# Patient Record
Sex: FEMALE | Race: WHITE | Employment: OTHER | URBAN - METROPOLITAN AREA
[De-identification: names, ages, dates, MRNs, and addresses within clinical notes are randomized per-mention and may not be internally consistent; named-entity substitution may affect disease eponyms.]

---

## 2017-01-06 ENCOUNTER — HOSPITAL ENCOUNTER (OUTPATIENT)
Dept: LAB | Age: 62
Discharge: HOME | End: 2017-01-06

## 2017-01-06 LAB
INR PPP: 1.3 (ref 0.9–1.2)
PROTHROMBIN TIME: 13.8 SEC (ref 9.6–12)

## 2017-01-06 PROCEDURE — 85610 PROTHROMBIN TIME: CPT | Performed by: GENERAL PRACTICE

## 2017-01-17 ENCOUNTER — HOSPITAL ENCOUNTER (OUTPATIENT)
Dept: LAB | Age: 62
Discharge: HOME | End: 2017-01-17

## 2017-01-17 LAB
INR PPP: 2 (ref 0.9–1.2)
PROTHROMBIN TIME: 21.9 SEC (ref 9.6–12)

## 2017-01-17 PROCEDURE — 85610 PROTHROMBIN TIME: CPT

## 2017-02-17 ENCOUNTER — HOSPITAL ENCOUNTER (OUTPATIENT)
Dept: LAB | Age: 62
Discharge: HOME | End: 2017-02-17

## 2017-02-17 LAB
INR PPP: 8.1 (ref 0.9–1.2)
PROTHROMBIN TIME: >82 SEC (ref 9.6–12)

## 2017-02-17 PROCEDURE — 85610 PROTHROMBIN TIME: CPT

## 2017-04-11 ENCOUNTER — HOSPITAL ENCOUNTER (OUTPATIENT)
Dept: LAB | Age: 62
Discharge: HOME | End: 2017-04-11

## 2017-04-11 LAB
ERYTHROCYTE [DISTWIDTH] IN BLOOD BY AUTOMATED COUNT: 18.8 % (ref 11.9–14.6)
HCT VFR BLD AUTO: 41.4 % (ref 35.8–46.3)
HGB BLD-MCNC: 13.1 G/DL (ref 11.7–15.4)
MCH RBC QN AUTO: 28 PG (ref 26.1–32.9)
MCHC RBC AUTO-ENTMCNC: 31.6 G/DL (ref 31.4–35)
MCV RBC AUTO: 88.5 FL (ref 79.6–97.8)
PLATELET # BLD AUTO: 429 K/UL (ref 150–450)
PMV BLD AUTO: 11 FL (ref 10.8–14.1)
RBC # BLD AUTO: 4.68 M/UL (ref 4.05–5.25)
WBC # BLD AUTO: 11.8 K/UL (ref 4.3–11.1)

## 2017-04-11 PROCEDURE — 85027 COMPLETE CBC AUTOMATED: CPT | Performed by: GENERAL PRACTICE

## 2017-05-23 ENCOUNTER — HOSPITAL ENCOUNTER (OUTPATIENT)
Dept: LAB | Age: 62
Discharge: HOME | End: 2017-05-23

## 2017-09-30 ENCOUNTER — HOSPITAL ENCOUNTER (OUTPATIENT)
Dept: LAB | Age: 62
Discharge: HOME | End: 2017-09-30

## 2017-09-30 LAB
ALBUMIN SERPL-MCNC: 3.3 G/DL (ref 3.2–4.6)
ALBUMIN/GLOB SERPL: 0.8 {RATIO} (ref 1.2–3.5)
ALP SERPL-CCNC: 162 U/L (ref 50–136)
ALT SERPL-CCNC: 31 U/L (ref 12–65)
ANION GAP SERPL CALC-SCNC: 10 MMOL/L (ref 7–16)
AST SERPL-CCNC: 37 U/L (ref 15–37)
BILIRUB SERPL-MCNC: 0.4 MG/DL (ref 0.2–1.1)
BUN SERPL-MCNC: 8 MG/DL (ref 8–23)
CALCIUM SERPL-MCNC: 8.9 MG/DL (ref 8.3–10.4)
CHLORIDE SERPL-SCNC: 93 MMOL/L (ref 98–107)
CO2 SERPL-SCNC: 32 MMOL/L (ref 21–32)
CREAT SERPL-MCNC: 0.6 MG/DL (ref 0.6–1)
ERYTHROCYTE [DISTWIDTH] IN BLOOD BY AUTOMATED COUNT: 14.8 % (ref 11.9–14.6)
GLOBULIN SER CALC-MCNC: 4.4 G/DL (ref 2.3–3.5)
GLUCOSE SERPL-MCNC: 269 MG/DL (ref 65–100)
HCT VFR BLD AUTO: 48.5 % (ref 35.8–46.3)
HGB BLD-MCNC: 15.4 G/DL (ref 11.7–15.4)
MCH RBC QN AUTO: 29.8 PG (ref 26.1–32.9)
MCHC RBC AUTO-ENTMCNC: 31.8 G/DL (ref 31.4–35)
MCV RBC AUTO: 93.8 FL (ref 79.6–97.8)
PLATELET # BLD AUTO: 369 K/UL (ref 150–450)
PMV BLD AUTO: 11.2 FL (ref 10.8–14.1)
POTASSIUM SERPL-SCNC: 3.6 MMOL/L (ref 3.5–5.1)
PROT SERPL-MCNC: 7.7 G/DL (ref 6.3–8.2)
RBC # BLD AUTO: 5.17 M/UL (ref 4.05–5.25)
SODIUM SERPL-SCNC: 135 MMOL/L (ref 136–145)
WBC # BLD AUTO: 10.9 K/UL (ref 4.3–11.1)

## 2017-09-30 PROCEDURE — 80053 COMPREHEN METABOLIC PANEL: CPT | Performed by: GENERAL PRACTICE

## 2017-09-30 PROCEDURE — 85027 COMPLETE CBC AUTOMATED: CPT | Performed by: GENERAL PRACTICE

## 2018-04-30 ENCOUNTER — HOSPITAL ENCOUNTER (OUTPATIENT)
Dept: LAB | Age: 63
Discharge: HOME | End: 2018-04-30

## 2018-04-30 LAB
AMORPH CRY URNS QL MICRO: ABNORMAL
APPEARANCE UR: ABNORMAL
BACTERIA URNS QL MICRO: ABNORMAL /HPF
BILIRUB UR QL: NEGATIVE
CASTS URNS QL MICRO: ABNORMAL /LPF
COLOR UR: YELLOW
EPI CELLS #/AREA URNS HPF: ABNORMAL /HPF
GLUCOSE UR STRIP.AUTO-MCNC: 250 MG/DL
HGB UR QL STRIP: ABNORMAL
KETONES UR QL STRIP.AUTO: NEGATIVE MG/DL
LEUKOCYTE ESTERASE UR QL STRIP.AUTO: ABNORMAL
NITRITE UR QL STRIP.AUTO: POSITIVE
PH UR STRIP: 8 [PH] (ref 5–9)
PROT UR STRIP-MCNC: NEGATIVE MG/DL
RBC #/AREA URNS HPF: ABNORMAL /HPF
SP GR UR REFRACTOMETRY: 1.01 (ref 1–1.02)
UROBILINOGEN UR QL STRIP.AUTO: 1 EU/DL (ref 0.2–1)
WBC URNS QL MICRO: ABNORMAL /HPF

## 2018-04-30 PROCEDURE — 81001 URINALYSIS AUTO W/SCOPE: CPT | Performed by: GENERAL PRACTICE

## 2018-06-27 ENCOUNTER — HOSPITAL ENCOUNTER (OUTPATIENT)
Dept: LAB | Age: 63
Discharge: HOME | End: 2018-06-27

## 2018-06-27 LAB
AMORPH CRY URNS QL MICRO: ABNORMAL
ANION GAP SERPL CALC-SCNC: 13 MMOL/L (ref 7–16)
APPEARANCE UR: ABNORMAL
BACTERIA URNS QL MICRO: ABNORMAL /HPF
BASOPHILS # BLD: 0.1 K/UL (ref 0–0.2)
BASOPHILS NFR BLD: 0 % (ref 0–2)
BILIRUB UR QL: NEGATIVE
BUN SERPL-MCNC: 16 MG/DL (ref 8–23)
CALCIUM SERPL-MCNC: 8.9 MG/DL (ref 8.3–10.4)
CHLORIDE SERPL-SCNC: 104 MMOL/L (ref 98–107)
CO2 SERPL-SCNC: 26 MMOL/L (ref 21–32)
COLOR UR: YELLOW
CREAT SERPL-MCNC: 0.57 MG/DL (ref 0.6–1)
CRYSTALS URNS QL MICRO: ABNORMAL /LPF
DIFFERENTIAL METHOD BLD: ABNORMAL
EOSINOPHIL # BLD: 0.4 K/UL (ref 0–0.8)
EOSINOPHIL NFR BLD: 3 % (ref 0.5–7.8)
EPI CELLS #/AREA URNS HPF: ABNORMAL /HPF
ERYTHROCYTE [DISTWIDTH] IN BLOOD BY AUTOMATED COUNT: 16.3 % (ref 11.9–14.6)
GLUCOSE SERPL-MCNC: 151 MG/DL (ref 65–100)
GLUCOSE UR STRIP.AUTO-MCNC: NEGATIVE MG/DL
HCT VFR BLD AUTO: 45.7 % (ref 35.8–46.3)
HGB BLD-MCNC: 14.4 G/DL (ref 11.7–15.4)
HGB UR QL STRIP: NEGATIVE
IMM GRANULOCYTES # BLD: 0.1 K/UL (ref 0–0.5)
IMM GRANULOCYTES NFR BLD AUTO: 1 % (ref 0–5)
KETONES UR QL STRIP.AUTO: NEGATIVE MG/DL
LEUKOCYTE ESTERASE UR QL STRIP.AUTO: ABNORMAL
LYMPHOCYTES # BLD: 3.1 K/UL (ref 0.5–4.6)
LYMPHOCYTES NFR BLD: 20 % (ref 13–44)
MCH RBC QN AUTO: 29.4 PG (ref 26.1–32.9)
MCHC RBC AUTO-ENTMCNC: 31.5 G/DL (ref 31.4–35)
MCV RBC AUTO: 93.5 FL (ref 79.6–97.8)
MONOCYTES # BLD: 0.7 K/UL (ref 0.1–1.3)
MONOCYTES NFR BLD: 5 % (ref 4–12)
NEUTS SEG # BLD: 10.9 K/UL (ref 1.7–8.2)
NEUTS SEG NFR BLD: 71 % (ref 43–78)
NITRITE UR QL STRIP.AUTO: POSITIVE
PH UR STRIP: 8.5 [PH] (ref 5–9)
PLATELET # BLD AUTO: 416 K/UL (ref 150–450)
PMV BLD AUTO: 11.6 FL (ref 10.8–14.1)
POTASSIUM SERPL-SCNC: 5.1 MMOL/L (ref 3.5–5.1)
PROT UR STRIP-MCNC: NEGATIVE MG/DL
RBC # BLD AUTO: 4.89 M/UL (ref 4.05–5.25)
RBC #/AREA URNS HPF: ABNORMAL /HPF
SODIUM SERPL-SCNC: 143 MMOL/L (ref 136–145)
SP GR UR REFRACTOMETRY: 1.01 (ref 1–1.02)
UROBILINOGEN UR QL STRIP.AUTO: 1 EU/DL (ref 0.2–1)
WBC # BLD AUTO: 15.2 K/UL (ref 4.3–11.1)
WBC URNS QL MICRO: ABNORMAL /HPF

## 2018-06-27 PROCEDURE — 85025 COMPLETE CBC W/AUTO DIFF WBC: CPT | Performed by: GENERAL PRACTICE

## 2018-06-27 PROCEDURE — 80048 BASIC METABOLIC PNL TOTAL CA: CPT | Performed by: GENERAL PRACTICE

## 2018-06-27 PROCEDURE — 81001 URINALYSIS AUTO W/SCOPE: CPT | Performed by: GENERAL PRACTICE

## 2018-07-02 ENCOUNTER — HOSPITAL ENCOUNTER (OUTPATIENT)
Dept: LAB | Age: 63
Discharge: HOME | End: 2018-07-02

## 2018-07-02 LAB
ALBUMIN SERPL-MCNC: 3.2 G/DL (ref 3.2–4.6)
ALBUMIN/GLOB SERPL: 0.8 {RATIO} (ref 1.2–3.5)
ALP SERPL-CCNC: 117 U/L (ref 50–136)
ALT SERPL-CCNC: 48 U/L (ref 12–65)
ANION GAP SERPL CALC-SCNC: 11 MMOL/L (ref 7–16)
APPEARANCE UR: CLEAR
AST SERPL-CCNC: 41 U/L (ref 15–37)
BILIRUB SERPL-MCNC: 0.3 MG/DL (ref 0.2–1.1)
BILIRUB UR QL: NEGATIVE
BUN SERPL-MCNC: 12 MG/DL (ref 8–23)
CALCIUM SERPL-MCNC: 8.6 MG/DL (ref 8.3–10.4)
CHLORIDE SERPL-SCNC: 102 MMOL/L (ref 98–107)
CO2 SERPL-SCNC: 27 MMOL/L (ref 21–32)
COLOR UR: YELLOW
CREAT SERPL-MCNC: 0.67 MG/DL (ref 0.6–1)
ERYTHROCYTE [DISTWIDTH] IN BLOOD BY AUTOMATED COUNT: 15.5 % (ref 11.9–14.6)
GLOBULIN SER CALC-MCNC: 4 G/DL (ref 2.3–3.5)
GLUCOSE SERPL-MCNC: 236 MG/DL (ref 65–100)
GLUCOSE UR STRIP.AUTO-MCNC: >1000 MG/DL
HCT VFR BLD AUTO: 43.8 % (ref 35.8–46.3)
HGB BLD-MCNC: 13.9 G/DL (ref 11.7–15.4)
HGB UR QL STRIP: NEGATIVE
KETONES UR QL STRIP.AUTO: NEGATIVE MG/DL
LEUKOCYTE ESTERASE UR QL STRIP.AUTO: NEGATIVE
MCH RBC QN AUTO: 29.5 PG (ref 26.1–32.9)
MCHC RBC AUTO-ENTMCNC: 31.7 G/DL (ref 31.4–35)
MCV RBC AUTO: 93 FL (ref 79.6–97.8)
NITRITE UR QL STRIP.AUTO: NEGATIVE
PH UR STRIP: 6.5 [PH] (ref 5–9)
PLATELET # BLD AUTO: 433 K/UL (ref 150–450)
PMV BLD AUTO: 11.4 FL (ref 10.8–14.1)
POTASSIUM SERPL-SCNC: 4.2 MMOL/L (ref 3.5–5.1)
PROT SERPL-MCNC: 7.2 G/DL (ref 6.3–8.2)
PROT UR STRIP-MCNC: NEGATIVE MG/DL
RBC # BLD AUTO: 4.71 M/UL (ref 4.05–5.25)
SODIUM SERPL-SCNC: 140 MMOL/L (ref 136–145)
SP GR UR REFRACTOMETRY: 1.02 (ref 1–1.02)
UROBILINOGEN UR QL STRIP.AUTO: 1 EU/DL (ref 0.2–1)
WBC # BLD AUTO: 11.6 K/UL (ref 4.3–11.1)

## 2018-07-02 PROCEDURE — 85027 COMPLETE CBC AUTOMATED: CPT | Performed by: GENERAL PRACTICE

## 2018-07-02 PROCEDURE — 80053 COMPREHEN METABOLIC PANEL: CPT | Performed by: GENERAL PRACTICE

## 2018-07-02 PROCEDURE — 81003 URINALYSIS AUTO W/O SCOPE: CPT | Performed by: GENERAL PRACTICE

## 2018-07-30 ENCOUNTER — HOSPITAL ENCOUNTER (OUTPATIENT)
Dept: LAB | Age: 63
Discharge: HOME | End: 2018-07-30

## 2018-07-30 LAB
APPEARANCE UR: ABNORMAL
BACTERIA URNS QL MICRO: ABNORMAL /HPF
BILIRUB UR QL: NEGATIVE
CASTS URNS QL MICRO: ABNORMAL /LPF
COLOR UR: YELLOW
EPI CELLS #/AREA URNS HPF: ABNORMAL /HPF
GLUCOSE UR STRIP.AUTO-MCNC: 250 MG/DL
HGB UR QL STRIP: NEGATIVE
KETONES UR QL STRIP.AUTO: NEGATIVE MG/DL
LEUKOCYTE ESTERASE UR QL STRIP.AUTO: ABNORMAL
NITRITE UR QL STRIP.AUTO: NEGATIVE
PH UR STRIP: 5.5 [PH] (ref 5–9)
PROT UR STRIP-MCNC: ABNORMAL MG/DL
RBC #/AREA URNS HPF: ABNORMAL /HPF
SP GR UR REFRACTOMETRY: 1.01 (ref 1–1.02)
UROBILINOGEN UR QL STRIP.AUTO: 0.2 EU/DL (ref 0.2–1)
WBC URNS QL MICRO: ABNORMAL /HPF

## 2018-07-30 PROCEDURE — 81001 URINALYSIS AUTO W/SCOPE: CPT | Performed by: GENERAL PRACTICE

## 2019-01-31 ENCOUNTER — APPOINTMENT (OUTPATIENT)
Dept: GENERAL RADIOLOGY | Age: 64
DRG: 466 | End: 2019-01-31
Attending: EMERGENCY MEDICINE
Payer: MEDICAID

## 2019-01-31 ENCOUNTER — HOSPITAL ENCOUNTER (INPATIENT)
Age: 64
LOS: 4 days | Discharge: HOME | DRG: 466 | End: 2019-02-04
Attending: EMERGENCY MEDICINE | Admitting: INTERNAL MEDICINE
Payer: MEDICAID

## 2019-01-31 ENCOUNTER — APPOINTMENT (OUTPATIENT)
Dept: CT IMAGING | Age: 64
DRG: 466 | End: 2019-01-31
Attending: EMERGENCY MEDICINE
Payer: MEDICAID

## 2019-01-31 DIAGNOSIS — A41.9 SEPSIS, DUE TO UNSPECIFIED ORGANISM: Primary | ICD-10-CM

## 2019-01-31 DIAGNOSIS — G93.40 ENCEPHALOPATHY ACUTE: ICD-10-CM

## 2019-01-31 PROBLEM — N39.0 UTI (URINARY TRACT INFECTION): Status: ACTIVE | Noted: 2019-01-31

## 2019-01-31 PROBLEM — R41.82 ALTERED MENTAL STATUS: Status: ACTIVE | Noted: 2019-01-31

## 2019-01-31 PROBLEM — R79.89 ELEVATED LACTIC ACID LEVEL: Status: ACTIVE | Noted: 2019-01-31

## 2019-01-31 PROBLEM — I48.91 ATRIAL FIBRILLATION WITH RVR (HCC): Status: ACTIVE | Noted: 2019-01-31

## 2019-01-31 LAB
ALBUMIN SERPL-MCNC: 3.5 G/DL (ref 3.2–4.6)
ALBUMIN/GLOB SERPL: 0.6 {RATIO} (ref 1.2–3.5)
ALP SERPL-CCNC: 122 U/L (ref 50–136)
ALT SERPL-CCNC: 39 U/L (ref 12–65)
AMMONIA PLAS-SCNC: 16 UMOL/L (ref 11–32)
AMPHET UR QL SCN: NEGATIVE
ANION GAP SERPL CALC-SCNC: 7 MMOL/L (ref 7–16)
ANION GAP SERPL CALC-SCNC: 9 MMOL/L (ref 7–16)
APAP SERPL-MCNC: <2 UG/ML (ref 10–30)
ARTERIAL PATENCY WRIST A: YES
AST SERPL-CCNC: 98 U/L (ref 15–37)
ATRIAL RATE: 312 BPM
ATRIAL RATE: 326 BPM
BARBITURATES UR QL SCN: NEGATIVE
BASE EXCESS BLD CALC-SCNC: 0 MMOL/L
BASOPHILS # BLD: 0.1 K/UL (ref 0–0.2)
BASOPHILS NFR BLD: 1 % (ref 0–2)
BDY SITE: ABNORMAL
BENZODIAZ UR QL: POSITIVE
BILIRUB SERPL-MCNC: 0.7 MG/DL (ref 0.2–1.1)
BODY TEMPERATURE: 98.6
BUN SERPL-MCNC: 11 MG/DL (ref 8–23)
BUN SERPL-MCNC: 11 MG/DL (ref 8–23)
CALCIUM SERPL-MCNC: 9.5 MG/DL (ref 8.3–10.4)
CALCIUM SERPL-MCNC: 9.6 MG/DL (ref 8.3–10.4)
CALCULATED P AXIS, ECG09: 101 DEGREES
CALCULATED R AXIS, ECG10: 35 DEGREES
CALCULATED R AXIS, ECG10: 45 DEGREES
CALCULATED T AXIS, ECG11: 163 DEGREES
CALCULATED T AXIS, ECG11: 52 DEGREES
CANNABINOIDS UR QL SCN: NEGATIVE
CHLORIDE SERPL-SCNC: 101 MMOL/L (ref 98–107)
CHLORIDE SERPL-SCNC: 103 MMOL/L (ref 98–107)
CO2 BLD-SCNC: 27 MMOL/L
CO2 SERPL-SCNC: 27 MMOL/L (ref 21–32)
CO2 SERPL-SCNC: 29 MMOL/L (ref 21–32)
COCAINE UR QL SCN: NEGATIVE
COLLECT TIME,HTIME: 1430
CREAT SERPL-MCNC: 0.7 MG/DL (ref 0.6–1)
CREAT SERPL-MCNC: 0.78 MG/DL (ref 0.6–1)
DIAGNOSIS, 93000: NORMAL
DIAGNOSIS, 93000: NORMAL
DIFFERENTIAL METHOD BLD: ABNORMAL
EOSINOPHIL # BLD: 0.1 K/UL (ref 0–0.8)
EOSINOPHIL NFR BLD: 0 % (ref 0.5–7.8)
ERYTHROCYTE [DISTWIDTH] IN BLOOD BY AUTOMATED COUNT: 15.9 % (ref 11.9–14.6)
ETHANOL SERPL-MCNC: <3 MG/DL
FLOW RATE ISTAT,IFRATE: 3 L/MIN
GAS FLOW.O2 O2 DELIVERY SYS: ABNORMAL L/MIN
GLOBULIN SER CALC-MCNC: 6.2 G/DL (ref 2.3–3.5)
GLUCOSE BLD STRIP.AUTO-MCNC: 260 MG/DL (ref 65–100)
GLUCOSE BLD STRIP.AUTO-MCNC: 266 MG/DL (ref 65–100)
GLUCOSE SERPL-MCNC: 254 MG/DL (ref 65–100)
GLUCOSE SERPL-MCNC: 266 MG/DL (ref 65–100)
HCO3 BLD-SCNC: 26 MMOL/L (ref 22–26)
HCT VFR BLD AUTO: 50.7 % (ref 35.8–46.3)
HGB BLD-MCNC: 16.4 G/DL (ref 11.7–15.4)
IMM GRANULOCYTES # BLD AUTO: 0.2 K/UL (ref 0–0.5)
IMM GRANULOCYTES NFR BLD AUTO: 1 % (ref 0–5)
INR PPP: 1
LACTATE BLD-SCNC: 2.16 MMOL/L (ref 0.5–1.9)
LACTATE BLD-SCNC: 2.82 MMOL/L (ref 0.5–1.9)
LACTATE SERPL-SCNC: 1.6 MMOL/L (ref 0.4–2)
LYMPHOCYTES # BLD: 2 K/UL (ref 0.5–4.6)
LYMPHOCYTES NFR BLD: 12 % (ref 13–44)
MAGNESIUM SERPL-MCNC: 2.3 MG/DL (ref 1.8–2.4)
MCH RBC QN AUTO: 29.4 PG (ref 26.1–32.9)
MCHC RBC AUTO-ENTMCNC: 32.3 G/DL (ref 31.4–35)
MCV RBC AUTO: 90.9 FL (ref 79.6–97.8)
METHADONE UR QL: NEGATIVE
MONOCYTES # BLD: 0.5 K/UL (ref 0.1–1.3)
MONOCYTES NFR BLD: 3 % (ref 4–12)
NEUTS SEG # BLD: 13.2 K/UL (ref 1.7–8.2)
NEUTS SEG NFR BLD: 82 % (ref 43–78)
NRBC # BLD: 0 K/UL (ref 0–0.2)
OPIATES UR QL: POSITIVE
PCO2 BLD: 44.8 MMHG (ref 35–45)
PCP UR QL: NEGATIVE
PH BLD: 7.37 [PH] (ref 7.35–7.45)
PLATELET # BLD AUTO: 460 K/UL (ref 150–450)
PMV BLD AUTO: 11.4 FL (ref 9.4–12.3)
PO2 BLD: 73 MMHG (ref 75–100)
POTASSIUM SERPL-SCNC: 4.5 MMOL/L (ref 3.5–5.1)
POTASSIUM SERPL-SCNC: 6.5 MMOL/L (ref 3.5–5.1)
PROT SERPL-MCNC: 9.7 G/DL (ref 6.3–8.2)
PROTHROMBIN TIME: 13.1 SEC (ref 11.7–14.5)
Q-T INTERVAL, ECG07: 252 MS
Q-T INTERVAL, ECG07: 436 MS
QRS DURATION, ECG06: 72 MS
QRS DURATION, ECG06: 72 MS
QTC CALCULATION (BEZET), ECG08: 421 MS
QTC CALCULATION (BEZET), ECG08: 497 MS
RBC # BLD AUTO: 5.58 M/UL (ref 4.05–5.2)
SAO2 % BLD: 94 % (ref 95–98)
SERVICE CMNT-IMP: ABNORMAL
SODIUM SERPL-SCNC: 135 MMOL/L (ref 136–145)
SODIUM SERPL-SCNC: 141 MMOL/L (ref 136–145)
SPECIMEN TYPE: ABNORMAL
TROPONIN I BLD-MCNC: 0.01 NG/ML (ref 0.02–0.05)
TSH SERPL DL<=0.005 MIU/L-ACNC: 2.11 UIU/ML (ref 0.36–3.74)
VENTRICULAR RATE, ECG03: 168 BPM
VENTRICULAR RATE, ECG03: 78 BPM
WBC # BLD AUTO: 16.1 K/UL (ref 4.3–11.1)

## 2019-01-31 PROCEDURE — 65660000000 HC RM CCU STEPDOWN

## 2019-01-31 PROCEDURE — 82803 BLOOD GASES ANY COMBINATION: CPT

## 2019-01-31 PROCEDURE — 36600 WITHDRAWAL OF ARTERIAL BLOOD: CPT

## 2019-01-31 PROCEDURE — 74011000258 HC RX REV CODE- 258: Performed by: INTERNAL MEDICINE

## 2019-01-31 PROCEDURE — 83605 ASSAY OF LACTIC ACID: CPT

## 2019-01-31 PROCEDURE — 74011636637 HC RX REV CODE- 636/637: Performed by: INTERNAL MEDICINE

## 2019-01-31 PROCEDURE — 70450 CT HEAD/BRAIN W/O DYE: CPT

## 2019-01-31 PROCEDURE — 36415 COLL VENOUS BLD VENIPUNCTURE: CPT

## 2019-01-31 PROCEDURE — 87088 URINE BACTERIA CULTURE: CPT

## 2019-01-31 PROCEDURE — 71045 X-RAY EXAM CHEST 1 VIEW: CPT

## 2019-01-31 PROCEDURE — 96374 THER/PROPH/DIAG INJ IV PUSH: CPT | Performed by: EMERGENCY MEDICINE

## 2019-01-31 PROCEDURE — 74011250636 HC RX REV CODE- 250/636: Performed by: EMERGENCY MEDICINE

## 2019-01-31 PROCEDURE — 74011000258 HC RX REV CODE- 258: Performed by: EMERGENCY MEDICINE

## 2019-01-31 PROCEDURE — 93005 ELECTROCARDIOGRAM TRACING: CPT | Performed by: EMERGENCY MEDICINE

## 2019-01-31 PROCEDURE — 74011250637 HC RX REV CODE- 250/637: Performed by: INTERNAL MEDICINE

## 2019-01-31 PROCEDURE — 80307 DRUG TEST PRSMV CHEM ANLYZR: CPT

## 2019-01-31 PROCEDURE — 96365 THER/PROPH/DIAG IV INF INIT: CPT | Performed by: EMERGENCY MEDICINE

## 2019-01-31 PROCEDURE — 96375 TX/PRO/DX INJ NEW DRUG ADDON: CPT | Performed by: EMERGENCY MEDICINE

## 2019-01-31 PROCEDURE — 74011250636 HC RX REV CODE- 250/636: Performed by: INTERNAL MEDICINE

## 2019-01-31 PROCEDURE — 85610 PROTHROMBIN TIME: CPT

## 2019-01-31 PROCEDURE — 74011000250 HC RX REV CODE- 250: Performed by: EMERGENCY MEDICINE

## 2019-01-31 PROCEDURE — 83735 ASSAY OF MAGNESIUM: CPT

## 2019-01-31 PROCEDURE — 84484 ASSAY OF TROPONIN QUANT: CPT

## 2019-01-31 PROCEDURE — 85025 COMPLETE CBC W/AUTO DIFF WBC: CPT

## 2019-01-31 PROCEDURE — 96367 TX/PROPH/DG ADDL SEQ IV INF: CPT | Performed by: EMERGENCY MEDICINE

## 2019-01-31 PROCEDURE — 80053 COMPREHEN METABOLIC PANEL: CPT

## 2019-01-31 PROCEDURE — 87186 SC STD MICRODIL/AGAR DIL: CPT

## 2019-01-31 PROCEDURE — 87086 URINE CULTURE/COLONY COUNT: CPT

## 2019-01-31 PROCEDURE — 87040 BLOOD CULTURE FOR BACTERIA: CPT

## 2019-01-31 PROCEDURE — 82962 GLUCOSE BLOOD TEST: CPT

## 2019-01-31 PROCEDURE — 99285 EMERGENCY DEPT VISIT HI MDM: CPT | Performed by: EMERGENCY MEDICINE

## 2019-01-31 PROCEDURE — 84443 ASSAY THYROID STIM HORMONE: CPT

## 2019-01-31 PROCEDURE — 82140 ASSAY OF AMMONIA: CPT

## 2019-01-31 RX ORDER — OXYCODONE HYDROCHLORIDE 5 MG/1
10 TABLET ORAL
Status: DISCONTINUED | OUTPATIENT
Start: 2019-01-31 | End: 2019-02-04 | Stop reason: HOSPADM

## 2019-01-31 RX ORDER — VANCOMYCIN/0.9 % SOD CHLORIDE 1.5G/250ML
1500 PLASTIC BAG, INJECTION (ML) INTRAVENOUS EVERY 12 HOURS
Status: DISCONTINUED | OUTPATIENT
Start: 2019-01-31 | End: 2019-02-02

## 2019-01-31 RX ORDER — IPRATROPIUM BROMIDE AND ALBUTEROL SULFATE 2.5; .5 MG/3ML; MG/3ML
3 SOLUTION RESPIRATORY (INHALATION)
Status: DISCONTINUED | OUTPATIENT
Start: 2019-01-31 | End: 2019-02-04 | Stop reason: HOSPADM

## 2019-01-31 RX ORDER — ONDANSETRON 2 MG/ML
4 INJECTION INTRAMUSCULAR; INTRAVENOUS
Status: DISCONTINUED | OUTPATIENT
Start: 2019-01-31 | End: 2019-02-04 | Stop reason: HOSPADM

## 2019-01-31 RX ORDER — IPRATROPIUM BROMIDE AND ALBUTEROL SULFATE 2.5; .5 MG/3ML; MG/3ML
3 SOLUTION RESPIRATORY (INHALATION)
Status: ACTIVE | OUTPATIENT
Start: 2019-01-31 | End: 2019-02-01

## 2019-01-31 RX ORDER — INSULIN GLARGINE 100 [IU]/ML
30 INJECTION, SOLUTION SUBCUTANEOUS
Status: DISCONTINUED | OUTPATIENT
Start: 2019-01-31 | End: 2019-02-02

## 2019-01-31 RX ORDER — ALPRAZOLAM 0.5 MG/1
0.5 TABLET ORAL
Status: DISCONTINUED | OUTPATIENT
Start: 2019-01-31 | End: 2019-02-04 | Stop reason: HOSPADM

## 2019-01-31 RX ORDER — QUETIAPINE FUMARATE 100 MG/1
300 TABLET, FILM COATED ORAL
Status: DISCONTINUED | OUTPATIENT
Start: 2019-01-31 | End: 2019-02-04 | Stop reason: HOSPADM

## 2019-01-31 RX ORDER — BUDESONIDE 0.5 MG/2ML
500 INHALANT ORAL 2 TIMES DAILY
Status: DISCONTINUED | OUTPATIENT
Start: 2019-01-31 | End: 2019-02-04 | Stop reason: HOSPADM

## 2019-01-31 RX ORDER — DILTIAZEM HYDROCHLORIDE 5 MG/ML
0.25 INJECTION INTRAVENOUS ONCE
Status: COMPLETED | OUTPATIENT
Start: 2019-01-31 | End: 2019-01-31

## 2019-01-31 RX ORDER — DILTIAZEM HYDROCHLORIDE 5 MG/ML
20 INJECTION INTRAVENOUS ONCE
Status: DISCONTINUED | OUTPATIENT
Start: 2019-01-31 | End: 2019-01-31

## 2019-01-31 RX ORDER — HEPARIN SODIUM 5000 [USP'U]/ML
5000 INJECTION, SOLUTION INTRAVENOUS; SUBCUTANEOUS ONCE
Status: COMPLETED | OUTPATIENT
Start: 2019-01-31 | End: 2019-01-31

## 2019-01-31 RX ORDER — BISACODYL 5 MG
5 TABLET, DELAYED RELEASE (ENTERIC COATED) ORAL
Status: DISCONTINUED | OUTPATIENT
Start: 2019-01-31 | End: 2019-02-04 | Stop reason: HOSPADM

## 2019-01-31 RX ORDER — SODIUM CHLORIDE 9 MG/ML
100 INJECTION, SOLUTION INTRAVENOUS CONTINUOUS
Status: DISCONTINUED | OUTPATIENT
Start: 2019-01-31 | End: 2019-01-31

## 2019-01-31 RX ORDER — ONDANSETRON 2 MG/ML
4 INJECTION INTRAMUSCULAR; INTRAVENOUS
Status: COMPLETED | OUTPATIENT
Start: 2019-01-31 | End: 2019-01-31

## 2019-01-31 RX ORDER — CITALOPRAM 10 MG/1
20 TABLET ORAL DAILY
Status: DISCONTINUED | OUTPATIENT
Start: 2019-02-01 | End: 2019-02-04 | Stop reason: HOSPADM

## 2019-01-31 RX ORDER — HEPARIN SODIUM 5000 [USP'U]/100ML
12-25 INJECTION, SOLUTION INTRAVENOUS
Status: DISCONTINUED | OUTPATIENT
Start: 2019-01-31 | End: 2019-02-01

## 2019-01-31 RX ORDER — SODIUM CHLORIDE 9 MG/ML
100 INJECTION, SOLUTION INTRAVENOUS CONTINUOUS
Status: DISCONTINUED | OUTPATIENT
Start: 2019-01-31 | End: 2019-02-01

## 2019-01-31 RX ORDER — HALOPERIDOL 5 MG/ML
2.5 INJECTION INTRAMUSCULAR
Status: COMPLETED | OUTPATIENT
Start: 2019-01-31 | End: 2019-01-31

## 2019-01-31 RX ORDER — INSULIN LISPRO 100 [IU]/ML
INJECTION, SOLUTION INTRAVENOUS; SUBCUTANEOUS
Status: DISCONTINUED | OUTPATIENT
Start: 2019-01-31 | End: 2019-02-04 | Stop reason: HOSPADM

## 2019-01-31 RX ORDER — GUAIFENESIN 100 MG/5ML
81 LIQUID (ML) ORAL DAILY
Status: DISCONTINUED | OUTPATIENT
Start: 2019-02-01 | End: 2019-02-01

## 2019-01-31 RX ORDER — ACETAMINOPHEN 325 MG/1
650 TABLET ORAL
Status: DISCONTINUED | OUTPATIENT
Start: 2019-01-31 | End: 2019-02-04 | Stop reason: HOSPADM

## 2019-01-31 RX ORDER — HALOPERIDOL 5 MG/ML
2 INJECTION INTRAMUSCULAR
Status: DISCONTINUED | OUTPATIENT
Start: 2019-01-31 | End: 2019-02-04 | Stop reason: HOSPADM

## 2019-01-31 RX ADMIN — SODIUM CHLORIDE 100 ML/HR: 900 INJECTION, SOLUTION INTRAVENOUS at 18:54

## 2019-01-31 RX ADMIN — HEPARIN SODIUM AND DEXTROSE 12 UNITS/KG/HR: 5000; 5 INJECTION INTRAVENOUS at 18:12

## 2019-01-31 RX ADMIN — SODIUM CHLORIDE 100 ML/HR: 900 INJECTION, SOLUTION INTRAVENOUS at 18:14

## 2019-01-31 RX ADMIN — INSULIN GLARGINE 30 UNITS: 100 INJECTION, SOLUTION SUBCUTANEOUS at 22:06

## 2019-01-31 RX ADMIN — PIPERACILLIN SODIUM,TAZOBACTAM SODIUM 4.5 G: 4; .5 INJECTION, POWDER, FOR SOLUTION INTRAVENOUS at 13:01

## 2019-01-31 RX ADMIN — SODIUM CHLORIDE 1000 ML: 900 INJECTION, SOLUTION INTRAVENOUS at 11:05

## 2019-01-31 RX ADMIN — DILTIAZEM HYDROCHLORIDE 25 MG: 5 INJECTION INTRAVENOUS at 10:54

## 2019-01-31 RX ADMIN — HEPARIN SODIUM 5000 UNITS: 5000 INJECTION INTRAVENOUS; SUBCUTANEOUS at 18:14

## 2019-01-31 RX ADMIN — QUETIAPINE FUMARATE 300 MG: 100 TABLET ORAL at 20:53

## 2019-01-31 RX ADMIN — SODIUM CHLORIDE 1000 ML: 900 INJECTION, SOLUTION INTRAVENOUS at 13:47

## 2019-01-31 RX ADMIN — VANCOMYCIN HYDROCHLORIDE 2500 MG: 10 INJECTION, POWDER, LYOPHILIZED, FOR SOLUTION INTRAVENOUS at 11:57

## 2019-01-31 RX ADMIN — HALOPERIDOL LACTATE 2.5 MG: 5 INJECTION INTRAMUSCULAR at 13:52

## 2019-01-31 RX ADMIN — BISACODYL 5 MG: 5 TABLET, COATED ORAL at 20:54

## 2019-01-31 RX ADMIN — PIPERACILLIN SODIUM,TAZOBACTAM SODIUM 3.38 G: 3; .375 INJECTION, POWDER, FOR SOLUTION INTRAVENOUS at 20:54

## 2019-01-31 RX ADMIN — INSULIN LISPRO 6 UNITS: 100 INJECTION, SOLUTION INTRAVENOUS; SUBCUTANEOUS at 22:07

## 2019-01-31 RX ADMIN — ALPRAZOLAM 0.5 MG: 0.5 TABLET ORAL at 20:54

## 2019-01-31 RX ADMIN — DILTIAZEM HYDROCHLORIDE 5 MG/HR: 5 INJECTION INTRAVENOUS at 14:30

## 2019-01-31 RX ADMIN — OXYCODONE HYDROCHLORIDE 10 MG: 5 TABLET ORAL at 20:54

## 2019-01-31 RX ADMIN — ONDANSETRON 4 MG: 2 INJECTION INTRAMUSCULAR; INTRAVENOUS at 13:52

## 2019-01-31 NOTE — H&P
History and Physical 
 
Patient: Blanca Crawford MRN: [id number]  SSN: [id number] YOB: 1955  Age: 59 y.o. Sex: female Subjective:\" I was confused\" Blanca Crawford is a 59 y.o. female who has a PMH of type 2 DM, p A fib not on meds, prior DVT on coumadin, HTN, paraplegia on chronic SP cath due to neurogenic bladder, bipolar disorder, anxiety, chronic pain on opiate and xanax, chronic hepatitis C who was brought in by EMS after she was found with altered mental status for the past 2 days. She was recently released from [email or web address] days ago. The patient stated not overusing her oxycodone nor xanax, which are given to her by pain clinic. She complains of fever, chills, cough, malaise. Denies falls, chest pain, dizziness, diarrhea. She does have recurrent UTIs. During EMS on route, patient was found in A fib RVR and also upon ER arrival (  on EKG ). She was started on cardizem bolus and gtt. She was found alert, conversant, oriented in person and place. Labs: lactic acid 2.8, normal troponin; wbc 16k, chemistry was hemolyzed and repeated. Positive urine dipstick. Brain ct normal. THS normal. Ammonia normal. Drug screen positive for benzo and opiates. Abg no hypoxia nor hypercapnia. CXR no infiltrates. She received iv vanc and zosyn empirically and hospitalist was contacted for admission in view of AMS, A fib and sepsis. ROS: as above Social hx: chronic daily smoker 4 cig day. No alcohol use Family hx: parents are healthy. Reviewed with patient Past Medical History:  
Diagnosis Date  Diabetes ([facility] [address]  Gastrointestinal disorder GERD  Hypertension  Ill-defined condition   
 neurogenic bladder  Ill-defined condition   
 transverse myelitis  Ill-defined condition   
 paraplegia  Liver disease Hepatitis C  
 Psychiatric disorder   
 anxiety  Psychiatric disorder   
 bipolar  Thromboembolus ([facility] [address] No past surgical history on file. No family history on file. Social History Tobacco Use  Smoking status: Current Every Day Smoker Packs/day: 0.50 Substance Use Topics  Alcohol use: No  
  
Prior to Admission medications Medication Sig Start Date End Date Taking? Authorizing Provider Blood-Glucose Meter monitoring kit Check glucose at home daily 8/14/18   Junior Marmolejo MD  
insulin glargine (LANTUS SOLOSTAR) 100 unit/mL (3 mL) pen 30 Units by SubCUTAneous route nightly. 12/21/16   Annita Golden MD  
oxyCODONE IR (ROXICODONE) 20 mg immediate release tablet Take 1 Tab by mouth every eight (8) hours as needed. Max Daily Amount: 60 mg. 12/21/16   Annita Golden MD  
ALPRAZolam Martvirgien Alba) 0.5 mg tablet Take 1 Tab by mouth two (2) times daily as needed for Anxiety. Max Daily Amount: 1 mg. 12/21/16   Annita Golden MD  
bisacodyl (DULCOLAX) 5 mg EC tablet Take 1 Tab by mouth daily as needed for Constipation. 12/21/16   Annita Golden MD  
budesonide (PULMICORT) 0.5 mg/2 mL nbsp 2 mL by Nebulization route two (2) times a day. 12/21/16   Annita Golden MD  
fentaNYL (DURAGESIC) 25 mcg/hr PATCH 1 Patch by TransDERmal route every seventy-two (72) hours. Max Daily Amount: 1 Patch. 12/21/16   Annita Golden MD  
nystatin (MYCOSTATIN) powder Apply  to affected area two (2) times a day. 12/21/16   Annita Golden MD  
zinc oxide-cod liver oil (DESITIN) 40 % paste Apply  to affected area two (2) times a day. 12/21/16   Annita Golden MD  
warfarin (COUMADIN) 2.5 mg tablet Take 0.5 Tabs by mouth every evening. Indications: DEEP VENOUS THROMBOSIS 12/21/16   Annita Golden MD  
pregabalin (LYRICA) 100 mg capsule Take 1 Cap by mouth three (3) times daily. Max Daily Amount: 300 mg. 12/21/16   Annita Golden MD  
furosemide (LASIX) 40 mg tablet Take  by mouth two (2) times a day. Provider, Historical  
citalopram (CELEXA) 20 mg tablet Take 20 mg by mouth daily.     Other, MD Dominique  
 aspirin 81 mg chewable tablet Take 81 mg by mouth daily. Dominique Sinha MD  
omeprazole (PRILOSEC) 40 mg capsule Take 40 mg by mouth daily. Dominique Sinha MD  
promethazine (PHENERGAN) 25 mg tablet Take 25 mg by mouth every six (6) hours as needed for Nausea. Dominique Sinha MD  
QUEtiapine (SEROQUEL) 100 mg tablet Take 300 mg by mouth nightly. Dominique Sinha MD  
  
 
No Known Allergies Review of Systems: A comprehensive review of systems was negative except for that written in the History of Present Illness. Objective:  
 
Vitals:  
 01/31/19 1016 01/31/19 1229 01/31/19 1243 BP: 180/80 138/73 139/72 Pulse: (!) 165 (!) 143 76 Resp: 18  22 Temp: 98.1 °F (36.7 °C) SpO2: 95% 91% 90% Weight: 99.8 kg (220 lb) Height: 5' 5\" (1.651 m) Physical Exam: 
GENERAL: alert, cooperative, no distress, appears stated age EYE: negative LYMPHATIC: Cervical, supraclavicular, and axillary nodes normal.  
THROAT & NECK: normal and no erythema or exudates noted. LUNG: clear to auscultation bilaterally HEART: irregular rate and rhythm, no murmur, click, rub or gallop ABDOMEN: soft, non-tender. Bowel sounds normal. No masses,  no organomegaly EXTREMITIES:  extremities normal, atraumatic, no cyanosis or edema - paraplegia SP cath in place SKIN: Normal. 
NEUROLOGIC: oriented in person, place, still drowsy though. Paraplegia. PSYCHIATRIC: non focal 
 
Assessment:  
 
Hospital Problems  Date Reviewed: 1/31/2019 Codes Class Noted POA  
 UTI (urinary tract infection) ICD-10-CM: N39.0 ICD-9-CM: 599.0  1/31/2019 Unknown Altered mental status ICD-10-CM: R41.82 
ICD-9-CM: 780.97  1/31/2019 Unknown Sepsis ([facility] ICD-10-CM: A41.9 ICD-9-CM: 038.9, 995.91  1/31/2019 Unknown * (Principal) Atrial fibrillation with RVR ([facility] ICD-10-CM: I48.91 
ICD-9-CM: 427.31  1/31/2019 Unknown Elevated lactic acid level ICD-10-CM: R79.89 ICD-9-CM: 276.2  1/31/2019 Unknown Type 2 diabetes mellitus with hyperglycemia (HCC) (Chronic) ICD-10-CM: E11.65 ICD-9-CM: 250.00  12/10/2016 Yes Paraplegia (HCC) (Chronic) ICD-10-CM: G57.17 ICD-9-CM: 344.1  12/10/2016 Yes Bipolar disorder without psychotic features (HCC) (Chronic) ICD-10-CM: F31.9 ICD-9-CM: 296.80  12/10/2016 Yes Chronic hepatitis C virus infection (HCC) (Chronic) ICD-10-CM: B18.2 ICD-9-CM: 070.54  12/10/2016 Yes Narcotic addiction ([facility] [address] ICD-10-CM: H09.80 ICD-9-CM: 304.90  12/10/2016 Yes Ms Solange Davis is a 58 yo female with history of paraplegia on chronic CP cath. Came with A fib RVR, sepsis possible related to CAUTI and acute metabolic encephalopathy, due to problem 2. She would benefit from cardizem gtt, heparin gtt and cardiology evaluation. Antibiotics already started. Plan:  
 
-Paroxysmal A fib with RVR: [id number] 2 score: 2 Continue cardizem gtt in view of preserved hemodynamics Heparin gtt, with serial ptt checks Cardiology consult ECHO 
 
-Sepsis: possible due to CAUTI - patient has chronic suprapubic cath in view of neurogenic bladder - complained of cough like symptoms at home with malaise Continue vanc and zosyn awaiting cultures Check influenza Monitor lactic acid q 4 hr until normalization Normal saline ivf hydration Monitor CBC 
 
-Acute metabolic encephalopathy, secondary to problem 2 probable, also related to opiate/benzo overuse Patient still confused. Brain ct is normal, she had no focal deficits Monitor neurological status Resume xanax since discontinuation may cause withdrawal 
Reduce oxycodone dose to 10 mg q 8hr instead of 20 mg  
Haldol if agitation 
 
-History of DVT on coumadin: 
INR level upon admission was 1. Questionable compliance Started on heparin gtt  
 
-Paraplegia, chronic suprapubic cath: monitor / PTOT 
 
-Type 2 DM: SSI 
 
-Chronic hepatitis C: stable 
 
-Chronic opiate dependence, chronic pain: on oxycodone -Anxiety/bipolar: on xanax, seroquel DVT ppx: on heparin gtt Code: full Estimated LOS > 2MN RISK: high Goals of care discussed with patient Disposition: home once stable Signed By: Amarilis Cedeno MD   
 January 31, 2019

## 2019-01-31 NOTE — PROGRESS NOTES
Admission skin assessment completed with second RN and reveals the following:scattered ecchymosis and scabs right knee, left FA, right hand. Sacrum intact- no breakdown noted, heels intact- rooke boots in place. Flaky BLE. Trace edema ble. Right Ac IV is half dollar size pink and firm. pt endorses pain at site. IV discontinued at this time #22 gauge R shoulder #20 gauge R AC removed

## 2019-01-31 NOTE — ED NOTES
Zosyn has been pulled from [place] and placed at bedside for administration after blood cultures are obtained.

## 2019-01-31 NOTE — PROGRESS NOTES
TRANSFER - IN REPORT: 
 
Verbal report received from NALDO Tejada on Blanca Crawford being received from ED for routine progression of care Report consisted of patients Situation, Background, Assessment and Recommendations(SBAR). Information from the following report(s) ED Summary was reviewed with the receiving nurse. Opportunity for questions and clarification was provided. Assessment completed upon patients arrival to unit and care assumed.

## 2019-01-31 NOTE — ED NOTES
Patient has had formed bowel movement into brief at this time. Patient has been cleaned, placed in new brief, linens changed. Patient tolerated procedure well. Dr. Krunal Hewitt aware of patient having suprapubic catheter. Straight cath order has been canceled at this time.

## 2019-01-31 NOTE — ROUTINE PROCESS
TRANSFER - OUT REPORT: 
 
Verbal report given to NLADO Aguero on Kenyatta Tovar  being transferred to 3rd floor for routine progression of care Report consisted of patients Situation, Background, Assessment and  
Recommendations(SBAR). Information from the following report(s) ED Summary was reviewed with the receiving nurse. Lines:    
 
Opportunity for questions and clarification was provided. Patient transported with: 
 Registered Nurse

## 2019-01-31 NOTE — ED PROVIDER NOTES
72-year-old female presents with altered mental status and tachycardia EMS reports that the family member stated that she was more confused than her baseline and found to have a heart rate in the 160s to 180s I also reported the patient has been noncompliant with her routine medications Patient is a very poor historian The history is provided by the patient and the EMS personnel. Irregular Heart Beat This is a recurrent problem. The problem has not changed since onset. The problem occurs constantly. The problem is associated with an unknown factor. Associated symptoms include malaise/fatigue, chest pain and irregular heartbeat. Pertinent negatives include no fever. Risk factors include a sendentary lifestyle. She has tried nothing for the symptoms. Her past medical history is significant for DM and atrial fibrillation. Past Medical History:  
Diagnosis Date  Diabetes ([facility] [address]  Gastrointestinal disorder GERD  Hypertension  Ill-defined condition   
 neurogenic bladder  Ill-defined condition   
 transverse myelitis  Ill-defined condition   
 paraplegia  Liver disease Hepatitis C  
 Psychiatric disorder   
 anxiety  Psychiatric disorder   
 bipolar  Thromboembolus ([facility] [address] No past surgical history on file. No family history on file. Social History Socioeconomic History  Marital status:  Spouse name: Not on file  Number of children: Not on file  Years of education: Not on file  Highest education level: Not on file Social Needs  Financial resource strain: Not on file  Food insecurity - worry: Not on file  Food insecurity - inability: Not on file  Transportation needs - medical: Not on file  Transportation needs - non-medical: Not on file Occupational History  Not on file Tobacco Use  Smoking status: Current Every Day Smoker Packs/day: 0.50 Substance and Sexual Activity  Alcohol use:  No  
  Drug use: No  
 Sexual activity: Not on file Other Topics Concern  Not on file Social History Narrative  Not on file ALLERGIES: Patient has no known allergies. Review of Systems Unable to perform ROS: Mental status change Constitutional: Positive for malaise/fatigue. Negative for fever. Cardiovascular: Positive for chest pain and palpitations. Vitals:  
 01/31/19 1016 BP: 180/80 Pulse: (!) 165 Resp: 18 Temp: 98.1 °F (36.7 °C) SpO2: 95% Weight: 99.8 kg (220 lb) Height: 5' 5\" (1.651 m) Physical Exam  
Constitutional: She is oriented to person, place, and time. She appears well-developed and well-nourished. She appears listless. She appears distressed. HENT:  
Head: Normocephalic and atraumatic. Mouth/Throat: Oropharynx is clear and moist. No oropharyngeal exudate. Eyes: Conjunctivae and EOM are normal. Pupils are equal, round, and reactive to light. Neck: Normal range of motion. Neck supple. No JVD present. Cardiovascular: Normal heart sounds and intact distal pulses. An irregularly irregular rhythm present. Tachycardia present. Exam reveals no gallop and no friction rub. No murmur heard. Pulmonary/Chest: Effort normal and breath sounds normal.  
Abdominal: Soft. Normal appearance and bowel sounds are normal. She exhibits no distension and no mass. There is no hepatosplenomegaly. There is no tenderness. Musculoskeletal: Normal range of motion. She exhibits no edema or deformity. Lower extremity paralysis, chronic. Trace pedal edema Both ankles are in protective boots Neurological: She is oriented to person, place, and time. She has normal strength. She appears listless. She is not disoriented. No cranial nerve deficit or sensory deficit. She displays a negative Romberg sign. She displays no seizure activity. Gait normal. GCS eye subscore is 4. GCS verbal subscore is 4. GCS motor subscore is 6. Bilateral lower extremity paralysis which is chronic. Skin: Skin is warm and dry. Capillary refill takes less than 2 seconds. No rash noted. Psychiatric: Her affect is blunt. Her speech is delayed. She is slowed. Cognition and memory are impaired. She is inattentive. Nursing note and vitals reviewed. MDM Number of Diagnoses or Management Options Encephalopathy acute: new and requires workup Sepsis, due to unspecified organism [facility]): new and requires workup Diagnosis management comments: EKG reviewed A. Fib with RVR Normal axis No acute ischemic changes EKG #2 Sinus rhythm Normal rate. No ectopy No acute ischemic changes 2:07 PM 
Discussed with hospitalist service They will admit Clif Dyer Amount and/or Complexity of Data Reviewed Clinical lab tests: ordered and reviewed Tests in the radiology section of CPT®: ordered and reviewed Tests in the medicine section of CPT®: ordered and reviewed Decide to obtain previous medical records or to obtain history from someone other than the patient: yes Review and summarize past medical records: yes Discuss the patient with other providers: yes Independent visualization of images, tracings, or specimens: yes Risk of Complications, Morbidity, and/or Mortality Presenting problems: high Diagnostic procedures: high Management options: high General comments: Elements of this note have been dictated via voice recognition software. Text and phrases may be limited by the accuracy of the software. The chart has been reviewed, but errors may still be present. Critical Care Total time providing critical care: 30-74 minutes (70 minutes of direct care of patient, reviewing  Laboratory/radiology results, reviewing past history and discussing patient with admitting team) Patient Progress Patient progress: stable Procedures

## 2019-01-31 NOTE — PROGRESS NOTES
Pharmacokinetic Consult to Pharmacist 
 
Irma Frederick is a 59 y.o. female being treated for sepsis. Height: 5' 5\" (165.1 cm)  Weight: 99.8 kg (220 lb) Lab Results Component Value Date/Time BUN 11 01/31/2019 01:00 PM  
 Creatinine 0.70 01/31/2019 01:00 PM  
 WBC 16.1 (H) 01/31/2019 10:56 AM  
 Lactic Acid (POC) 2.82 (H) 01/31/2019 01:14 PM  
  
Estimated Creatinine Clearance: 97.5 mL/min (based on SCr of 0.7 mg/dL). CULTURES: 
All Micro Results Procedure Component Value Units Date/Time INFLUENZA A & B AG (RAPID TEST) [[id number]] Order Status:  Sent Specimen:  Nasopharyngeal   
 CULTURE, URINE [[id number]] Collected:  01/31/19 1314 Order Status:  Completed Specimen:  Urine from Clean catch Updated:  01/31/19 1421 CULTURE, BLOOD [[id number]] Collected:  01/31/19 1307 Order Status:  Completed Specimen:  Whole Blood Updated:  01/31/19 1406 CULTURE, BLOOD [[id number]] Collected:  01/31/19 1307 Order Status:  Completed Specimen:  Whole Blood Updated:  01/31/19 1406  
  
' Day 1 of vancomycin. Goal trough is 15-20. I will order 1500mg q12h to begin tonight. Loading dose was given this morning. Pharmacist will continue to follow patient. Please call with any questions. Thank you, Breonna Ruiz, PharmD, BCCCP Clinical Pharmacist 
[phone]

## 2019-01-31 NOTE — ED TRIAGE NOTES
"Chief Complaint   Patient presents with     Conjunctivitis     Left       Initial /82 (BP Location: Left arm)  Pulse 68  Temp 98.5  F (36.9  C) (Oral) Estimated body mass index is 31.07 kg/(m^2) as calculated from the following:    Height as of 4/6/17: 6' 2\" (1.88 m).    Weight as of 4/6/17: 242 lb (109.8 kg).  Medication Reconciliation: complete  " Patient arrives via EMS for intermittent AMS for the last two days. Patient is able to answer all questions during triage. Recently discharged from rehab facility. A-fib RVR with EMS and on arrival. Patient does have history of this rhythm

## 2019-01-31 NOTE — ED NOTES
Difficulty obtaining blood work for blood cultures. IVF infusing. Charge nurse to attempt to obtain blood cultures prior to abx being administered. Dr. Usha Rao aware.

## 2019-01-31 NOTE — CONSULTS
[facility] Cardiology Consult                Date of  Admission: 1/31/2019 10:10 AM     Primary Care Physician: Dr Tani Zuniga  Primary Cardiologist: Unknown  Referring Physician: Dr Daryle Hudson Physician: Dr Cuca Montenegro    CC/Reason for consult: a fib w RVR      Merlinda Jeans is a 59 y.o. female admitted for Altered mental status  UTI (urinary tract infection)  Elevated lactic acid level  Sepsis ([address]  Atrial fibrillation with RVR ([address]  Atrial fibrillation with RVR ([address] She has a h/o type 2 DM, p A fib, prior DVT on coumadin, HTN, paraplegia on chronic SP cath due to neurogenic bladder, bipolar disorder, anxiety, chronic pain on opiate and xanax, chronic hepatitis C who was brought in by EMS after she was found with altered mental status for the past 2 days. She was recently released from [facility] 2 days ago. The patient stated not overusing her oxycodone nor xanax, which are given to her by pain clinic. She has no family at the bedside, cannot tell me why she is here, what has happened, or who her cardiologist is or what cardiac history she has. History obtained from the chart. She arrived in the ER in a fib w rate 168 w NSST/T wave changes, /80. WBC 16, hgb 16, K 4.5, cr .70, trop . 01. Head CT no acute abnormality. She has been admitted by the hospitalist for possible sepsis and metabolic encephalopathy started on zosyn and vanc. INR 1, unclear whether taking coumadin. She has been started on heparin and cardizem drip at 5 mg/hr and HR remains around 160. She states she currently has diffuse severe chest pain, dyspnea at rest, and notes palpitations but no dizziness or syncope. It appears at some point she converted to NSR but she is now again in a fib w rate 160. Pt not oriented to situation, unable to provide any further history.          Patient Active Problem List   Diagnosis Code    Closed displaced comminuted fracture of shaft of left femur ([facility] S72.352A    Type 2 diabetes mellitus with hyperglycemia ([facility] E11.65    Hypokalemia E87.6    Acute cystitis without hematuria N30.00    Paraplegia (HCC) G82.20    Bipolar disorder without psychotic features ([facility] F31.9    Chronic hepatitis C virus infection ([facility] B18.2    Narcotic addiction ([facility] F11.20    Drug-induced encephalopathy G92    UTI (urinary tract infection) N39.0    Altered mental status R41.82    Sepsis ([facility] A41.9    Atrial fibrillation with RVR (HCC) I48.91    Elevated lactic acid level R79.89       Past Medical History:   Diagnosis Date    Diabetes ([facility]     Gastrointestinal disorder     GERD    Hypertension     Ill-defined condition     neurogenic bladder    Ill-defined condition     transverse myelitis    Ill-defined condition     paraplegia    Liver disease     Hepatitis C    Psychiatric disorder     anxiety    Psychiatric disorder     bipolar    Thromboembolus ([facility]       No past surgical history on file. No Known Allergies   No family history on file. Social History     Tobacco Use    Smoking status: Current Every Day Smoker     Packs/day: 0.50   Substance Use Topics    Alcohol use: No        Current Facility-Administered Medications   Medication Dose Route Frequency    sodium chloride 0.9 % bolus infusion 1,000 mL  1,000 mL IntraVENous ONCE    dilTIAZem (CARDIZEM) 125 mg in dextrose 5% 125 mL infusion  5 mg/hr IntraVENous TITRATE    albuterol-ipratropium (DUO-NEB) 2.5 MG-0.5 MG/3 ML  3 mL Nebulization NOW    heparin 25,000 units in dextrose 500 mL infusion  12-25 Units/kg/hr IntraVENous TITRATE    heparin (porcine) injection 5,000 Units  5,000 Units IntraVENous ONCE     Current Outpatient Medications   Medication Sig    Blood-Glucose Meter monitoring kit Check glucose at home daily    insulin glargine (LANTUS SOLOSTAR) 100 unit/mL (3 mL) pen 30 Units by SubCUTAneous route nightly.  oxyCODONE IR (ROXICODONE) 20 mg immediate release tablet Take 1 Tab by mouth every eight (8) hours as needed.  Max Daily Amount: 60 mg.    ALPRAZolam (XANAX) 0.5 mg tablet Take 1 Tab by mouth two (2) times daily as needed for Anxiety. Max Daily Amount: 1 mg.  bisacodyl (DULCOLAX) 5 mg EC tablet Take 1 Tab by mouth daily as needed for Constipation.  budesonide (PULMICORT) 0.5 mg/2 mL nbsp 2 mL by Nebulization route two (2) times a day.  fentaNYL (DURAGESIC) 25 mcg/hr PATCH 1 Patch by TransDERmal route every seventy-two (72) hours. Max Daily Amount: 1 Patch.  nystatin (MYCOSTATIN) powder Apply  to affected area two (2) times a day.  zinc oxide-cod liver oil (DESITIN) 40 % paste Apply  to affected area two (2) times a day.  warfarin (COUMADIN) 2.5 mg tablet Take 0.5 Tabs by mouth every evening. Indications: DEEP VENOUS THROMBOSIS    pregabalin (LYRICA) 100 mg capsule Take 1 Cap by mouth three (3) times daily. Max Daily Amount: 300 mg.  furosemide (LASIX) 40 mg tablet Take  by mouth two (2) times a day.  citalopram (CELEXA) 20 mg tablet Take 20 mg by mouth daily.  aspirin 81 mg chewable tablet Take 81 mg by mouth daily.  omeprazole (PRILOSEC) 40 mg capsule Take 40 mg by mouth daily.  promethazine (PHENERGAN) 25 mg tablet Take 25 mg by mouth every six (6) hours as needed for Nausea.  QUEtiapine (SEROQUEL) 100 mg tablet Take 300 mg by mouth nightly.        Review of Symptoms:  Unable to obtain due to AMS       Physical Exam  Vitals:    01/31/19 1016 01/31/19 1229 01/31/19 1243 01/31/19 1431   BP: 180/80 138/73 139/72 161/83   Pulse: (!) 165 (!) 143 76 (!) 143   Resp: 18  22 23   Temp: 98.1 °F (36.7 °C)      SpO2: 95% 91% 90% 93%   Weight: 99.8 kg (220 lb)      Height: 5' 5\" (1.651 m)          Physical Exam:  General: Uncomfortable, confused pt, no acute distress, appears stated age  HEENT: pupils equal and round, no abnormalities noted  Neck: supple, no JVD, no carotid bruits  Heart: S1S2 irregularly irregular   Lungs: Clear throughout auscultation bilaterally without adventitious sounds  Abd: soft, nontender, nondistended, with good bowel sounds  Ext: warm, 1+ B edema, venous stasis changes in legs   Skin: warm and dry  Psychiatric: Not oriented to year or place, unable to answer questions appropriately, cannot given history   Neurologic: LE paraplegia with unna boots in place       Cardiographics    Telemetry: a fib w rate around 160     Labs:   Recent Labs     01/31/19  1300 01/31/19  1056    135*   K 4.5 6.5*   MG  --  2.3   BUN 11 11   CREA 0.70 0.78   * 266*   WBC  --  16.1*   HGB  --  16.4*   HCT  --  50.7*   PLT  --  460*   INR  --  1.0        Assessment/Plan:     Assessment:   Atrial fibrillation with RVR ([facility] (1/31/2019)- Pt with AMS, unable to provide any history, now in a fib w RVR supposedly on coumadin with INR 1. She has been admitted and started on antibiotics for sepsis. Cont rate control w cardizem and titrate drip as needed, heparin, check echo     Type 2 diabetes mellitus with hyperglycemia ([address] (12/10/2016)- Cont current meds    Paraplegia ([facility] (12/10/2016)- Per primary     Bipolar disorder without psychotic features ([address] (12/10/2016)- s/p haldol     Chronic hepatitis C virus infection ([facility] [facility] (12/10/2016)- Per primary     Narcotic addiction ([facility] [address] [facility] (12/10/2016)- per primary     UTI (urinary tract infection) (1/31/2019)- zosyn, vanc    Altered mental status (1/31/2019)- head CT without acute process    Sepsis ([address] (1/31/2019)- zosyn, vanc    Elevated lactic acid level (1/31/2019)- s/p 2 L NS fluid bolus    Thank you very much for this referral. We appreciate the opportunity to participate in this patient's care. We will follow along with above stated plan.     Joseph Reed PA-C  Consulting MD: Bri Edwards

## 2019-01-31 NOTE — PROGRESS NOTES
cardizem gtt infusing at 10mg/hr Titrate cardizem per Lucia Hubbard, NP- order modified Heparin gtt initiated- see MAR

## 2019-02-01 LAB
ANION GAP SERPL CALC-SCNC: 8 MMOL/L (ref 7–16)
APPEARANCE UR: ABNORMAL
APTT PPP: 40.4 SEC (ref 24.7–39.8)
APTT PPP: 45.8 SEC (ref 24.7–39.8)
BACTERIA URNS QL MICRO: ABNORMAL /HPF
BASOPHILS # BLD: 0.1 K/UL (ref 0–0.2)
BASOPHILS NFR BLD: 1 % (ref 0–2)
BILIRUB UR QL: NEGATIVE
BUN SERPL-MCNC: 9 MG/DL (ref 8–23)
CALCIUM SERPL-MCNC: 8.4 MG/DL (ref 8.3–10.4)
CASTS URNS QL MICRO: ABNORMAL /LPF
CHLORIDE SERPL-SCNC: 106 MMOL/L (ref 98–107)
CO2 SERPL-SCNC: 27 MMOL/L (ref 21–32)
COLOR UR: YELLOW
CREAT SERPL-MCNC: 0.67 MG/DL (ref 0.6–1)
DIFFERENTIAL METHOD BLD: ABNORMAL
EOSINOPHIL # BLD: 0.3 K/UL (ref 0–0.8)
EOSINOPHIL NFR BLD: 2 % (ref 0.5–7.8)
EPI CELLS #/AREA URNS HPF: ABNORMAL /HPF
ERYTHROCYTE [DISTWIDTH] IN BLOOD BY AUTOMATED COUNT: 15.7 % (ref 11.9–14.6)
FLUAV AG NPH QL IA: NEGATIVE
FLUBV AG NPH QL IA: NEGATIVE
GLUCOSE BLD STRIP.AUTO-MCNC: 215 MG/DL (ref 65–100)
GLUCOSE BLD STRIP.AUTO-MCNC: 231 MG/DL (ref 65–100)
GLUCOSE BLD STRIP.AUTO-MCNC: 249 MG/DL (ref 65–100)
GLUCOSE BLD STRIP.AUTO-MCNC: 267 MG/DL (ref 65–100)
GLUCOSE SERPL-MCNC: 222 MG/DL (ref 65–100)
GLUCOSE UR STRIP.AUTO-MCNC: 500 MG/DL
HCT VFR BLD AUTO: 40.7 % (ref 35.8–46.3)
HGB BLD-MCNC: 12.7 G/DL (ref 11.7–15.4)
HGB UR QL STRIP: ABNORMAL
IMM GRANULOCYTES # BLD AUTO: 0.2 K/UL (ref 0–0.5)
IMM GRANULOCYTES NFR BLD AUTO: 1 % (ref 0–5)
KETONES UR QL STRIP.AUTO: 15 MG/DL
LEUKOCYTE ESTERASE UR QL STRIP.AUTO: ABNORMAL
LYMPHOCYTES # BLD: 2.7 K/UL (ref 0.5–4.6)
LYMPHOCYTES NFR BLD: 20 % (ref 13–44)
MCH RBC QN AUTO: 29.2 PG (ref 26.1–32.9)
MCHC RBC AUTO-ENTMCNC: 31.2 G/DL (ref 31.4–35)
MCV RBC AUTO: 93.6 FL (ref 79.6–97.8)
MONOCYTES # BLD: 0.9 K/UL (ref 0.1–1.3)
MONOCYTES NFR BLD: 7 % (ref 4–12)
NEUTS SEG # BLD: 9.8 K/UL (ref 1.7–8.2)
NEUTS SEG NFR BLD: 70 % (ref 43–78)
NITRITE UR QL STRIP.AUTO: POSITIVE
NRBC # BLD: 0 K/UL (ref 0–0.2)
PH UR STRIP: 7.5 [PH] (ref 5–9)
PLATELET # BLD AUTO: 318 K/UL (ref 150–450)
PMV BLD AUTO: 11.1 FL (ref 9.4–12.3)
POTASSIUM SERPL-SCNC: 3.5 MMOL/L (ref 3.5–5.1)
PROT UR STRIP-MCNC: NEGATIVE MG/DL
RBC # BLD AUTO: 4.35 M/UL (ref 4.05–5.2)
RBC #/AREA URNS HPF: ABNORMAL /HPF
SODIUM SERPL-SCNC: 141 MMOL/L (ref 136–145)
SP GR UR REFRACTOMETRY: 1.01 (ref 1–1.02)
SPECIMEN SOURCE: NORMAL
UROBILINOGEN UR QL STRIP.AUTO: 1 EU/DL (ref 0.2–1)
VANCOMYCIN TROUGH SERPL-MCNC: 34.6 UG/ML (ref 5–20)
WBC # BLD AUTO: 14 K/UL (ref 4.3–11.1)
WBC URNS QL MICRO: ABNORMAL /HPF

## 2019-02-01 PROCEDURE — 74011000258 HC RX REV CODE- 258: Performed by: INTERNAL MEDICINE

## 2019-02-01 PROCEDURE — C8929 TTE W OR WO FOL WCON,DOPPLER: HCPCS

## 2019-02-01 PROCEDURE — 94640 AIRWAY INHALATION TREATMENT: CPT

## 2019-02-01 PROCEDURE — 74011250637 HC RX REV CODE- 250/637: Performed by: INTERNAL MEDICINE

## 2019-02-01 PROCEDURE — 74011250636 HC RX REV CODE- 250/636: Performed by: INTERNAL MEDICINE

## 2019-02-01 PROCEDURE — 94760 N-INVAS EAR/PLS OXIMETRY 1: CPT

## 2019-02-01 PROCEDURE — 74011000250 HC RX REV CODE- 250: Performed by: INTERNAL MEDICINE

## 2019-02-01 PROCEDURE — 85730 THROMBOPLASTIN TIME PARTIAL: CPT

## 2019-02-01 PROCEDURE — 65660000000 HC RM CCU STEPDOWN

## 2019-02-01 PROCEDURE — 85025 COMPLETE CBC W/AUTO DIFF WBC: CPT

## 2019-02-01 PROCEDURE — 81001 URINALYSIS AUTO W/SCOPE: CPT

## 2019-02-01 PROCEDURE — C1751 CATH, INF, PER/CENT/MIDLINE: HCPCS

## 2019-02-01 PROCEDURE — 02HV33Z INSERTION OF INFUSION DEVICE INTO SUPERIOR VENA CAVA, PERCUTANEOUS APPROACH: ICD-10-PCS | Performed by: INTERNAL MEDICINE

## 2019-02-01 PROCEDURE — 36569 INSJ PICC 5 YR+ W/O IMAGING: CPT | Performed by: INTERNAL MEDICINE

## 2019-02-01 PROCEDURE — 36415 COLL VENOUS BLD VENIPUNCTURE: CPT

## 2019-02-01 PROCEDURE — 76937 US GUIDE VASCULAR ACCESS: CPT

## 2019-02-01 PROCEDURE — 80202 ASSAY OF VANCOMYCIN: CPT

## 2019-02-01 PROCEDURE — 77030020263 HC SOL INJ SOD CL0.9% LFCR 1000ML

## 2019-02-01 PROCEDURE — 77010033678 HC OXYGEN DAILY

## 2019-02-01 PROCEDURE — 82962 GLUCOSE BLOOD TEST: CPT

## 2019-02-01 PROCEDURE — 74011250636 HC RX REV CODE- 250/636: Performed by: NURSE PRACTITIONER

## 2019-02-01 PROCEDURE — 87804 INFLUENZA ASSAY W/OPTIC: CPT

## 2019-02-01 PROCEDURE — 74011636637 HC RX REV CODE- 636/637: Performed by: INTERNAL MEDICINE

## 2019-02-01 PROCEDURE — 80048 BASIC METABOLIC PNL TOTAL CA: CPT

## 2019-02-01 RX ORDER — HEPARIN 100 UNIT/ML
900 SYRINGE INTRAVENOUS AS NEEDED
Status: DISCONTINUED | OUTPATIENT
Start: 2019-02-01 | End: 2019-02-04 | Stop reason: HOSPADM

## 2019-02-01 RX ORDER — METOPROLOL TARTRATE 50 MG/1
50 TABLET ORAL EVERY 6 HOURS
Status: DISCONTINUED | OUTPATIENT
Start: 2019-02-01 | End: 2019-02-03

## 2019-02-01 RX ORDER — HEPARIN 100 UNIT/ML
900 SYRINGE INTRAVENOUS EVERY 8 HOURS
Status: DISCONTINUED | OUTPATIENT
Start: 2019-02-01 | End: 2019-02-04 | Stop reason: HOSPADM

## 2019-02-01 RX ORDER — HEPARIN SODIUM 5000 [USP'U]/ML
35 INJECTION, SOLUTION INTRAVENOUS; SUBCUTANEOUS ONCE
Status: COMPLETED | OUTPATIENT
Start: 2019-02-01 | End: 2019-02-01

## 2019-02-01 RX ORDER — SODIUM CHLORIDE 0.9 % (FLUSH) 0.9 %
30 SYRINGE (ML) INJECTION AS NEEDED
Status: DISCONTINUED | OUTPATIENT
Start: 2019-02-01 | End: 2019-02-04 | Stop reason: HOSPADM

## 2019-02-01 RX ORDER — SODIUM CHLORIDE 0.9 % (FLUSH) 0.9 %
30 SYRINGE (ML) INJECTION EVERY 8 HOURS
Status: DISCONTINUED | OUTPATIENT
Start: 2019-02-01 | End: 2019-02-04 | Stop reason: HOSPADM

## 2019-02-01 RX ADMIN — INSULIN LISPRO 4 UNITS: 100 INJECTION, SOLUTION INTRAVENOUS; SUBCUTANEOUS at 07:37

## 2019-02-01 RX ADMIN — OXYCODONE HYDROCHLORIDE 10 MG: 5 TABLET ORAL at 13:07

## 2019-02-01 RX ADMIN — METOPROLOL TARTRATE 50 MG: 50 TABLET ORAL at 15:40

## 2019-02-01 RX ADMIN — VANCOMYCIN HYDROCHLORIDE 1500 MG: 10 INJECTION, POWDER, LYOPHILIZED, FOR SOLUTION INTRAVENOUS at 12:24

## 2019-02-01 RX ADMIN — ACETAMINOPHEN 650 MG: 325 TABLET, FILM COATED ORAL at 17:20

## 2019-02-01 RX ADMIN — Medication 30 ML: at 21:52

## 2019-02-01 RX ADMIN — PIPERACILLIN SODIUM,TAZOBACTAM SODIUM 3.38 G: 3; .375 INJECTION, POWDER, FOR SOLUTION INTRAVENOUS at 21:50

## 2019-02-01 RX ADMIN — HEPARIN SODIUM 3250 UNITS: 5000 INJECTION INTRAVENOUS; SUBCUTANEOUS at 01:40

## 2019-02-01 RX ADMIN — PIPERACILLIN SODIUM,TAZOBACTAM SODIUM 3.38 G: 3; .375 INJECTION, POWDER, FOR SOLUTION INTRAVENOUS at 13:09

## 2019-02-01 RX ADMIN — INSULIN LISPRO 4 UNITS: 100 INJECTION, SOLUTION INTRAVENOUS; SUBCUTANEOUS at 21:53

## 2019-02-01 RX ADMIN — SODIUM CHLORIDE, PRESERVATIVE FREE 900 UNITS: 5 INJECTION INTRAVENOUS at 17:14

## 2019-02-01 RX ADMIN — HALOPERIDOL LACTATE 2 MG: 5 INJECTION, SOLUTION INTRAMUSCULAR at 15:40

## 2019-02-01 RX ADMIN — ALPRAZOLAM 0.5 MG: 0.5 TABLET ORAL at 17:20

## 2019-02-01 RX ADMIN — IPRATROPIUM BROMIDE AND ALBUTEROL SULFATE 3 ML: .5; 3 SOLUTION RESPIRATORY (INHALATION) at 17:47

## 2019-02-01 RX ADMIN — INSULIN LISPRO 6 UNITS: 100 INJECTION, SOLUTION INTRAVENOUS; SUBCUTANEOUS at 17:14

## 2019-02-01 RX ADMIN — BUDESONIDE 500 MCG: 0.5 INHALANT RESPIRATORY (INHALATION) at 09:33

## 2019-02-01 RX ADMIN — DILTIAZEM HYDROCHLORIDE 15 MG/HR: 5 INJECTION, SOLUTION INTRAVENOUS at 13:28

## 2019-02-01 RX ADMIN — DILTIAZEM HYDROCHLORIDE 15 MG/HR: 5 INJECTION, SOLUTION INTRAVENOUS at 01:05

## 2019-02-01 RX ADMIN — CITALOPRAM HYDROBROMIDE 20 MG: 10 TABLET ORAL at 08:55

## 2019-02-01 RX ADMIN — DILTIAZEM HYDROCHLORIDE 15 MG/HR: 5 INJECTION, SOLUTION INTRAVENOUS at 20:21

## 2019-02-01 RX ADMIN — DILTIAZEM HYDROCHLORIDE 10 MG/HR: 5 INJECTION, SOLUTION INTRAVENOUS at 09:30

## 2019-02-01 RX ADMIN — Medication 30 ML: at 17:15

## 2019-02-01 RX ADMIN — OXYCODONE HYDROCHLORIDE 10 MG: 5 TABLET ORAL at 21:51

## 2019-02-01 RX ADMIN — ALPRAZOLAM 0.5 MG: 0.5 TABLET ORAL at 08:55

## 2019-02-01 RX ADMIN — APIXABAN 5 MG: 5 TABLET, FILM COATED ORAL at 15:41

## 2019-02-01 RX ADMIN — SODIUM CHLORIDE, PRESERVATIVE FREE 900 UNITS: 5 INJECTION INTRAVENOUS at 21:52

## 2019-02-01 RX ADMIN — BUDESONIDE 500 MCG: 0.5 INHALANT RESPIRATORY (INHALATION) at 17:47

## 2019-02-01 RX ADMIN — VANCOMYCIN HYDROCHLORIDE 1500 MG: 10 INJECTION, POWDER, LYOPHILIZED, FOR SOLUTION INTRAVENOUS at 00:13

## 2019-02-01 RX ADMIN — QUETIAPINE FUMARATE 300 MG: 100 TABLET ORAL at 21:51

## 2019-02-01 RX ADMIN — INSULIN LISPRO 4 UNITS: 100 INJECTION, SOLUTION INTRAVENOUS; SUBCUTANEOUS at 12:24

## 2019-02-01 RX ADMIN — INSULIN GLARGINE 30 UNITS: 100 INJECTION, SOLUTION SUBCUTANEOUS at 21:51

## 2019-02-01 RX ADMIN — PERFLUTREN 1 ML: 6.52 INJECTION, SUSPENSION INTRAVENOUS at 09:00

## 2019-02-01 RX ADMIN — HALOPERIDOL LACTATE 2 MG: 5 INJECTION, SOLUTION INTRAMUSCULAR at 23:41

## 2019-02-01 RX ADMIN — PIPERACILLIN SODIUM,TAZOBACTAM SODIUM 3.38 G: 3; .375 INJECTION, POWDER, FOR SOLUTION INTRAVENOUS at 05:10

## 2019-02-01 RX ADMIN — HEPARIN SODIUM 3250 UNITS: 5000 INJECTION INTRAVENOUS; SUBCUTANEOUS at 09:30

## 2019-02-01 RX ADMIN — ASPIRIN 81 MG 81 MG: 81 TABLET ORAL at 08:51

## 2019-02-01 RX ADMIN — OXYCODONE HYDROCHLORIDE 10 MG: 5 TABLET ORAL at 05:15

## 2019-02-01 NOTE — PROGRESS NOTES
Problem: Pressure Injury - Risk of 
Goal: *Prevention of pressure injury Document Ras Scale and appropriate interventions in the flowsheet. Outcome: Progressing Towards Goal 
Pressure Injury Interventions: 
Sensory Interventions: Assess changes in LOC Moisture Interventions: Absorbent underpads Activity Interventions: Pressure redistribution bed/mattress(bed type) Mobility Interventions: Pressure redistribution bed/mattress (bed type) Nutrition Interventions: Document food/fluid/supplement intake

## 2019-02-01 NOTE — PROGRESS NOTES
Report given to Daya Cook Heparin gtt verified 
cardizem gtt verified NS infusing Suprapubic cath assessed together- draining to bag Nasal cannula 2L Bed alarm on 
rooke boots on

## 2019-02-01 NOTE — PROGRESS NOTES
TRANSFER - IN REPORT: 
 
Verbal report received from NALDO Holcomb on Golden Nolan being received from GI lab for routine progression of care Report consisted of patients Situation, Background, Assessment and Recommendations(SBAR). Information from the following report(s) SBAR was reviewed with the receiving nurse. Opportunity for questions and clarification was provided. Assessment completed upon patients arrival to unit and care assumed.

## 2019-02-01 NOTE — PROGRESS NOTES
Plan of care reviewed with pt and Dr Abhay Walter. Pt has AMS and not answering or following conversation appropriately. Pt resting in bed, no distress apparent. Heparin and Cardizem infusing, NS stopped. vanc and zosyn per MAR. Pt reports pain, unable to verbalize, rate or describe- says \"please tell me why belly\" RN asks, \"does your belly hurt? \" Pt says \"yes\" abd is soft and tender with palpation, active bowel sounds, formed brown bm this morning in brief. Suprapubic cath is draining, site is red at insertion of cath-unchanged from yesterday.   
 
Prn med per [facility]

## 2019-02-01 NOTE — PROGRESS NOTES
Verbal bedside report given to oncoming RN, Valeriy Crowley. Patient's situation, background, assessment and recommendations provided. Opportunity for questions provided. Oncoming RN assumed care of patient.

## 2019-02-01 NOTE — PROGRESS NOTES
02/01/19 1747 Oxygen Therapy O2 Sat (%) 95 % Pulse via Oximetry 112 beats per minute O2 Device Nasal cannula O2 Flow Rate (L/min) 2 l/min Pre-Treatment Breathing Pattern Dyspnea at rest  
Cough Non-productive Upper Airway Sounds (Audible wheezing) Breath Sounds Bilateral Wheezing Pulse 112 SpO2 95 % Respirations 22 Post-Treatment Breathing Pattern Dyspnea at rest  
Cough Non-productive Breath Sounds Bilateral Expiratory wheezing Pulse 89 SpO2 96 % Respirations 22 Treatment Tolerance Poor 
(breath actuated to blow-by , uncooperative) Procedures  
$$ Subsequent Procedure Aerosol Delivery Source Breath Actuated Nebulizer Aerosolized Medications Pulmicort; Albuterol

## 2019-02-01 NOTE — PROGRESS NOTES
Mews score reviewed with Armida Kang, charge RN and attempted to call ICU outreach RN- was told they do not have one today. RT paged for treatment as pt is wheezing louder than before RR 24, 2L nasal cannula- pt encouraged to leave oxygen in place, but is altered and removes it at times.   
 
Tylenol given and xanax given- see MAR

## 2019-02-01 NOTE — PROGRESS NOTES
[facility] CARDIOLOGY PROGRESS NOTE 
      
 
2/1/2019 2:27 PM 
 
Admit Date: 1/31/2019 Subjective:  
C/o pain in the abdominal area. Hard to discern due to her AMS. Objective:  
  
Vitals:  
 02/01/19 0908 02/01/19 0933 02/01/19 1222 02/01/19 1332 BP: 160/81   145/90 Pulse: 90  (!) 130 (!) 135 Resp: 22   24 Temp: 98.4 °F (36.9 °C)   98.7 °F (37.1 °C) SpO2: 93% 92%  91% Weight:      
Height:      
 
Heart rhythm was NSR but now a fib x lats 2 hours Physical Exam: 
General-No Acute Distress, Neck- supple, no JVD 
CV- regular rate and rhythm no MRG Lung- clear bilaterally Abd- soft, nontender, nondistended Ext- no edema bilaterally. Skin- warm and dry Data Review:  
Recent Labs 02/01/19 
0404 01/31/19 
1300 01/31/19 
1056  141 135* K 3.5 4.5 6.5* MG  --   --  2.3 BUN 9 11 11 CREA 0.67 0.70 0.78 * 254* 266* WBC 14.0*  --  16.1* HGB 12.7  --  16.4* HCT 40.7  --  50.7*   --  460* INR  --   --  1.0 Assessment/Plan:  
 
Principal Problem: 
  Atrial fibrillation with RVR ([facility] (1/31/2019) Active Problems: 
  Type 2 diabetes mellitus with hyperglycemia ([facility] [address] [facility] (12/10/2016) Paraplegia ([facility] [address] [facility] (12/10/2016) Bipolar disorder without psychotic features ([facility] [address] (12/10/2016) Chronic hepatitis C virus infection ([facility] [address] [facility] (12/10/2016) Narcotic addiction ([facility] [facility] (12/10/2016) UTI (urinary tract infection) (1/31/2019) Altered mental status (1/31/2019) Sepsis ([address] (1/31/2019) Elevated lactic acid level (1/31/2019) 
   
///// Po metoprolol added for recurrent a fib Stop heparin, stop aspirin, add 4 [facility]. Jacki Dalton MD 
2/1/2019 2:27 PM

## 2019-02-01 NOTE — PROGRESS NOTES
Bedside and Verbal shift change report given to self(oncoming nurse) by Britton Patient (offgoing nurse). Report included the following information SBAR and Kardex.

## 2019-02-01 NOTE — PROGRESS NOTES
Care Management Interventions PCP Verified by CM: (does not have PCP ) Palliative Care Criteria Met (RRAT>21 & CHF Dx)?: No(RRAT 13 Dx Sepsis ) Transition of Care Consult (CM Consult): Discharge Planning Discharge Durable Medical Equipment: No 
Physical Therapy Consult: No 
Occupational Therapy Consult: No 
Speech Therapy Consult: No 
Current Support Network: Other(apartment with sitter) Discharge Location Discharge Placement: Unable to determine at this time Met with patient for d/c planning. Patient does not want to speak with CM. Noted patient was at [facility] for long term care and was recently d/c 2 days ago. Per Sonal Whitehead for [facility] patient d/c after she was helped with finding an apartment, a family member is being paid to sit with patient ? If paid by [facility] as patient has Medicaid and Brian set up the DME equipment for patient to go home. Patient is paraplegic and has supra pubic catheter for neurogenic bladder. No family available to discuss discharge plans. CM following.

## 2019-02-01 NOTE — PROGRESS NOTES
Progress Note Patient: Lucy Cristobal MRN: [id number]  SSN: [id number] YOB: 1955  Age: 59 y.o. Sex: female Admit Date: 1/31/2019 LOS: 1 day Subjective:  
Patient examined at bedside. She was noted in mild abdominal pain. Denied sob. Objective:  
 
Vitals:  
 01/31/19 2038 02/01/19 0032 02/01/19 9774 02/01/19 0423 BP: (!) 167/96 92/49 127/65 104/62 Pulse: (!) 128 80 92 84 Resp: 19 18 18 Temp: 99.2 °F (37.3 °C) 99 °F (37.2 °C) 98.3 °F (36.8 °C) SpO2: 96% 96% 91% Weight:  92.3 kg (203 lb 6.4 oz) Height:      
  
 
Intake and Output: 
Current Shift: No intake/output data recorded. Last three shifts: 01/30 1901 - 02/01 0700 In: -  
Out: 4023 [[facility]:[id number]] Physical Exam:  
GENERAL: alert, cooperative, no distress, appears stated age EYE: negative LYMPHATIC: Cervical, supraclavicular, and axillary nodes normal.  
THROAT & NECK: normal and no erythema or exudates noted. LUNG: clear to auscultation bilaterally HEART: irregular rate and rhythm, no murmur, click, rub or gallop ABDOMEN: soft, non-tender. Bowel sounds normal. No masses,  no organomegaly EXTREMITIES:  extremities normal, atraumatic, no cyanosis or edema - paraplegia SP cath in place SKIN: Normal. 
NEUROLOGIC: oriented in person, place, still drowsy though. Paraplegia. PSYCHIATRIC: non focal 
 
Lab/Data Review: All lab results for the last 24 hours reviewed. Assessment:  
 
Principal Problem: 
  Atrial fibrillation with RVR ([facility] [facility] (1/31/2019) Active Problems: 
  Type 2 diabetes mellitus with hyperglycemia ([facility] [address] (12/10/2016) Paraplegia ([facility] [facility] (12/10/2016) Bipolar disorder without psychotic features ([facility] [facility] (12/10/2016) Chronic hepatitis C virus infection ([facility] [facility] (12/10/2016) Narcotic addiction ([facility] (12/10/2016) UTI (urinary tract infection) (1/31/2019) Altered mental status (1/31/2019) Sepsis ([facility] (1/31/2019) Elevated lactic acid level (1/31/2019) Plan:  
Recurrent A fib with RVR: [id number] 2 score: 2 Continue cardizem gtt in view of preserved hemodynamics, cardiology added toprol and [address] 
 
-Sepsis: possible due to CAUTI - patient has chronic suprapubic cath in view of neurogenic bladder Continue vanc and zosyn awaiting cultures Discontinue saline ivf hydration Monitor CBC and urine culture, so far GNRs 
  
-Acute metabolic encephalopathy, secondary to problem 2 probable, also related to opiate/benzo overuse: still mild confused Monitor neurological status Haldol if agitation 
  
-History of DVT on coumadin: 
INR level upon admission was 1. Questionable compliance On eliquis  
  
-Paraplegia, chronic suprapubic cath: monitor / PTOT 
  
-Type 2 DM: SSI 
  
-Chronic hepatitis C: stable 
  
-Chronic opiate dependence, chronic pain: on oxycodone 
  
-Anxiety/bipolar: on xanax, seroquel 
  
DVT ppx: on heparin gtt 
  
Code: full Disposition: home once stable Signed By: Bg Esparza MD   
 February 1, 2019

## 2019-02-01 NOTE — PROGRESS NOTES
Problem: Falls - Risk of 
Goal: *Absence of Falls Document Bebeto Christina Fall Risk and appropriate interventions in the flowsheet. Outcome: Progressing Towards Goal 
Fall Risk Interventions: 
  
 
Mentation Interventions: Bed/chair exit alarm Medication Interventions: Bed/chair exit alarm, Patient to call before getting OOB Elimination Interventions: Bed/chair exit alarm, Call light in reach

## 2019-02-01 NOTE — PROGRESS NOTES
Pt restless, fidgeting, calling out Xanax and tylenol given- see MAR Pt points to suprapubic area when asked, Yanely Petty you hurting anywhere? \"

## 2019-02-01 NOTE — PROGRESS NOTES
PICC Placement Note PRE-PROCEDURE VERIFICATION Correct Procedure: yes. Time out completed with assistant Tejinder Bustillos RN VAT and all persons present in agreement with time out. Correct Site:  yes Temperature: Temp: 98.4 °F (36.9 °C), Temperature Source: Temp Source: Axillary Recent Labs 02/01/19 
0404  01/31/19 
1056 BUN 9   < > 11  
CREA 0.67   < > 0.78   --  460* INR  --   --  1.0 WBC 14.0*  --  16.1*  
 < > = values in this interval not displayed. Allergies: Patient has no known allergies. Education materials for [facility] given to patient or family. PROCEDURE DETAIL A triple lumen PICC line was started for vascular access. The following documentation is in addition to the PICC properties in the lines/airways flowsheet : 
Lot #: [id number] 
xylocaine used: yes Mid-Arm Circumference: 36 (cm) Internal Catheter Length: 38 (cm) Internal Catheter Total Length: 38 (cm) Vein Selection for PICC:right basilic Central Line Bundle followed yes Complication Related to Insertion: none Both the insertion guidewire and ECG guidewire were removed intact all ports have positive blood return and were flush well with normal saline. The location of the tip of the PICC is verified using ECG technology. The tip is in the SVC per ECG reading. See image below. Line is okay to use: yes

## 2019-02-01 NOTE — PROGRESS NOTES
Pt calling out \"mama\" \"help me! \" \"hold me please\" Restless and discussed with Dr Reesa Fabry Haldol given- see MAR

## 2019-02-02 LAB
ANION GAP SERPL CALC-SCNC: 8 MMOL/L (ref 7–16)
BASOPHILS # BLD: 0.1 K/UL (ref 0–0.2)
BASOPHILS NFR BLD: 1 % (ref 0–2)
BUN SERPL-MCNC: 6 MG/DL (ref 8–23)
CALCIUM SERPL-MCNC: 8.7 MG/DL (ref 8.3–10.4)
CHLORIDE SERPL-SCNC: 106 MMOL/L (ref 98–107)
CO2 SERPL-SCNC: 27 MMOL/L (ref 21–32)
CREAT SERPL-MCNC: 1.28 MG/DL (ref 0.6–1)
DIFFERENTIAL METHOD BLD: ABNORMAL
EOSINOPHIL # BLD: 0.3 K/UL (ref 0–0.8)
EOSINOPHIL NFR BLD: 3 % (ref 0.5–7.8)
ERYTHROCYTE [DISTWIDTH] IN BLOOD BY AUTOMATED COUNT: 15.7 % (ref 11.9–14.6)
GLUCOSE BLD STRIP.AUTO-MCNC: 142 MG/DL (ref 65–100)
GLUCOSE BLD STRIP.AUTO-MCNC: 163 MG/DL (ref 65–100)
GLUCOSE BLD STRIP.AUTO-MCNC: 182 MG/DL (ref 65–100)
GLUCOSE BLD STRIP.AUTO-MCNC: 210 MG/DL (ref 65–100)
GLUCOSE SERPL-MCNC: 203 MG/DL (ref 65–100)
HCT VFR BLD AUTO: 39.7 % (ref 35.8–46.3)
HGB BLD-MCNC: 12.6 G/DL (ref 11.7–15.4)
IMM GRANULOCYTES # BLD AUTO: 0.2 K/UL (ref 0–0.5)
IMM GRANULOCYTES NFR BLD AUTO: 2 % (ref 0–5)
LYMPHOCYTES # BLD: 1.9 K/UL (ref 0.5–4.6)
LYMPHOCYTES NFR BLD: 18 % (ref 13–44)
MCH RBC QN AUTO: 29 PG (ref 26.1–32.9)
MCHC RBC AUTO-ENTMCNC: 31.7 G/DL (ref 31.4–35)
MCV RBC AUTO: 91.5 FL (ref 79.6–97.8)
MONOCYTES # BLD: 1.1 K/UL (ref 0.1–1.3)
MONOCYTES NFR BLD: 11 % (ref 4–12)
NEUTS SEG # BLD: 6.8 K/UL (ref 1.7–8.2)
NEUTS SEG NFR BLD: 66 % (ref 43–78)
NRBC # BLD: 0 K/UL (ref 0–0.2)
PLATELET # BLD AUTO: 315 K/UL (ref 150–450)
PMV BLD AUTO: 10.9 FL (ref 9.4–12.3)
POTASSIUM SERPL-SCNC: 3.6 MMOL/L (ref 3.5–5.1)
RBC # BLD AUTO: 4.34 M/UL (ref 4.05–5.2)
SODIUM SERPL-SCNC: 141 MMOL/L (ref 136–145)
VANCOMYCIN TROUGH SERPL-MCNC: 33.7 UG/ML (ref 5–20)
WBC # BLD AUTO: 10.3 K/UL (ref 4.3–11.1)

## 2019-02-02 PROCEDURE — 82962 GLUCOSE BLOOD TEST: CPT

## 2019-02-02 PROCEDURE — 65660000000 HC RM CCU STEPDOWN

## 2019-02-02 PROCEDURE — 74011000250 HC RX REV CODE- 250: Performed by: INTERNAL MEDICINE

## 2019-02-02 PROCEDURE — 94640 AIRWAY INHALATION TREATMENT: CPT

## 2019-02-02 PROCEDURE — 74011000258 HC RX REV CODE- 258: Performed by: INTERNAL MEDICINE

## 2019-02-02 PROCEDURE — 74011636637 HC RX REV CODE- 636/637: Performed by: INTERNAL MEDICINE

## 2019-02-02 PROCEDURE — 36592 COLLECT BLOOD FROM PICC: CPT

## 2019-02-02 PROCEDURE — 74011250636 HC RX REV CODE- 250/636: Performed by: INTERNAL MEDICINE

## 2019-02-02 PROCEDURE — 80048 BASIC METABOLIC PNL TOTAL CA: CPT

## 2019-02-02 PROCEDURE — 74011250637 HC RX REV CODE- 250/637: Performed by: INTERNAL MEDICINE

## 2019-02-02 PROCEDURE — 85025 COMPLETE CBC W/AUTO DIFF WBC: CPT

## 2019-02-02 PROCEDURE — 80202 ASSAY OF VANCOMYCIN: CPT

## 2019-02-02 RX ORDER — INSULIN LISPRO 100 [IU]/ML
5 INJECTION, SOLUTION INTRAVENOUS; SUBCUTANEOUS
Status: DISCONTINUED | OUTPATIENT
Start: 2019-02-02 | End: 2019-02-04 | Stop reason: HOSPADM

## 2019-02-02 RX ORDER — INSULIN GLARGINE 100 [IU]/ML
40 INJECTION, SOLUTION SUBCUTANEOUS
Status: DISCONTINUED | OUTPATIENT
Start: 2019-02-02 | End: 2019-02-04 | Stop reason: HOSPADM

## 2019-02-02 RX ORDER — VANCOMYCIN/0.9 % SOD CHLORIDE 1.5G/250ML
1500 PLASTIC BAG, INJECTION (ML) INTRAVENOUS SEE ADMIN INSTRUCTIONS
Status: DISCONTINUED | OUTPATIENT
Start: 2019-02-02 | End: 2019-02-02

## 2019-02-02 RX ADMIN — METOPROLOL TARTRATE 50 MG: 50 TABLET ORAL at 00:12

## 2019-02-02 RX ADMIN — DILTIAZEM HYDROCHLORIDE 15 MG/HR: 5 INJECTION, SOLUTION INTRAVENOUS at 06:50

## 2019-02-02 RX ADMIN — ALPRAZOLAM 0.5 MG: 0.5 TABLET ORAL at 05:56

## 2019-02-02 RX ADMIN — INSULIN LISPRO 5 UNITS: 100 INJECTION, SOLUTION INTRAVENOUS; SUBCUTANEOUS at 17:25

## 2019-02-02 RX ADMIN — INSULIN GLARGINE 40 UNITS: 100 INJECTION, SOLUTION SUBCUTANEOUS at 20:43

## 2019-02-02 RX ADMIN — PIPERACILLIN SODIUM,TAZOBACTAM SODIUM 3.38 G: 3; .375 INJECTION, POWDER, FOR SOLUTION INTRAVENOUS at 20:26

## 2019-02-02 RX ADMIN — INSULIN LISPRO 2 UNITS: 100 INJECTION, SOLUTION INTRAVENOUS; SUBCUTANEOUS at 20:44

## 2019-02-02 RX ADMIN — INSULIN LISPRO 5 UNITS: 100 INJECTION, SOLUTION INTRAVENOUS; SUBCUTANEOUS at 12:03

## 2019-02-02 RX ADMIN — METOPROLOL TARTRATE 50 MG: 50 TABLET ORAL at 05:52

## 2019-02-02 RX ADMIN — APIXABAN 5 MG: 5 TABLET, FILM COATED ORAL at 08:50

## 2019-02-02 RX ADMIN — SODIUM CHLORIDE, PRESERVATIVE FREE 900 UNITS: 5 INJECTION INTRAVENOUS at 20:27

## 2019-02-02 RX ADMIN — OXYCODONE HYDROCHLORIDE 10 MG: 5 TABLET ORAL at 20:26

## 2019-02-02 RX ADMIN — CITALOPRAM HYDROBROMIDE 20 MG: 10 TABLET ORAL at 08:50

## 2019-02-02 RX ADMIN — ALPRAZOLAM 0.5 MG: 0.5 TABLET ORAL at 20:26

## 2019-02-02 RX ADMIN — IPRATROPIUM BROMIDE AND ALBUTEROL SULFATE 3 ML: .5; 3 SOLUTION RESPIRATORY (INHALATION) at 19:47

## 2019-02-02 RX ADMIN — INSULIN LISPRO 5 UNITS: 100 INJECTION, SOLUTION INTRAVENOUS; SUBCUTANEOUS at 08:51

## 2019-02-02 RX ADMIN — IPRATROPIUM BROMIDE AND ALBUTEROL SULFATE 3 ML: .5; 3 SOLUTION RESPIRATORY (INHALATION) at 08:21

## 2019-02-02 RX ADMIN — OXYCODONE HYDROCHLORIDE 10 MG: 5 TABLET ORAL at 05:51

## 2019-02-02 RX ADMIN — Medication 30 ML: at 14:16

## 2019-02-02 RX ADMIN — Medication 30 ML: at 12:07

## 2019-02-02 RX ADMIN — Medication 30 ML: at 20:27

## 2019-02-02 RX ADMIN — ACETAMINOPHEN 650 MG: 325 TABLET, FILM COATED ORAL at 10:14

## 2019-02-02 RX ADMIN — BUDESONIDE 500 MCG: 0.5 INHALANT RESPIRATORY (INHALATION) at 19:47

## 2019-02-02 RX ADMIN — DILTIAZEM HYDROCHLORIDE 10 MG/HR: 5 INJECTION, SOLUTION INTRAVENOUS at 16:18

## 2019-02-02 RX ADMIN — INSULIN LISPRO 4 UNITS: 100 INJECTION, SOLUTION INTRAVENOUS; SUBCUTANEOUS at 08:50

## 2019-02-02 RX ADMIN — INSULIN LISPRO 2 UNITS: 100 INJECTION, SOLUTION INTRAVENOUS; SUBCUTANEOUS at 12:03

## 2019-02-02 RX ADMIN — SODIUM CHLORIDE, PRESERVATIVE FREE 900 UNITS: 5 INJECTION INTRAVENOUS at 05:52

## 2019-02-02 RX ADMIN — METOPROLOL TARTRATE 50 MG: 50 TABLET ORAL at 17:25

## 2019-02-02 RX ADMIN — PIPERACILLIN SODIUM,TAZOBACTAM SODIUM 3.38 G: 3; .375 INJECTION, POWDER, FOR SOLUTION INTRAVENOUS at 12:06

## 2019-02-02 RX ADMIN — BUDESONIDE 500 MCG: 0.5 INHALANT RESPIRATORY (INHALATION) at 08:22

## 2019-02-02 RX ADMIN — SODIUM CHLORIDE, PRESERVATIVE FREE 900 UNITS: 5 INJECTION INTRAVENOUS at 14:16

## 2019-02-02 RX ADMIN — METOPROLOL TARTRATE 50 MG: 50 TABLET ORAL at 12:04

## 2019-02-02 RX ADMIN — PIPERACILLIN SODIUM,TAZOBACTAM SODIUM 3.38 G: 3; .375 INJECTION, POWDER, FOR SOLUTION INTRAVENOUS at 05:52

## 2019-02-02 RX ADMIN — QUETIAPINE FUMARATE 300 MG: 100 TABLET ORAL at 20:26

## 2019-02-02 RX ADMIN — APIXABAN 5 MG: 5 TABLET, FILM COATED ORAL at 17:26

## 2019-02-02 NOTE — PROGRESS NOTES
Patients son Myla Amador called to check on patient, however did not have correct security number. This nurse tried to return call to number on emergency contact list to speak with son, however The call went to voicemail. Will try again at later time

## 2019-02-02 NOTE — PROGRESS NOTES
Speech therapy note Attempted to see pt this pm, however, pt refused PO.   
 
 Ayse Garcia MA/TERESA/SLP

## 2019-02-02 NOTE — PROGRESS NOTES
Verbal bedside report received from [place] [id number] [facility]. Assumed care of patient. Cardizem gtt verified at bedside.

## 2019-02-02 NOTE — PROGRESS NOTES
Date of Outreach Update: 
Jacklyn Stephens was seen and assessed. MEWS Score: 1 (02/02/19 1243) Vitals:  
 02/02/19 0822 02/02/19 0830 02/02/19 1204 02/02/19 1243 BP:  103/68 129/86 129/87 Pulse:  85 88 62 Resp:  22  20 Temp:  97.4 °F (36.3 °C)  97.8 °F (36.6 °C) SpO2: 91% 94%  92% Weight:      
Height:      
  
 
 Pain Assessment Pain Intensity 1: 0 (02/02/19 1140) Pain Location 1: Abdomen(pt pointed) Pain Intervention(s) 1: Medication (see MAR)(Other pain meds are not due at this time, tylenol given) Patient Stated Pain Goal: 0 Previous Outreach assessment has been reviewed. There have been no significant clinical changes since the completion of the last dated Outreach assessment. Will continue to follow up per outreach protocol. Signed By:   Erin Garcia RN   February 2, 2019 3:23 PM

## 2019-02-02 NOTE — PROGRESS NOTES
Verbal bedside report received from [facility]. Assumed care of patient. Cardizem IV drip verified at bedside with outgoing RN.

## 2019-02-02 NOTE — PROGRESS NOTES
[facility] OUTREACH NURSE PROGRESS REPORT SUBJECTIVE: Called to assess patient secondary to outreach protocol. MEWS Score: 1 (02/01/19 2057) Vitals:  
 02/01/19 1715 02/01/19 1722 02/01/19 1747 02/01/19 2057 BP: (!) 167/109 (!) 150/96  150/88 Pulse:  (!) 116  65 Resp: 24 24  20 Temp:  98.1 °F (36.7 °C)  97.9 °F (36.6 °C) SpO2:   95% 94% Weight:      
Height:      
  
 
LAB DATA: 
 
Recent Labs 02/01/19 
0404 01/31/19 
1300 01/31/19 
1056  141 135* K 3.5 4.5 6.5*  
 103 101 CO2 27 29 27 AGAP 8 9 7 * 254* 266* BUN 9 11 11 CREA 0.67 0.70 0.78 GFRAA >60 >60 >60 GFRNA >60 >60 >60  
CA 8.4 9.5 9.6 MG  --   --  2.3 ALB  --   --  3.5 TP  --   --  9.7*  
GLOB  --   --  6.2* AGRAT  --   --  0.6* ALT  --   --  39 Recent Labs 02/01/19 
0404 01/31/19 
1056 WBC 14.0* 16.1* HGB 12.7 16.4* HCT 40.7 50.7*  460* OBJECTIVE: On arrival to room, I found patient to be resting in bed with primary RN at bedside. Pain Assessment Pain Intensity 1: 0 (02/01/19 2030) Pain Location 1: Other (comment) Patient Stated Pain Goal: 0 
 
  
  
  
  
 
  
  
  
   
 
ASSESSMENT:  Pt is alert and confused. Pt on 15mg/hr of Cardizem. Pt now in sinus rhythm/sinus shahriar (low 60's). Primary RN to titrate cardizem gtt down. Pt appears to be in no distress. PLAN:  Will continue to monitor.

## 2019-02-02 NOTE — PROGRESS NOTES
Patient continues to flip between afib and accelerated junctional. Patient has converted rhythms twice this shift. Cardizem at 15 currently. Will continue to monitor patient closely

## 2019-02-02 NOTE — PROGRESS NOTES
Verbal bedside report given to nga Denise RN. Patient's situation, background, assessment and recommendations provided. Opportunity for questions provided. Oncoming RN assumed care of patient.

## 2019-02-02 NOTE — PROGRESS NOTES
Pharmacokinetic Consult to Pharmacist 
 
Jacklyn Stephens is a 59 y.o. female being treated for sepsis with vancomycin and zosyn. Height: 5' 5\" (165.1 cm)  Weight: 97.5 kg (215 lb) Lab Results Component Value Date/Time BUN 6 (L) 02/02/2019 06:15 AM  
 Creatinine 1.28 (H) 02/02/2019 06:15 AM  
 WBC 10.3 02/02/2019 06:15 AM  
 Lactic acid 1.6 01/31/2019 06:14 PM  
 Lactic Acid (POC) 2.82 (H) 01/31/2019 01:14 PM  
  
Estimated Creatinine Clearance: 52.7 mL/min (A) (based on SCr of 1.28 mg/dL (H)). Lab Results Component Value Date/Time Vancomycin,trough 34.6 (HH) 02/01/2019 11:00 PM  
 
 
Day 3 of vancomycin. Goal trough is 15-20. Level elevated to 34.6 and dose held this morning. Change to intermittent dosing. Check and random level with am labs. Will continue to follow patient. Thank you, Charito Larios, Pharm. D. Clinical Pharmacist 
[phone]

## 2019-02-02 NOTE — PROGRESS NOTES
[facility] OUTREACH NURSE PROGRESS REPORT SUBJECTIVE: Assessed patient secondary to outreach protocol. MEWS Score: 2 (02/02/19 0048) Vitals:  
 02/02/19 3991 02/02/19 2296 02/02/19 0448 02/02/19 2822 BP: 119/61 121/61 Pulse: (!) 107 71 Resp: 20 20 Temp: 98 °F (36.7 °C) 98.4 °F (36.9 °C) SpO2: 93% 90%  91% Weight:   97.5 kg (215 lb) Height:      
  
 
 
LAB DATA: 
 
Recent Labs 02/02/19 
0615 02/01/19 
0404 01/31/19 
1300 01/31/19 
1056  141 141 135* K 3.6 3.5 4.5 6.5*  
 106 103 101 CO2 27 27 29 27 AGAP 8 8 9 7 * 222* 254* 266* BUN 6* 9 11 11 CREA 1.28* 0.67 0.70 0.78 GFRAA 54* >60 >60 >60 GFRNA 45* >60 >60 >60  
CA 8.7 8.4 9.5 9.6 MG  --   --   --  2.3 ALB  --   --   --  3.5 TP  --   --   --  9.7*  
GLOB  --   --   --  6.2* AGRAT  --   --   --  0.6* ALT  --   --   --  39 Recent Labs 02/02/19 
0615 02/01/19 
0404 01/31/19 
1056 WBC 10.3 14.0* 16.1* HGB 12.6 12.7 16.4* HCT 39.7 40.7 50.7*  318 460* OBJECTIVE: On arrival to room, I found patient to be resting in bed. Pain Assessment Pain Intensity 1: 0 (02/02/19 0740) Pain Location 1: Other (comment) Patient Stated Pain Goal: Unable to verbalize/indicate pain ASSESSMENT:  Pt in bed, confused at baseline. 2L NC, current sat 94%, HR 80's, on cardizem @ 15. No family at bedside. PLAN:   
 
 
Will continue to follow up per outreach protocol. Signed By:   Elden Cheadle, RN   February 2, 2019 8:56 AM

## 2019-02-02 NOTE — PROGRESS NOTES
Date of Outreach Update: 
Joslyn Walton was seen and assessed. Previous Outreach assessment has been reviewed. There have been no significant clinical changes since the completion of the last dated Outreach assessment. O2= 97% on 2L NC. HR= 58. Pt remains on 15mg/hr of Cardizem. Pt in sinus shahriar. Primary RN aware. Will continue to follow up per outreach protocol. Signed By:   Hattie Mei   February 2, 2019 3:21 AM

## 2019-02-02 NOTE — PROGRESS NOTES
Bedside and Verbal shift change report given to NALDO Mccormick (oncoming nurse) by self (offgoing nurse). Report included the following information SBAR, Kardex, Intake/Output, MAR, Recent Results and Med Rec Status. IV Cardizem gtt verified at bedside.

## 2019-02-02 NOTE — PROGRESS NOTES
Progress Note Patient: Fred Arciniega MRN: [id number]  SSN: [id number] YOB: 1955  Age: 59 y.o. Sex: female Admit Date: 1/31/2019 LOS: 2 days Subjective: \" my sob has improved\" Patient examined at bedside. She was found in no distress. The patient had mild dysphagia. Speech consulted. She denied chest pain, palpitations. Objective:  
 
Vitals:  
 02/02/19 0012 02/02/19 0656 02/02/19 1852 02/02/19 0448 BP: 114/62 119/61 121/61 Pulse: (!) 114 (!) 107 71 Resp:  20 20 Temp:  98 °F (36.7 °C) 98.4 °F (36.9 °C) SpO2:  93% 90% Weight:    97.5 kg (215 lb) Height:      
  
 
Intake and Output: 
Current Shift: No intake/output data recorded. Last three shifts: 01/31 1901 - 02/02 0700 In: -  
[facility] [id number] [facility] Physical Exam:  
GENERAL: alert, cooperative, no distress, appears stated age EYE: negative LYMPHATIC: Cervical, supraclavicular, and axillary nodes normal.  
THROAT & NECK: normal and no erythema or exudates noted. LUNG: clear to auscultation bilaterally HEART: irregular rate and rhythm, no murmur, click, rub or gallop ABDOMEN: soft, non-tender. Bowel sounds normal. No masses,  no organomegaly EXTREMITIES:  extremities normal, atraumatic, no cyanosis or edema - paraplegia SP cath in place SKIN: Normal. 
NEUROLOGIC: oriented in person, place, still drowsy. Paraplegia. PSYCHIATRIC: non focal 
 
Lab/Data Review: All lab results for the last 24 hours reviewed. Assessment:  
 
Principal Problem: 
  Atrial fibrillation with RVR ([facility] [address] (1/31/2019) Active Problems: 
  Type 2 diabetes mellitus with hyperglycemia ([facility] [address] (12/10/2016) Paraplegia ([facility] [address] (12/10/2016) Bipolar disorder without psychotic features ([facility] [address] (12/10/2016) Chronic hepatitis C virus infection ([facility] [address] (12/10/2016) Narcotic addiction ([address] (12/10/2016) UTI (urinary tract infection) (1/31/2019) Altered mental status (1/31/2019) Sepsis ([facility] [address] (1/31/2019) Elevated lactic acid level (1/31/2019) Plan:  
Recurrent A fib with RVR: [id number] 2 score: 2  
wean cardizem gtt, po toprol, OAC. cardiology following  
 
-Sepsis: possible due to CAUTI - patient has chronic suprapubic cath in view of neurogenic bladder Discontinue vanc and continue zosyn awaiting cultures Monitor urine cultures, so far GNRs 
  
-Acute metabolic encephalopathy, secondary to problem 2 probable, also related to opiate/benzo overuse: still mild confused Monitor neurological status Haldol if agitation 
  
-History of DVT on coumadin: 
INR level upon admission was 1. Questionable compliance On eliquis  
  
-Paraplegia, chronic suprapubic cath: monitor / PTOT 
  
-Type 2 DM: SSI 
  
-Chronic hepatitis C: stable 
  
-Chronic opiate dependence, chronic pain: on oxycodone 
  
-Anxiety/bipolar: on xanax, seroquel 
  
DVT ppx: on heparin gtt 
  
Code: full Disposition: home once stable Signed By: Alvarez Connelly MD   
 February 2, 2019

## 2019-02-03 LAB
ANION GAP SERPL CALC-SCNC: 8 MMOL/L (ref 7–16)
BASOPHILS # BLD: 0.1 K/UL (ref 0–0.2)
BASOPHILS NFR BLD: 1 % (ref 0–2)
BUN SERPL-MCNC: 14 MG/DL (ref 8–23)
CALCIUM SERPL-MCNC: 8.5 MG/DL (ref 8.3–10.4)
CHLORIDE SERPL-SCNC: 106 MMOL/L (ref 98–107)
CO2 SERPL-SCNC: 27 MMOL/L (ref 21–32)
CREAT SERPL-MCNC: 1.34 MG/DL (ref 0.6–1)
DIFFERENTIAL METHOD BLD: ABNORMAL
EOSINOPHIL # BLD: 0.4 K/UL (ref 0–0.8)
EOSINOPHIL NFR BLD: 4 % (ref 0.5–7.8)
ERYTHROCYTE [DISTWIDTH] IN BLOOD BY AUTOMATED COUNT: 15.7 % (ref 11.9–14.6)
GLUCOSE BLD STRIP.AUTO-MCNC: 118 MG/DL (ref 65–100)
GLUCOSE BLD STRIP.AUTO-MCNC: 124 MG/DL (ref 65–100)
GLUCOSE BLD STRIP.AUTO-MCNC: 134 MG/DL (ref 65–100)
GLUCOSE BLD STRIP.AUTO-MCNC: 134 MG/DL (ref 65–100)
GLUCOSE BLD STRIP.AUTO-MCNC: 136 MG/DL (ref 65–100)
GLUCOSE SERPL-MCNC: 152 MG/DL (ref 65–100)
HCT VFR BLD AUTO: 40.4 % (ref 35.8–46.3)
HGB BLD-MCNC: 12.5 G/DL (ref 11.7–15.4)
IMM GRANULOCYTES # BLD AUTO: 0.1 K/UL (ref 0–0.5)
IMM GRANULOCYTES NFR BLD AUTO: 1 % (ref 0–5)
LYMPHOCYTES # BLD: 2.6 K/UL (ref 0.5–4.6)
LYMPHOCYTES NFR BLD: 23 % (ref 13–44)
MCH RBC QN AUTO: 29.1 PG (ref 26.1–32.9)
MCHC RBC AUTO-ENTMCNC: 30.9 G/DL (ref 31.4–35)
MCV RBC AUTO: 94 FL (ref 79.6–97.8)
MONOCYTES # BLD: 1.1 K/UL (ref 0.1–1.3)
MONOCYTES NFR BLD: 10 % (ref 4–12)
NEUTS SEG # BLD: 7.1 K/UL (ref 1.7–8.2)
NEUTS SEG NFR BLD: 62 % (ref 43–78)
NRBC # BLD: 0 K/UL (ref 0–0.2)
PLATELET # BLD AUTO: 321 K/UL (ref 150–450)
PMV BLD AUTO: 11 FL (ref 9.4–12.3)
POTASSIUM SERPL-SCNC: 3.6 MMOL/L (ref 3.5–5.1)
RBC # BLD AUTO: 4.3 M/UL (ref 4.05–5.2)
SODIUM SERPL-SCNC: 141 MMOL/L (ref 136–145)
WBC # BLD AUTO: 11.5 K/UL (ref 4.3–11.1)

## 2019-02-03 PROCEDURE — 77010033678 HC OXYGEN DAILY

## 2019-02-03 PROCEDURE — 94640 AIRWAY INHALATION TREATMENT: CPT

## 2019-02-03 PROCEDURE — 74011250637 HC RX REV CODE- 250/637: Performed by: FAMILY MEDICINE

## 2019-02-03 PROCEDURE — 94760 N-INVAS EAR/PLS OXIMETRY 1: CPT

## 2019-02-03 PROCEDURE — 74011250637 HC RX REV CODE- 250/637: Performed by: INTERNAL MEDICINE

## 2019-02-03 PROCEDURE — 80048 BASIC METABOLIC PNL TOTAL CA: CPT

## 2019-02-03 PROCEDURE — 65660000000 HC RM CCU STEPDOWN

## 2019-02-03 PROCEDURE — 74011000258 HC RX REV CODE- 258: Performed by: INTERNAL MEDICINE

## 2019-02-03 PROCEDURE — 85025 COMPLETE CBC W/AUTO DIFF WBC: CPT

## 2019-02-03 PROCEDURE — 74011636637 HC RX REV CODE- 636/637: Performed by: INTERNAL MEDICINE

## 2019-02-03 PROCEDURE — 74011250636 HC RX REV CODE- 250/636: Performed by: INTERNAL MEDICINE

## 2019-02-03 PROCEDURE — 74011000250 HC RX REV CODE- 250: Performed by: INTERNAL MEDICINE

## 2019-02-03 PROCEDURE — 74011250637 HC RX REV CODE- 250/637: Performed by: NURSE PRACTITIONER

## 2019-02-03 PROCEDURE — 82962 GLUCOSE BLOOD TEST: CPT

## 2019-02-03 RX ORDER — METOPROLOL TARTRATE 100 MG/1
100 TABLET ORAL EVERY 12 HOURS
Status: DISCONTINUED | OUTPATIENT
Start: 2019-02-03 | End: 2019-02-03

## 2019-02-03 RX ORDER — HYDROCODONE BITARTRATE AND HOMATROPINE METHYLBROMIDE 5; 1.5 MG/1; MG/1
1 TABLET ORAL ONCE
Status: COMPLETED | OUTPATIENT
Start: 2019-02-03 | End: 2019-02-03

## 2019-02-03 RX ORDER — METOPROLOL TARTRATE 50 MG/1
50 TABLET ORAL EVERY 12 HOURS
Status: DISCONTINUED | OUTPATIENT
Start: 2019-02-03 | End: 2019-02-04 | Stop reason: HOSPADM

## 2019-02-03 RX ADMIN — Medication 30 ML: at 21:37

## 2019-02-03 RX ADMIN — INSULIN LISPRO 5 UNITS: 100 INJECTION, SOLUTION INTRAVENOUS; SUBCUTANEOUS at 09:54

## 2019-02-03 RX ADMIN — OXYCODONE HYDROCHLORIDE 10 MG: 5 TABLET ORAL at 05:17

## 2019-02-03 RX ADMIN — IPRATROPIUM BROMIDE AND ALBUTEROL SULFATE 3 ML: .5; 3 SOLUTION RESPIRATORY (INHALATION) at 08:41

## 2019-02-03 RX ADMIN — SODIUM CHLORIDE, PRESERVATIVE FREE 900 UNITS: 5 INJECTION INTRAVENOUS at 13:33

## 2019-02-03 RX ADMIN — INSULIN LISPRO 5 UNITS: 100 INJECTION, SOLUTION INTRAVENOUS; SUBCUTANEOUS at 17:53

## 2019-02-03 RX ADMIN — METOPROLOL TARTRATE 50 MG: 50 TABLET ORAL at 00:13

## 2019-02-03 RX ADMIN — CITALOPRAM HYDROBROMIDE 20 MG: 10 TABLET ORAL at 09:54

## 2019-02-03 RX ADMIN — APIXABAN 5 MG: 5 TABLET, FILM COATED ORAL at 17:53

## 2019-02-03 RX ADMIN — BUDESONIDE 500 MCG: 0.5 INHALANT RESPIRATORY (INHALATION) at 20:03

## 2019-02-03 RX ADMIN — METOPROLOL TARTRATE 100 MG: 100 TABLET ORAL at 09:54

## 2019-02-03 RX ADMIN — Medication 30 ML: at 05:20

## 2019-02-03 RX ADMIN — METOPROLOL TARTRATE 50 MG: 50 TABLET ORAL at 21:34

## 2019-02-03 RX ADMIN — SODIUM CHLORIDE, PRESERVATIVE FREE 900 UNITS: 5 INJECTION INTRAVENOUS at 21:37

## 2019-02-03 RX ADMIN — INSULIN GLARGINE 40 UNITS: 100 INJECTION, SOLUTION SUBCUTANEOUS at 21:44

## 2019-02-03 RX ADMIN — SODIUM CHLORIDE, PRESERVATIVE FREE 900 UNITS: 5 INJECTION INTRAVENOUS at 05:19

## 2019-02-03 RX ADMIN — PIPERACILLIN SODIUM,TAZOBACTAM SODIUM 3.38 G: 3; .375 INJECTION, POWDER, FOR SOLUTION INTRAVENOUS at 05:19

## 2019-02-03 RX ADMIN — APIXABAN 5 MG: 5 TABLET, FILM COATED ORAL at 09:54

## 2019-02-03 RX ADMIN — METOPROLOL TARTRATE 50 MG: 50 TABLET ORAL at 05:17

## 2019-02-03 RX ADMIN — HYDROCODONE BITARTRATE AND HOMATROPINE METHYLBROMIDE 1 TABLET: 5; 1.5 TABLET ORAL at 23:30

## 2019-02-03 RX ADMIN — PIPERACILLIN SODIUM,TAZOBACTAM SODIUM 3.38 G: 3; .375 INJECTION, POWDER, FOR SOLUTION INTRAVENOUS at 21:36

## 2019-02-03 RX ADMIN — QUETIAPINE FUMARATE 300 MG: 100 TABLET ORAL at 21:34

## 2019-02-03 RX ADMIN — PIPERACILLIN SODIUM,TAZOBACTAM SODIUM 3.38 G: 3; .375 INJECTION, POWDER, FOR SOLUTION INTRAVENOUS at 13:33

## 2019-02-03 RX ADMIN — OXYCODONE HYDROCHLORIDE 10 MG: 5 TABLET ORAL at 21:31

## 2019-02-03 RX ADMIN — Medication 30 ML: at 13:33

## 2019-02-03 RX ADMIN — BUDESONIDE 500 MCG: 0.5 INHALANT RESPIRATORY (INHALATION) at 08:41

## 2019-02-03 RX ADMIN — INSULIN LISPRO 5 UNITS: 100 INJECTION, SOLUTION INTRAVENOUS; SUBCUTANEOUS at 12:18

## 2019-02-03 RX ADMIN — OXYCODONE HYDROCHLORIDE 10 MG: 5 TABLET ORAL at 13:32

## 2019-02-03 NOTE — PROGRESS NOTES
Bedside and Verbal shift change report given to self (oncoming nurse) by Svetlana Ordonez RN (offgoing nurse). Report included the following information SBAR, Kardex, MAR and Recent Results.

## 2019-02-03 NOTE — PROGRESS NOTES
Bedside and Verbal shift change report given to Suki Dey RN (oncoming nurse) by self (offgoing nurse). Report included the following information SBAR, Kardex, MAR and Recent Results.

## 2019-02-03 NOTE — PROGRESS NOTES
Progress Note Patient: Jacklyn Stephens MRN: [id number]  SSN: [id number] YOB: 1955  Age: 59 y.o. Sex: female Admit Date: 1/31/2019 LOS: 3 days Subjective: \" I was confused\" Ms. Jacklyn Stephens is a 59 y.o. female who has a PMH of type 2 DM, p A fib not on meds, prior DVT on coumadin, HTN, paraplegia on chronic SP cath due to neurogenic bladder, recurrent CAUTIs,  bipolar disorder, anxiety, chronic pain on opiate and xanax, chronic hepatitis C who was brought in by EMS after she was found with altered mental status. She was found to be on A fib RVR and started on cardizem gtt and heparin gtt since coumadin level was sub-therapeutic. in addition, sepsis criteria due to E. Coli CAUTI, on zosyn awaiting cultures. Drug screen positive for benzo and opiates. AMS presumptive to be multifactorial ( sepsis, medication overuse ). Cardiology was consulted, currently on NSR on po meds and on [address] [place] [address] Road. ECHO preserved EF. Her altered mental status has improved. On my assessment today she was found in no distress, stated feeling better. Objective:  
 
Vitals:  
 02/02/19 2025 02/03/19 0013 02/03/19 0038 02/03/19 5793 BP: (!) 133/94 107/71 110/86 158/82 Pulse: 90 63 62 61 Resp: 20  18 16 Temp: 98.3 °F (36.8 °C)  100 °F (37.8 °C) 98.1 °F (36.7 °C) SpO2: 94%   94% Weight:    98 kg (216 lb 0.8 oz) Height:      
  
 
Intake and Output: 
Current Shift: No intake/output data recorded. Last three shifts: 02/01 1901 - 02/03 0700 In: 120 [P.O.:120] Out: 2040 [Urine:2040] Physical Exam:  
GENERAL: alert, cooperative, no distress, appears stated age EYE: negative LYMPHATIC: Cervical, supraclavicular, and axillary nodes normal.  
THROAT & NECK: normal and no erythema or exudates noted. LUNG: clear to auscultation bilaterally HEART: regular rate and rhythm, no murmur, click, rub or gallop ABDOMEN: soft, non-tender. Bowel sounds normal. No masses,  no organomegaly EXTREMITIES:  extremities normal, atraumatic, no cyanosis or edema - paraplegia SP cath in place SKIN: Normal. 
NEUROLOGIC: oriented in person, place, still drowsy. Paraplegia. PSYCHIATRIC: non focal 
 
Lab/Data Review: All lab results for the last 24 hours reviewed. Assessment:  
 
Principal Problem: 
  Atrial fibrillation with RVR ([address] (1/31/2019) Active Problems: 
  Type 2 diabetes mellitus with hyperglycemia ([address] (12/10/2016) Paraplegia ([address] (12/10/2016) Bipolar disorder without psychotic features ([address] (12/10/2016) Chronic hepatitis C virus infection ([address] (12/10/2016) Narcotic addiction ([address] (12/10/2016) UTI (urinary tract infection) (1/31/2019) Altered mental status (1/31/2019) Sepsis ([address] (1/31/2019) Elevated lactic acid level (1/31/2019) Plan:  
-Recurrent A fib with RVR: [id number] 2 score: 2  
po [facility], [address] [place] [address]. cardiology following  
 
-Sepsis secondary to E. Coli CAUTI -  
patient has chronic suprapubic cath in view of neurogenic bladder 
continue zosyn awaiting culture, so far GNRs 
  
-Acute metabolic encephalopathy, secondary to problem 2 probable, also related to opiate/benzo overuse: improving Monitor neurological status Haldol if agitation 
  
-History of DVT on coumadin: 
INR level upon admission was 1. Questionable compliance On eliquis instead  
  
-Paraplegia, chronic suprapubic cath: monitor / PTOT 
  
-Type 2 DM: SSI 
  
-Chronic hepatitis C: stable 
  
-Chronic opiate dependence, chronic pain: on oxycodone 
  
-Anxiety/bipolar: on xanax, seroquel 
  
DVT ppx: on eliquis  
  
Code: full Disposition: home possible tomorrow Signed By: Daisy Pillai MD   
 February 3, 2019

## 2019-02-03 NOTE — PROGRESS NOTES
Speech therapy note Attempted to see pt this am, however, pt unavailable.  
 
 Kaity Payne MA/TERESA/SLP

## 2019-02-03 NOTE — PROGRESS NOTES
Date of Outreach Update: 
Yvonne Hardy was seen and assessed. MEWS Score: 1 (02/03/19 0038) Vitals:  
 02/02/19 1947 02/02/19 2025 02/03/19 0013 02/03/19 0038 BP:  (!) 133/94 107/71 110/86 Pulse:  90 63 62 Resp:  20  18 Temp:  98.3 °F (36.8 °C)  100 °F (37.8 °C) SpO2: 98% 94% Weight:      
Height:      
  
 
Previous Outreach assessment has been reviewed. There have been no significant clinical changes since the completion of the last dated Outreach assessment. Pt asleep. HR 55 with SB rhythm, cardizem gtt down to 5. Will continue to follow up per outreach protocol. Signed By:   Felicita Yen RN   February 3, 2019 1:43 AM

## 2019-02-03 NOTE — PROGRESS NOTES
Updated Ryan Braden, NP of pts HR dropping to 49 more consistently, HR has been SB/NSR 55-65. Orders received to change metoprolol from 100mg bid to 50mg bid.

## 2019-02-03 NOTE — PROGRESS NOTES
OUTREACH NURSE PROGRESS REPORT SUBJECTIVE: In to assess patient secondary to transfer from unit. Sam Ohs was seen and focused assessment complete. MEWS Score: 2 (02/02/19 1515) Vitals:  
 02/02/19 1243 02/02/19 1515 02/02/19 1725 02/02/19 1947 BP: 129/87 112/77 119/84 Pulse: 62 72 96 Resp: 20 22 Temp: 97.8 °F (36.6 °C) 98.1 °F (36.7 °C) SpO2: 92% 97%  98% Weight:      
Height: OBJECTIVE: On arrival to room, I found patient to be resting in bed. No visitors present. ASSESSMENT:  
Pt drowsy but easily awakens to voice. Oriented to person only. O2 sat 95% on 2L NC. Pt SOB at rest. HR 56 on cardizem gtt at 10. Primary RN says pt flips back and forth between SB and Afib every 1-2 hours. PLAN:  Will continue to follow per outreach protocol.

## 2019-02-03 NOTE — PROGRESS NOTES
RN called asking to give pt PRN tx. When I arrived to bedside and began tx, pt refused, stated she didn't have any trouble breathing, didn't need a tx., and her 02 sat was 93%.

## 2019-02-03 NOTE — PROGRESS NOTES
Verbal bedside report given to nga Anderson RN. Patient's situation, background, assessment and recommendations provided. Opportunity for questions provided. Oncoming RN assumed care of patient.

## 2019-02-03 NOTE — PROGRESS NOTES
[facility] PROGRESS NOTE 
      
 
2/3/2019 9:26 AM 
 
Admit Date: 1/31/2019 Subjective:  
 
Rate control overnight some lower heart rates appear to be junctional rhythm. Now in sinus rate controlled Review of Systems Unable to perform ROS: Mental acuity Objective:  
  
Vitals:  
 02/02/19 2025 02/03/19 0013 02/03/19 0038 02/03/19 2116 BP: (!) 133/94 107/71 110/86 158/82 Pulse: 90 63 62 61 Resp: 20  18 16 Temp: 98.3 °F (36.8 °C)  100 °F (37.8 °C) 98.1 °F (36.7 °C) SpO2: 94%   94% Weight:    98 kg (216 lb 0.8 oz) Height:      
 
 
 
Physical Exam  
Constitutional: She appears ill. Eyes: Left eye exhibits no discharge. Neck: Normal range of motion. Cardiovascular: An irregularly irregular rhythm present. Pulmonary/Chest: She has wheezes. Abdominal: She exhibits no distension. Musculoskeletal:  
Trace edeam   
Neurological:  
Paraplegia Confused Skin: Skin is warm. Data Review:  
Recent Labs 02/03/19 
0509 02/02/19 
0615  01/31/19 
1056  141   < > 135* K 3.6 3.6   < > 6.5* MG  --   --   --  2.3 BUN 14 6*   < > 11  
CREA 1.34* 1.28*   < > 0.78 * 203*   < > 266* WBC 11.5* 10.3   < > 16.1* HGB 12.5 12.6   < > 16.4* HCT 40.4 39.7   < > 50.7*  315   < > 460* INR  --   --   --  1.0  
 < > = values in this interval not displayed. Intake/Output Summary (Last 24 hours) at 2/3/2019 0874 Last data filed at 2/3/2019 5501 Gross per 24 hour Intake 120 ml Output 1340 ml Net -1220 ml  
 
Current Facility-Administered Medications Medication Dose Route Frequency  metoprolol tartrate (LOPRESSOR) tablet 100 mg  100 mg Oral Q12H  
 insulin glargine (LANTUS) injection 40 Units  40 Units SubCUTAneous QHS  insulin lispro (HUMALOG) injection 5 Units  5 Units SubCUTAneous TID WITH MEALS  phenol throat spray (CHLORASEPTIC) 1 Spray  1 Spray Oral PRN  
 apixaban (ELIQUIS) tablet 5 mg  5 mg Oral BID  
  sodium chloride (NS) flush 30 mL  30 mL InterCATHeter Q8H  
 heparin (porcine) pf 900 Units  900 Units InterCATHeter Q8H  
 sodium chloride (NS) flush 30 mL  30 mL InterCATHeter PRN  
 heparin (porcine) pf 900 Units  900 Units InterCATHeter PRN  
 ALPRAZolam (XANAX) tablet 0.5 mg  0.5 mg Oral BID PRN  
 bisacodyl (DULCOLAX) tablet 5 mg  5 mg Oral DAILY PRN  
 budesonide (PULMICORT) 500 mcg/2 ml nebulizer suspension  500 mcg Nebulization BID  citalopram (CELEXA) tablet 20 mg  20 mg Oral DAILY  oxyCODONE IR (ROXICODONE) tablet 10 mg  10 mg Oral Q8H PRN  
 QUEtiapine (SEROquel) tablet 300 mg  300 mg Oral QHS  ondansetron (ZOFRAN) injection 4 mg  4 mg IntraVENous Q8H PRN  
 acetaminophen (TYLENOL) tablet 650 mg  650 mg Oral Q6H PRN  piperacillin-tazobactam (ZOSYN) 3.375 g in 0.9% sodium chloride (MBP/ADV) 100 mL  3.375 g IntraVENous Q8H  
 albuterol-ipratropium (DUO-NEB) 2.5 MG-0.5 MG/3 ML  3 mL Nebulization Q6H PRN  
 haloperidol lactate (HALDOL) injection 2 mg  2 mg IntraVENous Q6H PRN  
 insulin lispro (HUMALOG) injection   SubCUTAneous AC&HS Assessment/Plan:  
 
1. In sinus rhythm 2/3/2019 · Metoprolol consolidated to 100 mg by mouth twice a day · Patient is appropriately anticoagulated. Eliquis · Echocardiogram 1/2019 with normal left ventricle systolic function and no valvular disease 2. Sepsis · On antibiotics by primary team 
3. Chronic hepatitis C 
· Per primary team 
4. Type 2 diabetes ·  per primary team 
 
Manish Turner MD 
2/3/2019 9:26 AM

## 2019-02-03 NOTE — PROGRESS NOTES
Patient converted back to Afib with rates 90's to 120's. Cardizem currently at 15. Will continue to monitor patient

## 2019-02-04 ENCOUNTER — APPOINTMENT (OUTPATIENT)
Dept: GENERAL RADIOLOGY | Age: 64
DRG: 201 | End: 2019-02-04
Attending: EMERGENCY MEDICINE
Payer: MEDICAID

## 2019-02-04 ENCOUNTER — HOSPITAL ENCOUNTER (INPATIENT)
Age: 64
LOS: 4 days | Discharge: HOME HEALTH | DRG: 201 | End: 2019-02-09
Attending: EMERGENCY MEDICINE | Admitting: HOSPITALIST
Payer: MEDICAID

## 2019-02-04 VITALS
TEMPERATURE: 98.6 F | DIASTOLIC BLOOD PRESSURE: 76 MMHG | HEIGHT: 65 IN | OXYGEN SATURATION: 92 % | WEIGHT: 206.7 LBS | SYSTOLIC BLOOD PRESSURE: 179 MMHG | RESPIRATION RATE: 20 BRPM | BODY MASS INDEX: 34.44 KG/M2 | HEART RATE: 65 BPM

## 2019-02-04 DIAGNOSIS — D72.829 LEUKOCYTOSIS, UNSPECIFIED TYPE: Primary | ICD-10-CM

## 2019-02-04 DIAGNOSIS — G89.29 CHRONIC MIDLINE LOW BACK PAIN WITHOUT SCIATICA: ICD-10-CM

## 2019-02-04 DIAGNOSIS — I10 ESSENTIAL HYPERTENSION: ICD-10-CM

## 2019-02-04 DIAGNOSIS — F31.9 BIPOLAR DISORDER WITHOUT PSYCHOTIC FEATURES (HCC): Chronic | ICD-10-CM

## 2019-02-04 DIAGNOSIS — M54.50 CHRONIC MIDLINE LOW BACK PAIN WITHOUT SCIATICA: ICD-10-CM

## 2019-02-04 DIAGNOSIS — I48.91 ATRIAL FIBRILLATION WITH RVR (HCC): ICD-10-CM

## 2019-02-04 LAB
ALBUMIN SERPL-MCNC: 3.5 G/DL (ref 3.2–4.6)
ALBUMIN/GLOB SERPL: 0.6 {RATIO}
ALP SERPL-CCNC: 124 U/L (ref 50–136)
ALT SERPL-CCNC: 45 U/L (ref 12–65)
ANION GAP SERPL CALC-SCNC: 12 MMOL/L
ANION GAP SERPL CALC-SCNC: 7 MMOL/L (ref 7–16)
AST SERPL-CCNC: 67 U/L (ref 15–37)
BACTERIA SPEC CULT: ABNORMAL
BACTERIA SPEC CULT: ABNORMAL
BASOPHILS # BLD: 0.1 K/UL (ref 0–0.2)
BASOPHILS # BLD: 0.1 K/UL (ref 0–0.2)
BASOPHILS NFR BLD: 1 % (ref 0–2)
BASOPHILS NFR BLD: 1 % (ref 0–2)
BILIRUB SERPL-MCNC: 0.9 MG/DL (ref 0.2–1.1)
BUN SERPL-MCNC: 13 MG/DL (ref 8–23)
BUN SERPL-MCNC: 14 MG/DL (ref 8–23)
CALCIUM SERPL-MCNC: 8.5 MG/DL (ref 8.3–10.4)
CALCIUM SERPL-MCNC: 9.7 MG/DL (ref 8.3–10.4)
CHLORIDE SERPL-SCNC: 101 MMOL/L (ref 98–107)
CHLORIDE SERPL-SCNC: 107 MMOL/L (ref 98–107)
CO2 SERPL-SCNC: 25 MMOL/L (ref 21–32)
CO2 SERPL-SCNC: 29 MMOL/L (ref 21–32)
CREAT SERPL-MCNC: 1.06 MG/DL (ref 0.6–1)
CREAT SERPL-MCNC: 1.21 MG/DL (ref 0.6–1)
DIFFERENTIAL METHOD BLD: ABNORMAL
DIFFERENTIAL METHOD BLD: ABNORMAL
EOSINOPHIL # BLD: 0.1 K/UL (ref 0–0.8)
EOSINOPHIL # BLD: 0.4 K/UL (ref 0–0.8)
EOSINOPHIL NFR BLD: 1 % (ref 0.5–7.8)
EOSINOPHIL NFR BLD: 4 % (ref 0.5–7.8)
ERYTHROCYTE [DISTWIDTH] IN BLOOD BY AUTOMATED COUNT: 15 % (ref 11.9–14.6)
ERYTHROCYTE [DISTWIDTH] IN BLOOD BY AUTOMATED COUNT: 15.5 % (ref 11.9–14.6)
GLOBULIN SER CALC-MCNC: 5.9 G/DL (ref 2.3–3.5)
GLUCOSE BLD STRIP.AUTO-MCNC: 101 MG/DL (ref 65–100)
GLUCOSE BLD STRIP.AUTO-MCNC: 90 MG/DL (ref 65–100)
GLUCOSE BLD STRIP.AUTO-MCNC: 95 MG/DL (ref 65–100)
GLUCOSE SERPL-MCNC: 100 MG/DL (ref 65–100)
GLUCOSE SERPL-MCNC: 88 MG/DL (ref 65–100)
HCT VFR BLD AUTO: 39.5 % (ref 35.8–46.3)
HCT VFR BLD AUTO: 49.9 % (ref 35.8–46.3)
HGB BLD-MCNC: 12.3 G/DL (ref 11.7–15.4)
HGB BLD-MCNC: 15.9 G/DL (ref 11.7–15.4)
IMM GRANULOCYTES # BLD AUTO: 0.1 K/UL (ref 0–0.5)
IMM GRANULOCYTES # BLD AUTO: 0.1 K/UL (ref 0–0.5)
IMM GRANULOCYTES NFR BLD AUTO: 1 % (ref 0–5)
IMM GRANULOCYTES NFR BLD AUTO: 1 % (ref 0–5)
LIPASE SERPL-CCNC: 93 U/L (ref 73–393)
LYMPHOCYTES # BLD: 1.8 K/UL (ref 0.5–4.6)
LYMPHOCYTES # BLD: 2.1 K/UL (ref 0.5–4.6)
LYMPHOCYTES NFR BLD: 11 % (ref 13–44)
LYMPHOCYTES NFR BLD: 21 % (ref 13–44)
MAGNESIUM SERPL-MCNC: 2.2 MG/DL (ref 1.8–2.4)
MCH RBC QN AUTO: 28.4 PG (ref 26.1–32.9)
MCH RBC QN AUTO: 28.9 PG (ref 26.1–32.9)
MCHC RBC AUTO-ENTMCNC: 31.1 G/DL (ref 31.4–35)
MCHC RBC AUTO-ENTMCNC: 31.9 G/DL (ref 31.4–35)
MCV RBC AUTO: 89.1 FL (ref 79.6–97.8)
MCV RBC AUTO: 92.9 FL (ref 79.6–97.8)
MONOCYTES # BLD: 0.8 K/UL (ref 0.1–1.3)
MONOCYTES # BLD: 0.8 K/UL (ref 0.1–1.3)
MONOCYTES NFR BLD: 5 % (ref 4–12)
MONOCYTES NFR BLD: 9 % (ref 4–12)
NEUTS SEG # BLD: 13.2 K/UL (ref 1.7–8.2)
NEUTS SEG # BLD: 6.4 K/UL (ref 1.7–8.2)
NEUTS SEG NFR BLD: 64 % (ref 43–78)
NEUTS SEG NFR BLD: 82 % (ref 43–78)
NRBC # BLD: 0 K/UL (ref 0–0.2)
NRBC # BLD: 0 K/UL (ref 0–0.2)
PLATELET # BLD AUTO: 264 K/UL (ref 150–450)
PLATELET # BLD AUTO: 361 K/UL (ref 150–450)
PMV BLD AUTO: 10.7 FL (ref 9.4–12.3)
PMV BLD AUTO: 10.8 FL (ref 9.4–12.3)
POTASSIUM SERPL-SCNC: 3.4 MMOL/L (ref 3.5–5.1)
POTASSIUM SERPL-SCNC: 3.8 MMOL/L (ref 3.5–5.1)
PROT SERPL-MCNC: 9.4 G/DL
RBC # BLD AUTO: 4.25 M/UL (ref 4.05–5.2)
RBC # BLD AUTO: 5.6 M/UL (ref 4.05–5.2)
SERVICE CMNT-IMP: ABNORMAL
SODIUM SERPL-SCNC: 138 MMOL/L (ref 136–145)
SODIUM SERPL-SCNC: 143 MMOL/L (ref 136–145)
WBC # BLD AUTO: 16.2 K/UL (ref 4.3–11.1)
WBC # BLD AUTO: 9.9 K/UL (ref 4.3–11.1)

## 2019-02-04 PROCEDURE — 74011000250 HC RX REV CODE- 250: Performed by: INTERNAL MEDICINE

## 2019-02-04 PROCEDURE — 99285 EMERGENCY DEPT VISIT HI MDM: CPT | Performed by: EMERGENCY MEDICINE

## 2019-02-04 PROCEDURE — 74011636637 HC RX REV CODE- 636/637: Performed by: INTERNAL MEDICINE

## 2019-02-04 PROCEDURE — 83735 ASSAY OF MAGNESIUM: CPT

## 2019-02-04 PROCEDURE — 74011250637 HC RX REV CODE- 250/637: Performed by: INTERNAL MEDICINE

## 2019-02-04 PROCEDURE — 77010033678 HC OXYGEN DAILY

## 2019-02-04 PROCEDURE — 83690 ASSAY OF LIPASE: CPT

## 2019-02-04 PROCEDURE — 74011000250 HC RX REV CODE- 250: Performed by: EMERGENCY MEDICINE

## 2019-02-04 PROCEDURE — 94640 AIRWAY INHALATION TREATMENT: CPT

## 2019-02-04 PROCEDURE — 84145 PROCALCITONIN (PCT): CPT

## 2019-02-04 PROCEDURE — 05H633Z INSERTION OF INFUSION DEVICE INTO LEFT SUBCLAVIAN VEIN, PERCUTANEOUS APPROACH: ICD-10-PCS | Performed by: HOSPITALIST

## 2019-02-04 PROCEDURE — 81001 URINALYSIS AUTO W/SCOPE: CPT

## 2019-02-04 PROCEDURE — 82962 GLUCOSE BLOOD TEST: CPT

## 2019-02-04 PROCEDURE — 80048 BASIC METABOLIC PNL TOTAL CA: CPT

## 2019-02-04 PROCEDURE — 71045 X-RAY EXAM CHEST 1 VIEW: CPT

## 2019-02-04 PROCEDURE — 74011250637 HC RX REV CODE- 250/637: Performed by: NURSE PRACTITIONER

## 2019-02-04 PROCEDURE — 36592 COLLECT BLOOD FROM PICC: CPT

## 2019-02-04 PROCEDURE — 85025 COMPLETE CBC W/AUTO DIFF WBC: CPT

## 2019-02-04 PROCEDURE — 80053 COMPREHEN METABOLIC PANEL: CPT

## 2019-02-04 PROCEDURE — 74011250636 HC RX REV CODE- 250/636: Performed by: INTERNAL MEDICINE

## 2019-02-04 PROCEDURE — 94760 N-INVAS EAR/PLS OXIMETRY 1: CPT

## 2019-02-04 PROCEDURE — 74011250636 HC RX REV CODE- 250/636: Performed by: EMERGENCY MEDICINE

## 2019-02-04 PROCEDURE — 92610 EVALUATE SWALLOWING FUNCTION: CPT

## 2019-02-04 PROCEDURE — 93005 ELECTROCARDIOGRAM TRACING: CPT | Performed by: HOSPITALIST

## 2019-02-04 PROCEDURE — 74011000258 HC RX REV CODE- 258: Performed by: INTERNAL MEDICINE

## 2019-02-04 RX ORDER — HYDROMORPHONE HYDROCHLORIDE 2 MG/ML
1 INJECTION, SOLUTION INTRAMUSCULAR; INTRAVENOUS; SUBCUTANEOUS ONCE
Status: COMPLETED | OUTPATIENT
Start: 2019-02-05 | End: 2019-02-05

## 2019-02-04 RX ORDER — ALBUTEROL SULFATE 90 UG/1
1 AEROSOL, METERED RESPIRATORY (INHALATION)
Qty: 1 INHALER | Refills: 0 | Status: ON HOLD | OUTPATIENT
Start: 2019-02-04 | End: 2019-02-18 | Stop reason: SDUPTHER

## 2019-02-04 RX ORDER — DILTIAZEM HYDROCHLORIDE 5 MG/ML
20 INJECTION INTRAVENOUS
Status: COMPLETED | OUTPATIENT
Start: 2019-02-04 | End: 2019-02-04

## 2019-02-04 RX ORDER — AMPICILLIN 500 MG/1
500 CAPSULE ORAL EVERY 6 HOURS
Status: DISCONTINUED | OUTPATIENT
Start: 2019-02-04 | End: 2019-02-04 | Stop reason: HOSPADM

## 2019-02-04 RX ORDER — AMPICILLIN 500 MG/1
500 CAPSULE ORAL EVERY 6 HOURS
Qty: 12 CAP | Refills: 0 | Status: SHIPPED | OUTPATIENT
Start: 2019-02-04 | End: 2019-02-09

## 2019-02-04 RX ORDER — METOPROLOL TARTRATE 50 MG/1
50 TABLET ORAL EVERY 12 HOURS
Qty: 60 TAB | Refills: 0 | Status: SHIPPED | OUTPATIENT
Start: 2019-02-04 | End: 2019-02-14

## 2019-02-04 RX ADMIN — INSULIN LISPRO 5 UNITS: 100 INJECTION, SOLUTION INTRAVENOUS; SUBCUTANEOUS at 09:55

## 2019-02-04 RX ADMIN — SODIUM CHLORIDE 1000 ML: 900 INJECTION, SOLUTION INTRAVENOUS at 23:35

## 2019-02-04 RX ADMIN — AMPICILLIN 500 MG: 500 CAPSULE ORAL at 12:33

## 2019-02-04 RX ADMIN — DILTIAZEM HYDROCHLORIDE 20 MG: 5 INJECTION INTRAVENOUS at 23:35

## 2019-02-04 RX ADMIN — SODIUM CHLORIDE, PRESERVATIVE FREE 900 UNITS: 5 INJECTION INTRAVENOUS at 05:38

## 2019-02-04 RX ADMIN — CITALOPRAM HYDROBROMIDE 20 MG: 10 TABLET ORAL at 09:55

## 2019-02-04 RX ADMIN — APIXABAN 5 MG: 5 TABLET, FILM COATED ORAL at 09:55

## 2019-02-04 RX ADMIN — OXYCODONE HYDROCHLORIDE 10 MG: 5 TABLET ORAL at 09:55

## 2019-02-04 RX ADMIN — PIPERACILLIN SODIUM,TAZOBACTAM SODIUM 3.38 G: 3; .375 INJECTION, POWDER, FOR SOLUTION INTRAVENOUS at 05:38

## 2019-02-04 RX ADMIN — BUDESONIDE 500 MCG: 0.5 INHALANT RESPIRATORY (INHALATION) at 07:47

## 2019-02-04 RX ADMIN — Medication 30 ML: at 05:38

## 2019-02-04 RX ADMIN — ACETAMINOPHEN 650 MG: 325 TABLET, FILM COATED ORAL at 12:33

## 2019-02-04 RX ADMIN — METOPROLOL TARTRATE 50 MG: 50 TABLET ORAL at 09:55

## 2019-02-04 NOTE — PROGRESS NOTES
Bedside and Verbal shift change report given to self (oncoming nurse) by Cheyenne Lindsay RN (offgoing nurse). Report included the following information SBAR, Kardex, MAR and Recent Results.

## 2019-02-04 NOTE — PROGRESS NOTES
PICC, IV & heart monitor removed. Discharge instructions being completed by Akua Osei with alie Ramos at bedside. Pt awaiting transport.

## 2019-02-04 NOTE — PROGRESS NOTES
[facility] CARDIOLOGY PROGRESS NOTE 
      
 
2/4/2019 8:19 AM 
 
Admit Date: 1/31/2019 Subjective:  
Bradycardic yesterday, decreased BB dose--improved now. ROS: 
Cardiovascular:  As noted above Objective:  
  
Vitals:  
 02/03/19 2302 02/04/19 0045 02/04/19 9749 02/04/19 6685 BP: 157/78 146/85 93/69 Pulse:  75 73 Resp:  18 18 Temp:  97.6 °F (36.4 °C) 97.9 °F (36.6 °C) SpO2:  93% 93% 93% Weight:   93.8 kg (206 lb 11.2 oz) Height:      
 
 
Physical Exam: 
General-No Acute Distress Neck- supple, no JVD 
CV- regular rate and rhythm no MRG Lung-diminished throughout, exp wheezing Abd- soft, nontender, nondistended Ext- no edema bilaterally. Skin- warm and dry Data Review:  
Recent Labs 02/04/19 
9056 02/03/19 
7139  141  
K 3.4* 3.6 BUN 13 14 CREA 1.21* 1.34* GLU 88 152* WBC 9.9 11.5* HGB 12.3 12.5 HCT 39.5 40.4  321 Assessment/Plan:  
 
Principal Problem: 
  Atrial fibrillation with RVR ([address] (1/31/2019) In NSR at present, on eliquis and metoprolol 50 mg bid Active Problems: 
  Type 2 diabetes mellitus with hyperglycemia ([address] (12/10/2016) Per primary team  
 
  Paraplegia ([address] (12/10/2016) Bipolar disorder without psychotic features ([address] (12/10/2016) Chronic hepatitis C virus infection ([address] (12/10/2016) Narcotic addiction ([address] (12/10/2016) UTI (urinary tract infection) (1/31/2019) Recurrent with neurogenic Altered mental status (1/31/2019) Sepsis ([address] (1/31/2019) Elevated lactic acid level (1/31/2019) Norberto Stone NP 
2/4/2019 8:19 AM

## 2019-02-04 NOTE — DISCHARGE SUMMARY
Discharge Summary     Patient: Jonnie Parikh MRN: [id number]  SSN: [id number]    YOB: 1955  Age: 59 y.o. Sex: female       Admit Date: 1/31/2019    Discharge Date: 2/4/2019      Admission Diagnoses:  Altered mental status  UTI (urinary tract infection)  Elevated lactic acid level  Sepsis (HCC)  Atrial fibrillation with RVR (HCC)  Atrial fibrillation with RVR ([facility]    Discharge Diagnoses:   Problem List as of 2/4/2019 Date Reviewed: 1/31/2019          Codes Class Noted - Resolved    UTI (urinary tract infection) ICD-10-CM: N39.0  ICD-9-CM: 599.0  1/31/2019 - Present        Altered mental status ICD-10-CM: R41.82  ICD-9-CM: 780.97  1/31/2019 - Present        Sepsis ([facility] ICD-10-CM: A41.9  ICD-9-CM: 038.9, 995.91  1/31/2019 - Present        * (Principal) Atrial fibrillation with RVR (HCC) ICD-10-CM: I48.91  ICD-9-CM: 427.31  1/31/2019 - Present        Elevated lactic acid level ICD-10-CM: R79.89  ICD-9-CM: 276.2  1/31/2019 - Present        Drug-induced encephalopathy ICD-10-CM: G92  ICD-9-CM: 349.82  12/20/2016 - Present        Type 2 diabetes mellitus with hyperglycemia (HCC) (Chronic) ICD-10-CM: E11.65  ICD-9-CM: 250.00  12/10/2016 - Present        Hypokalemia ICD-10-CM: E87.6  ICD-9-CM: 276.8  12/10/2016 - Present        Acute cystitis without hematuria ICD-10-CM: N30.00  ICD-9-CM: 595.0  12/10/2016 - Present        Paraplegia (HCC) (Chronic) ICD-10-CM: G82.20  ICD-9-CM: 344.1  12/10/2016 - Present        Bipolar disorder without psychotic features ([facility] (Chronic) ICD-10-CM: F31.9  ICD-9-CM: 296.80  12/10/2016 - Present        Chronic hepatitis C virus infection ([facility] (Chronic) ICD-10-CM: B18.2  ICD-9-CM: 070.54  12/10/2016 - Present        Narcotic addiction ([facility] ICD-10-CM: U40.19  ICD-9-CM: 304.90  12/10/2016 - Present        Closed displaced comminuted fracture of shaft of left femur ([place] [address].) ICD-10-CM: I23.235U  ICD-9-CM: 821.01  12/9/2016 - Present               Discharge Condition: [facility] Course:   Ms. Lexus Peoples a 59 y. o. female who has a PMH of type 2 DM, p A fib not on meds, prior DVT on coumadin, HTN, paraplegia on chronic SP cath due to neurogenic bladder, recurrent CAUTIs,  bipolar disorder, anxiety, chronic pain on opiate and xanax, chronic hepatitis C who was brought in by EMS after she was found with altered mental status. She was found to be on A fib RVR and started on cardizem gtt and heparin gtt since coumadin level was sub-therapeutic. In addition, sepsis criteria due to E. Coli CAUTI, on zosyn initially, not on po ampicillin. Drug screen positive for benzo and opiates.  AMS presumptive to be multifactorial ( sepsis, medication overuse ). Cardiology was consulted, currently on NSR on po meds and on [address]. ECHO preserved EF. Her altered mental status has improved and the patient is stable enough to be discharge home. Physical Exam:   GENERAL: alert, cooperative, no distress, appears stated age  EYE: negative  LYMPHATIC: Cervical, supraclavicular, and axillary nodes normal.   THROAT & NECK: normal and no erythema or exudates noted.   LUNG: clear to auscultation bilaterally  HEART: regular rate and rhythm, no murmur, click, rub or gallop  ABDOMEN: soft, non-tender. Bowel sounds normal. No masses,  no organomegaly  EXTREMITIES:  extremities normal, atraumatic, no cyanosis or edema - paraplegia  SP cath in place   SKIN: Normal.  NEUROLOGIC: oriented x 3, Paraplegia.   PSYCHIATRIC: non focal     Consults: Cardiology    Significant Diagnostic Studies: see note     Disposition: home    Discharge Medications:   Current Discharge Medication List      START taking these medications    Details   ampicillin (PRINCIPEN) 500 mg capsule Take 1 Cap by mouth every six (6) hours for 3 days. Qty: 12 Cap, Refills: 0      apixaban (ELIQUIS) 5 mg tablet Take 1 Tab by mouth two (2) times a day for 30 days.   Qty: 60 Tab, Refills: 0      metoprolol tartrate (LOPRESSOR) 50 mg tablet Take 1 Tab by mouth every twelve (12) hours for 30 days. Qty: 60 Tab, Refills: 0      albuterol (PROVENTIL HFA, VENTOLIN HFA, PROAIR HFA) 90 mcg/actuation inhaler Take 1 Puff by inhalation every four (4) hours as needed for Wheezing. Qty: 1 Inhaler, Refills: 0         CONTINUE these medications which have NOT CHANGED    Details   Blood-Glucose Meter monitoring kit Check glucose at home daily  Qty: 1 Kit, Refills: 0      insulin glargine (LANTUS SOLOSTAR) 100 unit/mL (3 mL) pen 30 Units by SubCUTAneous route nightly. Qty: 1 Each, Refills: 0      oxyCODONE IR (ROXICODONE) 20 mg immediate release tablet Take 1 Tab by mouth every eight (8) hours as needed. Max Daily Amount: 60 mg.  Qty: 30 Tab, Refills: 0      ALPRAZolam (XANAX) 0.5 mg tablet Take 1 Tab by mouth two (2) times daily as needed for Anxiety. Max Daily Amount: 1 mg. Qty: 30 Tab, Refills: 0      bisacodyl (DULCOLAX) 5 mg EC tablet Take 1 Tab by mouth daily as needed for Constipation. Qty: 30 Tab, Refills: 0      budesonide (PULMICORT) 0.5 mg/2 mL nbsp 2 mL by Nebulization route two (2) times a day. Qty: 60 Each, Refills: 0      fentaNYL (DURAGESIC) 25 mcg/hr PATCH 1 Patch by TransDERmal route every seventy-two (72) hours. Max Daily Amount: 1 Patch. Qty: 5 Patch, Refills: 0      nystatin (MYCOSTATIN) powder Apply  to affected area two (2) times a day. Qty: 1 Bottle, Refills: 0      zinc oxide-cod liver oil (DESITIN) 40 % paste Apply  to affected area two (2) times a day. Qty: 1 Tube, Refills: 1      pregabalin (LYRICA) 100 mg capsule Take 1 Cap by mouth three (3) times daily. Max Daily Amount: 300 mg. Qty: 90 Cap, Refills: 0      furosemide (LASIX) 40 mg tablet Take  by mouth two (2) times a day. citalopram (CELEXA) 20 mg tablet Take 20 mg by mouth daily. aspirin 81 mg chewable tablet Take 81 mg by mouth daily. omeprazole (PRILOSEC) 40 mg capsule Take 40 mg by mouth daily.       promethazine (PHENERGAN) 25 mg tablet Take 25 mg by mouth every six (6) hours as needed for Nausea. QUEtiapine (SEROQUEL) 100 mg tablet Take 300 mg by mouth nightly. STOP taking these medications       warfarin (COUMADIN) 2.5 mg tablet Comments:   Reason for Stopping:               Activity: Activity as tolerated  Diet: Cardiac Diet  Wound Care: None needed    Follow-up Appointments   Procedures    FOLLOW UP VISIT Appointment in: One Week pcp     pcp     Standing Status:   Standing     Number of Occurrences:   1     Order Specific Question:   Appointment in     Answer:    One Week       Signed By: Ivory Habermann, MD     February 4, 2019

## 2019-02-04 NOTE — PROGRESS NOTES
Patient son Wander Black had said he was coming over at 81 and should be here in 1 1/2 hrs but patient states has no heard from him and he is not in room. CM placed another call to patient son requesting call back. Unable to d/c patient until make sure someone will be available at her home to meet her with the ambulance and let her in to the apartment.

## 2019-02-04 NOTE — PROGRESS NOTES
Care Management Interventions PCP Verified by CM: No(referral sent to Erin Timmons) Palliative Care Criteria Met (RRAT>21 & CHF Dx)?: No(RRAT 13 Dx Sepsis ) Mode of Transport at Discharge: BLS([facility]) Transition of Care Consult (CM Consult): Home Health [address] [place] [address] Road: No 
Reason Outside [place]: Patient already serviced by other home care/hospice agency Discharge Durable Medical Equipment: No(wheelchair, dixon lift and Hospital bed) Physical Therapy Consult: No 
Occupational Therapy Consult: No 
Speech Therapy Consult: Yes Current Support Network: Relative's Home(lives in apartment with her son Maggy Gonzalez) Confirm Follow Up Transport: Wheelchair Van(Medicaid [place] ) Plan discussed with Pt/Family/Caregiver: Yes Freedom of Choice Offered: Yes Discharge Location Discharge Placement: Home with home health Patient ready for d/c home by [facility] ambulance. Son and patient  Provided address of [address] [place] [address]. Notified [facility] ambulance patient ready for transport home and they will be here approximately 45 minutes.

## 2019-02-04 NOTE — PROGRESS NOTES
Discharge instructions were reviewed with patient. An opportunity was given for questions. All medications were reviewed, and information was given on the new medications - lopressor, albuterol, ampicillin, eliquis. Patient verbalized understanding, and has no questions at this time. IV and telemetry monitor removed by primary RN.

## 2019-02-04 NOTE — PROGRESS NOTES
Bedside and Verbal shift change report given to self (oncoming nurse) by Luiz Cross RN (offgoing nurse).  Report included the following information SBAR, Kardex, ED Summary, Procedure Summary, Intake/Output, MAR, Recent Results and Cardiac Rhythm SR.

## 2019-02-04 NOTE — PROGRESS NOTES
Ky Bradley is finally here and d/c is being completed by nursing. Will notify [facility] ambulance when ready for transport home.

## 2019-02-04 NOTE — PROGRESS NOTES
Met with patient who states she is ready to d/c home and request I call her son Ngoc Woodward [phone] to inform of d/c. Called and spoke with Ngoc Woodward and he states he will be coming over from [place] to discuss d/c with CM and nursing. Nursing to see if patient will need O2 at d/c. Patient alert and oriented to person, place and time. She lives with her son Donovan Ledbetter who takes care of her. DME includes wheelchair, dixon lift and hospital bed. She is current with [facility] for SW and RN. Obtained orders to resume with an aide also. Patient has Medicaid and is able to obtain her medications. She does not have a PCP and request CM send referral to Gwendolyn Nix to assist in obtaining new PCP. Patient will need transport set up by ambulance to return home. Current d/c plan is home with son and home health. Awaiting for son to get her for d/c information and then will set up transport.

## 2019-02-04 NOTE — PROGRESS NOTES
LTG: Patient will tolerate least restrictive diet without overt signs or symptoms of airway compromise. STG: Patient will tolerate mechanical soft diet and thin liquids without overt signs or symptoms of airway compromise. Speech language pathology: bedside swallow note: Initial Assessment NAME/AGE/GENDER: Inés Cartwright is a 59 y.o. female DATE: 2/4/2019 PRIMARY DIAGNOSIS: Altered mental status UTI (urinary tract infection) Elevated lactic acid level Sepsis ([address] Atrial fibrillation with RVR ([address] Atrial fibrillation with RVR ([address] ICD-10: Treatment Diagnosis: R13.12 Oropharyngeal Dysphagia. INTERDISCIPLINARY COLLABORATION: Registered Nurse PRECAUTIONS/ALLERGIES: Patient has no known allergies. ASSESSMENT:Based on the objective data described below, Ms. Bobbi Farah presents with mild-moderate oropharyngeal dysphagia. Upper dentures in place; lowers not present. She reports use of lower dentures for mastication at home. Delayed responses, low volume of speech. Patient required moderate encouragement for minimal po intake, which limited assessment. Thin liquids via cup/straw and puree tolerated without over s/s of airway compromise. Prolonged oral prep with chewables. Suspect missing dentition impacting mastication. Adequate oral clearing after the swallow. Solids declined by patient. Audible wheezing noted as session progressed. Appeared to be related to exertion/fatigue with mastication. RT at bedside at end of session and RN notified. Recommend mechanical soft diet/thin liquids. Medications whole with liquid wash. Speech to follow for diet tolerance and po trial with solid textures for possible advancement. Also ? Need for speech/language/cognitive assessment if given delayed responses and disorientation. Patient will benefit from skilled intervention to address the below impairments. ?????? ? ? This section established at most recent assessment?????????? 
 PROBLEM LIST (Impairments causing functional limitations): 1. Oropharyngeal dysphagia REHABILITATION POTENTIAL FOR STATED GOALS: Good PLAN OF CARE:  
Patient will benefit from skilled intervention to address the following impairments. RECOMMENDATIONS AND PLANNED INTERVENTIONS (Benefits and precautions of therapy have been discussed with the patient.): 
· PO:  Mechanical soft · Liquids:  regular thin MEDICATIONS: 
· With liquid ASPIRATION PRECAUTIONS: 
· Slow rate of intake · Small bites/sips · Upright at 90 degrees during meal 
COMPENSATORY STRATEGIES/MODIFICATIONS INCLUDING: 
· None OTHER RECOMMENDATIONS (including follow up treatment recommendations):  
· Patient education RECOMMENDED DIET MODIFICATIONS DISCUSSED WITH: 
· Nursing · Patient FREQUENCY/DURATION: Continue to follow patient 2 times a week for duration of hospital stay to address above goals. RECOMMENDED REHABILITATION/EQUIPMENT: (at time of discharge pending progress): Due to the probability of continued deficits (see above) this patient will not likely need continued skilled speech therapy after discharge. SUBJECTIVE:  
Alert, but delayed responses. Appears weak. Denies pain. Audible wheezing as session progressed. RT at bedside History of Present Injury/Illness: Ms. Chriss Chang  has a past medical history of Diabetes ([facility] Gastrointestinal disorder, Hypertension, Ill-defined condition, Ill-defined condition, Ill-defined condition, Liver disease, Psychiatric disorder, Psychiatric disorder, and Thromboembolus ([facility] .  She also  has no past surgical history on file. Present Symptoms: Weak, poor intake. ? Dysarthria Pain Scale 1: Numeric (0 - 10) Pain Intensity 1: 0 Pain Location 1: Abdomen Pain Intervention(s) 1: Medication (see MAR)(oxycodone) Current Medications: No current facility-administered medications on file prior to encounter. Current Outpatient Medications on File Prior to Encounter Medication Sig Dispense Refill  Blood-Glucose Meter monitoring kit Check glucose at home daily 1 Kit 0  
 insulin glargine (LANTUS SOLOSTAR) 100 unit/mL (3 mL) pen 30 Units by SubCUTAneous route nightly. 1 Each 0  
 oxyCODONE IR (ROXICODONE) 20 mg immediate release tablet Take 1 Tab by mouth every eight (8) hours as needed. Max Daily Amount: 60 mg. 30 Tab 0  ALPRAZolam (XANAX) 0.5 mg tablet Take 1 Tab by mouth two (2) times daily as needed for Anxiety. Max Daily Amount: 1 mg. 30 Tab 0  
 bisacodyl (DULCOLAX) 5 mg EC tablet Take 1 Tab by mouth daily as needed for Constipation. 30 Tab 0  
 budesonide (PULMICORT) 0.5 mg/2 mL nbsp 2 mL by Nebulization route two (2) times a day. 60 Each 0  
 fentaNYL (DURAGESIC) 25 mcg/hr PATCH 1 Patch by TransDERmal route every seventy-two (72) hours. Max Daily Amount: 1 Patch. 5 Patch 0  
 nystatin (MYCOSTATIN) powder Apply  to affected area two (2) times a day. 1 Bottle 0  
 zinc oxide-cod liver oil (DESITIN) 40 % paste Apply  to affected area two (2) times a day. 1 Tube 1  
 warfarin (COUMADIN) 2.5 mg tablet Take 0.5 Tabs by mouth every evening. Indications: DEEP VENOUS THROMBOSIS 30 Tab 0  pregabalin (LYRICA) 100 mg capsule Take 1 Cap by mouth three (3) times daily. Max Daily Amount: 300 mg. 90 Cap 0  
 furosemide (LASIX) 40 mg tablet Take  by mouth two (2) times a day.  citalopram (CELEXA) 20 mg tablet Take 20 mg by mouth daily.  aspirin 81 mg chewable tablet Take 81 mg by mouth daily.  omeprazole (PRILOSEC) 40 mg capsule Take 40 mg by mouth daily.  promethazine (PHENERGAN) 25 mg tablet Take 25 mg by mouth every six (6) hours as needed for Nausea.  QUEtiapine (SEROQUEL) 100 mg tablet Take 300 mg by mouth nightly. Current Dietary Status:   
 Regular/thin OBJECTIVE:  
Respiratory Status:  Nasal cannula  2 l/min Oral Motor Structure/Speech:  Oral-Motor Structure/Motor Speech Labial: No impairment Dentition: Upper dentures Lingual: No impairment Cognitive and Communication Status: 
Neurologic State: Alert Orientation Level: Oriented to place; Disoriented to situation;Disoriented to time;Disoriented to place Cognition: Decreased attention/concentration; Follows commands Perception: Appears intact Perseveration: No perseveration noted BEDSIDE SWALLOW EVALUATIONOral Assessment: 
Oral Assessment Labial: No impairment Dentition: Upper dentures Lingual: No impairment P.O. Trials: 
Patient Position: Upright in bed The patient was given tsp-small bite amounts of the following:  
Consistency Presented: Thin liquid;Puree;Mechanical soft How Presented: Self-fed/presented;Cup/sip;Spoon;Straw ORAL PHASE: 
Bolus Acceptance: No impairment Bolus Formation/Control: Impaired Propulsion: No impairment Type of Impairment: Delayed Oral Residue: None PHARYNGEAL PHASE: 
Initiation of Swallow: No impairment Laryngeal Elevation: Functional 
Aspiration Signs/Symptoms: None Vocal Quality: Low volume Pharyngeal Phase Characteristics: No impairment, issues, or problems OTHER OBSERVATIONS: 
Rate/bite size: Questionable Endurance:  Questionable Comments: Tool Used: Dysphagia Outcome and Severity Scale (AYAD) Score Comments Normal Diet  [] 7 With no strategies or extra time needed Functional Swallow  [] 6 May have mild oral or pharyngeal delay Mild Dysphagia 
  [] 5 Which may require one diet consistency restricted (those who demonstrate penetration which is entirely cleared on MBS would be included) Mild-Moderate Dysphagia  [x] 4 With 1-2 diet consistencies restricted Moderate Dysphagia  [] 3 With 2 or more diet consistencies restricted Moderately Severe Dysphagia  [] 2 With partial PO strategies (trials with ST only) Severe Dysphagia  [] 1 With inability to tolerate any PO safely Score:  Initial: 4 Most Recent: X (Date: --) Interpretation of Tool: The Dysphagia Outcome and Severity Scale (AYAD) is a simple, easy-to-use, 7-point scale developed to systematically rate the functional severity of dysphagia based on objective assessment and make recommendations for diet level, independence level, and type of nutrition. Payor: [id number] / Plan: [address] / Product Type: Medicaid /  
 
TREATMENT:  
 (In addition to Assessment/Re-Assessment sessions the following treatments were rendered) Assessment/Reassessment only, no treatment provided today MODALITIES:  
  
  
  
  
  
  
  
  
  
  
    
  
  
  
  
  
  
  
  
   
 
ORAL MOTOR  EXERCISES: 
  
  
  
  
  
  
  
  
  
  
  
  
  
  
  
  
  
  
  
  
  
  
  
  
  
  
    
  
  
  
  
  
  
  
  
  
  
  
  
  
  
  
  
  
  
  
  
  
  
  
  
  
   
 
LARYNGEAL / PHARYNGEAL EXERCISES: 
  
  
  
  
  
  
  
  
  
  
  
  
  
  
  
  
  
  
  
  
  
    
  
  
  
  
  
  
  
  
  
  
  
  
  
  
  
  
  
  
  
   
 
__________________________________________________________________________________________________ Safety: After treatment position/precautions: 
· RN notified · Upright in Bed. Progression/Medical Necessity:  
· Patient is expected to demonstrate progress in swallow strength, swallow timeliness, swallow function, diet tolerance and swallow safety to improve swallow safety, work toward diet advancement and decrease aspiration risk. Compliance with Program/Exercises: Will assess as treatment progresses Reason for Continuation of Services/Other Comments: 
· Patient continues to require skilled intervention due to patient unable to attend/participate in therapy as expected. Recommendations/Intent for next treatment session: \"Treatment next visit will focus on diet tolerance, po trials\". Total Treatment Duration: 
Time In: 0466 Time Out: [address], [facility], [facility] EV[facility]TE BOND, CCC-SLP

## 2019-02-04 NOTE — PROGRESS NOTES
Bedside and Verbal shift change report given to Chance Delgado RN (oncoming nurse) by self (offgoing nurse).  Report included the following information SBAR, Kardex, Intake/Output, MAR, Recent Results and Cardiac Rhythm SR.

## 2019-02-04 NOTE — DISCHARGE INSTRUCTIONS
Patient Education        Atrial Fibrillation: Care Instructions  Your Care Instructions    Atrial fibrillation is an irregular and often fast heartbeat. Treating this condition is important for several reasons. It can cause blood clots, which can travel from your heart to your brain and cause a stroke. If you have a fast heartbeat, you may feel lightheaded, dizzy, and weak. An irregular heartbeat can also increase your risk for heart failure. Atrial fibrillation is often the result of another heart condition, such as high blood pressure or coronary artery disease. Making changes to improve your heart condition will help you stay healthy and active. Follow-up care is a key part of your treatment and safety. Be sure to make and go to all appointments, and call your doctor if you are having problems. It's also a good idea to know your test results and keep a list of the medicines you take. How can you care for yourself at home? Medicines    · Take your medicines exactly as prescribed. Call your doctor if you think you are having a problem with your medicine. You will get more details on the specific medicines your doctor prescribes.     · If your doctor has given you a blood thinner to prevent a stroke, be sure you get instructions about how to take your medicine safely. Blood thinners can cause serious bleeding problems.     · Do not take any vitamins, over-the-counter drugs, or herbal products without talking to your doctor first.    Lifestyle changes    · Do not smoke. Smoking can increase your chance of a stroke and heart attack. If you need help quitting, talk to your doctor about stop-smoking programs and medicines. These can increase your chances of quitting for good.     · Eat a heart-healthy diet.     · Stay at a healthy weight. Lose weight if you need to.     · Limit alcohol to 2 drinks a day for men and 1 drink a day for women. Too much alcohol can cause health problems.     · Avoid colds and flu.  Get a pneumococcal vaccine shot. If you have had one before, ask your doctor whether you need another dose. Get a flu shot every year. If you must be around people with colds or flu, wash your hands often. Activity    · If your doctor recommends it, get more exercise. Walking is a good choice. Bit by bit, increase the amount you walk every day. Try for at least 30 minutes on most days of the week. You also may want to swim, bike, or do other activities. Your doctor may suggest that you join a cardiac rehabilitation program so that you can have help increasing your physical activity safely.     · Start light exercise if your doctor says it is okay. Even a small amount will help you get stronger, have more energy, and manage stress. Walking is an easy way to get exercise. Start out by walking a little more than you did in the hospital. Gradually increase the amount you walk.     · When you exercise, watch for signs that your heart is working too hard. You are pushing too hard if you cannot talk while you are exercising. If you become short of breath or dizzy or have chest pain, sit down and rest immediately.     · Check your pulse regularly. Place two fingers on the artery at the palm side of your wrist, in line with your thumb. If your heartbeat seems uneven or fast, talk to your doctor. When should you call for help? Call 911 anytime you think you may need emergency care. For example, call if:    · You have symptoms of a heart attack. These may include:  ? Chest pain or pressure, or a strange feeling in the chest.  ? Sweating. ? Shortness of breath. ? Nausea or vomiting. ? Pain, pressure, or a strange feeling in the back, neck, jaw, or upper belly or in one or both shoulders or arms. ? Lightheadedness or sudden weakness. ? A fast or irregular heartbeat. After you call 911, the  may tell you to chew 1 adult-strength or 2 to 4 low-dose aspirin. Wait for an ambulance.  Do not try to drive yourself.     · You have symptoms of a stroke. These may include:  ? Sudden numbness, tingling, weakness, or loss of movement in your face, arm, or leg, especially on only one side of your body. ? Sudden vision changes. ? Sudden trouble speaking. ? Sudden confusion or trouble understanding simple statements. ? Sudden problems with walking or balance. ? A sudden, severe headache that is different from past headaches.     · You passed out (lost consciousness).    Call your doctor now or seek immediate medical care if:    · You have new or increased shortness of breath.     · You feel dizzy or lightheaded, or you feel like you may faint.     · Your heart rate becomes irregular.     · You can feel your heart flutter in your chest or skip heartbeats. Tell your doctor if these symptoms are new or worse.    Watch closely for changes in your health, and be sure to contact your doctor if you have any problems. Where can you learn more? Go to http://sri-hollie.info/. Enter U020 in the search box to learn more about \"Atrial Fibrillation: Care Instructions. \"  Current as of: July 22, 2018  Content Version: 11.9  © 5140-9242 [employer]. Care instructions adapted under license by [employer] (which disclaims liability or warranty for this information). If you have questions about a medical condition or this instruction, always ask your healthcare professional. [address] any warranty or liability for your use of this information. Patient Education      Patient Education        Heart-Healthy Diet: Care Instructions  Your Care Instructions    A heart-healthy diet has lots of vegetables, fruits, nuts, beans, and whole grains, and is low in salt. It limits foods that are high in saturated fat, such as meats, cheeses, and fried foods. It may be hard to change your diet, but even small changes can lower your risk of heart attack and heart disease.   Follow-up care is a key part of your treatment and safety. Be sure to make and go to all appointments, and call your doctor if you are having problems. It's also a good idea to know your test results and keep a list of the medicines you take. How can you care for yourself at home? Watch your portions  · Learn what a serving is. A \"serving\" and a \"portion\" are not always the same thing. Make sure that you are not eating larger portions than are recommended. For example, a serving of pasta is ½ cup. A serving size of meat is 2 to 3 ounces. A 3-ounce serving is about the size of a deck of cards. Measure serving sizes until you are good at [place]" them. Keep in mind that restaurants often serve portions that are 2 or 3 times the size of one serving. · To keep your energy level up and keep you from feeling hungry, eat often but in smaller portions. · Eat only the number of calories you need to stay at a healthy weight. If you need to lose weight, eat fewer calories than your body burns (through exercise and other physical activity). Eat more fruits and vegetables  · Eat a variety of fruit and vegetables every day. Dark green, deep orange, red, or yellow fruits and vegetables are especially good for you. Examples include spinach, carrots, peaches, and berries. · Keep carrots, celery, and other veggies handy for snacks. Buy fruit that is in season and store it where you can see it so that you will be tempted to eat it. · Cook dishes that have a lot of veggies in them, such as stir-fries and soups. Limit saturated and trans fat  · Read food labels, and try to avoid saturated and trans fats. They increase your risk of heart disease. Trans fat is found in many processed foods such as cookies and crackers. · Use olive or canola oil when you cook. Try cholesterol-lowering spreads, such as Benecol or Take Control. · Bake, broil, grill, or steam foods instead of frying them.   · Choose lean meats instead of high-fat meats such as hot dogs and sausages. Cut off all visible fat when you prepare meat. · Eat fish, skinless poultry, and meat alternatives such as soy products instead of high-fat meats. Soy products, such as tofu, may be especially good for your heart. · Choose low-fat or fat-free milk and dairy products. Eat fish  · Eat at least two servings of fish a week. Certain fish, such as salmon and tuna, contain omega-3 fatty acids, which may help reduce your risk of heart attack. Eat foods high in fiber  · Eat a variety of grain products every day. Include whole-grain foods that have lots of fiber and nutrients. Examples of whole-grain foods include oats, whole wheat bread, and brown rice. · Buy whole-grain breads and cereals, instead of white bread or pastries. Limit salt and sodium  · Limit how much salt and sodium you eat to help lower your blood pressure. · Taste food before you salt it. Add only a little salt when you think you need it. With time, your taste buds will adjust to less salt. · Eat fewer snack items, fast foods, and other high-salt, processed foods. Check food labels for the amount of sodium in packaged foods. · Choose low-sodium versions of canned goods (such as soups, vegetables, and beans). Limit sugar  · Limit drinks and foods with added sugar. These include candy, desserts, and soda pop. Limit alcohol  · Limit alcohol to no more than 2 drinks a day for men and 1 drink a day for women. Too much alcohol can cause health problems. When should you call for help? Watch closely for changes in your health, and be sure to contact your doctor if:    · You would like help planning heart-healthy meals. Where can you learn more? Go to http://sri-hollie.info/. Enter V137 in the search box to learn more about \"Heart-Healthy Diet: Care Instructions. \"  Current as of: July 22, 2018  Content Version: 11.9  © 1102-5200 [employer].  Care instructions adapted under license by [facility] (which disclaims liability or warranty for this information). If you have questions about a medical condition or this instruction, always ask your healthcare professional. [place] [address] any warranty or liability for your use of this information. Urinary Tract Infection in Women: Care Instructions  Your Care Instructions    A urinary tract infection, or UTI, is a general term for an infection anywhere between the kidneys and the urethra (where urine comes out). Most UTIs are bladder infections. They often cause pain or burning when you urinate. UTIs are caused by bacteria and can be cured with antibiotics. Be sure to complete your treatment so that the infection goes away. Follow-up care is a key part of your treatment and safety. Be sure to make and go to all appointments, and call your doctor if you are having problems. It's also a good idea to know your test results and keep a list of the medicines you take. How can you care for yourself at home? · Take your antibiotics as directed. Do not stop taking them just because you feel better. You need to take the full course of antibiotics. · Drink extra water and other fluids for the next day or two. This may help wash out the bacteria that are causing the infection. (If you have kidney, heart, or liver disease and have to limit fluids, talk with your doctor before you increase your fluid intake.)  · Avoid drinks that are carbonated or have caffeine. They can irritate the bladder. · Urinate often. Try to empty your bladder each time. · To relieve pain, take a hot bath or lay a heating pad set on low over your lower belly or genital area. Never go to sleep with a heating pad in place. To prevent UTIs  · Drink plenty of water each day. This helps you urinate often, which clears bacteria from your system.  (If you have kidney, heart, or liver disease and have to limit fluids, talk with your doctor before you increase your fluid intake.)  · Urinate when you need to. · Urinate right after you have sex. · Change sanitary pads often. · Avoid douches, bubble baths, feminine hygiene sprays, and other feminine hygiene products that have deodorants. · After going to the bathroom, wipe from front to back. When should you call for help? Call your doctor now or seek immediate medical care if:    · Symptoms such as fever, chills, nausea, or vomiting get worse or appear for the first time.     · You have new pain in your back just below your rib cage. This is called flank pain.     · There is new blood or pus in your urine.     · You have any problems with your antibiotic medicine.    Watch closely for changes in your health, and be sure to contact your doctor if:    · You are not getting better after taking an antibiotic for 2 days.     · Your symptoms go away but then come back. Where can you learn more? Go to http://sri-hollie.info/. Enter W658 in the search box to learn more about \"Urinary Tract Infection in Women: Care Instructions. \"  Current as of: March 20, 2018  Content Version: 11.9  © 4803-2690 [employer]. Care instructions adapted under license by [employer] (which disclaims liability or warranty for this information). If you have questions about a medical condition or this instruction, always ask your healthcare professional. [address] any warranty or liability for your use of this information. DISCHARGE SUMMARY from Nurse    PATIENT INSTRUCTIONS:    After general anesthesia or intravenous sedation, for 24 hours or while taking prescription Narcotics:  · Limit your activities  · Do not drive and operate hazardous machinery  · Do not make important personal or business decisions  · Do  not drink alcoholic beverages  · If you have not urinated within 8 hours after discharge, please contact your surgeon on call.     Report the following to your surgeon:  · Excessive pain, swelling, redness or odor of or around the surgical area  · Temperature over 100.5  · Nausea and vomiting lasting longer than 4 hours or if unable to take medications  · Any signs of decreased circulation or nerve impairment to extremity: change in color, persistent  numbness, tingling, coldness or increase pain  · Any questions    What to do at Home:    *  Please give a list of your current medications to your Primary Care Provider. *  Please update this list whenever your medications are discontinued, doses are      changed, or new medications (including over-the-counter products) are added. *  Please carry medication information at all times in case of emergency situations. These are general instructions for a healthy lifestyle:    No smoking/ No tobacco products/ Avoid exposure to second hand smoke  Surgeon General's Warning:  Quitting smoking now greatly reduces serious risk to your health. Obesity, smoking, and sedentary lifestyle greatly increases your risk for illness    A healthy diet, regular physical exercise & weight monitoring are important for maintaining a healthy lifestyle    You may be retaining fluid if you have a history of heart failure or if you experience any of the following symptoms:  Weight gain of 3 pounds or more overnight or 5 pounds in a week, increased swelling in our hands or feet or shortness of breath while lying flat in bed. Please call your doctor as soon as you notice any of these symptoms; do not wait until your next office visit. Recognize signs and symptoms of STROKE:    F-face looks uneven    A-arms unable to move or move unevenly    S-speech slurred or non-existent    T-time-call 911 as soon as signs and symptoms begin-DO NOT go       Back to bed or wait to see if you get better-TIME IS BRAIN. Warning Signs of HEART ATTACK     Call 911 if you have these symptoms:   Chest discomfort.  Most heart attacks involve discomfort in the center of the chest that lasts more than a few minutes, or that goes away and comes back. It can feel like uncomfortable pressure, squeezing, fullness, or pain.  Discomfort in other areas of the upper body. Symptoms can include pain or discomfort in one or both arms, the back, neck, jaw, or stomach.  Shortness of breath with or without chest discomfort.  Other signs may include breaking out in a cold sweat, nausea, or lightheadedness. Don't wait more than five minutes to call 911 - MINUTES MATTER! Fast action can save your life. Calling 911 is almost always the fastest way to get lifesaving treatment. Emergency Medical Services staff can begin treatment when they arrive -- up to an hour sooner than if someone gets to the hospital by car. The discharge information has been reviewed with the patient. The patient verbalized understanding. Discharge medications reviewed with the patient and appropriate educational materials and side effects teaching were provided.   ___________________________________________________________________________________________________________________________________

## 2019-02-05 ENCOUNTER — APPOINTMENT (OUTPATIENT)
Dept: CT IMAGING | Age: 64
DRG: 201 | End: 2019-02-05
Attending: HOSPITALIST
Payer: MEDICAID

## 2019-02-05 ENCOUNTER — APPOINTMENT (OUTPATIENT)
Dept: MRI IMAGING | Age: 64
DRG: 201 | End: 2019-02-05
Attending: HOSPITALIST
Payer: MEDICAID

## 2019-02-05 PROBLEM — D72.829 LEUKOCYTOSIS: Status: ACTIVE | Noted: 2019-02-05

## 2019-02-05 PROBLEM — M54.50 CHRONIC MIDLINE LOW BACK PAIN WITHOUT SCIATICA: Status: ACTIVE | Noted: 2019-02-05

## 2019-02-05 PROBLEM — I48.91 ATRIAL FIBRILLATION WITH RAPID VENTRICULAR RESPONSE (HCC): Status: ACTIVE | Noted: 2019-02-05

## 2019-02-05 PROBLEM — G89.29 CHRONIC MIDLINE LOW BACK PAIN WITHOUT SCIATICA: Status: ACTIVE | Noted: 2019-02-05

## 2019-02-05 PROBLEM — I16.0 HYPERTENSIVE URGENCY, MALIGNANT: Status: ACTIVE | Noted: 2019-02-05

## 2019-02-05 PROBLEM — R06.00 DYSPNEA: Status: ACTIVE | Noted: 2019-02-05

## 2019-02-05 LAB
ANION GAP SERPL CALC-SCNC: 13 MMOL/L
APPEARANCE UR: CLEAR
ATRIAL RATE: 375 BPM
BACTERIA SPEC CULT: NORMAL
BACTERIA SPEC CULT: NORMAL
BACTERIA URNS QL MICRO: 0 /HPF
BASOPHILS # BLD: 0.1 K/UL (ref 0–0.2)
BASOPHILS NFR BLD: 1 % (ref 0–2)
BILIRUB UR QL: NEGATIVE
BUN SERPL-MCNC: 15 MG/DL (ref 8–23)
CALCIUM SERPL-MCNC: 8.7 MG/DL (ref 8.3–10.4)
CALCULATED R AXIS, ECG10: 33 DEGREES
CALCULATED T AXIS, ECG11: -166 DEGREES
CASTS URNS QL MICRO: ABNORMAL /LPF
CHLORIDE SERPL-SCNC: 102 MMOL/L (ref 98–107)
CO2 SERPL-SCNC: 22 MMOL/L (ref 21–32)
COLOR UR: YELLOW
CREAT SERPL-MCNC: 0.94 MG/DL (ref 0.6–1)
DIAGNOSIS, 93000: NORMAL
DIFFERENTIAL METHOD BLD: ABNORMAL
EOSINOPHIL # BLD: 0.1 K/UL (ref 0–0.8)
EOSINOPHIL NFR BLD: 1 % (ref 0.5–7.8)
EPI CELLS #/AREA URNS HPF: ABNORMAL /HPF
ERYTHROCYTE [DISTWIDTH] IN BLOOD BY AUTOMATED COUNT: 14.9 % (ref 11.9–14.6)
GLUCOSE BLD STRIP.AUTO-MCNC: 119 MG/DL (ref 65–100)
GLUCOSE BLD STRIP.AUTO-MCNC: 120 MG/DL (ref 65–100)
GLUCOSE BLD STRIP.AUTO-MCNC: 127 MG/DL (ref 65–100)
GLUCOSE BLD STRIP.AUTO-MCNC: 136 MG/DL (ref 65–100)
GLUCOSE SERPL-MCNC: 122 MG/DL (ref 65–100)
GLUCOSE UR STRIP.AUTO-MCNC: NEGATIVE MG/DL
HCT VFR BLD AUTO: 46.4 % (ref 35.8–46.3)
HGB BLD-MCNC: 14.6 G/DL (ref 11.7–15.4)
HGB UR QL STRIP: ABNORMAL
IMM GRANULOCYTES # BLD AUTO: 0.1 K/UL (ref 0–0.5)
IMM GRANULOCYTES NFR BLD AUTO: 1 % (ref 0–5)
KETONES UR QL STRIP.AUTO: 40 MG/DL
LACTATE BLD-SCNC: 1.82 MMOL/L (ref 0.5–1.9)
LEUKOCYTE ESTERASE UR QL STRIP.AUTO: NEGATIVE
LYMPHOCYTES # BLD: 1.8 K/UL (ref 0.5–4.6)
LYMPHOCYTES NFR BLD: 13 % (ref 13–44)
MCH RBC QN AUTO: 28.2 PG (ref 26.1–32.9)
MCHC RBC AUTO-ENTMCNC: 31.5 G/DL (ref 31.4–35)
MCV RBC AUTO: 89.6 FL (ref 79.6–97.8)
MONOCYTES # BLD: 0.7 K/UL (ref 0.1–1.3)
MONOCYTES NFR BLD: 5 % (ref 4–12)
NEUTS SEG # BLD: 11.5 K/UL (ref 1.7–8.2)
NEUTS SEG NFR BLD: 80 % (ref 43–78)
NITRITE UR QL STRIP.AUTO: NEGATIVE
NRBC # BLD: 0 K/UL (ref 0–0.2)
PH UR STRIP: 7 [PH] (ref 5–9)
PLATELET # BLD AUTO: 327 K/UL (ref 150–450)
PMV BLD AUTO: 10.9 FL (ref 9.4–12.3)
POTASSIUM SERPL-SCNC: 3.5 MMOL/L (ref 3.5–5.1)
PROCALCITONIN SERPL-MCNC: 0.1 NG/ML
PROT UR STRIP-MCNC: 100 MG/DL
Q-T INTERVAL, ECG07: 254 MS
QRS DURATION, ECG06: 78 MS
QTC CALCULATION (BEZET), ECG08: 408 MS
RBC # BLD AUTO: 5.18 M/UL (ref 4.05–5.2)
RBC #/AREA URNS HPF: ABNORMAL /HPF
SERVICE CMNT-IMP: NORMAL
SERVICE CMNT-IMP: NORMAL
SODIUM SERPL-SCNC: 137 MMOL/L (ref 136–145)
SP GR UR REFRACTOMETRY: 1.01 (ref 1–1.02)
UROBILINOGEN UR QL STRIP.AUTO: 1 EU/DL (ref 0.2–1)
VENTRICULAR RATE, ECG03: 155 BPM
WBC # BLD AUTO: 14.3 K/UL (ref 4.3–11.1)
WBC URNS QL MICRO: ABNORMAL /HPF

## 2019-02-05 PROCEDURE — 74011636637 HC RX REV CODE- 636/637: Performed by: HOSPITALIST

## 2019-02-05 PROCEDURE — 80048 BASIC METABOLIC PNL TOTAL CA: CPT

## 2019-02-05 PROCEDURE — 94760 N-INVAS EAR/PLS OXIMETRY 1: CPT

## 2019-02-05 PROCEDURE — 96375 TX/PRO/DX INJ NEW DRUG ADDON: CPT | Performed by: EMERGENCY MEDICINE

## 2019-02-05 PROCEDURE — 83605 ASSAY OF LACTIC ACID: CPT

## 2019-02-05 PROCEDURE — 74011250637 HC RX REV CODE- 250/637: Performed by: INTERNAL MEDICINE

## 2019-02-05 PROCEDURE — 74011250636 HC RX REV CODE- 250/636: Performed by: HOSPITALIST

## 2019-02-05 PROCEDURE — 74011000250 HC RX REV CODE- 250: Performed by: EMERGENCY MEDICINE

## 2019-02-05 PROCEDURE — 74011250637 HC RX REV CODE- 250/637: Performed by: HOSPITALIST

## 2019-02-05 PROCEDURE — 85025 COMPLETE CBC W/AUTO DIFF WBC: CPT

## 2019-02-05 PROCEDURE — 36415 COLL VENOUS BLD VENIPUNCTURE: CPT

## 2019-02-05 PROCEDURE — C1751 CATH, INF, PER/CENT/MIDLINE: HCPCS

## 2019-02-05 PROCEDURE — 72148 MRI LUMBAR SPINE W/O DYE: CPT

## 2019-02-05 PROCEDURE — 74011000258 HC RX REV CODE- 258: Performed by: EMERGENCY MEDICINE

## 2019-02-05 PROCEDURE — 82962 GLUCOSE BLOOD TEST: CPT

## 2019-02-05 PROCEDURE — 74011000250 HC RX REV CODE- 250: Performed by: HOSPITALIST

## 2019-02-05 PROCEDURE — 94640 AIRWAY INHALATION TREATMENT: CPT

## 2019-02-05 PROCEDURE — 96365 THER/PROPH/DIAG IV INF INIT: CPT | Performed by: EMERGENCY MEDICINE

## 2019-02-05 PROCEDURE — 77010033678 HC OXYGEN DAILY

## 2019-02-05 PROCEDURE — 74011250636 HC RX REV CODE- 250/636: Performed by: EMERGENCY MEDICINE

## 2019-02-05 PROCEDURE — 65610000001 HC ROOM ICU GENERAL

## 2019-02-05 PROCEDURE — 74011000258 HC RX REV CODE- 258: Performed by: HOSPITALIST

## 2019-02-05 PROCEDURE — 76937 US GUIDE VASCULAR ACCESS: CPT

## 2019-02-05 RX ORDER — GUAIFENESIN 100 MG/5ML
81 LIQUID (ML) ORAL DAILY
Status: DISCONTINUED | OUTPATIENT
Start: 2019-02-05 | End: 2019-02-09 | Stop reason: HOSPADM

## 2019-02-05 RX ORDER — QUETIAPINE FUMARATE 100 MG/1
300 TABLET, FILM COATED ORAL
Status: DISCONTINUED | OUTPATIENT
Start: 2019-02-05 | End: 2019-02-09 | Stop reason: HOSPADM

## 2019-02-05 RX ORDER — SODIUM CHLORIDE 9 MG/ML
75 INJECTION, SOLUTION INTRAVENOUS CONTINUOUS
Status: DISPENSED | OUTPATIENT
Start: 2019-02-05 | End: 2019-02-06

## 2019-02-05 RX ORDER — CLONIDINE HYDROCHLORIDE 0.1 MG/1
0.1 TABLET ORAL 2 TIMES DAILY
Status: DISCONTINUED | OUTPATIENT
Start: 2019-02-05 | End: 2019-02-05

## 2019-02-05 RX ORDER — INSULIN LISPRO 100 [IU]/ML
INJECTION, SOLUTION INTRAVENOUS; SUBCUTANEOUS
Status: DISCONTINUED | OUTPATIENT
Start: 2019-02-05 | End: 2019-02-09 | Stop reason: HOSPADM

## 2019-02-05 RX ORDER — HALOPERIDOL 5 MG/ML
2 INJECTION INTRAMUSCULAR
Status: DISCONTINUED | OUTPATIENT
Start: 2019-02-05 | End: 2019-02-09 | Stop reason: HOSPADM

## 2019-02-05 RX ORDER — FUROSEMIDE 20 MG/1
20 TABLET ORAL 2 TIMES DAILY
Status: DISCONTINUED | OUTPATIENT
Start: 2019-02-05 | End: 2019-02-07

## 2019-02-05 RX ORDER — SODIUM CHLORIDE 0.9 % (FLUSH) 0.9 %
10 SYRINGE (ML) INJECTION AS NEEDED
Status: DISCONTINUED | OUTPATIENT
Start: 2019-02-05 | End: 2019-02-09 | Stop reason: HOSPADM

## 2019-02-05 RX ORDER — BISACODYL 5 MG
5 TABLET, DELAYED RELEASE (ENTERIC COATED) ORAL
Status: DISCONTINUED | OUTPATIENT
Start: 2019-02-05 | End: 2019-02-09 | Stop reason: HOSPADM

## 2019-02-05 RX ORDER — HYDROMORPHONE HYDROCHLORIDE 2 MG/ML
1 INJECTION, SOLUTION INTRAMUSCULAR; INTRAVENOUS; SUBCUTANEOUS
Status: DISCONTINUED | OUTPATIENT
Start: 2019-02-05 | End: 2019-02-06 | Stop reason: SINTOL

## 2019-02-05 RX ORDER — ALPRAZOLAM 0.5 MG/1
0.5 TABLET ORAL
Status: DISCONTINUED | OUTPATIENT
Start: 2019-02-05 | End: 2019-02-09 | Stop reason: HOSPADM

## 2019-02-05 RX ORDER — METOPROLOL TARTRATE 50 MG/1
50 TABLET ORAL EVERY 12 HOURS
Status: DISCONTINUED | OUTPATIENT
Start: 2019-02-05 | End: 2019-02-06

## 2019-02-05 RX ORDER — OXYCODONE HYDROCHLORIDE 5 MG/1
10 TABLET ORAL
Status: DISCONTINUED | OUTPATIENT
Start: 2019-02-05 | End: 2019-02-09 | Stop reason: HOSPADM

## 2019-02-05 RX ORDER — ONDANSETRON 2 MG/ML
4 INJECTION INTRAMUSCULAR; INTRAVENOUS
Status: DISCONTINUED | OUTPATIENT
Start: 2019-02-05 | End: 2019-02-09 | Stop reason: HOSPADM

## 2019-02-05 RX ORDER — LABETALOL HCL 20 MG/4 ML
20 SYRINGE (ML) INTRAVENOUS
Status: COMPLETED | OUTPATIENT
Start: 2019-02-05 | End: 2019-02-05

## 2019-02-05 RX ORDER — SODIUM CHLORIDE 0.9 % (FLUSH) 0.9 %
5-40 SYRINGE (ML) INJECTION AS NEEDED
Status: DISCONTINUED | OUTPATIENT
Start: 2019-02-05 | End: 2019-02-09 | Stop reason: HOSPADM

## 2019-02-05 RX ORDER — LEVALBUTEROL INHALATION SOLUTION 0.63 MG/3ML
0.63 SOLUTION RESPIRATORY (INHALATION)
Status: DISCONTINUED | OUTPATIENT
Start: 2019-02-05 | End: 2019-02-09 | Stop reason: HOSPADM

## 2019-02-05 RX ORDER — CLONIDINE HYDROCHLORIDE 0.1 MG/1
0.2 TABLET ORAL 2 TIMES DAILY
Status: DISCONTINUED | OUTPATIENT
Start: 2019-02-05 | End: 2019-02-05

## 2019-02-05 RX ORDER — SODIUM CHLORIDE 0.9 % (FLUSH) 0.9 %
5-40 SYRINGE (ML) INJECTION EVERY 8 HOURS
Status: DISCONTINUED | OUTPATIENT
Start: 2019-02-05 | End: 2019-02-09 | Stop reason: HOSPADM

## 2019-02-05 RX ORDER — ACETAMINOPHEN 325 MG/1
650 TABLET ORAL
Status: DISCONTINUED | OUTPATIENT
Start: 2019-02-05 | End: 2019-02-09 | Stop reason: HOSPADM

## 2019-02-05 RX ORDER — INSULIN GLARGINE 100 [IU]/ML
30 INJECTION, SOLUTION SUBCUTANEOUS
Status: DISCONTINUED | OUTPATIENT
Start: 2019-02-05 | End: 2019-02-09 | Stop reason: HOSPADM

## 2019-02-05 RX ORDER — PANTOPRAZOLE SODIUM 40 MG/1
40 TABLET, DELAYED RELEASE ORAL
Status: DISCONTINUED | OUTPATIENT
Start: 2019-02-05 | End: 2019-02-09 | Stop reason: HOSPADM

## 2019-02-05 RX ORDER — NYSTATIN 100000 [USP'U]/G
POWDER TOPICAL 2 TIMES DAILY
Status: DISCONTINUED | OUTPATIENT
Start: 2019-02-05 | End: 2019-02-09 | Stop reason: HOSPADM

## 2019-02-05 RX ORDER — SODIUM CHLORIDE 0.9 % (FLUSH) 0.9 %
10 SYRINGE (ML) INJECTION EVERY 8 HOURS
Status: DISCONTINUED | OUTPATIENT
Start: 2019-02-05 | End: 2019-02-09 | Stop reason: HOSPADM

## 2019-02-05 RX ORDER — BUDESONIDE 0.5 MG/2ML
500 INHALANT ORAL 2 TIMES DAILY
Status: DISCONTINUED | OUTPATIENT
Start: 2019-02-05 | End: 2019-02-09 | Stop reason: HOSPADM

## 2019-02-05 RX ORDER — DILTIAZEM HYDROCHLORIDE 5 MG/ML
20 INJECTION INTRAVENOUS
Status: COMPLETED | OUTPATIENT
Start: 2019-02-05 | End: 2019-02-05

## 2019-02-05 RX ORDER — CITALOPRAM 20 MG/1
20 TABLET, FILM COATED ORAL DAILY
Status: DISCONTINUED | OUTPATIENT
Start: 2019-02-05 | End: 2019-02-09 | Stop reason: HOSPADM

## 2019-02-05 RX ORDER — INSULIN GLARGINE 100 [IU]/ML
40 INJECTION, SOLUTION SUBCUTANEOUS
Status: DISCONTINUED | OUTPATIENT
Start: 2019-02-05 | End: 2019-02-05

## 2019-02-05 RX ORDER — FENTANYL 25 UG/1
1 PATCH TRANSDERMAL
Status: DISCONTINUED | OUTPATIENT
Start: 2019-02-05 | End: 2019-02-09 | Stop reason: HOSPADM

## 2019-02-05 RX ORDER — INSULIN LISPRO 100 [IU]/ML
5 INJECTION, SOLUTION INTRAVENOUS; SUBCUTANEOUS
Status: DISCONTINUED | OUTPATIENT
Start: 2019-02-05 | End: 2019-02-09 | Stop reason: HOSPADM

## 2019-02-05 RX ORDER — PREGABALIN 50 MG/1
100 CAPSULE ORAL 3 TIMES DAILY
Status: DISCONTINUED | OUTPATIENT
Start: 2019-02-05 | End: 2019-02-09 | Stop reason: HOSPADM

## 2019-02-05 RX ADMIN — LABETALOL HYDROCHLORIDE 20 MG: 5 INJECTION, SOLUTION INTRAVENOUS at 02:44

## 2019-02-05 RX ADMIN — PANTOPRAZOLE SODIUM 40 MG: 40 TABLET, DELAYED RELEASE ORAL at 08:06

## 2019-02-05 RX ADMIN — DRONEDARONE 400 MG: 400 TABLET, FILM COATED ORAL at 22:03

## 2019-02-05 RX ADMIN — QUETIAPINE FUMARATE 300 MG: 100 TABLET ORAL at 22:03

## 2019-02-05 RX ADMIN — OXYCODONE HYDROCHLORIDE 10 MG: 5 TABLET ORAL at 11:32

## 2019-02-05 RX ADMIN — Medication 10 ML: at 07:00

## 2019-02-05 RX ADMIN — SODIUM CHLORIDE 75 ML/HR: 900 INJECTION, SOLUTION INTRAVENOUS at 11:50

## 2019-02-05 RX ADMIN — Medication 10 ML: at 22:05

## 2019-02-05 RX ADMIN — NYSTATIN: 100000 POWDER TOPICAL at 08:06

## 2019-02-05 RX ADMIN — Medication 10 ML: at 15:00

## 2019-02-05 RX ADMIN — Medication: at 12:01

## 2019-02-05 RX ADMIN — HYDROMORPHONE HYDROCHLORIDE 1 MG: 2 INJECTION, SOLUTION INTRAMUSCULAR; INTRAVENOUS; SUBCUTANEOUS at 00:17

## 2019-02-05 RX ADMIN — APIXABAN 5 MG: 5 TABLET, FILM COATED ORAL at 17:05

## 2019-02-05 RX ADMIN — FUROSEMIDE 20 MG: 20 TABLET ORAL at 17:05

## 2019-02-05 RX ADMIN — ASPIRIN 81 MG 81 MG: 81 TABLET ORAL at 08:06

## 2019-02-05 RX ADMIN — PREGABALIN 100 MG: 50 CAPSULE ORAL at 22:04

## 2019-02-05 RX ADMIN — BISACODYL 5 MG: 5 TABLET, COATED ORAL at 08:06

## 2019-02-05 RX ADMIN — INSULIN GLARGINE 15 UNITS: 100 INJECTION, SOLUTION SUBCUTANEOUS at 22:40

## 2019-02-05 RX ADMIN — OXYCODONE HYDROCHLORIDE 10 MG: 5 TABLET ORAL at 18:32

## 2019-02-05 RX ADMIN — DILTIAZEM HYDROCHLORIDE 20 MG: 5 INJECTION INTRAVENOUS at 00:15

## 2019-02-05 RX ADMIN — DILTIAZEM HYDROCHLORIDE 2.5 MG/HR: 5 INJECTION INTRAVENOUS at 01:16

## 2019-02-05 RX ADMIN — METOPROLOL TARTRATE 50 MG: 50 TABLET ORAL at 08:06

## 2019-02-05 RX ADMIN — METOPROLOL TARTRATE 50 MG: 50 TABLET ORAL at 22:03

## 2019-02-05 RX ADMIN — APIXABAN 5 MG: 5 TABLET, FILM COATED ORAL at 08:06

## 2019-02-05 RX ADMIN — SODIUM CHLORIDE 75 ML/HR: 900 INJECTION, SOLUTION INTRAVENOUS at 03:31

## 2019-02-05 RX ADMIN — BUDESONIDE 500 MCG: 0.5 INHALANT RESPIRATORY (INHALATION) at 11:14

## 2019-02-05 RX ADMIN — QUETIAPINE FUMARATE 300 MG: 100 TABLET ORAL at 04:03

## 2019-02-05 RX ADMIN — CITALOPRAM HYDROBROMIDE 20 MG: 20 TABLET ORAL at 08:06

## 2019-02-05 RX ADMIN — PREGABALIN 100 MG: 50 CAPSULE ORAL at 08:06

## 2019-02-05 RX ADMIN — PREGABALIN 100 MG: 50 CAPSULE ORAL at 16:33

## 2019-02-05 RX ADMIN — Medication 10 ML: at 15:13

## 2019-02-05 RX ADMIN — CEFTRIAXONE 1 G: 1 INJECTION, POWDER, FOR SOLUTION INTRAMUSCULAR; INTRAVENOUS at 04:04

## 2019-02-05 RX ADMIN — DILTIAZEM HYDROCHLORIDE 20 MG: 5 INJECTION INTRAVENOUS at 01:40

## 2019-02-05 RX ADMIN — CLONIDINE HYDROCHLORIDE 0.1 MG: 0.1 TABLET ORAL at 02:43

## 2019-02-05 RX ADMIN — FUROSEMIDE 20 MG: 20 TABLET ORAL at 08:06

## 2019-02-05 RX ADMIN — ALPRAZOLAM 0.5 MG: 0.5 TABLET ORAL at 11:50

## 2019-02-05 NOTE — H&P
HOSPITALIST H&P/CONSULTNAME:  Jacklyn Stephens Age:  59 y.o. 
:   1955 MRN:   [id number] PCP: None Consulting MD: Treatment Team: Attending Provider: Alicia Fulton MD; Primary Nurse: Rena Yost RN 
HPI:  
Patient is a 59years old female with pmhx of DM-2, HTN, A.fib on Eliquis, prior DVT, HTN, paraplegia on chronic SP cath due to neurogenic bladder, recurrent CAUTI's, bipolar d/o, anxiety d/o, chronic pain on opiate and xanax, chronic Hep C presents to ER with worsening back pain for 1 day duration. Pt was discharged from [facility] on 19 after being treated for sepsis due to CAUTI and A.fib with RVR. Urine culture was positive for E.coli, pt was on Zosyn, discharged on oral ampicillin. Pt was discharged home with HHA. Pt now is reporting of worsening back pain, 10/10 in severity, constant and not getting better with oral pain medications. In ER, pt was noted to be in A.fib with RVR with HR in 160's, /106, WBC 16K, LA 1.8, Cr 1.06 (1.21 on discharge). Complete ROS done and is as stated in HPI or otherwise negative Past Medical History:  
Diagnosis Date  Diabetes ([facility] [address]  Gastrointestinal disorder GERD  Hypertension  Ill-defined condition   
 neurogenic bladder  Ill-defined condition   
 transverse myelitis  Ill-defined condition   
 paraplegia  Liver disease Hepatitis C  
 Psychiatric disorder   
 anxiety  Psychiatric disorder   
 bipolar  Thromboembolus ([facility] [address] No past surgical history on file. Prior to Admission Medications Prescriptions Last Dose Informant Patient Reported? Taking? ALPRAZolam (XANAX) 0.5 mg tablet   No No  
Sig: Take 1 Tab by mouth two (2) times daily as needed for Anxiety. Max Daily Amount: 1 mg. Blood-Glucose Meter monitoring kit   No No  
Sig: Check glucose at home daily QUEtiapine (SEROQUEL) 100 mg tablet   Yes No  
Sig: Take 300 mg by mouth nightly. albuterol (PROVENTIL HFA, VENTOLIN HFA, PROAIR HFA) 90 mcg/actuation inhaler   No No  
Sig: Take 1 Puff by inhalation every four (4) hours as needed for Wheezing. ampicillin (PRINCIPEN) 500 mg capsule   No No  
Sig: Take 1 Cap by mouth every six (6) hours for 3 days. apixaban (ELIQUIS) 5 mg tablet   No No  
Sig: Take 1 Tab by mouth two (2) times a day for 30 days. aspirin 81 mg chewable tablet   Yes No  
Sig: Take 81 mg by mouth daily. bisacodyl (DULCOLAX) 5 mg EC tablet   No No  
Sig: Take 1 Tab by mouth daily as needed for Constipation. budesonide (PULMICORT) 0.5 mg/2 mL nbsp   No No  
Si mL by Nebulization route two (2) times a day. citalopram (CELEXA) 20 mg tablet   Yes No  
Sig: Take 20 mg by mouth daily. fentaNYL (DURAGESIC) 25 mcg/hr PATCH   No No  
Si Patch by TransDERmal route every seventy-two (72) hours. Max Daily Amount: 1 Patch. furosemide (LASIX) 40 mg tablet   Yes No  
Sig: Take  by mouth two (2) times a day. insulin glargine (LANTUS SOLOSTAR) 100 unit/mL (3 mL) pen   No No  
Si Units by SubCUTAneous route nightly. metoprolol tartrate (LOPRESSOR) 50 mg tablet   No No  
Sig: Take 1 Tab by mouth every twelve (12) hours for 30 days. nystatin (MYCOSTATIN) powder   No No  
Sig: Apply  to affected area two (2) times a day. omeprazole (PRILOSEC) 40 mg capsule   Yes No  
Sig: Take 40 mg by mouth daily. oxyCODONE IR (ROXICODONE) 20 mg immediate release tablet   No No  
Sig: Take 1 Tab by mouth every eight (8) hours as needed. Max Daily Amount: 60 mg.  
pregabalin (LYRICA) 100 mg capsule   No No  
Sig: Take 1 Cap by mouth three (3) times daily. Max Daily Amount: 300 mg.  
promethazine (PHENERGAN) 25 mg tablet   Yes No  
Sig: Take 25 mg by mouth every six (6) hours as needed for Nausea. zinc oxide-cod liver oil (DESITIN) 40 % paste   No No  
Sig: Apply  to affected area two (2) times a day. Facility-Administered Medications: None No Known Allergies Social History Tobacco Use  Smoking status: Current Every Day Smoker Packs/day: 0.50 Substance Use Topics  Alcohol use: No  
  
No family history on file. Objective:  
 
Visit Vitals BP (!) 167/99 Pulse (!) 162 Temp 98.1 °F (36.7 °C) Resp (!) 32 Ht 5' 5\" (1.651 m) Wt 93.4 kg (206 lb) SpO2 (!) 88% BMI 34.28 kg/m² Temp (24hrs), Av.1 °F (36.7 °C), Min:97.7 °F (36.5 °C), Max:98.6 °F (37 °C) Oxygen Therapy O2 Sat (%): (!) 88 % (19) Pulse via Oximetry: 130 beats per minute (19) O2 Device: Room air (19 [id number]) Physical Exam: 
General:    Alert, cooperative,morbidly obese, NAD Head:   Normocephalic, without obvious abnormality, atraumatic. Nose:  Nares normal. No drainage or sinus tenderness. Lungs:   Clear to auscultation bilaterally. No Wheezing or Rhonchi. No rales. Heart:   IRIR,  no murmur, rub or gallop. Abdomen:   Soft, non-tender. Not distended. Bowel sounds normal.  
Extremities: No cyanosis. No edema. No clubbing Skin:     Texture, turgor normal. No rashes or lesions. SP cath in place Neurologic: gcs 15, PARAPLEGIC Psych:             AO x3, mood and affect anxious Data Review:  
Recent Results (from the past 24 hour(s)) CBC WITH AUTOMATED DIFF Collection Time: 19  3:53 AM  
Result Value Ref Range WBC 9.9 4.3 - 11.1 K/uL  
 RBC 4.25 4.05 - 5.2 M/uL  
 HGB 12.3 11.7 - 15.4 g/dL HCT 39.5 35.8 - 46.3 % MCV 92.9 79.6 - 97.8 FL  
 MCH 28.9 26.1 - 32.9 PG  
 MCHC 31.1 (L) 31.4 - 35.0 g/dL  
 RDW 15.5 (H) 11.9 - 14.6 % PLATELET 361 992 - 611 K/uL MPV 10.8 9.4 - 12.3 FL ABSOLUTE NRBC 0.00 0.0 - 0.2 K/uL  
 DF AUTOMATED NEUTROPHILS 64 43 - 78 % LYMPHOCYTES 21 13 - 44 % MONOCYTES 9 4.0 - 12.0 % EOSINOPHILS 4 0.5 - 7.8 % BASOPHILS 1 0.0 - 2.0 % IMMATURE GRANULOCYTES 1 0.0 - 5.0 %  
 ABS. NEUTROPHILS 6.4 1.7 - 8.2 K/UL  
 ABS. LYMPHOCYTES 2.1 0.5 - 4.6 K/UL ABS. MONOCYTES 0.8 0.1 - 1.3 K/UL  
 ABS. EOSINOPHILS 0.4 0.0 - 0.8 K/UL  
 ABS. BASOPHILS 0.1 0.0 - 0.2 K/UL  
 ABS. IMM. GRANS. 0.1 0.0 - 0.5 K/UL METABOLIC PANEL, BASIC Collection Time: 02/04/19  3:53 AM  
Result Value Ref Range Sodium 143 136 - 145 mmol/L Potassium 3.4 (L) 3.5 - 5.1 mmol/L Chloride 107 98 - 107 mmol/L  
 CO2 29 21 - 32 mmol/L Anion gap 7 7 - 16 mmol/L Glucose 88 65 - 100 mg/dL BUN 13 8 - 23 MG/DL Creatinine 1.21 (H) 0.6 - 1.0 MG/DL  
 GFR est AA 58 (L) >60 ml/min/1.73m2 GFR est non-AA 48 (L) >60 ml/min/1.73m2 Calcium 8.5 8.3 - 10.4 MG/DL  
GLUCOSE, POC Collection Time: 02/04/19  5:56 AM  
Result Value Ref Range Glucose (POC) 101 (H) 65 - 100 mg/dL GLUCOSE, POC Collection Time: 02/04/19 10:58 AM  
Result Value Ref Range Glucose (POC) 95 65 - 100 mg/dL GLUCOSE, POC Collection Time: 02/04/19  4:17 PM  
Result Value Ref Range Glucose (POC) 90 65 - 100 mg/dL CBC WITH AUTOMATED DIFF Collection Time: 02/04/19 11:08 PM  
Result Value Ref Range WBC 16.2 (H) 4.3 - 11.1 K/uL  
 RBC 5.60 (H) 4.05 - 5.2 M/uL  
 HGB 15.9 (H) 11.7 - 15.4 g/dL HCT 49.9 (H) 35.8 - 46.3 % MCV 89.1 79.6 - 97.8 FL  
 MCH 28.4 26.1 - 32.9 PG  
 MCHC 31.9 31.4 - 35.0 g/dL  
 RDW 15.0 (H) 11.9 - 14.6 % PLATELET 108 538 - 986 K/uL MPV 10.7 9.4 - 12.3 FL ABSOLUTE NRBC 0.00 0.0 - 0.2 K/uL  
 DF AUTOMATED NEUTROPHILS 82 (H) 43 - 78 % LYMPHOCYTES 11 (L) 13 - 44 % MONOCYTES 5 4.0 - 12.0 % EOSINOPHILS 1 0.5 - 7.8 % BASOPHILS 1 0.0 - 2.0 % IMMATURE GRANULOCYTES 1 0.0 - 5.0 %  
 ABS. NEUTROPHILS 13.2 (H) 1.7 - 8.2 K/UL  
 ABS. LYMPHOCYTES 1.8 0.5 - 4.6 K/UL  
 ABS. MONOCYTES 0.8 0.1 - 1.3 K/UL  
 ABS. EOSINOPHILS 0.1 0.0 - 0.8 K/UL  
 ABS. BASOPHILS 0.1 0.0 - 0.2 K/UL  
 ABS. IMM. GRANS. 0.1 0.0 - 0.5 K/UL METABOLIC PANEL, COMPREHENSIVE Collection Time: 02/04/19 11:08 PM  
Result Value Ref Range  Sodium 138 136 - 145 mmol/L  
 Potassium 3.8 3.5 - 5.1 mmol/L Chloride 101 98 - 107 mmol/L  
 CO2 25 21 - 32 mmol/L Anion gap 12 mmol/L Glucose 100 65 - 100 mg/dL BUN 14 8 - 23 MG/DL Creatinine 1.06 (H) 0.6 - 1.0 MG/DL  
 GFR est AA >60 >60 ml/min/1.73m2 GFR est non-AA 56 ml/min/1.73m2 Calcium 9.7 8.3 - 10.4 MG/DL Bilirubin, total 0.9 0.2 - 1.1 MG/DL  
 ALT (SGPT) 45 12 - 65 U/L  
 AST (SGOT) 67 (H) 15 - 37 U/L Alk. phosphatase 124 50 - 136 U/L Protein, total 9.4 g/dL Albumin 3.5 3.2 - 4.6 g/dL Globulin 5.9 (H) 2.3 - 3.5 g/dL A-G Ratio 0.6 MAGNESIUM Collection Time: 02/04/19 11:08 PM  
Result Value Ref Range Magnesium 2.2 1.8 - 2.4 mg/dL LIPASE Collection Time: 02/04/19 11:08 PM  
Result Value Ref Range Lipase 93 73 - 393 U/L  
URINALYSIS W/ RFLX MICROSCOPIC Collection Time: 02/04/19 11:48 PM  
Result Value Ref Range Color YELLOW Appearance CLEAR Specific gravity 1.008 1.001 - 1.023    
 pH (UA) 7.0 5.0 - 9.0 Protein 100 (A) NEG mg/dL Glucose NEGATIVE  mg/dL Ketone 40 (A) NEG mg/dL Bilirubin NEGATIVE  NEG Blood SMALL (A) NEG Urobilinogen 1.0 0.2 - 1.0 EU/dL Nitrites NEGATIVE  NEG Leukocyte Esterase NEGATIVE  NEG    
 WBC 0-3 0 /hpf  
 RBC 0-3 0 /hpf Epithelial cells 0-3 0 /hpf Bacteria 0 0 /hpf Casts 0-3 0 /lpf POC LACTIC ACID Collection Time: 02/05/19 12:00 AM  
Result Value Ref Range Lactic Acid (POC) 1.82 0.5 - 1.9 mmol/L Imaging Princess Dennis All diagnostic imaging personally reviewed by me. CXR: 
EXAM: Chest x-ray. 
  
INDICATION: Dyspnea. 
  
COMPARISON: January 31, 2019. 
  
TECHNIQUE: Single frontal view chest. 
  
FINDINGS: The lungs are clear. The cardiac size, mediastinal contour and 
pulmonary vasculature are normal.  No pneumothorax or pleural effusion is seen. 
  
IMPRESSION IMPRESSION: Normal chest x-ray. Assessment and Plan: Active Hospital Problems Diagnosis Date Noted  Leukocytosis 02/05/2019  Dyspnea 02/05/2019  Atrial fibrillation with rapid ventricular response ([facility] [address] 02/05/2019  Hypertensive urgency, malignant 02/05/2019  Chronic midline low back pain without sciatica 02/05/2019 PLAN 
·  Admit inpatient in view of A.fib with RVR and malignant hypertensive urgency · Start in Diltiazem gtt, with oral BB in AM 
· Resume Eliquis · Start clonidine, resume metoprolol · Start IV rocephin x 3 days for recent E.coli CAUTI · Prn xopenex nebs · Supplemental oxygen prn, wean off as able · Cardiology consult in AM 
· POC glucose qachs. Continue weight based insulin regimen with lantus and humalog and SSI · Continue roxicodone with fentanyl patch for chronic pain · Will start IV dilaudid for optimal pain control · PRN tylenol for mild pain and fever · PRN zofran for nausea/vomiting · DVT prophylaxis with eliquis · Stool softeners to prevent opioid induced constipation · Cautious IV fluids Code Status: full High risk with opioids on board Anticipated discharge: >2MN Signed By: Ana Ohara MD   
 February 5, 2019

## 2019-02-05 NOTE — PROGRESS NOTES
02/05/19 0745 Oxygen Therapy O2 Sat (%) 98 % Pulse via Oximetry 63 beats per minute O2 Device Nasal cannula O2 Flow Rate (L/min) 2 l/min FIO2 (%) 28 %

## 2019-02-05 NOTE — PROGRESS NOTES
MIDLINE Placement Note PRE-PROCEDURE VERIFICATION 
PROCEDURE DETAIL Time out completed with Carlos Bhatia RN, VAT and everyone in agreement with procedure. A single lumen Midline was started for vascular access. The following documentation is in addition to the Midline properties in the lines/airways flowsheet : 
Lot #: [id number] Xylocaine used: yes Mid-Arm Circumference: 38 (cm) Internal Catheter Length: 8 (cm) Internal Catheter Total Length: 8 (cm) Vein Selection for Midline:left cephalic Line is okay to use: yes Aamuri Burr RN, VAT

## 2019-02-05 NOTE — PROGRESS NOTES
Admitted to 374 from ED. Cardizem drip infusing at 5 mg/hr, heart rate 128. Patient transferred to ICU bed without difficulty. Slow to answer questions, but oriented X 4. Follows commands. Cardiac monitor depicts atrial fibrillation. Dual skin assessment completed with Jeromy RN, scattered bruises to arms, no broken areas noted to skin. Allevyn to sacral area, old dark scar to mid sacral area. prevalon boots in place that patient came to floor with. Supra pubic catheter is in place to bedside drain with clear yellow urine. CHG bath completed. Patient repositioned for comfort. Educated on use of call bell and tv control. Head of bed elevated 30 degrees. Patient instructed to call for any needs.

## 2019-02-05 NOTE — PROGRESS NOTES
Problem: Falls - Risk of 
Goal: *Absence of Falls Document Emmie Lynne Fall Risk and appropriate interventions in the flowsheet. Outcome: Progressing Towards Goal 
Fall Risk Interventions:

## 2019-02-05 NOTE — PROGRESS NOTES
Care Management Interventions PCP Verified by CM: No 
Mode of Transport at Discharge: Other (see comment) Transition of Care Consult (CM Consult): Home Health [facility] [place] - INPATIENT: No 
Reason Outside [place]: Patient already serviced by other home care/hospice agency Current Support Network: Own Home Confirm Follow Up Transport: Family Plan discussed with Pt/Family/Caregiver: Yes Freedom of Choice Offered: Yes Discharge Location Discharge Placement: Home with home health Visited with pt regarding plans for discharge, pt was very sleepy and unable to answer questions. Read pt's chart and spoke with Sarita Sevilla @ [facility]. Pt was [facility]/c [facility] yesterday to her apartment with her son (per CM note). Prior to that hospital stay pt was in her apartment that the SW @ [facility] helped get for her. Pt was considered a Complex Medicaid and now is straight Medicaid. Pt has also been to [facility] in the past, but caused many issue and neither [facility] or [facility] will accept pt back. I LM for pt's son Shakeel Ellington [phone]. Pt's medically Hx consist of a neurogenic bladder, paraplegic & supra pubic catheter. D/c is TBD, but pt will be going back to her apartment with Interim Shubham Conrad( who have not actually seen pt b/c she was home for only 4 hrs and then came back to the hospital.). Also a referral was sent to [facility] via email. 2/7 Pt states that Soheilaosman Nalini/Friend #[phone], is planning on staying with pt and helping her out. I spoke with Ksenia and she confirmed what the pt said. Pt also used to have [facility], but after speaking with them pt has not been active in over 2 years, she would need to re-submit a application. Pt d/c plan is to return home with this friend and Interim ALENA RN/Livia/Complex SW.

## 2019-02-05 NOTE — PROGRESS NOTES
Report to [facility]. Dual skin assessment completed. Patient remains in NSR, with frequent PACs.

## 2019-02-05 NOTE — INTERDISCIPLINARY ROUNDS
Interdisciplinary team rounds were held 2/5/2019 with the following team members:Nursing and Physician Plan of care discussed. See clinical pathway and/or care plan for interventions and desired outcomes.

## 2019-02-05 NOTE — ROUTINE PROCESS
TRANSFER - OUT REPORT: 
 
Verbal report given to Pat on Rubina Guerra  being transferred to CCU rm # [id number] [phone] [id number] for routine progression of care Report consisted of patients Situation, Background, Assessment and  
Recommendations(SBAR). Information from the following report(s) SBAR was reviewed with the receiving nurse. Lines:  
Peripheral IV 02/05/19 Right (Active) Opportunity for questions and clarification was provided. Patient transported with: 
 Registered Nurse

## 2019-02-05 NOTE — ED PROVIDER NOTES
Patient presents to the ER for worsening back pain. Patient is a paraplegic. She had a recent hospitalization for UTI and also mental status. Was discharged earlier today. Reports worsening back pain. Denies any fevers or vomiting. Reports generalized fatigue and malaise. The history is provided by the patient. Back Pain This is a chronic problem. The current episode started more than 1 week ago. The problem has been gradually worsening. The problem occurs constantly. The pain is present in the lower back. Pertinent negatives include no fever and no numbness. Past Medical History:  
Diagnosis Date  Diabetes ([address]  Gastrointestinal disorder GERD  Hypertension  Ill-defined condition   
 neurogenic bladder  Ill-defined condition   
 transverse myelitis  Ill-defined condition   
 paraplegia  Liver disease Hepatitis C  
 Psychiatric disorder   
 anxiety  Psychiatric disorder   
 bipolar  Thromboembolus ([facility] [address] No past surgical history on file. No family history on file. Social History Socioeconomic History  Marital status:  Spouse name: Not on file  Number of children: Not on file  Years of education: Not on file  Highest education level: Not on file Social Needs  Financial resource strain: Not on file  Food insecurity - worry: Not on file  Food insecurity - inability: Not on file  Transportation needs - medical: Not on file  Transportation needs - non-medical: Not on file Occupational History  Not on file Tobacco Use  Smoking status: Current Every Day Smoker Packs/day: 0.50 Substance and Sexual Activity  Alcohol use: No  
 Drug use: No  
 Sexual activity: Not on file Other Topics Concern  Not on file Social History Narrative  Not on file ALLERGIES: Patient has no known allergies. Review of Systems Constitutional: Negative for fatigue, fever and unexpected weight change. HENT: Negative for congestion and dental problem. Eyes: Negative for photophobia, redness and visual disturbance. Respiratory: Negative for choking and chest tightness. Cardiovascular: Negative for palpitations and leg swelling. Musculoskeletal: Positive for back pain. Neurological: Negative for light-headedness and numbness. Hematological: Negative for adenopathy. Does not bruise/bleed easily. All other systems reviewed and are negative. Vitals:  
 02/04/19 2247 BP: (!) 199/106 Pulse: (!) 160 Resp: 20 Temp: 98.1 °F (36.7 °C) SpO2: 94% Weight: 93.4 kg (206 lb) Height: 5' 5\" (1.651 m) Physical Exam  
Constitutional: She is oriented to person, place, and time. She appears well-developed and well-nourished. HENT:  
Head: Normocephalic. Cardiovascular: An irregularly irregular rhythm present. Tachycardia present. Exam reveals no friction rub. No murmur heard. Pulmonary/Chest: Effort normal and breath sounds normal.  
Abdominal: Soft. Bowel sounds are normal. She exhibits no distension. There is no tenderness. Musculoskeletal: Normal range of motion. She exhibits edema. Neurological: She is alert and oriented to person, place, and time. Nursing note and vitals reviewed. MDM Number of Diagnoses or Management Options Diagnosis management comments: Will repeat labs in comparison to labs drawn earlier today. We'll obtain chest x-ray and urinalysis Patient currently in A. Fib with RVR 
 
1:44 AM 
Labs are significant for white count of 16,000. It was 9 before she left. Urine looks clear as well as chest x-ray looks clear here. Normal Lactic acid. Patient requiring continuous Cardizem infusion to keep rate controlled. Amount and/or Complexity of Data Reviewed Clinical lab tests: ordered and reviewed Tests in the radiology section of CPT®: ordered and reviewed Review and summarize past medical records: yes Discuss the patient with other providers: yes (Hospitalist) Risk of Complications, Morbidity, and/or Mortality Presenting problems: high Diagnostic procedures: moderate Management options: moderate Patient Progress Patient progress: stable Procedures Results Include: 
 
Recent Results (from the past 24 hour(s)) CBC WITH AUTOMATED DIFF Collection Time: 02/04/19  3:53 AM  
Result Value Ref Range WBC 9.9 4.3 - 11.1 K/uL  
 RBC 4.25 4.05 - 5.2 M/uL  
 HGB 12.3 11.7 - 15.4 g/dL HCT 39.5 35.8 - 46.3 % MCV 92.9 79.6 - 97.8 FL  
 MCH 28.9 26.1 - 32.9 PG  
 MCHC 31.1 (L) 31.4 - 35.0 g/dL  
 RDW 15.5 (H) 11.9 - 14.6 % PLATELET 644 479 - 840 K/uL MPV 10.8 9.4 - 12.3 FL ABSOLUTE NRBC 0.00 0.0 - 0.2 K/uL  
 DF AUTOMATED NEUTROPHILS 64 43 - 78 % LYMPHOCYTES 21 13 - 44 % MONOCYTES 9 4.0 - 12.0 % EOSINOPHILS 4 0.5 - 7.8 % BASOPHILS 1 0.0 - 2.0 % IMMATURE GRANULOCYTES 1 0.0 - 5.0 %  
 ABS. NEUTROPHILS 6.4 1.7 - 8.2 K/UL  
 ABS. LYMPHOCYTES 2.1 0.5 - 4.6 K/UL  
 ABS. MONOCYTES 0.8 0.1 - 1.3 K/UL  
 ABS. EOSINOPHILS 0.4 0.0 - 0.8 K/UL  
 ABS. BASOPHILS 0.1 0.0 - 0.2 K/UL  
 ABS. IMM. GRANS. 0.1 0.0 - 0.5 K/UL METABOLIC PANEL, BASIC Collection Time: 02/04/19  3:53 AM  
Result Value Ref Range Sodium 143 136 - 145 mmol/L Potassium 3.4 (L) 3.5 - 5.1 mmol/L Chloride 107 98 - 107 mmol/L  
 CO2 29 21 - 32 mmol/L Anion gap 7 7 - 16 mmol/L Glucose 88 65 - 100 mg/dL BUN 13 8 - 23 MG/DL Creatinine 1.21 (H) 0.6 - 1.0 MG/DL  
 GFR est AA 58 (L) >60 ml/min/1.73m2 GFR est non-AA 48 (L) >60 ml/min/1.73m2 Calcium 8.5 8.3 - 10.4 MG/DL  
GLUCOSE, POC Collection Time: 02/04/19  5:56 AM  
Result Value Ref Range Glucose (POC) 101 (H) 65 - 100 mg/dL GLUCOSE, POC Collection Time: 02/04/19 10:58 AM  
Result Value Ref Range Glucose (POC) 95 65 - 100 mg/dL GLUCOSE, POC  
 Collection Time: 02/04/19  4:17 PM  
Result Value Ref Range Glucose (POC) 90 65 - 100 mg/dL CBC WITH AUTOMATED DIFF Collection Time: 02/04/19 11:08 PM  
Result Value Ref Range WBC 16.2 (H) 4.3 - 11.1 K/uL  
 RBC 5.60 (H) 4.05 - 5.2 M/uL  
 HGB 15.9 (H) 11.7 - 15.4 g/dL HCT 49.9 (H) 35.8 - 46.3 % MCV 89.1 79.6 - 97.8 FL  
 MCH 28.4 26.1 - 32.9 PG  
 MCHC 31.9 31.4 - 35.0 g/dL  
 RDW 15.0 (H) 11.9 - 14.6 % PLATELET 343 677 - 744 K/uL MPV 10.7 9.4 - 12.3 FL ABSOLUTE NRBC 0.00 0.0 - 0.2 K/uL  
 DF AUTOMATED NEUTROPHILS 82 (H) 43 - 78 % LYMPHOCYTES 11 (L) 13 - 44 % MONOCYTES 5 4.0 - 12.0 % EOSINOPHILS 1 0.5 - 7.8 % BASOPHILS 1 0.0 - 2.0 % IMMATURE GRANULOCYTES 1 0.0 - 5.0 %  
 ABS. NEUTROPHILS 13.2 (H) 1.7 - 8.2 K/UL  
 ABS. LYMPHOCYTES 1.8 0.5 - 4.6 K/UL  
 ABS. MONOCYTES 0.8 0.1 - 1.3 K/UL  
 ABS. EOSINOPHILS 0.1 0.0 - 0.8 K/UL  
 ABS. BASOPHILS 0.1 0.0 - 0.2 K/UL  
 ABS. IMM. GRANS. 0.1 0.0 - 0.5 K/UL METABOLIC PANEL, COMPREHENSIVE Collection Time: 02/04/19 11:08 PM  
Result Value Ref Range Sodium 138 136 - 145 mmol/L Potassium 3.8 3.5 - 5.1 mmol/L Chloride 101 98 - 107 mmol/L  
 CO2 25 21 - 32 mmol/L Anion gap 12 mmol/L Glucose 100 65 - 100 mg/dL BUN 14 8 - 23 MG/DL Creatinine 1.06 (H) 0.6 - 1.0 MG/DL  
 GFR est AA >60 >60 ml/min/1.73m2 GFR est non-AA 56 ml/min/1.73m2 Calcium 9.7 8.3 - 10.4 MG/DL Bilirubin, total 0.9 0.2 - 1.1 MG/DL  
 ALT (SGPT) 45 12 - 65 U/L  
 AST (SGOT) 67 (H) 15 - 37 U/L Alk. phosphatase 124 50 - 136 U/L Protein, total 9.4 g/dL Albumin 3.5 3.2 - 4.6 g/dL Globulin 5.9 (H) 2.3 - 3.5 g/dL A-G Ratio 0.6 MAGNESIUM Collection Time: 02/04/19 11:08 PM  
Result Value Ref Range Magnesium 2.2 1.8 - 2.4 mg/dL LIPASE Collection Time: 02/04/19 11:08 PM  
Result Value Ref Range Lipase 93 73 - 393 U/L  
URINALYSIS W/ RFLX MICROSCOPIC Collection Time: 02/04/19 11:48 PM  
Result Value Ref Range Color YELLOW Appearance CLEAR Specific gravity 1.008 1.001 - 1.023    
 pH (UA) 7.0 5.0 - 9.0 Protein 100 (A) NEG mg/dL Glucose NEGATIVE  mg/dL Ketone 40 (A) NEG mg/dL Bilirubin NEGATIVE  NEG Blood SMALL (A) NEG Urobilinogen 1.0 0.2 - 1.0 EU/dL Nitrites NEGATIVE  NEG Leukocyte Esterase NEGATIVE  NEG    
 WBC 0-3 0 /hpf  
 RBC 0-3 0 /hpf Epithelial cells 0-3 0 /hpf Bacteria 0 0 /hpf Casts 0-3 0 /lpf POC LACTIC ACID Collection Time: 02/05/19 12:00 AM  
Result Value Ref Range Lactic Acid (POC) 1.82 0.5 - 1.9 mmol/L Voice dictation software was used during the making of this note. This software is not perfect and grammatical and other typographical errors may be present. This note has been proofread, but may still contain errors.  
Alison Young MD; 2/5/2019 @1:45 AM  
===================================================================

## 2019-02-05 NOTE — PROGRESS NOTES
Attempted PT/OT. Pt. Very drowsy. I was able to wake her up briefly but she refused any mobility at this time. Will try back tomorrow.

## 2019-02-06 LAB
GLUCOSE BLD STRIP.AUTO-MCNC: 148 MG/DL (ref 65–100)
GLUCOSE BLD STRIP.AUTO-MCNC: 284 MG/DL (ref 65–100)
GLUCOSE BLD STRIP.AUTO-MCNC: 300 MG/DL (ref 65–100)
GLUCOSE BLD STRIP.AUTO-MCNC: 334 MG/DL (ref 65–100)

## 2019-02-06 PROCEDURE — 94760 N-INVAS EAR/PLS OXIMETRY 1: CPT

## 2019-02-06 PROCEDURE — 97167 OT EVAL HIGH COMPLEX 60 MIN: CPT

## 2019-02-06 PROCEDURE — 74011250636 HC RX REV CODE- 250/636: Performed by: HOSPITALIST

## 2019-02-06 PROCEDURE — 77030020263 HC SOL INJ SOD CL0.9% LFCR 1000ML

## 2019-02-06 PROCEDURE — 77010033678 HC OXYGEN DAILY

## 2019-02-06 PROCEDURE — 74011250637 HC RX REV CODE- 250/637: Performed by: HOSPITALIST

## 2019-02-06 PROCEDURE — 82962 GLUCOSE BLOOD TEST: CPT

## 2019-02-06 PROCEDURE — 74011250637 HC RX REV CODE- 250/637: Performed by: INTERNAL MEDICINE

## 2019-02-06 PROCEDURE — 74011636637 HC RX REV CODE- 636/637: Performed by: HOSPITALIST

## 2019-02-06 PROCEDURE — 74011000250 HC RX REV CODE- 250: Performed by: HOSPITALIST

## 2019-02-06 PROCEDURE — 97163 PT EVAL HIGH COMPLEX 45 MIN: CPT

## 2019-02-06 PROCEDURE — 94640 AIRWAY INHALATION TREATMENT: CPT

## 2019-02-06 PROCEDURE — 65610000001 HC ROOM ICU GENERAL

## 2019-02-06 PROCEDURE — 74011000258 HC RX REV CODE- 258: Performed by: HOSPITALIST

## 2019-02-06 RX ORDER — METOPROLOL TARTRATE 100 MG/1
100 TABLET ORAL EVERY 12 HOURS
Status: DISCONTINUED | OUTPATIENT
Start: 2019-02-06 | End: 2019-02-08

## 2019-02-06 RX ORDER — DILTIAZEM HYDROCHLORIDE 5 MG/ML
10 INJECTION INTRAVENOUS ONCE
Status: COMPLETED | OUTPATIENT
Start: 2019-02-06 | End: 2019-02-06

## 2019-02-06 RX ADMIN — Medication: at 09:51

## 2019-02-06 RX ADMIN — INSULIN LISPRO 5 UNITS: 100 INJECTION, SOLUTION INTRAVENOUS; SUBCUTANEOUS at 12:39

## 2019-02-06 RX ADMIN — ASPIRIN 81 MG 81 MG: 81 TABLET ORAL at 09:46

## 2019-02-06 RX ADMIN — INSULIN LISPRO 5 UNITS: 100 INJECTION, SOLUTION INTRAVENOUS; SUBCUTANEOUS at 17:18

## 2019-02-06 RX ADMIN — PREGABALIN 100 MG: 50 CAPSULE ORAL at 22:33

## 2019-02-06 RX ADMIN — FUROSEMIDE 20 MG: 20 TABLET ORAL at 17:18

## 2019-02-06 RX ADMIN — Medication 10 ML: at 05:50

## 2019-02-06 RX ADMIN — INSULIN GLARGINE 30 UNITS: 100 INJECTION, SOLUTION SUBCUTANEOUS at 22:43

## 2019-02-06 RX ADMIN — PREGABALIN 100 MG: 50 CAPSULE ORAL at 09:47

## 2019-02-06 RX ADMIN — SODIUM CHLORIDE 75 ML/HR: 900 INJECTION, SOLUTION INTRAVENOUS at 02:12

## 2019-02-06 RX ADMIN — BUDESONIDE 500 MCG: 0.5 INHALANT RESPIRATORY (INHALATION) at 21:15

## 2019-02-06 RX ADMIN — OXYCODONE HYDROCHLORIDE 10 MG: 5 TABLET ORAL at 17:27

## 2019-02-06 RX ADMIN — OXYCODONE HYDROCHLORIDE 10 MG: 5 TABLET ORAL at 23:25

## 2019-02-06 RX ADMIN — Medication 10 ML: at 22:46

## 2019-02-06 RX ADMIN — METOPROLOL TARTRATE 100 MG: 100 TABLET ORAL at 20:29

## 2019-02-06 RX ADMIN — INSULIN LISPRO 6 UNITS: 100 INJECTION, SOLUTION INTRAVENOUS; SUBCUTANEOUS at 17:18

## 2019-02-06 RX ADMIN — CEFTRIAXONE 1 G: 1 INJECTION, POWDER, FOR SOLUTION INTRAMUSCULAR; INTRAVENOUS at 02:15

## 2019-02-06 RX ADMIN — Medication 10 ML: at 14:44

## 2019-02-06 RX ADMIN — METOPROLOL TARTRATE 100 MG: 100 TABLET ORAL at 09:46

## 2019-02-06 RX ADMIN — BISACODYL 5 MG: 5 TABLET, COATED ORAL at 14:44

## 2019-02-06 RX ADMIN — INSULIN LISPRO 8 UNITS: 100 INJECTION, SOLUTION INTRAVENOUS; SUBCUTANEOUS at 12:39

## 2019-02-06 RX ADMIN — FUROSEMIDE 20 MG: 20 TABLET ORAL at 09:46

## 2019-02-06 RX ADMIN — APIXABAN 5 MG: 5 TABLET, FILM COATED ORAL at 17:18

## 2019-02-06 RX ADMIN — ALPRAZOLAM 0.5 MG: 0.5 TABLET ORAL at 01:10

## 2019-02-06 RX ADMIN — INSULIN LISPRO 8 UNITS: 100 INJECTION, SOLUTION INTRAVENOUS; SUBCUTANEOUS at 22:43

## 2019-02-06 RX ADMIN — NYSTATIN: 100000 POWDER TOPICAL at 09:49

## 2019-02-06 RX ADMIN — Medication: at 20:29

## 2019-02-06 RX ADMIN — QUETIAPINE FUMARATE 300 MG: 100 TABLET ORAL at 20:29

## 2019-02-06 RX ADMIN — DILTIAZEM HYDROCHLORIDE 10 MG: 5 INJECTION INTRAVENOUS at 05:06

## 2019-02-06 RX ADMIN — CITALOPRAM HYDROBROMIDE 20 MG: 20 TABLET ORAL at 09:47

## 2019-02-06 RX ADMIN — DRONEDARONE 400 MG: 400 TABLET, FILM COATED ORAL at 09:46

## 2019-02-06 RX ADMIN — PANTOPRAZOLE SODIUM 40 MG: 40 TABLET, DELAYED RELEASE ORAL at 09:47

## 2019-02-06 RX ADMIN — OXYCODONE HYDROCHLORIDE 10 MG: 5 TABLET ORAL at 01:10

## 2019-02-06 RX ADMIN — OXYCODONE HYDROCHLORIDE 10 MG: 5 TABLET ORAL at 11:40

## 2019-02-06 RX ADMIN — PREGABALIN 100 MG: 50 CAPSULE ORAL at 16:21

## 2019-02-06 RX ADMIN — DRONEDARONE 400 MG: 400 TABLET, FILM COATED ORAL at 16:21

## 2019-02-06 RX ADMIN — APIXABAN 5 MG: 5 TABLET, FILM COATED ORAL at 09:47

## 2019-02-06 RX ADMIN — BUDESONIDE 500 MCG: 0.5 INHALANT RESPIRATORY (INHALATION) at 07:38

## 2019-02-06 RX ADMIN — ALPRAZOLAM 0.5 MG: 0.5 TABLET ORAL at 22:33

## 2019-02-06 NOTE — PROGRESS NOTES
Restful, watching television. No complaints. Campbell catheter has not had any leakage since earlier. Cardiac monitor depicts sinus rhythm with frequent pacs.

## 2019-02-06 NOTE — PROGRESS NOTES
Report to [facility]. Dual skin assessment completed with Arjun Calvillo RN. Patient repositioned for comfort. Drowsy. Cardiac monitor still atrial fibrillation, heart rate 100 - 116.

## 2019-02-06 NOTE — PROGRESS NOTES
0830 Patient very somnolent this morning. FSBS 148. Patient opens eyes to physical stimulus/voice. Answers all orientation questions correctly but very quickly falls back asleep. -125, A. Fib on monitor. BP 91/72 MAP 79. Patient received xanax/roxidcodone at 0400. Per nursing report patient was up most of the night. Dr. Rohit Benton informed and updated on patient condition. Room lights and TV turned on. 
 
1000 Patient alert and oriented. Now eating breakfast in bed- per patient did not sleep at all last night and is very tired.

## 2019-02-06 NOTE — PROGRESS NOTES
Problem: Mobility Impaired (Adult and Pediatric) Goal: *Acute Goals and Plan of Care (Insert Text) STG: 
(1.)Ms. Mylene Oliver will move from supine to sit and sit to supine  with MODERATE ASSIST within 7 treatment day(s). (2.)Ms. Mylene Oliver will transfer from bed to chair and chair to bed with MAXIMAL ASSIST and sliding board  within 7 treatment day(s). (3.)Ms. Mylene Oliver will improve sitting balance to tolerate performing UE AROM exs on EOB with CGA within 7 treatment day(s). (4.)Pt. Will tolerate sitting up in chair for 3 hours to improve endurance within 7 days 
 
 
 
 
________________________________________________________________________________________________ PHYSICAL THERAPY: Initial Assessment and AM 2/6/2019  
 
5 DAY PT TRIAL INPATIENT:   
Payor: [id number] / Plan: [place] [facility] / Product Type: Medicaid /   
  
NAME/AGE/GENDER: Shelly Minor is a 59 y.o. female PRIMARY DIAGNOSIS: Atrial fibrillation with rapid ventricular response ([facility] [address] [I48.91] Hypertensive urgency, malignant [I16.0] Leukocytosis [D72.829] Dyspnea [R06.00] <principal problem not specified> <principal problem not specified> 
 
  
ICD-10: Treatment Diagnosis:  
 · Generalized Muscle Weakness (M62.81) · Other abnormalities of gait and mobility (R26.89) · Low Back Pain (M54.5) Precaution/Allergies: 
Patient has no known allergies. ASSESSMENT:  
Ms. Mylene Oliver presents with leukocytosis. She is a paraplegic(transverse myelitis). States that she's been this way for 6 yrs. She's a poor historian. Her PT deficits include lack of initiative, decreased B LE ROM, dependent sitting balance and total assistance for bed mobility and transfers. She supposedly just left a SNF and is in a handicapped accessible apt. Was only there a number of hours before being brought to ER. This am, she keeps her eyes closed for most of treatment.  Wants pain medicine. States that she would rather work tonight because she is sleepy all day. Will put her on a 5 days trial for PT. I question how much she was out of bed prior to this. Her LEs are so contracted. This section established at most recent assessment PROBLEM LIST (Impairments causing functional limitations): 1. Decreased Strength 2. Decreased ADL/Functional Activities 3. Decreased Transfer Abilities 4. Decreased Balance 5. Increased Pain 6. Decreased Activity Tolerance 7. Increased Fatigue 8. Decreased Flexibility/Joint Mobility 9. Decreased Skin Integrity/Hygeine 10. Decreased Harper with Home Exercise Program 
11. Decreased Cognition INTERVENTIONS PLANNED: (Benefits and precautions of physical therapy have been discussed with the patient.) 1. Balance Exercise 2. Bed Mobility 3. Family Education 4. Home Exercise Program (HEP) 5. Range of Motion (ROM) 6. Therapeutic Activites 7. Therapeutic Exercise/Strengthening 8. Transfer Training TREATMENT PLAN: Frequency/Duration: daily for 5 day trial 
Rehabilitation Potential For Stated Goals: guarded RECOMMENDED REHABILITATION/EQUIPMENT: (at time of discharge pending progress): Due to the probability of continued deficits (see above) this patient will likely need continued skilled physical therapy after discharge. Equipment:  
? has a sliding board, wc, power wc, HISTORY:  
History of Present Injury/Illness (Reason for Referral): Admitted with leukocytosis and paraplegia Past Medical History/Comorbidities: Ms. Socorro Wesley  has a past medical history of Diabetes ([address] Gastrointestinal disorder, Hypertension, Ill-defined condition, Ill-defined condition, Ill-defined condition, Liver disease, Psychiatric disorder, Psychiatric disorder, and Thromboembolus ([address] Ms. Socorro Wesley  has no past surgical history on file. Social History/Living Environment:  
Home Environment: Apartment # Steps to Enter: 0 One/Two Story Residence: One story Living Alone: No 
Support Systems: Child(urban) Patient Expects to be Discharged to[de-identified] [id number] Current DME Used/Available at Home: Wheelchair, power, [employer]) Tub or Shower Type: (uncertain) Prior Level of Function/Work/Activity: 
Appears to be dependent. States that she could transfer herself Number of Personal Factors/Comorbidities that affect the Plan of Care: 3+: HIGH COMPLEXITY EXAMINATION:  
Most Recent Physical Functioning:  
Gross Assessment: PROM: Grossly decreased, non-functional(hips/knees lack 20 degrees from full extension. Ankles lack 20 degrees from neutral) Strength: Grossly decreased, non-functional(no volitional mvmt elicited) Tone: Abnormal(decreased) Sensation: Impaired(B LEs) Posture: 
Posture Assessment: Forward head, Rounded shoulders Balance: 
Sitting: Impaired; With support Sitting - Static: Supported sitting Sitting - Dynamic: Poor (constant support) Standing: (NA) Bed Mobility: 
Rolling: Maximum assistance Supine to Sit: Total assistance Sit to Supine: Total assistance Scooting: Total assistance Wheelchair Mobility: 
  
Transfers: 
  
Gait: 
  
   
  
Body Structures Involved: 1. Bones 2. Joints 3. Muscles Body Functions Affected: 1. Mental 
2. Sensory/Pain 3. Genitourinary 4. Neuromusculoskeletal 
5. Movement Related Activities and Participation Affected: 1. General Tasks and Demands 2. Mobility 3. Self Care Number of elements that affect the Plan of Care: 4+: HIGH COMPLEXITY CLINICAL PRESENTATION:  
Presentation: Evolving clinical presentation with unstable and unpredictable characteristics: HIGH COMPLEXITY CLINICAL DECISION MAKING:  
[employer] -PAC 6 Clicks Basic Mobility Inpatient Short Form How much difficulty does the patient currently have. .. Unable A Lot A Little None 1.   Turning over in bed (including adjusting bedclothes, sheets and blankets)? [] 1   [x] 2   [] 3   [] 4  
2. Sitting down on and standing up from a chair with arms ( e.g., wheelchair, bedside commode, etc.)   [x] 1   [] 2   [] 3   [] 4  
3. Moving from lying on back to sitting on the side of the bed? [x] 1   [] 2   [] 3   [] 4 How much help from another person does the patient currently need. .. Total A Lot A Little None 4. Moving to and from a bed to a chair (including a wheelchair)? [x] 1   [] 2   [] 3   [] 4  
5. Need to walk in hospital room? [x] 1   [] 2   [] 3   [] 4  
6. Climbing 3-5 steps with a railing? [x] 1   [] 2   [] 3   [] 4  
© 2007, Trustees of [facility] [employer], under license to [employer]. All rights reserved Score:  Initial: 7 Most Recent: X (Date: -- ) Interpretation of Tool:  Represents activities that are increasingly more difficult (i.e. Bed mobility, Transfers, Gait). Medical Necessity:    
· Patient demonstrates guarded rehab potential due to higher previous functional level. Reason for Services/Other Comments: 
· Patient continues to require present interventions due to patient's inability to perform any functional mobility without total assistance. Use of outcome tool(s) and clinical judgement create a POC that gives a: Difficult prediction of patient's progress: HIGH COMPLEXITY  
  
 
 
 
TREATMENT:  
(In addition to Assessment/Re-Assessment sessions the following treatments were rendered) Pre-treatment Symptoms/Complaints:  Back pain, tired Pain: Initial:  
Pain Intensity 1: 3 Pain Location 1: Back Pain Intervention(s) 1: Emotional support, Repositioned  Post Session:  0 Assessment/Reassessment only, no treatment provided today Braces/Orthotics/Lines/Etc:  
· IV 
· jasso catheter · telemetry lines · O2 Device: Nasal cannula Treatment/Session Assessment:   
· Response to Treatment:  Needed much encouragement · Interdisciplinary Collaboration:  
o Physical Therapist 
o Occupational Therapist 
 o Registered Nurse 
o  · After treatment position/precautions:  
o Supine in bed 
o Bed/Chair-wheels locked 
o Bed in low position 
o Call light within reach 
o RN notified 
o Side rails x 3  
· Compliance with Program/Exercises: Will assess as treatment progresses · Recommendations/Intent for next treatment session: \"Next visit will focus on advancements to more challenging activities and reduction in assistance provided\". Total Treatment Duration: PT Patient Time In/Time Out Time In: 1100 Time Out: 1115 Michel Madden PT

## 2019-02-06 NOTE — PROGRESS NOTES
2/6/2019 11:31 AM 
 
Admit Date: 2/4/2019 Subjective:  
 
Blanca Crawford denies chest pain, palpitations. went back In a fib again Dr Marysol Kumar started [facility] Objective:  
  
Visit Vitals /71 Pulse (!) 125 Temp 97.7 °F (36.5 °C) Resp 13 Ht 5' 5\" (1.651 m) Wt 206 lb 9.6 oz (93.7 kg) SpO2 100% BMI 34.38 kg/m² Physical Exam: 
Heart: regularly irregular rhythm Lungs: clear to auscultation bilaterally Data Review:  
Labs:   
Recent Results (from the past 24 hour(s)) GLUCOSE, POC Collection Time: 02/05/19 11:39 AM  
Result Value Ref Range Glucose (POC) 120 (H) 65 - 100 mg/dL GLUCOSE, POC Collection Time: 02/05/19  4:31 PM  
Result Value Ref Range Glucose (POC) 119 (H) 65 - 100 mg/dL GLUCOSE, POC Collection Time: 02/05/19 10:01 PM  
Result Value Ref Range Glucose (POC) 136 (H) 65 - 100 mg/dL GLUCOSE, POC Collection Time: 02/06/19  8:19 AM  
Result Value Ref Range Glucose (POC) 148 (H) 65 - 100 mg/dL Telemetry: AFIB Assessment:  
 
Patient Active Problem List  
 Diagnosis Date Noted  Leukocytosis 02/05/2019  Dyspnea 02/05/2019  Atrial fibrillation with rapid ventricular response ([address] recurrent agree with increasing lopresssor  02/05/2019  Hypertensive urgency, malignant 02/05/2019  Chronic midline low back pain without sciatica 02/05/2019  UTI (urinary tract infection) 01/31/2019  Altered mental status 01/31/2019  Sepsis ([address] 01/31/2019  Atrial fibrillation with RVR ([address] 01/31/2019  Elevated lactic acid level 01/31/2019  Drug-induced encephalopathy 12/20/2016  Type 2 diabetes mellitus with hyperglycemia ([address] 12/10/2016  Hypokalemia 12/10/2016  Acute cystitis without hematuria 12/10/2016  Paraplegia ([address] 12/10/2016  Bipolar disorder without psychotic features ([address] 12/10/2016  Chronic hepatitis C virus infection ([address] 12/10/2016  Narcotic addiction ([facility] 12/10/2016  Closed displaced comminuted fracture of shaft of left femur ([facility] [address] 12/09/2016 Plan:  
Continue meds

## 2019-02-06 NOTE — PROGRESS NOTES
Problem: Self Care Deficits Care Plan (Adult) Goal: *Acute Goals and Plan of Care (Insert Text) Patient will complete Bathing with minimal assist to increase self care independence. Patient will complete Grooming with supervision to increase self care independence. Patient will improve static sitting at edge of bed for 10 minutes in order to improve independence with transfers and self cares. Patient will complete UE exercises with supervision in order to increase overall activity tolerance and strength. Timeframe: 7 visits OCCUPATIONAL THERAPY: Initial Assessment 2/6/2019INPATIENT: OT Visit Days: 1 [address] [facility] / Plan: [place] MEDICA[place] [facility] / Product Type: Medicaid /  
  
NAME/AGE/GENDER: Sam Bertrand is a 59 y.o. female PRIMARY DIAGNOSIS:  Atrial fibrillation with rapid ventricular response ([address] [I48.91] Hypertensive urgency, malignant [I16.0] Leukocytosis [D72.829] Dyspnea [R06.00] <principal problem not specified> <principal problem not specified> 
 
  
ICD-10: Treatment Diagnosis:  
 · Generalized Muscle Weakness (M62.81) · Other lack of cordination (R27.8) · Unspecified Lack of Coordination (R27.9) Precautions/Allergies: 
   Patient has no known allergies. ASSESSMENT:  
Ms. Chriss Chang presents with leukocytosis. She is a paraplegic (transverse myelitis). States that she's been this way for 6 yrs. She's a poor historian. Her deficits include lack of initiative, decreased self cares, dependent sitting balance and total assistance for bed mobility and transfers. Per patients reports, she supposedly just left a LTC for 2 years and is in a handicapped accessible apt. Was only there a number of hours before being brought to ER. Treatment was difficult, but with assist x2 was able to sit edge of bed with poor balance. Lacking coordination and control was also total assist from sit to supine. Patient likely is near baseline, but will trail OT POC. This section established at most recent assessment PROBLEM LIST (Impairments causing functional limitations): 1. Decreased Strength 2. Decreased ADL/Functional Activities 3. Decreased Transfer Abilities 4. Decreased Balance 5. Increased Pain 6. Decreased Activity Tolerance 7. Decreased Pacing Skills INTERVENTIONS PLANNED: (Benefits and precautions of occupational therapy have been discussed with the patient.) 1. Activities of daily living training 2. Adaptive equipment training 3. Balance training 4. Clothing management 5. Neuromuscular re-eduation 6. Therapeutic activity 7. Therapeutic exercise TREATMENT PLAN: Frequency/Duration: Follow patient 1-2tx to address above goals. Rehabilitation Potential For Stated Goals: Poor RECOMMENDED REHABILITATION/EQUIPMENT: (at time of discharge pending progress): Due to the probability of continued deficits (see above) this patient will likely need continued skilled occupational therapy after discharge. Equipment:  
? None at this time OCCUPATIONAL PROFILE AND HISTORY:  
History of Present Injury/Illness (Reason for Referral): 
See H&P. Past Medical History/Comorbidities: Ms. Alysa Harris  has a past medical history of Diabetes ([facility] [address] Gastrointestinal disorder, Hypertension, Ill-defined condition, Ill-defined condition, Ill-defined condition, Liver disease, Psychiatric disorder, Psychiatric disorder, and Thromboembolus ([facility] [address] Ms. Alysa Harris  has no past surgical history on file. Social History/Living Environment:  
Home Environment: Apartment # Steps to Enter: 0 One/Two Story Residence: One story Living Alone: No 
Support Systems: Child(urban) Patient [facility] Current DME Used/Available at Home: Wheelchair, power, [employer]) Tub or Shower Type: (uncertain) Prior Level of Function/Work/Activity: 
Needs assist with ADL's and IADL's. Non ambulatory and uses a slide board from bed to manual w/c. Number of Personal Factors/Comorbidities that affect the Plan of Care: Extensive review of physical, cognitive, and psychosocial performance (3+):  HIGH COMPLEXITY ASSESSMENT OF OCCUPATIONAL PERFORMANCE[de-identified]  
Activities of Daily Living:  
Basic ADLs (From Assessment) Complex ADLs (From Assessment) Feeding: Stand-by assistance Oral Facial Hygiene/Grooming: Minimum assistance Bathing: Maximum assistance Upper Body Dressing: Moderate assistance Lower Body Dressing: Total assistance Toileting: Total assistance Grooming/Bathing/Dressing Activities of Daily Living Cognitive Retraining Safety/Judgement: Fall prevention;Decreased awareness of need for safety Bed/Mat Mobility Rolling: Maximum assistance Supine to Sit: Total assistance Sit to Supine: Total assistance Scooting: Total assistance Most Recent Physical Functioning:  
Gross Assessment: 
  
         
  
Posture: 
Posture Assessment: Forward head, Rounded shoulders Balance: 
Sitting: Impaired; With support Sitting - Static: Supported sitting Sitting - Dynamic: Poor (constant support) Standing: (NA) Bed Mobility: 
Rolling: Maximum assistance Supine to Sit: Total assistance Sit to Supine: Total assistance Scooting: Total assistance Wheelchair Mobility: 
  
Transfers: 
   
 
    
 
Patient Vitals for the past 6 hrs: 
 BP SpO2 O2 Flow Rate (L/min) Pulse 02/06/19 0633 99/73 100 %  (!) 130  
02/06/19 0702 105/71 100 %  99  
02/06/19 0703 105/71 100 %  (!) 125  
02/06/19 0738  100 % 2 l/min  Mental Status Neurologic State: Drowsy Orientation Level: Oriented to person Cognition: Decreased attention/concentration Perception: Appears intact Perseveration: No perseveration noted Safety/Judgement: Fall prevention, Decreased awareness of need for safety Physical Skills Involved: 1. Range of Motion 2. Balance 3. Strength 4. Activity Tolerance 5. Sensation 6. Fine Motor Control 7. Gross Motor Control 8. Pain (acute) 9. Pain (Chronic) Cognitive Skills Affected (resulting in the inability to perform in a timely and safe manner): 1. Perception 2. Immediate Memory 3. Short Term Recall 4. Long Term Memory 5. Sustained Attention 6. Divided Attention Psychosocial Skills Affected: 1. Habits/Routines 2. Environmental Adaptation 3. Social Interaction 4. Emotional Regulation 5. Self-Awareness 6. Awareness of Others 7. Social Roles Number of elements that affect the Plan of Care: 5+:  HIGH COMPLEXITY CLINICAL DECISION MAKIN[address] [place] [address] AM-PAC 6 Clicks Daily Activity Inpatient Short Form How much help from another person does the patient currently need. .. Total A Lot A Little None 1. Putting on and taking off regular lower body clothing? [x] 1   [] 2   [] 3   [] 4  
2. Bathing (including washing, rinsing, drying)? [x] 1   [] 2   [] 3   [] 4  
3. Toileting, which includes using toilet, bedpan or urinal?   [x] 1   [] 2   [] 3   [] 4  
4. Putting on and taking off regular upper body clothing? [] 1   [x] 2   [] 3   [] 4  
5. Taking care of personal grooming such as brushing teeth? [] 1   [x] 2   [] 3   [] 4  
6. Eating meals? [] 1   [] 2   [x] 3   [] 4  
© , Trustees of [address] [place] [address], under license to [employer]. All rights reserved Score:  Initial: 10 Most Recent: X (Date: -- ) Interpretation of Tool:  Represents activities that are increasingly more difficult (i.e. Bed mobility, Transfers, Gait). Medical Necessity:    
· Skilled intervention continues to be required due to Deficits listed above. Reason for Services/Other Comments: 
· Patient continues to require skilled intervention due to debility, back pain. Use of outcome tool(s) and clinical judgement create a POC that gives a: HIGH COMPLEXITY  
 
 
 
TREATMENT:  
(In addition to Assessment/Re-Assessment sessions the following treatments were rendered) Pre-treatment Symptoms/Complaints:   
Pain: Initial:  
Pain Intensity 1: 3 Pain Location 1: Back  Post Session:  3 Assessment/Reassessment only, no treatment provided today Braces/Orthotics/Lines/Etc:  
· IV 
· O2 Device: Nasal cannula Treatment/Session Assessment:   
· Response to Treatment:  Poor, supine in bed. · Interdisciplinary Collaboration:  
o Physical Therapist 
o Occupational Therapist 
o Registered Nurse · After treatment position/precautions:  
o Supine in bed 
o Bed/Chair-wheels locked 
o Bed in low position 
o Call light within reach 
o RN notified 
o Nurse at bedside 
o Side rails x 3  
· Compliance with Program/Exercises: Will assess as treatment progresses. · Recommendations/Intent for next treatment session: \"Next visit will focus on advancements to more challenging activities\". Total Treatment Duration: OT Patient Time In/Time Out Time In: 9107 Time Out: 1100 Veronica Narvaez OT

## 2019-02-06 NOTE — PROGRESS NOTES
HOSPITALISTNAME:  Jonnie Parikh Age:  59 y.o. 
:   1955 MRN:   [id number] PCP: None Consulting MD: Treatment Team: Attending Provider: Tejas Hernandez MD; Consulting Provider: Kory Alvarado MD 
subj Patient is a 59years old female with pmhx of DM-2, HTN, A.fib on Eliquis, prior DVT, HTN, paraplegia on chronic SP cath due to neurogenic bladder, recurrent CAUTI's, bipolar d/o, anxiety d/o, chronic pain on opiate and xanax, chronic Hep C presents to ER with worsening back pain for 1 day duration. Pt was discharged from [facility] on 19 after being treated for sepsis due to CAUTI and A.fib with RVR. Urine culture was positive for E.coli, pt was on Zosyn, discharged on oral ampicillin. Pt was discharged home with HHA. Pt now is reporting of worsening back pain, 10/10 in severity, constant and not getting better with oral pain medications. In ER, pt was noted to be in A.fib with RVR with HR in 160's, /106, WBC 16K, LA 1.8, Cr 1.06 (1.21 on discharge). Today, afib in 100-120s, off cardiazem gtt Objective:  
 
Visit Vitals /79 Pulse (!) 103 Temp 98.4 °F (36.9 °C) Resp 21 Ht 5' 5\" (1.651 m) Wt 93.7 kg (206 lb 9.6 oz) SpO2 93% BMI 34.38 kg/m² Temp (24hrs), Av.9 °F (36.6 °C), Min:96.2 °F (35.7 °C), Max:98.8 °F (37.1 °C) Oxygen Therapy O2 Sat (%): 93 % (19) Pulse via Oximetry: 105 beats per minute (19) O2 Device: Nasal cannula (19) O2 Flow Rate (L/min): 2 l/min (19 07) FIO2 (%): 28 % (19 1115) Physical Exam: 
General:    Alert, cooperative,morbidly obese, NAD Lungs:   Clear to auscultation bilaterally. No Wheezing or Rhonchi. No rales. Heart:   IRIR,  no murmur, rub or gallop. Abdomen:   Soft, non-tender. Not distended. Bowel sounds normal.  
Extremities: No cyanosis. No edema. No clubbing Skin:     Texture, turgor normal. No rashes or lesions. SP cath in place Neurologic: gcs 15, PARAPLEGIC 
 Psych:             AO x3, mood and affect anxious Data Review:  
Recent Results (from the past 24 hour(s)) GLUCOSE, POC Collection Time: 02/05/19 10:01 PM  
Result Value Ref Range Glucose (POC) 136 (H) 65 - 100 mg/dL GLUCOSE, POC Collection Time: 02/06/19  8:19 AM  
Result Value Ref Range Glucose (POC) 148 (H) 65 - 100 mg/dL GLUCOSE, POC Collection Time: 02/06/19 12:33 PM  
Result Value Ref Range Glucose (POC) 334 (H) 65 - 100 mg/dL GLUCOSE, POC Collection Time: 02/06/19  4:20 PM  
Result Value Ref Range Glucose (POC) 284 (H) 65 - 100 mg/dL Imaging Marli Can [address] [address] All diagnostic imaging personally reviewed by me. CXR: 
EXAM: Chest x-ray. 
  
INDICATION: Dyspnea. 
  
COMPARISON: January 31, 2019. 
  
TECHNIQUE: Single frontal view chest. 
  
FINDINGS: The lungs are clear. The cardiac size, mediastinal contour and 
pulmonary vasculature are normal.  No pneumothorax or pleural effusion is seen. 
  
IMPRESSION IMPRESSION: Normal chest x-ray. Assessment and Plan: Active Hospital Problems Diagnosis Date Noted  Leukocytosis 02/05/2019  Dyspnea 02/05/2019  Atrial fibrillation with rapid ventricular response ([address] 02/05/2019  Hypertensive urgency, malignant 02/05/2019  Chronic midline low back pain without sciatica 02/05/2019 PLAN 
·  Admit inpatient in view of A.fib with RVR and malignant hypertensive urgency · Start in Diltiazem gtt, with oral BB in AM 
· Resume Eliquis · Start clonidine, resume metoprolol · Start IV rocephin x 3 days for recent E.coli CAUTI · Prn xopenex nebs · Supplemental oxygen prn, wean off as able · Cardiology consult in AM 
· POC glucose qachs. Continue weight based insulin regimen with lantus and humalog and SSI · Continue roxicodone with fentanyl patch for chronic pain · Will start IV dilaudid for optimal pain control · PRN tylenol for mild pain and fever · PRN zofran for nausea/vomiting · DVT prophylaxis with eliquis · Stool softeners to prevent opioid induced constipation · Cautious IV fluids Cont rate control per card 
abx iv and follow urine CS Signed By: Jasmin Garcia MD   
 February 6, 2019

## 2019-02-06 NOTE — PROGRESS NOTES
Report received. Chart reviewed. Dual skin assessment completed. Patient appears to be leaking from insertion site of suprapubic catheter. Checked water in balloon, adequate amount. AM nurse reports no leakage all day, new problem. Cate care done, underpad changed.

## 2019-02-06 NOTE — PROGRESS NOTES
PT 
 
   Attempted to work with pt.and she was very lethargic.  Will check back later this am. 
 
Kraig Del Toro, PT

## 2019-02-07 LAB
ANION GAP SERPL CALC-SCNC: 10 MMOL/L
BUN SERPL-MCNC: 25 MG/DL (ref 8–23)
CALCIUM SERPL-MCNC: 8.2 MG/DL (ref 8.3–10.4)
CHLORIDE SERPL-SCNC: 107 MMOL/L (ref 98–107)
CO2 SERPL-SCNC: 27 MMOL/L (ref 21–32)
CREAT SERPL-MCNC: 1.42 MG/DL (ref 0.6–1)
GLUCOSE BLD STRIP.AUTO-MCNC: 189 MG/DL (ref 65–100)
GLUCOSE BLD STRIP.AUTO-MCNC: 264 MG/DL (ref 65–100)
GLUCOSE BLD STRIP.AUTO-MCNC: 289 MG/DL (ref 65–100)
GLUCOSE BLD STRIP.AUTO-MCNC: 296 MG/DL (ref 65–100)
GLUCOSE SERPL-MCNC: 217 MG/DL (ref 65–100)
MAGNESIUM SERPL-MCNC: 1.8 MG/DL (ref 1.8–2.4)
POTASSIUM SERPL-SCNC: 2.9 MMOL/L (ref 3.5–5.1)
SODIUM SERPL-SCNC: 144 MMOL/L (ref 136–145)

## 2019-02-07 PROCEDURE — 74011250637 HC RX REV CODE- 250/637: Performed by: INTERNAL MEDICINE

## 2019-02-07 PROCEDURE — 74011636637 HC RX REV CODE- 636/637: Performed by: HOSPITALIST

## 2019-02-07 PROCEDURE — 74011000250 HC RX REV CODE- 250: Performed by: HOSPITALIST

## 2019-02-07 PROCEDURE — 82962 GLUCOSE BLOOD TEST: CPT

## 2019-02-07 PROCEDURE — 94640 AIRWAY INHALATION TREATMENT: CPT

## 2019-02-07 PROCEDURE — 77010033678 HC OXYGEN DAILY

## 2019-02-07 PROCEDURE — 74011250636 HC RX REV CODE- 250/636: Performed by: HOSPITALIST

## 2019-02-07 PROCEDURE — 94760 N-INVAS EAR/PLS OXIMETRY 1: CPT

## 2019-02-07 PROCEDURE — 65660000000 HC RM CCU STEPDOWN

## 2019-02-07 PROCEDURE — 74011250637 HC RX REV CODE- 250/637: Performed by: HOSPITALIST

## 2019-02-07 PROCEDURE — 97530 THERAPEUTIC ACTIVITIES: CPT

## 2019-02-07 PROCEDURE — 77030020263 HC SOL INJ SOD CL0.9% LFCR 1000ML

## 2019-02-07 PROCEDURE — 74011250636 HC RX REV CODE- 250/636: Performed by: FAMILY MEDICINE

## 2019-02-07 PROCEDURE — 77030020253 HC SOL INJ D545NS .05 DEX .45 SAL

## 2019-02-07 PROCEDURE — 80048 BASIC METABOLIC PNL TOTAL CA: CPT

## 2019-02-07 PROCEDURE — 83735 ASSAY OF MAGNESIUM: CPT

## 2019-02-07 PROCEDURE — 74011000258 HC RX REV CODE- 258: Performed by: HOSPITALIST

## 2019-02-07 RX ORDER — POTASSIUM CHLORIDE 14.9 MG/ML
20 INJECTION INTRAVENOUS
Status: DISCONTINUED | OUTPATIENT
Start: 2019-02-07 | End: 2019-02-07

## 2019-02-07 RX ORDER — POTASSIUM CHLORIDE 20 MEQ/1
40 TABLET, EXTENDED RELEASE ORAL DAILY
Status: DISCONTINUED | OUTPATIENT
Start: 2019-02-07 | End: 2019-02-09 | Stop reason: HOSPADM

## 2019-02-07 RX ORDER — SODIUM CHLORIDE 9 MG/ML
100 INJECTION, SOLUTION INTRAVENOUS CONTINUOUS
Status: DISPENSED | OUTPATIENT
Start: 2019-02-07 | End: 2019-02-07

## 2019-02-07 RX ORDER — LANOLIN ALCOHOL/MO/W.PET/CERES
400 CREAM (GRAM) TOPICAL 2 TIMES DAILY
Status: DISCONTINUED | OUTPATIENT
Start: 2019-02-07 | End: 2019-02-09 | Stop reason: HOSPADM

## 2019-02-07 RX ORDER — POTASSIUM CHLORIDE 20 MEQ/1
40 TABLET, EXTENDED RELEASE ORAL ONCE
Status: COMPLETED | OUTPATIENT
Start: 2019-02-07 | End: 2019-02-07

## 2019-02-07 RX ADMIN — CEFTRIAXONE SODIUM 2 G: 2 INJECTION, POWDER, FOR SOLUTION INTRAMUSCULAR; INTRAVENOUS at 03:48

## 2019-02-07 RX ADMIN — PREGABALIN 100 MG: 50 CAPSULE ORAL at 09:42

## 2019-02-07 RX ADMIN — OXYCODONE HYDROCHLORIDE 10 MG: 5 TABLET ORAL at 14:17

## 2019-02-07 RX ADMIN — FUROSEMIDE 20 MG: 20 TABLET ORAL at 09:42

## 2019-02-07 RX ADMIN — ASPIRIN 81 MG 81 MG: 81 TABLET ORAL at 09:42

## 2019-02-07 RX ADMIN — SODIUM CHLORIDE 500 ML: 900 INJECTION, SOLUTION INTRAVENOUS at 14:03

## 2019-02-07 RX ADMIN — OXYCODONE HYDROCHLORIDE 10 MG: 5 TABLET ORAL at 08:04

## 2019-02-07 RX ADMIN — APIXABAN 5 MG: 5 TABLET, FILM COATED ORAL at 09:42

## 2019-02-07 RX ADMIN — ALPRAZOLAM 0.5 MG: 0.5 TABLET ORAL at 22:02

## 2019-02-07 RX ADMIN — Medication 10 ML: at 14:05

## 2019-02-07 RX ADMIN — POTASSIUM CHLORIDE 40 MEQ: 20 TABLET, EXTENDED RELEASE ORAL at 17:05

## 2019-02-07 RX ADMIN — Medication 10 ML: at 22:00

## 2019-02-07 RX ADMIN — POTASSIUM CHLORIDE 20 MEQ: 200 INJECTION, SOLUTION INTRAVENOUS at 07:15

## 2019-02-07 RX ADMIN — METOPROLOL TARTRATE 100 MG: 100 TABLET ORAL at 22:02

## 2019-02-07 RX ADMIN — INSULIN LISPRO 6 UNITS: 100 INJECTION, SOLUTION INTRAVENOUS; SUBCUTANEOUS at 22:03

## 2019-02-07 RX ADMIN — METOPROLOL TARTRATE 100 MG: 100 TABLET ORAL at 09:42

## 2019-02-07 RX ADMIN — Medication 10 ML: at 05:33

## 2019-02-07 RX ADMIN — PREGABALIN 100 MG: 50 CAPSULE ORAL at 22:01

## 2019-02-07 RX ADMIN — DRONEDARONE 400 MG: 400 TABLET, FILM COATED ORAL at 17:05

## 2019-02-07 RX ADMIN — OXYCODONE HYDROCHLORIDE 10 MG: 5 TABLET ORAL at 20:22

## 2019-02-07 RX ADMIN — INSULIN LISPRO 6 UNITS: 100 INJECTION, SOLUTION INTRAVENOUS; SUBCUTANEOUS at 14:04

## 2019-02-07 RX ADMIN — QUETIAPINE FUMARATE 300 MG: 100 TABLET ORAL at 22:01

## 2019-02-07 RX ADMIN — BUDESONIDE 500 MCG: 0.5 INHALANT RESPIRATORY (INHALATION) at 20:40

## 2019-02-07 RX ADMIN — INSULIN LISPRO 5 UNITS: 100 INJECTION, SOLUTION INTRAVENOUS; SUBCUTANEOUS at 14:03

## 2019-02-07 RX ADMIN — NYSTATIN: 100000 POWDER TOPICAL at 09:48

## 2019-02-07 RX ADMIN — INSULIN LISPRO 6 UNITS: 100 INJECTION, SOLUTION INTRAVENOUS; SUBCUTANEOUS at 17:04

## 2019-02-07 RX ADMIN — ALPRAZOLAM 0.5 MG: 0.5 TABLET ORAL at 09:45

## 2019-02-07 RX ADMIN — PREGABALIN 100 MG: 50 CAPSULE ORAL at 17:05

## 2019-02-07 RX ADMIN — BUDESONIDE 500 MCG: 0.5 INHALANT RESPIRATORY (INHALATION) at 09:52

## 2019-02-07 RX ADMIN — Medication 400 MG: at 17:05

## 2019-02-07 RX ADMIN — DRONEDARONE 400 MG: 400 TABLET, FILM COATED ORAL at 08:04

## 2019-02-07 RX ADMIN — CITALOPRAM HYDROBROMIDE 20 MG: 20 TABLET ORAL at 09:42

## 2019-02-07 RX ADMIN — INSULIN LISPRO 2 UNITS: 100 INJECTION, SOLUTION INTRAVENOUS; SUBCUTANEOUS at 08:06

## 2019-02-07 RX ADMIN — INSULIN LISPRO 5 UNITS: 100 INJECTION, SOLUTION INTRAVENOUS; SUBCUTANEOUS at 09:00

## 2019-02-07 RX ADMIN — POTASSIUM CHLORIDE 40 MEQ: 20 TABLET, EXTENDED RELEASE ORAL at 09:42

## 2019-02-07 RX ADMIN — INSULIN GLARGINE 30 UNITS: 100 INJECTION, SOLUTION SUBCUTANEOUS at 22:03

## 2019-02-07 RX ADMIN — INSULIN LISPRO 5 UNITS: 100 INJECTION, SOLUTION INTRAVENOUS; SUBCUTANEOUS at 17:04

## 2019-02-07 RX ADMIN — APIXABAN 5 MG: 5 TABLET, FILM COATED ORAL at 17:05

## 2019-02-07 RX ADMIN — PANTOPRAZOLE SODIUM 40 MG: 40 TABLET, DELAYED RELEASE ORAL at 08:04

## 2019-02-07 RX ADMIN — Medication 400 MG: at 09:42

## 2019-02-07 NOTE — PROGRESS NOTES
[facility] CARDIOLOGY PROGRESS NOTE 
      
 
2/7/2019 4:43 PM 
 
Admit Date: 2/4/2019 Subjective: In sinus rhythm Review of Systems Constitutional: Negative for fever. Respiratory: Negative for cough. Cardiovascular: Negative for chest pain. Genitourinary: Negative for dysuria. Skin: Negative for rash. Objective:  
  
Vitals:  
 02/07/19 1107 02/07/19 1207 02/07/19 1307 02/07/19 1407 BP: 146/89 108/67 122/70 145/73 Pulse: 63 (!) 53 (!) 54 60 Resp: 22 22 28 27 Temp:  98.3 °F (36.8 °C) SpO2: 92% 99% 92% 95% Weight:      
Height:      
 
 
 
Physical Exam  
Constitutional: She is oriented to person, place, and time. HENT:  
Head: Normocephalic and atraumatic. Eyes: Conjunctivae are normal. Pupils are equal, round, and reactive to light. Neck: Normal range of motion. No JVD present. Cardiovascular: Normal rate and normal heart sounds. No murmur heard. Pulmonary/Chest: Effort normal. She has no wheezes. Abdominal: She exhibits no distension. Musculoskeletal: She exhibits edema. Boots in place. Neurological: She is alert and oriented to person, place, and time. No cranial nerve deficit. Skin: Skin is warm and dry. No rash noted. She is not diaphoretic. Psychiatric: She has a normal mood and affect. Data Review:  
Recent Labs 02/07/19 
9656 02/05/19 
0542 02/04/19 
2308  137 138  
K 2.9* 3.5 3.8 MG 1.8  --  2.2 BUN 25* 15 14 CREA 1.42* 0.94 1.06* * 122* 100 WBC  --  14.3* 16.2* HGB  --  14.6 15.9* HCT  --  46.4* 49.9* PLT  --  327 361 Intake/Output Summary (Last 24 hours) at 2/7/2019 1643 Last data filed at 2/7/2019 1406 Gross per 24 hour Intake 2075 ml Output 2275 ml Net -200 ml Current Facility-Administered Medications Medication Dose Route Frequency  NUTRITIONAL SUPPORT ELECTROLYTE PRN ORDERS   Does Not Apply PRN  
  potassium chloride (K-DUR, KLOR-CON) SR tablet 40 mEq  40 mEq Oral ONCE  
 magnesium oxide (MAG-OX) tablet 400 mg  400 mg Oral BID  potassium chloride (K-DUR, KLOR-CON) SR tablet 40 mEq  40 mEq Oral DAILY  metoprolol tartrate (LOPRESSOR) tablet 100 mg  100 mg Oral Q12H  
 zinc oxide-cod liver oil (DESITIN) 40 % paste   Topical PRN  
 ALPRAZolam (XANAX) tablet 0.5 mg  0.5 mg Oral BID PRN  
 apixaban (ELIQUIS) tablet 5 mg  5 mg Oral BID  aspirin chewable tablet 81 mg  81 mg Oral DAILY  budesonide (PULMICORT) 500 mcg/2 ml nebulizer suspension  500 mcg Nebulization BID  citalopram (CELEXA) tablet 20 mg  20 mg Oral DAILY  fentaNYL (DURAGESIC) 25 mcg/hr patch 1 Patch  1 Patch TransDERmal Q72H  nystatin (MYCOSTATIN) 100,000 unit/gram powder   Topical BID  pantoprazole (PROTONIX) tablet 40 mg  40 mg Oral ACB  oxyCODONE IR (ROXICODONE) tablet 10 mg  10 mg Oral Q6H PRN  pregabalin (LYRICA) capsule 100 mg  100 mg Oral TID  QUEtiapine (SEROquel) tablet 300 mg  300 mg Oral QHS  insulin lispro (HUMALOG) injection   SubCUTAneous AC&HS  insulin lispro (HUMALOG) injection 5 Units  5 Units SubCUTAneous TID WITH MEALS  
 acetaminophen (TYLENOL) tablet 650 mg  650 mg Oral Q6H PRN  
 bisacodyl (DULCOLAX) tablet 5 mg  5 mg Oral DAILY PRN  
 haloperidol lactate (HALDOL) injection 2 mg  2 mg IntraVENous Q6H PRN  
 ondansetron (ZOFRAN) injection 4 mg  4 mg IntraVENous Q8H PRN  phenol throat spray (CHLORASEPTIC) 1 Spray  1 Spray Oral PRN  
 sodium chloride (NS) flush 5-40 mL  5-40 mL IntraVENous Q8H  
 sodium chloride (NS) flush 5-40 mL  5-40 mL IntraVENous PRN  
 levalbuterol (XOPENEX) nebulizer soln 0.63 mg/3 mL  0.63 mg Nebulization Q4H PRN  
 insulin glargine (LANTUS) injection 30 Units  30 Units SubCUTAneous QHS  sodium chloride (NS) flush 10 mL  10 mL InterCATHeter Q8H  
 sodium chloride (NS) flush 10 mL  10 mL InterCATHeter PRN  
  dronedarone (MULTAQ) tablet tab 400 mg  400 mg Oral BID WITH MEALS Assessment/Plan: Active Problems: 
  Leukocytosis (2/5/2019) Dyspnea (2/5/2019) Atrial fibrillation with rapid ventricular response ([address] (2/5/2019) Patient in sinus rhythm on Multitaq Hypertensive urgency, malignant (2/5/2019) Chronic midline low back pain without sciatica (2/5/2019) Alfredo Quinones MD 
2/7/2019 4:43 PM

## 2019-02-07 NOTE — PROGRESS NOTES
TRANSFER - OUT REPORT: 
 
Verbal report given to Eri Zuleta RN on Ballico Bard  being transferred to [address] [place], [place] [address] for routine progression of care Report consisted of patients Situation, Background, Assessment and  
Recommendations(SBAR). Information from the following report(s) ED Summary, Recent Results and Cardiac Rhythm SB/SR was reviewed with the receiving nurse. Lines:    
 
Opportunity for questions and clarification was provided. Patient transported with: 
 Monitor O2 @ 2L liters

## 2019-02-07 NOTE — PROGRESS NOTES
Problem: Mobility Impaired (Adult and Pediatric) Goal: *Acute Goals and Plan of Care (Insert Text) STG: 
(1.)Ms. Raghu Preeira will move from supine to sit and sit to supine  with MODERATE ASSIST within 7 treatment day(s). (2.)Ms. Raghu Pereira will transfer from bed to chair and chair to bed with MAXIMAL ASSIST and sliding board  within 7 treatment day(s). (3.)Ms. Raghu Pereira will improve sitting balance to tolerate performing UE AROM exs on EOB with CGA within 7 treatment day(s). (4.)Pt. Will tolerate sitting up in chair for 3 hours to improve endurance within 7 days 
 
 
 
 
________________________________________________________________________________________________ PHYSICAL THERAPY: Daily Note and AM 2/7/2019  
 
5 DAY PT TRIAL INPATIENT: PT Visit Days : 1 Payor: [id number] / Plan: [place] MEDICAID [facility] / Product Type: Medicaid /   
  
NAME/AGE/GENDER: Rod Ha is a 59 y.o. female PRIMARY DIAGNOSIS: Atrial fibrillation with rapid ventricular response ([facility] [address] [I48.91] Hypertensive urgency, malignant [I16.0] Leukocytosis [D72.829] Dyspnea [R06.00] <principal problem not specified> <principal problem not specified> 
 
  
ICD-10: Treatment Diagnosis:  
 · Generalized Muscle Weakness (M62.81) · Other abnormalities of gait and mobility (R26.89) · Low Back Pain (M54.5) Precaution/Allergies: 
Patient has no known allergies. ASSESSMENT:  
Ms. Raghu Pereira presents with leukocytosis. She is a paraplegic(transverse myelitis). States that she's been this way for 6 yrs. She's a poor historian. Her PT deficits include lack of initiative, decreased B LE ROM, dependent sitting balance and total assistance for bed mobility and transfers. She supposedly just left a SNF and is in a handicapped accessible apt. Was only there a number of hours before being brought to ER. This am,  She is alert and very chatty. Agreeable to PT.  Worked on supine to sit and pt participated and reached for bed rail to help sit up. Worked on sitting on side of bed-pt sat about 15 minutes with SBA/CGA-a few times minimal assist. While pt sitting and talking stated at home she used a sliding board to get up and stated that she did not stay in the bed at home. Talked about her rehab stay as well. Much better than yesterday-and increased participation from yesterday. Will continue to follow, hope to progress at next session. This section established at most recent assessment PROBLEM LIST (Impairments causing functional limitations): 1. Decreased Strength 2. Decreased ADL/Functional Activities 3. Decreased Transfer Abilities 4. Decreased Balance 5. Increased Pain 6. Decreased Activity Tolerance 7. Increased Fatigue 8. Decreased Flexibility/Joint Mobility 9. Decreased Skin Integrity/Hygeine 10. Decreased [place] with Home Exercise Program 
11. Decreased Cognition INTERVENTIONS PLANNED: (Benefits and precautions of physical therapy have been discussed with the patient.) 1. Balance Exercise 2. Bed Mobility 3. Family Education 4. Home Exercise Program (HEP) 5. Range of Motion (ROM) 6. Therapeutic Activites 7. Therapeutic Exercise/Strengthening 8. Transfer Training TREATMENT PLAN: Frequency/Duration: daily for 5 day trial 
Rehabilitation Potential For Stated Goals: guarded RECOMMENDED REHABILITATION/EQUIPMENT: (at time of discharge pending progress): Due to the probability of continued deficits (see above) this patient will likely need continued skilled physical therapy after discharge. Equipment:  
? has a sliding board, wc, power wc, HISTORY:  
History of Present Injury/Illness (Reason for Referral): Admitted with leukocytosis and paraplegia Past Medical History/Comorbidities: Ms. Solange Davis  has a past medical history of Diabetes ([facility] [address] Gastrointestinal disorder, Hypertension, Ill-defined condition, Ill-defined condition, Ill-defined condition, Liver disease, Psychiatric disorder, Psychiatric disorder, and Thromboembolus ([address] Ms. Frausto Servant  has no past surgical history on file. Social History/Living Environment:  
Home Environment: Apartment # Steps to Enter: 0 One/Two Story Residence: One story Living Alone: No 
Support Systems: Child(urban) Patient Expects to be Discharged to[de-identified] [facility] Current DME Used/Available at Home: Wheelchair, power, [employer]) Tub or Shower Type: (uncertain) Prior Level of Function/Work/Activity: 
Appears to be dependent. States that she could transfer herself Number of Personal Factors/Comorbidities that affect the Plan of Care: 3+: HIGH COMPLEXITY EXAMINATION:  
Most Recent Physical Functioning:  
Gross Assessment: 
  
         
  
Posture: 
  
Balance: 
Sitting: Impaired; With support Sitting - Static: Supported sitting Sitting - Dynamic: Poor (constant support) Bed Mobility: 
Supine to Sit: Moderate assistance;Maximum assistance;Assist x2 Sit to Supine: Moderate assistance;Maximum assistance;Assist x2 Wheelchair Mobility: 
  
Transfers: 
  
Gait: 
  
   
  
Body Structures Involved: 1. Bones 2. Joints 3. Muscles Body Functions Affected: 1. Mental 
2. Sensory/Pain 3. Genitourinary 4. Neuromusculoskeletal 
5. Movement Related Activities and Participation Affected: 1. General Tasks and Demands 2. Mobility 3. Self Care Number of elements that affect the Plan of Care: 4+: HIGH COMPLEXITY CLINICAL PRESENTATION:  
Presentation: Evolving clinical presentation with unstable and unpredictable characteristics: HIGH COMPLEXITY CLINICAL DECISION MAKIN [facility] [address] [id number] AM-PAC 6 Clicks Basic Mobility Inpatient Short Form How much difficulty does the patient currently have. .. Unable A Lot A Little None 1. Turning over in bed (including adjusting bedclothes, sheets and blankets)?    [] 1   [x] 2   [] 3   [] 4  
 2.  Sitting down on and standing up from a chair with arms ( e.g., wheelchair, bedside commode, etc.)   [x] 1   [] 2   [] 3   [] 4  
3. Moving from lying on back to sitting on the side of the bed? [x] 1   [] 2   [] 3   [] 4 How much help from another person does the patient currently need. .. Total A Lot A Little None 4. Moving to and from a bed to a chair (including a wheelchair)? [x] 1   [] 2   [] 3   [] 4  
5. Need to walk in hospital room? [x] 1   [] 2   [] 3   [] 4  
6. Climbing 3-5 steps with a railing? [x] 1   [] 2   [] 3   [] 4  
© 2007, Trustees of [facility] [employer], under license to [employer]. All rights reserved Score:  Initial: 7 Most Recent: X (Date: -- ) Interpretation of Tool:  Represents activities that are increasingly more difficult (i.e. Bed mobility, Transfers, Gait). Medical Necessity:    
· Patient demonstrates guarded rehab potential due to higher previous functional level. Reason for Services/Other Comments: 
· Patient continues to require present interventions due to patient's inability to perform any functional mobility without total assistance. Use of outcome tool(s) and clinical judgement create a POC that gives a: Difficult prediction of patient's progress: HIGH COMPLEXITY  
  
 
 
 
TREATMENT:  
(In addition to Assessment/Re-Assessment sessions the following treatments were rendered) Pre-treatment Symptoms/Complaints:  Back pain, tired Pain: Initial:  
   Post Session:  0 Therapeutic Activity: (    25): Therapeutic activities including Bed transfers and sitting balance work to improve mobility, strength, balance and coordination. Required minimal assist   to promote static and dynamic balance in sitting. Braces/Orthotics/Lines/Etc:  
· IV 
· jasso catheter · telemetry lines · O2 Device: Nasal cannula Treatment/Session Assessment:   
· Response to Treatment:  Tolerated well · Interdisciplinary Collaboration:  
o Registered Nurse o Rehabilitation Attendant · After treatment position/precautions:  
o Supine in bed 
o Bed/Chair-wheels locked 
o Bed in low position 
o Call light within reach 
o RN notified 
o Side rails x 3  
· Compliance with Program/Exercises: Will assess as treatment progresses · Recommendations/Intent for next treatment session: \"Next visit will focus on advancements to more challenging activities and reduction in assistance provided\". Total Treatment Duration: PT Patient Time In/Time Out Time In: 0500 Time Out: 1110 Noemi Marx, PT

## 2019-02-07 NOTE — PROGRESS NOTES
Pt received from ICU to room 361 via bed. Pt alert & oriented x4, talkative, resp easy & regular, lungs CTA bilaterally. Suprapubic catheter in place, patent, draining clear, yellow urine. Denies pain at this time. Be dlow & locked, side rails x3 up with call light within reach, pt instructed to call for assistance as needed. Family member at bedside.

## 2019-02-07 NOTE — INTERDISCIPLINARY ROUNDS
Interdisciplinary team rounds were held 2/7/2019 with the following team members:Care Management, Nursing and Physician. Plan of care discussed. See clinical pathway and/or care plan for interventions and desired outcomes.

## 2019-02-07 NOTE — PROGRESS NOTES
HOSPITALISTNAME:  Shelly Minor Age:  59 y.o. 
:   1955 MRN:   [id number] PCP: None Consulting MD: Treatment Team: Attending Provider: Walt Conde MD; Consulting Provider: Mj Doan MD 
subj Patient is a 59years old female with pmhx of DM-2, HTN, A.fib on Eliquis, prior DVT, HTN, paraplegia on chronic SP cath due to neurogenic bladder, recurrent CAUTI's, bipolar d/o, anxiety d/o, chronic pain on opiate and xanax, chronic Hep C presents to ER with worsening back pain for 1 day duration. Pt was discharged from [facility] on 19 after being treated for sepsis due to CAUTI and A.fib with RVR. Urine culture was positive for E.coli, pt was on Zosyn, discharged on oral ampicillin. Pt was discharged home with HHA. Pt now is reporting of worsening back pain, 10/10 in severity, constant and not getting better with oral pain medications. In ER, pt was noted to be in A.fib with RVR with HR in 160's, /106, WBC 16K, LA 1.8, Cr 1.06 (1.21 on discharge). Today, back to NSR today, slept well Objective:  
 
Visit Vitals /75 Pulse 65 Temp 97.6 °F (36.4 °C) Resp (!) 32 Ht 5' 5\" (1.651 m) Wt 94.9 kg (209 lb 4.8 oz) SpO2 96% BMI 34.83 kg/m² Temp (24hrs), Av.1 °F (36.7 °C), Min:97.6 °F (36.4 °C), Max:98.6 °F (37 °C) Oxygen Therapy O2 Sat (%): 96 % (19 1007) Pulse via Oximetry: 65 beats per minute (19 1007) O2 Device: Nasal cannula (19 09) O2 Flow Rate (L/min): 2 l/min (19 0954) FIO2 (%): 28 % (19 1115) Physical Exam: 
General:    Alert, cooperative,morbidly obese, NAD Lungs:   Clear to auscultation bilaterally. No Wheezing or Rhonchi. No rales. Heart:   RRR,  no murmur, rub or gallop. Abdomen:   Soft, non-tender. Not distended. Bowel sounds normal.  
Extremities: No cyanosis. No edema. No clubbing Skin:     Texture, turgor normal. No rashes or lesions. SP cath in place Neurologic: gcs 15, PARAPLEGIC 
 Psych:             AO x3, mood and affect anxious Data Review:  
Recent Results (from the past 24 hour(s)) GLUCOSE, POC Collection Time: 02/06/19 12:33 PM  
Result Value Ref Range Glucose (POC) 334 (H) 65 - 100 mg/dL GLUCOSE, POC Collection Time: 02/06/19  4:20 PM  
Result Value Ref Range Glucose (POC) 284 (H) 65 - 100 mg/dL GLUCOSE, POC Collection Time: 02/06/19 10:43 PM  
Result Value Ref Range Glucose (POC) 300 (H) 65 - 100 mg/dL METABOLIC PANEL, BASIC Collection Time: 02/07/19  6:32 AM  
Result Value Ref Range Sodium 144 136 - 145 mmol/L Potassium 2.9 (LL) 3.5 - 5.1 mmol/L Chloride 107 98 - 107 mmol/L  
 CO2 27 21 - 32 mmol/L Anion gap 10 mmol/L Glucose 217 (H) 65 - 100 mg/dL BUN 25 (H) 8 - 23 MG/DL Creatinine 1.42 (H) 0.6 - 1.0 MG/DL  
 GFR est AA 48 (L) >60 ml/min/1.73m2 GFR est non-AA 40 ml/min/1.73m2 Calcium 8.2 (L) 8.3 - 10.4 MG/DL MAGNESIUM Collection Time: 02/07/19  6:32 AM  
Result Value Ref Range Magnesium 1.8 1.8 - 2.4 mg/dL GLUCOSE, POC Collection Time: 02/07/19  7:50 AM  
Result Value Ref Range Glucose (POC) 189 (H) 65 - 100 mg/dL Imaging Real [place] Raynette Like All diagnostic imaging personally reviewed by me. CXR: 
EXAM: Chest x-ray. 
  
INDICATION: Dyspnea. 
  
COMPARISON: January 31, 2019. 
  
TECHNIQUE: Single frontal view chest. 
  
FINDINGS: The lungs are clear. The cardiac size, mediastinal contour and 
pulmonary vasculature are normal.  No pneumothorax or pleural effusion is seen. 
  
IMPRESSION IMPRESSION: Normal chest x-ray. Assessment and Plan: Active Hospital Problems Diagnosis Date Noted  Leukocytosis 02/05/2019  Dyspnea 02/05/2019  Atrial fibrillation with rapid ventricular response ([address] 02/05/2019  Hypertensive urgency, malignant 02/05/2019  Chronic midline low back pain without sciatica 02/05/2019 PLAN 
1- Afib w/ rvr, resolved, follow card input, on eliquis 2- Ac recurrent UTI/ E coli, has ch cath and paraplegic, on rocephin iv 3- GLENNA, prerenal, improving, non oliguric 4- DM and HTN, controlled Dispo: home w/ home health Case d/w pt, RN and CM Signed By: Belinda Mcnally MD   
 February 7, 2019

## 2019-02-07 NOTE — PROGRESS NOTES
Bedside, verbal, and written report received from [place], [facility]. Patient resting in bed, repositioned for comfort. Suprapubic catheter in place and draining. A fib on monitor, -110's.

## 2019-02-07 NOTE — PROGRESS NOTES
02/07/19 1526 Dual Skin Pressure Injury Assessment Dual Skin Pressure Injury Assessment WDL Second Care Provider (Based on [address] [place] [address]) Theodor Ebbing Skin Integumentary Skin Integumentary (WDL) X Pressure  Injury Documentation No Pressure Injury Noted-Pressure Ulcer Prevention Initiated Skin Condition/Temp Dry; Warm  
Skin Color Ecchymosis (comment)

## 2019-02-07 NOTE — PROGRESS NOTES
TRANSFER - IN REPORT: 
 
Verbal report received from [facility]) on Yesika Shah  being received from ICU(unit) for routine progression of care Report consisted of patients Situation, Background, Assessment and  
Recommendations(SBAR). Information from the following report(s) SBAR, Kardex and MAR was reviewed with the receiving nurse. Opportunity for questions and clarification was provided. Assessment completed upon patients arrival to unit and care assumed.

## 2019-02-08 ENCOUNTER — APPOINTMENT (OUTPATIENT)
Dept: GENERAL RADIOLOGY | Age: 64
DRG: 201 | End: 2019-02-08
Attending: INTERNAL MEDICINE
Payer: MEDICAID

## 2019-02-08 PROBLEM — J96.01 ACUTE RESPIRATORY FAILURE WITH HYPOXIA (HCC): Status: ACTIVE | Noted: 2019-02-08

## 2019-02-08 LAB
ANION GAP SERPL CALC-SCNC: 7 MMOL/L
BUN SERPL-MCNC: 29 MG/DL (ref 8–23)
CALCIUM SERPL-MCNC: 8.7 MG/DL (ref 8.3–10.4)
CHLORIDE SERPL-SCNC: 106 MMOL/L (ref 98–107)
CO2 SERPL-SCNC: 27 MMOL/L (ref 21–32)
CREAT SERPL-MCNC: 1.56 MG/DL (ref 0.6–1)
GLUCOSE BLD STRIP.AUTO-MCNC: 152 MG/DL (ref 65–100)
GLUCOSE BLD STRIP.AUTO-MCNC: 275 MG/DL (ref 65–100)
GLUCOSE BLD STRIP.AUTO-MCNC: 278 MG/DL (ref 65–100)
GLUCOSE BLD STRIP.AUTO-MCNC: 284 MG/DL (ref 65–100)
GLUCOSE SERPL-MCNC: 153 MG/DL (ref 65–100)
POTASSIUM SERPL-SCNC: 4.1 MMOL/L (ref 3.5–5.1)
SODIUM SERPL-SCNC: 140 MMOL/L (ref 136–145)

## 2019-02-08 PROCEDURE — 94760 N-INVAS EAR/PLS OXIMETRY 1: CPT

## 2019-02-08 PROCEDURE — 74011000250 HC RX REV CODE- 250: Performed by: HOSPITALIST

## 2019-02-08 PROCEDURE — 77010033678 HC OXYGEN DAILY

## 2019-02-08 PROCEDURE — 74011636637 HC RX REV CODE- 636/637: Performed by: HOSPITALIST

## 2019-02-08 PROCEDURE — 97530 THERAPEUTIC ACTIVITIES: CPT

## 2019-02-08 PROCEDURE — 71045 X-RAY EXAM CHEST 1 VIEW: CPT

## 2019-02-08 PROCEDURE — 80048 BASIC METABOLIC PNL TOTAL CA: CPT

## 2019-02-08 PROCEDURE — 74011250637 HC RX REV CODE- 250/637: Performed by: INTERNAL MEDICINE

## 2019-02-08 PROCEDURE — 97110 THERAPEUTIC EXERCISES: CPT

## 2019-02-08 PROCEDURE — 74011250637 HC RX REV CODE- 250/637: Performed by: HOSPITALIST

## 2019-02-08 PROCEDURE — 94640 AIRWAY INHALATION TREATMENT: CPT

## 2019-02-08 PROCEDURE — 74011000250 HC RX REV CODE- 250: Performed by: INTERNAL MEDICINE

## 2019-02-08 PROCEDURE — 65270000029 HC RM PRIVATE

## 2019-02-08 PROCEDURE — 82962 GLUCOSE BLOOD TEST: CPT

## 2019-02-08 RX ORDER — ALBUTEROL SULFATE 0.83 MG/ML
2.5 SOLUTION RESPIRATORY (INHALATION)
Status: DISCONTINUED | OUTPATIENT
Start: 2019-02-08 | End: 2019-02-09 | Stop reason: HOSPADM

## 2019-02-08 RX ORDER — ALBUTEROL SULFATE 0.83 MG/ML
5 SOLUTION RESPIRATORY (INHALATION)
Status: DISCONTINUED | OUTPATIENT
Start: 2019-02-08 | End: 2019-02-08

## 2019-02-08 RX ORDER — GUAIFENESIN 600 MG/1
600 TABLET, EXTENDED RELEASE ORAL EVERY 12 HOURS
Status: DISCONTINUED | OUTPATIENT
Start: 2019-02-08 | End: 2019-02-09 | Stop reason: HOSPADM

## 2019-02-08 RX ORDER — ALBUTEROL SULFATE 0.83 MG/ML
2.5 SOLUTION RESPIRATORY (INHALATION)
Status: DISCONTINUED | OUTPATIENT
Start: 2019-02-08 | End: 2019-02-08

## 2019-02-08 RX ADMIN — PANTOPRAZOLE SODIUM 40 MG: 40 TABLET, DELAYED RELEASE ORAL at 09:40

## 2019-02-08 RX ADMIN — INSULIN LISPRO 5 UNITS: 100 INJECTION, SOLUTION INTRAVENOUS; SUBCUTANEOUS at 12:59

## 2019-02-08 RX ADMIN — ALBUTEROL SULFATE 2.5 MG: 2.5 SOLUTION RESPIRATORY (INHALATION) at 19:40

## 2019-02-08 RX ADMIN — APIXABAN 5 MG: 5 TABLET, FILM COATED ORAL at 18:11

## 2019-02-08 RX ADMIN — INSULIN LISPRO 2 UNITS: 100 INJECTION, SOLUTION INTRAVENOUS; SUBCUTANEOUS at 09:34

## 2019-02-08 RX ADMIN — OXYCODONE HYDROCHLORIDE 10 MG: 5 TABLET ORAL at 16:04

## 2019-02-08 RX ADMIN — Medication 10 ML: at 13:01

## 2019-02-08 RX ADMIN — Medication 10 ML: at 05:45

## 2019-02-08 RX ADMIN — PREGABALIN 100 MG: 50 CAPSULE ORAL at 21:57

## 2019-02-08 RX ADMIN — APIXABAN 5 MG: 5 TABLET, FILM COATED ORAL at 09:40

## 2019-02-08 RX ADMIN — NYSTATIN: 100000 POWDER TOPICAL at 18:11

## 2019-02-08 RX ADMIN — INSULIN LISPRO 6 UNITS: 100 INJECTION, SOLUTION INTRAVENOUS; SUBCUTANEOUS at 22:00

## 2019-02-08 RX ADMIN — OXYCODONE HYDROCHLORIDE 10 MG: 5 TABLET ORAL at 09:26

## 2019-02-08 RX ADMIN — PREGABALIN 100 MG: 50 CAPSULE ORAL at 16:09

## 2019-02-08 RX ADMIN — QUETIAPINE FUMARATE 300 MG: 100 TABLET ORAL at 21:57

## 2019-02-08 RX ADMIN — ALBUTEROL SULFATE 2.5 MG: 2.5 SOLUTION RESPIRATORY (INHALATION) at 16:59

## 2019-02-08 RX ADMIN — INSULIN LISPRO 6 UNITS: 100 INJECTION, SOLUTION INTRAVENOUS; SUBCUTANEOUS at 12:59

## 2019-02-08 RX ADMIN — DRONEDARONE 400 MG: 400 TABLET, FILM COATED ORAL at 18:11

## 2019-02-08 RX ADMIN — ALPRAZOLAM 0.5 MG: 0.5 TABLET ORAL at 12:59

## 2019-02-08 RX ADMIN — METOPROLOL TARTRATE 100 MG: 100 TABLET ORAL at 09:40

## 2019-02-08 RX ADMIN — Medication 10 ML: at 22:00

## 2019-02-08 RX ADMIN — INSULIN LISPRO 6 UNITS: 100 INJECTION, SOLUTION INTRAVENOUS; SUBCUTANEOUS at 16:49

## 2019-02-08 RX ADMIN — OXYCODONE HYDROCHLORIDE 10 MG: 5 TABLET ORAL at 22:05

## 2019-02-08 RX ADMIN — PREGABALIN 100 MG: 50 CAPSULE ORAL at 09:39

## 2019-02-08 RX ADMIN — ALBUTEROL SULFATE 0.5 MG: 2.5 SOLUTION RESPIRATORY (INHALATION) at 12:52

## 2019-02-08 RX ADMIN — Medication 400 MG: at 10:10

## 2019-02-08 RX ADMIN — INSULIN GLARGINE 30 UNITS: 100 INJECTION, SOLUTION SUBCUTANEOUS at 22:01

## 2019-02-08 RX ADMIN — Medication 400 MG: at 18:11

## 2019-02-08 RX ADMIN — CITALOPRAM HYDROBROMIDE 20 MG: 20 TABLET ORAL at 09:40

## 2019-02-08 RX ADMIN — INSULIN LISPRO 5 UNITS: 100 INJECTION, SOLUTION INTRAVENOUS; SUBCUTANEOUS at 16:48

## 2019-02-08 RX ADMIN — DRONEDARONE 400 MG: 400 TABLET, FILM COATED ORAL at 09:40

## 2019-02-08 RX ADMIN — INSULIN LISPRO 5 UNITS: 100 INJECTION, SOLUTION INTRAVENOUS; SUBCUTANEOUS at 09:34

## 2019-02-08 RX ADMIN — POTASSIUM CHLORIDE 40 MEQ: 20 TABLET, EXTENDED RELEASE ORAL at 09:39

## 2019-02-08 RX ADMIN — ASPIRIN 81 MG 81 MG: 81 TABLET ORAL at 09:40

## 2019-02-08 RX ADMIN — GUAIFENESIN 600 MG: 600 TABLET, EXTENDED RELEASE ORAL at 10:10

## 2019-02-08 RX ADMIN — BUDESONIDE 500 MCG: 0.5 INHALANT RESPIRATORY (INHALATION) at 07:57

## 2019-02-08 RX ADMIN — GUAIFENESIN 600 MG: 600 TABLET, EXTENDED RELEASE ORAL at 21:58

## 2019-02-08 NOTE — PROGRESS NOTES
Problem: Nutrition Deficit Goal: *Optimize nutritional status Nutrition LOS Note:  
Assessment Diet: DIABETIC WITH OPTIONS Consistent Carb 1800kcal; Mechanical Soft; AHA-LOW-CHOL FAT  (texture per SLP)Pt admitted with afib, narcotic addition, acute respiratory failure with hypoxia. PMH remarkable for paraplegia, frequent CAUTI, Afib, COPD, bipolar. Food,Nutrition, and Pertinent History: Pt reports she was in rehab for ~2 years and just recently moved to a handicap apartment for 2 days pta. She indicates minimal interest in following a restricted diet reporting she wants to eat what she likes and is excited to be able to cook again once she is back at her apartment. She reports eating well here and denies any nutrition needs. Anthropometrics: Height: 5' 5\" (165.1 cm), Weight Source: Bed, Weight: 94.9 kg (209 lb 4.8 oz), Body mass index is 34.83 kg/m². BMI class of obesity class II. Macronutrient Needs: 
· EER:  5338-5360 kcal /day (15-18 kcal/kg current BW) · EPR:  71-86 grams protein/day (20% of calories) Intake/Comparative Standards: Per RD meal rounds: 100% of current tray in room. Average intake for 6 recorded meal(s): 100%. This potentially meets ~100% of kcal and ~100% of protein needs Nutrition Diagnosis: No nutrition diagnosis at this time Intervention:  
Meals and snacks: Continue current diet. Discharge nutrition plan: No discharge needs identified [place], [address], [place] [address]

## 2019-02-08 NOTE — PROGRESS NOTES
Shift assessment complete. Pt resting quietly in bed. No signs of acute distress. Resp even and unlabored. Oriented x4. Suprapubic cath in place and draining. Call light within reach. Pt instructed to call for assistance.

## 2019-02-08 NOTE — PROGRESS NOTES
Problem: Mobility Impaired (Adult and Pediatric) Goal: *Acute Goals and Plan of Care (Insert Text) STG: 
(1.)Ms. Chris Matthews will move from supine to sit and sit to supine  with MODERATE ASSIST within 7 treatment day(s). (2.)Ms. Chris Matthews will transfer from bed to chair and chair to bed with MAXIMAL ASSIST and sliding board  within 7 treatment day(s). (3.)Ms. Chris Matthews will improve sitting balance to tolerate performing UE AROM exs on EOB with CGA within 7 treatment day(s). (4.)Pt. Will tolerate sitting up in chair for 3 hours to improve endurance within 7 days 
______________________________________________________________________________________________ PHYSICAL THERAPY: Daily Note and AM 2/8/2019  
 
5 DAY PT TRIAL INPATIENT: PT Visit Days : 1 Payor: [id number] / Plan: [place] MEDICAID [employer] / Product Type: Medicaid /   
  
NAME/AGE/GENDER: Claudette Maha is a 59 y.o. female PRIMARY DIAGNOSIS: Atrial fibrillation with rapid ventricular response ([facility] [address] [I48.91] Hypertensive urgency, malignant [I16.0] Leukocytosis [D72.829] Dyspnea [R06.00] Atrial fibrillation with rapid ventricular response (HCC) Atrial fibrillation with rapid ventricular response (HCC) ICD-10: Treatment Diagnosis:  
 · Generalized Muscle Weakness (M62.81) · Other abnormalities of gait and mobility (R26.89) · Low Back Pain (M54.5) Precaution/Allergies: 
Patient has no known allergies. ASSESSMENT:  
Ms. Chris Matthews presents with leukocytosis. She is a paraplegic(transverse myelitis). States that she's been this way for 6 yrs. She's a poor historian. Her PT deficits include lack of initiative, decreased B LE ROM, dependent sitting balance and total assistance for bed mobility and transfers. She supposedly just left a SNF and is in a handicapped accessible apt.  Was only there a number of hours before being brought to ER. 
2/8/19:  Pt is supine in bed on arrival.  She is agreeable to PT and is hoping to home today. Bed mobility with mod A x2. She sat EOB and performed BUE exercises with no LOB. Pt returned to supine with needs in reach. Pt declined OOB activity as she is hoping to go home today. This section established at most recent assessment PROBLEM LIST (Impairments causing functional limitations): 1. Decreased Strength 2. Decreased ADL/Functional Activities 3. Decreased Transfer Abilities 4. Decreased Balance 5. Increased Pain 6. Decreased Activity Tolerance 7. Increased Fatigue 8. Decreased Flexibility/Joint Mobility 9. Decreased Skin Integrity/Hygeine 10. Decreased [place] with Home Exercise Program 
11. Decreased Cognition INTERVENTIONS PLANNED: (Benefits and precautions of physical therapy have been discussed with the patient.) 1. Balance Exercise 2. Bed Mobility 3. Family Education 4. Home Exercise Program (HEP) 5. Range of Motion (ROM) 6. Therapeutic Activites 7. Therapeutic Exercise/Strengthening 8. Transfer Training TREATMENT PLAN: Frequency/Duration: daily for 5 day trial 
Rehabilitation Potential For Stated Goals: guarded RECOMMENDED REHABILITATION/EQUIPMENT: (at time of discharge pending progress): Due to the probability of continued deficits (see above) this patient will likely need continued skilled physical therapy after discharge. Equipment:  
? has a sliding board, wc, power wc, HISTORY:  
History of Present Injury/Illness (Reason for Referral): Admitted with leukocytosis and paraplegia Past Medical History/Comorbidities: Ms. Jessenia Tapia  has a past medical history of Diabetes ([address] Gastrointestinal disorder, Hypertension, Ill-defined condition, Ill-defined condition, Ill-defined condition, Liver disease, Psychiatric disorder, Psychiatric disorder, and Thromboembolus ([address] Ms. Jessenia Tapia  has no past surgical history on file. Social History/Living Environment:  
Home Environment: Apartment # Steps to Enter: 0 One/Two Story Residence: One story Living Alone: No 
Support Systems: Child(urban) Patient Expects to be Discharged to[de-identified] [id number] Current DME Used/Available at Home: Wheelchair, power, [employer]) Tub or Shower Type: (uncertain) Prior Level of Function/Work/Activity: 
Appears to be dependent. States that she could transfer herself Number of Personal Factors/Comorbidities that affect the Plan of Care: 3+: HIGH COMPLEXITY EXAMINATION:  
Most Recent Physical Functioning:  
Gross Assessment: 
  
         
  
Posture: 
  
Balance: 
  Bed Mobility: 
Supine to Sit: Moderate assistance;Assist x2 Sit to Supine: Moderate assistance;Assist x2 Wheelchair Mobility: 
  
Transfers: 
  
Gait: 
  
   
  
Body Structures Involved: 1. Bones 2. Joints 3. Muscles Body Functions Affected: 1. Mental 
2. Sensory/Pain 3. Genitourinary 4. Neuromusculoskeletal 
5. Movement Related Activities and Participation Affected: 1. General Tasks and Demands 2. Mobility 3. Self Care Number of elements that affect the Plan of Care: 4+: HIGH COMPLEXITY CLINICAL PRESENTATION:  
Presentation: Evolving clinical presentation with unstable and unpredictable characteristics: HIGH COMPLEXITY CLINICAL DECISION MAKING:  
[facility] 6 Clicks Basic Mobility Inpatient Short Form How much difficulty does the patient currently have. .. Unable A Lot A Little None 1. Turning over in bed (including adjusting bedclothes, sheets and blankets)? [] 1   [x] 2   [] 3   [] 4  
2. Sitting down on and standing up from a chair with arms ( e.g., wheelchair, bedside commode, etc.)   [x] 1   [] 2   [] 3   [] 4  
3. Moving from lying on back to sitting on the side of the bed? [x] 1   [] 2   [] 3   [] 4 How much help from another person does the patient currently need. .. Total A Lot A Little None 4. Moving to and from a bed to a chair (including a wheelchair)?    [x] 1   [] 2   [] 3   [] 4  
 5.  Need to walk in hospital room? [x] 1   [] 2   [] 3   [] 4  
6. Climbing 3-5 steps with a railing? [x] 1   [] 2   [] 3   [] 4  
© 2007, Trustees of [address] [place] [address] [address] [id number], under license to [email or web address].. All rights reserved Score:  Initial: 7 Most Recent: X (Date: -- ) Interpretation of Tool:  Represents activities that are increasingly more difficult (i.e. Bed mobility, Transfers, Gait). Medical Necessity:    
· Patient demonstrates guarded rehab potential due to higher previous functional level. Reason for Services/Other Comments: 
· Patient continues to require present interventions due to patient's inability to perform any functional mobility without total assistance. Use of outcome tool(s) and clinical judgement create a POC that gives a: Difficult prediction of patient's progress: HIGH COMPLEXITY  
  
 
 
 
TREATMENT:  
(In addition to Assessment/Re-Assessment sessions the following treatments were rendered) Pre-treatment Symptoms/Complaints:  Complaints of back pain 
Pain: Initial:  
   Post Session:  Not rated Therapeutic Activity: (    25): Therapeutic activities including Bed transfers and sitting balance work to improve mobility, strength, balance and coordination. Required minimal assist   to promote static and dynamic balance in sitting. Therapeutic Exercise: (10 Minutes):  Exercises per grid below to improve mobility, strength and balance. Required minimal verbal cues to promote proper body alignment, promote proper body posture and promote proper body mechanics. Progressed repetitions as indicated. UE exercises in all planes x 10 reps each with constant redirecting to perform activity. Braces/Orthotics/Lines/Etc:  
· jasso catheter · O2 Device: Nasal cannula Treatment/Session Assessment:   
· Response to Treatment:  Tolerated well · Interdisciplinary Collaboration:  
o Registered Nurse 
o Rehabilitation Attendant · After treatment position/precautions: o Supine in bed 
o Bed/Chair-wheels locked 
o Bed in low position 
o Call light within reach 
o RN notified 
o Side rails x 3  
· Compliance with Program/Exercises: Will assess as treatment progresses · Recommendations/Intent for next treatment session: \"Next visit will focus on advancements to more challenging activities and reduction in assistance provided\". Total Treatment Duration: PT Patient Time In/Time Out Time In: 1130 Time Out: 1155 Joya Marie, PTA

## 2019-02-08 NOTE — PROGRESS NOTES
[facility] CARDIOLOGY PROGRESS NOTE 
      
 
2/8/2019 9:26 AM 
 
Admit Date: 2/4/2019 Subjective:  
 
Rate control overnight some lower heart rates appear to be junctional rhythm. Now in sinus rate controlled Review of Systems Unable to perform ROS: Mental acuity Objective:  
  
Vitals:  
 02/08/19 0754 02/08/19 0758 02/08/19 1136 02/08/19 1254 BP: 123/71  121/75 Pulse: 61  (!) 58 Resp: 16  18 Temp: 96.8 °F (36 °C)  97.6 °F (36.4 °C) SpO2: 94% 95% 95% 94% Weight:      
Height:      
 
 
 
Physical Exam  
Constitutional: She appears ill. Eyes: Left eye exhibits no discharge. Neck: Normal range of motion. Cardiovascular: An irregularly irregular rhythm present. Pulmonary/Chest: She has wheezes. Abdominal: She exhibits no distension. Musculoskeletal:  
Trace edeam   
Neurological:  
Paraplegia Confused Skin: Skin is warm. Data Review:  
Recent Labs 02/08/19 
3267 02/07/19 
2829  144  
K 4.1 2.9*  
MG  --  1.8 BUN 29* 25* CREA 1.56* 1.42* * 217* Intake/Output Summary (Last 24 hours) at 2/8/2019 1545 Last data filed at 2/8/2019 1136 Gross per 24 hour Intake  Output 3250 ml Net -3250 ml  
 
Current Facility-Administered Medications Medication Dose Route Frequency  guaiFENesin ER (MUCINEX) tablet 600 mg  600 mg Oral Q12H  
 albuterol (PROVENTIL VENTOLIN) nebulizer solution 5 mg  5 mg Nebulization Q4H RT  
 NUTRITIONAL SUPPORT ELECTROLYTE PRN ORDERS   Does Not Apply PRN  
 magnesium oxide (MAG-OX) tablet 400 mg  400 mg Oral BID  potassium chloride (K-DUR, KLOR-CON) SR tablet 40 mEq  40 mEq Oral DAILY  metoprolol tartrate (LOPRESSOR) tablet 100 mg  100 mg Oral Q12H  
 zinc oxide-cod liver oil (DESITIN) 40 % paste   Topical PRN  
 ALPRAZolam (XANAX) tablet 0.5 mg  0.5 mg Oral BID PRN  
 apixaban (ELIQUIS) tablet 5 mg  5 mg Oral BID  aspirin chewable tablet 81 mg  81 mg Oral DAILY  budesonide (PULMICORT) 500 mcg/2 ml nebulizer suspension  500 mcg Nebulization BID  citalopram (CELEXA) tablet 20 mg  20 mg Oral DAILY  fentaNYL (DURAGESIC) 25 mcg/hr patch 1 Patch  1 Patch TransDERmal Q72H  nystatin (MYCOSTATIN) 100,000 unit/gram powder   Topical BID  pantoprazole (PROTONIX) tablet 40 mg  40 mg Oral ACB  oxyCODONE IR (ROXICODONE) tablet 10 mg  10 mg Oral Q6H PRN  pregabalin (LYRICA) capsule 100 mg  100 mg Oral TID  QUEtiapine (SEROquel) tablet 300 mg  300 mg Oral QHS  insulin lispro (HUMALOG) injection   SubCUTAneous AC&HS  insulin lispro (HUMALOG) injection 5 Units  5 Units SubCUTAneous TID WITH MEALS  
 acetaminophen (TYLENOL) tablet 650 mg  650 mg Oral Q6H PRN  
 bisacodyl (DULCOLAX) tablet 5 mg  5 mg Oral DAILY PRN  
 haloperidol lactate (HALDOL) injection 2 mg  2 mg IntraVENous Q6H PRN  
 ondansetron (ZOFRAN) injection 4 mg  4 mg IntraVENous Q8H PRN  phenol throat spray (CHLORASEPTIC) 1 Spray  1 Spray Oral PRN  
 sodium chloride (NS) flush 5-40 mL  5-40 mL IntraVENous Q8H  
 sodium chloride (NS) flush 5-40 mL  5-40 mL IntraVENous PRN  
 levalbuterol (XOPENEX) nebulizer soln 0.63 mg/3 mL  0.63 mg Nebulization Q4H PRN  
 insulin glargine (LANTUS) injection 30 Units  30 Units SubCUTAneous QHS  sodium chloride (NS) flush 10 mL  10 mL InterCATHeter Q8H  
 sodium chloride (NS) flush 10 mL  10 mL InterCATHeter PRN  
 dronedarone (MULTAQ) tablet tab 400 mg  400 mg Oral BID WITH MEALS Assessment/Plan:  
 
1. In sinus rhythm 2/3/2019 · On dronedarone sent patient is appropriately anticoagulated. Eliquis. Stopped metoprolol due to bradycardia. · Echocardiogram 1/2019 with normal left ventricle systolic function and no valvular disease 2. Sepsis · On antibiotics by primary team 
3. Chronic hepatitis C 
· Per primary team 
4. Type 2 diabetes 5. AK I worsening not on diuretic therapies Will dign offplease call with questions. Ethan Contreras MD 
2/8/2019 9:26 AM

## 2019-02-08 NOTE — PROGRESS NOTES
Hospitalist Progress Note Subjective:  
Daily Progress Note: 2/8/2019 8:57 AM 
 
Ms. Alie Trivedi is a 58 yo obese white female, with a pmh of paraplegia, frequent CAUTIs due to suprapubic catheter, atrial fibrillation on eliquis, who presented 2/5 for significant back pain after being discharged the day prior for CAUTI and was found to be in atrial fibrillation with RVR. Cardiology has managed her afib with RVR and she is on multitaq and eliquis, currently in SR. She is on 3L NC and doesn't wear oxygen at baseline. Endorses difficulty producing sputum she feels is \"stuck. \". Has a COPD history. Denies chest pain. Endorses pain in arms to the point she \"can't move until someone brings me a pain pill. \" Current Facility-Administered Medications Medication Dose Route Frequency  guaiFENesin ER (MUCINEX) tablet 600 mg  600 mg Oral Q12H  
 albuterol (PROVENTIL VENTOLIN) nebulizer solution 5 mg  5 mg Nebulization Q4H RT  
 NUTRITIONAL SUPPORT ELECTROLYTE PRN ORDERS   Does Not Apply PRN  
 magnesium oxide (MAG-OX) tablet 400 mg  400 mg Oral BID  potassium chloride (K-DUR, KLOR-CON) SR tablet 40 mEq  40 mEq Oral DAILY  metoprolol tartrate (LOPRESSOR) tablet 100 mg  100 mg Oral Q12H  
 zinc oxide-cod liver oil (DESITIN) 40 % paste   Topical PRN  
 ALPRAZolam (XANAX) tablet 0.5 mg  0.5 mg Oral BID PRN  
 apixaban (ELIQUIS) tablet 5 mg  5 mg Oral BID  aspirin chewable tablet 81 mg  81 mg Oral DAILY  budesonide (PULMICORT) 500 mcg/2 ml nebulizer suspension  500 mcg Nebulization BID  citalopram (CELEXA) tablet 20 mg  20 mg Oral DAILY  fentaNYL (DURAGESIC) 25 mcg/hr patch 1 Patch  1 Patch TransDERmal Q72H  nystatin (MYCOSTATIN) 100,000 unit/gram powder   Topical BID  pantoprazole (PROTONIX) tablet 40 mg  40 mg Oral ACB  oxyCODONE IR (ROXICODONE) tablet 10 mg  10 mg Oral Q6H PRN  pregabalin (LYRICA) capsule 100 mg  100 mg Oral TID  QUEtiapine (SEROquel) tablet 300 mg  300 mg Oral QHS  insulin lispro (HUMALOG) injection   SubCUTAneous AC&HS  insulin lispro (HUMALOG) injection 5 Units  5 Units SubCUTAneous TID WITH MEALS  
 acetaminophen (TYLENOL) tablet 650 mg  650 mg Oral Q6H PRN  
 bisacodyl (DULCOLAX) tablet 5 mg  5 mg Oral DAILY PRN  
 haloperidol lactate (HALDOL) injection 2 mg  2 mg IntraVENous Q6H PRN  
 ondansetron (ZOFRAN) injection 4 mg  4 mg IntraVENous Q8H PRN  phenol throat spray (CHLORASEPTIC) 1 Spray  1 Spray Oral PRN  
 sodium chloride (NS) flush 5-40 mL  5-40 mL IntraVENous Q8H  
 sodium chloride (NS) flush 5-40 mL  5-40 mL IntraVENous PRN  
 levalbuterol (XOPENEX) nebulizer soln 0.63 mg/3 mL  0.63 mg Nebulization Q4H PRN  
 insulin glargine (LANTUS) injection 30 Units  30 Units SubCUTAneous QHS  sodium chloride (NS) flush 10 mL  10 mL InterCATHeter Q8H  
 sodium chloride (NS) flush 10 mL  10 mL InterCATHeter PRN  
 dronedarone (MULTAQ) tablet tab 400 mg  400 mg Oral BID WITH MEALS Review of Systems A comprehensive review of systems was negative except for that written in the HPI. Objective:  
 
Visit Vitals /71 (BP 1 Location: Right arm, BP Patient Position: At rest;Supine) Pulse 61 Temp 96.8 °F (36 °C) Resp 16 Ht 5' 5\" (1.651 m) Wt 94.9 kg (209 lb 4.8 oz) SpO2 95% BMI 34.83 kg/m² O2 Flow Rate (L/min): 3 l/min O2 Device: Nasal cannula Temp (24hrs), Av.7 °F (36.5 °C), Min:96.7 °F (35.9 °C), Max:98.3 °F (36.8 °C) No intake/output data recorded.  1901 -  0700 In: 200 [P.O.:1400; I.V.:500] Out: 4585 [CUUZX:[id number]] General; awake, alert, oriented, obese, disheveled Eyes; non icteric Neck; supple CV: RRR Pulm; diminished in bases, tight, no accessory muscle use Abd; soft, non tender, active BS, suprapubic catheter in place Additional comments:I reviewed the patient's new clinical lab test results. chest xray and labwork since admission Data Review Recent Results (from the past 24 hour(s)) GLUCOSE, POC Collection Time: 02/07/19  1:34 PM  
Result Value Ref Range Glucose (POC) 264 (H) 65 - 100 mg/dL GLUCOSE, POC Collection Time: 02/07/19  4:42 PM  
Result Value Ref Range Glucose (POC) 296 (H) 65 - 100 mg/dL GLUCOSE, POC Collection Time: 02/07/19  9:53 PM  
Result Value Ref Range Glucose (POC) 289 (H) 65 - 100 mg/dL METABOLIC PANEL, BASIC Collection Time: 02/08/19  6:20 AM  
Result Value Ref Range Sodium 140 136 - 145 mmol/L Potassium 4.1 3.5 - 5.1 mmol/L Chloride 106 98 - 107 mmol/L  
 CO2 27 21 - 32 mmol/L Anion gap 7 mmol/L Glucose 153 (H) 65 - 100 mg/dL BUN 29 (H) 8 - 23 MG/DL Creatinine 1.56 (H) 0.6 - 1.0 MG/DL  
 GFR est AA 43 (L) >60 ml/min/1.73m2 GFR est non-AA 36 ml/min/1.73m2 Calcium 8.7 8.3 - 10.4 MG/DL  
GLUCOSE, POC Collection Time: 02/08/19  8:14 AM  
Result Value Ref Range Glucose (POC) 152 (H) 65 - 100 mg/dL Assessment/Plan:  
 
Principal Problem: 
  Atrial fibrillation with rapid ventricular response ([address] (2/5/2019) Now in SR on Multitaq. On Prepair for [address] [place] [address]. Cardiology appreciated Active Problems: 
  Paraplegia ([address] (12/10/2016) Bipolar disorder without psychotic features ([address] (12/10/2016) Narcotic addiction ([address] (12/10/2016) Now off of iv pain medications and tolerating po. Leukocytosis (2/5/2019) Dyspnea (2/5/2019) Hypertensive urgency, malignant (2/5/2019) Chronic midline low back pain without sciatica (2/5/2019) Acute respiratory failure with hypoxia ([address] (2/8/2019) Wean oxygen as tolerated to keep sats 88-92% as she has COPD. Start mucinex 600 mg bid. Repeat CXR. Start albuterol nebulizer treatment q4. Home tomorrow if weaned off oxygen. Care Plan discussed with: Patient/Family Signed By: Ingris Mcmillan MD   
 February 8, 2019

## 2019-02-09 ENCOUNTER — HOSPITAL ENCOUNTER (EMERGENCY)
Age: 64
Discharge: HOME | DRG: 201 | End: 2019-02-10
Attending: EMERGENCY MEDICINE
Payer: MEDICAID

## 2019-02-09 VITALS
SYSTOLIC BLOOD PRESSURE: 165 MMHG | DIASTOLIC BLOOD PRESSURE: 82 MMHG | WEIGHT: 209.3 LBS | HEIGHT: 65 IN | BODY MASS INDEX: 34.87 KG/M2 | OXYGEN SATURATION: 92 % | HEART RATE: 75 BPM | RESPIRATION RATE: 18 BRPM | TEMPERATURE: 98.2 F

## 2019-02-09 DIAGNOSIS — I48.91 ATRIAL FIBRILLATION WITH RVR (HCC): Primary | ICD-10-CM

## 2019-02-09 LAB
GLUCOSE BLD STRIP.AUTO-MCNC: 191 MG/DL (ref 65–100)
GLUCOSE BLD STRIP.AUTO-MCNC: 318 MG/DL (ref 65–100)
GLUCOSE BLD STRIP.AUTO-MCNC: 392 MG/DL (ref 65–100)
GLUCOSE BLD STRIP.AUTO-MCNC: 454 MG/DL (ref 65–100)

## 2019-02-09 PROCEDURE — 74011000250 HC RX REV CODE- 250: Performed by: HOSPITALIST

## 2019-02-09 PROCEDURE — 74011250637 HC RX REV CODE- 250/637: Performed by: HOSPITALIST

## 2019-02-09 PROCEDURE — 97530 THERAPEUTIC ACTIVITIES: CPT

## 2019-02-09 PROCEDURE — 94640 AIRWAY INHALATION TREATMENT: CPT

## 2019-02-09 PROCEDURE — 74011250637 HC RX REV CODE- 250/637: Performed by: INTERNAL MEDICINE

## 2019-02-09 PROCEDURE — 74011636637 HC RX REV CODE- 636/637: Performed by: HOSPITALIST

## 2019-02-09 PROCEDURE — 94760 N-INVAS EAR/PLS OXIMETRY 1: CPT

## 2019-02-09 PROCEDURE — 82962 GLUCOSE BLOOD TEST: CPT

## 2019-02-09 PROCEDURE — 77010033678 HC OXYGEN DAILY

## 2019-02-09 PROCEDURE — 99285 EMERGENCY DEPT VISIT HI MDM: CPT | Performed by: EMERGENCY MEDICINE

## 2019-02-09 RX ORDER — ALPRAZOLAM 0.5 MG/1
0.5 TABLET ORAL
Qty: 30 TAB | Refills: 0 | Status: SHIPPED | OUTPATIENT
Start: 2019-02-09

## 2019-02-09 RX ORDER — INSULIN GLARGINE 100 [IU]/ML
30 INJECTION, SOLUTION SUBCUTANEOUS
Qty: 3 ADJUSTABLE DOSE PRE-FILLED PEN SYRINGE | Refills: 3 | Status: SHIPPED | OUTPATIENT
Start: 2019-02-09 | End: 2019-02-14

## 2019-02-09 RX ORDER — OXYCODONE HYDROCHLORIDE 20 MG/1
20 TABLET ORAL
Qty: 30 TAB | Refills: 0 | Status: ON HOLD | OUTPATIENT
Start: 2019-02-09 | End: 2019-03-14 | Stop reason: SDUPTHER

## 2019-02-09 RX ORDER — FENTANYL 25 UG/1
1 PATCH TRANSDERMAL
Qty: 5 PATCH | Refills: 0 | Status: SHIPPED | OUTPATIENT
Start: 2019-02-09 | End: 2021-01-01

## 2019-02-09 RX ORDER — GUAIFENESIN 600 MG/1
600 TABLET, EXTENDED RELEASE ORAL EVERY 12 HOURS
Qty: 14 TAB | Refills: 0 | Status: SHIPPED | OUTPATIENT
Start: 2019-02-09 | End: 2019-02-20

## 2019-02-09 RX ADMIN — PREGABALIN 100 MG: 50 CAPSULE ORAL at 17:55

## 2019-02-09 RX ADMIN — CITALOPRAM HYDROBROMIDE 20 MG: 20 TABLET ORAL at 08:43

## 2019-02-09 RX ADMIN — INSULIN LISPRO 5 UNITS: 100 INJECTION, SOLUTION INTRAVENOUS; SUBCUTANEOUS at 12:00

## 2019-02-09 RX ADMIN — PREGABALIN 100 MG: 50 CAPSULE ORAL at 08:42

## 2019-02-09 RX ADMIN — GUAIFENESIN 600 MG: 600 TABLET, EXTENDED RELEASE ORAL at 08:43

## 2019-02-09 RX ADMIN — Medication 400 MG: at 09:00

## 2019-02-09 RX ADMIN — ALBUTEROL SULFATE 2.5 MG: 2.5 SOLUTION RESPIRATORY (INHALATION) at 08:22

## 2019-02-09 RX ADMIN — INSULIN LISPRO 5 UNITS: 100 INJECTION, SOLUTION INTRAVENOUS; SUBCUTANEOUS at 17:13

## 2019-02-09 RX ADMIN — PANTOPRAZOLE SODIUM 40 MG: 40 TABLET, DELAYED RELEASE ORAL at 08:42

## 2019-02-09 RX ADMIN — DRONEDARONE 400 MG: 400 TABLET, FILM COATED ORAL at 17:55

## 2019-02-09 RX ADMIN — Medication 400 MG: at 17:55

## 2019-02-09 RX ADMIN — NYSTATIN: 100000 POWDER TOPICAL at 08:55

## 2019-02-09 RX ADMIN — ALPRAZOLAM 0.5 MG: 0.5 TABLET ORAL at 08:43

## 2019-02-09 RX ADMIN — APIXABAN 5 MG: 5 TABLET, FILM COATED ORAL at 09:09

## 2019-02-09 RX ADMIN — OXYCODONE HYDROCHLORIDE 10 MG: 5 TABLET ORAL at 14:47

## 2019-02-09 RX ADMIN — INSULIN HUMAN 10 UNITS: 100 INJECTION, SOLUTION PARENTERAL at 17:54

## 2019-02-09 RX ADMIN — OXYCODONE HYDROCHLORIDE 10 MG: 5 TABLET ORAL at 08:43

## 2019-02-09 RX ADMIN — INSULIN LISPRO 2 UNITS: 100 INJECTION, SOLUTION INTRAVENOUS; SUBCUTANEOUS at 08:48

## 2019-02-09 RX ADMIN — Medication 10 ML: at 13:04

## 2019-02-09 RX ADMIN — INSULIN LISPRO 10 UNITS: 100 INJECTION, SOLUTION INTRAVENOUS; SUBCUTANEOUS at 17:35

## 2019-02-09 RX ADMIN — Medication 10 ML: at 06:14

## 2019-02-09 RX ADMIN — NYSTATIN: 100000 POWDER TOPICAL at 17:57

## 2019-02-09 RX ADMIN — APIXABAN 5 MG: 5 TABLET, FILM COATED ORAL at 18:42

## 2019-02-09 RX ADMIN — ASPIRIN 81 MG 81 MG: 81 TABLET ORAL at 08:43

## 2019-02-09 RX ADMIN — BUDESONIDE 500 MCG: 0.5 INHALANT RESPIRATORY (INHALATION) at 08:22

## 2019-02-09 RX ADMIN — DRONEDARONE 400 MG: 400 TABLET, FILM COATED ORAL at 08:43

## 2019-02-09 RX ADMIN — POTASSIUM CHLORIDE 40 MEQ: 20 TABLET, EXTENDED RELEASE ORAL at 08:42

## 2019-02-09 RX ADMIN — INSULIN LISPRO 8 UNITS: 100 INJECTION, SOLUTION INTRAVENOUS; SUBCUTANEOUS at 13:03

## 2019-02-09 RX ADMIN — INSULIN LISPRO 5 UNITS: 100 INJECTION, SOLUTION INTRAVENOUS; SUBCUTANEOUS at 08:47

## 2019-02-09 NOTE — PROGRESS NOTES
SW discussed patient's transport with RN who states that patient would probably be safest transferring via EMS. SW met with patient who agrees to EMS transport - will arrange for 6:30 pm. Patient current with [facility], SW faxed resumption orders with discharge summary. FRANCISCA Mckinney  [facility] [email or web address]

## 2019-02-09 NOTE — DISCHARGE INSTRUCTIONS
DISCHARGE SUMMARY from Nurse    PATIENT INSTRUCTIONS:    After general anesthesia or intravenous sedation, for 24 hours or while taking prescription Narcotics:  · Limit your activities  · Do not drive and operate hazardous machinery  · Do not make important personal or business decisions  · Do  not drink alcoholic beverages  · If you have not urinated within 8 hours after discharge, please contact your surgeon on call. Report the following to your surgeon:  · Excessive pain, swelling, redness or odor of or around the surgical area  · Temperature over 100.5  · Nausea and vomiting lasting longer than 4 hours or if unable to take medications  · Any signs of decreased circulation or nerve impairment to extremity: change in color, persistent  numbness, tingling, coldness or increase pain  · Any questions    What to do at Home:  Recommended activity: Activity as tolerated,   If you experience any of the following symptoms any chest pain,  Any shortness of breath, , please follow up with your doctor. *  Please give a list of your current medications to your Primary Care Provider. *  Please update this list whenever your medications are discontinued, doses are      changed, or new medications (including over-the-counter products) are added. *  Please carry medication information at all times in case of emergency situations. These are general instructions for a healthy lifestyle:    No smoking/ No tobacco products/ Avoid exposure to second hand smoke  Surgeon General's Warning:  Quitting smoking now greatly reduces serious risk to your health.     Obesity, smoking, and sedentary lifestyle greatly increases your risk for illness    A healthy diet, regular physical exercise & weight monitoring are important for maintaining a healthy lifestyle    You may be retaining fluid if you have a history of heart failure or if you experience any of the following symptoms:  Weight gain of 3 pounds or more overnight or 5 pounds in a week, increased swelling in our hands or feet or shortness of breath while lying flat in bed. Please call your doctor as soon as you notice any of these symptoms; do not wait until your next office visit. Recognize signs and symptoms of STROKE:    F-face looks uneven    A-arms unable to move or move unevenly    S-speech slurred or non-existent    T-time-call 911 as soon as signs and symptoms begin-DO NOT go       Back to bed or wait to see if you get better-TIME IS BRAIN. Warning Signs of HEART ATTACK     Call 911 if you have these symptoms:   Chest discomfort. Most heart attacks involve discomfort in the center of the chest that lasts more than a few minutes, or that goes away and comes back. It can feel like uncomfortable pressure, squeezing, fullness, or pain.  Discomfort in other areas of the upper body. Symptoms can include pain or discomfort in one or both arms, the back, neck, jaw, or stomach.  Shortness of breath with or without chest discomfort.  Other signs may include breaking out in a cold sweat, nausea, or lightheadedness. Don't wait more than five minutes to call 911 - MINUTES MATTER! Fast action can save your life. Calling 911 is almost always the fastest way to get lifesaving treatment. Emergency Medical Services staff can begin treatment when they arrive -- up to an hour sooner than if someone gets to the hospital by car. The discharge information has been reviewed with the patient. The patient verbalized understanding. Discharge medications reviewed with the patient and appropriate educational materials and side effects teaching were provided.   ___________________________________________________________________________________________________________________________________

## 2019-02-09 NOTE — PROGRESS NOTES
Patient alert and oriented x4. C/O pain  8/10 in feet. Supra pubic Campbell paitent and draining and stat lock present. Non productive cough. On 1 lpm oxygen turned off to check RA sat. Left upper forearm midline patent and right breast  #22 INT Capped Bi lat leg paralysis.

## 2019-02-09 NOTE — PROGRESS NOTES
Shift assessment done. Received alert and oriented x4. Respiration even and unlabored. O2 at 2L via NC. HR regular. Abdomen soft, obese with active bowel sounds. Suprapubic cath in place and draining well. Denies needs and pain this time. Provided education about food intake. Bed low and locked. Call light within reach.

## 2019-02-09 NOTE — PROGRESS NOTES
Problem: Self Care Deficits Care Plan (Adult) Goal: *Acute Goals and Plan of Care (Insert Text) Patient will complete Bathing with minimal assist to increase self care independence. Patient will complete Grooming with supervision to increase self care independence. Patient will improve static sitting at edge of bed for 10 minutes in order to improve independence with transfers and self cares. Patient will complete UE exercises with supervision in order to increase overall activity tolerance and strength. Timeframe: 7 visits OCCUPATIONAL THERAPY: Daily Note and PM 2/9/2019INPATIENT: OT Visit Days: 2 Payor: [id number] / Plan: [place] MEDICA[place] Formerly [facility] / Product Type: Medicaid /  
  
NAME/AGE/GENDER: Rod Ha is a 59 y.o. female PRIMARY DIAGNOSIS:  Atrial fibrillation with rapid ventricular response ([facility] [address] [I48.91] Hypertensive urgency, malignant [I16.0] Leukocytosis [D72.829] Dyspnea [R06.00] Atrial fibrillation with rapid ventricular response (HCC) Atrial fibrillation with rapid ventricular response (HCC) ICD-10: Treatment Diagnosis:  
 · Generalized Muscle Weakness (M62.81) · Other lack of cordination (R27.8) · Unspecified Lack of Coordination (R27.9) Precautions/Allergies: 
   Patient has no known allergies. ASSESSMENT:  
Ms. Raghu Pereira presents with leukocytosis. She is a paraplegic (transverse myelitis). States that she's been this way for 6 yrs. She's a poor historian. Her deficits include lack of initiative, decreased self cares, dependent sitting balance and total assistance for bed mobility and transfers. Per patients reports, she supposedly just left a LTC for 2 years and is in a handicapped accessible apt. Was only there a number of hours before being brought to ER. Treatment was difficult, but with assist x2 was able to sit edge of bed with poor balance.  Lacking coordination and control was also total assist from sit to supine. Patient likely is near baseline, but will trail OT POC 
 
2/9/19 Pt was supine in bed upon arrival. Pt completed med mobility with mod A. Pt sat edge of bed for 20 minutes with good sitting balance. Good progress. Continue POC. This section established at most recent assessment PROBLEM LIST (Impairments causing functional limitations): 1. Decreased Strength 2. Decreased ADL/Functional Activities 3. Decreased Transfer Abilities 4. Decreased Balance 5. Increased Pain 6. Decreased Activity Tolerance 7. Decreased Pacing Skills INTERVENTIONS PLANNED: (Benefits and precautions of occupational therapy have been discussed with the patient.) 1. Activities of daily living training 2. Adaptive equipment training 3. Balance training 4. Clothing management 5. Neuromuscular re-eduation 6. Therapeutic activity 7. Therapeutic exercise TREATMENT PLAN: Frequency/Duration: Follow patient 1-2tx to address above goals. Rehabilitation Potential For Stated Goals: Poor RECOMMENDED REHABILITATION/EQUIPMENT: (at time of discharge pending progress): Due to the probability of continued deficits (see above) this patient will likely need continued skilled occupational therapy after discharge. Equipment:  
? None at this time OCCUPATIONAL PROFILE AND HISTORY:  
History of Present Injury/Illness (Reason for Referral): 
See H&P. Past Medical History/Comorbidities: Ms. Marcellus Grimm  has a past medical history of Diabetes ([facility] [address] Gastrointestinal disorder, Hypertension, Ill-defined condition, Ill-defined condition, Ill-defined condition, Liver disease, Psychiatric disorder, Psychiatric disorder, and Thromboembolus ([facility] [address] Ms. Marcellus Grimm  has no past surgical history on file. Social History/Living Environment:  
Home Environment: Apartment # Steps to Enter: 0 One/Two Story Residence: One story Living Alone: No 
Support Systems: Child(urban) Patient [facility] Current DME Used/Available at Home: Wheelchair, power, [employer]) Tub or Shower Type: (uncertain) Prior Level of Function/Work/Activity: 
Needs assist with ADL's and IADL's. Non ambulatory and uses a slide board from bed to manual w/c. Number of Personal Factors/Comorbidities that affect the Plan of Care: Extensive review of physical, cognitive, and psychosocial performance (3+):  HIGH COMPLEXITY ASSESSMENT OF OCCUPATIONAL PERFORMANCE[de-identified]  
Activities of Daily Living:  
Basic ADLs (From Assessment) Complex ADLs (From Assessment) Feeding: Stand-by assistance Oral Facial Hygiene/Grooming: Minimum assistance Bathing: Maximum assistance Upper Body Dressing: Moderate assistance Lower Body Dressing: Total assistance Toileting: Total assistance Grooming/Bathing/Dressing Activities of Daily Living Cognitive Retraining Safety/Judgement: Fall prevention Bed/Mat Mobility Supine to Sit: Moderate assistance Sit to Supine: Moderate assistance Most Recent Physical Functioning:  
Gross Assessment: 
  
         
  
Posture: 
Posture Assessment: Forward head, Rounded shoulders Balance: 
Sitting: Intact Bed Mobility: 
Supine to Sit: Moderate assistance Sit to Supine: Moderate assistance Wheelchair Mobility: 
  
Transfers: 
   
 
    
 
Patient Vitals for the past 6 hrs: 
 BP BP Patient Position SpO2 Pulse 02/09/19 1152 109/70 At rest;Supine 92 % 75 Mental Status Neurologic State: Alert Orientation Level: Oriented X4 Cognition: No command following Perception: Appears intact Perseveration: No perseveration noted Safety/Judgement: Fall prevention Physical Skills Involved: 1. Range of Motion 2. Balance 3. Strength 4. Activity Tolerance 5. Sensation 6. Fine Motor Control 7. Gross Motor Control 8. Pain (acute) 9.  Pain (Chronic) Cognitive Skills Affected (resulting in the inability to perform in a timely and safe manner): 
 1. Perception 2. Immediate Memory 3. Short Term Recall 4. Long Term Memory 5. Sustained Attention 6. Divided Attention Psychosocial Skills Affected: 1. Habits/Routines 2. Environmental Adaptation 3. Social Interaction 4. Emotional Regulation 5. Self-Awareness 6. Awareness of Others 7. Social Roles Number of elements that affect the Plan of Care: 5+:  HIGH COMPLEXITY CLINICAL DECISION MAKIN[address] [place] [address] AM-PAC 6 Clicks Daily Activity Inpatient Short Form How much help from another person does the patient currently need. .. Total A Lot A Little None 1. Putting on and taking off regular lower body clothing? [x] 1   [] 2   [] 3   [] 4  
2. Bathing (including washing, rinsing, drying)? [x] 1   [] 2   [] 3   [] 4  
3. Toileting, which includes using toilet, bedpan or urinal?   [x] 1   [] 2   [] 3   [] 4  
4. Putting on and taking off regular upper body clothing? [] 1   [x] 2   [] 3   [] 4  
5. Taking care of personal grooming such as brushing teeth? [] 1   [x] 2   [] 3   [] 4  
6. Eating meals? [] 1   [] 2   [x] 3   [] 4  
© , Trustees of [address] [place] [address], under license to [email or web address]. All rights reserved Score:  Initial: 10 Most Recent: X (Date: -- ) Interpretation of Tool:  Represents activities that are increasingly more difficult (i.e. Bed mobility, Transfers, Gait). Medical Necessity:    
· Skilled intervention continues to be required due to Deficits listed above. Reason for Services/Other Comments: 
· Patient continues to require skilled intervention due to debility, back pain. Use of outcome tool(s) and clinical judgement create a POC that gives a: HIGH COMPLEXITY  
 
 
 
TREATMENT:  
(In addition to Assessment/Re-Assessment sessions the following treatments were rendered) Pre-treatment Symptoms/Complaints:   
Pain: Initial:  
Pain Intensity 1: 0  Post Session:  3 Therapeutic Activity: (    24):   Therapeutic activities included bed mobility and sitting edge of bed to improve mobility and strength. Required mod   to promote motor control of bilateral, upper extremity(s), lower extremity(s). Braces/Orthotics/Lines/Etc:  
· IV 
· O2 Device: Nasal cannula Treatment/Session Assessment:   
· Response to Treatment:  Poor, supine in bed. · Interdisciplinary Collaboration:  
o Certified Occupational Therapy Assistant 
o Registered Nurse · After treatment position/precautions:  
o Supine in bed 
o Bed alarm/tab alert on 
o Bed/Chair-wheels locked 
o Bed in low position 
o Call light within reach 
o Nurse at bedside 
o Side rails x 3  
· Compliance with Program/Exercises: Will assess as treatment progresses. · Recommendations/Intent for next treatment session: \"Next visit will focus on advancements to more challenging activities\". Total Treatment Duration: OT Patient Time In/Time Out Time In: 2267 Time Out: 1584 [address] [facility]

## 2019-02-09 NOTE — PROGRESS NOTES
SW met with patient who states that her preference is to transport via cab. SW spoke with patient's son Imelda Guidry who states that he gets off at 6 pm and can meet the patient at home to help her transfer into her wheelchair and into the home. Patient in agreement with plan, states that her apartment complex is [address] , [address] Patient requesting specific medications at discharge. HALIE advised MD, will arrange for cab transport at 6:30 pm.  
 
FRANCISCA Calabrese  [facility] [email or web address]

## 2019-02-09 NOTE — DISCHARGE SUMMARY
Hospitalist Discharge Summary     Patient ID:  Caleb Mohamud  [id number]  72 y.o.  1955  Admit date: 2/4/2019 10:41 PM  Discharge date and time: 2/9/2019  Attending: Mily Gutierrez MD  PCP:  None  Treatment Team: Attending Provider: Mily Gutierrez MD    Principal Diagnosis Atrial fibrillation with rapid ventricular response [facility])   Hypertensive urgency, malignant  Chronic low back pain without sciatica  Paraplegic  Leukocytosis  Acute hypoxic respiratory failure; resolved          Principal Problem:    Atrial fibrillation with rapid ventricular response ([address] (2/5/2019)    Active Problems:    Paraplegia ([address] (12/10/2016)      Bipolar disorder without psychotic features ([address] (12/10/2016)      Narcotic addiction ([address] (12/10/2016)      Leukocytosis (2/5/2019)      Dyspnea (2/5/2019)      Hypertensive urgency, malignant (2/5/2019)      Chronic midline low back pain without sciatica (2/5/2019)      Acute respiratory failure with hypoxia ([address] (2/8/2019)             Hospital Course:  Please refer to the admission H&P for details of presentation. In summary, the patient is 60 yo obese white female, with a pmh of paraplegia, frequent CAUTIs due to suprapubic catheter, atrial fibrillation on eliquis, who presented 2/5 for significant back pain after being discharged the day prior for CAUTI and was found to be in atrial fibrillation with RVR. Cardiology has managed her afib with RVR and she is on multitaq and eliquis, currently in SR. She is on 3L NC and doesn't wear oxygen at baseline. Pt was weaned down to room air without any difficulty. Her mentation is back to baseline, no confusion. Pt is asking for prescription for fentanyl patch, roxicodone and ativan. I have informed that I can only give prescription for about a week and for further prescriptions, she has to establish a follow up at pain management clinic. Pt is not clinically stable and medically cleared to be discharged home.   For further details, please refer to daily progress notes. Significant Diagnostic Studies:   MRI LUMBAR SPINE WITHOUT CONTRAST 2/5/2019     HISTORY: 21-year-old female with low back pain. Evaluate for acute fracture or  prolapse.     TECHNIQUE: Sagittal T2-weighted, T1-weighted, and STIR images were obtained from  T12 through S3. Axial T2-weighted and T1-weighted images were obtained from  L2-L3 through L5-S1.     COMPARISON: None available     FINDINGS: A suprapubic catheter is present. The lumbar vertebrae are normal in  height and alignment with normal marrow signal. The tip of the conus is not well  defined on this study but likely extends down to L1. There is diffuse fibrofatty  infiltration of a thickened filum terminale.     Axial images:     L2-L3: No significant spinal stenosis or foraminal stenosis.     L3-L4: No significant spinal stenosis or foraminal stenosis.     L4-L5: Facet degenerative change. No significant spinal stenosis or foraminal  stenosis.     L5-S1: Facet degenerative change without significant spinal stenosis or  foraminal stenosis.     IMPRESSION  IMPRESSION:     1. No significant spinal stenosis.     2. Fibrofatty infiltration of the filum terminale. CHEST X-RAY, one view.     HISTORY:  Atrial fibrillation with rapid ventricular response and hypoxemia.     TECHNIQUE:  AP upright portable view     COMPARISON: 4 February 2019     IMPRESSION  IMPRESSION:  Small band of atelectasis left base, otherwise the heart and lungs  are unremarkable. Labs: Results:       Chemistry Recent Labs     02/08/19  0620 02/07/19  0632   * 217*    144   K 4.1 2.9*    107   CO2 27 27   BUN 29* 25*   CREA 1.56* 1.42*   CA 8.7 8.2*   AGAP 7 10      CBC w/Diff No results for input(s): WBC, RBC, HGB, HCT, PLT, GRANS, LYMPH, EOS, HGBEXT, HCTEXT, PLTEXT in the last 72 hours. Cardiac Enzymes No results for input(s): CPK, CKND1, DEMETRA in the last 72 hours.     No lab exists for component: Dolruchius Lulas Coagulation No results for input(s): PTP, INR, APTT in the last 72 hours. No lab exists for component: INREXT    Lipid Panel No results found for: CHOL, CHOLPOCT, CHOLX, CHLST, CHOLV, 024880, HDL, LDL, LDLC, DLDLP, 355520, VLDLC, VLDL, TGLX, TRIGL, TRIGP, TGLPOCT, CHHD, CHHDX   BNP No results for input(s): BNPP in the last 72 hours. Liver Enzymes No results for input(s): TP, ALB, TBIL, AP, SGOT, GPT in the last 72 hours. No lab exists for component: DBIL   Thyroid Studies Lab Results   Component Value Date/Time    TSH 2.110 01/31/2019 10:56 AM            Discharge Exam:  Visit Vitals  /70 (BP 1 Location: Right arm, BP Patient Position: At rest;Supine)   Pulse 75   Temp 97.7 °F (36.5 °C)   Resp 18   Ht 5' 5\" (1.651 m)   Wt 94.9 kg (209 lb 4.8 oz)   SpO2 92%   BMI 34.83 kg/m²     General; awake, alert, oriented, obese, disheveled  Eyes; non icteric  Neck; supple  CV: RRR  Pulm; diminished in bases, tight, no accessory muscle use  Abd; soft, non tender, active BS, suprapubic catheter in place        Disposition: home with [facility]  Discharge Condition: stable  Patient Instructions:   Current Discharge Medication List      START taking these medications    Details   dronedarone (MULTAQ) tab tablet Take 1 Tab by mouth two (2) times daily (with meals) for 30 days. Qty: 60 Tab, Refills: 2      guaiFENesin ER (MUCINEX) 600 mg ER tablet Take 1 Tab by mouth every twelve (12) hours for 7 days. Qty: 14 Tab, Refills: 0         CONTINUE these medications which have CHANGED    Details   ALPRAZolam (XANAX) 0.5 mg tablet Take 1 Tab by mouth two (2) times daily as needed for Anxiety. Max Daily Amount: 1 mg. Qty: 30 Tab, Refills: 0    Associated Diagnoses: Bipolar disorder without psychotic features ([facility] [address]      fentaNYL (DURAGESIC) 25 mcg/hr PATCH 1 Patch by TransDERmal route every seventy-two (72) hours. Max Daily Amount: 1 Patch.   Qty: 5 Patch, Refills: 0    Associated Diagnoses: Chronic midline low back pain without sciatica      insulin glargine (LANTUS,BASAGLAR) 100 unit/mL (3 mL) inpn 30 Units by SubCUTAneous route nightly for 30 days. Qty: 3 Adjustable Dose Pre-filled Pen Syringe, Refills: 3      oxyCODONE IR (ROXICODONE) 20 mg immediate release tablet Take 1 Tab by mouth every eight (8) hours as needed. Max Daily Amount: 60 mg.  Qty: 30 Tab, Refills: 0    Associated Diagnoses: Chronic midline low back pain without sciatica         CONTINUE these medications which have NOT CHANGED    Details   apixaban (ELIQUIS) 5 mg tablet Take 1 Tab by mouth two (2) times a day for 30 days. Qty: 60 Tab, Refills: 0      metoprolol tartrate (LOPRESSOR) 50 mg tablet Take 1 Tab by mouth every twelve (12) hours for 30 days. Qty: 60 Tab, Refills: 0      albuterol (PROVENTIL HFA, VENTOLIN HFA, PROAIR HFA) 90 mcg/actuation inhaler Take 1 Puff by inhalation every four (4) hours as needed for Wheezing. Qty: 1 Inhaler, Refills: 0      Blood-Glucose Meter monitoring kit Check glucose at home daily  Qty: 1 Kit, Refills: 0      bisacodyl (DULCOLAX) 5 mg EC tablet Take 1 Tab by mouth daily as needed for Constipation. Qty: 30 Tab, Refills: 0      budesonide (PULMICORT) 0.5 mg/2 mL nbsp 2 mL by Nebulization route two (2) times a day. Qty: 60 Each, Refills: 0      nystatin (MYCOSTATIN) powder Apply  to affected area two (2) times a day. Qty: 1 Bottle, Refills: 0      zinc oxide-cod liver oil (DESITIN) 40 % paste Apply  to affected area two (2) times a day. Qty: 1 Tube, Refills: 1      pregabalin (LYRICA) 100 mg capsule Take 1 Cap by mouth three (3) times daily. Max Daily Amount: 300 mg. Qty: 90 Cap, Refills: 0      furosemide (LASIX) 40 mg tablet Take  by mouth two (2) times a day. citalopram (CELEXA) 20 mg tablet Take 20 mg by mouth daily. aspirin 81 mg chewable tablet Take 81 mg by mouth daily. omeprazole (PRILOSEC) 40 mg capsule Take 40 mg by mouth daily.       promethazine (PHENERGAN) 25 mg tablet Take 25 mg by mouth every six (6) hours as needed for Nausea. QUEtiapine (SEROQUEL) 100 mg tablet Take 300 mg by mouth nightly. STOP taking these medications       ampicillin (PRINCIPEN) 500 mg capsule Comments:   Reason for Stopping:               Activity: PT/OT per Home Health  Diet: Cardiac Diet and diabetic diet  Wound Care: As directed    Follow-up  ·   Follow up with Cardiology in 2-3 weeks  · Follow up with PCP in 1-2 weeks.   Time spent to discharge patient 35 minutes  Signed:  Maya Grimes MD  2/9/2019  4:57 PM

## 2019-02-10 ENCOUNTER — APPOINTMENT (OUTPATIENT)
Dept: GENERAL RADIOLOGY | Age: 64
DRG: 201 | End: 2019-02-10
Attending: EMERGENCY MEDICINE
Payer: MEDICAID

## 2019-02-10 ENCOUNTER — HOSPITAL ENCOUNTER (INPATIENT)
Age: 64
LOS: 4 days | Discharge: HOME HEALTH | DRG: 201 | End: 2019-02-14
Attending: EMERGENCY MEDICINE | Admitting: FAMILY MEDICINE
Payer: MEDICAID

## 2019-02-10 VITALS
BODY MASS INDEX: 33.32 KG/M2 | HEART RATE: 77 BPM | RESPIRATION RATE: 14 BRPM | WEIGHT: 200 LBS | DIASTOLIC BLOOD PRESSURE: 56 MMHG | OXYGEN SATURATION: 92 % | HEIGHT: 65 IN | TEMPERATURE: 99.1 F | SYSTOLIC BLOOD PRESSURE: 114 MMHG

## 2019-02-10 DIAGNOSIS — I48.91 ATRIAL FIBRILLATION WITH RAPID VENTRICULAR RESPONSE (HCC): Primary | ICD-10-CM

## 2019-02-10 LAB
ALBUMIN SERPL-MCNC: 3.3 G/DL (ref 3.2–4.6)
ALBUMIN/GLOB SERPL: 0.6 {RATIO} (ref 1.2–3.5)
ALP SERPL-CCNC: 112 U/L (ref 50–136)
ALT SERPL-CCNC: 31 U/L (ref 12–65)
ANION GAP SERPL CALC-SCNC: 8 MMOL/L (ref 7–16)
AST SERPL-CCNC: 40 U/L (ref 15–37)
ATRIAL RATE: 166 BPM
ATRIAL RATE: 92 BPM
BASOPHILS # BLD: 0.2 K/UL (ref 0–0.2)
BASOPHILS NFR BLD: 1 % (ref 0–2)
BILIRUB SERPL-MCNC: 0.5 MG/DL (ref 0.2–1.1)
BNP SERPL-MCNC: 19 PG/ML
BUN SERPL-MCNC: 46 MG/DL (ref 8–23)
CALCIUM SERPL-MCNC: 9.1 MG/DL (ref 8.3–10.4)
CALCULATED P AXIS, ECG09: 71 DEGREES
CALCULATED R AXIS, ECG10: 22 DEGREES
CALCULATED R AXIS, ECG10: 41 DEGREES
CALCULATED T AXIS, ECG11: 105 DEGREES
CALCULATED T AXIS, ECG11: 79 DEGREES
CHLORIDE SERPL-SCNC: 95 MMOL/L (ref 98–107)
CO2 SERPL-SCNC: 28 MMOL/L (ref 21–32)
CREAT SERPL-MCNC: 1.75 MG/DL (ref 0.6–1)
DIAGNOSIS, 93000: NORMAL
DIAGNOSIS, 93000: NORMAL
DIFFERENTIAL METHOD BLD: ABNORMAL
EOSINOPHIL # BLD: 0.1 K/UL (ref 0–0.8)
EOSINOPHIL NFR BLD: 1 % (ref 0.5–7.8)
ERYTHROCYTE [DISTWIDTH] IN BLOOD BY AUTOMATED COUNT: 15.1 % (ref 11.9–14.6)
GLOBULIN SER CALC-MCNC: 5.5 G/DL (ref 2.3–3.5)
GLUCOSE BLD STRIP.AUTO-MCNC: 352 MG/DL (ref 65–100)
GLUCOSE SERPL-MCNC: 269 MG/DL (ref 65–100)
HCT VFR BLD AUTO: 42.2 % (ref 35.8–46.3)
HGB BLD-MCNC: 13.7 G/DL (ref 11.7–15.4)
IMM GRANULOCYTES # BLD AUTO: 0.3 K/UL (ref 0–0.5)
IMM GRANULOCYTES NFR BLD AUTO: 2 % (ref 0–5)
LYMPHOCYTES # BLD: 2.1 K/UL (ref 0.5–4.6)
LYMPHOCYTES NFR BLD: 12 % (ref 13–44)
MAGNESIUM SERPL-MCNC: 2.1 MG/DL (ref 1.8–2.4)
MCH RBC QN AUTO: 29.1 PG (ref 26.1–32.9)
MCHC RBC AUTO-ENTMCNC: 32.5 G/DL (ref 31.4–35)
MCV RBC AUTO: 89.8 FL (ref 79.6–97.8)
MONOCYTES # BLD: 0.7 K/UL (ref 0.1–1.3)
MONOCYTES NFR BLD: 4 % (ref 4–12)
NEUTS SEG # BLD: 14 K/UL (ref 1.7–8.2)
NEUTS SEG NFR BLD: 81 % (ref 43–78)
NRBC # BLD: 0 K/UL (ref 0–0.2)
P-R INTERVAL, ECG05: 140 MS
PLATELET # BLD AUTO: 498 K/UL (ref 150–450)
PMV BLD AUTO: 11.6 FL (ref 9.4–12.3)
POTASSIUM SERPL-SCNC: 5.7 MMOL/L (ref 3.5–5.1)
PROT SERPL-MCNC: 8.8 G/DL (ref 6.3–8.2)
Q-T INTERVAL, ECG07: 286 MS
Q-T INTERVAL, ECG07: 344 MS
QRS DURATION, ECG06: 74 MS
QRS DURATION, ECG06: 78 MS
QTC CALCULATION (BEZET), ECG08: 425 MS
QTC CALCULATION (BEZET), ECG08: 456 MS
RBC # BLD AUTO: 4.7 M/UL (ref 4.05–5.2)
SODIUM SERPL-SCNC: 131 MMOL/L (ref 136–145)
TROPONIN I SERPL-MCNC: <0.02 NG/ML (ref 0.02–0.05)
VENTRICULAR RATE, ECG03: 153 BPM
VENTRICULAR RATE, ECG03: 92 BPM
WBC # BLD AUTO: 17.3 K/UL (ref 4.3–11.1)

## 2019-02-10 PROCEDURE — 93005 ELECTROCARDIOGRAM TRACING: CPT | Performed by: EMERGENCY MEDICINE

## 2019-02-10 PROCEDURE — 71045 X-RAY EXAM CHEST 1 VIEW: CPT

## 2019-02-10 PROCEDURE — 74011000250 HC RX REV CODE- 250: Performed by: EMERGENCY MEDICINE

## 2019-02-10 PROCEDURE — 80053 COMPREHEN METABOLIC PANEL: CPT

## 2019-02-10 PROCEDURE — 74011000258 HC RX REV CODE- 258: Performed by: EMERGENCY MEDICINE

## 2019-02-10 PROCEDURE — 99285 EMERGENCY DEPT VISIT HI MDM: CPT | Performed by: EMERGENCY MEDICINE

## 2019-02-10 PROCEDURE — 83735 ASSAY OF MAGNESIUM: CPT

## 2019-02-10 PROCEDURE — 96374 THER/PROPH/DIAG INJ IV PUSH: CPT | Performed by: EMERGENCY MEDICINE

## 2019-02-10 PROCEDURE — 94640 AIRWAY INHALATION TREATMENT: CPT

## 2019-02-10 PROCEDURE — 96365 THER/PROPH/DIAG IV INF INIT: CPT | Performed by: EMERGENCY MEDICINE

## 2019-02-10 PROCEDURE — 65660000000 HC RM CCU STEPDOWN

## 2019-02-10 PROCEDURE — 82962 GLUCOSE BLOOD TEST: CPT

## 2019-02-10 PROCEDURE — 74011250637 HC RX REV CODE- 250/637: Performed by: EMERGENCY MEDICINE

## 2019-02-10 PROCEDURE — 84484 ASSAY OF TROPONIN QUANT: CPT

## 2019-02-10 PROCEDURE — 85025 COMPLETE CBC W/AUTO DIFF WBC: CPT

## 2019-02-10 PROCEDURE — 83880 ASSAY OF NATRIURETIC PEPTIDE: CPT

## 2019-02-10 PROCEDURE — 96375 TX/PRO/DX INJ NEW DRUG ADDON: CPT | Performed by: EMERGENCY MEDICINE

## 2019-02-10 RX ORDER — DILTIAZEM HYDROCHLORIDE 5 MG/ML
15 INJECTION INTRAVENOUS ONCE
Status: COMPLETED | OUTPATIENT
Start: 2019-02-10 | End: 2019-02-10

## 2019-02-10 RX ORDER — DILTIAZEM HYDROCHLORIDE 5 MG/ML
20 INJECTION INTRAVENOUS
Status: COMPLETED | OUTPATIENT
Start: 2019-02-10 | End: 2019-02-10

## 2019-02-10 RX ORDER — ALPRAZOLAM 0.5 MG/1
0.5 TABLET ORAL
Status: COMPLETED | OUTPATIENT
Start: 2019-02-10 | End: 2019-02-10

## 2019-02-10 RX ORDER — ALBUTEROL SULFATE 0.83 MG/ML
5 SOLUTION RESPIRATORY (INHALATION)
Status: COMPLETED | OUTPATIENT
Start: 2019-02-10 | End: 2019-02-10

## 2019-02-10 RX ORDER — METOPROLOL TARTRATE 5 MG/5ML
5 INJECTION INTRAVENOUS
Status: DISPENSED | OUTPATIENT
Start: 2019-02-10 | End: 2019-02-10

## 2019-02-10 RX ORDER — METOPROLOL TARTRATE 5 MG/5ML
5 INJECTION INTRAVENOUS ONCE
Status: COMPLETED | OUTPATIENT
Start: 2019-02-10 | End: 2019-02-10

## 2019-02-10 RX ORDER — OXYCODONE HYDROCHLORIDE 5 MG/1
10 TABLET ORAL
Status: COMPLETED | OUTPATIENT
Start: 2019-02-10 | End: 2019-02-10

## 2019-02-10 RX ADMIN — DRONEDARONE 400 MG: 400 TABLET, FILM COATED ORAL at 01:09

## 2019-02-10 RX ADMIN — DILTIAZEM HYDROCHLORIDE 7.5 MG/HR: 5 INJECTION INTRAVENOUS at 21:25

## 2019-02-10 RX ADMIN — ALPRAZOLAM 0.5 MG: 0.5 TABLET ORAL at 19:41

## 2019-02-10 RX ADMIN — DILTIAZEM HYDROCHLORIDE 20 MG: 5 INJECTION INTRAVENOUS at 01:44

## 2019-02-10 RX ADMIN — ALBUTEROL SULFATE 5 MG: 2.5 SOLUTION RESPIRATORY (INHALATION) at 19:08

## 2019-02-10 RX ADMIN — METOPROLOL TARTRATE 5 MG: 1 INJECTION, SOLUTION INTRAVENOUS at 10:19

## 2019-02-10 RX ADMIN — OXYCODONE HYDROCHLORIDE 10 MG: 5 TABLET ORAL at 01:07

## 2019-02-10 RX ADMIN — DRONEDARONE 400 MG: 400 TABLET, FILM COATED ORAL at 09:47

## 2019-02-10 RX ADMIN — ALPRAZOLAM 0.5 MG: 0.5 TABLET ORAL at 01:09

## 2019-02-10 RX ADMIN — DILTIAZEM HYDROCHLORIDE 15 MG: 5 INJECTION INTRAVENOUS at 21:22

## 2019-02-10 RX ADMIN — OXYCODONE HYDROCHLORIDE 10 MG: 5 TABLET ORAL at 19:41

## 2019-02-10 RX ADMIN — METOPROLOL TARTRATE 5 MG: 1 INJECTION, SOLUTION INTRAVENOUS at 02:40

## 2019-02-10 RX ADMIN — DILTIAZEM HYDROCHLORIDE 10 MG/HR: 5 INJECTION INTRAVENOUS at 23:50

## 2019-02-10 NOTE — ED TRIAGE NOTES
Patient presents via [facility] from home with complaints of palpitations/afib, chronic bilateral foot pain that is worse than normal, right sided middle back pain, and abdominal distention with last normal BM reported as a month ago. [facility]

## 2019-02-10 NOTE — PROGRESS NOTES
HALIE received a call from [facility] RN last night after SW had left for the day. HALIE spoke with Nilda Geller RN for [facility] who states that the patient was referred to [facility], but hospitalized prior to services beginning and patient would need regular orders, not resumption. HALEI submitted orders and faxed them to [facility]. Magdaleno Sarah, JEAN CLAUDEW  [facility] [email or web address]

## 2019-02-10 NOTE — ED NOTES
Report received from Benjamin Hernandez, [id number]. Care assumed at this time.

## 2019-02-10 NOTE — ED NOTES
Talked with social work. They were able to contact patient son. Multaq only available at [address] [place] [address] Drive location. Does Not Have Transportation. Social Work to arrange transportation so that he can  Rx from [facility] location. Patient vital signs stable. Patient is asymptomatic. Will discharge home at this time as we have ensured that prescription can be obtained.

## 2019-02-10 NOTE — ED PROVIDER NOTES
61-year-old lad ypresents with concerns about not being able to get any of her medications filled. Patient says that she has been unable to get her A. Fibmedication, her benzodiazepine, or her oral pain medication. She said that her sons were unable to come through for her to help her get those medicines Patient was just discharged from our HOSPITAL [address] early yesterday afternoon related to a stay from A. Fib with RVR. Patient denies any chest pain, fevers, or chills and says she otherwise feels okay except for her heart going a little bit fast. 
 
She is also worried about the possibility of withdrawal from her narcotics and her benzos. Elements of this note were created using speech recognition software. As such, errors of speech recognition may be present. Past Medical History:  
Diagnosis Date  Diabetes ([facility] [address]  Gastrointestinal disorder GERD  Hypertension  Ill-defined condition   
 neurogenic bladder  Ill-defined condition   
 transverse myelitis  Ill-defined condition   
 paraplegia  Liver disease Hepatitis C  
 Psychiatric disorder   
 anxiety  Psychiatric disorder   
 bipolar  Thromboembolus ([facility] [address] No past surgical history on file. No family history on file. Social History Socioeconomic History  Marital status:  Spouse name: Not on file  Number of children: Not on file  Years of education: Not on file  Highest education level: Not on file Social Needs  Financial resource strain: Not on file  Food insecurity - worry: Not on file  Food insecurity - inability: Not on file  Transportation needs - medical: Not on file  Transportation needs - non-medical: Not on file Occupational History  Not on file Tobacco Use  Smoking status: Current Every Day Smoker Packs/day: 0.50 Substance and Sexual Activity  Alcohol use: No  
 Drug use: No  
 Sexual activity: Not on file Other Topics Concern  Not on file Social History Narrative  Not on file ALLERGIES: Patient has no known allergies. Review of Systems Constitutional: Negative for chills and fever. Eyes: Negative for discharge and redness. Respiratory: Negative for cough and shortness of breath. Cardiovascular: Positive for palpitations. Negative for chest pain. Gastrointestinal: Negative for nausea and vomiting. Vitals:  
 02/09/19 2352 02/10/19 0000 02/10/19 0007 BP: 176/88  178/82 Pulse: (!) 124  93 Resp: 20  19 Temp: 99.1 °F (37.3 °C) SpO2: 90% (!) 88% 93% Weight: 90.7 kg (200 lb) Height: 5' 5\" (1.651 m) Physical Exam  
Constitutional: She is oriented to person, place, and time. She appears well-developed and well-nourished. HENT:  
Head: Normocephalic and atraumatic. Eyes: EOM are normal. Pupils are equal, round, and reactive to light. Right eye exhibits no discharge. Left eye exhibits no discharge. Cardiovascular:  
Initial heart rate is in the 90s with an irregular rhythm Pulmonary/Chest: Effort normal and breath sounds normal.  
Neurological: She is alert and oriented to person, place, and time. Nursing note and vitals reviewed. MDM Number of Diagnoses or Management Options Diagnosis management comments: Soon after arrival the patient's heart rate shot up into the one forty-one fifty range with A. Fib with RVR. Therefore, in addition to her oral multivitamin, Xanax, and oxycodone and I will give her some IV Cardizem to try to slow her heart rate down. 1:25 AM 
I read through the patient's discharge summary and the discharging physician from [address] commented that she was not medically stable for Discharge. However, she was discharged. I am prone to think that that was a dictation or typo-error.  Pending correction of her heart rate if the patient does not need admission and I may consider observation in the morning when social work can get involved to further clarify her discharge planning. 
 
3:03 AM 
After the Lopressor her heart rate is now in a sinus rhythm at seventy-five Procedures

## 2019-02-10 NOTE — ED NOTES
Pt to leave with [facility] at this time. I have reviewed discharge instructions with the patient. The patient verbalized understanding. Patient left ED via Discharge Method: stretcher to Home with [facility]. Opportunity for questions and clarification provided. Patient given 0 scripts. To continue your aftercare when you leave the hospital, you may receive an automated call from our care team to check in on how you are doing. This is a free service and part of our promise to provide the best care and service to meet your aftercare needs.  If you have questions, or wish to unsubscribe from this service please call [phone]. Thank you for Choosing our [facility] Emergency Department.

## 2019-02-10 NOTE — ED TRIAGE NOTES
Pt arrived EMS from home c/o pain all over mainly in her back and feet. Pt was d/c from [facility] today and was sent home with pain meds but has not gotten them filled yet.  Pt has a fentenal patch in place upon arrival.

## 2019-02-10 NOTE — PROGRESS NOTES
KASSANDRA was called to ED to speak with pt after she returned to the [facility] ED after her discharge home yesterday as she did  Not get her meds filled. Son went to pt's preferred  pharmacy [facility] and understood they didn't has one med and that pt would have to come to the drug store in person to get other scripts (pain and anxiety meds). Confirmed that  pharmacy [facility] [address]  did not have multaq in stock but [facility] did and MD called in script and was told should be ready in about an hour so pt was transported home  LMSW had agreed to get a cab ride for son to go to [facility] to get the medication. When KASSANDRA called [facility] to see if med was ready at this point was informed that pt has a medicaid lock out and she can only get scripts filled at her medicaid assigned pharmacy, so they could not fill the multaq and out pt pocket is about $400. Checked with [facility] and they did not have multaq in stock so this was no possible solution. Spoke with pt and also her son Marval Saint at the home [phone] and also with [facility] pharmacist at the [facility] [place] [phone] after multiple questions to all determined that pt must still be assigned by [facility] to [address] [phone] in [place] that priovides medication to [facility] where pt was until her discharge in January. When pt was D/C from [facility] she was given what meds were left of the month supply and new scripts. Pt has finally ran out of these meds and had taken new scripts to [facility] where she used to go but they also cannot fill any prescriptions at this point due to the medicaid lockout. [facility] did provide me with the medicaid pharmacy help desk number [phone] and I was able to reach a tech that did confirm for me that pt is assigned to [address] which is not a retail pharmacy.   
 
The pt must call Aline Corrales [phone] who is in charge of [facility] Medicaid lock out assignment between 8am-5pm Monday through Friday to get an override approved and have herself reassigned to her preferred pharmacy [facility] [employer] [facility] store [id number], phone [phone]. Until this is done this pt can get no medications filled under medicaid coverage at any pharmacy. This information has been provided by phone to the pt and her son Brayden Christy.

## 2019-02-10 NOTE — DISCHARGE INSTRUCTIONS
Pick[facility] from [facility].  Schedule follow-up w/ PCP in 48-72 hrs. Return to ED if symptoms worsen or progress in any way. Atrial Fibrillation: Care Instructions  Your Care Instructions    Atrial fibrillation is an irregular and often fast heartbeat. Treating this condition is important for several reasons. It can cause blood clots, which can travel from your heart to your brain and cause a stroke. If you have a fast heartbeat, you may feel lightheaded, dizzy, and weak. An irregular heartbeat can also increase your risk for heart failure. Atrial fibrillation is often the result of another heart condition, such as high blood pressure or coronary artery disease. Making changes to improve your heart condition will help you stay healthy and active. Follow-up care is a key part of your treatment and safety. Be sure to make and go to all appointments, and call your doctor if you are having problems. It's also a good idea to know your test results and keep a list of the medicines you take. How can you care for yourself at home? Medicines    · Take your medicines exactly as prescribed. Call your doctor if you think you are having a problem with your medicine. You will get more details on the specific medicines your doctor prescribes.     · If your doctor has given you a blood thinner to prevent a stroke, be sure you get instructions about how to take your medicine safely. Blood thinners can cause serious bleeding problems.     · Do not take any vitamins, over-the-counter drugs, or herbal products without talking to your doctor first.    Lifestyle changes    · Do not smoke. Smoking can increase your chance of a stroke and heart attack. If you need help quitting, talk to your doctor about stop-smoking programs and medicines. These can increase your chances of quitting for good.     · Eat a heart-healthy diet.     · Stay at a healthy weight.  Lose weight if you need to.     · Limit alcohol to 2 drinks a day for men and 1 drink a day for women. Too much alcohol can cause health problems.     · Avoid colds and flu. Get a pneumococcal vaccine shot. If you have had one before, ask your doctor whether you need another dose. Get a flu shot every year. If you must be around people with colds or flu, wash your hands often. Activity    · If your doctor recommends it, get more exercise. Walking is a good choice. Bit by bit, increase the amount you walk every day. Try for at least 30 minutes on most days of the week. You also may want to swim, bike, or do other activities. Your doctor may suggest that you join a cardiac rehabilitation program so that you can have help increasing your physical activity safely.     · Start light exercise if your doctor says it is okay. Even a small amount will help you get stronger, have more energy, and manage stress. Walking is an easy way to get exercise. Start out by walking a little more than you did in the hospital. Gradually increase the amount you walk.     · When you exercise, watch for signs that your heart is working too hard. You are pushing too hard if you cannot talk while you are exercising. If you become short of breath or dizzy or have chest pain, sit down and rest immediately.     · Check your pulse regularly. Place two fingers on the artery at the palm side of your wrist, in line with your thumb. If your heartbeat seems uneven or fast, talk to your doctor. When should you call for help? Call 911 anytime you think you may need emergency care. For example, call if:    · You have symptoms of a heart attack. These may include:  ? Chest pain or pressure, or a strange feeling in the chest.  ? Sweating. ? Shortness of breath. ? Nausea or vomiting. ? Pain, pressure, or a strange feeling in the back, neck, jaw, or upper belly or in one or both shoulders or arms. ? Lightheadedness or sudden weakness. ? A fast or irregular heartbeat.   After you call 911, the  may tell you to chew 1 adult-strength or 2 to 4 low-dose aspirin. Wait for an ambulance. Do not try to drive yourself.     · You have symptoms of a stroke. These may include:  ? Sudden numbness, tingling, weakness, or loss of movement in your face, arm, or leg, especially on only one side of your body. ? Sudden vision changes. ? Sudden trouble speaking. ? Sudden confusion or trouble understanding simple statements. ? Sudden problems with walking or balance. ? A sudden, severe headache that is different from past headaches.     · You passed out (lost consciousness).    Call your doctor now or seek immediate medical care if:    · You have new or increased shortness of breath.     · You feel dizzy or lightheaded, or you feel like you may faint.     · Your heart rate becomes irregular.     · You can feel your heart flutter in your chest or skip heartbeats. Tell your doctor if these symptoms are new or worse.    Watch closely for changes in your health, and be sure to contact your doctor if you have any problems. Where can you learn more? Go to http://sri-hollie.info/. Enter U020 in the search box to learn more about \"Atrial Fibrillation: Care Instructions. \"  Current as of: July 22, 2018  Content Version: 11.9  © 9368-5417 [employer]. Care instructions adapted under license by [employer] (which disclaims liability or warranty for this information). If you have questions about a medical condition or this instruction, always ask your healthcare professional. [place] [address] any warranty or liability for your use of this information.

## 2019-02-10 NOTE — ED NOTES
consulted for orders. Said to wait on blood work. Pt also refusing an IV at this time. Pt placed on monitor and 2L NC O2. Sat at 88% RA. Pt requesting water and ice at this time.

## 2019-02-11 PROBLEM — E87.5 HYPERKALEMIA: Status: ACTIVE | Noted: 2019-02-11

## 2019-02-11 PROBLEM — N17.9 AKI (ACUTE KIDNEY INJURY) (HCC): Status: ACTIVE | Noted: 2019-02-11

## 2019-02-11 LAB
ANION GAP SERPL CALC-SCNC: 10 MMOL/L (ref 7–16)
APPEARANCE UR: ABNORMAL
ATRIAL RATE: 340 BPM
ATRIAL RATE: 76 BPM
ATRIAL RATE: 82 BPM
BACTERIA URNS QL MICRO: 0 /HPF
BILIRUB UR QL: NEGATIVE
BUN SERPL-MCNC: 46 MG/DL (ref 8–23)
CALCIUM SERPL-MCNC: 9 MG/DL (ref 8.3–10.4)
CALCULATED P AXIS, ECG09: 0 DEGREES
CALCULATED P AXIS, ECG09: 46 DEGREES
CALCULATED P AXIS, ECG09: 63 DEGREES
CALCULATED R AXIS, ECG10: 25 DEGREES
CALCULATED R AXIS, ECG10: 28 DEGREES
CALCULATED R AXIS, ECG10: 30 DEGREES
CALCULATED T AXIS, ECG11: 56 DEGREES
CALCULATED T AXIS, ECG11: 74 DEGREES
CALCULATED T AXIS, ECG11: 90 DEGREES
CASTS URNS QL MICRO: ABNORMAL /LPF
CHLORIDE SERPL-SCNC: 95 MMOL/L (ref 98–107)
CO2 SERPL-SCNC: 28 MMOL/L (ref 21–32)
COLOR UR: YELLOW
CREAT SERPL-MCNC: 1.65 MG/DL (ref 0.6–1)
DIAGNOSIS, 93000: NORMAL
EPI CELLS #/AREA URNS HPF: ABNORMAL /HPF
ERYTHROCYTE [DISTWIDTH] IN BLOOD BY AUTOMATED COUNT: 14.9 % (ref 11.9–14.6)
GLUCOSE BLD STRIP.AUTO-MCNC: 265 MG/DL (ref 65–100)
GLUCOSE BLD STRIP.AUTO-MCNC: 278 MG/DL (ref 65–100)
GLUCOSE BLD STRIP.AUTO-MCNC: 282 MG/DL (ref 65–100)
GLUCOSE BLD STRIP.AUTO-MCNC: 315 MG/DL (ref 65–100)
GLUCOSE BLD STRIP.AUTO-MCNC: 388 MG/DL (ref 65–100)
GLUCOSE SERPL-MCNC: 288 MG/DL (ref 65–100)
GLUCOSE UR STRIP.AUTO-MCNC: NEGATIVE MG/DL
HCT VFR BLD AUTO: 39.6 % (ref 35.8–46.3)
HGB BLD-MCNC: 12.5 G/DL (ref 11.7–15.4)
HGB UR QL STRIP: ABNORMAL
KETONES UR QL STRIP.AUTO: NEGATIVE MG/DL
LEUKOCYTE ESTERASE UR QL STRIP.AUTO: ABNORMAL
MCH RBC QN AUTO: 28.5 PG (ref 26.1–32.9)
MCHC RBC AUTO-ENTMCNC: 31.6 G/DL (ref 31.4–35)
MCV RBC AUTO: 90.2 FL (ref 79.6–97.8)
NITRITE UR QL STRIP.AUTO: NEGATIVE
NRBC # BLD: 0 K/UL (ref 0–0.2)
P-R INTERVAL, ECG05: 130 MS
P-R INTERVAL, ECG05: 136 MS
PH UR STRIP: 5.5 [PH] (ref 5–9)
PLATELET # BLD AUTO: 440 K/UL (ref 150–450)
PMV BLD AUTO: 11.3 FL (ref 9.4–12.3)
POTASSIUM SERPL-SCNC: 4.7 MMOL/L (ref 3.5–5.1)
PROT UR STRIP-MCNC: NEGATIVE MG/DL
Q-T INTERVAL, ECG07: 302 MS
Q-T INTERVAL, ECG07: 350 MS
Q-T INTERVAL, ECG07: 470 MS
QRS DURATION, ECG06: 76 MS
QRS DURATION, ECG06: 78 MS
QRS DURATION, ECG06: 82 MS
QTC CALCULATION (BEZET), ECG08: 408 MS
QTC CALCULATION (BEZET), ECG08: 445 MS
QTC CALCULATION (BEZET), ECG08: 528 MS
RBC # BLD AUTO: 4.39 M/UL (ref 4.05–5.2)
RBC #/AREA URNS HPF: ABNORMAL /HPF
SODIUM SERPL-SCNC: 133 MMOL/L (ref 136–145)
SP GR UR REFRACTOMETRY: 1.01 (ref 1–1.02)
UROBILINOGEN UR QL STRIP.AUTO: 0.2 EU/DL (ref 0.2–1)
VENTRICULAR RATE, ECG03: 131 BPM
VENTRICULAR RATE, ECG03: 76 BPM
VENTRICULAR RATE, ECG03: 82 BPM
WBC # BLD AUTO: 16.2 K/UL (ref 4.3–11.1)
WBC URNS QL MICRO: ABNORMAL /HPF

## 2019-02-11 PROCEDURE — 74011250636 HC RX REV CODE- 250/636: Performed by: FAMILY MEDICINE

## 2019-02-11 PROCEDURE — 85027 COMPLETE CBC AUTOMATED: CPT

## 2019-02-11 PROCEDURE — 94760 N-INVAS EAR/PLS OXIMETRY 1: CPT

## 2019-02-11 PROCEDURE — 65660000000 HC RM CCU STEPDOWN

## 2019-02-11 PROCEDURE — 94640 AIRWAY INHALATION TREATMENT: CPT

## 2019-02-11 PROCEDURE — 74011000258 HC RX REV CODE- 258: Performed by: EMERGENCY MEDICINE

## 2019-02-11 PROCEDURE — 86580 TB INTRADERMAL TEST: CPT | Performed by: FAMILY MEDICINE

## 2019-02-11 PROCEDURE — 77010033678 HC OXYGEN DAILY

## 2019-02-11 PROCEDURE — 74011000250 HC RX REV CODE- 250: Performed by: FAMILY MEDICINE

## 2019-02-11 PROCEDURE — 80048 BASIC METABOLIC PNL TOTAL CA: CPT

## 2019-02-11 PROCEDURE — 93005 ELECTROCARDIOGRAM TRACING: CPT | Performed by: PHYSICIAN ASSISTANT

## 2019-02-11 PROCEDURE — 36415 COLL VENOUS BLD VENIPUNCTURE: CPT

## 2019-02-11 PROCEDURE — 77030020263 HC SOL INJ SOD CL0.9% LFCR 1000ML

## 2019-02-11 PROCEDURE — 81001 URINALYSIS AUTO W/SCOPE: CPT

## 2019-02-11 PROCEDURE — 74011636637 HC RX REV CODE- 636/637: Performed by: FAMILY MEDICINE

## 2019-02-11 PROCEDURE — 74011000250 HC RX REV CODE- 250: Performed by: EMERGENCY MEDICINE

## 2019-02-11 PROCEDURE — 82962 GLUCOSE BLOOD TEST: CPT

## 2019-02-11 PROCEDURE — 74011250637 HC RX REV CODE- 250/637: Performed by: FAMILY MEDICINE

## 2019-02-11 PROCEDURE — 74011000302 HC RX REV CODE- 302: Performed by: FAMILY MEDICINE

## 2019-02-11 PROCEDURE — 74011250637 HC RX REV CODE- 250/637: Performed by: PHYSICIAN ASSISTANT

## 2019-02-11 RX ORDER — BUDESONIDE 0.5 MG/2ML
500 INHALANT ORAL
Status: DISCONTINUED | OUTPATIENT
Start: 2019-02-11 | End: 2019-02-14 | Stop reason: HOSPADM

## 2019-02-11 RX ORDER — BISACODYL 5 MG
5 TABLET, DELAYED RELEASE (ENTERIC COATED) ORAL
Status: DISCONTINUED | OUTPATIENT
Start: 2019-02-11 | End: 2019-02-14 | Stop reason: HOSPADM

## 2019-02-11 RX ORDER — GUAIFENESIN 600 MG/1
600 TABLET, EXTENDED RELEASE ORAL EVERY 12 HOURS
Status: DISCONTINUED | OUTPATIENT
Start: 2019-02-11 | End: 2019-02-14 | Stop reason: HOSPADM

## 2019-02-11 RX ORDER — FLECAINIDE ACETATE 100 MG/1
50 TABLET ORAL EVERY 12 HOURS
Status: DISCONTINUED | OUTPATIENT
Start: 2019-02-11 | End: 2019-02-14 | Stop reason: HOSPADM

## 2019-02-11 RX ORDER — ALPRAZOLAM 0.5 MG/1
0.5 TABLET ORAL
Status: DISCONTINUED | OUTPATIENT
Start: 2019-02-11 | End: 2019-02-14 | Stop reason: HOSPADM

## 2019-02-11 RX ORDER — FUROSEMIDE 40 MG/1
40 TABLET ORAL 2 TIMES DAILY
Status: DISCONTINUED | OUTPATIENT
Start: 2019-02-11 | End: 2019-02-11

## 2019-02-11 RX ORDER — PROMETHAZINE HYDROCHLORIDE 25 MG/1
25 TABLET ORAL
Status: DISCONTINUED | OUTPATIENT
Start: 2019-02-11 | End: 2019-02-14 | Stop reason: HOSPADM

## 2019-02-11 RX ORDER — ACETAMINOPHEN 325 MG/1
650 TABLET ORAL
Status: DISCONTINUED | OUTPATIENT
Start: 2019-02-11 | End: 2019-02-14 | Stop reason: HOSPADM

## 2019-02-11 RX ORDER — GUAIFENESIN 100 MG/5ML
81 LIQUID (ML) ORAL DAILY
Status: DISCONTINUED | OUTPATIENT
Start: 2019-02-11 | End: 2019-02-12

## 2019-02-11 RX ORDER — ALBUTEROL SULFATE 0.83 MG/ML
2.5 SOLUTION RESPIRATORY (INHALATION)
Status: DISCONTINUED | OUTPATIENT
Start: 2019-02-11 | End: 2019-02-14 | Stop reason: HOSPADM

## 2019-02-11 RX ORDER — METOPROLOL SUCCINATE 50 MG/1
50 TABLET, EXTENDED RELEASE ORAL DAILY
Status: DISCONTINUED | OUTPATIENT
Start: 2019-02-11 | End: 2019-02-14 | Stop reason: HOSPADM

## 2019-02-11 RX ORDER — INSULIN LISPRO 100 [IU]/ML
INJECTION, SOLUTION INTRAVENOUS; SUBCUTANEOUS
Status: DISCONTINUED | OUTPATIENT
Start: 2019-02-11 | End: 2019-02-14 | Stop reason: HOSPADM

## 2019-02-11 RX ORDER — BUDESONIDE 0.5 MG/2ML
500 INHALANT ORAL 2 TIMES DAILY
Status: DISCONTINUED | OUTPATIENT
Start: 2019-02-11 | End: 2019-02-11

## 2019-02-11 RX ORDER — CITALOPRAM 10 MG/1
20 TABLET ORAL DAILY
Status: DISCONTINUED | OUTPATIENT
Start: 2019-02-11 | End: 2019-02-14 | Stop reason: HOSPADM

## 2019-02-11 RX ORDER — INSULIN GLARGINE 100 [IU]/ML
30 INJECTION, SOLUTION SUBCUTANEOUS
Status: DISCONTINUED | OUTPATIENT
Start: 2019-02-11 | End: 2019-02-12

## 2019-02-11 RX ORDER — SODIUM CHLORIDE 0.9 % (FLUSH) 0.9 %
5-40 SYRINGE (ML) INJECTION AS NEEDED
Status: DISCONTINUED | OUTPATIENT
Start: 2019-02-11 | End: 2019-02-14 | Stop reason: HOSPADM

## 2019-02-11 RX ORDER — ONDANSETRON 2 MG/ML
4 INJECTION INTRAMUSCULAR; INTRAVENOUS
Status: DISCONTINUED | OUTPATIENT
Start: 2019-02-11 | End: 2019-02-11

## 2019-02-11 RX ORDER — SODIUM CHLORIDE 9 MG/ML
100 INJECTION, SOLUTION INTRAVENOUS CONTINUOUS
Status: DISCONTINUED | OUTPATIENT
Start: 2019-02-11 | End: 2019-02-14 | Stop reason: HOSPADM

## 2019-02-11 RX ORDER — QUETIAPINE FUMARATE 100 MG/1
300 TABLET, FILM COATED ORAL
Status: DISCONTINUED | OUTPATIENT
Start: 2019-02-11 | End: 2019-02-14 | Stop reason: HOSPADM

## 2019-02-11 RX ORDER — FENTANYL 25 UG/1
1 PATCH TRANSDERMAL
Status: DISCONTINUED | OUTPATIENT
Start: 2019-02-11 | End: 2019-02-14 | Stop reason: HOSPADM

## 2019-02-11 RX ORDER — SODIUM CHLORIDE 0.9 % (FLUSH) 0.9 %
5-40 SYRINGE (ML) INJECTION EVERY 8 HOURS
Status: DISCONTINUED | OUTPATIENT
Start: 2019-02-11 | End: 2019-02-14 | Stop reason: HOSPADM

## 2019-02-11 RX ORDER — BISACODYL 5 MG
5 TABLET, DELAYED RELEASE (ENTERIC COATED) ORAL DAILY PRN
Status: DISCONTINUED | OUTPATIENT
Start: 2019-02-11 | End: 2019-02-14 | Stop reason: HOSPADM

## 2019-02-11 RX ADMIN — BUDESONIDE 500 MCG: 0.5 INHALANT RESPIRATORY (INHALATION) at 21:23

## 2019-02-11 RX ADMIN — GUAIFENESIN 600 MG: 600 TABLET, EXTENDED RELEASE ORAL at 08:14

## 2019-02-11 RX ADMIN — APIXABAN 5 MG: 5 TABLET, FILM COATED ORAL at 08:14

## 2019-02-11 RX ADMIN — OXYCODONE HYDROCHLORIDE 20 MG: 15 TABLET ORAL at 21:45

## 2019-02-11 RX ADMIN — INSULIN GLARGINE 30 UNITS: 100 INJECTION, SOLUTION SUBCUTANEOUS at 00:52

## 2019-02-11 RX ADMIN — QUETIAPINE FUMARATE 300 MG: 100 TABLET ORAL at 00:52

## 2019-02-11 RX ADMIN — TUBERCULIN PURIFIED PROTEIN DERIVATIVE 5 UNITS: 5 INJECTION, SOLUTION INTRADERMAL at 00:53

## 2019-02-11 RX ADMIN — ALPRAZOLAM 0.5 MG: 0.5 TABLET ORAL at 21:39

## 2019-02-11 RX ADMIN — FLECAINIDE ACETATE 50 MG: 100 TABLET ORAL at 12:08

## 2019-02-11 RX ADMIN — ACETAMINOPHEN 650 MG: 325 TABLET, FILM COATED ORAL at 02:03

## 2019-02-11 RX ADMIN — ASPIRIN 81 MG 81 MG: 81 TABLET ORAL at 08:14

## 2019-02-11 RX ADMIN — OXYCODONE HYDROCHLORIDE 20 MG: 15 TABLET ORAL at 05:38

## 2019-02-11 RX ADMIN — INSULIN LISPRO 10 UNITS: 100 INJECTION, SOLUTION INTRAVENOUS; SUBCUTANEOUS at 21:38

## 2019-02-11 RX ADMIN — ALPRAZOLAM 0.5 MG: 0.5 TABLET ORAL at 08:14

## 2019-02-11 RX ADMIN — SODIUM CHLORIDE 75 ML/HR: 900 INJECTION, SOLUTION INTRAVENOUS at 02:04

## 2019-02-11 RX ADMIN — BUDESONIDE 500 MCG: 0.5 INHALANT RESPIRATORY (INHALATION) at 07:28

## 2019-02-11 RX ADMIN — DILTIAZEM HYDROCHLORIDE 10 MG/HR: 5 INJECTION INTRAVENOUS at 09:19

## 2019-02-11 RX ADMIN — FLECAINIDE ACETATE 50 MG: 100 TABLET ORAL at 21:37

## 2019-02-11 RX ADMIN — APIXABAN 5 MG: 5 TABLET, FILM COATED ORAL at 17:35

## 2019-02-11 RX ADMIN — PROMETHAZINE HYDROCHLORIDE 25 MG: 25 TABLET ORAL at 17:35

## 2019-02-11 RX ADMIN — OXYCODONE HYDROCHLORIDE 20 MG: 15 TABLET ORAL at 13:46

## 2019-02-11 RX ADMIN — INSULIN LISPRO 8 UNITS: 100 INJECTION, SOLUTION INTRAVENOUS; SUBCUTANEOUS at 17:35

## 2019-02-11 RX ADMIN — SODIUM CHLORIDE 75 ML/HR: 900 INJECTION, SOLUTION INTRAVENOUS at 17:36

## 2019-02-11 RX ADMIN — INSULIN LISPRO 6 UNITS: 100 INJECTION, SOLUTION INTRAVENOUS; SUBCUTANEOUS at 08:14

## 2019-02-11 RX ADMIN — INSULIN LISPRO 6 UNITS: 100 INJECTION, SOLUTION INTRAVENOUS; SUBCUTANEOUS at 12:06

## 2019-02-11 RX ADMIN — QUETIAPINE FUMARATE 300 MG: 100 TABLET ORAL at 21:36

## 2019-02-11 RX ADMIN — CITALOPRAM HYDROBROMIDE 20 MG: 10 TABLET ORAL at 08:14

## 2019-02-11 RX ADMIN — GUAIFENESIN 600 MG: 600 TABLET, EXTENDED RELEASE ORAL at 00:51

## 2019-02-11 RX ADMIN — GUAIFENESIN 600 MG: 600 TABLET, EXTENDED RELEASE ORAL at 21:37

## 2019-02-11 RX ADMIN — INSULIN GLARGINE 30 UNITS: 100 INJECTION, SOLUTION SUBCUTANEOUS at 21:38

## 2019-02-11 RX ADMIN — METOPROLOL SUCCINATE 50 MG: 50 TABLET, EXTENDED RELEASE ORAL at 12:08

## 2019-02-11 NOTE — PROGRESS NOTES
Care Management Interventions PCP Verified by CM: Yes(New PCP set up last admission and was to see 2/12 and this is rescheduled  and appt on [facility]) Palliative Care Criteria Met (RRAT>21 & CHF Dx)?: No(RRAT 14 Dx Afib) Transition of Care Consult (CM Consult): Discharge Planning, Home Health [address]: No 
Reason Outside [place]: Patient already serviced by other home care/hospice agency(Current with [facility]   [address]) Discharge Durable Medical Equipment: No(Pastor lift, wheel chair and hospital bed ) Physical Therapy Consult: No 
Occupational Therapy Consult: No 
Speech Therapy Consult: No 
Current Support Network: Other(lives in apartment with alie Schneider) Confirm Follow Up Transport: Other (see comment)(ambulance [facility] and also access to [facility] ) Plan discussed with Pt/Family/Caregiver: Yes Freedom of Choice Offered: Yes Discharge Location Discharge Placement: Home with home health CM familiar with patient from previous admission. Patient recently d/c from [facility] and 2 days readmitted to hospital. She was d/c 2/4 home with alie Schneider [phone] and [address] [facility]. She was unable to obtain her medications as she was not released from [facility] with [address] [phone]. CM called [facility] and spoke with Elvira who stated that they need for [place] to let them know that they do not need service for patient so that they can release her from their pharmacy. IMANI was then directed to call Medicaid pharmacy help line at  [phone] and spoke with Rufino Munoz and she transferred me to [facility] [phone] and CM spoke with [facility] and she has changed patient to her preferred pharmacy [facility] store #[id number], phone [phone] and patient should be able to obtain her medications in 24 hours at this new pharmacy.  CM left message for patient alie Benedict to make sure that is the pharmacy. If not the pharmacy then will need to call [facility] back with correct pharmacy. Attempted to call the Medicaid help line for medications on the list at [phone] and no one available at this time. CM will attempt later to follow up. DME includes hospital bed, wheelchair and dixon lift and transportation is with ambulance and per son they can also do taxi cab. He is aware of the Medicaid [place] for transportation assistance for him. Patient was to have [facility] RN/Aide/ST/SW and this will need to be re-ordered. Patient had been set up last admission with new PCP at [facility]: SHANE KELLEY with [facility] [phone] and was to see tomorrow and this has been rescheduled for 2/20/19 follow up. Current d/c plan is to return home with her son Tim Mortimer when medically stable. [facility] in [facility] and make sure they have her medications and if there are any issues with her medications. CM following.

## 2019-02-11 NOTE — ED PROVIDER NOTES
Patient is a 59-year-old female who is coming in with complaint of being out of her medications. She was recently admitted for new diagnosis of atrial fibrillation. She states she still not been able to get her medications. She complains of being out of her oxycodone and Xanax. She states she feels anxious and she feels like she is getting some palpitations now. She also has COPD and still smokes and does have some wheezing as well. Past Medical History:  
Diagnosis Date  Diabetes ([facility] [address]  Gastrointestinal disorder GERD  Hypertension  Ill-defined condition   
 neurogenic bladder  Ill-defined condition   
 transverse myelitis  Ill-defined condition   
 paraplegia  Liver disease Hepatitis C  
 Psychiatric disorder   
 anxiety  Psychiatric disorder   
 bipolar  Thromboembolus ([facility] [address] No past surgical history on file. No family history on file. Social History Socioeconomic History  Marital status:  Spouse name: Not on file  Number of children: Not on file  Years of education: Not on file  Highest education level: Not on file Social Needs  Financial resource strain: Not on file  Food insecurity - worry: Not on file  Food insecurity - inability: Not on file  Transportation needs - medical: Not on file  Transportation needs - non-medical: Not on file Occupational History  Not on file Tobacco Use  Smoking status: Current Every Day Smoker Packs/day: 0.50 Substance and Sexual Activity  Alcohol use: No  
 Drug use: No  
 Sexual activity: Not on file Other Topics Concern  Not on file Social History Narrative  Not on file ALLERGIES: Patient has no known allergies. Review of Systems Constitutional: Negative for chills and fever. Respiratory: Negative for chest tightness and shortness of breath. Cardiovascular: Positive for palpitations. Musculoskeletal: Positive for arthralgias and myalgias. Skin: Negative for color change, pallor and wound. Neurological: Negative for weakness and numbness. Psychiatric/Behavioral: The patient is nervous/anxious. All other systems reviewed and are negative. Vitals:  
 02/10/19 1840 BP: 139/64 Pulse: 74 Resp: 18 Temp: 98.6 °F (37 °C) SpO2: (!) 88% Weight: 99.8 kg (220 lb) Height: 5' 5\" (1.651 m) Physical Exam  
Constitutional: She is oriented to person, place, and time. She appears well-developed and well-nourished. No distress. HENT:  
Head: Normocephalic and atraumatic. Eyes: Conjunctivae and EOM are normal. Pupils are equal, round, and reactive to light. No scleral icterus. Neck: Normal range of motion. Cardiovascular: Normal rate, regular rhythm and normal heart sounds. Exam reveals no gallop and no friction rub. No murmur heard. Pulmonary/Chest: Effort normal and breath sounds normal. No stridor. No respiratory distress. She has no wheezes. She has no rales. She exhibits no tenderness. Abdominal: Soft. She exhibits no mass. There is no tenderness. There is no guarding. Musculoskeletal: Normal range of motion. She exhibits no edema or deformity. Neurological: She is alert and oriented to person, place, and time. Skin: Skin is warm and dry. Capillary refill takes less than 2 seconds. No rash noted. She is not diaphoretic. No erythema. No pallor. Psychiatric: She has a normal mood and affect. Her behavior is normal.  
Nursing note and vitals reviewed. MDM Number of Diagnoses or Management Options Diagnosis management comments: I went back to reevaluate patient and her sinus rhythm had converted to atrial fibrillation with a rate about 1:30 with a lot of variability. This happened to her earlier today as well. She can feel the palpitations and I am giving her IV diltiazem and diltiazem drip. Leslie Davies MD; 2/10/2019 @8:58 PM Voice dictation software was used during the making of this note. This software is not perfect and grammatical and other typographical errors may be present. This note has not been proofread for errors. 
=================================================================== Amount and/or Complexity of Data Reviewed Clinical lab tests: ordered and reviewed Tests in the radiology section of CPT®: ordered and reviewed (Xr Chest [facility] Result Date: 2/10/2019 Chest X-ray  2/10/2019 7:15 PM Clinical indication:  59year-old with hypoxia, palpitations. Comparison: 2/8/2019 at 11:01 AM and older. Findings: Upright AP chest at 6:59 PM. Lungs are relatively well-aerated. New linear atelectasis is present in the right midlung. Interval improvement in left basilar atelectasis. No new edema. No effusions. The cardiac silhouette is stable. IMPRESSION: 1. Shifting areas of atelectasis with increase on the right and improvement on the left. ) Independent visualization of images, tracings, or specimens: yes Procedures

## 2019-02-11 NOTE — PROGRESS NOTES
Bedside and verbal report given to [facility], [facility], RN and Roxanne Self RN.

## 2019-02-11 NOTE — PROGRESS NOTES
Hospitalist Progress Note Admit Date:  2/10/2019  6:40 PM  
Name:  Fred Arciniega Age:  59 y.o. 
:  1955 MRN:  [id number] PCP:  None Treatment Team: Attending Provider: Dixon Gar MD; Consulting Provider: Avani Gama MD; Care Manager: Van Marsh RN 
 
HPI/Subjective:  
 
Ms. Chica Florence is a 58 yo female with PMH of paraplegia, SP cath with CAUTI, DM2, HTN, AFIB admitted with recurrent AFIB with RVR after being unable to afford multaq. She has been seen by case management who will assist with medications. She has been seen by cardiology, now in NSR and on IV cardizem/ eliquis-changed to metoprolol/ tambocor. She has some GLENNA and is being hydrated. Additionally has chronic pain followed by pain management. She had CAUTI last month s/p treatment and needs SP cath exchanged. CXR shows stable atelectasis. Plans for home at discharge 19 asking for increase of pain meds though sleepy, going to eat lunch, needs SP cath exchanged, has dyspnea Objective:  
 
Patient Vitals for the past 24 hrs: 
 Temp Pulse Resp BP SpO2  
19 1206  75  120/59   
19 0914 97.5 °F (36.4 °C) 68 18 111/57 92 % 19 0729     93 % 19 0527 97.1 °F (36.2 °C) 78 18 119/61 91 % 19 0054 97.4 °F (36.3 °C) 92 18 119/64 94 % 02/10/19 2343 98.6 °F (37 °C) (!) 126 20 136/75 90 % 02/10/19 2300 98.1 °F (36.7 °C) (!) 140 20 108/78 93 % 02/10/19 2231  (!) 126 22 114/64 92 % 02/10/19 2201  (!) 128 21 116/75 91 % 02/10/19 2152  (!) 135 25 111/80 94 % 02/10/19 2135  (!) 131 14 105/56 93 % 02/10/19 2131  (!) 140 18 115/58 92 % 02/10/19 2130 97.9 °F (36.6 °C) (!) 124 19 115/58 92 % 02/10/19 2129  (!) 121 22  90 % 02/10/19 2122  (!) 139  154/66   
02/10/19 2022     92 % 02/10/19 2013  (!) 131   92 % 02/10/19 2002  (!) 138  177/72 92 % 02/10/19 194  (!) 113  (!) 162/113 96 % 02/10/19 1936  84   94 % 02/10/19 1900     93 % 02/10/19 1840 98.6 °F (37 °C) 74 18 139/64 (!) 88 % Oxygen Therapy O2 Sat (%): 92 % (02/11/19 0914) Pulse via Oximetry: 81 beats per minute (02/11/19 0729) O2 Device: Nasal cannula (02/11/19 0920) O2 Flow Rate (L/min): 2 l/min(pt was 88 percent O2 sat on room air) (02/11/19 0920) Intake/Output Summary (Last 24 hours) at 2/11/2019 1344 Last data filed at 2/11/2019 1215 Gross per 24 hour Intake 1405 ml Output 2250 ml Net -845 ml *Note that automatically entered I/Os may not be accurate; dependent on patient compliance with collection and accurate  by assistants. General:    Well nourished. Alert. Obese, no distress CV:   RRR. No murmur, rub, or gallop. No edema Lungs:   CTAB. No wheezing, rhonchi, or rales. Anterior exam 
Abdomen:   Soft, nontender, nondistended. Extremities: Warm and dry. Skin:     No rashes or jaundice. Neuro:  No gross focal deficits Data Review: 
I have reviewed all labs, meds, and studies from the last 24 hours: 
 
Recent Results (from the past 24 hour(s)) EKG, 12 LEAD, SUBSEQUENT Collection Time: 02/10/19  6:50 PM  
Result Value Ref Range Ventricular Rate 82 BPM  
 Atrial Rate 82 BPM  
 P-R Interval 130 ms QRS Duration 82 ms Q-T Interval 350 ms QTC Calculation (Bezet) 408 ms Calculated P Axis 63 degrees Calculated R Axis 28 degrees Calculated T Axis 74 degrees Diagnosis    
  !! AGE AND GENDER SPECIFIC ECG ANALYSIS !! Sinus rhythm with marked sinus arrhythmia Nonspecific T wave abnormality Abnormal ECG When compared with ECG of 10-FEB-2019 01:09, 
Significant changes have occurred Confirmed by JOSÉ MCGOWAN (), Magno Sol ([id number]) on 2/11/2019 8:26:33 AM 
  
EKG, 12 LEAD, INITIAL Collection Time: 02/10/19  8:39 PM  
Result Value Ref Range Ventricular Rate 131 BPM  
 Atrial Rate 340 BPM  
 QRS Duration 78 ms Q-T Interval 302 ms QTC Calculation (Bezet) 445 ms Calculated P Axis 0 degrees Calculated R Axis 25 degrees Calculated T Axis 90 degrees Diagnosis    
  !! AGE AND GENDER SPECIFIC ECG ANALYSIS !! Atrial flutter with variable A-V block Abnormal ECG When compared with ECG of 10-FEB-2019 18:50, 
Atrial flutter has replaced Sinus rhythm Vent. rate has increased BY  49 BPM 
Non-specific change in ST segment in Inferior leads Nonspecific T wave abnormality no longer evident in Inferior leads Nonspecific T wave abnormality, improved in Anterior leads Confirmed by JOSÉ MCGOWAN (), Maryse Moreno ([id number]) on 2/11/2019 8:28:33 AM 
  
CBC WITH AUTOMATED DIFF Collection Time: 02/10/19  8:45 PM  
Result Value Ref Range WBC 17.3 (H) 4.3 - 11.1 K/uL  
 RBC 4.70 4.05 - 5.2 M/uL  
 HGB 13.7 11.7 - 15.4 g/dL HCT 42.2 35.8 - 46.3 % MCV 89.8 79.6 - 97.8 FL  
 MCH 29.1 26.1 - 32.9 PG  
 MCHC 32.5 31.4 - 35.0 g/dL  
 RDW 15.1 (H) 11.9 - 14.6 % PLATELET 301 (H) 713 - 450 K/uL MPV 11.6 9.4 - 12.3 FL ABSOLUTE NRBC 0.00 0.0 - 0.2 K/uL  
 DF AUTOMATED NEUTROPHILS 81 (H) 43 - 78 % LYMPHOCYTES 12 (L) 13 - 44 % MONOCYTES 4 4.0 - 12.0 % EOSINOPHILS 1 0.5 - 7.8 % BASOPHILS 1 0.0 - 2.0 % IMMATURE GRANULOCYTES 2 0.0 - 5.0 %  
 ABS. NEUTROPHILS 14.0 (H) 1.7 - 8.2 K/UL  
 ABS. LYMPHOCYTES 2.1 0.5 - 4.6 K/UL  
 ABS. MONOCYTES 0.7 0.1 - 1.3 K/UL  
 ABS. EOSINOPHILS 0.1 0.0 - 0.8 K/UL  
 ABS. BASOPHILS 0.2 0.0 - 0.2 K/UL  
 ABS. IMM. GRANS. 0.3 0.0 - 0.5 K/UL METABOLIC PANEL, COMPREHENSIVE Collection Time: 02/10/19  8:45 PM  
Result Value Ref Range Sodium 131 (L) 136 - 145 mmol/L Potassium 5.7 (H) 3.5 - 5.1 mmol/L Chloride 95 (L) 98 - 107 mmol/L  
 CO2 28 21 - 32 mmol/L Anion gap 8 7 - 16 mmol/L Glucose 269 (H) 65 - 100 mg/dL BUN 46 (H) 8 - 23 MG/DL Creatinine 1.75 (H) 0.6 - 1.0 MG/DL  
 GFR est AA 38 (L) >60 ml/min/1.73m2 GFR est non-AA 31 (L) >60 ml/min/1.73m2 Calcium 9.1 8.3 - 10.4 MG/DL  Bilirubin, total 0.5 0.2 - 1.1 MG/DL  
 ALT (SGPT) 31 12 - 65 U/L  
 AST (SGOT) 40 (H) 15 - 37 U/L Alk. phosphatase 112 50 - 136 U/L Protein, total 8.8 (H) 6.3 - 8.2 g/dL Albumin 3.3 3.2 - 4.6 g/dL Globulin 5.5 (H) 2.3 - 3.5 g/dL A-G Ratio 0.6 (L) 1.2 - 3.5    
TROPONIN I Collection Time: 02/10/19  8:45 PM  
Result Value Ref Range Troponin-I, Qt. <0.02 (L) 0.02 - 0.05 NG/ML  
BNP Collection Time: 02/10/19  8:45 PM  
Result Value Ref Range BNP 19 (H) 0 pg/mL MAGNESIUM Collection Time: 02/10/19  8:45 PM  
Result Value Ref Range Magnesium 2.1 1.8 - 2.4 mg/dL GLUCOSE, POC Collection Time: 02/11/19  1:05 AM  
Result Value Ref Range Glucose (POC) 265 (H) 65 - 100 mg/dL METABOLIC PANEL, BASIC Collection Time: 02/11/19  3:47 AM  
Result Value Ref Range Sodium 133 (L) 136 - 145 mmol/L Potassium 4.7 3.5 - 5.1 mmol/L Chloride 95 (L) 98 - 107 mmol/L  
 CO2 28 21 - 32 mmol/L Anion gap 10 7 - 16 mmol/L Glucose 288 (H) 65 - 100 mg/dL BUN 46 (H) 8 - 23 MG/DL Creatinine 1.65 (H) 0.6 - 1.0 MG/DL  
 GFR est AA 41 (L) >60 ml/min/1.73m2 GFR est non-AA 34 (L) >60 ml/min/1.73m2 Calcium 9.0 8.3 - 10.4 MG/DL  
CBC W/O DIFF Collection Time: 02/11/19  3:47 AM  
Result Value Ref Range WBC 16.2 (H) 4.3 - 11.1 K/uL  
 RBC 4.39 4.05 - 5.2 M/uL  
 HGB 12.5 11.7 - 15.4 g/dL HCT 39.6 35.8 - 46.3 % MCV 90.2 79.6 - 97.8 FL  
 MCH 28.5 26.1 - 32.9 PG  
 MCHC 31.6 31.4 - 35.0 g/dL  
 RDW 14.9 (H) 11.9 - 14.6 % PLATELET 914 737 - 881 K/uL MPV 11.3 9.4 - 12.3 FL ABSOLUTE NRBC 0.00 0.0 - 0.2 K/uL GLUCOSE, POC Collection Time: 02/11/19  6:04 AM  
Result Value Ref Range Glucose (POC) 278 (H) 65 - 100 mg/dL EKG, 12 LEAD, SUBSEQUENT Collection Time: 02/11/19  9:46 AM  
Result Value Ref Range Ventricular Rate 76 BPM  
 Atrial Rate 76 BPM  
 P-R Interval 136 ms QRS Duration 76 ms  
 Q-T Interval 470 ms QTC Calculation (Bezet) 528 ms Calculated P Axis 46 degrees Calculated R Axis 30 degrees Calculated T Axis 56 degrees Diagnosis Sinus rhythm with Premature supraventricular complexes Prolonged QT Abnormal ECG When compared with ECG of 10-FEB-2019 20:39, 
Sinus rhythm has replaced Atrial flutter Vent. rate has decreased BY  55 BPM 
Nonspecific T wave abnormality no longer evident in Lateral leads Confirmed by CEAUSTYN MCGOWAN (), Leda Colbert ([id number]) on 2/11/2019 10:29:35 AM 
  
GLUCOSE, POC Collection Time: 02/11/19 11:47 AM  
Result Value Ref Range Glucose (POC) 282 (H) 65 - 100 mg/dL All Micro Results None No results found for this visit on 02/10/19. Current Meds: 
Current Facility-Administered Medications Medication Dose Route Frequency  albuterol (PROVENTIL VENTOLIN) nebulizer solution 2.5 mg  2.5 mg Inhalation Q4H PRN  
 ALPRAZolam (XANAX) tablet 0.5 mg  0.5 mg Oral BID PRN  
 apixaban (ELIQUIS) tablet 5 mg  5 mg Oral BID  aspirin chewable tablet 81 mg  81 mg Oral DAILY  bisacodyl (DULCOLAX) tablet 5 mg  5 mg Oral DAILY PRN  
 citalopram (CELEXA) tablet 20 mg  20 mg Oral DAILY  fentaNYL (DURAGESIC) 25 mcg/hr patch 1 Patch  1 Patch TransDERmal Q72H  
 guaiFENesin ER (MUCINEX) tablet 600 mg  600 mg Oral Q12H  
 insulin glargine (LANTUS) injection 30 Units  30 Units SubCUTAneous QHS  promethazine (PHENERGAN) tablet 25 mg  25 mg Oral Q6H PRN  
 QUEtiapine (SEROquel) tablet 300 mg  300 mg Oral QHS  oxyCODONE IR (ROXICODONE) tablet 20 mg  20 mg Oral Q8H PRN  
 sodium chloride (NS) flush 5-40 mL  5-40 mL IntraVENous Q8H  
 sodium chloride (NS) flush 5-40 mL  5-40 mL IntraVENous PRN  
 tuberculin injection 5 Units  5 Units IntraDERMal ONCE  
 bisacodyl (DULCOLAX) tablet 5 mg  5 mg Oral DAILY PRN  
 insulin lispro (HUMALOG) injection   SubCUTAneous AC&HS  
 0.9% sodium chloride infusion  75 mL/hr IntraVENous CONTINUOUS  
 budesonide (PULMICORT) 500 mcg/2 ml nebulizer suspension  500 mcg Nebulization BID RT  
  acetaminophen (TYLENOL) tablet 650 mg  650 mg Oral Q6H PRN  
 flecainide (TAMBOCOR) tablet 50 mg  50 mg Oral Q12H  
 metoprolol succinate (TOPROL-XL) XL tablet 50 mg  50 mg Oral DAILY Other Studies (last 24 hours): Xr Chest [place] Result Date: 2/10/2019 Chest X-ray  2/10/2019 7:15 PM Clinical indication:  59year-old with hypoxia, palpitations. Comparison: 2/8/2019 at 11:01 AM and older. Findings: Upright AP chest at 6:59 PM. Lungs are relatively well-aerated. New linear atelectasis is present in the right midlung. Interval improvement in left basilar atelectasis. No new edema. No effusions. The cardiac silhouette is stable. IMPRESSION: 1. Shifting areas of atelectasis with increase on the right and improvement on the left. Assessment and Plan:  
 
Hospital Problems as of 2/11/2019 Date Reviewed: 1/31/2019 Codes Class Noted - Resolved POA GLENNA (acute kidney injury) ([facility] 75.) ICD-10-CM: N17.9 ICD-9-CM: 584.9  2/11/2019 - Present Yes Hyperkalemia ICD-10-CM: E87.5 ICD-9-CM: 276.7  2/11/2019 - Present Yes Chronic midline low back pain without sciatica ICD-10-CM: M54.5, G89.29 ICD-9-CM: 724.2, 338.29  2/5/2019 - Present Yes * (Principal) Atrial fibrillation with RVR ([facility] ICD-10-CM: I48.91 
ICD-9-CM: 427.31  1/31/2019 - Present Yes Type 2 diabetes mellitus with hyperglycemia (HCC) (Chronic) ICD-10-CM: E11.65 ICD-9-CM: 250.00  12/10/2016 - Present Yes Paraplegia (HCC) (Chronic) ICD-10-CM: Y90.75 ICD-9-CM: 344.1  12/10/2016 - Present Yes Bipolar disorder without psychotic features (HCC) (Chronic) ICD-10-CM: F31.9 ICD-9-CM: 296.80  12/10/2016 - Present Yes Plan: · AFIB with RVR: converted to NSR, wean off IV cardizem, continue tambacor and eliquis · Recent UTI with urine retention and SP cath: nursing to exchange cath and send UA · Paraplegia: noted · Chronic pain: she is sleepy and will not increase oxycodone frequent now, additionally has fentanyl patch · DM2:  Continue lantus and SSI · Hyperkalemia and GLENNA: continue IVF and followup BMP, potassium normal 
 
DC planning/Dispo:  Pending to home Diet:  DIET CARDIAC 
DVT ppx:  eliquis Signed: Tori Habermann, MD

## 2019-02-11 NOTE — PROGRESS NOTES
Consulted Norman Corley, NP, with Urology to replace suprapubic catheter. NP at bedside at 1500 to change suprapubic catheter with primary RNs, Shahid Villa and Alex Lai to assist. Urine sample collected and sent to lab.

## 2019-02-11 NOTE — PROGRESS NOTES
Dual RN Skin Assessment completed. Sacrum intact with no evidence of break down, allevyn in place for prevention. Heels intact with no evidence of breakdown, just dry and flaky. Scattered bruising and scabs over all extremities. Abdomen distended. Suprabic jasso in place. Skin all over is very dry and flaky.

## 2019-02-11 NOTE — PROGRESS NOTES
TRANSFER - IN REPORT: 
 
Verbal report received from Rosalie Kruger Dr., RN (name) on Rod Ha  being received from ED (unit) for routine progression of care Report consisted of patients Situation, Background, Assessment and  
Recommendations(SBAR). Information from the following report(s) SBAR, Kardex, ED Summary, Recent Results and Cardiac Rhythm A Fib was reviewed with the receiving nurse. Opportunity for questions and clarification was provided. Assessment completed upon patients arrival to unit and care assumed.

## 2019-02-11 NOTE — CONSULTS
[address] Cardiology Consultation        Date of  Admission: 2/10/2019  6:40 PM     Referring Physician: Dr. Ann Westfall Physician: Dr. Diego Zuleta    CC/Reason for consult: AF/flutter    HPI:  Tori Simon is a 59 y.o. morbidly obese WF with h/o DM II, PAF, prior DVT, HTN, paraplegia on chronic supra pubic cath due to neurogenic bladder, bipolar disorder, anxiety, chronic pain on opiate and xanax and chronic hepatitis C who presented to the [facility] ER with c/o feeling anxious. She was admitted 1/31-2/4 for Af w/ RVR and CAUTI. The admitted 2/5-2/9 for AF w/RVR and back pain. She was started on Multaq and was discharged in NSR. She was unable to get the [facility] and returned to the ER on 2/10. CM was consulted and reportedly made arrangements for her to have transportation to pharmacy for [facility]. She was still unable to fill prescription. After leaving the ER, she found herself to be feeling anxious and unwell as she did last time she was in RVR. She was admitted again by the hospitalist and started on IV Cardizem. Anxiety and pain was controlled after home meds restarted in ER. [address] Cardiology was consulted to evaluate and treat AF. Echo on 1/31/19 showed preserved EF 55-60%, no WMA and mild LAE. Pt is now in NSR w/o complaints. She is difficult to arouse and repeatedly falls asleep during exam. On admission she had GLENNA with hyperkalemia and lasix stopped and IVF given per hospitalist. Discussed with CM who will look into Medicaid paying for Multaq. Past Medical History:   Diagnosis Date    Diabetes ([address]     Gastrointestinal disorder     GERD    Hypertension     Ill-defined condition     neurogenic bladder    Ill-defined condition     transverse myelitis    Ill-defined condition     paraplegia    Liver disease     Hepatitis C    Psychiatric disorder     anxiety    Psychiatric disorder     bipolar    Thromboembolus ([address]       No past surgical history on file.     No Known Allergies   Social History Socioeconomic History    Marital status:      Spouse name: Not on file    Number of children: Not on file    Years of education: Not on file    Highest education level: Not on file   Social Needs    Financial resource strain: Not on file    Food insecurity - worry: Not on file    Food insecurity - inability: Not on file    Transportation needs - medical: Not on file   [employer] needs - non-medical: Not on file   Occupational History    Not on file   Tobacco Use    Smoking status: Current Every Day Smoker     Packs/day: 0.50   Substance and Sexual Activity    Alcohol use: No    Drug use: No    Sexual activity: Not on file   Other Topics Concern    Not on file   Social History Narrative    Not on file     No family history on file.      Current Facility-Administered Medications   Medication Dose Route Frequency    albuterol (PROVENTIL VENTOLIN) nebulizer solution 2.5 mg  2.5 mg Inhalation Q4H PRN    ALPRAZolam (XANAX) tablet 0.5 mg  0.5 mg Oral BID PRN    apixaban (ELIQUIS) tablet 5 mg  5 mg Oral BID    aspirin chewable tablet 81 mg  81 mg Oral DAILY    bisacodyl (DULCOLAX) tablet 5 mg  5 mg Oral DAILY PRN    citalopram (CELEXA) tablet 20 mg  20 mg Oral DAILY    fentaNYL (DURAGESIC) 25 mcg/hr patch 1 Patch  1 Patch TransDERmal Q72H    guaiFENesin ER (MUCINEX) tablet 600 mg  600 mg Oral Q12H    insulin glargine (LANTUS) injection 30 Units  30 Units SubCUTAneous QHS    promethazine (PHENERGAN) tablet 25 mg  25 mg Oral Q6H PRN    QUEtiapine (SEROquel) tablet 300 mg  300 mg Oral QHS    oxyCODONE IR (ROXICODONE) tablet 20 mg  20 mg Oral Q8H PRN    sodium chloride (NS) flush 5-40 mL  5-40 mL IntraVENous Q8H    sodium chloride (NS) flush 5-40 mL  5-40 mL IntraVENous PRN    tuberculin injection 5 Units  5 Units IntraDERMal ONCE    ondansetron (ZOFRAN) injection 4 mg  4 mg IntraVENous Q4H PRN    bisacodyl (DULCOLAX) tablet 5 mg  5 mg Oral DAILY PRN    insulin lispro (HUMALOG) injection   SubCUTAneous AC&HS    0.9% sodium chloride infusion  75 mL/hr IntraVENous CONTINUOUS    budesonide (PULMICORT) 500 mcg/2 ml nebulizer suspension  500 mcg Nebulization BID RT    acetaminophen (TYLENOL) tablet 650 mg  650 mg Oral Q6H PRN    dilTIAZem (CARDIZEM) 125 mg in dextrose 5% 125 mL infusion  0-15 mg/hr IntraVENous TITRATE       Review of symptoms:  General: no recent weight loss/gain, +weakness/fatigue, fever or chills   Skin: no rashes, lumps, or other skin changes   HEENT: no headache, dizziness, lightheadedness, vision changes, hearing changes, tinnitus, vertigo, sinus pressure/pain, bleeding gums, sore throat, or hoarseness   Neck: no swollen glands, goiter, pain or stiffness   Respiratory: no cough, sputum, hemoptysis, dyspnea, wheezing   Cardiovascular: no chest pain or discomfort, +palpitations, dyspnea, orthopnea, paroxysmal nocturnal dyspnea, peripheral edema   Gastrointestinal: no trouble swallowing, heartburn, change of appetite, nausea, change in bowel habits, pain with defecation, rectal bleeding or black/tarry stools, hemorrhoids, constipation, diarrhea, abdominal pain, jaundice, liver or gallbladder problems   Urinary: no frequency, urgency , hematuria, burning/pain with urination, recent flank pain, polyuria, nocturia, or difficulty urinating   Genital: no vaginal or pelvic infections   Peripheral Vascular: no claudication, leg cramps, +prior DVTs, swelling of calves, legs, or feet, color change, or swelling with redness or tenderness   Musculoskeletal: no muscle or joint pain/stiffness, joint swelling, erythema of joints, or back pain   Psychiatric: +bipolar and anxiety, mental disorders, or excessive stress   Neurological: no history of CVA, dizziness, no sensory or motor loss, seizures, syncope, tremors, numbness, tingling, no changes in mood, attention, or speech, no changes in orientation, memory, insight, or judgment. no headache, vertigo.    Hematologic: no anemia, easy bruising or bleeding   Endocrine: +diabetes, thyroid problems, heat or cold intolerance, excessive sweating, polyuria, polydipsia        Subjective:   Physical Exam    Visit Vitals  /57   Pulse 68   Temp 97.5 °F (36.4 °C)   Resp 18   Ht 5' 5\" (1.651 m)   Wt 90.5 kg (199 lb 8.3 oz)   SpO2 92%   BMI 33.20 kg/m²     General Appearance:  Well developed, obese, alert and oriented x 3, and individual in no acute distress. Ears/Nose/Mouth/Throat:   Hearing grossly normal.         Neck: Supple. Chest:   Lungs clear to auscultation bilaterally. Cardiovascular:  Regular rate and rhythm, S1, S2   Abdomen:   Soft, non-tender, bowel sounds are active. Extremities: No edema bilaterally. Skin: Warm and dry. Cardiographics  Telemetry: normal sinus rhythm  ECG: atrial fibrillation  Echocardiogram: 1/31 EF 55-60%, no WMA, mild LAE    Labs:   Recent Results (from the past 24 hour(s))   EKG, 12 LEAD, SUBSEQUENT    Collection Time: 02/10/19  6:50 PM   Result Value Ref Range    Ventricular Rate 82 BPM    Atrial Rate 82 BPM    P-R Interval 130 ms    QRS Duration 82 ms    Q-T Interval 350 ms    QTC Calculation (Bezet) 408 ms    Calculated P Axis 63 degrees    Calculated R Axis 28 degrees    Calculated T Axis 74 degrees    Diagnosis       !! AGE AND GENDER SPECIFIC ECG ANALYSIS !! Sinus rhythm with marked sinus arrhythmia  Nonspecific T wave abnormality  Abnormal ECG  When compared with ECG of 10-FEB-2019 01:09,  Significant changes have occurred  Confirmed by JOSÉ MCGOWAN (), NEHA MYERS ([id number]) on 2/11/2019 8:26:33 AM     EKG, 12 LEAD, INITIAL    Collection Time: 02/10/19  8:39 PM   Result Value Ref Range    Ventricular Rate 131 BPM    Atrial Rate 340 BPM    QRS Duration 78 ms    Q-T Interval 302 ms    QTC Calculation (Bezet) 445 ms    Calculated P Axis 0 degrees    Calculated R Axis 25 degrees    Calculated T Axis 90 degrees    Diagnosis       !! AGE AND GENDER SPECIFIC ECG ANALYSIS !!   Atrial flutter with variable A-V block  Abnormal ECG  When compared with ECG of 10-FEB-2019 18:50,  Atrial flutter has replaced Sinus rhythm  Vent. rate has increased BY  49 BPM  Non-specific change in ST segment in Inferior leads  Nonspecific T wave abnormality no longer evident in Inferior leads  Nonspecific T wave abnormality, improved in Anterior leads  Confirmed by JOSÉ MCGOWAN (), NEHA MYERS ([id number]) on 2/11/2019 8:28:33 AM     CBC WITH AUTOMATED DIFF    Collection Time: 02/10/19  8:45 PM   Result Value Ref Range    WBC 17.3 (H) 4.3 - 11.1 K/uL    RBC 4.70 4.05 - 5.2 M/uL    HGB 13.7 11.7 - 15.4 g/dL    HCT 42.2 35.8 - 46.3 %    MCV 89.8 79.6 - 97.8 FL    MCH 29.1 26.1 - 32.9 PG    MCHC 32.5 31.4 - 35.0 g/dL    RDW 15.1 (H) 11.9 - 14.6 %    PLATELET 216 (H) 555 - 450 K/uL    MPV 11.6 9.4 - 12.3 FL    ABSOLUTE NRBC 0.00 0.0 - 0.2 K/uL    DF AUTOMATED      NEUTROPHILS 81 (H) 43 - 78 %    LYMPHOCYTES 12 (L) 13 - 44 %    MONOCYTES 4 4.0 - 12.0 %    EOSINOPHILS 1 0.5 - 7.8 %    BASOPHILS 1 0.0 - 2.0 %    IMMATURE GRANULOCYTES 2 0.0 - 5.0 %    ABS. NEUTROPHILS 14.0 (H) 1.7 - 8.2 K/UL    ABS. LYMPHOCYTES 2.1 0.5 - 4.6 K/UL    ABS. MONOCYTES 0.7 0.1 - 1.3 K/UL    ABS. EOSINOPHILS 0.1 0.0 - 0.8 K/UL    ABS. BASOPHILS 0.2 0.0 - 0.2 K/UL    ABS. IMM. GRANS. 0.3 0.0 - 0.5 K/UL   METABOLIC PANEL, COMPREHENSIVE    Collection Time: 02/10/19  8:45 PM   Result Value Ref Range    Sodium 131 (L) 136 - 145 mmol/L    Potassium 5.7 (H) 3.5 - 5.1 mmol/L    Chloride 95 (L) 98 - 107 mmol/L    CO2 28 21 - 32 mmol/L    Anion gap 8 7 - 16 mmol/L    Glucose 269 (H) 65 - 100 mg/dL    BUN 46 (H) 8 - 23 MG/DL    Creatinine 1.75 (H) 0.6 - 1.0 MG/DL    GFR est AA 38 (L) >60 ml/min/1.73m2    GFR est non-AA 31 (L) >60 ml/min/1.73m2    Calcium 9.1 8.3 - 10.4 MG/DL    Bilirubin, total 0.5 0.2 - 1.1 MG/DL    ALT (SGPT) 31 12 - 65 U/L    AST (SGOT) 40 (H) 15 - 37 U/L    Alk.  phosphatase 112 50 - 136 U/L    Protein, total 8.8 (H) 6.3 - 8.2 g/dL    Albumin 3.3 3.2 - 4.6 g/dL    Globulin 5.5 (H) 2.3 - 3.5 g/dL    A-G Ratio 0.6 (L) 1.2 - 3.5     TROPONIN I    Collection Time: 02/10/19  8:45 PM   Result Value Ref Range    Troponin-I, Qt. <0.02 (L) 0.02 - 0.05 NG/ML   BNP    Collection Time: 02/10/19  8:45 PM   Result Value Ref Range    BNP 19 (H) 0 pg/mL   MAGNESIUM    Collection Time: 02/10/19  8:45 PM   Result Value Ref Range    Magnesium 2.1 1.8 - 2.4 mg/dL   GLUCOSE, POC    Collection Time: 02/11/19  1:05 AM   Result Value Ref Range    Glucose (POC) 265 (H) 65 - 209 mg/dL   METABOLIC PANEL, BASIC    Collection Time: 02/11/19  3:47 AM   Result Value Ref Range    Sodium 133 (L) 136 - 145 mmol/L    Potassium 4.7 3.5 - 5.1 mmol/L    Chloride 95 (L) 98 - 107 mmol/L    CO2 28 21 - 32 mmol/L    Anion gap 10 7 - 16 mmol/L    Glucose 288 (H) 65 - 100 mg/dL    BUN 46 (H) 8 - 23 MG/DL    Creatinine 1.65 (H) 0.6 - 1.0 MG/DL    GFR est AA 41 (L) >60 ml/min/1.73m2    GFR est non-AA 34 (L) >60 ml/min/1.73m2    Calcium 9.0 8.3 - 10.4 MG/DL   CBC W/O DIFF    Collection Time: 02/11/19  3:47 AM   Result Value Ref Range    WBC 16.2 (H) 4.3 - 11.1 K/uL    RBC 4.39 4.05 - 5.2 M/uL    HGB 12.5 11.7 - 15.4 g/dL    HCT 39.6 35.8 - 46.3 %    MCV 90.2 79.6 - 97.8 FL    MCH 28.5 26.1 - 32.9 PG    MCHC 31.6 31.4 - 35.0 g/dL    RDW 14.9 (H) 11.9 - 14.6 %    PLATELET 734 281 - 834 K/uL    MPV 11.3 9.4 - 12.3 FL    ABSOLUTE NRBC 0.00 0.0 - 0.2 K/uL   GLUCOSE, POC    Collection Time: 02/11/19  6:04 AM   Result Value Ref Range    Glucose (POC) 278 (H) 65 - 100 mg/dL       Pt has been seen and examined by Dr. Sarthak Roque. He agrees with the following assessment and plan.      Assessment/Plan:        Diagnosis    Atrial fibrillation with rapid ventricular response ([facility] [address]- in NSR on IV Cardizem, continue Eliquis- restart Multaq if Medicaid will supply, if not consider Flecainide/Rythmol- CM investigating     GLENNA (acute kidney injury) ([facility] [address]- improving on IVF and holding nephrotoxic drugs per primary team    Hyperkalemia- improving on IVF and holding nephrotoxic drugs per primary team    Hypertension- improved, continue meds    Chronic midline low back pain without sciatica- pain meds per primary team    UTI (urinary tract infection)- meds per primary team    Type 2 diabetes mellitus with hyperglycemia ([facility] [address]- meds per primary team    Paraplegia ([facility] [address]    Bipolar disorder without psychotic features ([facility] [address] [facility]    Chronic hepatitis C virus infection ([facility] [address]    Narcotic addiction ([facility] [address]       Thank you for consulting [facility] Cardiology and allowing us to participate in the care of this patient. We will continue to follow along with you.     Mallory Camp PA-C

## 2019-02-11 NOTE — PROGRESS NOTES
Problem: Falls - Risk of 
Goal: *Absence of Falls Document Bebeto Dye Fall Risk and appropriate interventions in the flowsheet. Outcome: Progressing Towards Goal 
Fall Risk Interventions: 
  
 
  
 
Medication Interventions: Evaluate medications/consider consulting pharmacy, Patient to call before getting OOB, Teach patient to arise slowly Elimination Interventions: Call light in reach Patient progressing towards goal with no falls on current admission. Patient without confusion, or agitation. Personal belongings are within reach. Bed is in the low and locked position with side rails up x2. Call light within reach and patient verbalizes understanding of use. Problem: Pressure Injury - Risk of 
Goal: *Prevention of pressure injury Document Ras Scale and appropriate interventions in the flowsheet. Outcome: Progressing Towards Goal 
Pressure Injury Interventions: 
Sensory Interventions: Assess changes in LOC, Assess need for specialty bed Moisture Interventions: Absorbent underpads, Apply protective barrier, creams and emollients Activity Interventions: Assess need for specialty bed, Increase time out of bed Mobility Interventions: Assess need for specialty bed Friction and Shear Interventions: Apply protective barrier, creams and emollients, Foam dressings/transparent film/skin sealants

## 2019-02-11 NOTE — PROGRESS NOTES
Bedside and Verbal shift change report given to self and Isabelle Barnett RN (oncoming nurse) by Reji Fitzpatrick RN (offgoing nurse). Report included the following information SBAR, Kardex, MAR and Recent Results.

## 2019-02-11 NOTE — H&P
HOSPITALIST H&P/CONSULTNAME:  Jill Meier Age:  59 y.o. 
:   1955 MRN:   [id number] PCP: None Consulting MD: Treatment Team: Attending Provider: Antoni Morales MD 
HPI:  
Patient is a 64yo F with hx paraplegia, DM, HTN, who was recently diagnosed with afib and admitted twice in the past two weeks. Admitted - for afib RVR and CAUTI. Came back later that day and was admitted - for RVR and back pain. She was started at that time on multaq by cardiology and was discharged in NSR. She was treated yesterday in the ER for RVR but was discharged in [place] [facility]. IMANI was consulted to assist with getting her multaq filled, but she may need to contact medicaid regarding changing pharmacies to one which has it in stock. See their not for more details. She has not been able to stay out of the hospital for a full day. After leaving the ER, she found herself to be feeling anxious and unwell as she did last time she was in RVR. Still unable to fill prescription. No other new complaints. No sob, no cough. Does admit to some wheeze which is intermittent and chronic. Not having chest pain. Anxiety and pain controlled after home meds restarted in ER. Complete ROS done and is as stated in HPI or otherwise negative Past Medical History:  
Diagnosis Date  Diabetes ([facility]  Gastrointestinal disorder GERD  Hypertension  Ill-defined condition   
 neurogenic bladder  Ill-defined condition   
 transverse myelitis  Ill-defined condition   
 paraplegia  Liver disease Hepatitis C  
 Psychiatric disorder   
 anxiety  Psychiatric disorder   
 bipolar  Thromboembolus ([facility] No past surgical history on file. Prior to Admission Medications Prescriptions Last Dose Informant Patient Reported? Taking? ALPRAZolam (XANAX) 0.5 mg tablet   No No  
Sig: Take 1 Tab by mouth two (2) times daily as needed for Anxiety. Max Daily Amount: 1 mg. Blood-Glucose Meter monitoring kit   No No  
Sig: Check glucose at home daily QUEtiapine (SEROQUEL) 100 mg tablet   Yes No  
Sig: Take 300 mg by mouth nightly. albuterol (PROVENTIL HFA, VENTOLIN HFA, PROAIR HFA) 90 mcg/actuation inhaler   No No  
Sig: Take 1 Puff by inhalation every four (4) hours as needed for Wheezing. apixaban (ELIQUIS) 5 mg tablet   No No  
Sig: Take 1 Tab by mouth two (2) times a day for 30 days. aspirin 81 mg chewable tablet   Yes No  
Sig: Take 81 mg by mouth daily. bisacodyl (DULCOLAX) 5 mg EC tablet   No No  
Sig: Take 1 Tab by mouth daily as needed for Constipation. budesonide (PULMICORT) 0.5 mg/2 mL nbsp   No No  
Si mL by Nebulization route two (2) times a day. citalopram (CELEXA) 20 mg tablet   Yes No  
Sig: Take 20 mg by mouth daily. dronedarone (MULTAQ) tab tablet   No No  
Sig: Take 1 Tab by mouth two (2) times daily (with meals) for 30 days. fentaNYL (DURAGESIC) 25 mcg/hr PATCH   No No  
Si Patch by TransDERmal route every seventy-two (72) hours. Max Daily Amount: 1 Patch. furosemide (LASIX) 40 mg tablet   Yes No  
Sig: Take  by mouth two (2) times a day. guaiFENesin ER (MUCINEX) 600 mg ER tablet   No No  
Sig: Take 1 Tab by mouth every twelve (12) hours for 7 days. insulin glargine (LANTUS,BASAGLAR) 100 unit/mL (3 mL) inpn   No No  
Si Units by SubCUTAneous route nightly for 30 days. metoprolol tartrate (LOPRESSOR) 50 mg tablet   No No  
Sig: Take 1 Tab by mouth every twelve (12) hours for 30 days. nystatin (MYCOSTATIN) powder   No No  
Sig: Apply  to affected area two (2) times a day. omeprazole (PRILOSEC) 40 mg capsule   Yes No  
Sig: Take 40 mg by mouth daily. oxyCODONE IR (ROXICODONE) 20 mg immediate release tablet   No No  
Sig: Take 1 Tab by mouth every eight (8) hours as needed. Max Daily Amount: 60 mg.  
pregabalin (LYRICA) 100 mg capsule   No No  
Sig: Take 1 Cap by mouth three (3) times daily. Max Daily Amount: 300 mg. promethazine (PHENERGAN) 25 mg tablet   Yes No  
Sig: Take 25 mg by mouth every six (6) hours as needed for Nausea. zinc oxide-cod liver oil (DESITIN) 40 % paste   No No  
Sig: Apply  to affected area two (2) times a day. Facility-Administered Medications: None No Known Allergies Social History Tobacco Use  Smoking status: Current Every Day Smoker Packs/day: 0.50 Substance Use Topics  Alcohol use: No  
  
No family history on file. Objective:  
 
Visit Vitals /75 (BP 1 Location: Right arm, BP Patient Position: At rest) Pulse (!) 126 Temp 98.6 °F (37 °C) Resp 20 Ht 5' 5\" (1.651 m) Wt 92 kg (202 lb 12.8 oz) SpO2 90% BMI 33.75 kg/m² Temp (24hrs), Av.3 °F (36.8 °C), Min:97.9 °F (36.6 °C), Max:98.6 °F (37 °C) Oxygen Therapy O2 Sat (%): 90 % (02/10/19 2343) Pulse via Oximetry: 94 beats per minute (02/10/19 2300) O2 Device: Room air (02/10/19 2300) O2 Flow Rate (L/min): 2 l/min (02/10/19 2231) Physical Exam: 
General:    Alert, NAD, morbidly obese Head:   Normocephalic, without obvious abnormality, atraumatic. Nose:  Nares normal. No drainage or sinus tenderness. Lungs:   Wheeze, no distress on RA Heart:   Irregular, tachy. Abdomen:   Soft, non-tender. Not distended. Bowel sounds normal.  
Extremities: No cyanosis. No edema. No clubbing Skin:     Texture, turgor normal. No rashes or lesions. Not Jaundiced Neurologic: Alert and oriented x 3, paraplegic Data Review:  
Recent Results (from the past 24 hour(s)) EKG, 12 LEAD, INITIAL Collection Time: 02/10/19 12:09 AM  
Result Value Ref Range Ventricular Rate 92 BPM  
 Atrial Rate 92 BPM  
 P-R Interval 140 ms QRS Duration 78 ms Q-T Interval 344 ms QTC Calculation (Bezet) 425 ms Calculated P Axis 71 degrees Calculated R Axis 22 degrees Calculated T Axis 79 degrees Diagnosis    
  !! AGE AND GENDER SPECIFIC ECG ANALYSIS !! Normal sinus rhythm Possible Left atrial enlargement Nonspecific ST abnormality Abnormal ECG When compared with ECG of 04-FEB-2019 22:56, 
Significant changes have occurred Confirmed by Cristhian Overotn MD (), JULIETA JEFFREY ([id number]) on 2/10/2019 3:01:19 PM 
  
GLUCOSE, POC Collection Time: 02/10/19 12:33 AM  
Result Value Ref Range Glucose (POC) 352 (H) 65 - 100 mg/dL EKG, 12 LEAD, SUBSEQUENT Collection Time: 02/10/19  1:09 AM  
Result Value Ref Range Ventricular Rate 153 BPM  
 Atrial Rate 166 BPM  
 QRS Duration 74 ms Q-T Interval 286 ms QTC Calculation (Bezet) 456 ms Calculated R Axis 41 degrees Calculated T Axis 105 degrees Diagnosis    
  !! AGE AND GENDER SPECIFIC ECG ANALYSIS !! Atrial fibrillation with rapid ventricular response ST & T wave abnormality, consider lateral ischemia or digitalis effect Abnormal ECG When compared with ECG of 10-FEB-2019 00:09, 
Atrial fibrillation has replaced Sinus rhythm Vent. rate has increased BY  61 BPM 
ST more depressed Inferior leads ST now depressed in Anterolateral leads Confirmed by Cristhian Overton MD (), JULIETA JEFFREY ([id number]) on 2/10/2019 3:03:25 PM 
  
CBC WITH AUTOMATED DIFF Collection Time: 02/10/19  8:45 PM  
Result Value Ref Range WBC 17.3 (H) 4.3 - 11.1 K/uL  
 RBC 4.70 4.05 - 5.2 M/uL  
 HGB 13.7 11.7 - 15.4 g/dL HCT 42.2 35.8 - 46.3 % MCV 89.8 79.6 - 97.8 FL  
 MCH 29.1 26.1 - 32.9 PG  
 MCHC 32.5 31.4 - 35.0 g/dL  
 RDW 15.1 (H) 11.9 - 14.6 % PLATELET 972 (H) 613 - 450 K/uL MPV 11.6 9.4 - 12.3 FL ABSOLUTE NRBC 0.00 0.0 - 0.2 K/uL  
 DF AUTOMATED NEUTROPHILS 81 (H) 43 - 78 % LYMPHOCYTES 12 (L) 13 - 44 % MONOCYTES 4 4.0 - 12.0 % EOSINOPHILS 1 0.5 - 7.8 % BASOPHILS 1 0.0 - 2.0 % IMMATURE GRANULOCYTES 2 0.0 - 5.0 %  
 ABS. NEUTROPHILS 14.0 (H) 1.7 - 8.2 K/UL  
 ABS. LYMPHOCYTES 2.1 0.5 - 4.6 K/UL  
 ABS. MONOCYTES 0.7 0.1 - 1.3 K/UL  
 ABS. EOSINOPHILS 0.1 0.0 - 0.8 K/UL  
 ABS. BASOPHILS 0.2 0.0 - 0.2 K/UL ABS. IMM. GRANS. 0.3 0.0 - 0.5 K/UL METABOLIC PANEL, COMPREHENSIVE Collection Time: 02/10/19  8:45 PM  
Result Value Ref Range Sodium 131 (L) 136 - 145 mmol/L Potassium 5.7 (H) 3.5 - 5.1 mmol/L Chloride 95 (L) 98 - 107 mmol/L  
 CO2 28 21 - 32 mmol/L Anion gap 8 7 - 16 mmol/L Glucose 269 (H) 65 - 100 mg/dL BUN 46 (H) 8 - 23 MG/DL Creatinine 1.75 (H) 0.6 - 1.0 MG/DL  
 GFR est AA 38 (L) >60 ml/min/1.73m2 GFR est non-AA 31 (L) >60 ml/min/1.73m2 Calcium 9.1 8.3 - 10.4 MG/DL Bilirubin, total 0.5 0.2 - 1.1 MG/DL  
 ALT (SGPT) 31 12 - 65 U/L  
 AST (SGOT) 40 (H) 15 - 37 U/L Alk. phosphatase 112 50 - 136 U/L Protein, total 8.8 (H) 6.3 - 8.2 g/dL Albumin 3.3 3.2 - 4.6 g/dL Globulin 5.5 (H) 2.3 - 3.5 g/dL A-G Ratio 0.6 (L) 1.2 - 3.5    
TROPONIN I Collection Time: 02/10/19  8:45 PM  
Result Value Ref Range Troponin-I, Qt. <0.02 (L) 0.02 - 0.05 NG/ML  
BNP Collection Time: 02/10/19  8:45 PM  
Result Value Ref Range BNP 19 (H) 0 pg/mL MAGNESIUM Collection Time: 02/10/19  8:45 PM  
Result Value Ref Range Magnesium 2.1 1.8 - 2.4 mg/dL [facility] XR CHEST PORT Final Result IMPRESSION:  
  
1. Shifting areas of atelectasis with increase on the right and improvement on  
the left. Assessment and Plan: Active Hospital Problems Diagnosis Date Noted  GLENNA (acute kidney injury) ([facility] 02/11/2019  Hyperkalemia 02/11/2019  Chronic midline low back pain without sciatica 02/05/2019  At[facility] fibrillation with RVR ([facility] 01/31/2019  Bipolar disorder without psychotic features ([facility] 12/10/2016  Paraplegia ([facility] 12/10/2016  Type 2 diabetes mellitus with hyperglycemia ([facility] 12/10/2016 PLAN: 
Afib RVR: back on cardizem drip, HR not yet controlled on 7.5. BP marginal.  Was unable to get multaq filled after prior discharge.   Control rate for now, consult cards in AM. Likely to restart multaq assuming the patient will be able to get on it as an outpatient. Leukocytosis, malaise. Malaise consistent with prior afib RVR. No fevers. CXR with some some improving and some new atelectasis. No cough/sputum. Checking UA, was recently treated for CAUTI. No obvious infection at this point. Monitor. Hyperkalemia, GLENNA: worsening over past few days. Hold lasix, other nephrotoxic drugs. Gentle IVF. Chronic pain, bipolar, DVT: home meds DVT PPx: anticoagulated Code Status: full Anticipated discharge: 3-4d Signed By: Chu Magallanes MD   
 February 10, 2019

## 2019-02-12 LAB
ANION GAP SERPL CALC-SCNC: 7 MMOL/L (ref 7–16)
ATRIAL RATE: 66 BPM
BASOPHILS # BLD: 0.1 K/UL (ref 0–0.2)
BASOPHILS NFR BLD: 1 % (ref 0–2)
BUN SERPL-MCNC: 39 MG/DL (ref 8–23)
CALCIUM SERPL-MCNC: 9.1 MG/DL (ref 8.3–10.4)
CALCULATED P AXIS, ECG09: 57 DEGREES
CALCULATED R AXIS, ECG10: 36 DEGREES
CALCULATED T AXIS, ECG11: 58 DEGREES
CHLORIDE SERPL-SCNC: 100 MMOL/L (ref 98–107)
CO2 SERPL-SCNC: 26 MMOL/L (ref 21–32)
CREAT SERPL-MCNC: 1.63 MG/DL (ref 0.6–1)
DIAGNOSIS, 93000: NORMAL
DIFFERENTIAL METHOD BLD: ABNORMAL
EOSINOPHIL # BLD: 0.6 K/UL (ref 0–0.8)
EOSINOPHIL NFR BLD: 5 % (ref 0.5–7.8)
ERYTHROCYTE [DISTWIDTH] IN BLOOD BY AUTOMATED COUNT: 14.9 % (ref 11.9–14.6)
EST. AVERAGE GLUCOSE BLD GHB EST-MCNC: 229 MG/DL
GLUCOSE BLD STRIP.AUTO-MCNC: 219 MG/DL (ref 65–100)
GLUCOSE BLD STRIP.AUTO-MCNC: 253 MG/DL (ref 65–100)
GLUCOSE BLD STRIP.AUTO-MCNC: 334 MG/DL (ref 65–100)
GLUCOSE BLD STRIP.AUTO-MCNC: 358 MG/DL (ref 65–100)
GLUCOSE SERPL-MCNC: 376 MG/DL (ref 65–100)
HBA1C MFR BLD: 9.6 % (ref 4.8–6)
HCT VFR BLD AUTO: 38.5 % (ref 35.8–46.3)
HGB BLD-MCNC: 12 G/DL (ref 11.7–15.4)
IMM GRANULOCYTES # BLD AUTO: 0.2 K/UL (ref 0–0.5)
IMM GRANULOCYTES NFR BLD AUTO: 2 % (ref 0–5)
LYMPHOCYTES # BLD: 2.5 K/UL (ref 0.5–4.6)
LYMPHOCYTES NFR BLD: 24 % (ref 13–44)
MCH RBC QN AUTO: 29.1 PG (ref 26.1–32.9)
MCHC RBC AUTO-ENTMCNC: 31.2 G/DL (ref 31.4–35)
MCV RBC AUTO: 93.4 FL (ref 79.6–97.8)
MM INDURATION POC: 0 MM (ref 0–5)
MONOCYTES # BLD: 0.9 K/UL (ref 0.1–1.3)
MONOCYTES NFR BLD: 8 % (ref 4–12)
NEUTS SEG # BLD: 6.4 K/UL (ref 1.7–8.2)
NEUTS SEG NFR BLD: 60 % (ref 43–78)
NRBC # BLD: 0 K/UL (ref 0–0.2)
P-R INTERVAL, ECG05: 146 MS
PLATELET # BLD AUTO: 400 K/UL (ref 150–450)
PMV BLD AUTO: 11.3 FL (ref 9.4–12.3)
POTASSIUM SERPL-SCNC: 4.7 MMOL/L (ref 3.5–5.1)
PPD POC: NEGATIVE NEGATIVE
Q-T INTERVAL, ECG07: 460 MS
QRS DURATION, ECG06: 80 MS
QTC CALCULATION (BEZET), ECG08: 482 MS
RBC # BLD AUTO: 4.12 M/UL (ref 4.05–5.2)
SODIUM SERPL-SCNC: 133 MMOL/L (ref 136–145)
VENTRICULAR RATE, ECG03: 66 BPM
WBC # BLD AUTO: 10.6 K/UL (ref 4.3–11.1)

## 2019-02-12 PROCEDURE — 85025 COMPLETE CBC W/AUTO DIFF WBC: CPT

## 2019-02-12 PROCEDURE — 83036 HEMOGLOBIN GLYCOSYLATED A1C: CPT

## 2019-02-12 PROCEDURE — 65660000000 HC RM CCU STEPDOWN

## 2019-02-12 PROCEDURE — 74011250636 HC RX REV CODE- 250/636: Performed by: FAMILY MEDICINE

## 2019-02-12 PROCEDURE — 36415 COLL VENOUS BLD VENIPUNCTURE: CPT

## 2019-02-12 PROCEDURE — 74011250637 HC RX REV CODE- 250/637: Performed by: PHYSICIAN ASSISTANT

## 2019-02-12 PROCEDURE — 94640 AIRWAY INHALATION TREATMENT: CPT

## 2019-02-12 PROCEDURE — 82962 GLUCOSE BLOOD TEST: CPT

## 2019-02-12 PROCEDURE — 94760 N-INVAS EAR/PLS OXIMETRY 1: CPT

## 2019-02-12 PROCEDURE — 77010033678 HC OXYGEN DAILY

## 2019-02-12 PROCEDURE — 93005 ELECTROCARDIOGRAM TRACING: CPT | Performed by: PHYSICIAN ASSISTANT

## 2019-02-12 PROCEDURE — 74011636637 HC RX REV CODE- 636/637: Performed by: FAMILY MEDICINE

## 2019-02-12 PROCEDURE — 80048 BASIC METABOLIC PNL TOTAL CA: CPT

## 2019-02-12 PROCEDURE — 74011000250 HC RX REV CODE- 250: Performed by: FAMILY MEDICINE

## 2019-02-12 PROCEDURE — 77030020263 HC SOL INJ SOD CL0.9% LFCR 1000ML

## 2019-02-12 PROCEDURE — 74011250637 HC RX REV CODE- 250/637: Performed by: FAMILY MEDICINE

## 2019-02-12 RX ORDER — INSULIN GLARGINE 100 [IU]/ML
49 INJECTION, SOLUTION SUBCUTANEOUS
Status: DISCONTINUED | OUTPATIENT
Start: 2019-02-12 | End: 2019-02-14 | Stop reason: HOSPADM

## 2019-02-12 RX ORDER — INSULIN GLARGINE 100 [IU]/ML
45 INJECTION, SOLUTION SUBCUTANEOUS
Status: DISCONTINUED | OUTPATIENT
Start: 2019-02-12 | End: 2019-02-12

## 2019-02-12 RX ADMIN — INSULIN GLARGINE 49 UNITS: 100 INJECTION, SOLUTION SUBCUTANEOUS at 23:10

## 2019-02-12 RX ADMIN — FLECAINIDE ACETATE 50 MG: 100 TABLET ORAL at 08:02

## 2019-02-12 RX ADMIN — BUDESONIDE 500 MCG: 0.5 INHALANT RESPIRATORY (INHALATION) at 20:49

## 2019-02-12 RX ADMIN — BUDESONIDE 500 MCG: 0.5 INHALANT RESPIRATORY (INHALATION) at 08:58

## 2019-02-12 RX ADMIN — QUETIAPINE FUMARATE 300 MG: 100 TABLET ORAL at 22:35

## 2019-02-12 RX ADMIN — GUAIFENESIN 600 MG: 600 TABLET, EXTENDED RELEASE ORAL at 08:02

## 2019-02-12 RX ADMIN — OXYCODONE HYDROCHLORIDE 20 MG: 15 TABLET ORAL at 07:04

## 2019-02-12 RX ADMIN — APIXABAN 5 MG: 5 TABLET, FILM COATED ORAL at 08:00

## 2019-02-12 RX ADMIN — OXYCODONE HYDROCHLORIDE 20 MG: 15 TABLET ORAL at 16:14

## 2019-02-12 RX ADMIN — ALPRAZOLAM 0.5 MG: 0.5 TABLET ORAL at 07:53

## 2019-02-12 RX ADMIN — APIXABAN 5 MG: 5 TABLET, FILM COATED ORAL at 17:05

## 2019-02-12 RX ADMIN — ASPIRIN 81 MG 81 MG: 81 TABLET ORAL at 08:00

## 2019-02-12 RX ADMIN — SODIUM CHLORIDE 100 ML/HR: 900 INJECTION, SOLUTION INTRAVENOUS at 13:06

## 2019-02-12 RX ADMIN — SODIUM CHLORIDE 75 ML/HR: 900 INJECTION, SOLUTION INTRAVENOUS at 07:04

## 2019-02-12 RX ADMIN — INSULIN LISPRO 4 UNITS: 100 INJECTION, SOLUTION INTRAVENOUS; SUBCUTANEOUS at 17:05

## 2019-02-12 RX ADMIN — GUAIFENESIN 600 MG: 600 TABLET, EXTENDED RELEASE ORAL at 22:35

## 2019-02-12 RX ADMIN — INSULIN LISPRO 10 UNITS: 100 INJECTION, SOLUTION INTRAVENOUS; SUBCUTANEOUS at 23:10

## 2019-02-12 RX ADMIN — CITALOPRAM HYDROBROMIDE 20 MG: 10 TABLET ORAL at 08:02

## 2019-02-12 RX ADMIN — SODIUM CHLORIDE 100 ML/HR: 900 INJECTION, SOLUTION INTRAVENOUS at 22:34

## 2019-02-12 RX ADMIN — FLECAINIDE ACETATE 50 MG: 100 TABLET ORAL at 22:42

## 2019-02-12 RX ADMIN — Medication 10 ML: at 22:36

## 2019-02-12 RX ADMIN — ALPRAZOLAM 0.5 MG: 0.5 TABLET ORAL at 17:05

## 2019-02-12 RX ADMIN — INSULIN LISPRO 8 UNITS: 100 INJECTION, SOLUTION INTRAVENOUS; SUBCUTANEOUS at 07:52

## 2019-02-12 RX ADMIN — METOPROLOL SUCCINATE 50 MG: 50 TABLET, EXTENDED RELEASE ORAL at 08:00

## 2019-02-12 RX ADMIN — INSULIN LISPRO 6 UNITS: 100 INJECTION, SOLUTION INTRAVENOUS; SUBCUTANEOUS at 11:55

## 2019-02-12 RX ADMIN — SODIUM CHLORIDE 100 ML/HR: 900 INJECTION, SOLUTION INTRAVENOUS at 17:05

## 2019-02-12 NOTE — PROGRESS NOTES
Problem: Falls - Risk of 
Goal: *Absence of Falls Document [facility] Fall Risk and appropriate interventions in the flowsheet. Outcome: Progressing Towards Goal 
Fall Risk Interventions: 
  
 
Mentation Interventions: Family/sitter at bedside, Increase mobility, More frequent rounding Medication Interventions: Patient to call before getting OOB, Evaluate medications/consider consulting pharmacy Elimination Interventions: Call light in reach, Patient to call for help with toileting needs, Toilet paper/wipes in reach, Toileting schedule/hourly rounds

## 2019-02-12 NOTE — PROGRESS NOTES
CM called [facility] pharmacy on [address] Phone [phone] and spoke with Kaiser Gabriel to follow up and see if patient no longer locked out with their pharmacy. Per Kaiser Gabriel he tried a prescription and it was able to go through so he stated looked like it would be okay. They do not have Multaq but can order if patient will need medications. He stated CM could fax prescriptions [phone] and he would make sure there were no issues with her medications at d/c and let CM know if unable to obtain or not covered by Medicaid. CM following for d/c.

## 2019-02-12 NOTE — PROGRESS NOTES
Progress Note Patient: Edie Rice MRN: [id number]  SSN: [id number] YOB: 1955  Age: 59 y.o. Sex: female Admit Date: 2/10/2019 LOS: 2 days Subjective:  
F/U a fib RVR, hyperglycemia, noncompliance \"58 yo female with PMH of paraplegia, SP cath with CAUTI, DM2, HTN, AFIB admitted with recurrent AFIB with RVR after being unable to afford multaq. She has been seen by case management who will assist with medications. She has been seen by cardiology, now in NSR and on IV cardizem/ eliquis-changed to metoprolol/ tambocor. She has some GLENNA and is being hydrated. Additionally has chronic pain followed by pain management. She had CAUTI last month s/p treatment and needs SP cath exchanged. CXR shows stable atelectasis. \" Glucose levels in the 300s. Na corrected normal. Creatine 1.63. HR controlled. Current Facility-Administered Medications Medication Dose Route Frequency  insulin glargine (LANTUS) injection 49 Units  49 Units SubCUTAneous QHS  albuterol (PROVENTIL VENTOLIN) nebulizer solution 2.5 mg  2.5 mg Inhalation Q4H PRN  
 ALPRAZolam (XANAX) tablet 0.5 mg  0.5 mg Oral BID PRN  
 apixaban (ELIQUIS) tablet 5 mg  5 mg Oral BID  aspirin chewable tablet 81 mg  81 mg Oral DAILY  bisacodyl (DULCOLAX) tablet 5 mg  5 mg Oral DAILY PRN  
 citalopram (CELEXA) tablet 20 mg  20 mg Oral DAILY  fentaNYL (DURAGESIC) 25 mcg/hr patch 1 Patch  1 Patch TransDERmal Q72H  
 guaiFENesin ER (MUCINEX) tablet 600 mg  600 mg Oral Q12H  promethazine (PHENERGAN) tablet 25 mg  25 mg Oral Q6H PRN  
 QUEtiapine (SEROquel) tablet 300 mg  300 mg Oral QHS  oxyCODONE IR (ROXICODONE) tablet 20 mg  20 mg Oral Q8H PRN  
 sodium chloride (NS) flush 5-40 mL  5-40 mL IntraVENous Q8H  
 sodium chloride (NS) flush 5-40 mL  5-40 mL IntraVENous PRN  
 bisacodyl (DULCOLAX) tablet 5 mg  5 mg Oral DAILY PRN  
 insulin lispro (HUMALOG) injection   SubCUTAneous AC&HS  
  0.9% sodium chloride infusion  75 mL/hr IntraVENous CONTINUOUS  
 budesonide (PULMICORT) 500 mcg/2 ml nebulizer suspension  500 mcg Nebulization BID RT  
 acetaminophen (TYLENOL) tablet 650 mg  650 mg Oral Q6H PRN  
 flecainide (TAMBOCOR) tablet 50 mg  50 mg Oral Q12H  
 metoprolol succinate (TOPROL-XL) XL tablet 50 mg  50 mg Oral DAILY Objective:  
 
Vitals:  
 02/11/19 2126 02/12/19 0106 02/12/19 0448 02/12/19 0800 BP: 129/74 131/66 115/55 110/61 Pulse: 63 62 70 70 Resp: 18 18 18 Temp: 97.6 °F (36.4 °C) 98.1 °F (36.7 °C) 98.1 °F (36.7 °C) SpO2: (!) 89% 97% 90% Weight:   93.2 kg (205 lb 8 oz) Height:      
  
  
Intake and Output: 
Current Shift: 02/12 0701 - 02/12 1900 In: -  
Out: 225 [Urine:225] Last three shifts: 02/10 1901 - 02/12 0700 In: 2485 [P.O.:2485] Out: 4250 [EMHTV:3349] Physical Exam:  
General:  Alert, cooperative, no distress, appears stated age. Eyes:  Conjunctivae/corneas clear. Ears:  Normal TMs and external ear canals both ears. Nose: Nares normal. Septum midline. Mouth/Throat: Lips, mucosa, and tongue normal. Teeth and gums normal.  
Neck:  no JVD. Back:   deferred Lungs:   Clear to auscultation bilaterally. Heart:  Irregularly irregular, rate controleld Abdomen:   Soft, non-tender. Bowel sounds normal. No masses,  No organomegaly. Obese. Extremities: Limited function of LE bilaterally. Chronic 2+ edema to LE bilaterally. Pulses: 2+ and symmetric all extremities. Skin: Skin color, texture, turgor normal. No rashes or lesions Lymph nodes: Cervical, supraclavicular, and axillary nodes normal.  
Neurologic: CNII-XII intact. Bed bound. Lab/Data Review: 
 
Recent Results (from the past 24 hour(s)) EKG, 12 LEAD, SUBSEQUENT Collection Time: 02/11/19  9:46 AM  
Result Value Ref Range Ventricular Rate 76 BPM  
 Atrial Rate 76 BPM  
 P-R Interval 136 ms QRS Duration 76 ms  
 Q-T Interval 470 ms QTC Calculation (Bezet) 528 ms Calculated P Axis 46 degrees Calculated R Axis 30 degrees Calculated T Axis 56 degrees Diagnosis Sinus rhythm with Premature supraventricular complexes Prolonged QT Abnormal ECG When compared with ECG of 10-FEB-2019 20:39, 
Sinus rhythm has replaced Atrial flutter Vent. rate has decreased BY  55 BPM 
Nonspecific T wave abnormality no longer evident in Lateral leads Confirmed by JOSÉ MCGOWAN (), Mansi Kasper ([id number]) on 2/11/2019 10:29:35 AM 
  
GLUCOSE, POC Collection Time: 02/11/19 11:47 AM  
Result Value Ref Range Glucose (POC) 282 (H) 65 - 100 mg/dL URINALYSIS W/ RFLX MICROSCOPIC Collection Time: 02/11/19  3:04 PM  
Result Value Ref Range Color YELLOW Appearance CLOUDY Specific gravity 1.011 1.001 - 1.023    
 pH (UA) 5.5 5.0 - 9.0 Protein NEGATIVE  NEG mg/dL Glucose NEGATIVE  mg/dL Ketone NEGATIVE  NEG mg/dL Bilirubin NEGATIVE  NEG Blood LARGE (A) NEG Urobilinogen 0.2 0.2 - 1.0 EU/dL Nitrites NEGATIVE  NEG Leukocyte Esterase MODERATE (A) NEG    
 WBC 0-3 0 /hpf  
 RBC 5-10 0 /hpf Epithelial cells 0-3 0 /hpf Bacteria 0 0 /hpf Casts 0-3 0 /lpf  
GLUCOSE, POC Collection Time: 02/11/19  4:34 PM  
Result Value Ref Range Glucose (POC) 315 (H) 65 - 100 mg/dL GLUCOSE, POC Collection Time: 02/11/19  9:23 PM  
Result Value Ref Range Glucose (POC) 388 (H) 65 - 100 mg/dL PLEASE READ & DOCUMENT PPD TEST IN 24 HRS Collection Time: 02/12/19  1:00 AM  
Result Value Ref Range PPD Negative Negative  
 mm Induration 0 mm CBC WITH AUTOMATED DIFF Collection Time: 02/12/19  3:56 AM  
Result Value Ref Range WBC 10.6 4.3 - 11.1 K/uL  
 RBC 4.12 4.05 - 5.2 M/uL  
 HGB 12.0 11.7 - 15.4 g/dL HCT 38.5 35.8 - 46.3 % MCV 93.4 79.6 - 97.8 FL  
 MCH 29.1 26.1 - 32.9 PG  
 MCHC 31.2 (L) 31.4 - 35.0 g/dL  
 RDW 14.9 (H) 11.9 - 14.6 % PLATELET 533 133 - 847 K/uL  MPV 11.3 9.4 - 12.3 FL  
 ABSOLUTE NRBC 0.00 0.0 - 0.2 K/uL  
 DF AUTOMATED NEUTROPHILS 60 43 - 78 % LYMPHOCYTES 24 13 - 44 % MONOCYTES 8 4.0 - 12.0 % EOSINOPHILS 5 0.5 - 7.8 % BASOPHILS 1 0.0 - 2.0 % IMMATURE GRANULOCYTES 2 0.0 - 5.0 %  
 ABS. NEUTROPHILS 6.4 1.7 - 8.2 K/UL  
 ABS. LYMPHOCYTES 2.5 0.5 - 4.6 K/UL  
 ABS. MONOCYTES 0.9 0.1 - 1.3 K/UL  
 ABS. EOSINOPHILS 0.6 0.0 - 0.8 K/UL  
 ABS. BASOPHILS 0.1 0.0 - 0.2 K/UL  
 ABS. IMM. GRANS. 0.2 0.0 - 0.5 K/UL METABOLIC PANEL, BASIC Collection Time: 02/12/19  3:56 AM  
Result Value Ref Range Sodium 133 (L) 136 - 145 mmol/L Potassium 4.7 3.5 - 5.1 mmol/L Chloride 100 98 - 107 mmol/L  
 CO2 26 21 - 32 mmol/L Anion gap 7 7 - 16 mmol/L Glucose 376 (H) 65 - 100 mg/dL BUN 39 (H) 8 - 23 MG/DL Creatinine 1.63 (H) 0.6 - 1.0 MG/DL  
 GFR est AA 41 (L) >60 ml/min/1.73m2 GFR est non-AA 34 (L) >60 ml/min/1.73m2 Calcium 9.1 8.3 - 10.4 MG/DL  
GLUCOSE, POC Collection Time: 02/12/19  6:11 AM  
Result Value Ref Range Glucose (POC) 334 (H) 65 - 100 mg/dL EKG, 12 LEAD, SUBSEQUENT Collection Time: 02/12/19  6:52 AM  
Result Value Ref Range Ventricular Rate 66 BPM  
 Atrial Rate 66 BPM  
 P-R Interval 146 ms  
 QRS Duration 80 ms  
 Q-T Interval 460 ms QTC Calculation (Bezet) 482 ms Calculated P Axis 57 degrees Calculated R Axis 36 degrees Calculated T Axis 58 degrees Diagnosis Normal sinus rhythm Normal ECG When compared with ECG of 11-FEB-2019 09:46, 
Premature supraventricular complexes are no longer Present QT has shortened Confirmed by JOSÉ MCGOWAN (), Sonu Luque ([id number]) on 2/12/2019 7:45:23 AM 
  
 
 
Assessment/ Plan:  
 
Principal Problem: 
  Atrial fibrillation with RVR ([facility] (1/31/2019) Active Problems: 
  Type 2 diabetes mellitus with hyperglycemia ([facility] (12/10/2016) Paraplegia ([facility] (12/10/2016) Bipolar disorder without psychotic features ([facility] (12/10/2016) Chronic midline low back pain without sciatica (2/5/2019) GLENNA (acute kidney injury) ([facility] (2/11/2019) Hyperkalemia (2/11/2019) A fib RVR- Rate controlled. Eliquis 5mg BID. Flecainide 50mg BID and metoprolol succinate 50mg daily. Chronic urinary retention - Denies dysuria. Afebrile and no leukocytosis. Will not treat for UTI. Paraplegia - Stable. Chronic pain - Fentanyl patch. PRN oxycodone 20mg q8hr. DM uncontrolled glucose levels - Patient states she took 57 units of Levemir at home. She has required 38 units SS in past 24 hours. Will increase her Lantus to 49 units with continuing SS. Patient seems confused when discussing what medications she takes. Will check A1C. Hyperkalemia and GLENNA- NS 100ml/hr. Slowly improving. Baseline creatine 0.6. DVT prophylaxis - Eliquis 5mg BID Look to dc in 1-2 days. Likely will need home with home health. Signed By: Laura Lopez DO February 12, 2019

## 2019-02-12 NOTE — PROGRESS NOTES
Bedside and verbal report received from [facility] and [facility].

## 2019-02-12 NOTE — PROGRESS NOTES
[facility] CARDIOLOGY PROGRESS NOTE 
      
 
2/12/2019 9:39 AM 
 
Admit Date: 2/10/2019 Subjective: No cp or inc sob ROS: 
Cardiovascular:  As noted above Objective:  
  
Vitals:  
 02/11/19 2126 02/12/19 0106 02/12/19 0448 02/12/19 0800 BP: 129/74 131/66 115/55 110/61 Pulse: 63 62 70 70 Resp: 18 18 18 Temp: 97.6 °F (36.4 °C) 98.1 °F (36.7 °C) 98.1 °F (36.7 °C) SpO2: (!) 89% 97% 90% Weight:   93.2 kg (205 lb 8 oz) Height:      
 
 
Physical Exam: 
General-No Acute Distress Neck- supple, no JVD 
CV- regular rate and rhythm no MRG Lung- clear bilaterally Abd- soft, nontender, nondistended Ext- no edema bilaterally. Skin- warm and dry Data Review:  
Recent Labs  
  02/12/19 
0356 02/11/19 
0347 02/10/19 
2045 * 133* 131* K 4.7 4.7 5.7* MG  --   --  2.1 BUN 39* 46* 46* CREA 1.63* 1.65* 1.75* * 288* 269* WBC 10.6 16.2* 17.3* HGB 12.0 12.5 13.7 HCT 38.5 39.6 42.2  440 498* TROIQ  --   --  <0.02* Assessment/Plan:  
 
Principal Problem: 
  Atrial fibrillation with [facility] ([facility] (1/31/2019) Active Problems: 
  Type 2 diabetes mellitus with hyperglycemia ([facility] (12/10/2016) Paraplegia ([facility] (12/10/2016) Bipolar disorder without psychotic features ([facility] (12/10/2016) Chronic midline low back pain without sciatica (2/5/2019) GLENNA (acute kidney injury) ([facility] (2/11/2019) Hyperkalemia (2/11/2019) 
   
///// 
 
C/V is stable. Her current cardiac regimen should be safe and affordable in spite of her GLENNA and uncontrolled diabetes. Stop aspirin. On standby.  
 
 
 
 
Zoe Norris MD 
2/12/2019 9:39 AM

## 2019-02-12 NOTE — PROGRESS NOTES
Verbal bedside report given to nga Heard RN. Patient's situation, background, assessment and recommendations provided. Opportunity for questions provided. Oncoming RN assumed care of patient.

## 2019-02-12 NOTE — PROGRESS NOTES
[facility] Assumed care of patient.

## 2019-02-12 NOTE — PROGRESS NOTES
Bedside and Verbal shift change report given to Santos Edwards RN (oncoming nurse) by self and Mayra Crews RN (offgoing nurse). Report included the following information SBAR, Kardex, MAR and Recent Results.

## 2019-02-12 NOTE — PROGRESS NOTES
Problem: Falls - Risk of 
Goal: *Absence of Falls Document [facility] Fall Risk and appropriate interventions in the flowsheet. Outcome: Progressing Towards Goal 
Fall Risk Interventions: 
  
 
Mentation Interventions: Adequate sleep, hydration, pain control Medication Interventions: Evaluate medications/consider consulting pharmacy Elimination Interventions: Call light in reach, Patient to call for help with toileting needs Patient progressing towards goal with no falls on current admission. Patient without confusion, agitation, or sensory perception deficits. Personal belongings are within reach. Bed is in the low and locked position with side rails up x2. Yellow gripper socks to bilateral feet. Call light within reach and patient verbalizes understanding of use.

## 2019-02-13 LAB
ANION GAP SERPL CALC-SCNC: 8 MMOL/L (ref 7–16)
ATRIAL RATE: 66 BPM
BUN SERPL-MCNC: 29 MG/DL (ref 8–23)
CALCIUM SERPL-MCNC: 8.2 MG/DL (ref 8.3–10.4)
CALCULATED P AXIS, ECG09: 50 DEGREES
CALCULATED R AXIS, ECG10: 32 DEGREES
CALCULATED T AXIS, ECG11: 56 DEGREES
CHLORIDE SERPL-SCNC: 107 MMOL/L (ref 98–107)
CO2 SERPL-SCNC: 25 MMOL/L (ref 21–32)
CREAT SERPL-MCNC: 1 MG/DL (ref 0.6–1)
DIAGNOSIS, 93000: NORMAL
GLUCOSE BLD STRIP.AUTO-MCNC: 163 MG/DL (ref 65–100)
GLUCOSE BLD STRIP.AUTO-MCNC: 184 MG/DL (ref 65–100)
GLUCOSE BLD STRIP.AUTO-MCNC: 199 MG/DL (ref 65–100)
GLUCOSE BLD STRIP.AUTO-MCNC: 201 MG/DL (ref 65–100)
GLUCOSE SERPL-MCNC: 198 MG/DL (ref 65–100)
MM INDURATION POC: 0 MM (ref 0–5)
P-R INTERVAL, ECG05: 148 MS
POTASSIUM SERPL-SCNC: 5 MMOL/L (ref 3.5–5.1)
PPD POC: NEGATIVE NEGATIVE
Q-T INTERVAL, ECG07: 446 MS
QRS DURATION, ECG06: 82 MS
QTC CALCULATION (BEZET), ECG08: 467 MS
SODIUM SERPL-SCNC: 140 MMOL/L (ref 136–145)
VENTRICULAR RATE, ECG03: 66 BPM

## 2019-02-13 PROCEDURE — 74011636637 HC RX REV CODE- 636/637: Performed by: FAMILY MEDICINE

## 2019-02-13 PROCEDURE — 74011250636 HC RX REV CODE- 250/636: Performed by: FAMILY MEDICINE

## 2019-02-13 PROCEDURE — 80048 BASIC METABOLIC PNL TOTAL CA: CPT

## 2019-02-13 PROCEDURE — 77010033678 HC OXYGEN DAILY

## 2019-02-13 PROCEDURE — 36415 COLL VENOUS BLD VENIPUNCTURE: CPT

## 2019-02-13 PROCEDURE — 65660000000 HC RM CCU STEPDOWN

## 2019-02-13 PROCEDURE — 74011250637 HC RX REV CODE- 250/637: Performed by: PHYSICIAN ASSISTANT

## 2019-02-13 PROCEDURE — 74011000250 HC RX REV CODE- 250: Performed by: FAMILY MEDICINE

## 2019-02-13 PROCEDURE — 82962 GLUCOSE BLOOD TEST: CPT

## 2019-02-13 PROCEDURE — 74011250637 HC RX REV CODE- 250/637: Performed by: FAMILY MEDICINE

## 2019-02-13 PROCEDURE — 94760 N-INVAS EAR/PLS OXIMETRY 1: CPT

## 2019-02-13 PROCEDURE — 93005 ELECTROCARDIOGRAM TRACING: CPT | Performed by: PHYSICIAN ASSISTANT

## 2019-02-13 PROCEDURE — 94640 AIRWAY INHALATION TREATMENT: CPT

## 2019-02-13 PROCEDURE — 77030020263 HC SOL INJ SOD CL0.9% LFCR 1000ML

## 2019-02-13 RX ORDER — METOPROLOL SUCCINATE 50 MG/1
50 TABLET, EXTENDED RELEASE ORAL DAILY
Qty: 30 TAB | Refills: 0 | Status: ON HOLD | OUTPATIENT
Start: 2019-02-14 | End: 2019-05-01 | Stop reason: SDUPTHER

## 2019-02-13 RX ORDER — INSULIN GLARGINE 100 [IU]/ML
INJECTION, SOLUTION SUBCUTANEOUS
Qty: 1 VIAL | Refills: 0 | Status: ON HOLD | OUTPATIENT
Start: 2019-02-13 | End: 2019-03-13

## 2019-02-13 RX ORDER — FLECAINIDE ACETATE 50 MG/1
50 TABLET ORAL EVERY 12 HOURS
Qty: 60 TAB | Refills: 0 | Status: ON HOLD | OUTPATIENT
Start: 2019-02-13 | End: 2019-04-09 | Stop reason: SDUPTHER

## 2019-02-13 RX ORDER — INSULIN LISPRO 100 [IU]/ML
INJECTION, SOLUTION INTRAVENOUS; SUBCUTANEOUS
Qty: 1 VIAL | Refills: 0 | Status: ON HOLD | OUTPATIENT
Start: 2019-02-13 | End: 2019-05-01 | Stop reason: SDUPTHER

## 2019-02-13 RX ADMIN — INSULIN LISPRO 4 UNITS: 100 INJECTION, SOLUTION INTRAVENOUS; SUBCUTANEOUS at 16:39

## 2019-02-13 RX ADMIN — SODIUM CHLORIDE 100 ML/HR: 900 INJECTION, SOLUTION INTRAVENOUS at 08:17

## 2019-02-13 RX ADMIN — ACETAMINOPHEN 650 MG: 325 TABLET, FILM COATED ORAL at 15:00

## 2019-02-13 RX ADMIN — INSULIN LISPRO 2 UNITS: 100 INJECTION, SOLUTION INTRAVENOUS; SUBCUTANEOUS at 11:22

## 2019-02-13 RX ADMIN — CITALOPRAM HYDROBROMIDE 20 MG: 10 TABLET ORAL at 08:20

## 2019-02-13 RX ADMIN — GUAIFENESIN 600 MG: 600 TABLET, EXTENDED RELEASE ORAL at 08:20

## 2019-02-13 RX ADMIN — INSULIN GLARGINE 49 UNITS: 100 INJECTION, SOLUTION SUBCUTANEOUS at 22:24

## 2019-02-13 RX ADMIN — FLECAINIDE ACETATE 50 MG: 100 TABLET ORAL at 20:27

## 2019-02-13 RX ADMIN — OXYCODONE HYDROCHLORIDE 20 MG: 15 TABLET ORAL at 12:24

## 2019-02-13 RX ADMIN — APIXABAN 5 MG: 5 TABLET, FILM COATED ORAL at 18:16

## 2019-02-13 RX ADMIN — ALPRAZOLAM 0.5 MG: 0.5 TABLET ORAL at 11:50

## 2019-02-13 RX ADMIN — METOPROLOL SUCCINATE 50 MG: 50 TABLET, EXTENDED RELEASE ORAL at 08:17

## 2019-02-13 RX ADMIN — BUDESONIDE 500 MCG: 0.5 INHALANT RESPIRATORY (INHALATION) at 21:10

## 2019-02-13 RX ADMIN — INSULIN LISPRO 2 UNITS: 100 INJECTION, SOLUTION INTRAVENOUS; SUBCUTANEOUS at 07:29

## 2019-02-13 RX ADMIN — QUETIAPINE FUMARATE 300 MG: 100 TABLET ORAL at 20:27

## 2019-02-13 RX ADMIN — PROMETHAZINE HYDROCHLORIDE 25 MG: 25 TABLET ORAL at 14:02

## 2019-02-13 RX ADMIN — FLECAINIDE ACETATE 50 MG: 100 TABLET ORAL at 08:20

## 2019-02-13 RX ADMIN — INSULIN LISPRO 2 UNITS: 100 INJECTION, SOLUTION INTRAVENOUS; SUBCUTANEOUS at 22:24

## 2019-02-13 RX ADMIN — BUDESONIDE 500 MCG: 0.5 INHALANT RESPIRATORY (INHALATION) at 07:32

## 2019-02-13 RX ADMIN — OXYCODONE HYDROCHLORIDE 20 MG: 15 TABLET ORAL at 04:17

## 2019-02-13 RX ADMIN — OXYCODONE HYDROCHLORIDE 20 MG: 15 TABLET ORAL at 20:27

## 2019-02-13 RX ADMIN — GUAIFENESIN 600 MG: 600 TABLET, EXTENDED RELEASE ORAL at 20:27

## 2019-02-13 RX ADMIN — APIXABAN 5 MG: 5 TABLET, FILM COATED ORAL at 08:18

## 2019-02-13 NOTE — PROGRESS NOTES
Verbal bedside report received from [facility], [facility], [facility]. Assumed care of patient. Suprapubic cath visualized

## 2019-02-13 NOTE — PROGRESS NOTES
Care Management Interventions PCP Verified by CM: Yes(New PCP set up last admission and was to see 2/12 and this is rescheduled  and appt on AVS) Palliative Care Criteria Met (RRAT>21 & CHF Dx)?: No(RRAT 14 Dx Afib) Mode of Transport at Discharge: BLS([facility]) Transition of Care Consult (CM Consult): Home Health [facility] - INPATIENT: No 
Reason Outside [place]: Patient already serviced by other home care/hospice agency Discharge Durable Medical Equipment: No(Pastor lift, wheel chair and hospital bed ) Physical Therapy Consult: No 
Occupational Therapy Consult: No 
Speech Therapy Consult: No 
Current Support Network: Other(lives in apartment with son Olivia Reece) Confirm Follow Up Transport: Other (see comment)(ambulance [facility] and also access to [facility] ) Plan discussed with Pt/Family/Caregiver: Yes Freedom of Choice Offered: Yes Discharge Location Discharge Placement: Home with home health Patient to d/c home with [facility] and transported by [facility] ambulance. Ambulance on call until confirm family will be at home for patient.

## 2019-02-13 NOTE — DISCHARGE INSTRUCTIONS
If worsening AMS, chest pain, or SOB, please come back to the ED.      Repeat BMP in three days. Patient Education      Atrial Fibrillation: Care Instructions  Your Care Instructions    Atrial fibrillation is an irregular and often fast heartbeat. Treating this condition is important for several reasons. It can cause blood clots, which can travel from your heart to your brain and cause a stroke. If you have a fast heartbeat, you may feel lightheaded, dizzy, and weak. An irregular heartbeat can also increase your risk for heart failure. Atrial fibrillation is often the result of another heart condition, such as high blood pressure or coronary artery disease. Making changes to improve your heart condition will help you stay healthy and active. Follow-up care is a key part of your treatment and safety. Be sure to make and go to all appointments, and call your doctor if you are having problems. It's also a good idea to know your test results and keep a list of the medicines you take. How can you care for yourself at home? Medicines    · Take your medicines exactly as prescribed. Call your doctor if you think you are having a problem with your medicine. You will get more details on the specific medicines your doctor prescribes.     · If your doctor has given you a blood thinner to prevent a stroke, be sure you get instructions about how to take your medicine safely. Blood thinners can cause serious bleeding problems.     · Do not take any vitamins, over-the-counter drugs, or herbal products without talking to your doctor first.    Lifestyle changes    · Do not smoke. Smoking can increase your chance of a stroke and heart attack. If you need help quitting, talk to your doctor about stop-smoking programs and medicines. These can increase your chances of quitting for good.     · Eat a heart-healthy diet.     · Stay at a healthy weight.  Lose weight if you need to.     · Limit alcohol to 2 drinks a day for men and 1 drink a day for women. Too much alcohol can cause health problems.     · Avoid colds and flu. Get a pneumococcal vaccine shot. If you have had one before, ask your doctor whether you need another dose. Get a flu shot every year. If you must be around people with colds or flu, wash your hands often. Activity    · If your doctor recommends it, get more exercise. Walking is a good choice. Bit by bit, increase the amount you walk every day. Try for at least 30 minutes on most days of the week. You also may want to swim, bike, or do other activities. Your doctor may suggest that you join a cardiac rehabilitation program so that you can have help increasing your physical activity safely.     · Start light exercise if your doctor says it is okay. Even a small amount will help you get stronger, have more energy, and manage stress. Walking is an easy way to get exercise. Start out by walking a little more than you did in the hospital. Gradually increase the amount you walk.     · When you exercise, watch for signs that your heart is working too hard. You are pushing too hard if you cannot talk while you are exercising. If you become short of breath or dizzy or have chest pain, sit down and rest immediately.     · Check your pulse regularly. Place two fingers on the artery at the palm side of your wrist, in line with your thumb. If your heartbeat seems uneven or fast, talk to your doctor. When should you call for help? Call 911 anytime you think you may need emergency care. For example, call if:    · You have symptoms of a heart attack. These may include:  ? Chest pain or pressure, or a strange feeling in the chest.  ? Sweating. ? Shortness of breath. ? Nausea or vomiting. ? Pain, pressure, or a strange feeling in the back, neck, jaw, or upper belly or in one or both shoulders or arms. ? Lightheadedness or sudden weakness. ? A fast or irregular heartbeat.   After you call 911, the  may tell you to chew 1 adult-strength or 2 to 4 low-dose aspirin. Wait for an ambulance. Do not try to drive yourself.     · You have symptoms of a stroke. These may include:  ? Sudden numbness, tingling, weakness, or loss of movement in your face, arm, or leg, especially on only one side of your body. ? Sudden vision changes. ? Sudden trouble speaking. ? Sudden confusion or trouble understanding simple statements. ? Sudden problems with walking or balance. ? A sudden, severe headache that is different from past headaches.     · You passed out (lost consciousness).    Call your doctor now or seek immediate medical care if:    · You have new or increased shortness of breath.     · You feel dizzy or lightheaded, or you feel like you may faint.     · Your heart rate becomes irregular.     · You can feel your heart flutter in your chest or skip heartbeats. Tell your doctor if these symptoms are new or worse.    Watch closely for changes in your health, and be sure to contact your doctor if you have any problems. Where can you learn more? Go to http://sri-hollie.info/. Enter U020 in the search box to learn more about \"Atrial Fibrillation: Care Instructions. \"  Current as of: July 22, 2018  Content Version: 11.9  © 9224-0937 [employer]. Care instructions adapted under license by [employer] (which disclaims liability or warranty for this information). If you have questions about a medical condition or this instruction, always ask your healthcare professional. [place] [address] any warranty or liability for your use of this information. Patient Education        Acute Kidney Injury: Care Instructions  Your Care Instructions    Acute kidney injury (GLENNA) is a sudden decrease in kidney function. This can happen over a period of hours, days or, in some cases, weeks. GLENNA used to be called acute renal failure, but kidney failure doesn't always happen with GLENNA.  Common causes of GLENNA are dehydration, blood loss, and medicines. When GLENNA happens, the kidneys have trouble removing waste and excess fluids from the body. The waste and fluids build up and become harmful. Kidney function may return to normal if the cause of GLENNA is treated quickly. Your chance of a full recovery depends on what caused the problem, how quickly the cause was treated, and what other medical problems you have. A machine may be used to help your kidneys remove waste and fluids for a short period of time. This is called dialysis. Follow-up care is a key part of your treatment and safety. Be sure to make and go to all appointments, and call your doctor if you are having problems. It's also a good idea to know your test results and keep a list of the medicines you take. How can you care for yourself at home? · Talk to your doctor about how much fluid you should drink. · Eat a balanced diet. Talk to your doctor or a dietitian about what type of diet may be best for you. You may need to limit sodium, potassium, and phosphorus. · If you need dialysis, follow the instructions and schedule for dialysis that your doctor gives you. · Do not smoke. Smoking can make your condition worse. If you need help quitting, talk to your doctor about stop-smoking programs and medicines. These can increase your chances of quitting for good. · Do not drink alcohol. · Review all of your medicines with your doctor. Do not take any medicines, including nonsteroidal anti-inflammatory drugs (NSAIDs) such as ibuprofen (Advil, Motrin) or naproxen (Aleve), unless your doctor says it is safe for you to do so. · Make sure that anyone treating you for any health problem knows that you have had GLENNA. When should you call for help? Call 911 anytime you think you may need emergency care.  For example, call if:    · You passed out (lost consciousness).    Call your doctor now or seek immediate medical care if:    · You have new or worse nausea and vomiting.     · You have much less urine than normal, or you have no urine.     · You are feeling confused or cannot think clearly.     · You have new or more blood in your urine.     · You have new swelling.     · You are dizzy or lightheaded, or feel like you may faint.    Watch closely for changes in your health, and be sure to contact your doctor if:    · You do not get better as expected. Where can you learn more? Go to http://sri-hollie.info/. Enter Z688 in the search box to learn more about \"Acute Kidney Injury: Care Instructions. \"  Current as of: March 14, 2018  Content Version: 11.9  © 5719-2309 [employer] Care instructions adapted under license by [employer] (which disclaims liability or warranty for this information). If you have questions about a medical condition or this instruction, always ask your healthcare professional. [address] any warranty or liability for your use of this information.

## 2019-02-13 NOTE — PROGRESS NOTES
Verbal bedside report received from Boubacar, [address] [facility]. Assumed care of patient.

## 2019-02-13 NOTE — PROGRESS NOTES
CM has left 2 voices message for son about patient ready for d/c and no return call. [facility] on call for d/c if son calls and states he is home.

## 2019-02-13 NOTE — PROGRESS NOTES
Verbal bedside report given to Daljit Pereyra oncoming RN. Patient's situation, background, assessment and recommendations provided. Opportunity for questions provided. Oncoming RN assumed care of patient.

## 2019-02-13 NOTE — PROGRESS NOTES
Follow up with patient for d/c. Patient ready for d/c and will need ambulance transport. Left message for son Bentley Larose to inform of d/c and make sure someone is going to be there when patient is transported home. Address [address]. Called [facility] on [address] and they have Tambacor in stock for patient to be able to obtain. [facility] ambulance set up on call for transport. Patient request referral to [facility] since she has had them in the past. CM sent referral to [facility] with confirmation referral received [id number]. [facility] also current with [facility] and order obtained for home health RN/PT/OT/Aide and sent to [facility] with confirmation received.

## 2019-02-13 NOTE — DISCHARGE SUMMARY
Hospitalist Discharge Summary     Patient ID:  Yuko Peñaloza  [id number]  8 y.o.  1955  Admit date: 2/10/2019  6:40 PM  Discharge date and time: 2/13/2019  Attending: Rey Loza DO  PCP:  None  Treatment Team: Attending Provider: Rey Loza DO; Care Manager: Ana Jolley RN    Principal Diagnosis Atrial fibrillation with RVR [facility])   Principal Problem:    Atrial fibrillation with RVR ([facility] (1/31/2019)    Active Problems:    Type 2 diabetes mellitus with hyperglycemia ([facility] (12/10/2016)      Paraplegia ([facility] (12/10/2016)      Bipolar disorder without psychotic features ([facility] (12/10/2016)      Chronic midline low back pain without sciatica (2/5/2019)      GLENNA (acute kidney injury) ([facility] (2/11/2019)      Hyperkalemia (2/11/2019)     Hospital Course: \"58 yo female with PMH of paraplegia, SP cath with CAUTI from chronic [place], DM2, HTN, AFIB admitted with recurrent AFIB with RVR after being unable to afford multaq. She has been seen by case management who will assist with medications. She has been seen by cardiology, now in NSR on IV cardizem/ eliquis-changed to metoprolol/ tambocor. She has some GLENNA and is being hydrated with creatine back to 1.\" With metoprolol succinate and flecainide, HR has been well controlled. Patient did not want rehab and wanted to go home with home health. Patient originally said she was feeling weak today but while discussing her weakness she started to verbally fight along with using curse words towards her son who is the primary care taker. Patient also states she has not been able to eat all day but then asks me to find her chips and crackers to eat since she is hungry. She states she has Eliquis at home and understands the importance to continue Eliquis to reduce chance of stroke. Needs to be compliant with home medications. If worsening AMS, chest pain, or SOB, please come back to the ED. Repeat BMP in three days.      Please refer to the admission H&P for details of presentation. In summary, the patient is stable for discharge with home health. Significant Diagnostic Studies:       Labs: Results:       Chemistry Recent Labs     02/13/19  0340 02/12/19  0356 02/11/19  0347 02/10/19  2045   * 376* 288* 269*    133* 133* 131*   K 5.0 4.7 4.7 5.7*    100 95* 95*   CO2 25 26 28 28   BUN 29* 39* 46* 46*   CREA 1.00 1.63* 1.65* 1.75*   CA 8.2* 9.1 9.0 9.1   AGAP 8 7 10 8   AP  --   --   --  112   TP  --   --   --  8.8*   ALB  --   --   --  3.3   GLOB  --   --   --  5.5*   AGRAT  --   --   --  0.6*      CBC w/Diff Recent Labs     02/12/19  0356 02/11/19  0347 02/10/19  2045   WBC 10.6 16.2* 17.3*   RBC 4.12 4.39 4.70   HGB 12.0 12.5 13.7   HCT 38.5 39.6 42.2    440 498*   GRANS 60  --  81*   LYMPH 24  --  12*   EOS 5  --  1      Cardiac Enzymes No results for input(s): CPK, CKND1, DEMETRA in the last 72 hours. No lab exists for component: CKRMB, TROIP   Coagulation No results for input(s): PTP, INR, APTT in the last 72 hours. No lab exists for component: INREXT, INREXT    Lipid Panel No results found for: CHOL, CHOLPOCT, CHOLX, CHLST, CHOLV, 546446, HDL, LDL, LDLC, DLDLP, 033163, VLDLC, VLDL, TGLX, TRIGL, TRIGP, TGLPOCT, CHHD, CHHDX   BNP No results for input(s): BNPP in the last 72 hours. Liver Enzymes Recent Labs     02/10/19  2045   TP 8.8*   ALB 3.3      SGOT 40*      Thyroid Studies Lab Results   Component Value Date/Time    TSH 2.110 01/31/2019 10:56 AM            Discharge Exam:  Visit Vitals  /74   Pulse 66   Temp 98.1 °F (36.7 °C)   Resp 18   Ht 5' 5\" (1.651 m)   Wt 93.8 kg (206 lb 11.2 oz)   SpO2 95%   BMI 34.40 kg/m²     General appearance: alert, cooperative, no distress, appears stated age  Lungs: clear to auscultation bilaterally  Heart: regular rate and rhythm, S1, S2 normal, no murmur, click, rub or gallop, distant heart sounds. Abdomen: soft, non-tender. Bowel sounds normal. No masses,  no organomegaly. Obese.   Extremities: Trace edema to LE bilaterally   Neurologic: paraplegic to LE bilaterally. Chronic jasso. Disposition: Home with home health   Discharge Condition: stable  Patient Instructions: as above   Current Discharge Medication List      START taking these medications    Details   insulin glargine (LANTUS) 100 unit/mL injection 49 units at bedtime. Qty: 1 Vial, Refills: 0      flecainide (TAMBOCOR) 50 mg tablet Take 1 Tab by mouth every twelve (12) hours. Qty: 60 Tab, Refills: 0      metoprolol succinate (TOPROL-XL) 50 mg XL tablet Take 1 Tab by mouth daily. Qty: 30 Tab, Refills: 0      insulin lispro (HUMALOG) 100 unit/mL injection Less than 150 =   0 units           150 -199 =   2 units  200 -249 =   4 units  250 -299 =   6 units  300 -349 =   8 units  Before meals and at bedtime. Qty: 1 Vial, Refills: 0         CONTINUE these medications which have NOT CHANGED    Details   ALPRAZolam (XANAX) 0.5 mg tablet Take 1 Tab by mouth two (2) times daily as needed for Anxiety. Max Daily Amount: 1 mg. Qty: 30 Tab, Refills: 0    Associated Diagnoses: Bipolar disorder without psychotic features ([address]      fentaNYL (DURAGESIC) 25 mcg/hr PATCH 1 Patch by TransDERmal route every seventy-two (72) hours. Max Daily Amount: 1 Patch. Qty: 5 Patch, Refills: 0    Associated Diagnoses: Chronic midline low back pain without sciatica      oxyCODONE IR (ROXICODONE) 20 mg immediate release tablet Take 1 Tab by mouth every eight (8) hours as needed. Max Daily Amount: 60 mg.  Qty: 30 Tab, Refills: 0    Associated Diagnoses: Chronic midline low back pain without sciatica      guaiFENesin ER (MUCINEX) 600 mg ER tablet Take 1 Tab by mouth every twelve (12) hours for 7 days. Qty: 14 Tab, Refills: 0      apixaban (ELIQUIS) 5 mg tablet Take 1 Tab by mouth two (2) times a day for 30 days.   Qty: 60 Tab, Refills: 0      albuterol (PROVENTIL HFA, VENTOLIN HFA, PROAIR HFA) 90 mcg/actuation inhaler Take 1 Puff by inhalation every four (4) hours as needed for Wheezing. Qty: 1 Inhaler, Refills: 0      Blood-Glucose Meter monitoring kit Check glucose at home daily  Qty: 1 Kit, Refills: 0      bisacodyl (DULCOLAX) 5 mg EC tablet Take 1 Tab by mouth daily as needed for Constipation. Qty: 30 Tab, Refills: 0      budesonide (PULMICORT) 0.5 mg/2 mL nbsp 2 mL by Nebulization route two (2) times a day. Qty: 60 Each, Refills: 0      nystatin (MYCOSTATIN) powder Apply  to affected area two (2) times a day. Qty: 1 Bottle, Refills: 0      zinc oxide-cod liver oil (DESITIN) 40 % paste Apply  to affected area two (2) times a day. Qty: 1 Tube, Refills: 1      omeprazole (PRILOSEC) 40 mg capsule Take 40 mg by mouth daily. promethazine (PHENERGAN) 25 mg tablet Take 25 mg by mouth every six (6) hours as needed for Nausea. QUEtiapine (SEROQUEL) 100 mg tablet Take 300 mg by mouth nightly. STOP taking these medications       insulin glargine (LANTUS,BASAGLAR) 100 unit/mL (3 mL) inpn Comments:   Reason for Stopping:         dronedarone (MULTAQ) tab tablet Comments:   Reason for Stopping:         metoprolol tartrate (LOPRESSOR) 50 mg tablet Comments:   Reason for Stopping:         pregabalin (LYRICA) 100 mg capsule Comments:   Reason for Stopping:         furosemide (LASIX) 40 mg tablet Comments:   Reason for Stopping:         citalopram (CELEXA) 20 mg tablet Comments:   Reason for Stopping:         aspirin 81 mg chewable tablet Comments:   Reason for Stopping:               Activity: bedrest, paraplegic   Diet: ADA diet   Wound Care: none     Follow-up PCP in one week. Cardiology in two weeks.    ·     Time spent to discharge patient 35 minutes  Signed:  Mirela Domingo DO  2/13/2019  1:25 PM

## 2019-02-13 NOTE — PROGRESS NOTES
Discharge instructions were reviewed with patient. An opportunity was given for questions. All medications were reviewed, and information was given on the new medications - lantus, flecainide, toprol XL, humalog. Patient verbalized understanding, and has no questions at this time. IV and telemetry monitor removed by primary RN.

## 2019-02-14 ENCOUNTER — APPOINTMENT (OUTPATIENT)
Dept: GENERAL RADIOLOGY | Age: 64
DRG: 201 | End: 2019-02-14
Attending: EMERGENCY MEDICINE
Payer: MEDICAID

## 2019-02-14 ENCOUNTER — HOSPITAL ENCOUNTER (INPATIENT)
Age: 64
LOS: 1 days | Discharge: HOME HEALTH | DRG: 201 | End: 2019-02-20
Attending: EMERGENCY MEDICINE | Admitting: INTERNAL MEDICINE
Payer: MEDICAID

## 2019-02-14 VITALS
HEART RATE: 68 BPM | DIASTOLIC BLOOD PRESSURE: 77 MMHG | HEIGHT: 65 IN | RESPIRATION RATE: 18 BRPM | OXYGEN SATURATION: 94 % | TEMPERATURE: 98.7 F | SYSTOLIC BLOOD PRESSURE: 161 MMHG | BODY MASS INDEX: 34.49 KG/M2 | WEIGHT: 207 LBS

## 2019-02-14 DIAGNOSIS — I10 ACCELERATED HYPERTENSION: ICD-10-CM

## 2019-02-14 DIAGNOSIS — I48.91 ATRIAL FIBRILLATION WITH RVR (HCC): Primary | ICD-10-CM

## 2019-02-14 DIAGNOSIS — F13.930 BENZODIAZEPINE WITHDRAWAL WITHOUT COMPLICATION (HCC): ICD-10-CM

## 2019-02-14 DIAGNOSIS — F11.93 OPIATE WITHDRAWAL (HCC): ICD-10-CM

## 2019-02-14 PROBLEM — J96.01 ACUTE RESPIRATORY FAILURE WITH HYPOXIA (HCC): Status: RESOLVED | Noted: 2019-02-08 | Resolved: 2019-02-14

## 2019-02-14 PROBLEM — N17.9 AKI (ACUTE KIDNEY INJURY) (HCC): Status: RESOLVED | Noted: 2019-02-11 | Resolved: 2019-02-14

## 2019-02-14 PROBLEM — A41.9 SEPSIS (HCC): Status: RESOLVED | Noted: 2019-01-31 | Resolved: 2019-02-14

## 2019-02-14 PROBLEM — I16.0 HYPERTENSIVE URGENCY, MALIGNANT: Status: RESOLVED | Noted: 2019-02-05 | Resolved: 2019-02-14

## 2019-02-14 PROBLEM — R79.89 ELEVATED LACTIC ACID LEVEL: Status: RESOLVED | Noted: 2019-01-31 | Resolved: 2019-02-14

## 2019-02-14 PROBLEM — R41.82 ALTERED MENTAL STATUS: Status: RESOLVED | Noted: 2019-01-31 | Resolved: 2019-02-14

## 2019-02-14 PROBLEM — R11.2 NAUSEA & VOMITING: Status: ACTIVE | Noted: 2019-02-14

## 2019-02-14 PROBLEM — R06.00 DYSPNEA: Status: RESOLVED | Noted: 2019-02-05 | Resolved: 2019-02-14

## 2019-02-14 PROBLEM — N39.0 UTI (URINARY TRACT INFECTION): Status: RESOLVED | Noted: 2019-01-31 | Resolved: 2019-02-14

## 2019-02-14 LAB
ALBUMIN SERPL-MCNC: 3.2 G/DL (ref 3.2–4.6)
ALBUMIN/GLOB SERPL: 0.6 {RATIO} (ref 1.2–3.5)
ALP SERPL-CCNC: 110 U/L (ref 50–136)
ALT SERPL-CCNC: 28 U/L (ref 12–65)
ANION GAP SERPL CALC-SCNC: 7 MMOL/L (ref 7–16)
ANION GAP SERPL CALC-SCNC: 8 MMOL/L (ref 7–16)
APPEARANCE UR: CLEAR
AST SERPL-CCNC: 42 U/L (ref 15–37)
ATRIAL RATE: 70 BPM
BACTERIA URNS QL MICRO: ABNORMAL /HPF
BASOPHILS # BLD: 0.1 K/UL (ref 0–0.2)
BASOPHILS NFR BLD: 0 % (ref 0–2)
BILIRUB DIRECT SERPL-MCNC: 0.1 MG/DL
BILIRUB SERPL-MCNC: 0.5 MG/DL (ref 0.2–1.1)
BILIRUB UR QL: NEGATIVE
BNP SERPL-MCNC: 178 PG/ML
BUN SERPL-MCNC: 18 MG/DL (ref 8–23)
BUN SERPL-MCNC: 21 MG/DL (ref 8–23)
CALCIUM SERPL-MCNC: 9.2 MG/DL (ref 8.3–10.4)
CALCIUM SERPL-MCNC: 9.3 MG/DL (ref 8.3–10.4)
CALCULATED P AXIS, ECG09: 57 DEGREES
CALCULATED R AXIS, ECG10: 34 DEGREES
CALCULATED T AXIS, ECG11: 51 DEGREES
CHLORIDE SERPL-SCNC: 103 MMOL/L (ref 98–107)
CHLORIDE SERPL-SCNC: 108 MMOL/L (ref 98–107)
CO2 SERPL-SCNC: 26 MMOL/L (ref 21–32)
CO2 SERPL-SCNC: 27 MMOL/L (ref 21–32)
COLOR UR: YELLOW
CREAT SERPL-MCNC: 0.91 MG/DL (ref 0.6–1)
CREAT SERPL-MCNC: 0.91 MG/DL (ref 0.6–1)
DIAGNOSIS, 93000: NORMAL
DIFFERENTIAL METHOD BLD: ABNORMAL
EOSINOPHIL # BLD: 0.1 K/UL (ref 0–0.8)
EOSINOPHIL NFR BLD: 0 % (ref 0.5–7.8)
EPI CELLS #/AREA URNS HPF: ABNORMAL /HPF
ERYTHROCYTE [DISTWIDTH] IN BLOOD BY AUTOMATED COUNT: 14.6 % (ref 11.9–14.6)
GLOBULIN SER CALC-MCNC: 5.5 G/DL (ref 2.3–3.5)
GLUCOSE BLD STRIP.AUTO-MCNC: 154 MG/DL (ref 65–100)
GLUCOSE BLD STRIP.AUTO-MCNC: 154 MG/DL (ref 65–100)
GLUCOSE BLD STRIP.AUTO-MCNC: 167 MG/DL (ref 65–100)
GLUCOSE SERPL-MCNC: 130 MG/DL (ref 65–100)
GLUCOSE SERPL-MCNC: 205 MG/DL (ref 65–100)
GLUCOSE UR STRIP.AUTO-MCNC: 100 MG/DL
HCT VFR BLD AUTO: 41.7 % (ref 35.8–46.3)
HGB BLD-MCNC: 13.2 G/DL (ref 11.7–15.4)
HGB UR QL STRIP: ABNORMAL
IMM GRANULOCYTES # BLD AUTO: 0.2 K/UL (ref 0–0.5)
IMM GRANULOCYTES NFR BLD AUTO: 1 % (ref 0–5)
KETONES UR QL STRIP.AUTO: 15 MG/DL
LEUKOCYTE ESTERASE UR QL STRIP.AUTO: NEGATIVE
LIPASE SERPL-CCNC: 123 U/L (ref 73–393)
LYMPHOCYTES # BLD: 1.9 K/UL (ref 0.5–4.6)
LYMPHOCYTES NFR BLD: 12 % (ref 13–44)
MAGNESIUM SERPL-MCNC: 1.9 MG/DL (ref 1.8–2.4)
MCH RBC QN AUTO: 28.4 PG (ref 26.1–32.9)
MCHC RBC AUTO-ENTMCNC: 31.7 G/DL (ref 31.4–35)
MCV RBC AUTO: 89.9 FL (ref 79.6–97.8)
MM INDURATION POC: 0 MM (ref 0–5)
MONOCYTES # BLD: 0.7 K/UL (ref 0.1–1.3)
MONOCYTES NFR BLD: 4 % (ref 4–12)
NEUTS SEG # BLD: 12.8 K/UL (ref 1.7–8.2)
NEUTS SEG NFR BLD: 82 % (ref 43–78)
NITRITE UR QL STRIP.AUTO: NEGATIVE
NRBC # BLD: 0 K/UL (ref 0–0.2)
P-R INTERVAL, ECG05: 138 MS
PH UR STRIP: 7 [PH] (ref 5–9)
PLATELET # BLD AUTO: 454 K/UL (ref 150–450)
PMV BLD AUTO: 11 FL (ref 9.4–12.3)
POTASSIUM SERPL-SCNC: 4.1 MMOL/L (ref 3.5–5.1)
POTASSIUM SERPL-SCNC: 5.3 MMOL/L (ref 3.5–5.1)
PPD POC: NEGATIVE NEGATIVE
PROT SERPL-MCNC: 8.7 G/DL (ref 6.3–8.2)
PROT UR STRIP-MCNC: 30 MG/DL
Q-T INTERVAL, ECG07: 444 MS
QRS DURATION, ECG06: 86 MS
QTC CALCULATION (BEZET), ECG08: 479 MS
RBC # BLD AUTO: 4.64 M/UL (ref 4.05–5.2)
RBC #/AREA URNS HPF: ABNORMAL /HPF
SODIUM SERPL-SCNC: 136 MMOL/L (ref 136–145)
SODIUM SERPL-SCNC: 143 MMOL/L (ref 136–145)
SP GR UR REFRACTOMETRY: 1.01 (ref 1–1.02)
TROPONIN I SERPL-MCNC: <0.02 NG/ML (ref 0.02–0.05)
UROBILINOGEN UR QL STRIP.AUTO: 0.2 EU/DL (ref 0.2–1)
VENTRICULAR RATE, ECG03: 70 BPM
WBC # BLD AUTO: 15.7 K/UL (ref 4.3–11.1)
WBC URNS QL MICRO: ABNORMAL /HPF

## 2019-02-14 PROCEDURE — 82962 GLUCOSE BLOOD TEST: CPT

## 2019-02-14 PROCEDURE — 74011000250 HC RX REV CODE- 250: Performed by: FAMILY MEDICINE

## 2019-02-14 PROCEDURE — 85025 COMPLETE CBC W/AUTO DIFF WBC: CPT

## 2019-02-14 PROCEDURE — 93005 ELECTROCARDIOGRAM TRACING: CPT | Performed by: PHYSICIAN ASSISTANT

## 2019-02-14 PROCEDURE — 74011636637 HC RX REV CODE- 636/637: Performed by: FAMILY MEDICINE

## 2019-02-14 PROCEDURE — 84484 ASSAY OF TROPONIN QUANT: CPT

## 2019-02-14 PROCEDURE — 36415 COLL VENOUS BLD VENIPUNCTURE: CPT

## 2019-02-14 PROCEDURE — 74011250637 HC RX REV CODE- 250/637: Performed by: EMERGENCY MEDICINE

## 2019-02-14 PROCEDURE — 71045 X-RAY EXAM CHEST 1 VIEW: CPT

## 2019-02-14 PROCEDURE — 80076 HEPATIC FUNCTION PANEL: CPT

## 2019-02-14 PROCEDURE — 96365 THER/PROPH/DIAG IV INF INIT: CPT | Performed by: EMERGENCY MEDICINE

## 2019-02-14 PROCEDURE — 74011250637 HC RX REV CODE- 250/637: Performed by: FAMILY MEDICINE

## 2019-02-14 PROCEDURE — 83880 ASSAY OF NATRIURETIC PEPTIDE: CPT

## 2019-02-14 PROCEDURE — 87086 URINE CULTURE/COLONY COUNT: CPT

## 2019-02-14 PROCEDURE — 99218 HC RM OBSERVATION: CPT

## 2019-02-14 PROCEDURE — 74011250636 HC RX REV CODE- 250/636: Performed by: FAMILY MEDICINE

## 2019-02-14 PROCEDURE — 83690 ASSAY OF LIPASE: CPT

## 2019-02-14 PROCEDURE — 74011000258 HC RX REV CODE- 258: Performed by: EMERGENCY MEDICINE

## 2019-02-14 PROCEDURE — 99285 EMERGENCY DEPT VISIT HI MDM: CPT | Performed by: EMERGENCY MEDICINE

## 2019-02-14 PROCEDURE — 96374 THER/PROPH/DIAG INJ IV PUSH: CPT | Performed by: EMERGENCY MEDICINE

## 2019-02-14 PROCEDURE — 93005 ELECTROCARDIOGRAM TRACING: CPT | Performed by: EMERGENCY MEDICINE

## 2019-02-14 PROCEDURE — 74011250637 HC RX REV CODE- 250/637: Performed by: PHYSICIAN ASSISTANT

## 2019-02-14 PROCEDURE — 83735 ASSAY OF MAGNESIUM: CPT

## 2019-02-14 PROCEDURE — 80048 BASIC METABOLIC PNL TOTAL CA: CPT

## 2019-02-14 PROCEDURE — 94760 N-INVAS EAR/PLS OXIMETRY 1: CPT

## 2019-02-14 PROCEDURE — 74011000250 HC RX REV CODE- 250: Performed by: EMERGENCY MEDICINE

## 2019-02-14 PROCEDURE — 94640 AIRWAY INHALATION TREATMENT: CPT

## 2019-02-14 PROCEDURE — 81003 URINALYSIS AUTO W/O SCOPE: CPT

## 2019-02-14 RX ORDER — OXYCODONE HYDROCHLORIDE 5 MG/1
10 TABLET ORAL
Status: COMPLETED | OUTPATIENT
Start: 2019-02-14 | End: 2019-02-14

## 2019-02-14 RX ORDER — GUAIFENESIN 600 MG/1
600 TABLET, EXTENDED RELEASE ORAL EVERY 12 HOURS
Status: DISCONTINUED | OUTPATIENT
Start: 2019-02-15 | End: 2019-02-20 | Stop reason: HOSPADM

## 2019-02-14 RX ORDER — BISACODYL 5 MG
5 TABLET, DELAYED RELEASE (ENTERIC COATED) ORAL
Status: DISCONTINUED | OUTPATIENT
Start: 2019-02-14 | End: 2019-02-20 | Stop reason: HOSPADM

## 2019-02-14 RX ORDER — ADHESIVE BANDAGE
30 BANDAGE TOPICAL DAILY PRN
Status: DISCONTINUED | OUTPATIENT
Start: 2019-02-14 | End: 2019-02-17

## 2019-02-14 RX ORDER — DILTIAZEM HYDROCHLORIDE 5 MG/ML
20 INJECTION INTRAVENOUS
Status: COMPLETED | OUTPATIENT
Start: 2019-02-14 | End: 2019-02-14

## 2019-02-14 RX ORDER — DIAZEPAM 5 MG/1
10 TABLET ORAL
Status: COMPLETED | OUTPATIENT
Start: 2019-02-14 | End: 2019-02-14

## 2019-02-14 RX ORDER — SODIUM CHLORIDE 0.9 % (FLUSH) 0.9 %
5-40 SYRINGE (ML) INJECTION EVERY 8 HOURS
Status: DISCONTINUED | OUTPATIENT
Start: 2019-02-15 | End: 2019-02-20 | Stop reason: HOSPADM

## 2019-02-14 RX ORDER — QUETIAPINE FUMARATE 100 MG/1
300 TABLET, FILM COATED ORAL
Status: DISCONTINUED | OUTPATIENT
Start: 2019-02-15 | End: 2019-02-20 | Stop reason: HOSPADM

## 2019-02-14 RX ORDER — SODIUM CHLORIDE 9 MG/ML
100 INJECTION, SOLUTION INTRAVENOUS CONTINUOUS
Status: DISCONTINUED | OUTPATIENT
Start: 2019-02-15 | End: 2019-02-15

## 2019-02-14 RX ORDER — ACETAMINOPHEN 325 MG/1
650 TABLET ORAL
Status: DISCONTINUED | OUTPATIENT
Start: 2019-02-14 | End: 2019-02-14 | Stop reason: SDUPTHER

## 2019-02-14 RX ORDER — SODIUM CHLORIDE 0.9 % (FLUSH) 0.9 %
5-40 SYRINGE (ML) INJECTION AS NEEDED
Status: DISCONTINUED | OUTPATIENT
Start: 2019-02-14 | End: 2019-02-20 | Stop reason: HOSPADM

## 2019-02-14 RX ORDER — PROMETHAZINE HYDROCHLORIDE 25 MG/1
25 TABLET ORAL
Status: DISCONTINUED | OUTPATIENT
Start: 2019-02-14 | End: 2019-02-20 | Stop reason: HOSPADM

## 2019-02-14 RX ORDER — BUDESONIDE 0.5 MG/2ML
500 INHALANT ORAL 2 TIMES DAILY
Status: DISCONTINUED | OUTPATIENT
Start: 2019-02-15 | End: 2019-02-20 | Stop reason: HOSPADM

## 2019-02-14 RX ORDER — ONDANSETRON 2 MG/ML
4 INJECTION INTRAMUSCULAR; INTRAVENOUS
Status: DISCONTINUED | OUTPATIENT
Start: 2019-02-14 | End: 2019-02-14 | Stop reason: ALTCHOICE

## 2019-02-14 RX ORDER — PROMETHAZINE HYDROCHLORIDE 25 MG/1
25 TABLET ORAL
Status: COMPLETED | OUTPATIENT
Start: 2019-02-14 | End: 2019-02-14

## 2019-02-14 RX ORDER — ALPRAZOLAM 0.5 MG/1
0.5 TABLET ORAL
Status: DISCONTINUED | OUTPATIENT
Start: 2019-02-14 | End: 2019-02-20 | Stop reason: HOSPADM

## 2019-02-14 RX ORDER — OXYCODONE HYDROCHLORIDE 5 MG/1
10 TABLET ORAL
Status: DISCONTINUED | OUTPATIENT
Start: 2019-02-14 | End: 2019-02-14 | Stop reason: CLARIF

## 2019-02-14 RX ORDER — ALBUTEROL SULFATE 0.83 MG/ML
2.5 SOLUTION RESPIRATORY (INHALATION)
Status: DISCONTINUED | OUTPATIENT
Start: 2019-02-14 | End: 2019-02-20 | Stop reason: HOSPADM

## 2019-02-14 RX ORDER — METOPROLOL SUCCINATE 50 MG/1
50 TABLET, EXTENDED RELEASE ORAL DAILY
Status: DISCONTINUED | OUTPATIENT
Start: 2019-02-15 | End: 2019-02-20 | Stop reason: HOSPADM

## 2019-02-14 RX ORDER — INSULIN GLARGINE 100 [IU]/ML
49 INJECTION, SOLUTION SUBCUTANEOUS
Status: DISCONTINUED | OUTPATIENT
Start: 2019-02-15 | End: 2019-02-16

## 2019-02-14 RX ORDER — LORAZEPAM 2 MG/ML
1 INJECTION INTRAMUSCULAR
Status: DISCONTINUED | OUTPATIENT
Start: 2019-02-14 | End: 2019-02-14

## 2019-02-14 RX ORDER — ACETAMINOPHEN 325 MG/1
650 TABLET ORAL
Status: DISCONTINUED | OUTPATIENT
Start: 2019-02-14 | End: 2019-02-20 | Stop reason: HOSPADM

## 2019-02-14 RX ORDER — FLECAINIDE ACETATE 100 MG/1
50 TABLET ORAL EVERY 12 HOURS
Status: DISCONTINUED | OUTPATIENT
Start: 2019-02-15 | End: 2019-02-20 | Stop reason: HOSPADM

## 2019-02-14 RX ORDER — PANTOPRAZOLE SODIUM 40 MG/1
40 TABLET, DELAYED RELEASE ORAL
Status: DISCONTINUED | OUTPATIENT
Start: 2019-02-15 | End: 2019-02-20 | Stop reason: HOSPADM

## 2019-02-14 RX ORDER — FENTANYL 25 UG/1
1 PATCH TRANSDERMAL
Status: DISCONTINUED | OUTPATIENT
Start: 2019-02-15 | End: 2019-02-20 | Stop reason: HOSPADM

## 2019-02-14 RX ADMIN — BUDESONIDE 500 MCG: 0.5 INHALANT RESPIRATORY (INHALATION) at 08:42

## 2019-02-14 RX ADMIN — QUETIAPINE FUMARATE 300 MG: 100 TABLET ORAL at 23:33

## 2019-02-14 RX ADMIN — Medication 10 ML: at 23:35

## 2019-02-14 RX ADMIN — Medication 5 ML: at 12:09

## 2019-02-14 RX ADMIN — METOPROLOL SUCCINATE 50 MG: 50 TABLET, EXTENDED RELEASE ORAL at 09:21

## 2019-02-14 RX ADMIN — DILTIAZEM HYDROCHLORIDE 10 MG/HR: 5 INJECTION INTRAVENOUS at 21:26

## 2019-02-14 RX ADMIN — INSULIN LISPRO 2 UNITS: 100 INJECTION, SOLUTION INTRAVENOUS; SUBCUTANEOUS at 12:09

## 2019-02-14 RX ADMIN — ACETAMINOPHEN 650 MG: 325 TABLET, FILM COATED ORAL at 23:33

## 2019-02-14 RX ADMIN — APIXABAN 5 MG: 5 TABLET, FILM COATED ORAL at 09:21

## 2019-02-14 RX ADMIN — OXYCODONE HYDROCHLORIDE 20 MG: 15 TABLET ORAL at 13:45

## 2019-02-14 RX ADMIN — GUAIFENESIN 600 MG: 600 TABLET, EXTENDED RELEASE ORAL at 09:21

## 2019-02-14 RX ADMIN — DIAZEPAM 10 MG: 5 TABLET ORAL at 19:32

## 2019-02-14 RX ADMIN — DILTIAZEM HYDROCHLORIDE 20 MG: 5 INJECTION INTRAVENOUS at 21:21

## 2019-02-14 RX ADMIN — SODIUM CHLORIDE 100 ML/HR: 900 INJECTION, SOLUTION INTRAVENOUS at 23:33

## 2019-02-14 RX ADMIN — OXYCODONE HYDROCHLORIDE 10 MG: 5 TABLET ORAL at 19:32

## 2019-02-14 RX ADMIN — GUAIFENESIN 600 MG: 600 TABLET, EXTENDED RELEASE ORAL at 23:33

## 2019-02-14 RX ADMIN — PROMETHAZINE HYDROCHLORIDE 25 MG: 25 TABLET ORAL at 12:51

## 2019-02-14 RX ADMIN — CITALOPRAM HYDROBROMIDE 20 MG: 10 TABLET ORAL at 09:21

## 2019-02-14 RX ADMIN — FLECAINIDE ACETATE 50 MG: 100 TABLET ORAL at 09:21

## 2019-02-14 RX ADMIN — OXYCODONE HYDROCHLORIDE 20 MG: 15 TABLET ORAL at 04:22

## 2019-02-14 RX ADMIN — PROMETHAZINE HYDROCHLORIDE 25 MG: 25 TABLET ORAL at 20:26

## 2019-02-14 RX ADMIN — OXYCODONE HYDROCHLORIDE 10 MG: 5 TABLET ORAL at 20:46

## 2019-02-14 RX ADMIN — INSULIN LISPRO 2 UNITS: 100 INJECTION, SOLUTION INTRAVENOUS; SUBCUTANEOUS at 09:22
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
## 2019-02-14 NOTE — PROGRESS NOTES
Progress Note Patient: Erlinda Sidhu MRN: [id number]  SSN: [id number] YOB: 1955  Age: 59 y.o. Sex: female Admit Date: 2/10/2019 LOS: 4 days Subjective:  
 
Patient was to be discharged yesterday but son suddenly left facility after stating we were going to discharge his mother and no one has been able to get in touch with him since. Case management spent this AM attempting to get in touch with son. Patient states she has gotten in touch with son and he will be at the apartment once she is discharged. We highly encouraged patient to stay stating she would be safer going to rehab due to her multiple admissions and non compliance with medications. Patient is of sound mind and stated she does not want to go to rehab. Patient stated she just wanted to [facility]. \" Of note, yesterday when speaking with the patient she stated her son just wants her for her money. When asking about whether her family is stealing from her today, she states her sons take good care of her and that no one is stealing money from her. We stated again today the importance for her to get her medications at discharge. Current Facility-Administered Medications Medication Dose Route Frequency  insulin glargine (LANTUS) injection 49 Units  49 Units SubCUTAneous QHS  albuterol (PROVENTIL VENTOLIN) nebulizer solution 2.5 mg  2.5 mg Inhalation Q4H PRN  
 ALPRAZolam (XANAX) tablet 0.5 mg  0.5 mg Oral BID PRN  
 apixaban (ELIQUIS) tablet 5 mg  5 mg Oral BID  
 bisacodyl (DULCOLAX) tablet 5 mg  5 mg Oral DAILY PRN  
 citalopram (CELEXA) tablet 20 mg  20 mg Oral DAILY  fentaNYL (DURAGESIC) 25 mcg/hr patch 1 Patch  1 Patch TransDERmal Q72H  
 guaiFENesin ER (MUCINEX) tablet 600 mg  600 mg Oral Q12H  promethazine (PHENERGAN) tablet 25 mg  25 mg Oral Q6H PRN  
 QUEtiapine (SEROquel) tablet 300 mg  300 mg Oral QHS  oxyCODONE IR (ROXICODONE) tablet 20 mg  20 mg Oral Q8H PRN  
  sodium chloride (NS) flush 5-40 mL  5-40 mL IntraVENous Q8H  
 sodium chloride (NS) flush 5-40 mL  5-40 mL IntraVENous PRN  
 bisacodyl (DULCOLAX) tablet 5 mg  5 mg Oral DAILY PRN  
 insulin lispro (HUMALOG) injection   SubCUTAneous AC&HS  
 0.9% sodium chloride infusion  100 mL/hr IntraVENous CONTINUOUS  
 budesonide (PULMICORT) 500 mcg/2 ml nebulizer suspension  500 mcg Nebulization BID RT  
 acetaminophen (TYLENOL) tablet 650 mg  650 mg Oral Q6H PRN  
 flecainide (TAMBOCOR) tablet 50 mg  50 mg Oral Q12H  
 metoprolol succinate (TOPROL-XL) XL tablet 50 mg  50 mg Oral DAILY Objective:  
 
Vitals:  
 02/14/19 1332 02/14/19 0731 02/14/19 0842 02/14/19 1121 BP: 143/75 162/85  161/77 Pulse:  70  68 Resp:  18  18 Temp:  98.9 °F (37.2 °C)  98.7 °F (37.1 °C) SpO2:  96% 90% 94% Weight:      
Height:      
  
  
Intake and Output: 
Current Shift: No intake/output data recorded. Last three shifts: 02/12 1901 - 02/14 0700 In: 960 [P.O.:960] Out: Mandy Snyder [[id number]] Physical Exam:  
General:  Alert, sad appearing. Eyes:  Conjunctivae/corneas clear. Ears:  Normal TMs and external ear canals both ears. Nose: Nares normal. Septum midline. Mouth/Throat: Lips, mucosa, and tongue normal. Teeth and gums normal.  
Neck:  no JVD. Back:   deferred. Lungs:   Clear to auscultation bilaterally. Heart:  Regular rate and rhythm, S1, S2 normal  
Abdomen:   Soft, non-tender. Bowel sounds normal. No masses,  No organomegaly. Obese. Extremities: Chronic 2+ edema to LE bilaterally. Pulses: 2+ and symmetric all extremities. Skin: Skin color, texture, turgor normal. No rashes or lesions Lymph nodes: Cervical, supraclavicular, and axillary nodes normal.  
Neurologic: CNII-XII intact. Lab/Data Review: 
 
Recent Results (from the past 24 hour(s)) GLUCOSE, POC Collection Time: 02/13/19  4:29 PM  
Result Value Ref Range Glucose (POC) 201 (H) 65 - 100 mg/dL GLUCOSE, POC  
 Collection Time: 02/13/19  9:54 PM  
Result Value Ref Range Glucose (POC) 184 (H) 65 - 100 mg/dL PLEASE READ & DOCUMENT PPD TEST IN 72 HRS Collection Time: 02/14/19 12:58 AM  
Result Value Ref Range PPD negative Negative  
 mm Induration 0 mm METABOLIC PANEL, BASIC Collection Time: 02/14/19  3:31 AM  
Result Value Ref Range Sodium 143 136 - 145 mmol/L Potassium 4.1 3.5 - 5.1 mmol/L Chloride 108 (H) 98 - 107 mmol/L  
 CO2 27 21 - 32 mmol/L Anion gap 8 7 - 16 mmol/L Glucose 130 (H) 65 - 100 mg/dL BUN 21 8 - 23 MG/DL Creatinine 0.91 0.6 - 1.0 MG/DL  
 GFR est AA >60 >60 ml/min/1.73m2 GFR est non-AA >60 >60 ml/min/1.73m2 Calcium 9.2 8.3 - 10.4 MG/DL  
GLUCOSE, POC Collection Time: 02/14/19  6:20 AM  
Result Value Ref Range Glucose (POC) 154 (H) 65 - 100 mg/dL EKG, 12 LEAD, SUBSEQUENT Collection Time: 02/14/19  6:50 AM  
Result Value Ref Range Ventricular Rate 70 BPM  
 Atrial Rate 70 BPM  
 P-R Interval 138 ms QRS Duration 86 ms  
 Q-T Interval 444 ms QTC Calculation (Bezet) 479 ms Calculated P Axis 57 degrees Calculated R Axis 34 degrees Calculated T Axis 51 degrees Diagnosis Normal sinus rhythm Normal ECG When compared with ECG of 13-FEB-2019 06:59, No significant change was found Confirmed by JOSÉ MCGOWAN (), Sima Pinedo ([id number]) on 2/14/2019 8:46:23 AM 
  
GLUCOSE, POC Collection Time: 02/14/19 11:48 AM  
Result Value Ref Range Glucose (POC) 154 (H) 65 - 100 mg/dL Assessment/ Plan:  
 
Principal Problem: 
  Atrial fibrillation with RVR ([address] (1/31/2019) Active Problems: 
  Type 2 diabetes mellitus with hyperglycemia ([address] (12/10/2016) Paraplegia ([address] (12/10/2016) Bipolar disorder without psychotic features ([address] (12/10/2016) Chronic midline low back pain without sciatica (2/5/2019) GLENNA (acute kidney injury) ([facility] (2/11/2019) Hyperkalemia (2/11/2019) Please see discharge note from yesterday for plan of care. Patient is high risk for returning back to hospital.  
 
 
Signed By: Jessica Gallardo,  February 14, 2019

## 2019-02-14 NOTE — PROGRESS NOTES
Pt son has returned home and pt states she wants to be at home with her son. Pt advised that the medical staff feel she should go back to rehab and she refuses. Pt states she understands our concerns and still wants to go home to her son.

## 2019-02-14 NOTE — PROGRESS NOTES
written report given to oncoming charge RN [facility] , [facility] is here. Patient's situation, background, assessment and recommendations provided. Opportunity for questions provided. Oncoming RN assumed care of patient.

## 2019-02-14 NOTE — PROGRESS NOTES
Bedside and Verbal shift change report given to self(oncoming nurse) by Crystal Haque (offgoing nurse). Report included the following information SBAR and Kardex.

## 2019-02-14 NOTE — PROGRESS NOTES
Followed up with patient for d/c. She was to d/c home yesterday but was unable to reach family to meet her at the apartment. Patient does not have a key to her apartment to get in. Discussed with patient safety of returning home and needed help. Per patient her son Devon Mcgrath and Rahel Gayle(girlfriend of Delfino Hammans) are with her throughout the day. Asked if they assist taking care of her and patient stated \"Yes they help\". Discussed this is 3rd admission since recent d/c from SNF and discussed if she needed to return to SNF long term. Patient stated \"No I am not going back to nursing home. \" \" I am going home\" CM has referred to [address] to see if additional services can be provided and also [facility] RN/PT/OT/aide/SW has been ordered. Patient adamant that she is returning home when d/c.

## 2019-02-14 NOTE — PROGRESS NOTES
Verbal bedside report given to April Owens oncoming RN. Patient's situation, background, assessment and recommendations provided. Opportunity for questions provided. Oncoming RN assumed care of patient.

## 2019-02-14 NOTE — PROGRESS NOTES
Attempted to contact Son Allyssa Zurita due to the fact that the patient has been discharged to make sure he is home so she can be transported there but it went straight to voicemail message left
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
## 2019-02-14 NOTE — PROGRESS NOTES
Patient has had discharge orders but we can not reach her son for her to be transported home spoke with MD Sid Johnson about patient being off monitor and IV being removed do to the discharge process. Orders to put patient back on cardiac monitoring but no IV needs to be placed.

## 2019-02-14 NOTE — ED TRIAGE NOTES
Pt here via ems from home for shob. Pt was dc'd yesterday. Pt left rehab AMA before prior admission. Pt states she needs oxygen at home/ RA sat 94%. EMS VS: /96 HR 70's 94% RA  98.9. Pt complains of chronic feet pain. Pt denies cp, dizziness or head ache.

## 2019-02-14 NOTE — PROGRESS NOTES
Follow up with patient again with Dr. Kristen Fry, charge nurse and  regarding d/c. Charge nurse earlier had reported case to Bobby Mobley and is calling the [employer] for a well care check at patient's apartment. Patient has denied that anyone is being paid to take care of patient but CM has been told in the past that when patient d/c from [address] [place] [address] that they set up DME, apartment and paid sitter to be with patient. CM will follow up with nursing facilities and see who may have Medicaid bed. Also checking to see if her 10 days needed for complex Medicaid bed can be included from previous admission or needs to be started from this admission. Addendum: Spoke with Valdivia Shwetha from [facility] about possible return to [address] [place] [address] and they will not accept her back at their facilities. Will need complex Medicaid process for patient to go to long term care and find facility that will take patient. Obtained order for PT/OT for rehab potential.  
Please note patient was seen with Dr. Rodri Alvarado not Dr. Kristen Fry as listed above.

## 2019-02-15 LAB
ANION GAP SERPL CALC-SCNC: 8 MMOL/L (ref 7–16)
ATRIAL RATE: 68 BPM
BASOPHILS # BLD: 0.1 K/UL (ref 0–0.2)
BASOPHILS NFR BLD: 1 % (ref 0–2)
BUN SERPL-MCNC: 17 MG/DL (ref 8–23)
CALCIUM SERPL-MCNC: 8.7 MG/DL (ref 8.3–10.4)
CALCULATED P AXIS, ECG09: 51 DEGREES
CALCULATED R AXIS, ECG10: 11 DEGREES
CALCULATED T AXIS, ECG11: 49 DEGREES
CHLORIDE SERPL-SCNC: 106 MMOL/L (ref 98–107)
CO2 SERPL-SCNC: 26 MMOL/L (ref 21–32)
CREAT SERPL-MCNC: 0.88 MG/DL (ref 0.6–1)
DIAGNOSIS, 93000: NORMAL
DIFFERENTIAL METHOD BLD: ABNORMAL
EOSINOPHIL # BLD: 0.2 K/UL (ref 0–0.8)
EOSINOPHIL NFR BLD: 2 % (ref 0.5–7.8)
ERYTHROCYTE [DISTWIDTH] IN BLOOD BY AUTOMATED COUNT: 14.7 % (ref 11.9–14.6)
GLUCOSE BLD STRIP.AUTO-MCNC: 136 MG/DL (ref 65–100)
GLUCOSE BLD STRIP.AUTO-MCNC: 139 MG/DL (ref 65–100)
GLUCOSE BLD STRIP.AUTO-MCNC: 143 MG/DL (ref 65–100)
GLUCOSE BLD STRIP.AUTO-MCNC: 167 MG/DL (ref 65–100)
GLUCOSE SERPL-MCNC: 148 MG/DL (ref 65–100)
HCT VFR BLD AUTO: 39.3 % (ref 35.8–46.3)
HGB BLD-MCNC: 12.6 G/DL (ref 11.7–15.4)
IMM GRANULOCYTES # BLD AUTO: 0.2 K/UL (ref 0–0.5)
IMM GRANULOCYTES NFR BLD AUTO: 1 % (ref 0–5)
LYMPHOCYTES # BLD: 2.9 K/UL (ref 0.5–4.6)
LYMPHOCYTES NFR BLD: 20 % (ref 13–44)
MCH RBC QN AUTO: 29 PG (ref 26.1–32.9)
MCHC RBC AUTO-ENTMCNC: 32.1 G/DL (ref 31.4–35)
MCV RBC AUTO: 90.3 FL (ref 79.6–97.8)
MONOCYTES # BLD: 1 K/UL (ref 0.1–1.3)
MONOCYTES NFR BLD: 7 % (ref 4–12)
NEUTS SEG # BLD: 10.2 K/UL (ref 1.7–8.2)
NEUTS SEG NFR BLD: 70 % (ref 43–78)
NRBC # BLD: 0 K/UL (ref 0–0.2)
P-R INTERVAL, ECG05: 140 MS
PLATELET # BLD AUTO: 416 K/UL (ref 150–450)
PMV BLD AUTO: 10.9 FL (ref 9.4–12.3)
POTASSIUM SERPL-SCNC: 3.7 MMOL/L (ref 3.5–5.1)
Q-T INTERVAL, ECG07: 454 MS
QRS DURATION, ECG06: 78 MS
QTC CALCULATION (BEZET), ECG08: 482 MS
RBC # BLD AUTO: 4.35 M/UL (ref 4.05–5.2)
SODIUM SERPL-SCNC: 140 MMOL/L (ref 136–145)
VENTRICULAR RATE, ECG03: 68 BPM
WBC # BLD AUTO: 14.6 K/UL (ref 4.3–11.1)

## 2019-02-15 PROCEDURE — 74011636637 HC RX REV CODE- 636/637: Performed by: FAMILY MEDICINE

## 2019-02-15 PROCEDURE — 97166 OT EVAL MOD COMPLEX 45 MIN: CPT

## 2019-02-15 PROCEDURE — 99218 HC RM OBSERVATION: CPT

## 2019-02-15 PROCEDURE — 82962 GLUCOSE BLOOD TEST: CPT

## 2019-02-15 PROCEDURE — 74011250637 HC RX REV CODE- 250/637

## 2019-02-15 PROCEDURE — 36415 COLL VENOUS BLD VENIPUNCTURE: CPT

## 2019-02-15 PROCEDURE — 85025 COMPLETE CBC W/AUTO DIFF WBC: CPT

## 2019-02-15 PROCEDURE — 74011000250 HC RX REV CODE- 250: Performed by: FAMILY MEDICINE

## 2019-02-15 PROCEDURE — 74011250637 HC RX REV CODE- 250/637: Performed by: FAMILY MEDICINE

## 2019-02-15 PROCEDURE — 74011250636 HC RX REV CODE- 250/636: Performed by: FAMILY MEDICINE

## 2019-02-15 PROCEDURE — 80048 BASIC METABOLIC PNL TOTAL CA: CPT

## 2019-02-15 PROCEDURE — 94640 AIRWAY INHALATION TREATMENT: CPT

## 2019-02-15 PROCEDURE — 97110 THERAPEUTIC EXERCISES: CPT

## 2019-02-15 PROCEDURE — 94760 N-INVAS EAR/PLS OXIMETRY 1: CPT

## 2019-02-15 PROCEDURE — 77010033678 HC OXYGEN DAILY

## 2019-02-15 PROCEDURE — 97161 PT EVAL LOW COMPLEX 20 MIN: CPT

## 2019-02-15 RX ORDER — CALCIUM CARBONATE 200(500)MG
200 TABLET,CHEWABLE ORAL DAILY PRN
Status: DISCONTINUED | OUTPATIENT
Start: 2019-02-15 | End: 2019-02-20 | Stop reason: HOSPADM

## 2019-02-15 RX ORDER — CALCIUM CARBONATE 200(500)MG
TABLET,CHEWABLE ORAL
Status: COMPLETED
Start: 2019-02-15 | End: 2019-02-15

## 2019-02-15 RX ADMIN — Medication 10 ML: at 06:00

## 2019-02-15 RX ADMIN — OXYCODONE HYDROCHLORIDE 20 MG: 5 TABLET ORAL at 19:27

## 2019-02-15 RX ADMIN — QUETIAPINE FUMARATE 300 MG: 100 TABLET ORAL at 22:37

## 2019-02-15 RX ADMIN — INSULIN GLARGINE 49 UNITS: 100 INJECTION, SOLUTION SUBCUTANEOUS at 22:38

## 2019-02-15 RX ADMIN — INSULIN GLARGINE 49 UNITS: 100 INJECTION, SOLUTION SUBCUTANEOUS at 00:00

## 2019-02-15 RX ADMIN — FLECAINIDE ACETATE 50 MG: 100 TABLET ORAL at 00:00

## 2019-02-15 RX ADMIN — BUDESONIDE 500 MCG: 0.5 INHALANT RESPIRATORY (INHALATION) at 22:15

## 2019-02-15 RX ADMIN — FLECAINIDE ACETATE 50 MG: 100 TABLET ORAL at 22:38

## 2019-02-15 RX ADMIN — METOPROLOL SUCCINATE 50 MG: 50 TABLET, EXTENDED RELEASE ORAL at 08:48

## 2019-02-15 RX ADMIN — PROMETHAZINE HYDROCHLORIDE 25 MG: 25 TABLET ORAL at 15:17

## 2019-02-15 RX ADMIN — INSULIN HUMAN 2 UNITS: 100 INJECTION, SOLUTION PARENTERAL at 12:06

## 2019-02-15 RX ADMIN — CALCIUM CARBONATE (ANTACID) CHEW TAB 500 MG 200 MG: 500 CHEW TAB at 01:32

## 2019-02-15 RX ADMIN — PANTOPRAZOLE SODIUM 40 MG: 40 TABLET, DELAYED RELEASE ORAL at 08:48

## 2019-02-15 RX ADMIN — Medication 10 ML: at 22:39

## 2019-02-15 RX ADMIN — CALCIUM CARBONATE (ANTACID) CHEW TAB 500 MG 200 MG: 500 CHEW TAB at 02:00

## 2019-02-15 RX ADMIN — Medication 5 ML: at 13:58

## 2019-02-15 RX ADMIN — ACETAMINOPHEN 650 MG: 325 TABLET, FILM COATED ORAL at 18:32

## 2019-02-15 RX ADMIN — OXYCODONE HYDROCHLORIDE 20 MG: 5 TABLET ORAL at 09:42

## 2019-02-15 RX ADMIN — SODIUM CHLORIDE 100 ML/HR: 900 INJECTION, SOLUTION INTRAVENOUS at 09:43

## 2019-02-15 RX ADMIN — GUAIFENESIN 600 MG: 600 TABLET, EXTENDED RELEASE ORAL at 22:37

## 2019-02-15 RX ADMIN — APIXABAN 5 MG: 5 TABLET, FILM COATED ORAL at 18:27

## 2019-02-15 RX ADMIN — GUAIFENESIN 600 MG: 600 TABLET, EXTENDED RELEASE ORAL at 08:48

## 2019-02-15 RX ADMIN — BUDESONIDE 500 MCG: 0.5 INHALANT RESPIRATORY (INHALATION) at 07:46

## 2019-02-15 RX ADMIN — FLECAINIDE ACETATE 50 MG: 100 TABLET ORAL at 08:49

## 2019-02-15 RX ADMIN — APIXABAN 5 MG: 5 TABLET, FILM COATED ORAL at 08:48

## 2019-02-15 NOTE — PROGRESS NOTES
Problem: Mobility Impaired (Adult and Pediatric) Goal: *Acute Goals and Plan of Care (Insert Text) LTG: 
(1.)Ms. Alysa Harris will move from supine to sit and sit to supine , scoot up and down and roll side to side in bed with MAXIMAL ASSIST within 7 treatment day(s). (2.)Ms. Alysa Harris will transfer from bed to chair and chair to bed with MAXIMAL ASSIST using the least restrictive device within 7 treatment day(s). (3.)Ms. Alysa Harris will maintain static/dynamic sitting x 12 minutes with STAND BY ASSIST for improved balance within 7 days. (4.)Ms. Cary/family will be educated on PROM exercise and bed mobility for decreased risk of secondary complications within 7 days. ________________________________________________________________________________________________ PHYSICAL THERAPY: Initial Assessment and AM 2/15/2019OBSERVATION:   
Payor: [id number] / Plan: [address] [place] [address] Avenue / Product Type: Medicaid /   
  
NAME/AGE/GENDER: Golden Nolan is a 59 y.o. female PRIMARY DIAGNOSIS: Atrial fibrillation with RVR (HCC) [I48.91] Atrial fibrillation with rapid ventricular response (HCC) Atrial fibrillation with rapid ventricular response (HCC) ICD-10: Treatment Diagnosis:  
 · Generalized Muscle Weakness (M62.81) · Difficulty in walking, Not elsewhere classified (R26.2) Precaution/Allergies: 
Patient has no known allergies. ASSESSMENT:  
Ms. Alysa Harris is a 59 y.o. female in the hospital for the above who was supine in bed upon arrival.  Of note pt has PMH including paraplegia, Bipolar disorder, narcotic abuse and Hepatitis C. Pt presents with flat affect and appears depressed. Pt's son in room sleeping on bench with blanket over his head. Pt reports that she lives in an apartment with her son that has 0 steps to enter. Pt also reported that PTA she was getting assistance with ADLs (from son) and wheelchair bound.   Pt reported that PTA she was able to come to sitting on EOB independently as well as transfer to/from wheelchair sometimes. Per chart pt previously in STR for extended time before leaving AMA. Suspect pt has been bed bound for some time. Ms. Rajendra Flores presents to PT with grossly decreased AROM and strength in B LEs. She also has apparent plantarflexion contractures with hip positioned in external rotation bilaterally. Pt reported intact sensation in B LEs to light touch. Pt required max-total assist for rolling in bed and totalA for scooting up. She reported she plans to go home but would like [facility] PT/OT if possible. Ms. Rajendra Flores could benefit from skilled PT as she is currently functioning below her reported baseline. Will place pt on twice/week POC to further assess rehab potential. 
 
 
 
This section established at most recent assessment PROBLEM LIST (Impairments causing functional limitations): 1. Decreased Strength 2. Decreased ADL/Functional Activities 3. Decreased Transfer Abilities 4. Decreased Ambulation Ability/Technique 5. Decreased Balance 6. Increased Pain 7. Decreased Flexibility/Joint Mobility INTERVENTIONS PLANNED: (Benefits and precautions of physical therapy have been discussed with the patient.) 1. Balance Exercise 2. Bed Mobility 3. Family Education 4. Gait Training 5. Neuromuscular Re-education/Strengthening 6. Range of Motion (ROM) 7. Therapeutic Activites 8. Transfer Training TREATMENT PLAN: Frequency/Duration: 2 times a week for duration of hospital stay Rehabilitation Potential For Stated Goals: Fair RECOMMENDED REHABILITATION/EQUIPMENT: (at time of discharge pending progress): Due to the probability of continued deficits (see above) this patient will likely need continued skilled physical therapy after discharge. Equipment:  
? None at this time HISTORY:  
History of Present Injury/Illness (Reason for Referral): 
See H&P Past Medical History/Comorbidities: Ms. Lan Oppenheim  has a past medical history of Diabetes ([facility] Gastrointestinal disorder, Hypertension, Ill-defined condition, Ill-defined condition, Ill-defined condition, Liver disease, Psychiatric disorder, Psychiatric disorder, and Thromboembolus ([facility] Ms. Lan Oppenheim  has no past surgical history on file. Social History/Living Environment:  
Home Environment: Apartment # Steps to Enter: 0 One/Two Story Residence: One story Living Alone: No 
Support Systems: Child(urban) Patient Expects to be Discharged to[de-identified] [facility] Current DME Used/Available at Home: Walker, rolling(Pastor lift) Prior Level of Function/Work/Activity: 
Reported PTA she was able to perform bed mobility and wheelchair transfer independently but suspect pt is actually bed bound. She lives with her son in an apartment and gets his help for ADLs. Number of Personal Factors/Comorbidities that affect the Plan of Care: 3+: HIGH COMPLEXITY EXAMINATION:  
Most Recent Physical Functioning:  
Gross Assessment: 
AROM: Grossly decreased, non-functional 
PROM: Generally decreased, functional(B ankle dorsiflexion) Strength: Grossly decreased, non-functional 
Sensation: Intact Posture: 
  
Balance: 
  Bed Mobility: 
Rolling: Total assistance Scooting: Total assistance Wheelchair Mobility: 
  
Transfers: 
  
Gait: 
  
   
  
Body Structures Involved: 1. Nerves 2. Heart 3. Joints 4. Muscles Body Functions Affected: 1. Sensory/Pain 2. Cardio 3. Neuromusculoskeletal 
4. Movement Related Activities and Participation Affected: 1. General Tasks and Demands 2. Mobility 3. Self Care 4. Domestic Life 5. Community, Social and [place] Number of elements that affect the Plan of Care: 4+: HIGH COMPLEXITY CLINICAL PRESENTATION:  
Presentation: Stable and uncomplicated: LOW COMPLEXITY CLINICAL DECISION MAKING:  
[employer] AM-PAC 6 Clicks Basic Mobility Inpatient Short Form How much difficulty does the patient currently have. .. Unable A Lot A Little None 1. Turning over in bed (including adjusting bedclothes, sheets and blankets)? [] 1   [x] 2   [] 3   [] 4  
2. Sitting down on and standing up from a chair with arms ( e.g., wheelchair, bedside commode, etc.)   [x] 1   [] 2   [] 3   [] 4  
3. Moving from lying on back to sitting on the side of the bed? [] 1   [x] 2   [] 3   [] 4 How much help from another person does the patient currently need. .. Total A Lot A Little None 4. Moving to and from a bed to a chair (including a wheelchair)? [x] 1   [] 2   [] 3   [] 4  
5. Need to walk in hospital room? [x] 1   [] 2   [] 3   [] 4  
6. Climbing 3-5 steps with a railing? [x] 1   [] 2   [] 3   [] 4  
© 2007, Trustees of [facility] [employer], under license to [employer]. All rights reserved Score:  Initial: 8 Most Recent: X (Date: -- ) Interpretation of Tool:  Represents activities that are increasingly more difficult (i.e. Bed mobility, Transfers, Gait). Medical Necessity:    
· Patient demonstrates fair rehab potential due to higher previous functional level. Reason for Services/Other Comments: 
· Patient continues to require skilled intervention due to decreased functional mobility. Use of outcome tool(s) and clinical judgement create a POC that gives a: Clear prediction of patient's progress: LOW COMPLEXITY  
  
 
 
 
TREATMENT:  
(In addition to Assessment/Re-Assessment sessions the following treatments were rendered) Pre-treatment Symptoms/Complaints:  Chronic leg pain 
Pain: Initial:  
Pain Intensity 1: 7 Pain Location 1: Leg 
Pain Orientation 1: Left, Right  Post Session:  7/10 Assessment/Reassessment only, no treatment provided today Braces/Orthotics/Lines/Etc:  
· IV 
· jasso catheter · O2 Device: Nasal cannula Treatment/Session Assessment:   
· Response to Treatment:  Poorly given decreased motivation and flat affect. · Interdisciplinary Collaboration:  
o Physical Therapist 
o Registered Nurse 
o  
o Certified Nursing Assistant/Patient Care Technician · After treatment position/precautions:  
o Bed/Chair-wheels locked 
o Bed in low position 
o Family at bedside 
o Bed in semi-chair position · Compliance with Program/Exercises: Will assess as treatment progresses · Recommendations/Intent for next treatment session: \"Next visit will focus on advancements to more challenging activities and reduction in assistance provided\". Total Treatment Duration: PT Patient Time In/Time Out Time In: 1030 Time Out: 1052 Hi Villafuerte, PT, DPT

## 2019-02-15 NOTE — H&P
HOSPITALIST INITIAL HISTORY AND PHYSICAL 
NAME:  Rod Ha Age:  59 y.o. 
:   1955 MRN:   [id number] PCP: None Consulting MD: Treatment Team: Attending Provider: Rosemarie Brenner MD; Primary Nurse: Yovani Mendes RN; Primary Nurse: Evelyn Boyer 
 
CHIEF COMPLAINT: \"feeling sick\" HISTORY OF PRESENT ILLNESS:  
Rod Ha is a 59 y.o. female with a past medical history of DM type II, GERD, HTN, anxiety, Hep C, paraplegia 2/2 transverse myelitis, atrial fib on Eliquis, narcotic/benzo dependence who presents to the ER after being discharged earlier this afternoon with \"feeling really sick. \" She was admitted from 2/10/19 until to today for Afib with RVR and GLENNA. She was discharged this afternoon after refusing recommendation for inpatient rehab, instead opting for home health rehab. She currently reports that she feels sick and that she felt that way before discharge but didn't tell anyone about it. She admits to some vomiting shortly after going home. She admits to her chronic pain and anxiety. Denies any fevers . REVIEW OF SYSTEMS: Comprehensive ROS performed and negative except as stated in HPI. Past Medical History:  
Diagnosis Date  Diabetes ([facility]  Gastrointestinal disorder GERD  Hypertension  Ill-defined condition   
 neurogenic bladder  Ill-defined condition   
 transverse myelitis  Ill-defined condition   
 paraplegia  Liver disease Hepatitis C  
 Psychiatric disorder   
 anxiety  Psychiatric disorder   
 bipolar  Thromboembolus ([facility] [address] No past surgical history on file. Prior to Admission Medications Prescriptions Last Dose Informant Patient Reported? Taking? ALPRAZolam (XANAX) 0.5 mg tablet   No No  
Sig: Take 1 Tab by mouth two (2) times daily as needed for Anxiety. Max Daily Amount: 1 mg. Blood-Glucose Meter monitoring kit   No No  
Sig: Check glucose at home daily QUEtiapine (SEROQUEL) 100 mg tablet   Yes No  
 Sig: Take 300 mg by mouth nightly. albuterol (PROVENTIL HFA, VENTOLIN HFA, PROAIR HFA) 90 mcg/actuation inhaler   No No  
Sig: Take 1 Puff by inhalation every four (4) hours as needed for Wheezing. apixaban (ELIQUIS) 5 mg tablet   No No  
Sig: Take 1 Tab by mouth two (2) times a day for 30 days. bisacodyl (DULCOLAX) 5 mg EC tablet   No No  
Sig: Take 1 Tab by mouth daily as needed for Constipation. budesonide (PULMICORT) 0.5 mg/2 mL nbsp   No No  
Si mL by Nebulization route two (2) times a day. fentaNYL (DURAGESIC) 25 mcg/hr PATCH   No No  
Si Patch by TransDERmal route every seventy-two (72) hours. Max Daily Amount: 1 Patch. flecainide (TAMBOCOR) 50 mg tablet   No No  
Sig: Take 1 Tab by mouth every twelve (12) hours. guaiFENesin ER (MUCINEX) 600 mg ER tablet   No No  
Sig: Take 1 Tab by mouth every twelve (12) hours for 7 days. insulin glargine (LANTUS) 100 unit/mL injection   No No  
Si units at bedtime. insulin lispro (HUMALOG) 100 unit/mL injection   No No  
Sig: Less than 150 =   0 units          
150 -199 =   2 units 200 -249 =   4 units 250 -299 =   6 units 300 -349 =   8 units Before meals and at bedtime. metoprolol succinate (TOPROL-XL) 50 mg XL tablet   No No  
Sig: Take 1 Tab by mouth daily. nystatin (MYCOSTATIN) powder   No No  
Sig: Apply  to affected area two (2) times a day. omeprazole (PRILOSEC) 40 mg capsule   Yes No  
Sig: Take 40 mg by mouth daily. oxyCODONE IR (ROXICODONE) 20 mg immediate release tablet   No No  
Sig: Take 1 Tab by mouth every eight (8) hours as needed. Max Daily Amount: 60 mg.  
promethazine (PHENERGAN) 25 mg tablet   Yes No  
Sig: Take 25 mg by mouth every six (6) hours as needed for Nausea. zinc oxide-cod liver oil (DESITIN) 40 % paste   No No  
Sig: Apply  to affected area two (2) times a day. Facility-Administered Medications: None No Known Allergies FAMILY HISTORY: Reviewed. Negative except No family history on file. Social History Tobacco Use  Smoking status: Current Every Day Smoker Packs/day: 0.50 Substance Use Topics  Alcohol use: No  
 
 
 
Objective:  
 
Visit Vitals /84 (BP 1 Location: Right arm, BP Patient Position: At rest;Supine) Pulse (!) 117 Temp 98.5 °F (36.9 °C) Resp 16 Ht 5' 5\" (1.651 m) Wt 93.9 kg (207 lb) SpO2 92% BMI 34.45 kg/m² Temp (24hrs), Av.5 °F (36.9 °C), Min:98.1 °F (36.7 °C), Max:98.9 °F (37.2 °C) Oxygen Therapy O2 Sat (%): 92 % (19) O2 Device: Nasal cannula (19) O2 Flow Rate (L/min): 2 l/min (19) Physical Exam: 
General:    The patient is an elderly female appearing older than stated age in obvious discomfort. Head:   Normocephalic/atraumatic. Eyes:  No palpebral pallor or scleral icterus. ENT:  External auricular and nasal exam within normal limits. Mucous membranes are moist. Edentulous Neck:  Supple, non-tender, no JVD. Lungs:   Clear to auscultation bilaterally without wheezes or crackles. No respiratory distress or accessory muscle use. Heart:   Regular rate and rhythm, without murmurs, rubs, or gallops. Abdomen:   Soft, non-tender, non-distended with normoactive bowel sounds. Genitourinary: No tenderness over the bladder or bilateral CVAs. Extremities: Without clubbing, cyanosis, or edema. Skin:     Normal color, texture, and turgor. No rashes, lesions, or jaundice. Pulses: Radial and dorsalis pedis pulses present 2+ bilaterally. Capillary refill <2s. Neurologic: CN II-XII grossly intact and symmetrical.  
  Baseline paraplegia Psychiatric: Anxious affect. Alert and oriented x 3 Data Review:  
Recent Results (from the past 24 hour(s)) PLEASE READ & DOCUMENT PPD TEST IN 72 HRS Collection Time: 19 12:58 AM  
Result Value Ref Range PPD negative Negative  
 mm Induration 0 mm METABOLIC PANEL, BASIC Collection Time: 02/14/19  3:31 AM  
Result Value Ref Range Sodium 143 136 - 145 mmol/L Potassium 4.1 3.5 - 5.1 mmol/L Chloride 108 (H) 98 - 107 mmol/L  
 CO2 27 21 - 32 mmol/L Anion gap 8 7 - 16 mmol/L Glucose 130 (H) 65 - 100 mg/dL BUN 21 8 - 23 MG/DL Creatinine 0.91 0.6 - 1.0 MG/DL  
 GFR est AA >60 >60 ml/min/1.73m2 GFR est non-AA >60 >60 ml/min/1.73m2 Calcium 9.2 8.3 - 10.4 MG/DL  
GLUCOSE, POC Collection Time: 02/14/19  6:20 AM  
Result Value Ref Range Glucose (POC) 154 (H) 65 - 100 mg/dL EKG, 12 LEAD, SUBSEQUENT Collection Time: 02/14/19  6:50 AM  
Result Value Ref Range Ventricular Rate 70 BPM  
 Atrial Rate 70 BPM  
 P-R Interval 138 ms QRS Duration 86 ms  
 Q-T Interval 444 ms QTC Calculation (Bezet) 479 ms Calculated P Axis 57 degrees Calculated R Axis 34 degrees Calculated T Axis 51 degrees Diagnosis Normal sinus rhythm Normal ECG When compared with ECG of 13-FEB-2019 06:59, No significant change was found Confirmed by JOSÉ MCGOWAN (), Cherylene Harris ([id number]) on 2/14/2019 8:46:23 AM 
  
GLUCOSE, POC Collection Time: 02/14/19 11:48 AM  
Result Value Ref Range Glucose (POC) 154 (H) 65 - 100 mg/dL METABOLIC PANEL, BASIC Collection Time: 02/14/19  7:41 PM  
Result Value Ref Range Sodium 136 136 - 145 mmol/L Potassium 5.3 (H) 3.5 - 5.1 mmol/L Chloride 103 98 - 107 mmol/L  
 CO2 26 21 - 32 mmol/L Anion gap 7 7 - 16 mmol/L Glucose 205 (H) 65 - 100 mg/dL BUN 18 8 - 23 MG/DL Creatinine 0.91 0.6 - 1.0 MG/DL  
 GFR est AA >60 >60 ml/min/1.73m2 GFR est non-AA >60 >60 ml/min/1.73m2 Calcium 9.3 8.3 - 10.4 MG/DL MAGNESIUM Collection Time: 02/14/19  7:41 PM  
Result Value Ref Range Magnesium 1.9 1.8 - 2.4 mg/dL CBC WITH AUTOMATED DIFF Collection Time: 02/14/19  7:41 PM  
Result Value Ref Range WBC 15.7 (H) 4.3 - 11.1 K/uL  
 RBC 4.64 4.05 - 5.2 M/uL  
 HGB 13.2 11.7 - 15.4 g/dL HCT 41.7 35.8 - 46.3 % MCV 89.9 79.6 - 97.8 FL  
 MCH 28.4 26.1 - 32.9 PG  
 MCHC 31.7 31.4 - 35.0 g/dL  
 RDW 14.6 11.9 - 14.6 % PLATELET 434 (H) 118 - 450 K/uL MPV 11.0 9.4 - 12.3 FL ABSOLUTE NRBC 0.00 0.0 - 0.2 K/uL  
 DF AUTOMATED NEUTROPHILS 82 (H) 43 - 78 % LYMPHOCYTES 12 (L) 13 - 44 % MONOCYTES 4 4.0 - 12.0 % EOSINOPHILS 0 (L) 0.5 - 7.8 % BASOPHILS 0 0.0 - 2.0 % IMMATURE GRANULOCYTES 1 0.0 - 5.0 %  
 ABS. NEUTROPHILS 12.8 (H) 1.7 - 8.2 K/UL  
 ABS. LYMPHOCYTES 1.9 0.5 - 4.6 K/UL  
 ABS. MONOCYTES 0.7 0.1 - 1.3 K/UL  
 ABS. EOSINOPHILS 0.1 0.0 - 0.8 K/UL  
 ABS. BASOPHILS 0.1 0.0 - 0.2 K/UL  
 ABS. IMM. GRANS. 0.2 0.0 - 0.5 K/UL BNP Collection Time: 02/14/19  7:41 PM  
Result Value Ref Range  (H) 0 pg/mL TROPONIN I Collection Time: 02/14/19  7:41 PM  
Result Value Ref Range Troponin-I, Qt. <0.02 (L) 0.02 - 0.05 NG/ML Imaging Marli Can /Studies: Xr Chest [facility] Result Date: 2/14/2019 IMPRESSION: No acute findings in the chest  
  
 
 
Assessment and Plan:  
 
Principal Problem: 
  Atrial fibrillation with rapid ventricular response ([facility] [address] (2/5/2019) Likely 2/2 anxiety, possibly mild benzo withdrawal. Will continue IV cardizem doses for now, will follow on telemetry. Will consult cardiology Active Problems: 
  Leukocytosis (2/5/2019) Mild, follow CBC. Check U/A Hyperkalemia (2/11/2019) IVF, follow BMP Nausea & vomiting (2/14/2019) Uncertain etiology. Checking U/A, lfts, lipase Type 2 diabetes mellitus with hyperglycemia ([address] (12/10/2016) Stable, continue home meds, SSI [facility]aplegia ([facility] (12/10/2016) Stable. Bipolar disorder without psychotic features ([facility] (12/10/2016) Anxious, resume home meds Chronic hepatitis C virus infection ([facility] (12/10/2016) Stable Narcotic addiction ([facility] (12/10/2016) Stable.  Pt reports that her narcotics are all locked up in a rehab facility she recently left. She cannot get any new scripts filled until those . She refused to go to rehab upon discharge earlier today. DVT Prophylaxis: eliquis Code Status: FULL CODE Disposition: observe on telemetry for evaluation and treatment as per above. Anticipated discharge: < 2 midnights Signed By: Sue Brenner MD   
 2019

## 2019-02-15 NOTE — PROGRESS NOTES
Due to family having transportation issues to obtain medications CM called Physician Partner Miles Gonsales [phone] and left message for her to call CM to see if they may can provide medications at home for patient. Referral for [employer] has also been completed. CM following.

## 2019-02-15 NOTE — CONSULTS
[facility] Cardiology Consultation        Date of  Admission: 2/14/2019  6:55 PM     Referring Physician: Dr. Sophia Enriquez Physician: Dr. Anne Fernandes    CC/Reason for consult: AF    HPI:  Caleb Mohamud is a 59 y.o. morbidly obese WF with h/o DM II, PAF, prior DVT, HTN, paraplegia with chronic supra pubic cath due to neurogenic bladder, bipolar disorder, anxiety, chronic pain on opiate and xanax and chronic hepatitis C who has had recurrent admissions due to not filling medications and poor care at home. She was admitted 1/31-2/4 for AF w/ RVR and CAUTI. Then admitted 2/5-2/9 for AF w/RVR and back pain. She was started on Multaq and was discharged in NSR.  She was unable to get the [facility] and returned to the ER on 2/10. CM was consulted and reportedly made arrangements for her to have transportation to pharmacy for [facility]. She was still unable to fill prescription. After leaving the ER, she found herself to be feeling anxious and unwell as she did last time she was in RVR. She was admitted again by the hospitalist and started on IV Cardizem. Anxiety and pain was controlled after home meds restarted in ER. [facility] Cardiology was consulted to evaluate and treat AF. Echo on 1/31/19 showed preserved EF 55-60%, no WMA and mild LAE. When seen for consult, Pt was in NSR w/o complaints. She was prescribed Flecainide and Toprol XL and she was again DC on 2/13 by the hospitalist. Her son left and didn't return and therefore her DC was postponed to 2/14 when she was DC home. She returned 4-5 hours later to the ER reporting \"feeling sick. \" She was admitted by the hospitalist and later went back into AF while still in ER. She again did not have her meds filled at DC. She was started on IV Cardizem and converted back to NSR. Pt was started back on Flecainide and Toprol and is currently in NSR in the 60's on telemetry.       Past Medical History:   Diagnosis Date    Diabetes ([address]     Gastrointestinal disorder     GERD  Hypertension     Ill-defined condition     neurogenic bladder    Ill-defined condition     transverse myelitis    Ill-defined condition     paraplegia    Liver disease     Hepatitis C    Psychiatric disorder     anxiety    Psychiatric disorder     bipolar    Thromboembolus ([facility] [address]       No past surgical history on file. No Known Allergies   Social History     Socioeconomic History    Marital status:      Spouse name: Not on file    Number of children: Not on file    Years of education: Not on file    Highest education level: Not on file   Social Needs    Financial resource strain: Not on file    Food insecurity - worry: Not on file    Food insecurity - inability: Not on file    Transportation needs - medical: Not on file   [employer] needs - non-medical: Not on file   Occupational History    Not on file   Tobacco Use    Smoking status: Current Every Day Smoker     Packs/day: 0.50   Substance and Sexual Activity    Alcohol use: No    Drug use: No    Sexual activity: Not on file   Other Topics Concern    Not on file   Social History Narrative    Not on file     No family history on file.      Current Facility-Administered Medications   Medication Dose Route Frequency    calcium carbonate (TUMS) chewable tablet 200 mg [elemental]  200 mg Oral DAILY PRN    ALPRAZolam (XANAX) tablet 0.5 mg  0.5 mg Oral BID PRN    apixaban (ELIQUIS) tablet 5 mg  5 mg Oral BID    budesonide (PULMICORT) 500 mcg/2 ml nebulizer suspension  500 mcg Nebulization BID    fentaNYL (DURAGESIC) 25 mcg/hr patch 1 Patch  1 Patch TransDERmal Q72H    flecainide (TAMBOCOR) tablet 50 mg  50 mg Oral Q12H    guaiFENesin ER (MUCINEX) tablet 600 mg  600 mg Oral Q12H    insulin glargine (LANTUS) injection 49 Units  49 Units SubCUTAneous QHS    insulin regular (NOVOLIN R, HUMULIN R) injection 0-15 Units  0-15 Units SubCUTAneous AC&HS    metoprolol succinate (TOPROL-XL) XL tablet 50 mg  50 mg Oral DAILY    pantoprazole (PROTONIX) tablet 40 mg  40 mg Oral ACB    oxyCODONE IR (ROXICODONE) tablet 20 mg  20 mg Oral Q8H PRN    QUEtiapine (SEROquel) tablet 300 mg  300 mg Oral QHS    0.9% sodium chloride infusion  100 mL/hr IntraVENous CONTINUOUS    albuterol (PROVENTIL VENTOLIN) nebulizer solution 2.5 mg  2.5 mg Inhalation Q4H PRN    bisacodyl (DULCOLAX) tablet 5 mg  5 mg Oral DAILY PRN    promethazine (PHENERGAN) tablet 25 mg  25 mg Oral Q6H PRN    sodium chloride (NS) flush 5-40 mL  5-40 mL IntraVENous PRN    sodium chloride (NS) flush 5-40 mL  5-40 mL IntraVENous Q8H    sodium chloride (NS) flush 5-40 mL  5-40 mL IntraVENous PRN    acetaminophen (TYLENOL) tablet 650 mg  650 mg Oral Q4H PRN    magnesium hydroxide (MILK OF MAGNESIA) 400 mg/5 mL oral suspension 30 mL  30 mL Oral DAILY PRN       Review of symptoms:  General: no recent weight loss/gain, +weakness/fatigue, fever or chills   Skin: no rashes, lumps, or other skin changes   HEENT: no headache, dizziness, lightheadedness, vision changes, hearing changes, tinnitus, vertigo, sinus pressure/pain, bleeding gums, sore throat, or hoarseness   Neck: no swollen glands, goiter, pain or stiffness   Respiratory: no cough, sputum, hemoptysis, dyspnea, wheezing   Cardiovascular: no chest pain or discomfort, +palpitations, dyspnea, orthopnea, paroxysmal nocturnal dyspnea, peripheral edema   Gastrointestinal: no trouble swallowing, heartburn, change of appetite, nausea, change in bowel habits, pain with defecation, rectal bleeding or black/tarry stools, hemorrhoids, constipation, diarrhea, abdominal pain, jaundice, liver or gallbladder problems   Urinary: no frequency, urgency , hematuria, burning/pain with urination, recent flank pain, polyuria, nocturia, or difficulty urinating   Genital: no vaginal or pelvic infections   Peripheral Vascular: no claudication, leg cramps, +prior DVTs, swelling of calves, legs, or feet, color change, or swelling with redness or tenderness   Musculoskeletal: no muscle or joint pain/stiffness, joint swelling, erythema of joints, or back pain   Psychiatric: +bipolar and anxiety, mental disorders, or excessive stress   Neurological: no history of CVA, dizziness, no sensory or motor loss, seizures, syncope, tremors, numbness, tingling, no changes in mood, attention, or speech, no changes in orientation, memory, insight, or judgment. no headache, vertigo. Hematologic: no anemia, easy bruising or bleeding   Endocrine: +diabetes, thyroid problems, heat or cold intolerance, excessive sweating, polyuria, polydipsia        Subjective:   Physical Exam    Visit Vitals  /56 (BP 1 Location: Right arm, BP Patient Position: At rest)   Pulse 80   Temp 97.2 °F (36.2 °C)   Resp 16   Ht 5' 5\" (1.651 m)   Wt 93.4 kg (206 lb)   SpO2 94%   BMI 34.28 kg/m²     General Appearance:  Well developed, obese, alert and oriented x 3, and individual in no acute distress. Ears/Nose/Mouth/Throat:   Hearing grossly normal.         Neck: Supple. Chest:   Lungs clear to auscultation bilaterally. Cardiovascular:  Regular rate and rhythm, S1, S2   Abdomen:   Soft, non-tender, bowel sounds are active. Extremities: No edema bilaterally. Skin: Warm and dry. Labs:   Recent Results (from the past 24 hour(s))   GLUCOSE, POC    Collection Time: 02/14/19 11:48 AM   Result Value Ref Range    Glucose (POC) 154 (H) 65 - 100 mg/dL   EKG, 12 LEAD, INITIAL    Collection Time: 02/14/19  7:02 PM   Result Value Ref Range    Ventricular Rate 68 BPM    Atrial Rate 68 BPM    P-R Interval 140 ms    QRS Duration 78 ms    Q-T Interval 454 ms    QTC Calculation (Bezet) 482 ms    Calculated P Axis 51 degrees    Calculated R Axis 11 degrees    Calculated T Axis 49 degrees    Diagnosis       !! AGE AND GENDER SPECIFIC ECG ANALYSIS !!   Normal sinus rhythm  Possible Left atrial enlargement  Prolonged QT  Abnormal ECG  When compared with ECG of 17-NOV-2014 22:11,  T wave amplitude has increased in Inferior leads  T wave amplitude has increased in Lateral leads  QT has lengthened  Confirmed by JOSÉ MCGOWAN (), NEHA MYERS ([id number]) on 2/15/2019 5:32:38 AM     METABOLIC PANEL, BASIC    Collection Time: 02/14/19  7:41 PM   Result Value Ref Range    Sodium 136 136 - 145 mmol/L    Potassium 5.3 (H) 3.5 - 5.1 mmol/L    Chloride 103 98 - 107 mmol/L    CO2 26 21 - 32 mmol/L    Anion gap 7 7 - 16 mmol/L    Glucose 205 (H) 65 - 100 mg/dL    BUN 18 8 - 23 MG/DL    Creatinine 0.91 0.6 - 1.0 MG/DL    GFR est AA >60 >60 ml/min/1.73m2    GFR est non-AA >60 >60 ml/min/1.73m2    Calcium 9.3 8.3 - 10.4 MG/DL   MAGNESIUM    Collection Time: 02/14/19  7:41 PM   Result Value Ref Range    Magnesium 1.9 1.8 - 2.4 mg/dL   CBC WITH AUTOMATED DIFF    Collection Time: 02/14/19  7:41 PM   Result Value Ref Range    WBC 15.7 (H) 4.3 - 11.1 K/uL    RBC 4.64 4.05 - 5.2 M/uL    HGB 13.2 11.7 - 15.4 g/dL    HCT 41.7 35.8 - 46.3 %    MCV 89.9 79.6 - 97.8 FL    MCH 28.4 26.1 - 32.9 PG    MCHC 31.7 31.4 - 35.0 g/dL    RDW 14.6 11.9 - 14.6 %    PLATELET 858 (H) 976 - 450 K/uL    MPV 11.0 9.4 - 12.3 FL    ABSOLUTE NRBC 0.00 0.0 - 0.2 K/uL    DF AUTOMATED      NEUTROPHILS 82 (H) 43 - 78 %    LYMPHOCYTES 12 (L) 13 - 44 %    MONOCYTES 4 4.0 - 12.0 %    EOSINOPHILS 0 (L) 0.5 - 7.8 %    BASOPHILS 0 0.0 - 2.0 %    IMMATURE GRANULOCYTES 1 0.0 - 5.0 %    ABS. NEUTROPHILS 12.8 (H) 1.7 - 8.2 K/UL    ABS. LYMPHOCYTES 1.9 0.5 - 4.6 K/UL    ABS. MONOCYTES 0.7 0.1 - 1.3 K/UL    ABS. EOSINOPHILS 0.1 0.0 - 0.8 K/UL    ABS. BASOPHILS 0.1 0.0 - 0.2 K/UL    ABS. IMM.  GRANS. 0.2 0.0 - 0.5 K/UL   BNP    Collection Time: 02/14/19  7:41 PM   Result Value Ref Range     (H) 0 pg/mL   TROPONIN I    Collection Time: 02/14/19  7:41 PM   Result Value Ref Range    Troponin-I, Qt. <0.02 (L) 0.02 - 0.05 NG/ML   HEPATIC FUNCTION PANEL    Collection Time: 02/14/19  7:41 PM   Result Value Ref Range    Protein, total 8.7 (H) 6.3 - 8.2 g/dL    Albumin 3.2 3.2 - 4.6 g/dL    Globulin 5.5 (H) 2.3 - 3.5 g/dL    A-G Ratio 0.6 (L) 1.2 - 3.5      Bilirubin, total 0.5 0.2 - 1.1 MG/DL    Bilirubin, direct 0.1 <0.4 MG/DL    Alk. phosphatase 110 50 - 136 U/L    AST (SGOT) 42 (H) 15 - 37 U/L    ALT (SGPT) 28 12 - 65 U/L   LIPASE    Collection Time: 02/14/19  7:41 PM   Result Value Ref Range    Lipase 123 73 - 393 U/L   URINALYSIS W/ RFLX MICROSCOPIC    Collection Time: 02/14/19 10:07 PM   Result Value Ref Range    Color YELLOW      Appearance CLEAR      Specific gravity 1.010 1.001 - 1.023      pH (UA) 7.0 5.0 - 9.0      Protein 30 (A) NEG mg/dL    Glucose 100 (A) NEG mg/dL    Ketone 15 (A) NEG mg/dL    Bilirubin NEGATIVE  NEG      Blood MODERATE (A) NEG      Urobilinogen 0.2 0.2 - 1.0 EU/dL    Nitrites NEGATIVE  NEG      Leukocyte Esterase NEGATIVE  NEG      WBC 0-3 0 /hpf    RBC 20-50 0 /hpf    Epithelial cells 0-3 0 /hpf    Bacteria TRACE 0 /hpf   CULTURE, URINE    Collection Time: 02/14/19 10:07 PM   Result Value Ref Range    Special Requests: NO SPECIAL REQUESTS      Culture result:        NO GROWTH AFTER SHORT PERIOD OF INCUBATION. FURTHER RESULTS TO FOLLOW AFTER OVERNIGHT INCUBATION.    GLUCOSE, POC    Collection Time: 02/14/19 11:32 PM   Result Value Ref Range    Glucose (POC) 167 (H) 65 - 133 mg/dL   METABOLIC PANEL, BASIC    Collection Time: 02/15/19  3:27 AM   Result Value Ref Range    Sodium 140 136 - 145 mmol/L    Potassium 3.7 3.5 - 5.1 mmol/L    Chloride 106 98 - 107 mmol/L    CO2 26 21 - 32 mmol/L    Anion gap 8 7 - 16 mmol/L    Glucose 148 (H) 65 - 100 mg/dL    BUN 17 8 - 23 MG/DL    Creatinine 0.88 0.6 - 1.0 MG/DL    GFR est AA >60 >60 ml/min/1.73m2    GFR est non-AA >60 >60 ml/min/1.73m2    Calcium 8.7 8.3 - 10.4 MG/DL   CBC WITH AUTOMATED DIFF    Collection Time: 02/15/19  3:27 AM   Result Value Ref Range    WBC 14.6 (H) 4.3 - 11.1 K/uL    RBC 4.35 4.05 - 5.2 M/uL    HGB 12.6 11.7 - 15.4 g/dL    HCT 39.3 35.8 - 46.3 %    MCV 90.3 79.6 - 97.8 FL    MCH 29.0 26.1 - 32.9 PG    MCHC 32.1 31.4 - 35.0 g/dL    RDW 14.7 (H) 11.9 - 14.6 %    PLATELET 442 430 - 281 K/uL    MPV 10.9 9.4 - 12.3 FL    ABSOLUTE NRBC 0.00 0.0 - 0.2 K/uL    DF AUTOMATED      NEUTROPHILS 70 43 - 78 %    LYMPHOCYTES 20 13 - 44 %    MONOCYTES 7 4.0 - 12.0 %    EOSINOPHILS 2 0.5 - 7.8 %    BASOPHILS 1 0.0 - 2.0 %    IMMATURE GRANULOCYTES 1 0.0 - 5.0 %    ABS. NEUTROPHILS 10.2 (H) 1.7 - 8.2 K/UL    ABS. LYMPHOCYTES 2.9 0.5 - 4.6 K/UL    ABS. MONOCYTES 1.0 0.1 - 1.3 K/UL    ABS. EOSINOPHILS 0.2 0.0 - 0.8 K/UL    ABS. BASOPHILS 0.1 0.0 - 0.2 K/UL    ABS. IMM. GRANS. 0.2 0.0 - 0.5 K/UL   GLUCOSE, POC    Collection Time: 02/15/19  6:32 AM   Result Value Ref Range    Glucose (POC) 143 (H) 65 - 100 mg/dL       Pt has been seen and examined by Dr. Cordell Omer. He agrees with the following assessment and plan. Assessment/Plan:         Atrial fibrillation with rapid ventricular response ([address]- in NSR now on Flecainide/Toprol XL, continue Eliquis. If Pt will fill medications and take them she will have a greater chance of staying out of AF. Call if needed    GLENNA (acute kidney injury) ([address]- improved    Hypertension- improved, continue meds    Chronic midline low back pain without sciatica- pain meds per primary team    UTI (urinary tract infection)- meds per primary team    Type 2 diabetes mellitus with hyperglycemia ([address]- meds per primary team    Paraplegia ([address]    Bipolar disorder without psychotic features ([address]    Chronic hepatitis C virus infection ([address]    Narcotic addiction ([address]              Thank you for consulting [facility] and allowing us to participate in the care of this patient. We will continue to follow along with you.     Mallory Camp PA-C

## 2019-02-15 NOTE — PROGRESS NOTES
Bedside and Verbal shift change report given to self (oncoming nurse) by Chris Jackson (offgoing nurse). Report included the following information SBAR, Kardex, MAR and Recent Results.

## 2019-02-15 NOTE — PROGRESS NOTES
Bedside and Verbal shift change report given to [address] (oncoming nurse) by self (offgoing nurse). Report included the following information SBAR, Kardex, Intake/Output and MAR.

## 2019-02-15 NOTE — PROGRESS NOTES
TRANSFER - IN REPORT: 
 
Verbal report received from NALDO Conley on Keara Lucia being received from ER for routine progression of care Report consisted of patients Situation, Background, Assessment and Recommendations(SBAR). Information from the following report(s) SBAR, Kardex, Intake/Output and MAR was reviewed with the receiving nurse. Opportunity for questions and clarification was provided. Assessment completed upon patients arrival to unit and care assumed. Skin assessment complete with secondary RN which reveals the following. Pink but blachable scab between gluteal cleft. Heels are intact. BUE are bruised and scabbed. Telemetry monitor on, bed low and locked, siderails x2. Pt instructed to call for assistance.

## 2019-02-15 NOTE — PROGRESS NOTES
Care Management Interventions PCP Verified by CM: Yes(had been set up for [address] for 2/19 but now readmitted) Palliative Care Criteria Met (RRAT>21 & CHF Dx)?: No(RRAT 17 Dx Atrial fib ) Transition of Care Consult (CM Consult): Discharge Planning Discharge Durable Medical Equipment: No([place] lift, hospital bed and wheel chair) Physical Therapy Consult: Yes Occupational Therapy Consult: Yes Current Support Network: Other(lives in apartment with her 2 sons and one sons girlfriend coming in and out through the day) Confirm Follow Up Transport: Other (see comment)([facility]) Plan discussed with Pt/Family/Caregiver: Yes Freedom of Choice Offered: Yes Discharge Location Discharge Placement: Unable to determine at this time Patient readmitted 2/14/19 after few hours at home. Patient is alert and oriented x 3, flat affect. She states her son Hervey Duane [phone], son Juan Martin and his girlfriend Sam Flores stay with her through out the day. She denies being left alone. APS was informed today of readmission by Svetlana Lozada charge RN and she spoke with Mitchel Myers [phone]. Patient lives in an apartment that was set up by [facility] when she was d/c from their facility in January. This is patient 4 readmission and 1 ED visit since Jan 31, 2019. CM has been informed that Sam Flores is the one who was to be assisting in patient's care. CM has spoken with alie Castillo and patient about d/c plans. Patient had initially agreed to long term placement on day of d/c 2/14 then spoke with her son Saman Castillo and stated she was going home. Patient has DME at home to include Hospital bed, Wheel chair and [place] lift. She has not been able to be seen by home health as she is readmitted before they can see patient.  CM has referred patient to [facility] last admission with reference number [id number] to see if they can provide additional in home services for patient but it may be awhile until this can be approved or arranged. Patient had an appointment for follow up set up with Denise Vieyra with Dr Kt Denney for 2/19/19 at 120 pm to establish a PCP follow up for her. She has Medicaid and her pharmacy is [facility] # [id number] phone [phone]. Patient has not had any medications filled with her d/c from the hospital as she has been readmitted and did not get them filled. Last admission CM spoke with pharmacist at [address] [place] [address] and he stated that she should be able to obtain her medications at their pharmacy. He ran a prescription he had for her and stated it had gone through so he did not see a reason why she would not be able to obtain medications. Per [facility] pharmacist that in the past they had difficulty with patients sons attempting to  her narcotic medications and then patient would call and say don't give them the medications so they do not allow sons to  narcotic medications. Patient does not drive and takes the ambulance to the hospital. Patient and son aware of the Medicaid [facility] options for patient. Patient does not want to go back to a nursing home even though this is the best option for her. PT/OT have been ordered to see if there is a skilled need for rehab. Spoke with Hamida Joseph for [facility] and she stated that her facilities will not be able to accept patient. If patient agrees to long term placement will need to see who has Medicaid beds. [id number] [facility] may consider if she is skilled Medicaid then transition into long term. Patient initially told CM she did not want to discuss any d/c plans. PT saw patient and stated that the patient told them she wanted to go back home with her son. Will discuss case with  supervisor for any other options for patient. CM following.

## 2019-02-15 NOTE — PROGRESS NOTES
PT Note: PT orders received/reviewed and evaluation attempted. Patient was refusing to participate due to pain in B LEs of 8-9/10. RN alerted. Evaluation will be re-attempted as schedule and patient's status allow. Thanks, Debera Rubinstein, PT, DPT

## 2019-02-15 NOTE — ED PROVIDER NOTES
Patient is a 64yo F with hx paraplegia, DM, HTN, who was recently diagnosed with afib and admitted thrice, in the past two weeks. Admitted 1/31-2/4 for afib RVR and CAUTI. RE-admitted 2/4-2/9 for RVR and back pain. She was started at that time on multaq by cardiology and was discharged in NSR. She was treated subsequently in the ER for RVR but was discharged in [facility] RASHID Steinberg CM was consulted to assist with getting her multaq filled, but she may need to contact medicaid regarding changing pharmacies to one which has it in stock. See their not for more details. [id number] February 10 to the 14th, going from the home She has not been able to stay out of the hospital for a full day. KINGSTON HITCHCOCK who is familiar with the situation, and has had her multiple times recently, relates that perhaps the bigger issue now is her inability to procure her opiates and benzos, for chronic pain. These meds area apprarently sequestered, at the most recent rehab/nursing home where she was, and can not be released for some reason, due to some medicaid rules. She is apparantly on 20mg roxicodone tid, 25mcg fantanyl patch q3d, and 0.5 xanax bid. Pt relates her last pain pills were around 14:30 today prior to discharge HOME. She relates she threw up once, about that same time. Pt relates some abdominal pain which she initially states stared around 15:30, but then inidicates she has had this discomfort on and off during her recent 15 days in the hospital. 
She feels dyspneic, and just \"bad\" Past Medical History:  
Diagnosis Date  Diabetes ([facility]  Gastrointestinal disorder GERD  Hypertension  Ill-defined condition   
 neurogenic bladder  Ill-defined condition   
 transverse myelitis  Ill-defined condition   
 paraplegia  Liver disease Hepatitis C  
 Psychiatric disorder   
 anxiety  Psychiatric disorder   
 bipolar  Thromboembolus ([facility] No past surgical history on file. No family history on file. Social History Socioeconomic History  Marital status:  Spouse name: Not on file  Number of children: Not on file  Years of education: Not on file  Highest education level: Not on file Social Needs  Financial resource strain: Not on file  Food insecurity - worry: Not on file  Food insecurity - inability: Not on file  Transportation needs - medical: Not on file  Transportation needs - non-medical: Not on file Occupational History  Not on file Tobacco Use  Smoking status: Current Every Day Smoker Packs/day: 0.50 Substance and Sexual Activity  Alcohol use: No  
 Drug use: No  
 Sexual activity: Not on file Other Topics Concern  Not on file Social History Narrative  Not on file ALLERGIES: Patient has no known allergies. Review of Systems Constitutional: Negative for chills and fever. HENT: Negative for rhinorrhea and sore throat. Eyes: Negative for discharge and redness. Respiratory: Positive for shortness of breath. Negative for cough. Cardiovascular: Negative for chest pain and palpitations. Gastrointestinal: Positive for abdominal pain, nausea and vomiting. Negative for diarrhea. Musculoskeletal: Positive for back pain. Negative for arthralgias. Skin: Negative for rash. Neurological: Negative for dizziness and headaches. All other systems reviewed and are negative. Vitals:  
 02/14/19 1859 BP: (!) 205/95 Pulse: 64 Resp: 16 Temp: 98.5 °F (36.9 °C) SpO2: 92% Weight: 93.9 kg (207 lb) Height: 5' 5\" (1.651 m) Physical Exam  
Constitutional: She is oriented to person, place, and time. She appears well-developed and well-nourished. Non-toxic appearance. She does not appear ill. No distress. HENT:  
Head: Normocephalic and atraumatic.   
Eyes: Conjunctivae are normal. Pupils are equal, round, and reactive to light. Right eye exhibits no discharge. Left eye exhibits no discharge. No scleral icterus. Neck: Normal range of motion. Neck supple. Cardiovascular: Normal rate, regular rhythm and normal heart sounds. Pulmonary/Chest: Effort normal and breath sounds normal. No accessory muscle usage or stridor. Tachypnea noted. No respiratory distress. She has no decreased breath sounds. She has no wheezes. She has no rhonchi. She has no rales. Abdominal: Soft. Bowel sounds are normal. There is no tenderness. There is no guarding. Suprapubic cath Musculoskeletal: She exhibits no edema. Neurological: She is alert and oriented to person, place, and time. She exhibits normal muscle tone. cni 2-12 grossly 
paraplegia Skin: Skin is warm and dry. She is not diaphoretic. Psychiatric: She has a normal mood and affect. Her behavior is normal.  
Nursing note and vitals reviewed. MDM Number of Diagnoses or Management Options Accelerated hypertension:  
Atrial fibrillation with RVR ([facility]):  
Benzodiazepine withdrawal without complication ([facility]): Opiate withdrawal [facility]):  
Diagnosis management comments: Medical decision making note: 
Patient presents with malaise, subacute abdominal pain, nausea, vomiting, dyspnea, elevated blood pressure. Status chart review undertaken, as well as additional history taken from nurse who is familiar with her, Suspect opiate and benzodiazepine is a large factor in her presentation tonight, having just been discharged from the hospital less than 5 hours ago. . 
Case discussed with hospitalist who is familiar with her situation. Recommends one dose of 2 mg Valium by mouth, which based on its long half-life should hopefully cover her for many element of visit withdrawal seizure since she is on a low dose of short-acting benzodiazepine.  
Opiate withdrawal will be managed with Phenergan and clonidine for a few days until she can get her prescription issues straightened out with Medicaid and whatever nursing home. She was at last. 
. 
If pressure does not improve with these doses of medicines hospitalist may consider overnight observation to assist with placement if the patient changes her mind is agreeable to going to a rehabilitation facility or nursing home 8:51 PM 
Patient went into a fast A. Fib with RVR, Cardizem bolus and drip will be started, patient will be admitted to the hospitalist. 
This concludes the \"medical decision making note\" part of this emergency department visit note. Procedures

## 2019-02-15 NOTE — ROUTINE PROCESS
TRANSFER - OUT REPORT: 
 
Verbal report given to Ursula Sarabia RN on Carlton Nuñez  being transferred to [facility] for routine progression of care Report consisted of patients Situation, Background, Assessment and  
Recommendations(SBAR). Information from the following report(s) SBAR, ED Summary, [facility] and Recent Results was reviewed with the receiving nurse. Lines:  
Peripheral IV 02/14/19 Right Other(comment) (Active) Site Assessment Clean, dry, & intact 2/14/2019  7:42 PM  
Phlebitis Assessment 0 2/14/2019  7:42 PM  
Infiltration Assessment 0 2/14/2019  7:42 PM  
Dressing Status Clean, dry, & intact 2/14/2019  7:42 PM  
  
 
Opportunity for questions and clarification was provided. Patient transported with: 
 Monitor O2 @ 2 liters Registered Nurse

## 2019-02-15 NOTE — PROGRESS NOTES
Hospitalist Progress Note 2/15/2019 Admit Date: 2019  6:55 PM  
NAME: Maggie Ledbetter :  1955 MRN:  [id number] Attending: Nilam Pendleotn MD 
PCP:  None SUBJECTIVE:  
 
Maggie Ledbetter is a 63/F who was admitted for AFIB RVR and chronic back pain. She was discharged day before repeat admission and had refused rehab placement. 2/15- patient seen and examined at bedside this morning, she complains of some nausea and back pain. She denies any chest pain, palpitations, SOB or vomiting. She is currently in NSR. Review of Systems negative with exception of pertinent positives noted above PHYSICAL EXAM  
 
 
Visit Vitals /56 (BP 1 Location: Right arm, BP Patient Position: At rest) Pulse 80 Temp 97.2 °F (36.2 °C) Resp 16 Ht 5' 5\" (1.651 m) Wt 93.4 kg (206 lb) SpO2 94% BMI 34.28 kg/m² Temp (24hrs), Av.2 °F (36.8 °C), Min:97.2 °F (36.2 °C), Max:98.7 °F (37.1 °C) Oxygen Therapy O2 Sat (%): 94 % (02/15/19 0820) O2 Device: Nasal cannula (02/15/19 08) O2 Flow Rate (L/min): 2 l/min (02/15/19 0851) Intake/Output Summary (Last 24 hours) at 2/15/2019 1118 Last data filed at 2/15/2019 1048 Gross per 24 hour Intake  Output 2550 ml Net -2550 ml General: No acute distress. Head:  Atraumatic Normocephalic. Eyes:  PERRLA, EOMI, Anicteric. ENT:  No discharges/lesions. Lungs:  CTA Bilaterally. CVS:  Regular rate and rhythm,  No murmur, rub, or gallop, No JVD, 2+ pitting edema bilaterally Abdomen: Soft, Non distended, Non tender, Positive bowel sounds. MSK:  No deformities, lesions, Spontaneously moves extremities. Neurologic:  AAOx3. No focal deficits Psychiatry:      No anxiety/Depression Skin:   No rash/lesions. Good skin turgor Recent Results (from the past 24 hour(s)) GLUCOSE, POC Collection Time: 19 11:48 AM  
Result Value Ref Range Glucose (POC) 154 (H) 65 - 100 mg/dL EKG, 12 LEAD, INITIAL Collection Time: 02/14/19  7:02 PM  
Result Value Ref Range Ventricular Rate 68 BPM  
 Atrial Rate 68 BPM  
 P-R Interval 140 ms QRS Duration 78 ms Q-T Interval 454 ms QTC Calculation (Bezet) 482 ms Calculated P Axis 51 degrees Calculated R Axis 11 degrees Calculated T Axis 49 degrees Diagnosis    
  !! AGE AND GENDER SPECIFIC ECG ANALYSIS !! Normal sinus rhythm Possible Left atrial enlargement Prolonged QT Abnormal ECG When compared with ECG of 17-NOV-2014 22:11, 
T wave amplitude has increased in Inferior leads T wave amplitude has increased in Lateral leads QT has lengthened Confirmed by JOSÉ MCGOWAN (), Sima Pinedo ([id number]) on 2/15/2019 7:78:55 AM 
  
METABOLIC PANEL, BASIC Collection Time: 02/14/19  7:41 PM  
Result Value Ref Range Sodium 136 136 - 145 mmol/L Potassium 5.3 (H) 3.5 - 5.1 mmol/L Chloride 103 98 - 107 mmol/L  
 CO2 26 21 - 32 mmol/L Anion gap 7 7 - 16 mmol/L Glucose 205 (H) 65 - 100 mg/dL BUN 18 8 - 23 MG/DL Creatinine 0.91 0.6 - 1.0 MG/DL  
 GFR est AA >60 >60 ml/min/1.73m2 GFR est non-AA >60 >60 ml/min/1.73m2 Calcium 9.3 8.3 - 10.4 MG/DL MAGNESIUM Collection Time: 02/14/19  7:41 PM  
Result Value Ref Range Magnesium 1.9 1.8 - 2.4 mg/dL CBC WITH AUTOMATED DIFF Collection Time: 02/14/19  7:41 PM  
Result Value Ref Range WBC 15.7 (H) 4.3 - 11.1 K/uL  
 RBC 4.64 4.05 - 5.2 M/uL  
 HGB 13.2 11.7 - 15.4 g/dL HCT 41.7 35.8 - 46.3 % MCV 89.9 79.6 - 97.8 FL  
 MCH 28.4 26.1 - 32.9 PG  
 MCHC 31.7 31.4 - 35.0 g/dL  
 RDW 14.6 11.9 - 14.6 % PLATELET 762 (H) 845 - 450 K/uL MPV 11.0 9.4 - 12.3 FL ABSOLUTE NRBC 0.00 0.0 - 0.2 K/uL  
 DF AUTOMATED NEUTROPHILS 82 (H) 43 - 78 % LYMPHOCYTES 12 (L) 13 - 44 % MONOCYTES 4 4.0 - 12.0 % EOSINOPHILS 0 (L) 0.5 - 7.8 % BASOPHILS 0 0.0 - 2.0 % IMMATURE GRANULOCYTES 1 0.0 - 5.0 %  
 ABS. NEUTROPHILS 12.8 (H) 1.7 - 8.2 K/UL  
 ABS. LYMPHOCYTES 1.9 0.5 - 4.6 K/UL ABS. MONOCYTES 0.7 0.1 - 1.3 K/UL  
 ABS. EOSINOPHILS 0.1 0.0 - 0.8 K/UL  
 ABS. BASOPHILS 0.1 0.0 - 0.2 K/UL  
 ABS. IMM. GRANS. 0.2 0.0 - 0.5 K/UL BNP Collection Time: 02/14/19  7:41 PM  
Result Value Ref Range  (H) 0 pg/mL TROPONIN I Collection Time: 02/14/19  7:41 PM  
Result Value Ref Range Troponin-I, Qt. <0.02 (L) 0.02 - 0.05 NG/ML  
HEPATIC FUNCTION PANEL Collection Time: 02/14/19  7:41 PM  
Result Value Ref Range Protein, total 8.7 (H) 6.3 - 8.2 g/dL Albumin 3.2 3.2 - 4.6 g/dL Globulin 5.5 (H) 2.3 - 3.5 g/dL A-G Ratio 0.6 (L) 1.2 - 3.5 Bilirubin, total 0.5 0.2 - 1.1 MG/DL Bilirubin, direct 0.1 <0.4 MG/DL Alk. phosphatase 110 50 - 136 U/L  
 AST (SGOT) 42 (H) 15 - 37 U/L  
 ALT (SGPT) 28 12 - 65 U/L  
LIPASE Collection Time: 02/14/19  7:41 PM  
Result Value Ref Range Lipase 123 73 - 393 U/L  
URINALYSIS W/ RFLX MICROSCOPIC Collection Time: 02/14/19 10:07 PM  
Result Value Ref Range Color YELLOW Appearance CLEAR Specific gravity 1.010 1.001 - 1.023    
 pH (UA) 7.0 5.0 - 9.0 Protein 30 (A) NEG mg/dL Glucose 100 (A) NEG mg/dL Ketone 15 (A) NEG mg/dL Bilirubin NEGATIVE  NEG Blood MODERATE (A) NEG Urobilinogen 0.2 0.2 - 1.0 EU/dL Nitrites NEGATIVE  NEG Leukocyte Esterase NEGATIVE  NEG    
 WBC 0-3 0 /hpf  
 RBC 20-50 0 /hpf Epithelial cells 0-3 0 /hpf Bacteria TRACE 0 /hpf CULTURE, URINE Collection Time: 02/14/19 10:07 PM  
Result Value Ref Range Special Requests: NO SPECIAL REQUESTS Culture result:     
  NO GROWTH AFTER SHORT PERIOD OF INCUBATION. FURTHER RESULTS TO FOLLOW AFTER OVERNIGHT INCUBATION. GLUCOSE, POC Collection Time: 02/14/19 11:32 PM  
Result Value Ref Range Glucose (POC) 167 (H) 65 - 100 mg/dL METABOLIC PANEL, BASIC Collection Time: 02/15/19  3:27 AM  
Result Value Ref Range Sodium 140 136 - 145 mmol/L  Potassium 3.7 3.5 - 5.1 mmol/L  
 Chloride 106 98 - 107 mmol/L  
 CO2 26 21 - 32 mmol/L Anion gap 8 7 - 16 mmol/L Glucose 148 (H) 65 - 100 mg/dL BUN 17 8 - 23 MG/DL Creatinine 0.88 0.6 - 1.0 MG/DL  
 GFR est AA >60 >60 ml/min/1.73m2 GFR est non-AA >60 >60 ml/min/1.73m2 Calcium 8.7 8.3 - 10.4 MG/DL  
CBC WITH AUTOMATED DIFF Collection Time: 02/15/19  3:27 AM  
Result Value Ref Range WBC 14.6 (H) 4.3 - 11.1 K/uL  
 RBC 4.35 4.05 - 5.2 M/uL  
 HGB 12.6 11.7 - 15.4 g/dL HCT 39.3 35.8 - 46.3 % MCV 90.3 79.6 - 97.8 FL  
 MCH 29.0 26.1 - 32.9 PG  
 MCHC 32.1 31.4 - 35.0 g/dL  
 RDW 14.7 (H) 11.9 - 14.6 % PLATELET 965 945 - 950 K/uL MPV 10.9 9.4 - 12.3 FL ABSOLUTE NRBC 0.00 0.0 - 0.2 K/uL  
 DF AUTOMATED NEUTROPHILS 70 43 - 78 % LYMPHOCYTES 20 13 - 44 % MONOCYTES 7 4.0 - 12.0 % EOSINOPHILS 2 0.5 - 7.8 % BASOPHILS 1 0.0 - 2.0 % IMMATURE GRANULOCYTES 1 0.0 - 5.0 %  
 ABS. NEUTROPHILS 10.2 (H) 1.7 - 8.2 K/UL  
 ABS. LYMPHOCYTES 2.9 0.5 - 4.6 K/UL  
 ABS. MONOCYTES 1.0 0.1 - 1.3 K/UL  
 ABS. EOSINOPHILS 0.2 0.0 - 0.8 K/UL  
 ABS. BASOPHILS 0.1 0.0 - 0.2 K/UL  
 ABS. IMM. GRANS. 0.2 0.0 - 0.5 K/UL GLUCOSE, POC Collection Time: 02/15/19  6:32 AM  
Result Value Ref Range Glucose (POC) 143 (H) 65 - 100 mg/dL Imaging [facility] Chest X-ray 
  INDICATION: Shortness of breath 
  
A portable AP view of the chest was obtained. 
  
FINDINGS: The lungs are clear. There are no infiltrates or effusions. The heart 
size is normal.  The bony thorax is intact.   
  
IMPRESSION IMPRESSION: No acute findings in the chest 
 
ASSESSMENT Hospital Problems as of 2/15/2019 Date Reviewed: 1/31/2019 Codes Class Noted - Resolved POA Nausea & vomiting ICD-10-CM: R11.2 ICD-9-CM: 787.01  2/14/2019 - Present Yes Hyperkalemia ICD-10-CM: E87.5 ICD-9-CM: 276.7  2/11/2019 - Present Yes Leukocytosis ICD-10-CM: S70.564 ICD-9-CM: 288.60  2/5/2019 - Present Yes * (Principal) Atrial fibrillation with rapid ventricular response (HCC) ICD-10-CM: I48.91 
ICD-9-CM: 427.31  2/5/2019 - Present Yes Type 2 diabetes mellitus with hyperglycemia (HCC) (Chronic) ICD-10-CM: E11.65 ICD-9-CM: 250.00  12/10/2016 - Present Yes Paraplegia (HCC) (Chronic) ICD-10-CM: Y54.26 ICD-9-CM: 344.1  12/10/2016 - Present Yes Bipolar disorder without psychotic features (HCC) (Chronic) ICD-10-CM: F31.9 ICD-9-CM: 296.80  12/10/2016 - Present Yes Chronic hepatitis C virus infection (HCC) (Chronic) ICD-10-CM: B18.2 ICD-9-CM: 070.54  12/10/2016 - Present Yes Narcotic addiction ([facility] ICD-10-CM: P54.32 ICD-9-CM: 304.90  12/10/2016 - Present Yes RESOLVED: Atrial fibrillation with RVR (HCC) ICD-10-CM: I48.91 
ICD-9-CM: 427.31  2/14/2019 - 2/14/2019 Unknown Plan: 
 
AFIB RVR- has resolved, patient is now in NSR, continue with Metoprolol and Flecainide, continue Eliquis, seen by cardiology and appreciate recommendations, patient has been non compliant with medications and has been counseled Leukocytosis- trending down, Urine culture shows no growth Type II DM- continue with current Insulin regimen Paraplegia- stable Narcotic addiction- cannot get any new narcotics until she has completed her current prescription DVT Prophylaxis: Eliquis Dispo- PT evaluation pending, patient would benefit from Rehab placement Mary Hussein MD

## 2019-02-15 NOTE — PROGRESS NOTES
Spoke with Dr Rolanda Francois regarding Cardizem drip, orders received to stop drip. Will continue to monitor.

## 2019-02-15 NOTE — PROGRESS NOTES
Problem: Self Care Deficits Care Plan (Adult) Goal: *Acute Goals and Plan of Care (Insert Text) 1. Pt will increase strength in bilateral UEs from 3- to 3/5 to 4/5. 
2.  Pt will bathe and dress UB with set up. 3.  Pt will groom self with set up . 4. Pt will pull self to edge of bed with mod to max assist. 
Timeframe:  7 visits OCCUPATIONAL THERAPY: Initial Assessment, Daily Note and PM 2/15/2019OBSERVATION: OT Visit Days: 1 Payor: [id number] / Plan: [place] MEDICA[place], located within [facility] / Product Type: Medicaid /  
  
NAME/AGE/GENDER: Yesika Shah is a 59 y.o. female PRIMARY DIAGNOSIS:  Atrial fibrillation with RVR ([facility]) [I48.91] Atrial fibrillation with rapid ventricular response (HCC) Atrial fibrillation with rapid ventricular response ([facility]) ICD-10: Treatment Diagnosis:  
 · Generalized Muscle Weakness (M62.81) Precautions/Allergies: 
   Patient has no known allergies. ASSESSMENT:  
Ms. Rajendra Flores presents lying in bed appearing depressed. Pt stated that she was in [facility] where she was able to bathe and dress UB, groom self, transfer into wheelchiar with sliding board and roll wheelchair. Pt now dependent or needs help in many areas. Pt with limited AROM to less than 90 degrees and decreased strength in bilateral shoulders at 3- to 3/5. Pt unable to comb her hair with back being very matted. Pt was able to wash her face. Pt appeared very depressed. Pt went home with her 3 sons who apparently were not very supportive. Feel that even after therapy, pt needs LTC due to need for continous support. Worked on PROM and AROM with pt. Pt given red theraband and tied it to the bed. Pt instructed in therapeutic exercise. OT to see 2 x per week for strenthening UB and UB self care . This section established at most recent assessment PROBLEM LIST (Impairments causing functional limitations): 1. Decreased Strength 2. Decreased ADL/Functional Activities 3. Decreased Transfer Abilities 4. Increased Pain 5. Decreased Activity Tolerance 6. Decreased Flexibility/Joint Mobility INTERVENTIONS PLANNED: (Benefits and precautions of occupational therapy have been discussed with the patient.) 1. Activities of daily living training 2. Balance training 3. Donning&doffing training 4. Therapeutic activity 5. Therapeutic exercise TREATMENT PLAN: Frequency/Duration: Follow patient 2X per week to address above goals. Rehabilitation Potential For Stated Goals: Fair RECOMMENDED REHABILITATION/EQUIPMENT: (at time of discharge pending progress): Due to the probability of continued deficits (see above) this patient will likely need continued skilled occupational therapy after discharge. Equipment:  
? None at this time OCCUPATIONAL PROFILE AND HISTORY:  
History of Present Injury/Illness (Reason for Referral): 
See H and P Past Medical History/Comorbidities: Ms. Lan Oppenheim  has a past medical history of Diabetes ([facility] [address] Gastrointestinal disorder, Hypertension, Ill-defined condition, Ill-defined condition, Ill-defined condition, Liver disease, Psychiatric disorder, Psychiatric disorder, and Thromboembolus ([facility] [address] Ms. Lan Oppenheim  has no past surgical history on file. Social History/Living Environment:  
Home Environment: Apartment # Steps to Enter: 0(0) One/Two Story Residence: One story Living Alone: No 
Support Systems: Child(urban) Patient Expects to be Discharged to[de-identified] [facility] Current DME Used/Available at Home: [address], rolling(Pt needs a wheelchiar.) Prior Level of Function/Work/Activity: Pt lived at [facility] for a year. Transferred into wheelchair with sliding board and helped roll wheelchiar. Pt able to do UB care . Dominant Side:  
      RIGHT Number of Personal Factors/Comorbidities that affect the Plan of Care: Extensive review of physical, cognitive, and psychosocial performance (3+):  HIGH COMPLEXITY ASSESSMENT OF OCCUPATIONAL PERFORMANCE[de-identified]  
Activities of Daily Living:  
Basic ADLs (From Assessment) Complex ADLs (From Assessment) Feeding: Setup Oral Facial Hygiene/Grooming: Moderate assistance Upper Body Dressing: Total assistance Lower Body Dressing: Total assistance Toileting: Total assistance Instrumental ADL Meal Preparation: Total assistance Grooming/Bathing/Dressing Activities of Daily Living Cognitive Retraining Safety/Judgement: Decreased insight into deficits Functional Transfers Toilet Transfer : Total assistance Bed/Mat Mobility Rolling: Total assistance Bed to Chair: Total assistance Scooting: Total assistance Most Recent Physical Functioning:  
Gross Assessment: 
AROM: Grossly decreased, non-functional 
Strength: Grossly decreased, non-functional 
Sensation: Intact Posture: 
  
Balance: 
  Bed Mobility: 
Rolling: Total assistance Scooting: Total assistance Wheelchair Mobility: 
  
Transfers: 
Bed to Chair: Total assistance Patient Vitals for the past 6 hrs: 
 BP BP Patient Position SpO2 O2 Flow Rate (L/min) Pulse 02/15/19 1055    2 l/min   
02/15/19 1218    2 l/min   
02/15/19 1240 153/77 At rest 92 %  (!) 56 Mental Status Neurologic State: Alert Orientation Level: Oriented X4 Cognition: Follows commands Perseveration: No perseveration noted Safety/Judgement: Decreased insight into deficits Physical Skills Involved: 1. Range of Motion 2. Balance 3. Strength 4. Activity Tolerance 5. Pain (Chronic) Cognitive Skills Affected (resulting in the inability to perform in a timely and safe manner): 1. appears depressed Psychosocial Skills Affected: 1. Habits/Routines 2. Social Roles Number of elements that affect the Plan of Care: 5+:  HIGH COMPLEXITY CLINICAL DECISION MAKING:  
[employer] AM-PAC 6 Clicks Daily Activity Inpatient Short Form How much help from another person does the patient currently need. .. Total A Lot A Little None 1. Putting on and taking off regular lower body clothing? [x] 1   [] 2   [] 3   [] 4  
2. Bathing (including washing, rinsing, drying)? [x] 1   [] 2   [] 3   [] 4  
3. Toileting, which includes using toilet, bedpan or urinal?   [x] 1   [] 2   [] 3   [] 4  
4. Putting on and taking off regular upper body clothing? [] 1   [x] 2   [] 3   [] 4  
5. Taking care of personal grooming such as brushing teeth? [] 1   [] 2   [x] 3   [] 4  
6. Eating meals? [] 1   [] 2   [x] 3   [] 4  
© 2007, Trustees of [address] [place] [address] [address] [address], under license to [employer]. All rights reserved Score:  Initial: 11 Most Recent: X (Date: -- ) Interpretation of Tool:  Represents activities that are increasingly more difficult (i.e. Bed mobility, Transfers, Gait). Medical Necessity:    
· Patient demonstrates fair rehab potential due to higher previous functional level. Reason for Services/Other Comments: 
· Patient continues to require skilled intervention due to medical complications and patient unable to attend/participate in therapy as expected. Use of outcome tool(s) and clinical judgement create a POC that gives a: HIGH COMPLEXITY  
 
 
 
TREATMENT:  
(In addition to Assessment/Re-Assessment sessions the following treatments were rendered) Pre-treatment Symptoms/Complaints:   
Pain: Initial:  
Pain Intensity 1: 7 Pain Location 1: Leg  Post Session:  7 Therapeutic Exercise: ( 15 min  ):  PROM and scapular mobilization, amol on right UE to get shoudler range up from 45 degrees to 90 degrees. AROM with red theraband bilateral UEs for strengthening for self care  And assist with mobility Braces/Orthotics/Lines/Etc:  
· O2 Device: Nasal cannula Treatment/Session Assessment:   
· Response to Treatment:  Pt cooperative · Interdisciplinary Collaboration:  
o Physical Therapist 
o Occupational Therapist 
 o Registered Nurse · After treatment position/precautions:  
o Supine in bed 
o Bed in low position 
o Call light within reach · Compliance with Program/Exercises: Will assess as treatment progresses. · Recommendations/Intent for next treatment session: \"Next visit will focus on advancements to more challenging activities and reduction in assistance provided\". Total Treatment Duration: OT Patient Time In/Time Out Time In: 1404 Time Out: 1450 Shmuel Ramirez OT

## 2019-02-15 NOTE — PROGRESS NOTES
Problem: Falls - Risk of 
Goal: *Absence of Falls Document Scooter Macleod Fall Risk and appropriate interventions in the flowsheet. Outcome: Progressing Towards Goal 
Fall Risk Interventions: 
  
 
  
 
Medication Interventions: Patient to call before getting OOB, Teach patient to arise slowly Elimination Interventions: Call light in reach Problem: Pressure Injury - Risk of 
Goal: *Prevention of pressure injury Document Ras Scale and appropriate interventions in the flowsheet. Outcome: Progressing Towards Goal 
Pressure Injury Interventions: 
  
 
Moisture Interventions: Absorbent underpads Activity Interventions: Pressure redistribution bed/mattress(bed type) Mobility Interventions: Float heels Nutrition Interventions: Document food/fluid/supplement intake Friction and Shear Interventions: Foam dressings/transparent film/skin sealants

## 2019-02-15 NOTE — PROGRESS NOTES
Bedside and Verbal shift change report given to Yomi Concepcion (oncoming nurse) by self (offgoing nurse). Report included the following information SBAR, Kardex, MAR and Recent Results.

## 2019-02-16 LAB
ANION GAP SERPL CALC-SCNC: 9 MMOL/L (ref 7–16)
BASOPHILS # BLD: 0.1 K/UL (ref 0–0.2)
BASOPHILS NFR BLD: 1 % (ref 0–2)
BUN SERPL-MCNC: 15 MG/DL (ref 8–23)
CALCIUM SERPL-MCNC: 9.3 MG/DL (ref 8.3–10.4)
CHLORIDE SERPL-SCNC: 108 MMOL/L (ref 98–107)
CO2 SERPL-SCNC: 25 MMOL/L (ref 21–32)
CREAT SERPL-MCNC: 0.83 MG/DL (ref 0.6–1)
DIFFERENTIAL METHOD BLD: ABNORMAL
EOSINOPHIL # BLD: 0.4 K/UL (ref 0–0.8)
EOSINOPHIL NFR BLD: 3 % (ref 0.5–7.8)
ERYTHROCYTE [DISTWIDTH] IN BLOOD BY AUTOMATED COUNT: 14.7 % (ref 11.9–14.6)
GLUCOSE BLD STRIP.AUTO-MCNC: 104 MG/DL (ref 65–100)
GLUCOSE BLD STRIP.AUTO-MCNC: 83 MG/DL (ref 65–100)
GLUCOSE BLD STRIP.AUTO-MCNC: 89 MG/DL (ref 65–100)
GLUCOSE BLD STRIP.AUTO-MCNC: 94 MG/DL (ref 65–100)
GLUCOSE SERPL-MCNC: 76 MG/DL (ref 65–100)
HCT VFR BLD AUTO: 40.8 % (ref 35.8–46.3)
HGB BLD-MCNC: 13 G/DL (ref 11.7–15.4)
IMM GRANULOCYTES # BLD AUTO: 0.1 K/UL (ref 0–0.5)
IMM GRANULOCYTES NFR BLD AUTO: 1 % (ref 0–5)
LYMPHOCYTES # BLD: 3.2 K/UL (ref 0.5–4.6)
LYMPHOCYTES NFR BLD: 24 % (ref 13–44)
MCH RBC QN AUTO: 29 PG (ref 26.1–32.9)
MCHC RBC AUTO-ENTMCNC: 31.9 G/DL (ref 31.4–35)
MCV RBC AUTO: 90.9 FL (ref 79.6–97.8)
MONOCYTES # BLD: 1 K/UL (ref 0.1–1.3)
MONOCYTES NFR BLD: 8 % (ref 4–12)
NEUTS SEG # BLD: 8.4 K/UL (ref 1.7–8.2)
NEUTS SEG NFR BLD: 64 % (ref 43–78)
NRBC # BLD: 0 K/UL (ref 0–0.2)
PLATELET # BLD AUTO: 392 K/UL (ref 150–450)
PMV BLD AUTO: 11.1 FL (ref 9.4–12.3)
POTASSIUM SERPL-SCNC: 3.6 MMOL/L (ref 3.5–5.1)
RBC # BLD AUTO: 4.49 M/UL (ref 4.05–5.2)
SODIUM SERPL-SCNC: 142 MMOL/L (ref 136–145)
WBC # BLD AUTO: 13.2 K/UL (ref 4.3–11.1)

## 2019-02-16 PROCEDURE — 74011636637 HC RX REV CODE- 636/637: Performed by: INTERNAL MEDICINE

## 2019-02-16 PROCEDURE — 94640 AIRWAY INHALATION TREATMENT: CPT

## 2019-02-16 PROCEDURE — 85025 COMPLETE CBC W/AUTO DIFF WBC: CPT

## 2019-02-16 PROCEDURE — 74011250636 HC RX REV CODE- 250/636: Performed by: INTERNAL MEDICINE

## 2019-02-16 PROCEDURE — 82962 GLUCOSE BLOOD TEST: CPT

## 2019-02-16 PROCEDURE — 74011000250 HC RX REV CODE- 250: Performed by: FAMILY MEDICINE

## 2019-02-16 PROCEDURE — 80048 BASIC METABOLIC PNL TOTAL CA: CPT

## 2019-02-16 PROCEDURE — 99218 HC RM OBSERVATION: CPT

## 2019-02-16 PROCEDURE — 94760 N-INVAS EAR/PLS OXIMETRY 1: CPT

## 2019-02-16 PROCEDURE — 36415 COLL VENOUS BLD VENIPUNCTURE: CPT

## 2019-02-16 PROCEDURE — 74011250637 HC RX REV CODE- 250/637: Performed by: FAMILY MEDICINE

## 2019-02-16 RX ORDER — ONDANSETRON 2 MG/ML
4 INJECTION INTRAMUSCULAR; INTRAVENOUS
Status: DISCONTINUED | OUTPATIENT
Start: 2019-02-16 | End: 2019-02-20 | Stop reason: HOSPADM

## 2019-02-16 RX ORDER — INSULIN GLARGINE 100 [IU]/ML
25 INJECTION, SOLUTION SUBCUTANEOUS
Status: DISCONTINUED | OUTPATIENT
Start: 2019-02-16 | End: 2019-02-17 | Stop reason: CLARIF

## 2019-02-16 RX ORDER — HYDRALAZINE HYDROCHLORIDE 20 MG/ML
10 INJECTION INTRAMUSCULAR; INTRAVENOUS
Status: DISCONTINUED | OUTPATIENT
Start: 2019-02-16 | End: 2019-02-20 | Stop reason: HOSPADM

## 2019-02-16 RX ADMIN — PROMETHAZINE HYDROCHLORIDE 25 MG: 25 TABLET ORAL at 18:00

## 2019-02-16 RX ADMIN — GUAIFENESIN 600 MG: 600 TABLET, EXTENDED RELEASE ORAL at 08:10

## 2019-02-16 RX ADMIN — APIXABAN 5 MG: 5 TABLET, FILM COATED ORAL at 08:14

## 2019-02-16 RX ADMIN — METOPROLOL SUCCINATE 50 MG: 50 TABLET, EXTENDED RELEASE ORAL at 08:11

## 2019-02-16 RX ADMIN — FLECAINIDE ACETATE 50 MG: 100 TABLET ORAL at 21:49

## 2019-02-16 RX ADMIN — ONDANSETRON 4 MG: 2 INJECTION INTRAMUSCULAR; INTRAVENOUS at 18:52

## 2019-02-16 RX ADMIN — BUDESONIDE 500 MCG: 0.5 INHALANT RESPIRATORY (INHALATION) at 07:21

## 2019-02-16 RX ADMIN — Medication 5 ML: at 21:50

## 2019-02-16 RX ADMIN — Medication 5 ML: at 06:26

## 2019-02-16 RX ADMIN — QUETIAPINE FUMARATE 300 MG: 100 TABLET ORAL at 21:50

## 2019-02-16 RX ADMIN — HYDRALAZINE HYDROCHLORIDE 10 MG: 20 INJECTION INTRAMUSCULAR; INTRAVENOUS at 17:53

## 2019-02-16 RX ADMIN — OXYCODONE HYDROCHLORIDE 20 MG: 5 TABLET ORAL at 06:22

## 2019-02-16 RX ADMIN — INSULIN GLARGINE 25 UNITS: 100 INJECTION, SOLUTION SUBCUTANEOUS at 22:27

## 2019-02-16 RX ADMIN — OXYCODONE HYDROCHLORIDE 20 MG: 5 TABLET ORAL at 22:26

## 2019-02-16 RX ADMIN — Medication 5 ML: at 14:25

## 2019-02-16 RX ADMIN — OXYCODONE HYDROCHLORIDE 20 MG: 5 TABLET ORAL at 14:25

## 2019-02-16 RX ADMIN — APIXABAN 5 MG: 5 TABLET, FILM COATED ORAL at 18:00

## 2019-02-16 RX ADMIN — ACETAMINOPHEN 650 MG: 325 TABLET, FILM COATED ORAL at 08:15

## 2019-02-16 RX ADMIN — PANTOPRAZOLE SODIUM 40 MG: 40 TABLET, DELAYED RELEASE ORAL at 08:10

## 2019-02-16 RX ADMIN — BUDESONIDE 500 MCG: 0.5 INHALANT RESPIRATORY (INHALATION) at 23:22

## 2019-02-16 RX ADMIN — GUAIFENESIN 600 MG: 600 TABLET, EXTENDED RELEASE ORAL at 21:49

## 2019-02-16 RX ADMIN — FLECAINIDE ACETATE 50 MG: 100 TABLET ORAL at 08:14

## 2019-02-16 NOTE — PROGRESS NOTES
Bedside and Verbal shift change report given to self (oncoming nurse) by Terese Medina RN (offgoing nurse). Report included the following information SBAR, Kardex, MAR and Recent Results.

## 2019-02-16 NOTE — PROGRESS NOTES
Bedside and Verbal shift change report given to Self (oncoming nurse) by Salvatore Roque (offgoing nurse). Report included the following information SBAR, Kardex, MAR and Recent Results.

## 2019-02-16 NOTE — PROGRESS NOTES
Hospitalist Progress Note 2019 Admit Date: 2019  6:55 PM  
NAME: Sam Bertrand :  1955 MRN:  [id number] Attending: Nicolle Moran MD 
PCP:  None SUBJECTIVE:  
 
Sam Bertrand is a 63/F who was admitted for AFIB RVR and chronic back pain. She was discharged day before repeat admission and had refused rehab placement. - patient seen and examined at bedside this morning, she complains of some abdominal pain and nausea but has not had any vomiting or diarrhea, she remains in NSR. Review of Systems negative with exception of pertinent positives noted above PHYSICAL EXAM  
 
 
Visit Vitals BP (!) 157/97 Pulse 66 Temp 98.2 °F (36.8 °C) Resp 18 Ht 5' 5\" (1.651 m) Wt 93.1 kg (205 lb 3.2 oz) SpO2 92% BMI 34.15 kg/m² Temp (24hrs), Av.2 °F (36.8 °C), Min:96.7 °F (35.9 °C), Max:98.9 °F (37.2 °C) Oxygen Therapy O2 Sat (%): 92 % (19 0952) Pulse via Oximetry: 50 beats per minute (19 0723) O2 Device: Room air (19 0809) O2 Flow Rate (L/min): 2 l/min (02/15/19 1623) Intake/Output Summary (Last 24 hours) at 2019 1202 Last data filed at 2019 1021 Gross per 24 hour Intake 100 ml Output 1975 ml Net -1875 ml General: No acute distress. Head:  Atraumatic Normocephalic. Eyes:  PERRLA, EOMI, Anicteric. ENT:  No discharges/lesions. Lungs:  CTA Bilaterally. CVS:  Regular rate and rhythm,  No murmur, rub, or gallop, No JVD, 2+ pitting edema bilaterally Abdomen: Soft, Non distended, Non tender, Positive bowel sounds. MSK:  No deformities, lesions, Spontaneously moves extremities. Neurologic:  AAOx3. No focal deficits Psychiatry:      No anxiety/Depression Skin:   No rash/lesions. Good skin turgor Recent Results (from the past 24 hour(s)) GLUCOSE, POC Collection Time: 02/15/19  4:32 PM  
Result Value Ref Range Glucose (POC) 139 (H) 65 - 100 mg/dL GLUCOSE, POC  Collection Time: 02/15/19  8:42 PM  
 Result Value Ref Range Glucose (POC) 136 (H) 65 - 100 mg/dL METABOLIC PANEL, BASIC Collection Time: 02/16/19  3:30 AM  
Result Value Ref Range Sodium 142 136 - 145 mmol/L Potassium 3.6 3.5 - 5.1 mmol/L Chloride 108 (H) 98 - 107 mmol/L  
 CO2 25 21 - 32 mmol/L Anion gap 9 7 - 16 mmol/L Glucose 76 65 - 100 mg/dL BUN 15 8 - 23 MG/DL Creatinine 0.83 0.6 - 1.0 MG/DL  
 GFR est AA >60 >60 ml/min/1.73m2 GFR est non-AA >60 >60 ml/min/1.73m2 Calcium 9.3 8.3 - 10.4 MG/DL  
CBC WITH AUTOMATED DIFF Collection Time: 02/16/19  3:30 AM  
Result Value Ref Range WBC 13.2 (H) 4.3 - 11.1 K/uL  
 RBC 4.49 4.05 - 5.2 M/uL  
 HGB 13.0 11.7 - 15.4 g/dL HCT 40.8 35.8 - 46.3 % MCV 90.9 79.6 - 97.8 FL  
 MCH 29.0 26.1 - 32.9 PG  
 MCHC 31.9 31.4 - 35.0 g/dL  
 RDW 14.7 (H) 11.9 - 14.6 % PLATELET 105 868 - 536 K/uL MPV 11.1 9.4 - 12.3 FL ABSOLUTE NRBC 0.00 0.0 - 0.2 K/uL  
 DF AUTOMATED NEUTROPHILS 64 43 - 78 % LYMPHOCYTES 24 13 - 44 % MONOCYTES 8 4.0 - 12.0 % EOSINOPHILS 3 0.5 - 7.8 % BASOPHILS 1 0.0 - 2.0 % IMMATURE GRANULOCYTES 1 0.0 - 5.0 %  
 ABS. NEUTROPHILS 8.4 (H) 1.7 - 8.2 K/UL  
 ABS. LYMPHOCYTES 3.2 0.5 - 4.6 K/UL  
 ABS. MONOCYTES 1.0 0.1 - 1.3 K/UL  
 ABS. EOSINOPHILS 0.4 0.0 - 0.8 K/UL  
 ABS. BASOPHILS 0.1 0.0 - 0.2 K/UL  
 ABS. IMM. GRANS. 0.1 0.0 - 0.5 K/UL GLUCOSE, POC Collection Time: 02/16/19  6:25 AM  
Result Value Ref Range Glucose (POC) 89 65 - 100 mg/dL GLUCOSE, POC Collection Time: 02/16/19 10:47 AM  
Result Value Ref Range Glucose (POC) 83 65 - 100 mg/dL Imaging [facility] Chest X-ray 
  INDICATION: Shortness of breath 
  
A portable AP view of the chest was obtained. 
  
FINDINGS: The lungs are clear. There are no infiltrates or effusions. The heart 
size is normal.  The bony thorax is intact.   
  
IMPRESSION IMPRESSION: No acute findings in the chest 
 
ASSESSMENT Hospital Problems as of 2/16/2019 Date Reviewed: 1/31/2019 Codes Class Noted - Resolved POA Nausea & vomiting ICD-10-CM: R11.2 ICD-9-CM: 787.01  2/14/2019 - Present Yes Hyperkalemia ICD-10-CM: E87.5 ICD-9-CM: 276.7  2/11/2019 - Present Yes Leukocytosis ICD-10-CM: T36.273 ICD-9-CM: 288.60  2/5/2019 - Present Yes * (Principal) Atrial fibrillation with rapid ventricular response (HCC) ICD-10-CM: I48.91 
ICD-9-CM: 427.31  2/5/2019 - Present Yes Type 2 diabetes mellitus with hyperglycemia (HCC) (Chronic) ICD-10-CM: E11.65 ICD-9-CM: 250.00  12/10/2016 - Present Yes Paraplegia (HCC) (Chronic) ICD-10-CM: K71.84 ICD-9-CM: 344.1  12/10/2016 - Present Yes Bipolar disorder without psychotic features (HCC) (Chronic) ICD-10-CM: F31.9 ICD-9-CM: 296.80  12/10/2016 - Present Yes Chronic hepatitis C virus infection (HCC) (Chronic) ICD-10-CM: B18.2 ICD-9-CM: 070.54  12/10/2016 - Present Yes Narcotic addiction ([facility] [address] ICD-10-CM: I16.28 ICD-9-CM: 304.90  12/10/2016 - Present Yes RESOLVED: Atrial fibrillation with RVR (HCC) ICD-10-CM: I48.91 
ICD-9-CM: 427.31  2/14/2019 - 2/14/2019 Unknown Plan: 
 
AFIB RVR- has resolved, patient is now in NSR, continue with Metoprolol and Flecainide, continue Eliquis, seen by cardiology and appreciate recommendations, patient has been non compliant with medications and has been counseled Leukocytosis- continue to trend down, Urine culture shows no growth Type II DM- continue with current Insulin regimen, sugars have been controlled Paraplegia- PT Narcotic addiction- cannot get any new narcotics until she has completed her current prescription DVT Prophylaxis: Eliquis Dispo- PT evaluation pending, patient would benefit from Rehab placement however she wants to speak to her family about it first. 
 
Corina Arevalo MD

## 2019-02-16 NOTE — PROGRESS NOTES
Bedside and Verbal shift change report given to Donnameir Crowe and [facility] AARON MCCANN, RNs (oncoming nurse) by self (offgoing nurse). Report included the following information SBAR, Kardex, MAR and Recent Results.

## 2019-02-17 ENCOUNTER — APPOINTMENT (OUTPATIENT)
Dept: GENERAL RADIOLOGY | Age: 64
DRG: 201 | End: 2019-02-17
Attending: INTERNAL MEDICINE
Payer: MEDICAID

## 2019-02-17 LAB
BACTERIA SPEC CULT: NORMAL
GLUCOSE BLD STRIP.AUTO-MCNC: 104 MG/DL (ref 65–100)
GLUCOSE BLD STRIP.AUTO-MCNC: 114 MG/DL (ref 65–100)
GLUCOSE BLD STRIP.AUTO-MCNC: 116 MG/DL (ref 65–100)
GLUCOSE BLD STRIP.AUTO-MCNC: 207 MG/DL (ref 65–100)
GLUCOSE BLD STRIP.AUTO-MCNC: 65 MG/DL (ref 65–100)
SERVICE CMNT-IMP: NORMAL

## 2019-02-17 PROCEDURE — 74011000250 HC RX REV CODE- 250: Performed by: FAMILY MEDICINE

## 2019-02-17 PROCEDURE — 99218 HC RM OBSERVATION: CPT

## 2019-02-17 PROCEDURE — 74011636637 HC RX REV CODE- 636/637: Performed by: FAMILY MEDICINE

## 2019-02-17 PROCEDURE — 74018 RADEX ABDOMEN 1 VIEW: CPT

## 2019-02-17 PROCEDURE — 94640 AIRWAY INHALATION TREATMENT: CPT

## 2019-02-17 PROCEDURE — 77010033678 HC OXYGEN DAILY

## 2019-02-17 PROCEDURE — 94760 N-INVAS EAR/PLS OXIMETRY 1: CPT

## 2019-02-17 PROCEDURE — 82962 GLUCOSE BLOOD TEST: CPT

## 2019-02-17 PROCEDURE — 74011250636 HC RX REV CODE- 250/636: Performed by: INTERNAL MEDICINE

## 2019-02-17 PROCEDURE — 74011250637 HC RX REV CODE- 250/637: Performed by: FAMILY MEDICINE

## 2019-02-17 RX ORDER — DEXTROSE 50 % IN WATER (D50W) INTRAVENOUS SYRINGE
25 AS NEEDED
Status: DISCONTINUED | OUTPATIENT
Start: 2019-02-17 | End: 2019-02-20 | Stop reason: HOSPADM

## 2019-02-17 RX ORDER — ADHESIVE BANDAGE
30 BANDAGE TOPICAL EVERY EVENING
Status: DISCONTINUED | OUTPATIENT
Start: 2019-02-17 | End: 2019-02-20 | Stop reason: HOSPADM

## 2019-02-17 RX ORDER — INSULIN GLARGINE 100 [IU]/ML
15 INJECTION, SOLUTION SUBCUTANEOUS
Status: DISCONTINUED | OUTPATIENT
Start: 2019-02-17 | End: 2019-02-20 | Stop reason: HOSPADM

## 2019-02-17 RX ADMIN — APIXABAN 5 MG: 5 TABLET, FILM COATED ORAL at 08:20

## 2019-02-17 RX ADMIN — GUAIFENESIN 600 MG: 600 TABLET, EXTENDED RELEASE ORAL at 08:20

## 2019-02-17 RX ADMIN — Medication 10 ML: at 06:44

## 2019-02-17 RX ADMIN — ONDANSETRON 4 MG: 2 INJECTION INTRAMUSCULAR; INTRAVENOUS at 19:52

## 2019-02-17 RX ADMIN — ACETAMINOPHEN 650 MG: 325 TABLET, FILM COATED ORAL at 02:52

## 2019-02-17 RX ADMIN — ACETAMINOPHEN 650 MG: 325 TABLET, FILM COATED ORAL at 23:07

## 2019-02-17 RX ADMIN — BISACODYL 5 MG: 5 TABLET, COATED ORAL at 21:52

## 2019-02-17 RX ADMIN — INSULIN GLARGINE 15 UNITS: 100 INJECTION, SOLUTION SUBCUTANEOUS at 22:08

## 2019-02-17 RX ADMIN — BUDESONIDE 500 MCG: 0.5 INHALANT RESPIRATORY (INHALATION) at 20:55

## 2019-02-17 RX ADMIN — DEXTROSE MONOHYDRATE 25 G: 25 INJECTION, SOLUTION INTRAVENOUS at 06:44

## 2019-02-17 RX ADMIN — GUAIFENESIN 600 MG: 600 TABLET, EXTENDED RELEASE ORAL at 21:52

## 2019-02-17 RX ADMIN — OXYCODONE HYDROCHLORIDE 20 MG: 5 TABLET ORAL at 12:32

## 2019-02-17 RX ADMIN — PANTOPRAZOLE SODIUM 40 MG: 40 TABLET, DELAYED RELEASE ORAL at 08:20

## 2019-02-17 RX ADMIN — PROMETHAZINE HYDROCHLORIDE 25 MG: 25 TABLET ORAL at 21:52

## 2019-02-17 RX ADMIN — BUDESONIDE 500 MCG: 0.5 INHALANT RESPIRATORY (INHALATION) at 09:00

## 2019-02-17 RX ADMIN — FLECAINIDE ACETATE 50 MG: 100 TABLET ORAL at 21:52

## 2019-02-17 RX ADMIN — METOPROLOL SUCCINATE 50 MG: 50 TABLET, EXTENDED RELEASE ORAL at 08:20

## 2019-02-17 RX ADMIN — APIXABAN 5 MG: 5 TABLET, FILM COATED ORAL at 17:22

## 2019-02-17 RX ADMIN — Medication 5 ML: at 21:54

## 2019-02-17 RX ADMIN — OXYCODONE HYDROCHLORIDE 20 MG: 5 TABLET ORAL at 20:46

## 2019-02-17 RX ADMIN — FLECAINIDE ACETATE 50 MG: 100 TABLET ORAL at 08:20

## 2019-02-17 RX ADMIN — QUETIAPINE FUMARATE 300 MG: 100 TABLET ORAL at 21:52

## 2019-02-17 RX ADMIN — Medication 5 ML: at 14:26

## 2019-02-17 NOTE — PROGRESS NOTES
BG 65. Pt refused to drink or eat anything to bring up sugar. Spoke to Dr. Tim Mathur regarding pt condition and situation.  Orders to order one amp of D50

## 2019-02-17 NOTE — PROGRESS NOTES
Attempted to give patient the Milk of Magnesia ordered and patient refused to take anything liquid. I mentioned that an enema might be the next step and she states she won't allow that either.  Patient informed the importance of the laxative and she remains very addiment of refusal.

## 2019-02-17 NOTE — PROGRESS NOTES
Bedside and Verbal shift change report given to Lauren Rmasey (oncoming nurse) by self (offgoing nurse). Report included the following information SBAR, Kardex, MAR and Recent Results.

## 2019-02-17 NOTE — PROGRESS NOTES
Bedside and Verbal shift change report given to self (oncoming nurse) by Anthony Daily RN (offgoing nurse). Report included the following information SBAR, Kardex, MAR and Recent Results.

## 2019-02-17 NOTE — PROGRESS NOTES
Problem: Nutrition Deficit Goal: *Optimize nutritional status Nutrition Reason for assessment: Received nutrition consult for general nutrition management and supplements Assessment:  
Diet order(s): CCHO, cardiacFood/Nutrition Patient History:  Pt reports she doesn't feel like eating because she is \"sick\"= nausea and abdominal pain. KUB today with findings of increased fecal material. Pt says she feels like she might her bowels may move this evening. She tried a nutritional supplement last night (unknown which one) and she says it made her sick. She is adamant that she will not try another one. She can't think of anything she wants to eat or drink but says maybe she'll ask for something later. Has active order for MOM prn. Anthropometrics:Height: 5' 5\" (165.1 cm),  Weight: 91.9 kg (202 lb 11.2 oz), Weight Source: Bed, Body mass index is 33.73 kg/m². BMI class of obesity class I. Macronutrient needs: EER:  9444-9550 kcal /day (15-18 kcal/kg ABW) 
EPR:  45-57 grams protein/day (0.8-1 grams/kg IBW) Intake/Comparative Standards: Per RD meal rounds: <10% of supper. Average intake for past 3 day(s)/9 recorded meal(s): 75%. This potentially meets ~100% of kcal and ~>100% of protein needs. Note was eating % of meals up until the last 24 hrs. Nutrition Diagnosis: Predicted inadequate oral intake (acute decline) r/t decreased ability to consume sufficient oral intake as evidenced by minimal po intake for last 24 hrs d/t nausea and abdominal pain,?constipation Intervention: 
Meals and snacks: Continue current diet. Nutrition Supplement Therapy: Declined Medication r/t nutrition management. Change MOM from prn to daily. Discharge nutrition plan: Too soon to determine. Thomes Miracle, [address] [place] [address], [facility] [address]

## 2019-02-17 NOTE — PROGRESS NOTES
Hospitalist Progress Note 2019 Admit Date: 2019  6:55 PM  
NAME: Beverly Ambriz :  1955 MRN:  [id number] Attending: Swati Siddiqi MD 
PCP:  None SUBJECTIVE:  
 
Beverly Ambriz is a 63/F who was admitted for AFIB RVR and chronic back pain. She was discharged day before repeat admission and had refused rehab placement. - patient seen and examined at bedside this morning, she complains of some abdominal pain and nausea but has not had any vomiting or diarrhea, she remains in NSR. Patient continue to refuse food as she has no appetite. Review of Systems negative with exception of pertinent positives noted above PHYSICAL EXAM  
 
Visit Vitals /76 (BP 1 Location: Right arm, BP Patient Position: At rest) Pulse (!) 58 Temp 97.9 °F (36.6 °C) Resp 20 Ht 5' 5\" (1.651 m) Wt 91.9 kg (202 lb 11.2 oz) SpO2 91% BMI 33.73 kg/m² Temp (24hrs), Av.1 °F (36.7 °C), Min:97.4 °F (36.3 °C), Max:99 °F (37.2 °C) Oxygen Therapy O2 Sat (%): 91 % (19) Pulse via Oximetry: 66 beats per minute (19) O2 Device: Nasal cannula (19) O2 Flow Rate (L/min): 2 l/min (19) Intake/Output Summary (Last 24 hours) at 2019 1157 Last data filed at 2019 2434 Gross per 24 hour Intake 60 ml Output 725 ml Net -665 ml General: No acute distress. Head:  Atraumatic Normocephalic. Eyes:  PERRLA, EOMI, Anicteric. ENT:  No discharges/lesions. Lungs:  CTA Bilaterally. CVS:  Regular rate and rhythm,  No murmur, rub, or gallop, No JVD, 2+ pitting edema bilaterally Abdomen: Soft, Non distended, Non tender, Positive bowel sounds. MSK:  No deformities, lesions, Spontaneously moves extremities. Neurologic:  AAOx3. No focal deficits Psychiatry:      No anxiety/Depression Skin:   No rash/lesions. Good skin turgor Recent Results (from the past 24 hour(s)) GLUCOSE, POC  Collection Time: 19  4:52 PM  
 Result Value Ref Range Glucose (POC) 104 (H) 65 - 100 mg/dL GLUCOSE, POC Collection Time: 02/16/19  9:57 PM  
Result Value Ref Range Glucose (POC) 94 65 - 100 mg/dL GLUCOSE, POC Collection Time: 02/17/19  6:28 AM  
Result Value Ref Range Glucose (POC) 65 65 - 100 mg/dL GLUCOSE, POC Collection Time: 02/17/19  6:56 AM  
Result Value Ref Range Glucose (POC) 207 (H) 65 - 100 mg/dL Imaging [facility] Chest X-ray 
  INDICATION: Shortness of breath 
  
A portable AP view of the chest was obtained. 
  
FINDINGS: The lungs are clear. There are no infiltrates or effusions. The heart 
size is normal.  The bony thorax is intact.   
  
IMPRESSION IMPRESSION: No acute findings in the chest 
 
ASSESSMENT Hospital Problems as of 2/17/2019 Date Reviewed: 1/31/2019 Codes Class Noted - Resolved POA Nausea & vomiting ICD-10-CM: R11.2 ICD-9-CM: 787.01  2/14/2019 - Present Yes Hyperkalemia ICD-10-CM: E87.5 ICD-9-CM: 276.7  2/11/2019 - Present Yes Leukocytosis ICD-10-CM: B64.538 ICD-9-CM: 288.60  2/5/2019 - Present Yes * (Principal) Atrial fibrillation with rapid ventricular response (HCC) ICD-10-CM: I48.91 
ICD-9-CM: 427.31  2/5/2019 - Present Yes Type 2 diabetes mellitus with hyperglycemia (HCC) (Chronic) ICD-10-CM: E11.65 ICD-9-CM: 250.00  12/10/2016 - Present Yes Paraplegia (HCC) (Chronic) ICD-10-CM: C09.08 ICD-9-CM: 344.1  12/10/2016 - Present Yes Bipolar disorder without psychotic features (HCC) (Chronic) ICD-10-CM: F31.9 ICD-9-CM: 296.80  12/10/2016 - Present Yes Chronic hepatitis C virus infection (HCC) (Chronic) ICD-10-CM: B18.2 ICD-9-CM: 070.54  12/10/2016 - Present Yes [facility] ICD-10-CM: M02.18 ICD-9-CM: 304.90  12/10/2016 - Present Yes RESOLVED: Atrial fibrillation with RVR (HCC) ICD-10-CM: I48.91 
ICD-9-CM: 427.31  2/14/2019 - 2/14/2019 Unknown Plan: AFIB RVR- has resolved, patient is now in NSR, continue with Metoprolol and Flecainide, continue Eliquis, seen by cardiology and appreciate recommendations, patient has been non compliant with medications and has been counseled Loss of appetite- check KUB, dietician consult Type II DM- continue with current Insulin regimen, sugars have been controlled Paraplegia- PT, SNF recommended Narcotic addiction- cannot get any new narcotics until she has completed her current prescription DVT Prophylaxis: Eliquis Dispo- PT evaluation pending, patient would benefit from Rehab placement however she wants to speak to her family about it first. 
 
Laura Miguel MD

## 2019-02-18 LAB
ALBUMIN SERPL-MCNC: 2.8 G/DL (ref 3.2–4.6)
ALBUMIN/GLOB SERPL: 0.6 {RATIO} (ref 1.2–3.5)
ALP SERPL-CCNC: 107 U/L (ref 50–136)
ALT SERPL-CCNC: 18 U/L (ref 12–65)
ANION GAP SERPL CALC-SCNC: 12 MMOL/L (ref 7–16)
AST SERPL-CCNC: 21 U/L (ref 15–37)
BILIRUB SERPL-MCNC: 0.4 MG/DL (ref 0.2–1.1)
BUN SERPL-MCNC: 22 MG/DL (ref 8–23)
CALCIUM SERPL-MCNC: 8.5 MG/DL (ref 8.3–10.4)
CHLORIDE SERPL-SCNC: 104 MMOL/L (ref 98–107)
CO2 SERPL-SCNC: 22 MMOL/L (ref 21–32)
CREAT SERPL-MCNC: 1 MG/DL (ref 0.6–1)
ERYTHROCYTE [DISTWIDTH] IN BLOOD BY AUTOMATED COUNT: 14.7 % (ref 11.9–14.6)
GLOBULIN SER CALC-MCNC: 4.6 G/DL (ref 2.3–3.5)
GLUCOSE BLD STRIP.AUTO-MCNC: 133 MG/DL (ref 65–100)
GLUCOSE BLD STRIP.AUTO-MCNC: 150 MG/DL (ref 65–100)
GLUCOSE BLD STRIP.AUTO-MCNC: 168 MG/DL (ref 65–100)
GLUCOSE BLD STRIP.AUTO-MCNC: 204 MG/DL (ref 65–100)
GLUCOSE SERPL-MCNC: 178 MG/DL (ref 65–100)
HCT VFR BLD AUTO: 40.7 % (ref 35.8–46.3)
HGB BLD-MCNC: 12.7 G/DL (ref 11.7–15.4)
MCH RBC QN AUTO: 29 PG (ref 26.1–32.9)
MCHC RBC AUTO-ENTMCNC: 31.2 G/DL (ref 31.4–35)
MCV RBC AUTO: 92.9 FL (ref 79.6–97.8)
NRBC # BLD: 0 K/UL (ref 0–0.2)
PLATELET # BLD AUTO: 407 K/UL (ref 150–450)
PMV BLD AUTO: 11 FL (ref 9.4–12.3)
POTASSIUM SERPL-SCNC: 3.6 MMOL/L (ref 3.5–5.1)
PROT SERPL-MCNC: 7.4 G/DL (ref 6.3–8.2)
RBC # BLD AUTO: 4.38 M/UL (ref 4.05–5.2)
SODIUM SERPL-SCNC: 138 MMOL/L (ref 136–145)
WBC # BLD AUTO: 14.4 K/UL (ref 4.3–11.1)

## 2019-02-18 PROCEDURE — 36415 COLL VENOUS BLD VENIPUNCTURE: CPT

## 2019-02-18 PROCEDURE — 82962 GLUCOSE BLOOD TEST: CPT

## 2019-02-18 PROCEDURE — 74011250636 HC RX REV CODE- 250/636: Performed by: INTERNAL MEDICINE

## 2019-02-18 PROCEDURE — 74011250637 HC RX REV CODE- 250/637: Performed by: FAMILY MEDICINE

## 2019-02-18 PROCEDURE — 77010033678 HC OXYGEN DAILY

## 2019-02-18 PROCEDURE — 74011000250 HC RX REV CODE- 250: Performed by: FAMILY MEDICINE

## 2019-02-18 PROCEDURE — 94760 N-INVAS EAR/PLS OXIMETRY 1: CPT

## 2019-02-18 PROCEDURE — 80053 COMPREHEN METABOLIC PANEL: CPT

## 2019-02-18 PROCEDURE — 99218 HC RM OBSERVATION: CPT

## 2019-02-18 PROCEDURE — 94640 AIRWAY INHALATION TREATMENT: CPT

## 2019-02-18 PROCEDURE — 74011636637 HC RX REV CODE- 636/637: Performed by: FAMILY MEDICINE

## 2019-02-18 PROCEDURE — 85027 COMPLETE CBC AUTOMATED: CPT

## 2019-02-18 RX ORDER — BUDESONIDE 0.5 MG/2ML
500 INHALANT ORAL 2 TIMES DAILY
Qty: 1 EACH | Refills: 0 | Status: SHIPPED | OUTPATIENT
Start: 2019-02-18 | End: 2019-04-09

## 2019-02-18 RX ORDER — ALBUTEROL SULFATE 90 UG/1
1 AEROSOL, METERED RESPIRATORY (INHALATION)
Qty: 1 INHALER | Refills: 0 | Status: SHIPPED | OUTPATIENT
Start: 2019-02-18

## 2019-02-18 RX ORDER — ALBUTEROL SULFATE 0.83 MG/ML
2.5 SOLUTION RESPIRATORY (INHALATION)
Qty: 24 EACH | Refills: 0 | Status: SHIPPED | OUTPATIENT
Start: 2019-02-18

## 2019-02-18 RX ORDER — PANTOPRAZOLE SODIUM 40 MG/1
40 TABLET, DELAYED RELEASE ORAL
Qty: 30 TAB | Refills: 0 | Status: ON HOLD | OUTPATIENT
Start: 2019-02-19 | End: 2019-04-09 | Stop reason: SDUPTHER

## 2019-02-18 RX ORDER — FLECAINIDE ACETATE 50 MG/1
50 TABLET ORAL EVERY 12 HOURS
Qty: 60 TAB | Refills: 0 | Status: SHIPPED | OUTPATIENT
Start: 2019-02-18 | End: 2019-03-14

## 2019-02-18 RX ORDER — PROMETHAZINE HYDROCHLORIDE 25 MG/1
25 TABLET ORAL
Qty: 20 TAB | Refills: 0 | Status: SHIPPED | OUTPATIENT
Start: 2019-02-18 | End: 2019-03-14

## 2019-02-18 RX ORDER — POLYETHYLENE GLYCOL 3350 17 G/17G
17 POWDER, FOR SOLUTION ORAL DAILY
Status: DISCONTINUED | OUTPATIENT
Start: 2019-02-18 | End: 2019-02-20 | Stop reason: HOSPADM

## 2019-02-18 RX ORDER — INSULIN PUMP SYRINGE, 3 ML
EACH MISCELLANEOUS
Qty: 1 KIT | Refills: 0 | Status: SHIPPED | OUTPATIENT
Start: 2019-02-18

## 2019-02-18 RX ORDER — METOPROLOL SUCCINATE 50 MG/1
50 TABLET, EXTENDED RELEASE ORAL DAILY
Qty: 30 TAB | Refills: 0 | Status: SHIPPED | OUTPATIENT
Start: 2019-02-19 | End: 2019-03-14

## 2019-02-18 RX ORDER — INSULIN GLARGINE 100 [IU]/ML
49 INJECTION, SOLUTION SUBCUTANEOUS
Qty: 1 VIAL | Refills: 0 | Status: SHIPPED | OUTPATIENT
Start: 2019-02-18 | End: 2019-03-14

## 2019-02-18 RX ORDER — NYSTATIN 100000 [USP'U]/G
POWDER TOPICAL 2 TIMES DAILY
Qty: 1 BOTTLE | Refills: 0 | Status: SHIPPED | OUTPATIENT
Start: 2019-02-18

## 2019-02-18 RX ADMIN — INSULIN HUMAN 2 UNITS: 100 INJECTION, SOLUTION PARENTERAL at 11:53

## 2019-02-18 RX ADMIN — INSULIN HUMAN 3 UNITS: 100 INJECTION, SOLUTION PARENTERAL at 22:28

## 2019-02-18 RX ADMIN — PANTOPRAZOLE SODIUM 40 MG: 40 TABLET, DELAYED RELEASE ORAL at 08:53

## 2019-02-18 RX ADMIN — OXYCODONE HYDROCHLORIDE 20 MG: 5 TABLET ORAL at 21:34

## 2019-02-18 RX ADMIN — GUAIFENESIN 600 MG: 600 TABLET, EXTENDED RELEASE ORAL at 08:53

## 2019-02-18 RX ADMIN — OXYCODONE HYDROCHLORIDE 20 MG: 5 TABLET ORAL at 04:51

## 2019-02-18 RX ADMIN — FLECAINIDE ACETATE 50 MG: 100 TABLET ORAL at 21:34

## 2019-02-18 RX ADMIN — BISACODYL 5 MG: 5 TABLET, COATED ORAL at 21:34

## 2019-02-18 RX ADMIN — INSULIN HUMAN 6 UNITS: 100 INJECTION, SOLUTION PARENTERAL at 17:14

## 2019-02-18 RX ADMIN — ACETAMINOPHEN 650 MG: 325 TABLET, FILM COATED ORAL at 03:29

## 2019-02-18 RX ADMIN — BUDESONIDE 500 MCG: 0.5 INHALANT RESPIRATORY (INHALATION) at 22:16

## 2019-02-18 RX ADMIN — INSULIN GLARGINE 15 UNITS: 100 INJECTION, SOLUTION SUBCUTANEOUS at 22:24

## 2019-02-18 RX ADMIN — GUAIFENESIN 600 MG: 600 TABLET, EXTENDED RELEASE ORAL at 21:34

## 2019-02-18 RX ADMIN — ALPRAZOLAM 0.5 MG: 0.5 TABLET ORAL at 11:53

## 2019-02-18 RX ADMIN — Medication 5 ML: at 05:54

## 2019-02-18 RX ADMIN — ONDANSETRON 4 MG: 2 INJECTION INTRAMUSCULAR; INTRAVENOUS at 06:14

## 2019-02-18 RX ADMIN — OXYCODONE HYDROCHLORIDE 20 MG: 5 TABLET ORAL at 13:24

## 2019-02-18 RX ADMIN — METOPROLOL SUCCINATE 50 MG: 50 TABLET, EXTENDED RELEASE ORAL at 08:53

## 2019-02-18 RX ADMIN — PROMETHAZINE HYDROCHLORIDE 25 MG: 25 TABLET ORAL at 11:53

## 2019-02-18 RX ADMIN — APIXABAN 5 MG: 5 TABLET, FILM COATED ORAL at 08:53

## 2019-02-18 RX ADMIN — APIXABAN 5 MG: 5 TABLET, FILM COATED ORAL at 17:15

## 2019-02-18 RX ADMIN — Medication 5 ML: at 21:35

## 2019-02-18 RX ADMIN — FLECAINIDE ACETATE 50 MG: 100 TABLET ORAL at 08:53

## 2019-02-18 RX ADMIN — QUETIAPINE FUMARATE 300 MG: 100 TABLET ORAL at 21:34

## 2019-02-18 RX ADMIN — PROMETHAZINE HYDROCHLORIDE 25 MG: 25 TABLET ORAL at 18:06

## 2019-02-18 NOTE — PROGRESS NOTES
[id number] Received call back. Adelita Stone has referral for home med delivery and she will follow up with pt and son John Mai once pt is home and has her discharge scripts filled then she will transfer pt scripts to Physician [facility] for home delivery of maintenance meds. LMSW placed a follow up call to Physician [place] [address] LEA Gonsales [phone] with request for a call back with update about pt referral.  Left VM, await call back.

## 2019-02-18 NOTE — PROGRESS NOTES
Spoke to Dr. Matos Payment regarding pt dosage of Lantus due 2200. Updated MD regarding pt blood glucose of 65 at 0600 requiring 1 amp of D50. Telephone orders to decrease Lantus 25 units to Lantus 15 units at bedtime.

## 2019-02-18 NOTE — PROGRESS NOTES
Bedside and Verbal shift change report given to self (oncoming nurse) by Junior Cottrell RN (offgoing nurse). Report included the following information SBAR, Kardex, MAR and Recent Results.

## 2019-02-18 NOTE — PROGRESS NOTES
Hospitalist Progress Note 2019 Admit Date: 2019  6:55 PM  
NAME: Suzanne Ramirez :  1955 MRN:  [id number] Attending: Belle Nava MD 
PCP:  None SUBJECTIVE:  
 
Suzanne Ramirez is a 63/F who was admitted for AFIB RVR and chronic back pain. She was discharged day before repeat admission and had refused rehab placement. - patient seen and examined at bedside this morning, she states she feels better and denies any nausea or vomiting. She tolerated breakfast this morning. Review of Systems negative with exception of pertinent positives noted above PHYSICAL EXAM  
 
Visit Vitals /70 Pulse 63 Temp 97.3 °F (36.3 °C) Resp 17 Ht 5' 5\" (1.651 m) Wt 92 kg (202 lb 14.4 oz) SpO2 95% BMI 33.76 kg/m² Temp (24hrs), Av.6 °F (36.4 °C), Min:97.3 °F (36.3 °C), Max:97.9 °F (36.6 °C) Oxygen Therapy O2 Sat (%): 95 % (19 0858) Pulse via Oximetry: 65 beats per minute (197) O2 Device: Nasal cannula (19 1124) O2 Flow Rate (L/min): 2 l/min (19 0853) Intake/Output Summary (Last 24 hours) at 2019 1255 Last data filed at 2019 1230 Gross per 24 hour Intake 540 ml Output 1325 ml Net -785 ml General: No acute distress. Head:  Atraumatic Normocephalic. Eyes:  PERRLA, EOMI, Anicteric. ENT:  No discharges/lesions. Lungs:  CTA Bilaterally. CVS:  Regular rate and rhythm,  No murmur, rub, or gallop, No JVD, 2+ pitting edema bilaterally Abdomen: Soft, Non distended, Non tender, Positive bowel sounds. MSK:  No deformities, lesions Neurologic:  AAOx3. No focal deficits Psychiatry:      No anxiety/Depression Skin:   No rash/lesions. Good skin turgor Recent Results (from the past 24 hour(s)) GLUCOSE, POC Collection Time: 19  4:28 PM  
Result Value Ref Range Glucose (POC) 116 (H) 65 - 100 mg/dL GLUCOSE, POC Collection Time: 19  9:47 PM  
Result Value Ref Range Glucose (POC) 104 (H) 65 - 100 mg/dL METABOLIC PANEL, COMPREHENSIVE Collection Time: 02/18/19  4:25 AM  
Result Value Ref Range Sodium 138 136 - 145 mmol/L Potassium 3.6 3.5 - 5.1 mmol/L Chloride 104 98 - 107 mmol/L  
 CO2 22 21 - 32 mmol/L Anion gap 12 7 - 16 mmol/L Glucose 178 (H) 65 - 100 mg/dL BUN 22 8 - 23 MG/DL Creatinine 1.00 0.6 - 1.0 MG/DL  
 GFR est AA >60 >60 ml/min/1.73m2 GFR est non-AA 60 (L) >60 ml/min/1.73m2 Calcium 8.5 8.3 - 10.4 MG/DL Bilirubin, total 0.4 0.2 - 1.1 MG/DL  
 ALT (SGPT) 18 12 - 65 U/L  
 AST (SGOT) 21 15 - 37 U/L Alk. phosphatase 107 50 - 136 U/L Protein, total 7.4 6.3 - 8.2 g/dL Albumin 2.8 (L) 3.2 - 4.6 g/dL Globulin 4.6 (H) 2.3 - 3.5 g/dL A-G Ratio 0.6 (L) 1.2 - 3.5    
CBC W/O DIFF Collection Time: 02/18/19  4:25 AM  
Result Value Ref Range WBC 14.4 (H) 4.3 - 11.1 K/uL  
 RBC 4.38 4.05 - 5.2 M/uL  
 HGB 12.7 11.7 - 15.4 g/dL HCT 40.7 35.8 - 46.3 % MCV 92.9 79.6 - 97.8 FL  
 MCH 29.0 26.1 - 32.9 PG  
 MCHC 31.2 (L) 31.4 - 35.0 g/dL  
 RDW 14.7 (H) 11.9 - 14.6 % PLATELET 396 607 - 128 K/uL MPV 11.0 9.4 - 12.3 FL ABSOLUTE NRBC 0.00 0.0 - 0.2 K/uL GLUCOSE, POC Collection Time: 02/18/19  6:12 AM  
Result Value Ref Range Glucose (POC) 133 (H) 65 - 100 mg/dL GLUCOSE, POC Collection Time: 02/18/19 11:01 AM  
Result Value Ref Range Glucose (POC) 150 (H) 65 - 100 mg/dL [facility] 
  INDICATION: Shortness of breath 
  
A portable AP view of the chest was obtained. 
  
FINDINGS: The lungs are clear. There are no infiltrates or effusions. The heart 
size is normal.  The bony thorax is intact.   
  
IMPRESSION IMPRESSION: No acute findings in the chest 
 
 
KUB- 2/18/19 KUB 
  
HISTORY: Abdominal pain, nausea. 
  
AP views of the abdomen were obtained in the supine position.  No prior studies 
are available for comparison. 
  
 FINDINGS: The intestinal gas pattern shows no evidence of obstruction. Coil 
packing material overlies the upper abdomen. There is a mildly excessive amount 
of fecal material throughout the colon. Assessment for free intraperitoneal air 
is suboptimal on this supine technique. No radiopaque densities resembling 
calculi are identified. There is mild right basilar opacity, unchanged. 
  
IMPRESSION IMPRESSION: Increased fecal material. 
 
ASSESSMENT Hospital Problems as of 2/18/2019 Date Reviewed: 1/31/2019 Codes Class Noted - Resolved POA Nausea & vomiting ICD-10-CM: R11.2 ICD-9-CM: 787.01  2/14/2019 - Present Yes Hyperkalemia ICD-10-CM: E87.5 ICD-9-CM: 276.7  2/11/2019 - Present Yes Leukocytosis ICD-10-CM: L56.340 ICD-9-CM: 288.60  2/5/2019 - Present Yes * (Principal) Atrial fibrillation with rapid ventricular response (HCC) ICD-10-CM: I48.91 
ICD-9-CM: 427.31  2/5/2019 - Present Yes Type 2 diabetes mellitus with hyperglycemia (HCC) (Chronic) ICD-10-CM: E11.65 ICD-9-CM: 250.00  12/10/2016 - Present Yes Paraplegia (HCC) (Chronic) ICD-10-CM: D22.14 ICD-9-CM: 344.1  12/10/2016 - Present Yes Bipolar disorder without psychotic features (HCC) (Chronic) ICD-10-CM: F31.9 ICD-9-CM: 296.80  12/10/2016 - Present Yes Chronic hepatitis C virus infection (HCC) (Chronic) ICD-10-CM: B18.2 ICD-9-CM: 070.54  12/10/2016 - Present Yes Narcotic addiction ([facility] [address] ICD-10-CM: D65.53 ICD-9-CM: 304.90  12/10/2016 - Present Yes RESOLVED: Atrial fibrillation with RVR (HCC) ICD-10-CM: I48.91 
ICD-9-CM: 427.31  2/14/2019 - 2/14/2019 Unknown Plan: 
 
AFIB RVR- has resolved, patient is now in NSR, continue with Metoprolol and Flecainide, continue Eliquis, seen by cardiology and appreciate recommendations, patient has been non compliant with medications and has been counseled Loss of appetite- KUB shows abundance of fecal matter, patient started on stool softeners, refusing enema, dietician consulted, tolerated breakfast today Type II DM- continue with current Insulin regimen, sugars have been controlled Paraplegia- PT, SNF recommended Narcotic addiction- cannot get any new narcotics until she has completed her current prescription DVT Prophylaxis: Eliquis Dispo- PT evaluation completed, patient would benefit from Rehab placement however she wants to speak to her family about it first, told her she needs to make a decision soon Laura Miguel MD

## 2019-02-18 NOTE — PHYSICIAN ADVISORY
Letter of Determination: Upgrade from Observation to Inpatient Status This patient was originally hospitalized as Outpatient Status with Observation Services on 2/14/2019 for atrial fibrillation. This patient is appropriate for Inpatient Admission based on medical necessity. The patient's stay was medically necessary based on readmission within 24 hours of discharge, with continued symptoms. It is our recommendation that this patient's hospitalization status should be upgraded from OBSERVATION to INPATIENT status.  
  
The final decision regarding the patient's hospitalization status depends on the attending physician's judgement. Larry Pan MD, CIARRA, Physician Advisor [address] [place] [address].

## 2019-02-18 NOTE — PROGRESS NOTES
Pt has been discharged this afternoon to return to her apt at [address] [address]. [address] has been placed on will call to transport home. Pt son Lázaro Cortez has not been at bedside since about 530am this morning and his phone [phone] is not working, get message that number cannot receive calls. Pt and CM tried to reach Christiano Campo  (girlfriend of son Kayleigh Lanier) at her work([employer] [phone]) but were told she was not working today and called her cell [phone] left  asking for return call. Also Left  on number listed for alie Lanier [phone] with request for a call back. Pt's meds except for controlled drugs have been sent electronically to [facility], phone [phone] and CM confirmed that they are filled for family to . They close at 8pm.  CM has agreed that we will provide a cab ride for son/family to [facility] to  meds and return to the apt to meet pt transport to assure that pt has meds at discharge. Per pt her son Lázaro Cortez has scripts for controlled meds and she knows that they must take original scripts to pharmacy to fill these. RN updated about plan and will call cab [phone] and [phone] for transports if son shows up.  
 
If family does not respond this evening to carry out this plan will attempt discharge again in am.

## 2019-02-19 PROBLEM — I48.91 A-FIB (HCC): Status: ACTIVE | Noted: 2019-02-19

## 2019-02-19 LAB
GLUCOSE BLD STRIP.AUTO-MCNC: 140 MG/DL (ref 65–100)
GLUCOSE BLD STRIP.AUTO-MCNC: 145 MG/DL (ref 65–100)
GLUCOSE BLD STRIP.AUTO-MCNC: 218 MG/DL (ref 65–100)
GLUCOSE BLD STRIP.AUTO-MCNC: 269 MG/DL (ref 65–100)

## 2019-02-19 PROCEDURE — 99218 HC RM OBSERVATION: CPT

## 2019-02-19 PROCEDURE — 94760 N-INVAS EAR/PLS OXIMETRY 1: CPT

## 2019-02-19 PROCEDURE — 74011636637 HC RX REV CODE- 636/637: Performed by: FAMILY MEDICINE

## 2019-02-19 PROCEDURE — 65270000029 HC RM PRIVATE

## 2019-02-19 PROCEDURE — 74011250637 HC RX REV CODE- 250/637: Performed by: FAMILY MEDICINE

## 2019-02-19 PROCEDURE — 94640 AIRWAY INHALATION TREATMENT: CPT

## 2019-02-19 PROCEDURE — 82962 GLUCOSE BLOOD TEST: CPT

## 2019-02-19 PROCEDURE — 74011000250 HC RX REV CODE- 250: Performed by: FAMILY MEDICINE

## 2019-02-19 RX ADMIN — FLECAINIDE ACETATE 50 MG: 100 TABLET ORAL at 08:39

## 2019-02-19 RX ADMIN — APIXABAN 5 MG: 5 TABLET, FILM COATED ORAL at 17:23

## 2019-02-19 RX ADMIN — APIXABAN 5 MG: 5 TABLET, FILM COATED ORAL at 08:39

## 2019-02-19 RX ADMIN — QUETIAPINE FUMARATE 300 MG: 100 TABLET ORAL at 22:27

## 2019-02-19 RX ADMIN — OXYCODONE HYDROCHLORIDE 20 MG: 5 TABLET ORAL at 08:43

## 2019-02-19 RX ADMIN — INSULIN HUMAN 6 UNITS: 100 INJECTION, SOLUTION PARENTERAL at 17:23

## 2019-02-19 RX ADMIN — BISACODYL 5 MG: 5 TABLET, COATED ORAL at 22:27

## 2019-02-19 RX ADMIN — GUAIFENESIN 600 MG: 600 TABLET, EXTENDED RELEASE ORAL at 22:27

## 2019-02-19 RX ADMIN — GUAIFENESIN 600 MG: 600 TABLET, EXTENDED RELEASE ORAL at 08:39

## 2019-02-19 RX ADMIN — ALPRAZOLAM 0.5 MG: 0.5 TABLET ORAL at 11:25

## 2019-02-19 RX ADMIN — INSULIN HUMAN 9 UNITS: 100 INJECTION, SOLUTION PARENTERAL at 22:29

## 2019-02-19 RX ADMIN — BUDESONIDE 500 MCG: 0.5 INHALANT RESPIRATORY (INHALATION) at 08:21

## 2019-02-19 RX ADMIN — ALPRAZOLAM 0.5 MG: 0.5 TABLET ORAL at 22:27

## 2019-02-19 RX ADMIN — PROMETHAZINE HYDROCHLORIDE 25 MG: 25 TABLET ORAL at 12:30

## 2019-02-19 RX ADMIN — INSULIN GLARGINE 15 UNITS: 100 INJECTION, SOLUTION SUBCUTANEOUS at 22:28

## 2019-02-19 RX ADMIN — Medication 5 ML: at 06:10

## 2019-02-19 RX ADMIN — METOPROLOL SUCCINATE 50 MG: 50 TABLET, EXTENDED RELEASE ORAL at 08:39

## 2019-02-19 RX ADMIN — PANTOPRAZOLE SODIUM 40 MG: 40 TABLET, DELAYED RELEASE ORAL at 08:39

## 2019-02-19 RX ADMIN — OXYCODONE HYDROCHLORIDE 20 MG: 5 TABLET ORAL at 17:22

## 2019-02-19 RX ADMIN — FLECAINIDE ACETATE 50 MG: 100 TABLET ORAL at 22:27

## 2019-02-19 NOTE — PROGRESS NOTES
Bedside and Verbal shift change report given to Yvon Friday, RN (oncoming nurse) by self (offgoing nurse). Report included the following information SBAR, Kardex, MAR and Recent Results.

## 2019-02-19 NOTE — DISCHARGE SUMMARY
Hospitalist Discharge Summary     Patient ID:  Yesika Shah  [id number]  89 y.o.  1955  Admit date: 2/14/2019  6:55 PM  Discharge date and time: 2/19/2019  Attending: Erendira Poon MD  PCP:  None  Treatment Team: Attending Provider: Erendira Poon MD; Consulting Provider: Tima Barney; Care Manager: Luis Armando Will, NALDO    Principal Diagnosis Atrial fibrillation with rapid ventricular response [facility])   Principal Problem:    Atrial fibrillation with rapid ventricular response ([address] (2/5/2019)    Active Problems:    Type 2 diabetes mellitus with hyperglycemia ([address] (12/10/2016)      Paraplegia ([address] (12/10/2016)      Bipolar disorder without psychotic features ([address] (12/10/2016)      Chronic hepatitis C virus infection ([address] (12/10/2016)      Narcotic addiction ([address] (12/10/2016)      Leukocytosis (2/5/2019)      Hyperkalemia (2/11/2019)      Nausea & vomiting (2/14/2019)      A-fib ([address] (2/19/2019)    Hospital Course:  Please refer to the admission H&P for details of presentation. In summary, the patient is a 63/F with PMH of Type 2 DM, Paraplegia, Bipolar disorder, Chronic Hep C, Narcotic addiction and AFIB who presented with AFIB RVR. She was seen by cardiology and continued on Metoprolol, Flecainide and Eliquis. Patient complained of abdominal pain and nausea. She had a KUB done showing an abundance of feces and she was started on stool softeners. She was able to tolerate her diet. PT saw patient and recommended rehab but she refused. Patient has been discharged but still waiting for transportation. Significant Diagnostic Studies:     KUB     HISTORY: Abdominal pain, nausea.     AP views of the abdomen were obtained in the supine position. No prior studies  are available for comparison.     FINDINGS: The intestinal gas pattern shows no evidence of obstruction. Coil  packing material overlies the upper abdomen. There is a mildly excessive amount  of fecal material throughout the colon. Assessment for free intraperitoneal air  is suboptimal on this supine technique. No radiopaque densities resembling  calculi are identified. There is mild right basilar opacity, unchanged.     IMPRESSION  IMPRESSION: Increased fecal material.       Labs: Results:       Chemistry Recent Labs     02/18/19 0425   *      K 3.6      CO2 22   BUN 22   CREA 1.00   CA 8.5   AGAP 12      TP 7.4   ALB 2.8*   GLOB 4.6*   AGRAT 0.6*      CBC w/Diff Recent Labs     02/18/19 0425   WBC 14.4*   RBC 4.38   HGB 12.7   HCT 40.7         Cardiac Enzymes No results for input(s): CPK, CKND1, DEMETRA in the last 72 hours. No lab exists for component: CKRMB, TROIP   Coagulation No results for input(s): PTP, INR, APTT in the last 72 hours. No lab exists for component: INREXT    Lipid Panel No results found for: CHOL, CHOLPOCT, CHOLX, CHLST, CHOLV, 896393, HDL, LDL, LDLC, DLDLP, 532551, VLDLC, VLDL, TGLX, TRIGL, TRIGP, TGLPOCT, CHHD, CHHDX   BNP No results for input(s): BNPP in the last 72 hours. Liver Enzymes Recent Labs     02/18/19 0425   TP 7.4   ALB 2.8*      SGOT 21      Thyroid Studies Lab Results   Component Value Date/Time    TSH 2.110 01/31/2019 10:56 AM            Discharge Exam:  Visit Vitals  /83   Pulse 63   Temp 97.4 °F (36.3 °C)   Resp 18   Ht 5' 5\" (1.651 m)   Wt 91.8 kg (202 lb 4.8 oz)   SpO2 93%   BMI 33.66 kg/m²     General:          No acute distress. Head:               Atraumatic Normocephalic. Eyes:               PERRLA, EOMI, Anicteric. ENT:                No discharges/lesions. Lungs:             CTA Bilaterally. CVS:                Regular rate and rhythm,  No murmur, rub, or gallop, No JVD, 2+ pitting edema bilaterally  Abdomen:        Soft, Non distended, Non tender, Positive bowel sounds. MSK:               No deformities, lesions  Neurologic:      AAOx3. No focal deficits  Psychiatry:      No anxiety/Depression  Skin:                No rash/lesions.  Good skin turgor    Disposition: home  Discharge Condition: stable  Patient Instructions:   Current Discharge Medication List      START taking these medications    Details   albuterol (PROVENTIL VENTOLIN) 2.5 mg /3 mL (0.083 %) nebulizer solution Take 3 mL by inhalation every four (4) hours as needed for Wheezing. Qty: 24 Each, Refills: 0      !! insulin glargine (LANTUS) 100 unit/mL injection 49 Units by SubCUTAneous route nightly. Qty: 1 Vial, Refills: 0      pantoprazole (PROTONIX) 40 mg tablet Take 1 Tab by mouth Daily (before breakfast). Qty: 30 Tab, Refills: 0      !! promethazine (PHENERGAN) 25 mg tablet Take 1 Tab by mouth every six (6) hours as needed. Qty: 20 Tab, Refills: 0       !! - Potential duplicate medications found. Please discuss with provider. CONTINUE these medications which have CHANGED    Details   albuterol (PROVENTIL HFA, VENTOLIN HFA, PROAIR HFA) 90 mcg/actuation inhaler Take 1 Puff by inhalation every four (4) hours as needed for Wheezing. Qty: 1 Inhaler, Refills: 0      !! apixaban (ELIQUIS) 5 mg tablet Take 1 Tab by mouth two (2) times a day. Qty: 30 Tab, Refills: 0      Blood-Glucose Meter monitoring kit Check glucose at home daily  Qty: 1 Kit, Refills: 0      !! budesonide (PULMICORT) 0.5 mg/2 mL nbsp 2 mL by Nebulization route two (2) times a day. Qty: 1 Each, Refills: 0      !! flecainide (TAMBOCOR) 50 mg tablet Take 1 Tab by mouth every twelve (12) hours. Qty: 60 Tab, Refills: 0      !! metoprolol succinate (TOPROL-XL) 50 mg XL tablet Take 1 Tab by mouth daily. Qty: 30 Tab, Refills: 0      nystatin (MYCOSTATIN) powder Apply  to affected area two (2) times a day. Qty: 1 Bottle, Refills: 0       !! - Potential duplicate medications found. Please discuss with provider. CONTINUE these medications which have NOT CHANGED    Details   !! insulin glargine (LANTUS) 100 unit/mL injection 49 units at bedtime.   Qty: 1 Vial, Refills: 0      !! flecainide (TAMBOCOR) 50 mg tablet Take 1 Tab by mouth every twelve (12) hours. Qty: 60 Tab, Refills: 0      !! metoprolol succinate (TOPROL-XL) 50 mg XL tablet Take 1 Tab by mouth daily. Qty: 30 Tab, Refills: 0      insulin lispro (HUMALOG) 100 unit/mL injection Less than 150 =   0 units           150 -199 =   2 units  200 -249 =   4 units  250 -299 =   6 units  300 -349 =   8 units  Before meals and at bedtime. Qty: 1 Vial, Refills: 0      ALPRAZolam (XANAX) 0.5 mg tablet Take 1 Tab by mouth two (2) times daily as needed for Anxiety. Max Daily Amount: 1 mg. Qty: 30 Tab, Refills: 0    Associated Diagnoses: Bipolar disorder without psychotic features ([facility] [address]      fentaNYL (DURAGESIC) 25 mcg/hr PATCH 1 Patch by TransDERmal route every seventy-two (72) hours. Max Daily Amount: 1 Patch. Qty: 5 Patch, Refills: 0    Associated Diagnoses: Chronic midline low back pain without sciatica      oxyCODONE IR (ROXICODONE) 20 mg immediate release tablet Take 1 Tab by mouth every eight (8) hours as needed. Max Daily Amount: 60 mg.  Qty: 30 Tab, Refills: 0    Associated Diagnoses: Chronic midline low back pain without sciatica      !! apixaban (ELIQUIS) 5 mg tablet Take 1 Tab by mouth two (2) times a day for 30 days. Qty: 60 Tab, Refills: 0      bisacodyl (DULCOLAX) 5 mg EC tablet Take 1 Tab by mouth daily as needed for Constipation. Qty: 30 Tab, Refills: 0      !! budesonide (PULMICORT) 0.5 mg/2 mL nbsp 2 mL by Nebulization route two (2) times a day. Qty: 60 Each, Refills: 0      zinc oxide-cod liver oil (DESITIN) 40 % paste Apply  to affected area two (2) times a day. Qty: 1 Tube, Refills: 1      omeprazole (PRILOSEC) 40 mg capsule Take 40 mg by mouth daily. !! promethazine (PHENERGAN) 25 mg tablet Take 25 mg by mouth every six (6) hours as needed for Nausea. QUEtiapine (SEROQUEL) 100 mg tablet Take 300 mg by mouth nightly. !! - Potential duplicate medications found. Please discuss with provider.       STOP taking these medications guaiFENesin ER (MUCINEX) 600 mg ER tablet Comments:   Reason for Stopping:               Activity: PT/OT Eval and Treat  Diet: Diabetic Diet  Wound Care: None needed    Follow-up  ·   PCP    Time spent to discharge patient 35 minutes  Signed:  Birda Gowers, MD  2/19/2019  10:35 AM

## 2019-02-19 NOTE — PROGRESS NOTES
Care Management Interventions PCP Verified by CM: Yes(had been set up for [address] for 2/19 but now readmitted) Palliative Care Criteria Met (RRAT>21 & CHF Dx)?: No(RRAT 17 Dx Atrial fib ) Mode of Transport at Discharge: BLS([facility] ) Transition of Care Consult (CM Consult): Home Health [address]: No 
Reason Outside [place]: Patient already serviced by other home care/hospice agency Discharge Durable Medical Equipment: No 
Physical Therapy Consult: Yes Occupational Therapy Consult: Yes Speech Therapy Consult: No 
Current Support Network: Other(lives in apartment with her 2 sons and one sons girlfriend coming in and out through the day) Confirm Follow Up Transport: Other (see comment)([facility] ) Plan discussed with Pt/Family/Caregiver: Yes Freedom of Choice Offered: Yes Discharge Location Discharge Placement: Home with home health Patient to d/c home today. Met with patient and Sanjeev Woodard and had long discussion about services that she has been referred. CM called [facility] pharmacy to confirm patient's medications and they have the ones faxed however 2 of the meds need authorization: Toprol and Nystatin. CM called Dr. Stefania Marcos and provided him with number to call the insurance for authorization [phone] and also need script for strips and lancets. Notified Dr. Alcides Guevara that patient will not getthe Nystatin, inhaler, insulin as she can only get 7 and she must have her Xanax and Oxycodone or \"I will be right back to the hospital.\" Patient was also to have nebulizer medication but does not have machine but she states she is not going to  the nebulizer as she can only get 7 meds. She states does not need insulin as she has the insulin already at home. She also needed a glucometer but per the pharmacy patient insurance will not cover meter as they recently paid for glucometer 6 months ago.  CM asked which meter that patient insurance would cover and she stated one touch. Called Diabetes staff and they had One Touch Verio and provided a new meter for patient. Script given to patient to fill for strips and lancets. Provided patient with list of services: [facility], [facility], [facility], [place] [address] transportation, [employer], [facility] to change her address, Physician [facility]. CM notified [facility] of d/c and referral sent to them. Willam Fajardo will call tomorrow if has not heard from them by the morning. Requested Interim see patient as soon as possible and spoke with Kamilah Kelley at admissions of [facility]. Assisted patient in setting up [address] [place] [address] for her physician appointment on Friday 2/22 at 9 am. They will be there to  patient at 830 am. Notified Yecenia Lopez at [facility] of d/c and also Scott Morales [phone] of d/c planned for today. CM waiting for Myla Amador patient other son who will need taxi cab to [address] [facility] then home to  patient's medications. [address] ambulance on call to transport patient and Willam Fajardo to her home [address] Patient to d/c home with Willam Fajardo and [facility].

## 2019-02-19 NOTE — PROGRESS NOTES
Bedside and Verbal shift change report given to self (oncoming nurse) by Minna Hays RN (offgoing nurse). Report included the following information SBAR, Kardex, MAR and Recent Results.

## 2019-02-19 NOTE — DISCHARGE INSTRUCTIONS
Atrial Fibrillation: Care Instructions  Your Care Instructions    Atrial fibrillation is an irregular and often fast heartbeat. Treating this condition is important for several reasons. It can cause blood clots, which can travel from your heart to your brain and cause a stroke. If you have a fast heartbeat, you may feel lightheaded, dizzy, and weak. An irregular heartbeat can also increase your risk for heart failure. Atrial fibrillation is often the result of another heart condition, such as high blood pressure or coronary artery disease. Making changes to improve your heart condition will help you stay healthy and active. Follow-up care is a key part of your treatment and safety. Be sure to make and go to all appointments, and call your doctor if you are having problems. It's also a good idea to know your test results and keep a list of the medicines you take. How can you care for yourself at home? Medicines    · Take your medicines exactly as prescribed. Call your doctor if you think you are having a problem with your medicine. You will get more details on the specific medicines your doctor prescribes.     · If your doctor has given you a blood thinner to prevent a stroke, be sure you get instructions about how to take your medicine safely. Blood thinners can cause serious bleeding problems.     · Do not take any vitamins, over-the-counter drugs, or herbal products without talking to your doctor first.    Lifestyle changes    · Do not smoke. Smoking can increase your chance of a stroke and heart attack. If you need help quitting, talk to your doctor about stop-smoking programs and medicines. These can increase your chances of quitting for good.     · Eat a heart-healthy diet.     · Stay at a healthy weight. Lose weight if you need to.     · Limit alcohol to 2 drinks a day for men and 1 drink a day for women. Too much alcohol can cause health problems.     · Avoid colds and flu.  Get a pneumococcal vaccine shot. If you have had one before, ask your doctor whether you need another dose. Get a flu shot every year. If you must be around people with colds or flu, wash your hands often. Activity    · If your doctor recommends it, get more exercise. Walking is a good choice. Bit by bit, increase the amount you walk every day. Try for at least 30 minutes on most days of the week. You also may want to swim, bike, or do other activities. Your doctor may suggest that you join a cardiac rehabilitation program so that you can have help increasing your physical activity safely.     · Start light exercise if your doctor says it is okay. Even a small amount will help you get stronger, have more energy, and manage stress. Walking is an easy way to get exercise. Start out by walking a little more than you did in the hospital. Gradually increase the amount you walk.     · When you exercise, watch for signs that your heart is working too hard. You are pushing too hard if you cannot talk while you are exercising. If you become short of breath or dizzy or have chest pain, sit down and rest immediately.     · Check your pulse regularly. Place two fingers on the artery at the palm side of your wrist, in line with your thumb. If your heartbeat seems uneven or fast, talk to your doctor. When should you call for help? Call 911 anytime you think you may need emergency care. For example, call if:    · You have symptoms of a heart attack. These may include:  ? Chest pain or pressure, or a strange feeling in the chest.  ? Sweating. ? Shortness of breath. ? Nausea or vomiting. ? Pain, pressure, or a strange feeling in the back, neck, jaw, or upper belly or in one or both shoulders or arms. ? Lightheadedness or sudden weakness. ? A fast or irregular heartbeat. After you call 911, the  may tell you to chew 1 adult-strength or 2 to 4 low-dose aspirin. Wait for an ambulance.  Do not try to drive yourself.     · You have symptoms of a stroke. These may include:  ? Sudden numbness, tingling, weakness, or loss of movement in your face, arm, or leg, especially on only one side of your body. ? Sudden vision changes. ? Sudden trouble speaking. ? Sudden confusion or trouble understanding simple statements. ? Sudden problems with walking or balance. ? A sudden, severe headache that is different from past headaches.     · You passed out (lost consciousness).    Call your doctor now or seek immediate medical care if:    · You have new or increased shortness of breath.     · You feel dizzy or lightheaded, or you feel like you may faint.     · Your heart rate becomes irregular.     · You can feel your heart flutter in your chest or skip heartbeats. Tell your doctor if these symptoms are new or worse.    Watch closely for changes in your health, and be sure to contact your doctor if you have any problems. Where can you learn more? Go to http://sri-hollie.info/. Enter U020 in the search box to learn more about \"Atrial Fibrillation: Care Instructions. \"  Current as of: July 22, 2018  Content Version: 11.9  © 8023-5431 [employer]. Care instructions adapted under license by [employer] (which disclaims liability or warranty for this information). If you have questions about a medical condition or this instruction, always ask your healthcare professional. [place] [address] any warranty or liability for your use of this information. Patient Education        Constipation: Care Instructions  Your Care Instructions    Constipation means that you have a hard time passing stools (bowel movements). People pass stools from 3 times a day to once every 3 days. What is normal for you may be different. Constipation may occur with pain in the rectum and cramping. The pain may get worse when you try to pass stools.  Sometimes there are small amounts of bright red blood on toilet paper or the surface of stools. This is because of enlarged veins near the rectum (hemorrhoids). A few changes in your diet and lifestyle may help you avoid ongoing constipation. Your doctor may also prescribe medicine to help loosen your stool. Some medicines can cause constipation. These include pain medicines and antidepressants. Tell your doctor about all the medicines you take. Your doctor may want to make a medicine change to ease your symptoms. Follow-up care is a key part of your treatment and safety. Be sure to make and go to all appointments, and call your doctor if you are having problems. It's also a good idea to know your test results and keep a list of the medicines you take. How can you care for yourself at home? · Drink plenty of fluids, enough so that your urine is light yellow or clear like water. If you have kidney, heart, or liver disease and have to limit fluids, talk with your doctor before you increase the amount of fluids you drink. · Include high-fiber foods in your diet each day. These include fruits, vegetables, beans, and whole grains. · Get at least 30 minutes of exercise on most days of the week. Walking is a good choice. You also may want to do other activities, such as running, swimming, cycling, or playing tennis or team sports. · Take a fiber supplement, such as Citrucel or Metamucil, every day. Read and follow all instructions on the label. · Schedule time each day for a bowel movement. A daily routine may help. Take your time having your bowel movement. · Support your feet with a small step stool when you sit on the toilet. This helps flex your hips and places your pelvis in a squatting position. · Your doctor may recommend an over-the-counter laxative to relieve your constipation. Examples are Milk of Magnesia and MiraLax. Read and follow all instructions on the label. Do not use laxatives on a long-term basis. When should you call for help?   Call your doctor now or seek immediate medical care if:    · You have new or worse belly pain.     · You have new or worse nausea or vomiting.     · You have blood in your stools.    Watch closely for changes in your health, and be sure to contact your doctor if:    · Your constipation is getting worse.     · You do not get better as expected. Where can you learn more? Go to http://sri-hollie.info/. Enter 21  in the search box to learn more about \"Constipation: Care Instructions. \"  Current as of: September 23, 2018  Content Version: 11.9  © 4627-1362 [employer]. Care instructions adapted under license by [employer] (which disclaims liability or warranty for this information). If you have questions about a medical condition or this instruction, always ask your healthcare professional. [place] [address] any warranty or liability for your use of this information. Anxiety Disorder: Care Instructions  Your Care Instructions    Anxiety is a normal reaction to stress. Difficult situations can cause you to have symptoms such as sweaty palms and a nervous feeling. In an anxiety disorder, the symptoms are far more severe. Constant worry, muscle tension, trouble sleeping, nausea and diarrhea, and other symptoms can make normal daily activities difficult or impossible. These symptoms may occur for no reason, and they can affect your work, school, or social life. Medicines, counseling, and self-care can all help. Follow-up care is a key part of your treatment and safety. Be sure to make and go to all appointments, and call your doctor if you are having problems. It's also a good idea to know your test results and keep a list of the medicines you take. How can you care for yourself at home? · Take medicines exactly as directed. Call your doctor if you think you are having a problem with your medicine. · Go to your counseling sessions and follow-up appointments.   · Recognize and accept your anxiety. Then, when you are in a situation that makes you anxious, say to yourself, \"This is not an emergency. I feel uncomfortable, but I am not in danger. I can keep going even if I feel anxious. \"  · Be kind to your body:  ? Relieve tension with exercise or a massage. ? Get enough rest.  ? Avoid alcohol, caffeine, nicotine, and illegal drugs. They can increase your anxiety level and cause sleep problems. ? Learn and do relaxation techniques. See below for more about these techniques. · Engage your mind. Get out and do something you enjoy. Go to a [facility] movie, or take a walk or hike. Plan your day. Having too much or too little to do can make you anxious. · Keep a record of your symptoms. Discuss your fears with a good friend or family member, or join a support group for people with similar problems. Talking to others sometimes relieves stress. · Get involved in social groups, or volunteer to help others. Being alone sometimes makes things seem worse than they are. · Get at least 30 minutes of exercise on most days of the week to relieve stress. Walking is a good choice. You also may want to do other activities, such as running, swimming, cycling, or playing tennis or team sports. Relaxation techniques  Do relaxation exercises 10 to 20 minutes a day. You can play soothing, relaxing music while you do them, if you wish. · Tell others in your house that you are going to do your relaxation exercises. Ask them not to disturb you. · Find a comfortable place, away from all distractions and noise. · Lie down on your back, or sit with your back straight. · Focus on your breathing. Make it slow and steady. · Breathe in through your nose. Breathe out through either your nose or mouth. · Breathe deeply, filling up the area between your navel and your rib cage. Breathe so that your belly goes up and down. · Do not hold your breath. · Breathe like this for 5 to 10 minutes.  Notice the feeling of calmness throughout your whole body. As you continue to breathe slowly and deeply, relax by doing the following for another 5 to 10 minutes:  · Tighten and relax each muscle group in your body. You can begin at your toes and work your way up to your head. · Imagine your muscle groups relaxing and becoming heavy. · Empty your mind of all thoughts. · Let yourself relax more and more deeply. · Become aware of the state of calmness that surrounds you. · When your relaxation time is over, you can bring yourself back to alertness by moving your fingers and toes and then your hands and feet and then stretching and moving your entire body. Sometimes people fall asleep during relaxation, but they usually wake up shortly afterward. · Always give yourself time to return to full alertness before you drive a car or do anything that might cause an accident if you are not fully alert. Never play a relaxation tape while you drive a car. When should you call for help? Call 911 anytime you think you may need emergency care. For example, call if:    · You feel you cannot stop from hurting yourself or someone else.   [employer] the numbers for these national suicide hotlines: 2-351-320-TALK (2-133.658.6536) and 4-452-RRATRDE (0-269.214.6836). If you or someone you know talks about suicide or feeling hopeless, get help right away.   Watch closely for changes in your health, and be sure to contact your doctor if:    · You have anxiety or fear that affects your life.     · You have symptoms of anxiety that are new or different from those you had before. Where can you learn more? Go to http://sri-hollie.info/. Enter P754 in the search box to learn more about \"Anxiety Disorder: Care Instructions. \"  Current as of: September 11, 2018  Content Version: 11.9  © 8401-6207 [employer]. Care instructions adapted under license by [employer] (which disclaims liability or warranty for this information).  If you have questions about a medical condition or this instruction, always ask your healthcare professional. [address] any warranty or liability for your use of this information. DISCHARGE SUMMARY from Nurse    PATIENT INSTRUCTIONS:    After general anesthesia or intravenous sedation, for 24 hours or while taking prescription Narcotics:  · Limit your activities  · Do not drive and operate hazardous machinery  · Do not make important personal or business decisions  · Do  not drink alcoholic beverages  · If you have not urinated within 8 hours after discharge, please contact your surgeon on call. Report the following to your surgeon:  · Excessive pain, swelling, redness or odor of or around the surgical area  · Temperature over 100.5  · Nausea and vomiting lasting longer than 4 hours or if unable to take medications  · Any signs of decreased circulation or nerve impairment to extremity: change in color, persistent  numbness, tingling, coldness or increase pain  · Any questions    What to do at Home:    *  Please give a list of your current medications to your Primary Care Provider. *  Please update this list whenever your medications are discontinued, doses are      changed, or new medications (including over-the-counter products) are added. *  Please carry medication information at all times in case of emergency situations. These are general instructions for a healthy lifestyle:    No smoking/ No tobacco products/ Avoid exposure to second hand smoke  Surgeon General's Warning:  Quitting smoking now greatly reduces serious risk to your health.     Obesity, smoking, and sedentary lifestyle greatly increases your risk for illness    A healthy diet, regular physical exercise & weight monitoring are important for maintaining a healthy lifestyle    You may be retaining fluid if you have a history of heart failure or if you experience any of the following symptoms:  Weight gain of 3 pounds or more overnight or 5 pounds in a week, increased swelling in our hands or feet or shortness of breath while lying flat in bed. Please call your doctor as soon as you notice any of these symptoms; do not wait until your next office visit. Recognize signs and symptoms of STROKE:    F-face looks uneven    A-arms unable to move or move unevenly    S-speech slurred or non-existent    T-time-call 911 as soon as signs and symptoms begin-DO NOT go       Back to bed or wait to see if you get better-TIME IS BRAIN. Warning Signs of HEART ATTACK     Call 911 if you have these symptoms:   Chest discomfort. Most heart attacks involve discomfort in the center of the chest that lasts more than a few minutes, or that goes away and comes back. It can feel like uncomfortable pressure, squeezing, fullness, or pain.  Discomfort in other areas of the upper body. Symptoms can include pain or discomfort in one or both arms, the back, neck, jaw, or stomach.  Shortness of breath with or without chest discomfort.  Other signs may include breaking out in a cold sweat, nausea, or lightheadedness. Don't wait more than five minutes to call 911 - MINUTES MATTER! Fast action can save your life. Calling 911 is almost always the fastest way to get lifesaving treatment. Emergency Medical Services staff can begin treatment when they arrive -- up to an hour sooner than if someone gets to the hospital by car. The discharge information has been reviewed with the patient. The patient verbalized understanding. Discharge medications reviewed with the patient and appropriate educational materials and side effects teaching were provided.   ___________________________________________________________________________________________________________________________________

## 2019-02-19 NOTE — PROGRESS NOTES
Problem: Pressure Injury - Risk of 
Goal: *Prevention of pressure injury Document Ras Scale and appropriate interventions in the flowsheet. Outcome: Progressing Towards Goal 
Pressure Injury Interventions: 
Sensory Interventions: Assess changes in LOC, Assess need for specialty bed, Check visual cues for pain, Keep linens dry and wrinkle-free, Maintain/enhance activity level, Minimize linen layers Moisture Interventions: Absorbent underpads, Apply protective barrier, creams and emollients, Limit adult briefs Activity Interventions: Increase time out of bed, Pressure redistribution bed/mattress(bed type), PT/OT evaluation Mobility Interventions: Assess need for specialty bed, Chair cushion, Float heels, HOB 30 degrees or less Nutrition Interventions: Document food/fluid/supplement intake, Offer support with meals,snacks and hydration Friction and Shear Interventions: Apply protective barrier, creams and emollients

## 2019-02-20 VITALS
HEART RATE: 69 BPM | TEMPERATURE: 98.3 F | SYSTOLIC BLOOD PRESSURE: 120 MMHG | BODY MASS INDEX: 33.67 KG/M2 | OXYGEN SATURATION: 92 % | HEIGHT: 65 IN | RESPIRATION RATE: 18 BRPM | WEIGHT: 202.1 LBS | DIASTOLIC BLOOD PRESSURE: 76 MMHG

## 2019-02-20 LAB
GLUCOSE BLD STRIP.AUTO-MCNC: 149 MG/DL (ref 65–100)
GLUCOSE BLD STRIP.AUTO-MCNC: 193 MG/DL (ref 65–100)
GLUCOSE BLD STRIP.AUTO-MCNC: 295 MG/DL (ref 65–100)

## 2019-02-20 PROCEDURE — 74011000302 HC RX REV CODE- 302: Performed by: FAMILY MEDICINE

## 2019-02-20 PROCEDURE — 74011636637 HC RX REV CODE- 636/637: Performed by: FAMILY MEDICINE

## 2019-02-20 PROCEDURE — 86580 TB INTRADERMAL TEST: CPT | Performed by: FAMILY MEDICINE

## 2019-02-20 PROCEDURE — 74011250637 HC RX REV CODE- 250/637: Performed by: FAMILY MEDICINE

## 2019-02-20 PROCEDURE — 82962 GLUCOSE BLOOD TEST: CPT

## 2019-02-20 RX ADMIN — INSULIN HUMAN 9 UNITS: 100 INJECTION, SOLUTION PARENTERAL at 17:31

## 2019-02-20 RX ADMIN — OXYCODONE HYDROCHLORIDE 20 MG: 5 TABLET ORAL at 11:47

## 2019-02-20 RX ADMIN — PANTOPRAZOLE SODIUM 40 MG: 40 TABLET, DELAYED RELEASE ORAL at 09:05

## 2019-02-20 RX ADMIN — GUAIFENESIN 600 MG: 600 TABLET, EXTENDED RELEASE ORAL at 09:05

## 2019-02-20 RX ADMIN — APIXABAN 5 MG: 5 TABLET, FILM COATED ORAL at 17:12

## 2019-02-20 RX ADMIN — PROMETHAZINE HYDROCHLORIDE 25 MG: 25 TABLET ORAL at 17:31

## 2019-02-20 RX ADMIN — APIXABAN 5 MG: 5 TABLET, FILM COATED ORAL at 09:05

## 2019-02-20 RX ADMIN — METOPROLOL SUCCINATE 50 MG: 50 TABLET, EXTENDED RELEASE ORAL at 09:05

## 2019-02-20 RX ADMIN — OXYCODONE HYDROCHLORIDE 20 MG: 5 TABLET ORAL at 01:29

## 2019-02-20 RX ADMIN — FLECAINIDE ACETATE 50 MG: 100 TABLET ORAL at 09:06

## 2019-02-20 RX ADMIN — ALPRAZOLAM 0.5 MG: 0.5 TABLET ORAL at 11:47

## 2019-02-20 RX ADMIN — TUBERCULIN PURIFIED PROTEIN DERIVATIVE 5 UNITS: 5 INJECTION, SOLUTION INTRADERMAL at 13:54

## 2019-02-20 NOTE — PROGRESS NOTES
Attempted to reach patient son González Rice and his phone does not accept calls and Elena Even left message requesting return call.

## 2019-02-20 NOTE — PROGRESS NOTES
Spoke with patient about sons not picking up medications last night and what prevented them from getting the medications. Per patient she stated that they did not get their until 750 pm and everything was confusing and they only had $13 to  medications. Per [facility] pharmacy the medications that patient needs have no co-pay charge for the medications except the Toprol has not been approved and will need to be paid for with cash. CM called CM supervisor Katelyn Segura to discuss case and see if CM could  needed medications for patient. Per supervisor Geneva Abernathy can  medications for patient and bring to her to go by ambulance home if patient agreeable. CM spoke with patient and she is agreeable to CM picking up medications that are needed and giving to her to take home. CM notified Dr. Maia Putnam of the issues with medications and that patient is only wanting the ones \"I have to take because I need to make sure I can get my Xanax, Oxycodone and Duragesic Patch with my Medicaid insurance. \" Explained to patient that our physician did not write prescriptions for controlled substance and she states she has prescriptions for them but just had not gotten them filled as she was in and out of the hospital. Discussed medications patient must have and per Dr. Maia Putnam she needs the Lantus, Humalog, Tambacor, Eliquis and Toprol XL. The Toprol XL per the pharmacy has not been authorized by the insurance and Dr. Maia Putnam states he has not been able to get through to [facility] for authorization. The pharmacy stated could pay cash for the Toprol XL and they would accept goodrx coupon. Jerome Segura Case  and CM received approval to obtain medications listed and pay for the Toprol XL and  the other medications and give to patient at d/c.  Notified Shonda Nugent from [facility] and provided her with update of concerns and events that occurred last evening. Also notified [facility] that patient should be d/c home today and would notify them if not d/c. [facility] [phone] and it states unable to leave a message and not available. CM called Jessica Duran per patient request to let him know she is ready for d/c. CM left voice message for Kennth Montcalm to return call.

## 2019-02-20 NOTE — PROGRESS NOTES
IMANI Lees picked up the Lantus, Tambacor, Eliquis and Toprol XL at patient pharmacy. Patient insist she has the Humalog insulin at home and did not want CM to pick that insulin up for her. Per pharmacy they will keep other medications ordered for her and approved by insurance on file for her for 1 week.

## 2019-02-20 NOTE — PROGRESS NOTES
Patient's son's Abiel Alejandra and Sanjeev Woodard took taxi service to [facility] on [address] but the pharmacy was closed so they could not get the patient's medications. Dr. Douglas Villareal paged and he says it is okay to leave heart monitor off and have no iv access.

## 2019-02-20 NOTE — PROGRESS NOTES
Care Management Interventions PCP Verified by CM: Yes(had been set up for [address] for 2/19 but now readmitted) Palliative Care Criteria Met (RRAT>21 & CHF Dx)?: No(RRAT 17 Dx Atrial fib ) Mode of Transport at Discharge: BLS([facility] ) Transition of Care Consult (CM Consult): Home Health [address] No 
Reason Outside [place]: Patient already serviced by other home care/hospice agency Discharge Durable Medical Equipment: No 
Physical Therapy Consult: Yes Occupational Therapy Consult: Yes Speech Therapy Consult: No 
Current Support Network: Other(lives in apartment with her 2 sons and one sons girlfriend coming in and out through the day) Confirm Follow Up Transport: Other (see comment)([facility] ) Plan discussed with Pt/Family/Caregiver: Yes Freedom of Choice Offered: Yes Discharge Location Discharge Placement: Home with home health Patient states she has spoken with her son Juice Zurita and he will be home at 7 pm tonight and she request transport home. She states Juice Zurita is mowing grass to earn some money and go purchase groceries and will be home at 7 pm. Patient has been given her medications from [facility] for Eliquis, Lantus, Toprol XL and Tambacor and placed them in her backpack. Notified [facility] Jennifer Dugan of d/c at 7 pm today and also left message with Ashley Alarcon about d/c requesting to see patient as soon as possible. Juice Zurita has been told that he needs to call [facility] in am to let them know he is there to let them in. [facility] set up for transport at [phone] pm today. Patient to d/c home with [facility] and family.

## 2019-02-20 NOTE — PROGRESS NOTES
Spoke with Huma Pettit CM RN. RN plans to  patient medication from [facility] and arrange for patient transport home today with medications. Will continue to monitor.

## 2019-02-20 NOTE — PROGRESS NOTES
Bedside and Verbal shift change report given to self (oncoming nurse) by Josie Wallace RN (offgoing nurse). Report included the following information SBAR, Kardex and MAR.

## 2019-02-20 NOTE — PROGRESS NOTES
Hospitalist Progress Note 2019 Admit Date: 2019  6:55 PM  
NAME: Marc Deluca :  1955 MRN:  [id number] Attending: Erin Otto MD 
PCP:  None SUBJECTIVE:  
 
Marc Deluca is a 63/F who was admitted for AFIB RVR and chronic back pain. She was discharged day before repeat admission and had refused rehab placement. - patient seen and examined at bedside this morning, she was discharged two days ago but has still not had a chance to either get her medications or have transportation home. Review of Systems negative with exception of pertinent positives noted above PHYSICAL EXAM  
 
Visit Vitals /71 (BP 1 Location: Left arm, BP Patient Position: At rest) Pulse 70 Temp 97.5 °F (36.4 °C) Resp 19 Ht 5' 5\" (1.651 m) Wt 91.7 kg (202 lb 1.6 oz) SpO2 93% BMI 33.63 kg/m² Temp (24hrs), Av.2 °F (36.8 °C), Min:97.5 °F (36.4 °C), Max:98.9 °F (37.2 °C) Oxygen Therapy O2 Sat (%): 93 % (19 1310) Pulse via Oximetry: 60 beats per minute (19 0821) O2 Device: Room air (19 0830) O2 Flow Rate (L/min): 2 l/min (19 0839) Intake/Output Summary (Last 24 hours) at 2019 1409 Last data filed at 2019 8618 Gross per 24 hour Intake 360 ml Output 2625 ml Net -2265 ml General: No acute distress. Head:  Atraumatic Normocephalic. Eyes:  PERRLA, EOMI, Anicteric. ENT:  No discharges/lesions. Lungs:  CTA Bilaterally. CVS:  Regular rate and rhythm,  No murmur, rub, or gallop, No JVD, 2+ pitting edema bilaterally Abdomen: Soft, Non distended, Non tender, Positive bowel sounds. MSK:  No deformities, lesions Neurologic:  AAOx3. No focal deficits Psychiatry:      No anxiety/Depression Skin:   No rash/lesions. Good skin turgor Recent Results (from the past 24 hour(s)) GLUCOSE, POC Collection Time: 19  3:53 PM  
Result Value Ref Range Glucose (POC) 218 (H) 65 - 100 mg/dL GLUCOSE, POC  
 Collection Time: 02/19/19  9:37 PM  
Result Value Ref Range Glucose (POC) 269 (H) 65 - 100 mg/dL GLUCOSE, POC Collection Time: 02/20/19  6:04 AM  
Result Value Ref Range Glucose (POC) 193 (H) 65 - 100 mg/dL GLUCOSE, POC Collection Time: 02/20/19 11:05 AM  
Result Value Ref Range Glucose (POC) 149 (H) 65 - 100 mg/dL Imaging [facility] [place] [facility] Chest X-ray 
  INDICATION: Shortness of breath 
  
A portable AP view of the chest was obtained. 
  
FINDINGS: The lungs are clear. There are no infiltrates or effusions. The heart 
size is normal.  The bony thorax is intact.   
  
IMPRESSION IMPRESSION: No acute findings in the chest 
 
 
KUB- 2/18/19 KUB 
  
HISTORY: Abdominal pain, nausea. 
  
AP views of the abdomen were obtained in the supine position. No prior studies 
are available for comparison. 
  
FINDINGS: The intestinal gas pattern shows no evidence of obstruction. Coil 
packing material overlies the upper abdomen. There is a mildly excessive amount 
of fecal material throughout the colon. Assessment for free intraperitoneal air 
is suboptimal on this supine technique. No radiopaque densities resembling 
calculi are identified. There is mild right basilar opacity, unchanged. 
  
IMPRESSION IMPRESSION: Increased fecal material. 
 
ASSESSMENT Hospital Problems as of 2/20/2019 Date Reviewed: 1/31/2019 Codes Class Noted - Resolved POA A-fib [facility]) ICD-10-CM: I48.91 
ICD-9-CM: 427.31  2/19/2019 - Present Unknown Nausea & vomiting ICD-10-CM: R11.2 ICD-9-CM: 787.01  2/14/2019 - Present Yes Hyperkalemia ICD-10-CM: E87.5 ICD-9-CM: 276.7  2/11/2019 - Present Yes Leukocytosis ICD-10-CM: B53.159 ICD-9-CM: 288.60  2/5/2019 - Present Yes * (Principal) Atrial fibrillation with rapid ventricular response (HCC) ICD-10-CM: I48.91 
ICD-9-CM: 427.31  2/5/2019 - Present Yes  Type 2 diabetes mellitus with hyperglycemia (HCC) (Chronic) ICD-10-CM: E11.65 ICD-9-CM: 250.00  12/10/2016 - Present Yes Paraplegia (HCC) (Chronic) ICD-10-CM: R73.88 ICD-9-CM: 344.1  12/10/2016 - Present Yes Bipolar disorder without psychotic features (HCC) (Chronic) ICD-10-CM: F31.9 ICD-9-CM: 296.80  12/10/2016 - Present Yes Chronic hepatitis C virus infection (HCC) (Chronic) ICD-10-CM: B18.2 ICD-9-CM: 070.54  12/10/2016 - Present Yes Narcotic addiction ([facility] [address] ICD-10-CM: L25.54 ICD-9-CM: 304.90  12/10/2016 - Present Yes RESOLVED: Atrial fibrillation with RVR (HCC) ICD-10-CM: I48.91 
ICD-9-CM: 427.31  2/14/2019 - 2/14/2019 Unknown Plan: 
 
AFIB RVR- has resolved, patient is now in NSR, continue with Metoprolol and Flecainide, continue Eliquis, seen by cardiology and appreciate recommendations, patient has been non compliant with medications and has been counseled Type II DM- continue with current Insulin regimen, sugars have been controlled Paraplegia- PT Narcotic addiction- cannot get any new narcotics until she has completed her current prescription DVT Prophylaxis: Eliquis Dispo- patient discharged two days ago, Today  personally went to the pharmacy to  the patient's medications. She should be going home today if her sons can provide her with transportation. Refer to Discharge summary from yesterday 2/19/19.    
 
Lisa Soliz MD

## 2019-02-22 NOTE — PROGRESS NOTES
Received a call from Dr. [facility] that they had not seen patient on Thursday due to nurse calling [facility]) phone and the message that was on the cell phone was so \"vile\" that nurse felt unsafe to go out. Per the manager at [facility] they will go out and attempt to see Saturday and if unable to see will notify police for a well check. CM notified Shauna Yan CM Supervisor  and Marissa Gaona CM Director of above information and they requested call APS  Mary Rahman. CM left message for Andrew Noon with APS that CM has discussed case with prior to d/c on 2/20) about above information from [employer].

## 2019-03-10 ENCOUNTER — HOSPITAL ENCOUNTER (INPATIENT)
Age: 64
LOS: 4 days | Discharge: HOME HEALTH | DRG: 052 | End: 2019-03-14
Attending: EMERGENCY MEDICINE | Admitting: HOSPITALIST
Payer: MEDICAID

## 2019-03-10 ENCOUNTER — APPOINTMENT (OUTPATIENT)
Dept: CT IMAGING | Age: 64
DRG: 052 | End: 2019-03-10
Attending: EMERGENCY MEDICINE
Payer: MEDICAID

## 2019-03-10 ENCOUNTER — APPOINTMENT (OUTPATIENT)
Dept: GENERAL RADIOLOGY | Age: 64
DRG: 052 | End: 2019-03-10
Attending: EMERGENCY MEDICINE
Payer: MEDICAID

## 2019-03-10 DIAGNOSIS — G89.29 CHRONIC MIDLINE LOW BACK PAIN WITHOUT SCIATICA: ICD-10-CM

## 2019-03-10 DIAGNOSIS — G47.00 INSOMNIA, UNSPECIFIED TYPE: ICD-10-CM

## 2019-03-10 DIAGNOSIS — R41.0 DELIRIUM: Primary | ICD-10-CM

## 2019-03-10 DIAGNOSIS — M54.50 CHRONIC MIDLINE LOW BACK PAIN WITHOUT SCIATICA: ICD-10-CM

## 2019-03-10 PROBLEM — K59.00 CONSTIPATION: Status: ACTIVE | Noted: 2019-03-10

## 2019-03-10 PROBLEM — R82.81 PYURIA: Status: ACTIVE | Noted: 2019-03-10

## 2019-03-10 PROBLEM — G93.40 ACUTE ENCEPHALOPATHY: Status: ACTIVE | Noted: 2019-03-10

## 2019-03-10 PROBLEM — E11.9 DIABETES (HCC): Status: ACTIVE | Noted: 2019-03-10

## 2019-03-10 PROBLEM — I10 HYPERTENSION: Status: ACTIVE | Noted: 2019-03-10

## 2019-03-10 LAB
ALBUMIN SERPL-MCNC: 3.2 G/DL (ref 3.2–4.6)
ALBUMIN/GLOB SERPL: 0.6 {RATIO} (ref 1.2–3.5)
ALP SERPL-CCNC: 124 U/L (ref 50–136)
ALT SERPL-CCNC: 14 U/L (ref 12–65)
AMMONIA PLAS-SCNC: <10 UMOL/L (ref 11–32)
AMPHET UR QL SCN: NEGATIVE
ANION GAP SERPL CALC-SCNC: 13 MMOL/L (ref 7–16)
APPEARANCE UR: ABNORMAL
AST SERPL-CCNC: 16 U/L (ref 15–37)
ATRIAL RATE: 73 BPM
BACTERIA URNS QL MICRO: 0 /HPF
BARBITURATES UR QL SCN: NEGATIVE
BASOPHILS # BLD: 0.1 K/UL (ref 0–0.2)
BASOPHILS NFR BLD: 0 % (ref 0–2)
BENZODIAZ UR QL: POSITIVE
BILIRUB SERPL-MCNC: 0.7 MG/DL (ref 0.2–1.1)
BILIRUB UR QL: NEGATIVE
BUN SERPL-MCNC: 15 MG/DL (ref 8–23)
CALCIUM SERPL-MCNC: 9.5 MG/DL (ref 8.3–10.4)
CALCULATED P AXIS, ECG09: 59 DEGREES
CALCULATED R AXIS, ECG10: 16 DEGREES
CALCULATED T AXIS, ECG11: 59 DEGREES
CANNABINOIDS UR QL SCN: NEGATIVE
CHLORIDE SERPL-SCNC: 96 MMOL/L (ref 98–107)
CO2 SERPL-SCNC: 26 MMOL/L (ref 21–32)
COCAINE UR QL SCN: NEGATIVE
COLOR UR: YELLOW
CREAT SERPL-MCNC: 0.93 MG/DL (ref 0.6–1)
DIAGNOSIS, 93000: NORMAL
DIFFERENTIAL METHOD BLD: ABNORMAL
EOSINOPHIL # BLD: 0.1 K/UL (ref 0–0.8)
EOSINOPHIL NFR BLD: 1 % (ref 0.5–7.8)
ERYTHROCYTE [DISTWIDTH] IN BLOOD BY AUTOMATED COUNT: 14.7 % (ref 11.9–14.6)
GLOBULIN SER CALC-MCNC: 5 G/DL (ref 2.3–3.5)
GLUCOSE BLD STRIP.AUTO-MCNC: 385 MG/DL (ref 65–100)
GLUCOSE BLD STRIP.AUTO-MCNC: 407 MG/DL (ref 65–100)
GLUCOSE SERPL-MCNC: 425 MG/DL (ref 65–100)
GLUCOSE UR STRIP.AUTO-MCNC: >1000 MG/DL
HCT VFR BLD AUTO: 46 % (ref 35.8–46.3)
HGB BLD-MCNC: 15 G/DL (ref 11.7–15.4)
HGB UR QL STRIP: ABNORMAL
IMM GRANULOCYTES # BLD AUTO: 0.1 K/UL (ref 0–0.5)
IMM GRANULOCYTES NFR BLD AUTO: 1 % (ref 0–5)
KETONES UR QL STRIP.AUTO: ABNORMAL MG/DL
LACTATE BLD-SCNC: 1.32 MMOL/L (ref 0.5–1.9)
LEUKOCYTE ESTERASE UR QL STRIP.AUTO: ABNORMAL
LIPASE SERPL-CCNC: 242 U/L (ref 73–393)
LYMPHOCYTES # BLD: 2.2 K/UL (ref 0.5–4.6)
LYMPHOCYTES NFR BLD: 14 % (ref 13–44)
MCH RBC QN AUTO: 29.1 PG (ref 26.1–32.9)
MCHC RBC AUTO-ENTMCNC: 32.6 G/DL (ref 31.4–35)
MCV RBC AUTO: 89.3 FL (ref 79.6–97.8)
METHADONE UR QL: NEGATIVE
MONOCYTES # BLD: 0.6 K/UL (ref 0.1–1.3)
MONOCYTES NFR BLD: 4 % (ref 4–12)
NEUTS SEG # BLD: 13.1 K/UL (ref 1.7–8.2)
NEUTS SEG NFR BLD: 81 % (ref 43–78)
NITRITE UR QL STRIP.AUTO: NEGATIVE
NRBC # BLD: 0 K/UL (ref 0–0.2)
OPIATES UR QL: POSITIVE
P-R INTERVAL, ECG05: 160 MS
PCP UR QL: NEGATIVE
PH UR STRIP: 6.5 [PH] (ref 5–9)
PLATELET # BLD AUTO: 308 K/UL (ref 150–450)
PMV BLD AUTO: 11.4 FL (ref 9.4–12.3)
POTASSIUM SERPL-SCNC: 3.6 MMOL/L (ref 3.5–5.1)
PROCALCITONIN SERPL-MCNC: <0.1 NG/ML
PROT SERPL-MCNC: 8.2 G/DL (ref 6.3–8.2)
PROT UR STRIP-MCNC: 30 MG/DL
Q-T INTERVAL, ECG07: 454 MS
QRS DURATION, ECG06: 86 MS
QTC CALCULATION (BEZET), ECG08: 500 MS
RBC # BLD AUTO: 5.15 M/UL (ref 4.05–5.2)
RBC #/AREA URNS HPF: ABNORMAL /HPF
SODIUM SERPL-SCNC: 135 MMOL/L (ref 136–145)
SP GR UR REFRACTOMETRY: 1.03 (ref 1–1.02)
TROPONIN I SERPL-MCNC: <0.02 NG/ML (ref 0.02–0.05)
UROBILINOGEN UR QL STRIP.AUTO: 1 EU/DL (ref 0.2–1)
VENTRICULAR RATE, ECG03: 73 BPM
WBC # BLD AUTO: 16.1 K/UL (ref 4.3–11.1)
WBC URNS QL MICRO: >100 /HPF
YEAST URNS QL MICRO: ABNORMAL

## 2019-03-10 PROCEDURE — 85025 COMPLETE CBC W/AUTO DIFF WBC: CPT

## 2019-03-10 PROCEDURE — 77030020263 HC SOL INJ SOD CL0.9% LFCR 1000ML

## 2019-03-10 PROCEDURE — 80053 COMPREHEN METABOLIC PANEL: CPT

## 2019-03-10 PROCEDURE — 83605 ASSAY OF LACTIC ACID: CPT

## 2019-03-10 PROCEDURE — 74011250636 HC RX REV CODE- 250/636: Performed by: EMERGENCY MEDICINE

## 2019-03-10 PROCEDURE — 74011000258 HC RX REV CODE- 258: Performed by: EMERGENCY MEDICINE

## 2019-03-10 PROCEDURE — 87086 URINE CULTURE/COLONY COUNT: CPT

## 2019-03-10 PROCEDURE — 93005 ELECTROCARDIOGRAM TRACING: CPT | Performed by: EMERGENCY MEDICINE

## 2019-03-10 PROCEDURE — 84145 PROCALCITONIN (PCT): CPT

## 2019-03-10 PROCEDURE — 99285 EMERGENCY DEPT VISIT HI MDM: CPT | Performed by: EMERGENCY MEDICINE

## 2019-03-10 PROCEDURE — 87040 BLOOD CULTURE FOR BACTERIA: CPT

## 2019-03-10 PROCEDURE — 96365 THER/PROPH/DIAG IV INF INIT: CPT | Performed by: EMERGENCY MEDICINE

## 2019-03-10 PROCEDURE — 74011636637 HC RX REV CODE- 636/637: Performed by: EMERGENCY MEDICINE

## 2019-03-10 PROCEDURE — 70450 CT HEAD/BRAIN W/O DYE: CPT

## 2019-03-10 PROCEDURE — 80307 DRUG TEST PRSMV CHEM ANLYZR: CPT

## 2019-03-10 PROCEDURE — 96375 TX/PRO/DX INJ NEW DRUG ADDON: CPT | Performed by: EMERGENCY MEDICINE

## 2019-03-10 PROCEDURE — 82140 ASSAY OF AMMONIA: CPT

## 2019-03-10 PROCEDURE — 65270000029 HC RM PRIVATE

## 2019-03-10 PROCEDURE — 87106 FUNGI IDENTIFICATION YEAST: CPT

## 2019-03-10 PROCEDURE — 74011636320 HC RX REV CODE- 636/320: Performed by: EMERGENCY MEDICINE

## 2019-03-10 PROCEDURE — 82962 GLUCOSE BLOOD TEST: CPT

## 2019-03-10 PROCEDURE — 36415 COLL VENOUS BLD VENIPUNCTURE: CPT

## 2019-03-10 PROCEDURE — 83690 ASSAY OF LIPASE: CPT

## 2019-03-10 PROCEDURE — 71045 X-RAY EXAM CHEST 1 VIEW: CPT

## 2019-03-10 PROCEDURE — 74177 CT ABD & PELVIS W/CONTRAST: CPT

## 2019-03-10 PROCEDURE — 84484 ASSAY OF TROPONIN QUANT: CPT

## 2019-03-10 PROCEDURE — 81001 URINALYSIS AUTO W/SCOPE: CPT

## 2019-03-10 RX ORDER — NYSTATIN 100000 [USP'U]/G
POWDER TOPICAL 2 TIMES DAILY
Status: DISCONTINUED | OUTPATIENT
Start: 2019-03-11 | End: 2019-03-14 | Stop reason: HOSPADM

## 2019-03-10 RX ORDER — NALOXONE HYDROCHLORIDE 0.4 MG/ML
0.4 INJECTION, SOLUTION INTRAMUSCULAR; INTRAVENOUS; SUBCUTANEOUS AS NEEDED
Status: DISCONTINUED | OUTPATIENT
Start: 2019-03-10 | End: 2019-03-14 | Stop reason: HOSPADM

## 2019-03-10 RX ORDER — BISACODYL 5 MG
5 TABLET, DELAYED RELEASE (ENTERIC COATED) ORAL
Status: DISCONTINUED | OUTPATIENT
Start: 2019-03-10 | End: 2019-03-14 | Stop reason: HOSPADM

## 2019-03-10 RX ORDER — DEXTROSE 40 %
15 GEL (GRAM) ORAL AS NEEDED
Status: DISCONTINUED | OUTPATIENT
Start: 2019-03-10 | End: 2019-03-14 | Stop reason: HOSPADM

## 2019-03-10 RX ORDER — HYDRALAZINE HYDROCHLORIDE 20 MG/ML
10 INJECTION INTRAMUSCULAR; INTRAVENOUS
Status: COMPLETED | OUTPATIENT
Start: 2019-03-10 | End: 2019-03-10

## 2019-03-10 RX ORDER — BISACODYL 5 MG
5 TABLET, DELAYED RELEASE (ENTERIC COATED) ORAL DAILY PRN
Status: DISCONTINUED | OUTPATIENT
Start: 2019-03-10 | End: 2019-03-11

## 2019-03-10 RX ORDER — SODIUM CHLORIDE 9 MG/ML
75 INJECTION, SOLUTION INTRAVENOUS CONTINUOUS
Status: DISCONTINUED | OUTPATIENT
Start: 2019-03-11 | End: 2019-03-14 | Stop reason: HOSPADM

## 2019-03-10 RX ORDER — SODIUM CHLORIDE 0.9 % (FLUSH) 0.9 %
10 SYRINGE (ML) INJECTION
Status: COMPLETED | OUTPATIENT
Start: 2019-03-10 | End: 2019-03-10

## 2019-03-10 RX ORDER — ALBUTEROL SULFATE 0.83 MG/ML
2.5 SOLUTION RESPIRATORY (INHALATION)
Status: DISCONTINUED | OUTPATIENT
Start: 2019-03-10 | End: 2019-03-14 | Stop reason: HOSPADM

## 2019-03-10 RX ORDER — ALPRAZOLAM 0.5 MG/1
0.5 TABLET ORAL
Status: DISCONTINUED | OUTPATIENT
Start: 2019-03-10 | End: 2019-03-14 | Stop reason: HOSPADM

## 2019-03-10 RX ORDER — ACETAMINOPHEN 325 MG/1
650 TABLET ORAL
Status: DISCONTINUED | OUTPATIENT
Start: 2019-03-10 | End: 2019-03-14 | Stop reason: HOSPADM

## 2019-03-10 RX ORDER — DEXTROSE 50 % IN WATER (D50W) INTRAVENOUS SYRINGE
25-50 AS NEEDED
Status: DISCONTINUED | OUTPATIENT
Start: 2019-03-10 | End: 2019-03-14 | Stop reason: HOSPADM

## 2019-03-10 RX ORDER — QUETIAPINE FUMARATE 100 MG/1
300 TABLET, FILM COATED ORAL
Status: DISCONTINUED | OUTPATIENT
Start: 2019-03-11 | End: 2019-03-14 | Stop reason: HOSPADM

## 2019-03-10 RX ORDER — LORAZEPAM 2 MG/ML
1 INJECTION INTRAMUSCULAR
Status: COMPLETED | OUTPATIENT
Start: 2019-03-10 | End: 2019-03-10

## 2019-03-10 RX ORDER — INSULIN GLARGINE 100 [IU]/ML
49 INJECTION, SOLUTION SUBCUTANEOUS
Status: DISCONTINUED | OUTPATIENT
Start: 2019-03-11 | End: 2019-03-12

## 2019-03-10 RX ORDER — SODIUM CHLORIDE 0.9 % (FLUSH) 0.9 %
5-40 SYRINGE (ML) INJECTION AS NEEDED
Status: DISCONTINUED | OUTPATIENT
Start: 2019-03-10 | End: 2019-03-14 | Stop reason: HOSPADM

## 2019-03-10 RX ORDER — ALBUTEROL SULFATE 90 UG/1
1 AEROSOL, METERED RESPIRATORY (INHALATION)
Status: DISCONTINUED | OUTPATIENT
Start: 2019-03-10 | End: 2019-03-10

## 2019-03-10 RX ORDER — FLECAINIDE ACETATE 100 MG/1
50 TABLET ORAL EVERY 12 HOURS
Status: DISCONTINUED | OUTPATIENT
Start: 2019-03-11 | End: 2019-03-14 | Stop reason: HOSPADM

## 2019-03-10 RX ORDER — BUDESONIDE 0.5 MG/2ML
500 INHALANT ORAL 2 TIMES DAILY
Status: DISCONTINUED | OUTPATIENT
Start: 2019-03-11 | End: 2019-03-12

## 2019-03-10 RX ORDER — POLYETHYLENE GLYCOL 3350 17 G/17G
17 POWDER, FOR SOLUTION ORAL DAILY
Status: DISCONTINUED | OUTPATIENT
Start: 2019-03-11 | End: 2019-03-14 | Stop reason: HOSPADM

## 2019-03-10 RX ORDER — METOPROLOL SUCCINATE 50 MG/1
50 TABLET, EXTENDED RELEASE ORAL DAILY
Status: DISCONTINUED | OUTPATIENT
Start: 2019-03-11 | End: 2019-03-14 | Stop reason: HOSPADM

## 2019-03-10 RX ORDER — INSULIN LISPRO 100 [IU]/ML
INJECTION, SOLUTION INTRAVENOUS; SUBCUTANEOUS
Status: DISCONTINUED | OUTPATIENT
Start: 2019-03-11 | End: 2019-03-14 | Stop reason: HOSPADM

## 2019-03-10 RX ORDER — SODIUM CHLORIDE 0.9 % (FLUSH) 0.9 %
5-40 SYRINGE (ML) INJECTION EVERY 8 HOURS
Status: DISCONTINUED | OUTPATIENT
Start: 2019-03-11 | End: 2019-03-14 | Stop reason: HOSPADM

## 2019-03-10 RX ORDER — OXYCODONE HYDROCHLORIDE 5 MG/1
20 TABLET ORAL
Status: DISCONTINUED | OUTPATIENT
Start: 2019-03-10 | End: 2019-03-13

## 2019-03-10 RX ORDER — PANTOPRAZOLE SODIUM 40 MG/1
40 TABLET, DELAYED RELEASE ORAL
Status: DISCONTINUED | OUTPATIENT
Start: 2019-03-11 | End: 2019-03-14 | Stop reason: HOSPADM

## 2019-03-10 RX ADMIN — Medication 10 ML: at 21:55

## 2019-03-10 RX ADMIN — SODIUM CHLORIDE 100 ML: 900 INJECTION, SOLUTION INTRAVENOUS at 21:55

## 2019-03-10 RX ADMIN — SODIUM CHLORIDE 1 G: 900 INJECTION, SOLUTION INTRAVENOUS at 19:44

## 2019-03-10 RX ADMIN — SODIUM CHLORIDE 1000 ML: 900 INJECTION, SOLUTION INTRAVENOUS at 19:42

## 2019-03-10 RX ADMIN — LORAZEPAM 1 MG: 2 INJECTION INTRAMUSCULAR; INTRAVENOUS at 21:50

## 2019-03-10 RX ADMIN — IOPAMIDOL 100 ML: 755 INJECTION, SOLUTION INTRAVENOUS at 21:55

## 2019-03-10 RX ADMIN — HYDRALAZINE HYDROCHLORIDE 10 MG: 20 INJECTION INTRAMUSCULAR; INTRAVENOUS at 19:43

## 2019-03-10 RX ADMIN — INSULIN HUMAN 7 UNITS: 100 INJECTION, SOLUTION PARENTERAL at 20:09

## 2019-03-10 NOTE — ED TRIAGE NOTES
Pt [facility] from home, called out for altered mental status, seen by home health today - concerned for sepsis. Altered to where pt could not tell EMS her name on their arrival, but is able to by the time EMS arrived to ED. Wheezes present in left side, hx of COPD. Family tells EMS her urine was dark in color yesterday. Given 3.5 duo-neb en route. . O2 92% RA. HTN en route. Pt did not meet sepsis criteria with EMS. Upon this RN putting ekg leads on patient, pt states chest pain and grimaces with even light touch to abdomen. Janet Whitney MD notified of patient. Pt repeatedly said to this RN that she \"just does not feel good\" but unsure how long she has been feeling bad.

## 2019-03-10 NOTE — ED PROVIDER NOTES
Presents with complaint of altered mental status. She has a history of Campbell catheter, paraplegia, opiate and benzo dependence who was found by home health very confused. She improved somewhat with EMS. She complains of abdominal pain. Her history is unreliable as she answers some questions correctly and others do not make sense. She states her suprapubic cath was changed today. The history is provided by the patient. Altered mental status    This is a new problem. Episode onset: unknown. The problem has not changed since onset. Associated symptoms include confusion. Mental status baseline is normal.  Her past medical history is significant for diabetes, liver disease and psychotropic medication treatment. Past Medical History:   Diagnosis Date    Diabetes ([facility] [address]     Gastrointestinal disorder     GERD    Hypertension     Ill-defined condition     neurogenic bladder    Ill-defined condition     transverse myelitis    Ill-defined condition     paraplegia    Liver disease     Hepatitis C    Psychiatric disorder     anxiety    Psychiatric disorder     bipolar    [facility]        History reviewed. No pertinent surgical history. History reviewed. No pertinent family history.     Social History     Socioeconomic History    Marital status:      Spouse name: Not on file    Number of children: Not on file    Years of education: Not on file    Highest education level: Not on file   Social Needs    Financial resource strain: Not on file    Food insecurity - worry: Not on file    Food insecurity - inability: Not on file    Transportation needs - medical: Not on file   [employer] needs - non-medical: Not on file   Occupational History    Not on file   Tobacco Use    Smoking status: Current Every Day Smoker     Packs/day: 0.50   Substance and Sexual Activity    Alcohol use: No    Drug use: No    Sexual activity: Not on file   Other Topics Concern    Not on file Social History Narrative    Not on file         ALLERGIES: Patient has no known allergies. Review of Systems   Unable to perform ROS: Mental status change   Psychiatric/Behavioral: Positive for confusion. Vitals:    03/10/19 1637   BP: (!) 205/88   Pulse: 73   Resp: 20   Temp: 98 °F (36.7 °C)   SpO2: 94%   Weight: 91.6 kg (202 lb)   Height: 5' 5\" (1.651 m)            Physical Exam   Constitutional: She appears well-developed and well-nourished. Non-toxic appearance. She appears ill. No distress. HENT:   Head: Normocephalic and atraumatic. Eyes: Pupils are equal, round, and reactive to light. Neck: Normal range of motion. Neck supple. Cardiovascular: Normal rate and regular rhythm. Abdominal: Soft. Bowel sounds are normal. There is tenderness (diffusely). Neurological: She is alert. She is disoriented. No cranial nerve deficit. Skin: Skin is warm and dry. Psychiatric: She has a normal mood and affect. Her behavior is normal.   Nursing note and vitals reviewed. MDM  Number of Diagnoses or Management Options  Delirium:   Diagnosis management comments: It took a long time to get an IV in the patient. This delayed her disposition. She continues to be altered. She complained of pain in her abd. She has a hx of opiate and benzo addiction/dependence. She has been admitted for HTN in past.  Her BP was elevating and she was given hydralazine. She was agitated and given ativan.         Amount and/or Complexity of Data Reviewed  Clinical lab tests: reviewed and ordered  Decide to obtain previous medical records or to obtain history from someone other than the patient: yes (EMS)  Review and summarize past medical records: yes  Discuss the patient with other providers: yes  Independent visualization of images, tracings, or specimens: yes    Risk of Complications, Morbidity, and/or Mortality  Presenting problems: high  Diagnostic procedures: moderate  Management options: high    Patient Progress  Patient progress: stable    ED Course as of Mar 10 2102   Sun Mar 10, 2019   2100 Per nursing pt more altered and seeing grandmother in room  [DZ]      ED Course User Index  [DZ] Malaika Rooney MD       Procedures

## 2019-03-11 LAB
ALBUMIN SERPL-MCNC: 2.8 G/DL (ref 3.2–4.6)
ALBUMIN/GLOB SERPL: 0.6 {RATIO} (ref 1.2–3.5)
ALP SERPL-CCNC: 109 U/L (ref 50–136)
ALT SERPL-CCNC: 10 U/L (ref 12–65)
ANION GAP SERPL CALC-SCNC: 12 MMOL/L (ref 7–16)
AST SERPL-CCNC: 14 U/L (ref 15–37)
BASOPHILS # BLD: 0 K/UL (ref 0–0.2)
BASOPHILS NFR BLD: 0 % (ref 0–2)
BILIRUB SERPL-MCNC: 0.5 MG/DL (ref 0.2–1.1)
BUN SERPL-MCNC: 12 MG/DL (ref 8–23)
CALCIUM SERPL-MCNC: 8.9 MG/DL (ref 8.3–10.4)
CHLORIDE SERPL-SCNC: 103 MMOL/L (ref 98–107)
CO2 SERPL-SCNC: 24 MMOL/L (ref 21–32)
CREAT SERPL-MCNC: 0.78 MG/DL (ref 0.6–1)
DIFFERENTIAL METHOD BLD: ABNORMAL
EOSINOPHIL # BLD: 0.1 K/UL (ref 0–0.8)
EOSINOPHIL NFR BLD: 1 % (ref 0.5–7.8)
ERYTHROCYTE [DISTWIDTH] IN BLOOD BY AUTOMATED COUNT: 15 % (ref 11.9–14.6)
FOLATE SERPL-MCNC: 15.4 NG/ML (ref 3.1–17.5)
GLOBULIN SER CALC-MCNC: 4.6 G/DL (ref 2.3–3.5)
GLUCOSE BLD STRIP.AUTO-MCNC: 187 MG/DL (ref 65–100)
GLUCOSE BLD STRIP.AUTO-MCNC: 225 MG/DL (ref 65–100)
GLUCOSE BLD STRIP.AUTO-MCNC: 246 MG/DL (ref 65–100)
GLUCOSE BLD STRIP.AUTO-MCNC: 286 MG/DL (ref 65–100)
GLUCOSE SERPL-MCNC: 270 MG/DL (ref 65–100)
HCT VFR BLD AUTO: 42.6 % (ref 35.8–46.3)
HGB BLD-MCNC: 13.8 G/DL (ref 11.7–15.4)
IMM GRANULOCYTES # BLD AUTO: 0.1 K/UL (ref 0–0.5)
IMM GRANULOCYTES NFR BLD AUTO: 1 % (ref 0–5)
LYMPHOCYTES # BLD: 3.1 K/UL (ref 0.5–4.6)
LYMPHOCYTES NFR BLD: 23 % (ref 13–44)
MAGNESIUM SERPL-MCNC: 1.8 MG/DL (ref 1.8–2.4)
MCH RBC QN AUTO: 29.1 PG (ref 26.1–32.9)
MCHC RBC AUTO-ENTMCNC: 32.4 G/DL (ref 31.4–35)
MCV RBC AUTO: 89.9 FL (ref 79.6–97.8)
MONOCYTES # BLD: 0.7 K/UL (ref 0.1–1.3)
MONOCYTES NFR BLD: 5 % (ref 4–12)
NEUTS SEG # BLD: 9.5 K/UL (ref 1.7–8.2)
NEUTS SEG NFR BLD: 70 % (ref 43–78)
NRBC # BLD: 0 K/UL (ref 0–0.2)
PHOSPHATE SERPL-MCNC: 3 MG/DL (ref 2.3–3.7)
PLATELET # BLD AUTO: 285 K/UL (ref 150–450)
PMV BLD AUTO: 11.8 FL (ref 9.4–12.3)
POTASSIUM SERPL-SCNC: 3.1 MMOL/L (ref 3.5–5.1)
PROT SERPL-MCNC: 7.4 G/DL (ref 6.3–8.2)
RBC # BLD AUTO: 4.74 M/UL (ref 4.05–5.2)
SODIUM SERPL-SCNC: 139 MMOL/L (ref 136–145)
T4 FREE SERPL-MCNC: 1.4 NG/DL (ref 0.78–1.46)
TSH SERPL DL<=0.005 MIU/L-ACNC: 1.22 UIU/ML (ref 0.36–3.74)
VIT B12 SERPL-MCNC: 511 PG/ML (ref 193–986)
WBC # BLD AUTO: 13.5 K/UL (ref 4.3–11.1)

## 2019-03-11 PROCEDURE — 74011250636 HC RX REV CODE- 250/636: Performed by: HOSPITALIST

## 2019-03-11 PROCEDURE — 82607 VITAMIN B-12: CPT

## 2019-03-11 PROCEDURE — 82962 GLUCOSE BLOOD TEST: CPT

## 2019-03-11 PROCEDURE — 74011000250 HC RX REV CODE- 250: Performed by: HOSPITALIST

## 2019-03-11 PROCEDURE — 77010033678 HC OXYGEN DAILY

## 2019-03-11 PROCEDURE — 84100 ASSAY OF PHOSPHORUS: CPT

## 2019-03-11 PROCEDURE — 74011636637 HC RX REV CODE- 636/637: Performed by: HOSPITALIST

## 2019-03-11 PROCEDURE — 65270000029 HC RM PRIVATE

## 2019-03-11 PROCEDURE — 94640 AIRWAY INHALATION TREATMENT: CPT

## 2019-03-11 PROCEDURE — 84443 ASSAY THYROID STIM HORMONE: CPT

## 2019-03-11 PROCEDURE — 84439 ASSAY OF FREE THYROXINE: CPT

## 2019-03-11 PROCEDURE — 36415 COLL VENOUS BLD VENIPUNCTURE: CPT

## 2019-03-11 PROCEDURE — 77030020263 HC SOL INJ SOD CL0.9% LFCR 1000ML

## 2019-03-11 PROCEDURE — 74011250637 HC RX REV CODE- 250/637: Performed by: HOSPITALIST

## 2019-03-11 PROCEDURE — 94760 N-INVAS EAR/PLS OXIMETRY 1: CPT

## 2019-03-11 PROCEDURE — 97162 PT EVAL MOD COMPLEX 30 MIN: CPT

## 2019-03-11 PROCEDURE — 97530 THERAPEUTIC ACTIVITIES: CPT

## 2019-03-11 PROCEDURE — 85025 COMPLETE CBC W/AUTO DIFF WBC: CPT

## 2019-03-11 PROCEDURE — 74011000258 HC RX REV CODE- 258: Performed by: EMERGENCY MEDICINE

## 2019-03-11 PROCEDURE — 80053 COMPREHEN METABOLIC PANEL: CPT

## 2019-03-11 PROCEDURE — 83735 ASSAY OF MAGNESIUM: CPT

## 2019-03-11 PROCEDURE — 82746 ASSAY OF FOLIC ACID SERUM: CPT

## 2019-03-11 PROCEDURE — 74011250636 HC RX REV CODE- 250/636: Performed by: EMERGENCY MEDICINE

## 2019-03-11 RX ORDER — INSULIN LISPRO 100 [IU]/ML
12 INJECTION, SOLUTION INTRAVENOUS; SUBCUTANEOUS
Status: DISCONTINUED | OUTPATIENT
Start: 2019-03-11 | End: 2019-03-12

## 2019-03-11 RX ORDER — HYDRALAZINE HYDROCHLORIDE 20 MG/ML
10 INJECTION INTRAMUSCULAR; INTRAVENOUS
Status: DISCONTINUED | OUTPATIENT
Start: 2019-03-11 | End: 2019-03-14 | Stop reason: HOSPADM

## 2019-03-11 RX ADMIN — PANTOPRAZOLE SODIUM 40 MG: 40 TABLET, DELAYED RELEASE ORAL at 05:07

## 2019-03-11 RX ADMIN — INSULIN LISPRO 6 UNITS: 100 INJECTION, SOLUTION INTRAVENOUS; SUBCUTANEOUS at 08:06

## 2019-03-11 RX ADMIN — Medication 10 ML: at 15:31

## 2019-03-11 RX ADMIN — INSULIN LISPRO 2 UNITS: 100 INJECTION, SOLUTION INTRAVENOUS; SUBCUTANEOUS at 21:37

## 2019-03-11 RX ADMIN — HYDRALAZINE HYDROCHLORIDE 10 MG: 20 INJECTION INTRAMUSCULAR; INTRAVENOUS at 03:06

## 2019-03-11 RX ADMIN — INSULIN LISPRO 4 UNITS: 100 INJECTION, SOLUTION INTRAVENOUS; SUBCUTANEOUS at 11:57

## 2019-03-11 RX ADMIN — SODIUM CHLORIDE 1 G: 900 INJECTION, SOLUTION INTRAVENOUS at 20:27

## 2019-03-11 RX ADMIN — VANCOMYCIN HYDROCHLORIDE 2500 MG: 10 INJECTION, POWDER, LYOPHILIZED, FOR SOLUTION INTRAVENOUS at 23:46

## 2019-03-11 RX ADMIN — OXYCODONE HYDROCHLORIDE 20 MG: 5 TABLET ORAL at 17:22

## 2019-03-11 RX ADMIN — Medication: at 21:45

## 2019-03-11 RX ADMIN — INSULIN GLARGINE 49 UNITS: 100 INJECTION, SOLUTION SUBCUTANEOUS at 21:37

## 2019-03-11 RX ADMIN — FLECAINIDE ACETATE 50 MG: 100 TABLET ORAL at 21:37

## 2019-03-11 RX ADMIN — ALPRAZOLAM 0.5 MG: 0.5 TABLET ORAL at 15:27

## 2019-03-11 RX ADMIN — NYSTATIN: 100000 POWDER TOPICAL at 17:23

## 2019-03-11 RX ADMIN — BUDESONIDE 500 MCG: 0.5 INHALANT RESPIRATORY (INHALATION) at 07:30

## 2019-03-11 RX ADMIN — NYSTATIN: 100000 POWDER TOPICAL at 08:07

## 2019-03-11 RX ADMIN — APIXABAN 5 MG: 5 TABLET, FILM COATED ORAL at 08:06

## 2019-03-11 RX ADMIN — Medication: at 03:12

## 2019-03-11 RX ADMIN — FLECAINIDE ACETATE 50 MG: 100 TABLET ORAL at 08:06

## 2019-03-11 RX ADMIN — FLECAINIDE ACETATE 50 MG: 100 TABLET ORAL at 00:08

## 2019-03-11 RX ADMIN — Medication 10 ML: at 06:00

## 2019-03-11 RX ADMIN — Medication 10 ML: at 21:38

## 2019-03-11 RX ADMIN — SODIUM CHLORIDE 125 ML/HR: 900 INJECTION, SOLUTION INTRAVENOUS at 00:08

## 2019-03-11 RX ADMIN — OXYCODONE HYDROCHLORIDE 20 MG: 5 TABLET ORAL at 08:45

## 2019-03-11 RX ADMIN — SODIUM CHLORIDE 125 ML/HR: 900 INJECTION, SOLUTION INTRAVENOUS at 08:11

## 2019-03-11 RX ADMIN — QUETIAPINE FUMARATE 300 MG: 100 TABLET ORAL at 21:37

## 2019-03-11 RX ADMIN — SODIUM CHLORIDE 1 G: 900 INJECTION, SOLUTION INTRAVENOUS at 08:06

## 2019-03-11 RX ADMIN — BUDESONIDE 500 MCG: 0.5 INHALANT RESPIRATORY (INHALATION) at 20:02

## 2019-03-11 RX ADMIN — SODIUM CHLORIDE 125 ML/HR: 900 INJECTION, SOLUTION INTRAVENOUS at 15:27

## 2019-03-11 RX ADMIN — INSULIN GLARGINE 49 UNITS: 100 INJECTION, SOLUTION SUBCUTANEOUS at 00:09

## 2019-03-11 RX ADMIN — Medication 10 ML: at 00:00

## 2019-03-11 RX ADMIN — Medication: at 15:30

## 2019-03-11 RX ADMIN — INSULIN LISPRO 4 UNITS: 100 INJECTION, SOLUTION INTRAVENOUS; SUBCUTANEOUS at 16:52

## 2019-03-11 RX ADMIN — APIXABAN 5 MG: 5 TABLET, FILM COATED ORAL at 17:22

## 2019-03-11 RX ADMIN — QUETIAPINE FUMARATE 300 MG: 100 TABLET ORAL at 00:10

## 2019-03-11 RX ADMIN — METOPROLOL SUCCINATE 50 MG: 50 TABLET, EXTENDED RELEASE ORAL at 08:06

## 2019-03-11 NOTE — DIABETES MGMT
Patient admitted with acute encephalopathy with hx of paraplegia, subrapubic catheter, bipolar disorder, hep C, HTN. Pt positive for benzodiazepines and opiates on admission. Admitting blood glucose 425. HbA1c 9.6 (eAG 229). This AM blood glucose 286. Reviewed pt current regimen: Lantus 49 units nightly, Humalog 12 units with meals, and Humalog SSI. Last FSBS 246. Pt in bed, visitor at bedside, pt states Anyi Villasenor is one of my sons. \" Educator questioned pt regarding history of DM. Pt states \"I don't know\" when questioned how long she has been diagnosed with diabetes. Educator questioned pt orientation. Educator questioned pt about current location, to which pt responded \"the hospital.\" Educator questioned pt about the situation that led to pt coming to the hospital. Pt was silent and did not answer. Educator questioned pt regarding time and who the president is. Pt was silent and did not answer. Educator questioned pt regarding pt date of birth. Pt was silent and did not answer. Educator questioned pt regarding pt first name. Pt was silent and did not answer. Primary RN made aware, per primary RN pt recently called  for anxiety medicine prior to educator coming to floor. Spoke with son. Pt son states pt has been out of \"one type of insulin for 4-5 days,\" stating \"the pen broke. \" Pt states \"she was still taking the one I draw out of the vial but I don't know what that one was. \" Pt also states he is not sure how much insulin the patient takes. Will re-attempt to see patient tomorrow for diabetes education as pt is able and willing to participate.

## 2019-03-11 NOTE — PROGRESS NOTES
Problem: Mobility Impaired (Adult and Pediatric)  Goal: *Acute Goals and Plan of Care (Insert Text)  LTG:  (1.)Ms. Mylene Oliver will move from supine to sit and sit to supine, scoot up and down and roll side to side with MINIMAL ASSIST within 7 treatment day(s). (2.)Ms. Mylene Oliver will transfer from bed to chair and chair to bed with MINIMAL ASSIST using the least restrictive device within 7 treatment day(s). (3.)Ms. Mylene Oliver will ambulate with MODERATE ASSIST for 15+ feet with the least restrictive device within 7 treatment day(s). (4.)Ms. Mylene Oliver will perform exercises per HEP for 10+ minutes to improve strength and mobility within 7 days. ________________________________________________________________________________________________    PHYSICAL THERAPY: Initial Assessment and PM 3/11/2019  INPATIENT: PT Visit Days : 1  Payor: [id number] / Plan: [address] [place] [address] Avenue / Product Type: Medicaid /       NAME/AGE/GENDER: Shelly Minor is a 62 y.o. female   PRIMARY DIAGNOSIS: Acute encephalopathy [G93.40] Acute encephalopathy Acute encephalopathy       ICD-10: Treatment Diagnosis:    · Generalized Muscle Weakness (M62.81)  · Difficulty in walking, Not elsewhere classified (R26.2)  · Other abnormalities of gait and mobility (R26.89)   Precaution/Allergies:  Patient has no known allergies. ASSESSMENT:     Ms. Mylene Oliver is a 62year old female admitted from home for acute encephalopathy, AMS. She lives at home with son and was apparently discharged home from rehab about 1 month ago. She presents in supine with hips and lower extremities in externally rotated, flexed position. She is drowsy but oriented to person and place (hospital and city, unable to state name of hospital), disoriented to other details and has hard time participating with conversation due to drowsiness. She requires heavy max assist 2 and increased cueing/instruction for bed mobility- rolling and transfer to sitting.  Initially with poor balance but improves with positioning and is fair+ statically. LE assessment shows decreased AROM and strength. Pt unable to perform anterior or lateral scooting to help with bed mobility, prepare for transfers. She needs max A x2 to scoot to edge of bed and was unable to stand. Attempted x a few trials however pt giving little effort and appears too drowsy today. Performed sit to supine with min-mod assist. Positioned comfortably with needs in reach, son present. Erlinda Sidhu appears to be functioning well below baseline with strength and mobility and will benefit from continued therapy during hospital stay to address deficits/maximize independence with mobility. Dc needs TBD- currently would recommend rehab again. This section established at most recent assessment   PROBLEM LIST (Impairments causing functional limitations):  1. Decreased Strength  2. Decreased Transfer Abilities  3. Decreased Ambulation Ability/Technique  4. Decreased Balance  5. Decreased Activity Tolerance  6. Decreased Cognition   INTERVENTIONS PLANNED: (Benefits and precautions of physical therapy have been discussed with the patient.)  1. Balance Exercise  2. Bed Mobility  3. Gait Training  4. Home Exercise Program (HEP)  5. Therapeutic Activites  6. Therapeutic Exercise/Strengthening  7. Transfer Training     TREATMENT PLAN: Frequency/Duration: 3 times a week for duration of hospital stay  Rehabilitation Potential For Stated Goals: Sandrea Cowden REHABILITATION/EQUIPMENT: (at time of discharge pending progress): Due to the probability of continued deficits (see above) this patient will likely need continued skilled physical therapy after discharge. Equipment:    tbd              HISTORY:   History of Present Injury/Illness (Reason for Referral):  Per H&P, \"Patient is a 63 y/o female with hx paraplegia, suprapubic catheter, narcotic dependence, bipol;ar disorder, Hep C, HTN, DM who presents from home with altered mental status.  She was seen by home health and reportedly had her suprapubic catheter exchange. They were concerned she could be septic so sent to ED. She has a WBC ct of 16.1 but normal lactic acid. Glucose of 425 but no DKA. She is on numerous sedating medications at home including narcotics, benzodiazepines and psychotropics. Abdominal film shows large amount of fecal material. UA with pyuria but no bacteria. ED started IV cefipime and sent cultures. Head CT with no acute intracranial abnormalities. Hospitalist service consulted for admission and further workup of encephalopathy\"    Past Medical History/Comorbidities:   Ms. Blair Snyder  has a past medical history of Diabetes ([facility] [address] Gastrointestinal disorder, Hypertension, Ill-defined condition, Ill-defined condition, Ill-defined condition, Liver disease, Psychiatric disorder, Psychiatric disorder, and Thromboembolus ([facility] [address] Ms. Blair Snyder  has no past surgical history on file. Social History/Living Environment:   Home Environment: Private residence  Living Alone: No  Support Systems: Child(urban)  Patient Expects to be Discharged to[de-identified] Unknown  Prior Level of Function/Work/Activity:  Lives with son. Has been home from rehab for ~ 1 month. Was apparently performing transfers without much assistance upon return home from [facility] but does not appear very mobile. Number of Personal Factors/Comorbidities that affect the Plan of Care: 1-2: MODERATE COMPLEXITY   EXAMINATION:   Most Recent Physical Functioning:   Gross Assessment:  AROM: Generally decreased, functional  Strength: Generally decreased, functional  Coordination: Generally decreased, functional               Posture:  Posture (WDL): Exceptions to WDL  Posture Assessment:  Forward head, Rounded shoulders, Trunk flexion  Balance:  Sitting: Impaired  Sitting - Static: Fair (occasional)(+)  Sitting - Dynamic: Fair (occasional) Bed Mobility:  Rolling: Maximum assistance;Assist x2  Supine to Sit: Maximum assistance;Assist x2  Sit to Supine: Total assistance  Scooting: Total assistance  Interventions: Verbal cues; Visual cues; Safety awareness training; Tactile cues;Manual cues  Duration: 10 Minutes  Wheelchair Mobility:     Transfers:  Sit to Stand: (attempted; unable to stand with max A x2)  Gait:            Body Structures Involved:  1. Muscles Body Functions Affected:  1. Mental  2. Sensory/Pain  3. Voice and Speech  4. Movement Related Activities and Participation Affected:  1. General Tasks and Demands  2. Mobility  3. Domestic Life  4. Community, Social and [place]   Number of elements that affect the Plan of Care: 4+: HIGH COMPLEXITY   CLINICAL PRESENTATION:   Presentation: Evolving clinical presentation with changing clinical characteristics: MODERATE COMPLEXITY   CLINICAL DECISION MAKIN [facility] In[facility] Short Form  How much difficulty does the patient currently have. .. Unable A Lot A Little None   1. Turning over in bed (including adjusting bedclothes, sheets and blankets)? [] 1   [x] 2   [] 3   [] 4   2. Sitting down on and standing up from a chair with arms ( e.g., wheelchair, bedside commode, etc.)   [x] 1   [] 2   [] 3   [] 4   3. Moving from lying on back to sitting on the side of the bed? [] 1   [x] 2   [] 3   [] 4   How much help from another person does the patient currently need. .. Total A Lot A Little None   4. Moving to and from a bed to a chair (including a wheelchair)? [x] 1   [] 2   [] 3   [] 4   5. Need to walk in hospital room? [x] 1   [] 2   [] 3   [] 4   6. Climbing 3-5 steps with a railing? [x] 1   [] 2   [] 3   [] 4   © , Trustees of [address] [place] [address] [address] [address], under license to [employer]. All rights reserved      Score:  Initial: 8 Most Recent: X (Date: -- )    Interpretation of Tool:  Represents activities that are increasingly more difficult (i.e. Bed mobility, Transfers, Gait).     Medical Necessity:     · Patient demonstrates fair rehab potential due to higher previous functional level. Reason for Services/Other Comments:  · Patient continues to demonstrate capacity to improve strength, mobility, transfers, balance, activity tolerance which will increase independence, decrease amount of assistance required from caregiver and increase safety. Use of outcome tool(s) and clinical judgement create a POC that gives a: Questionable prediction of patient's progress: MODERATE COMPLEXITY            TREATMENT:   (In addition to Assessment/Re-Assessment sessions the following treatments were rendered)   Pre-treatment Symptoms/Complaints:  \"Not today\"  Pain: Initial:   Pain Intensity 1: 3  Pain Location 1: Back  Pain Intervention(s) 1: Repositioned, Ambulation/Increased Activity  Post Session:  0/10 visual     Therapeutic Activity: (10 Minutes   ):  Therapeutic activities including Bed mobility (supine<->sit), seated activities/positioning, and attempts at sit-stand, scooting at edge of bed to improve mobility, strength, balance and coordination. Required moderate to maximal of 2 and increased verbal, visual, manual, and tactile cues   to promote static and dynamic balance in standing and promote motor control of bilateral, lower extremity(s). Braces/Orthotics/Lines/Etc:   · IV  · O2 Device: Room air  Treatment/Session Assessment:    · Response to Treatment:  Pt performs mobility with mod-max A of 2  · Interdisciplinary Collaboration:   o Physical Therapist  o Registered Nurse  o Rehabilitation Attendant  · After treatment position/precautions:   o Supine in bed  o Bed/Chair-wheels locked  o Bed in low position  o Call light within reach  o Family at bedside   · Compliance with Program/Exercises: Will assess as treatment progresses  · Recommendations/Intent for next treatment session: \"Next visit will focus on advancements to more challenging activities and reduction in assistance provided\".   Total Treatment Duration:  PT Patient Time In/Time Out  Time In: 1407  Time Out: [address] [facility] [place]

## 2019-03-11 NOTE — PROGRESS NOTES
Chart screened by  for discharge planning. No needs identified at this time. Please consult  if any new issues arise. Care Management Interventions  PCP Verified by CM: Yes  Mode of Transport at Discharge:  Other (see comment)  Transition of Care Consult (CM Consult): Discharge Planning  Discharge Durable Medical Equipment: No  Physical Therapy Consult: No  Occupational Therapy Consult: No  Current Support Network: Own Home  Confirm Follow Up Transport: Family  Plan discussed with Pt/Family/Caregiver: Yes  Freedom of Choice Offered: Yes  Discharge Location  Discharge Placement: Home

## 2019-03-11 NOTE — H&P
Hospitalist H&P/Consult Note     Admit Date:  3/10/2019  4:36 PM   Name:  Yesika Shah   Age:  62 y.o.  :  1955   MRN:  [id number]   PCP:  None  Treatment Team: Attending Provider: Jr Hansen MD    HPI:   Patient is a 63 y/o female with hx paraplegia, suprapubic catheter, narcotic dependence, bipol;ar disorder, Hep C, HTN, DM who presents from home with altered mental status. She was seen by home health and reportedly had her suprapubic catheter exchange. They were concerned she could be septic so sent to ED. She has a WBC ct of 16.1 but normal lactic acid. Glucose of 425 but no DKA. She is on numerous sedating medications at home including narcotics, benzodiazepines and psychotropics. Abdominal film shows large amount of fecal material. UA with pyuria but no bacteria. ED started IV cefipime and sent cultures. Head CT with no acute intracranial abnormalities. Hospitalist service consulted for admission and further workup of encephalopathy. 10 systems reviewed and negative except as noted in HPI. Too confused to provide adequate history  Past Medical History:   Diagnosis Date    Diabetes ([facility]     Gastrointestinal disorder     GERD    Hypertension     Ill-defined condition     neurogenic bladder    Ill-defined condition     transverse myelitis    Ill-defined condition     paraplegia    Liver disease     Hepatitis C    Psychiatric disorder     anxiety    Psychiatric disorder     bipolar    Thromboembolus ([facility]       History reviewed. No pertinent surgical history. Prior to Admission Medications   Prescriptions Last Dose Informant Patient Reported? Taking? ALPRAZolam (XANAX) 0.5 mg tablet   No No   Sig: Take 1 Tab by mouth two (2) times daily as needed for Anxiety. Max Daily Amount: 1 mg. Blood-Glucose Meter monitoring kit   No No   Sig: Check glucose at home daily   QUEtiapine (SEROQUEL) 100 mg tablet   Yes No   Sig: Take 300 mg by mouth nightly.    albuterol (PROVENTIL HFA, VENTOLIN HFA, PROAIR HFA) 90 mcg/actuation inhaler   No No   Sig: Take 1 Puff by inhalation every four (4) hours as needed for Wheezing. albuterol (PROVENTIL VENTOLIN) 2.5 mg /3 mL (0.083 %) nebulizer solution   No No   Sig: Take 3 mL by inhalation every four (4) hours as needed for Wheezing. apixaban (ELIQUIS) 5 mg tablet   No No   Sig: Take 1 Tab by mouth two (2) times a day. bisacodyl (DULCOLAX) 5 mg EC tablet   No No   Sig: Take 1 Tab by mouth daily as needed for Constipation. budesonide (PULMICORT) 0.5 mg/2 mL nbsp   No No   Si mL by Nebulization route two (2) times a day. budesonide (PULMICORT) 0.5 mg/2 mL nbsp   No No   Si mL by Nebulization route two (2) times a day. fentaNYL (DURAGESIC) 25 mcg/hr PATCH   No No   Si Patch by TransDERmal route every seventy-two (72) hours. Max Daily Amount: 1 Patch. flecainide (TAMBOCOR) 50 mg tablet   No No   Sig: Take 1 Tab by mouth every twelve (12) hours. flecainide (TAMBOCOR) 50 mg tablet   No No   Sig: Take 1 Tab by mouth every twelve (12) hours. insulin glargine (LANTUS) 100 unit/mL injection   No No   Si units at bedtime. insulin glargine (LANTUS) 100 unit/mL injection   No No   Si Units by SubCUTAneous route nightly. insulin lispro (HUMALOG) 100 unit/mL injection   No No   Sig: Less than 150 =   0 units           150 -199 =   2 units  200 -249 =   4 units  250 -299 =   6 units  300 -349 =   8 units  Before meals and at bedtime. metoprolol succinate (TOPROL-XL) 50 mg XL tablet   No No   Sig: Take 1 Tab by mouth daily. metoprolol succinate (TOPROL-XL) 50 mg XL tablet   No No   Sig: Take 1 Tab by mouth daily. nystatin (MYCOSTATIN) powder   No No   Sig: Apply  to affected area two (2) times a day. omeprazole (PRILOSEC) 40 mg capsule   Yes No   Sig: Take 40 mg by mouth daily. oxyCODONE IR (ROXICODONE) 20 mg immediate release tablet   No No   Sig: Take 1 Tab by mouth every eight (8) hours as needed.  Max Daily Amount: 60 mg.   pantoprazole (PROTONIX) 40 mg tablet   No No   Sig: Take 1 Tab by mouth Daily (before breakfast). promethazine (PHENERGAN) 25 mg tablet   Yes No   Sig: Take 25 mg by mouth every six (6) hours as needed for Nausea. promethazine (PHENERGAN) 25 mg tablet   No No   Sig: Take 1 Tab by mouth every six (6) hours as needed. zinc oxide-cod liver oil (DESITIN) 40 % paste   No No   Sig: Apply  to affected area two (2) times a day. Facility-Administered Medications: None     No Known Allergies   Social History     Tobacco Use    Smoking status: Current Every Day Smoker     Packs/day: 0.50   Substance Use Topics    Alcohol use: No      History reviewed. No pertinent family history.    Immunization History   Administered Date(s) Administered    Influenza Vaccine (Quad) PF 10/03/2013, 12/11/2015, 12/23/2015, 10/20/2016    Influenza Vaccine PF 10/17/2014    Pneumococcal Polysaccharide (PPSV-23) 12/11/2015, 05/08/2016    TB Skin Test (PPD) Intradermal 12/09/2016, 02/11/2019, 02/20/2019       Objective:     Patient Vitals for the past 24 hrs:   Temp Pulse Resp BP SpO2   03/11/19 0439 97.4 °F (36.3 °C) 87 18 130/70 95 %   03/10/19 2325 98.5 °F (36.9 °C) 81 20 (!) 185/96 91 %   03/10/19 2216  82 27 184/86 97 %   03/10/19 2146  90 14 (!) 180/91 98 %   03/10/19 2135  88 25 193/90 91 %   03/10/19 2050  83  182/79 99 %   03/10/19 2040  89 18 177/88 98 %   03/10/19 2010  75  169/79 98 %   03/10/19 2008  78  197/84 95 %   03/10/19 1950  71  (!) 209/93 97 %   03/10/19 1947  64  (!) 210/91 98 %   03/10/19 1942  70  (!) 210/86    03/10/19 1931  72 15 (!) 210/86 95 %   03/10/19 1916  75 11 (!) 211/94 97 %   03/10/19 1902  75 15 (!) 214/91 96 %   03/10/19 1846  73 27 (!) 202/97 98 %   03/10/19 1816  73  189/90 95 %   03/10/19 1803    194/85 94 %   03/10/19 1731     94 %   03/10/19 1731    (!) 174/92 95 %   03/10/19 1725 98.6 °F (37 °C)       03/10/19 1719  71 17 (!) 183/92 93 % 03/10/19 1637 98 °F (36.7 °C) 73 20 (!) 205/88 94 %     Oxygen Therapy  O2 Sat (%): 95 % (03/11/19 0439)  Pulse via Oximetry: 82 beats per minute (03/10/19 2216)  O2 Device: Room air (03/10/19 1731)    Intake/Output Summary (Last 24 hours) at 3/11/2019 0504  Last data filed at 3/11/2019 0439  Gross per 24 hour   Intake    Output 1250 ml   Net -1250 ml       Physical Exam:  General:    Well nourished. Confused, disoriented, mango face   Eyes:   Normal sclera. Extraocular movements intact. ENT:  Normocephalic, atraumatic. Moist mucous membranes  CV:   RRR. No murmur, rub, or gallop. Lungs:  CTAB. No wheezing, rhonchi, or rales. Abdomen: Diffusely tender abdomen with suprapubic catheter in place  Extremities: Warm and dry. No cyanosis or edema. Neurologic: CN II-XII grossly intact. Paraplegic. Can slightly move feet  Skin:     No rashes or jaundice. No wounds. Psych:  Dysphoric mood with flat affect    I reviewed the labs, imaging, EKGs, telemetry, and other studies done this admission.   Data Review:   Recent Results (from the past 24 hour(s))   EKG, 12 LEAD, INITIAL    Collection Time: 03/10/19  4:43 PM   Result Value Ref Range    Ventricular Rate 73 BPM    Atrial Rate 73 BPM    P-R Interval 160 ms    QRS Duration 86 ms    Q-T Interval 454 ms    QTC Calculation (Bezet) 500 ms    Calculated P Axis 59 degrees    Calculated R Axis 16 degrees    Calculated T Axis 59 degrees    Diagnosis         Normal sinus rhythm  Possible Left atrial enlargement  Nonspecific ST and T wave abnormality  Prolonged QT  Abnormal ECG  When compared with ECG of 14-FEB-2019 19:02,  Nonspecific T wave abnormality now evident in Anterior leads  Confirmed by Kandis Rider ([id number]) on 3/10/2019 8:38:32 PM     URINALYSIS W/ RFLX MICROSCOPIC    Collection Time: 03/10/19  5:37 PM   Result Value Ref Range    Color YELLOW      Appearance HAZY      Specific gravity 1.035 (H) 1.001 - 1.023      pH (UA) 6.5 5.0 - 9.0      Protein 30 (A) NEG mg/dL    Glucose >1,000 mg/dL    Ketone TRACE (A) NEG mg/dL    Bilirubin NEGATIVE  NEG      Blood TRACE (A) NEG      Urobilinogen 1.0 0.2 - 1.0 EU/dL    Nitrites NEGATIVE  NEG      Leukocyte Esterase SMALL (A) NEG      WBC >100 0 /hpf    RBC 3-5 0 /hpf    Bacteria 0 0 /hpf    Yeast MODERATE     DRUG SCREEN, URINE    Collection Time: 03/10/19  5:37 PM   Result Value Ref Range    PCP(PHENCYCLIDINE) NEGATIVE       BENZODIAZEPINES POSITIVE      COCAINE NEGATIVE       AMPHETAMINES NEGATIVE       METHADONE NEGATIVE       THC (TH-CANNABINOL) NEGATIVE       OPIATES POSITIVE      BARBITURATES NEGATIVE      AMMONIA    Collection Time: 03/10/19  5:54 PM   Result Value Ref Range    Ammonia <10 (L) 11 - 32 UMOL/L   POC LACTIC ACID    Collection Time: 03/10/19  5:58 PM   Result Value Ref Range    Lactic Acid (POC) 1.32 0.5 - 1.9 mmol/L   CULTURE, BLOOD    Collection Time: 03/10/19  6:50 PM   Result Value Ref Range    Special Requests: RIGHT ARM      Culture result: PENDING    PROCALCITONIN    Collection Time: 03/10/19  6:50 PM   Result Value Ref Range    Procalcitonin <0.1 ng/mL   LIPASE    Collection Time: 03/10/19  6:50 PM   Result Value Ref Range    Lipase 242 73 - 200 U/L   METABOLIC PANEL, COMPREHENSIVE    Collection Time: 03/10/19  6:50 PM   Result Value Ref Range    Sodium 135 (L) 136 - 145 mmol/L    Potassium 3.6 3.5 - 5.1 mmol/L    Chloride 96 (L) 98 - 107 mmol/L    CO2 26 21 - 32 mmol/L    Anion gap 13 7 - 16 mmol/L    Glucose 425 (H) 65 - 100 mg/dL    BUN 15 8 - 23 MG/DL    Creatinine 0.93 0.6 - 1.0 MG/DL    GFR est AA >60 >60 ml/min/1.73m2    GFR est non-AA >60 >60 ml/min/1.73m2    Calcium 9.5 8.3 - 10.4 MG/DL    Bilirubin, total 0.7 0.2 - 1.1 MG/DL    ALT (SGPT) 14 12 - 65 U/L    AST (SGOT) 16 15 - 37 U/L    Alk.  phosphatase 124 50 - 136 U/L    Protein, total 8.2 6.3 - 8.2 g/dL    Albumin 3.2 3.2 - 4.6 g/dL    Globulin 5.0 (H) 2.3 - 3.5 g/dL    A-G Ratio 0.6 (L) 1.2 - 3.5     CBC WITH AUTOMATED DIFF    Collection Time: 03/10/19  6:50 PM   Result Value Ref Range    WBC 16.1 (H) 4.3 - 11.1 K/uL    RBC 5.15 4.05 - 5.2 M/uL    HGB 15.0 11.7 - 15.4 g/dL    HCT 46.0 35.8 - 46.3 %    MCV 89.3 79.6 - 97.8 FL    MCH 29.1 26.1 - 32.9 PG    MCHC 32.6 31.4 - 35.0 g/dL    RDW 14.7 (H) 11.9 - 14.6 %    PLATELET 077 766 - 831 K/uL    MPV 11.4 9.4 - 12.3 FL    ABSOLUTE NRBC 0.00 0.0 - 0.2 K/uL    DF AUTOMATED      NEUTROPHILS 81 (H) 43 - 78 %    LYMPHOCYTES 14 13 - 44 %    MONOCYTES 4 4.0 - 12.0 %    EOSINOPHILS 1 0.5 - 7.8 %    BASOPHILS 0 0.0 - 2.0 %    IMMATURE GRANULOCYTES 1 0.0 - 5.0 %    ABS. NEUTROPHILS 13.1 (H) 1.7 - 8.2 K/UL    ABS. LYMPHOCYTES 2.2 0.5 - 4.6 K/UL    ABS. MONOCYTES 0.6 0.1 - 1.3 K/UL    ABS. EOSINOPHILS 0.1 0.0 - 0.8 K/UL    ABS. BASOPHILS 0.1 0.0 - 0.2 K/UL    ABS. IMM. GRANS. 0.1 0.0 - 0.5 K/UL   TROPONIN I    Collection Time: 03/10/19  6:50 PM   Result Value Ref Range    Troponin-I, Qt. <0.02 (L) 0.02 - 0.05 NG/ML   GLUCOSE, POC    Collection Time: 03/10/19  8:08 PM   Result Value Ref Range    Glucose (POC) 385 (H) 65 - 100 mg/dL   GLUCOSE, POC    Collection Time: 03/10/19  8:53 PM   Result Value Ref Range    Glucose (POC) 407 (H) 65 - 100 mg/dL       Imaging Studies:  CXR Results  (Last 48 hours)               03/10/19 1700  XR CHEST PORT Final result    Impression:  IMPRESSION:  Negative for acute change. Narrative:  CHEST X-RAY, one view. HISTORY:  Wheezing. TECHNIQUE:  AP upright portable view. COMPARISON: 14 February 2019. FINDINGS:  The lungs are clear. The heart size is normal. The costophrenic   angles are sharp. The pulmonary vasculature is unremarkable. Included portion of   the upper abdomen is unremarkable. CT Results  (Last 48 hours)               03/10/19 2123  CT HEAD WO CONT Final result    Impression:  IMPRESSION:       No acute intracranial amounts. Chronic bilateral maxillary sinusitis.        Date of Dictation: 3/10/2019 9:13 PM Narrative:  CT HEAD WITHOUT CONTRAST        HISTORY:  Transient alteration of awareness. COMPARISON: 1/31/2019       TECHNIQUE: Axial imaging was performed without intravenous contrast utilizing   5mm slice thickness. Sagittal and coronal reformats were performed. Radiation   dose reduction techniques were used for this study. Our CT scanner uses one or   all of the following:       Automated exposure control, adjustment of the MAS or KUB according to patient's   size and iterative reconstruction. FINDINGS:           *BRAIN:       -  There are no early signs of territorial or lacunar infarction by CT.      -  No intracranial mass, hematoma, or hydrocephalus. -  For patient's age, the scattered areas of white matter hypodensities may   represent a chronic small vessel white matter ischemia. However this is   nonspecific. *VISUALIZED PARANASAL SINUSES: Chronic bilateral maxillary sinusitis. *MASTOIDS:  Clear. *CALVARIUM AND SCALP: Unremarkable. 03/10/19 2123  CT ABD PELV W CONT Final result    Impression:  IMPRESSION:  No acute intra-abdominal abnormality. Small amounts of atelectasis. Large amount of stool in the colon. Narrative:  CT ABDOMEN AND PELVIS WITH CONTRAST. HISTORY: Abdominal pain. COMPARISON: None       TECHNIQUE: 5 mm axial scans from above the diaphragms to the pubic symphysis   following oral and 100 cc intravenous contrast without acute complication. Intravenous contrast was given to increase the sensitivity to acute   inflammation. Radiation dose reduction techniques were used for this study. Our CT scanners use one or more of the following: Automated exposure control,   adjustment of the mA and or kV according to patient size, iterative   reconstruction. FINDINGS:    Abdomen: The lung bases demonstrate small amounts of bibasilar atelectasis. The   liver and spleen appear homogeneous. No calcified gallstones.  The biliary tree   is not dilated. The pancreas is unremarkable. No free fluid, acute inflammatory   changes or adenopathy. Bowel loops are not dilated. The kidneys enhance   uniformly. No radiopaque renal calculi. No hydronephrosis. The adrenal glands   are normal size. Aorta is normal caliber. Pelvis: No free fluid or acute inflammatory changes. The urinary bladder drained   by suprapubic Campbell catheter. There is large amount of stool in the rectum.                   Assessment and Plan:     Hospital Problems as of 3/11/2019 Date Reviewed: 1/31/2019          Codes Class Noted - Resolved POA    * (Principal) Acute encephalopathy ICD-10-CM: G93.40  ICD-9-CM: 348.30  3/10/2019 - Present Yes        Pyuria ICD-10-CM: N39.0  ICD-9-CM: 791.9  3/10/2019 - Present Yes        Constipation ICD-10-CM: K59.00  ICD-9-CM: 564.00  3/10/2019 - Present Yes        Hyperglycemia due to type 2 diabetes mellitus (HCC) (Chronic) ICD-10-CM: E11.65  ICD-9-CM: 250.00  12/10/2016 - Present Yes        Diabetes ([facility] ICD-10-CM: E11.9  ICD-9-CM: 250.00  3/10/2019 - Present Yes        Hypertension ICD-10-CM: I10  ICD-9-CM: 401.9  3/10/2019 - Present Yes        A-fib ([facility] ICD-10-CM: I48.91  ICD-9-CM: 427.31  2/19/2019 - Present Yes        Leukocytosis ICD-10-CM: F37.248  ICD-9-CM: 288.60  2/5/2019 - Present Yes        Paraplegia ([facility] (Chronic) ICD-10-CM: G82.20  ICD-9-CM: 344.1  12/10/2016 - Present Yes        Bipolar disorder without psychotic features ([facility] (Chronic) ICD-10-CM: F31.9  ICD-9-CM: 296.80  12/10/2016 - Present Yes        Chronic hepatitis C virus infection ([facility] (Chronic) ICD-10-CM: B18.2  ICD-9-CM: 070.54  12/10/2016 - Present Yes        Narcotic addiction ([facility] ICD-10-CM: F11.20  ICD-9-CM: 304.90  12/10/2016 - Present Yes              PLAN:  · Admit inpatient to medical bed  · Bedrest tonight while encephalopathic  · Patient is on several sedating medications, may be etiology for her encephalopathy  · Also has chronic suprapubic catheter. Has pyuria but no bacteria. Positive for peripheral leukocytosis but no lactic acidosis  · She has been started on antibiotics while cultures sent  · Start IV fluids for hyperglycemia.  Titrate insulin  · Continue bipolar medications  · Add dulcolax and miralax for constipation  · Add prn IV hydralazine for elevated blood pressures  · Anticoagulated with eliquis for PAF      Estimated LOS:  2 midnights    Signed:  Chayo Fitzpatrick MD

## 2019-03-11 NOTE — PROGRESS NOTES
Hospitalist Progress note     Admit Date:  3/10/2019  4:36 PM   Name:  Ria Forte   Age:  62 y.o.  :  1955   MRN:  [id number]   PCP:  None  Treatment Team: Attending Provider: Beatriz Mckeon MD    SUBJECTIVE:   Patient is a 63 y/o female with hx paraplegia, suprapubic catheter, narcotic dependence, bipol;ar disorder, Hep C, HTN, DM admitted on 3/10 for altered mental status. She recently had suprapubic catheter replaced by HHA. They were concerned she could be septic so sent to ED. She has a WBC ct of 16.1 but normal lactic acid. Glucose of 425 but no DKA. She is on numerous sedating medications at home including narcotics, benzodiazepines and psychotropics. Abdominal film shows large amount of fecal material. UA with pyuria but no bacteria. 3/11:  Pt seen at bedside  Pt is sleepy, easy to arouse, answering questions. Denies chest pain/abdominal pain, nausea/vomiting, fever, chills, diarrhea. No overnight events. 10 point ROS negative except what mentioned above. Prior to Admission Medications   Prescriptions Last Dose Informant Patient Reported? Taking? ALPRAZolam (XANAX) 0.5 mg tablet   No No   Sig: Take 1 Tab by mouth two (2) times daily as needed for Anxiety. Max Daily Amount: 1 mg. Blood-Glucose Meter monitoring kit   No No   Sig: Check glucose at home daily   QUEtiapine (SEROQUEL) 100 mg tablet   Yes No   Sig: Take 300 mg by mouth nightly. albuterol (PROVENTIL HFA, VENTOLIN HFA, PROAIR HFA) 90 mcg/actuation inhaler   No No   Sig: Take 1 Puff by inhalation every four (4) hours as needed for Wheezing. albuterol (PROVENTIL VENTOLIN) 2.5 mg /3 mL (0.083 %) nebulizer solution   No No   Sig: Take 3 mL by inhalation every four (4) hours as needed for Wheezing. apixaban (ELIQUIS) 5 mg tablet   No No   Sig: Take 1 Tab by mouth two (2) times a day. bisacodyl (DULCOLAX) 5 mg EC tablet   No No   Sig: Take 1 Tab by mouth daily as needed for Constipation.    budesonide (PULMICORT) 0.5 mg/2 mL nbsp   No No   Si mL by Nebulization route two (2) times a day. budesonide (PULMICORT) 0.5 mg/2 mL nbsp   No No   Si mL by Nebulization route two (2) times a day. fentaNYL (DURAGESIC) 25 mcg/hr PATCH   No No   Si Patch by TransDERmal route every seventy-two (72) hours. Max Daily Amount: 1 Patch. flecainide (TAMBOCOR) 50 mg tablet   No No   Sig: Take 1 Tab by mouth every twelve (12) hours. flecainide (TAMBOCOR) 50 mg tablet   No No   Sig: Take 1 Tab by mouth every twelve (12) hours. insulin glargine (LANTUS) 100 unit/mL injection   No No   Si units at bedtime. insulin glargine (LANTUS) 100 unit/mL injection   No No   Si Units by SubCUTAneous route nightly. insulin lispro (HUMALOG) 100 unit/mL injection   No No   Sig: Less than 150 =   0 units           150 -199 =   2 units  200 -249 =   4 units  250 -299 =   6 units  300 -349 =   8 units  Before meals and at bedtime. metoprolol succinate (TOPROL-XL) 50 mg XL tablet   No No   Sig: Take 1 Tab by mouth daily. metoprolol succinate (TOPROL-XL) 50 mg XL tablet   No No   Sig: Take 1 Tab by mouth daily. nystatin (MYCOSTATIN) powder   No No   Sig: Apply  to affected area two (2) times a day. omeprazole (PRILOSEC) 40 mg capsule   Yes No   Sig: Take 40 mg by mouth daily. oxyCODONE IR (ROXICODONE) 20 mg immediate release tablet   No No   Sig: Take 1 Tab by mouth every eight (8) hours as needed. Max Daily Amount: 60 mg.   pantoprazole (PROTONIX) 40 mg tablet   No No   Sig: Take 1 Tab by mouth Daily (before breakfast). promethazine (PHENERGAN) 25 mg tablet   Yes No   Sig: Take 25 mg by mouth every six (6) hours as needed for Nausea. promethazine (PHENERGAN) 25 mg tablet   No No   Sig: Take 1 Tab by mouth every six (6) hours as needed. zinc oxide-cod liver oil (DESITIN) 40 % paste   No No   Sig: Apply  to affected area two (2) times a day.       Facility-Administered Medications: None         Objective:     Patient Vitals for the past 24 hrs:   Temp Pulse Resp BP SpO2   03/11/19 0439 97.4 °F (36.3 °C) 87 18 130/70 95 %   03/10/19 2325 98.5 °F (36.9 °C) 81 20 (!) 185/96 91 %   03/10/19 2216  82 27 184/86 97 %   03/10/19 2146  90 14 (!) 180/91 98 %   03/10/19 2135  88 25 193/90 91 %   03/10/19 2050  83  182/79 99 %   03/10/19 2040  89 18 177/88 98 %   03/10/19 2010  75  169/79 98 %   03/10/19 2008  78  197/84 95 %   03/10/19 1950  71  (!) 209/93 97 %   03/10/19 1947  64  (!) 210/91 98 %   03/10/19 1942  70  (!) 210/86    03/10/19 1931  72 15 (!) 210/86 95 %   03/10/19 1916  75 11 (!) 211/94 97 %   03/10/19 1902  75 15 (!) 214/91 96 %   03/10/19 1846  73 27 (!) 202/97 98 %   03/10/19 1816  73  189/90 95 %   03/10/19 1803    194/85 94 %   03/10/19 1731     94 %   03/10/19 1731    (!) 174/92 95 %   03/10/19 1725 98.6 °F (37 °C)       03/10/19 1719  71 17 (!) 183/92 93 %   03/10/19 1637 98 °F (36.7 °C) 73 20 (!) 205/88 94 %     Oxygen Therapy  O2 Sat (%): 95 % (03/11/19 0439)  Pulse via Oximetry: 82 beats per minute (03/10/19 2216)  O2 Device: Room air (03/10/19 1731)    Intake/Output Summary (Last 24 hours) at 3/11/2019 0707  Last data filed at 3/11/2019 0439  Gross per 24 hour   Intake    Output 1250 ml   Net -1250 ml       Physical Exam:  General:    Obese, NAD, sluggish/lethargic  Eyes:   Normal sclera. Extraocular movements intact. ENT:  Normocephalic, atraumatic. Moist mucous membranes  CV:   RRR. No murmur, rub, or gallop. Lungs:  CTAB. No wheezing, rhonchi, or rales. Abdomen: Soft, obese, non tender, non distended, BS normoactive  Extremities: Warm and dry. No cyanosis or edema. Neurologic: CN II-XII grossly intact. Paraplegic. Can slightly move feet  Skin:     No rashes or jaundice. No wounds. Psych:  Dysphoric mood with flat affect    I reviewed the labs, imaging, EKGs, telemetry, and other studies done this admission.   Data Review:   Recent Results (from the past 24 hour(s)) EKG, 12 LEAD, INITIAL    Collection Time: 03/10/19  4:43 PM   Result Value Ref Range    Ventricular Rate 73 BPM    Atrial Rate 73 BPM    P-R Interval 160 ms    QRS Duration 86 ms    Q-T Interval 454 ms    QTC Calculation (Bezet) 500 ms    Calculated P Axis 59 degrees    Calculated R Axis 16 degrees    Calculated T Axis 59 degrees    Diagnosis         Normal sinus rhythm  Possible Left atrial enlargement  Nonspecific ST and T wave abnormality  Prolonged QT  Abnormal ECG  When compared with ECG of 14-FEB-2019 19:02,  Nonspecific T wave abnormality now evident in Anterior leads  Confirmed by Josie De La Rosa [id number]) on 3/10/2019 8:38:32 PM     URINALYSIS W/ RFLX MICROSCOPIC    Collection Time: 03/10/19  5:37 PM   Result Value Ref Range    Color YELLOW      Appearance HAZY      Specific gravity 1.035 (H) 1.001 - 1.023      pH (UA) 6.5 5.0 - 9.0      Protein 30 (A) NEG mg/dL    Glucose >1,000 mg/dL    Ketone TRACE (A) NEG mg/dL    Bilirubin NEGATIVE  NEG      Blood TRACE (A) NEG      Urobilinogen 1.0 0.2 - 1.0 EU/dL    Nitrites NEGATIVE  NEG      Leukocyte Esterase SMALL (A) NEG      WBC >100 0 /hpf    RBC 3-5 0 /hpf    Bacteria 0 0 /hpf    Yeast MODERATE     DRUG SCREEN, URINE    Collection Time: 03/10/19  5:37 PM   Result Value Ref Range    PCP(PHENCYCLIDINE) NEGATIVE       BENZODIAZEPINES POSITIVE      COCAINE NEGATIVE       AMPHETAMINES NEGATIVE       METHADONE NEGATIVE       THC (TH-CANNABINOL) NEGATIVE       OPIATES POSITIVE      BARBITURATES NEGATIVE      AMMONIA    Collection Time: 03/10/19  5:54 PM   Result Value Ref Range    Ammonia <10 (L) 11 - 32 UMOL/L   POC LACTIC ACID    Collection Time: 03/10/19  5:58 PM   Result Value Ref Range    Lactic Acid (POC) 1.32 0.5 - 1.9 mmol/L   CULTURE, BLOOD    Collection Time: 03/10/19  6:50 PM   Result Value Ref Range    Special Requests: RIGHT ARM      Culture result: PENDING    PROCALCITONIN    Collection Time: 03/10/19  6:50 PM   Result Value Ref Range    Procalcitonin <0.1 ng/mL   LIPASE    Collection Time: 03/10/19  6:50 PM   Result Value Ref Range    Lipase 242 73 - 550 U/L   METABOLIC PANEL, COMPREHENSIVE    Collection Time: 03/10/19  6:50 PM   Result Value Ref Range    Sodium 135 (L) 136 - 145 mmol/L    Potassium 3.6 3.5 - 5.1 mmol/L    Chloride 96 (L) 98 - 107 mmol/L    CO2 26 21 - 32 mmol/L    Anion gap 13 7 - 16 mmol/L    Glucose 425 (H) 65 - 100 mg/dL    BUN 15 8 - 23 MG/DL    Creatinine 0.93 0.6 - 1.0 MG/DL    GFR est AA >60 >60 ml/min/1.73m2    GFR est non-AA >60 >60 ml/min/1.73m2    Calcium 9.5 8.3 - 10.4 MG/DL    Bilirubin, total 0.7 0.2 - 1.1 MG/DL    ALT (SGPT) 14 12 - 65 U/L    AST (SGOT) 16 15 - 37 U/L    Alk. phosphatase 124 50 - 136 U/L    Protein, total 8.2 6.3 - 8.2 g/dL    Albumin 3.2 3.2 - 4.6 g/dL    Globulin 5.0 (H) 2.3 - 3.5 g/dL    A-G Ratio 0.6 (L) 1.2 - 3.5     CBC WITH AUTOMATED DIFF    Collection Time: 03/10/19  6:50 PM   Result Value Ref Range    WBC 16.1 (H) 4.3 - 11.1 K/uL    RBC 5.15 4.05 - 5.2 M/uL    HGB 15.0 11.7 - 15.4 g/dL    HCT 46.0 35.8 - 46.3 %    MCV 89.3 79.6 - 97.8 FL    MCH 29.1 26.1 - 32.9 PG    MCHC 32.6 31.4 - 35.0 g/dL    RDW 14.7 (H) 11.9 - 14.6 %    PLATELET 726 790 - 904 K/uL    MPV 11.4 9.4 - 12.3 FL    ABSOLUTE NRBC 0.00 0.0 - 0.2 K/uL    DF AUTOMATED      NEUTROPHILS 81 (H) 43 - 78 %    LYMPHOCYTES 14 13 - 44 %    MONOCYTES 4 4.0 - 12.0 %    EOSINOPHILS 1 0.5 - 7.8 %    BASOPHILS 0 0.0 - 2.0 %    IMMATURE GRANULOCYTES 1 0.0 - 5.0 %    ABS. NEUTROPHILS 13.1 (H) 1.7 - 8.2 K/UL    ABS. LYMPHOCYTES 2.2 0.5 - 4.6 K/UL    ABS. MONOCYTES 0.6 0.1 - 1.3 K/UL    ABS. EOSINOPHILS 0.1 0.0 - 0.8 K/UL    ABS. BASOPHILS 0.1 0.0 - 0.2 K/UL    ABS. IMM.  GRANS. 0.1 0.0 - 0.5 K/UL   TROPONIN I    Collection Time: 03/10/19  6:50 PM   Result Value Ref Range    Troponin-I, Qt. <0.02 (L) 0.02 - 0.05 NG/ML   GLUCOSE, POC    Collection Time: 03/10/19  8:08 PM   Result Value Ref Range    Glucose (POC) 385 (H) 65 - 100 mg/dL   GLUCOSE, POC Collection Time: 03/10/19  8:53 PM   Result Value Ref Range    Glucose (POC) 407 (H) 65 - 100 mg/dL   CBC WITH AUTOMATED DIFF    Collection Time: 03/11/19  5:29 AM   Result Value Ref Range    WBC 13.5 (H) 4.3 - 11.1 K/uL    RBC 4.74 4.05 - 5.2 M/uL    HGB 13.8 11.7 - 15.4 g/dL    HCT 42.6 35.8 - 46.3 %    MCV 89.9 79.6 - 97.8 FL    MCH 29.1 26.1 - 32.9 PG    MCHC 32.4 31.4 - 35.0 g/dL    RDW 15.0 (H) 11.9 - 14.6 %    PLATELET 455 180 - 945 K/uL    MPV 11.8 9.4 - 12.3 FL    ABSOLUTE NRBC 0.00 0.0 - 0.2 K/uL    DF AUTOMATED      NEUTROPHILS 70 43 - 78 %    LYMPHOCYTES 23 13 - 44 %    MONOCYTES 5 4.0 - 12.0 %    EOSINOPHILS 1 0.5 - 7.8 %    BASOPHILS 0 0.0 - 2.0 %    IMMATURE GRANULOCYTES 1 0.0 - 5.0 %    ABS. NEUTROPHILS 9.5 (H) 1.7 - 8.2 K/UL    ABS. LYMPHOCYTES 3.1 0.5 - 4.6 K/UL    ABS. MONOCYTES 0.7 0.1 - 1.3 K/UL    ABS. EOSINOPHILS 0.1 0.0 - 0.8 K/UL    ABS. BASOPHILS 0.0 0.0 - 0.2 K/UL    ABS. IMM. GRANS. 0.1 0.0 - 0.5 K/UL       Imaging Studies:  CXR Results  (Last 48 hours)               03/10/19 1700  XR CHEST PORT Final result    Impression:  IMPRESSION:  Negative for acute change. Narrative:  CHEST X-RAY, one view. HISTORY:  Wheezing. TECHNIQUE:  AP upright portable view. COMPARISON: 14 February 2019. FINDINGS:  The lungs are clear. The heart size is normal. The costophrenic   angles are sharp. The pulmonary vasculature is unremarkable. Included portion of   the upper abdomen is unremarkable. CT Results  (Last 48 hours)               03/10/19 2123  CT HEAD WO CONT Final result    Impression:  IMPRESSION:       No acute intracranial amounts. Chronic bilateral maxillary sinusitis. Date of Dictation: 3/10/2019 9:13 PM           Narrative:  CT HEAD WITHOUT CONTRAST        HISTORY:  Transient alteration of awareness.        COMPARISON: 1/31/2019       TECHNIQUE: Axial imaging was performed without intravenous contrast utilizing   5mm slice thickness. Sagittal and coronal reformats were performed. Radiation   dose reduction techniques were used for this study. Our CT scanner uses one or   all of the following:       Automated exposure control, adjustment of the MAS or KUB according to patient's   size and iterative reconstruction. FINDINGS:           *BRAIN:       -  There are no early signs of territorial or lacunar infarction by CT.      -  No intracranial mass, hematoma, or hydrocephalus. -  For patient's age, the scattered areas of white matter hypodensities may   represent a chronic small vessel white matter ischemia. However this is   nonspecific. *VISUALIZED PARANASAL SINUSES: Chronic bilateral maxillary sinusitis. *MASTOIDS:  Clear. *CALVARIUM AND SCALP: Unremarkable. 03/10/19 2123  CT ABD PELV W CONT Final result    Impression:  IMPRESSION:  No acute intra-abdominal abnormality. Small amounts of atelectasis. Large amount of stool in the colon. Narrative:  CT ABDOMEN AND PELVIS WITH CONTRAST. HISTORY: Abdominal pain. COMPARISON: None       TECHNIQUE: 5 mm axial scans from above the diaphragms to the pubic symphysis   following oral and 100 cc intravenous contrast without acute complication. Intravenous contrast was given to increase the sensitivity to acute   inflammation. Radiation dose reduction techniques were used for this study. Our CT scanners use one or more of the following: Automated exposure control,   adjustment of the mA and or kV according to patient size, iterative   reconstruction. FINDINGS:    Abdomen: The lung bases demonstrate small amounts of bibasilar atelectasis. The   liver and spleen appear homogeneous. No calcified gallstones. The biliary tree   is not dilated. The pancreas is unremarkable. No free fluid, acute inflammatory   changes or adenopathy. Bowel loops are not dilated. The kidneys enhance   uniformly. No radiopaque renal calculi.  No hydronephrosis. The adrenal glands   are normal size. Aorta is normal caliber. Pelvis: No free fluid or acute inflammatory changes. The urinary bladder drained   by suprapubic Campbell catheter. There is large amount of stool in the rectum.                   Assessment and Plan:     Hospital Problems as of 3/11/2019 Date Reviewed: 1/31/2019          Codes Class Noted - Resolved POA    * (Principal) Acute encephalopathy ICD-10-CM: G93.40  ICD-9-CM: 348.30  3/10/2019 - Present Yes        Diabetes ([facility] ICD-10-CM: E11.9  ICD-9-CM: 250.00  3/10/2019 - Present Yes        Hypertension ICD-10-CM: I10  ICD-9-CM: 401.9  3/10/2019 - Present Yes        Constipation ICD-10-CM: K59.00  ICD-9-CM: 564.00  3/10/2019 - Present Yes        Pyuria ICD-10-CM: N39.0  ICD-9-CM: 791.9  3/10/2019 - Present Yes        A-fib ([facility] ICD-10-CM: I48.91  ICD-9-CM: 427.31  2/19/2019 - Present Yes        Leukocytosis ICD-10-CM: R25.197  ICD-9-CM: 288.60  2/5/2019 - Present Yes        Hyperglycemia due to type 2 diabetes mellitus (HCC) (Chronic) ICD-10-CM: E11.65  ICD-9-CM: 250.00  12/10/2016 - Present Yes        Paraplegia ([facility] (Chronic) ICD-10-CM: G82.20  ICD-9-CM: 344.1  12/10/2016 - Present Yes        Bipolar disorder without psychotic features ([facility] (Chronic) ICD-10-CM: F31.9  ICD-9-CM: 296.80  12/10/2016 - Present Yes        Chronic hepatitis C virus infection ([facility] (Chronic) ICD-10-CM: B18.2  ICD-9-CM: 070.54  12/10/2016 - Present Yes        Narcotic addiction ([facility] ICD-10-CM: F11.20  ICD-9-CM: 304.90  12/10/2016 - Present Yes              PLAN:  · Continue IV cefepime  · Follow up with blood and urine culture  · Leukocytosis improving, 13K << 16K  · Continue to hold sedatives  · Replace potassium, orally and IV recheck in AM.  · Add humalog 12 units premeals TIDAC, continue lantus 49 units and SSI  · Suprapubic catheter recently changed  · Continue bipolar medications  · Continue dulcolax and miralax for constipation  · HR well controlled with flecainide  · Add prn IV hydralazine for elevated blood pressures  · Anticoagulated with eliquis for PAF    PT/OT eval  Dispo: pending until medically stable  High risk with opioids on board    Signed:  Kevin Florence MD

## 2019-03-11 NOTE — PROGRESS NOTES
Initial visit to assess pt's spiritual needs. Feeling today? Not feeling well today    Receiving good care?  yes    Needs from Spiritual Care:  none    Ministry of presence & prayer to demonstrate caring & concern, convey emotional & spiritual support.     anais Telles, MDiv,ThM,PhD

## 2019-03-11 NOTE — ED NOTES
TRANSFER - OUT REPORT:    Verbal report given to Zoeyabdifatah(name) on Rod Ha  being transferred to 605(unit) for routine progression of care       Report consisted of patients Situation, Background, Assessment and   Recommendations(SBAR). Information from the following report(s) SBAR, ED Summary, [facility] and Recent Results was reviewed with the receiving nurse. Lines:   Peripheral IV 03/10/19 Left Other(comment) (Active)   Site Assessment Clean, dry, & intact 3/10/2019  6:03 PM   Phlebitis Assessment 0 3/10/2019  6:03 PM   Infiltration Assessment 0 3/10/2019  6:03 PM   Dressing Status Clean, dry, & intact 3/10/2019  6:03 PM   Alcohol Cap Used No 3/10/2019  6:03 PM        Opportunity for questions and clarification was provided.       Patient transported with:   [place] [facility] [place]

## 2019-03-11 NOTE — PROGRESS NOTES
03/10/19 8580   Skin Integumentary   Skin Integumentary (WDL) X  (bilateral upper extr scratchs. bruise L breast)   Skin Color Appropriate for ethnicity   Skin Condition/Temp Warm   Skin Integrity Abrasion; Excoriation  (bilateral upper extr (old))   Hair Growth Present   Varicosities Absent   Pressure  Injury Documentation No Pressure Injury Noted-Pressure Ulcer Prevention Initiated   Wound Prevention and Protection Methods   Orientation of Wound Prevention Posterior   Location of Wound Prevention Sacrum/Coccyx   Dressing Present  No  (allevin)   Wound Offloading (Prevention Methods) Bed, pressure reduction mattress   Primary Nurse Leon Amador and NALDO Yin performed a dual skin assessment on this patient Impairment noted- see wound doc flow sheet  Ras score is 14

## 2019-03-11 NOTE — PROGRESS NOTES
Pt still confuse with halucination . Hourly round completed throughout the shift. Bed in lower position and call light/ personal items within reach. Will continue to monitor and give bed side shift report to on coming day shift nurse. END OF SHIFT NOTE:    INTAKE/OUTPUT  03/10 0701 - 03/11 0700  In: -   Out: 1250 [Urine:1250]  Voiding: YES  Catheter: YES  Color: clear  Drain:              DIET  diabetic    Flatus: Patient does have flatus present. Stool:  0 occurrences. Characteristics:       Ambulating  No    Emesis: 0 occurrences.     Characteristics:          VITAL SIGNS  Patient Vitals for the past 12 hrs:   Temp Pulse Resp BP SpO2   03/11/19 0439 97.4 °F (36.3 °C) 87 18 130/70 95 %   03/10/19 2325 98.5 °F (36.9 °C) 81 20 (!) 185/96 91 %   03/10/19 2216  82 27 184/86 97 %   03/10/19 2146  90 14 (!) 180/91 98 %   03/10/19 2135  88 25 193/90 91 %   03/10/19 2050  83  182/79 99 %   03/10/19 2040  89 18 177/88 98 %   03/10/19 2010  75  169/79 98 %   03/10/19 2008  78  197/84 95 %   03/10/19 1950  71  (!) 209/93 97 %   03/10/19 1947  64  (!) 210/91 98 %   03/10/19 1942  70  (!) 210/86    03/10/19 1931  72 15 (!) 210/86 95 %   03/10/19 1916  75 11 (!) 211/94 97 %   03/10/19 1902  75 15 (!) 214/91 96 %   03/10/19 1846  73 27 (!) 202/97 98 %   03/10/19 1816  73  189/90 95 %                               Reanna Vieira

## 2019-03-11 NOTE — PROGRESS NOTES
Pt refused bath today asked three times. Pt didn't eat well this shift, held the 12units of insulin. Son is here asking for diabetic teaching for pt and himself, diabetic nurse came, but she couldn't get son or patient to open up about diabetic regimen at home, or about diabetes education. Nurse stated she will come back in the morning.

## 2019-03-11 NOTE — ED NOTES
Pt back from CT and Dr. Benji Bravo notified that patients mentation has changed. Anesthesia Type: 1% lidocaine with epinephrine

## 2019-03-11 NOTE — PROGRESS NOTES
Interdisciplinary Rounds completed 03/11/19. Nursing, Case Management, Physician and PT present. Plan of care reviewed and updated. Home with son probably Wednesday.

## 2019-03-11 NOTE — PROGRESS NOTES
TRANSFER - IN REPORT:    Verbal report received from Claudio Choi RN(name) on Merlinda Jeans  being received from ED(unit) for routine progression of care      Report consisted of patients Situation, Background, Assessment and   Recommendations(SBAR). Information from the following report(s) SBAR and Kardex was reviewed with the receiving nurse. Opportunity for questions and clarification was provided. Assessment completed upon patients arrival to unit and care assumed.

## 2019-03-12 LAB
ANION GAP SERPL CALC-SCNC: 8 MMOL/L (ref 7–16)
BUN SERPL-MCNC: 10 MG/DL (ref 8–23)
CALCIUM SERPL-MCNC: 8.4 MG/DL (ref 8.3–10.4)
CHLORIDE SERPL-SCNC: 112 MMOL/L (ref 98–107)
CO2 SERPL-SCNC: 24 MMOL/L (ref 21–32)
CREAT SERPL-MCNC: 0.73 MG/DL (ref 0.6–1)
ERYTHROCYTE [DISTWIDTH] IN BLOOD BY AUTOMATED COUNT: 15.1 % (ref 11.9–14.6)
GLUCOSE BLD STRIP.AUTO-MCNC: 114 MG/DL (ref 65–100)
GLUCOSE BLD STRIP.AUTO-MCNC: 197 MG/DL (ref 65–100)
GLUCOSE BLD STRIP.AUTO-MCNC: 223 MG/DL (ref 65–100)
GLUCOSE BLD STRIP.AUTO-MCNC: 85 MG/DL (ref 65–100)
GLUCOSE SERPL-MCNC: 84 MG/DL (ref 65–100)
HCT VFR BLD AUTO: 41.2 % (ref 35.8–46.3)
HGB BLD-MCNC: 12.6 G/DL (ref 11.7–15.4)
MCH RBC QN AUTO: 28.9 PG (ref 26.1–32.9)
MCHC RBC AUTO-ENTMCNC: 30.6 G/DL (ref 31.4–35)
MCV RBC AUTO: 94.5 FL (ref 79.6–97.8)
NRBC # BLD: 0 K/UL (ref 0–0.2)
PLATELET # BLD AUTO: 245 K/UL (ref 150–450)
PMV BLD AUTO: 11.2 FL (ref 9.4–12.3)
POTASSIUM SERPL-SCNC: 3.2 MMOL/L (ref 3.5–5.1)
RBC # BLD AUTO: 4.36 M/UL (ref 4.05–5.2)
SODIUM SERPL-SCNC: 144 MMOL/L (ref 136–145)
WBC # BLD AUTO: 11.1 K/UL (ref 4.3–11.1)

## 2019-03-12 PROCEDURE — 80048 BASIC METABOLIC PNL TOTAL CA: CPT

## 2019-03-12 PROCEDURE — 85027 COMPLETE CBC AUTOMATED: CPT

## 2019-03-12 PROCEDURE — 97530 THERAPEUTIC ACTIVITIES: CPT

## 2019-03-12 PROCEDURE — C1751 CATH, INF, PER/CENT/MIDLINE: HCPCS

## 2019-03-12 PROCEDURE — 77030037759

## 2019-03-12 PROCEDURE — 94760 N-INVAS EAR/PLS OXIMETRY 1: CPT

## 2019-03-12 PROCEDURE — 74011250636 HC RX REV CODE- 250/636: Performed by: EMERGENCY MEDICINE

## 2019-03-12 PROCEDURE — 74011000250 HC RX REV CODE- 250: Performed by: HOSPITALIST

## 2019-03-12 PROCEDURE — 74011250636 HC RX REV CODE- 250/636: Performed by: HOSPITALIST

## 2019-03-12 PROCEDURE — 36415 COLL VENOUS BLD VENIPUNCTURE: CPT

## 2019-03-12 PROCEDURE — 74011636637 HC RX REV CODE- 636/637: Performed by: HOSPITALIST

## 2019-03-12 PROCEDURE — 77030034849

## 2019-03-12 PROCEDURE — 65270000029 HC RM PRIVATE

## 2019-03-12 PROCEDURE — 76937 US GUIDE VASCULAR ACCESS: CPT

## 2019-03-12 PROCEDURE — 77010033678 HC OXYGEN DAILY

## 2019-03-12 PROCEDURE — 77030020263 HC SOL INJ SOD CL0.9% LFCR 1000ML

## 2019-03-12 PROCEDURE — 74011250637 HC RX REV CODE- 250/637: Performed by: HOSPITALIST

## 2019-03-12 PROCEDURE — 82962 GLUCOSE BLOOD TEST: CPT

## 2019-03-12 PROCEDURE — 97166 OT EVAL MOD COMPLEX 45 MIN: CPT

## 2019-03-12 PROCEDURE — 74011000258 HC RX REV CODE- 258: Performed by: EMERGENCY MEDICINE

## 2019-03-12 PROCEDURE — 94640 AIRWAY INHALATION TREATMENT: CPT

## 2019-03-12 RX ORDER — SODIUM CHLORIDE 0.9 % (FLUSH) 0.9 %
10 SYRINGE (ML) INJECTION EVERY 8 HOURS
Status: DISCONTINUED | OUTPATIENT
Start: 2019-03-12 | End: 2019-03-14 | Stop reason: HOSPADM

## 2019-03-12 RX ORDER — POTASSIUM CHLORIDE 20 MEQ/1
40 TABLET, EXTENDED RELEASE ORAL 2 TIMES DAILY
Status: COMPLETED | OUTPATIENT
Start: 2019-03-12 | End: 2019-03-12

## 2019-03-12 RX ORDER — VANCOMYCIN/0.9 % SOD CHLORIDE 1.5G/250ML
1500 PLASTIC BAG, INJECTION (ML) INTRAVENOUS EVERY 12 HOURS
Status: DISCONTINUED | OUTPATIENT
Start: 2019-03-12 | End: 2019-03-13

## 2019-03-12 RX ORDER — SODIUM CHLORIDE 0.9 % (FLUSH) 0.9 %
10 SYRINGE (ML) INJECTION AS NEEDED
Status: DISCONTINUED | OUTPATIENT
Start: 2019-03-12 | End: 2019-03-14 | Stop reason: HOSPADM

## 2019-03-12 RX ORDER — BUDESONIDE 0.5 MG/2ML
500 INHALANT ORAL
Status: DISCONTINUED | OUTPATIENT
Start: 2019-03-12 | End: 2019-03-14 | Stop reason: HOSPADM

## 2019-03-12 RX ORDER — POTASSIUM CHLORIDE 14.9 MG/ML
20 INJECTION INTRAVENOUS
Status: DISPENSED | OUTPATIENT
Start: 2019-03-12 | End: 2019-03-12

## 2019-03-12 RX ORDER — INSULIN GLARGINE 100 [IU]/ML
35 INJECTION, SOLUTION SUBCUTANEOUS
Status: DISCONTINUED | OUTPATIENT
Start: 2019-03-12 | End: 2019-03-13

## 2019-03-12 RX ADMIN — APIXABAN 5 MG: 5 TABLET, FILM COATED ORAL at 17:07

## 2019-03-12 RX ADMIN — SODIUM CHLORIDE 75 ML/HR: 900 INJECTION, SOLUTION INTRAVENOUS at 12:53

## 2019-03-12 RX ADMIN — SODIUM CHLORIDE 1 G: 900 INJECTION, SOLUTION INTRAVENOUS at 20:11

## 2019-03-12 RX ADMIN — NYSTATIN: 100000 POWDER TOPICAL at 17:07

## 2019-03-12 RX ADMIN — INSULIN GLARGINE 35 UNITS: 100 INJECTION, SOLUTION SUBCUTANEOUS at 21:45

## 2019-03-12 RX ADMIN — Medication 10 ML: at 22:00

## 2019-03-12 RX ADMIN — OXYCODONE HYDROCHLORIDE 20 MG: 5 TABLET ORAL at 16:51

## 2019-03-12 RX ADMIN — FLECAINIDE ACETATE 50 MG: 100 TABLET ORAL at 21:43

## 2019-03-12 RX ADMIN — SODIUM CHLORIDE 1 G: 900 INJECTION, SOLUTION INTRAVENOUS at 08:50

## 2019-03-12 RX ADMIN — POTASSIUM CHLORIDE 40 MEQ: 20 TABLET, EXTENDED RELEASE ORAL at 11:50

## 2019-03-12 RX ADMIN — Medication: at 21:47

## 2019-03-12 RX ADMIN — Medication 10 ML: at 14:59

## 2019-03-12 RX ADMIN — POTASSIUM CHLORIDE 40 MEQ: 20 TABLET, EXTENDED RELEASE ORAL at 17:06

## 2019-03-12 RX ADMIN — INSULIN LISPRO 2 UNITS: 100 INJECTION, SOLUTION INTRAVENOUS; SUBCUTANEOUS at 16:51

## 2019-03-12 RX ADMIN — VANCOMYCIN HYDROCHLORIDE 1500 MG: 10 INJECTION, POWDER, LYOPHILIZED, FOR SOLUTION INTRAVENOUS at 12:59

## 2019-03-12 RX ADMIN — METOPROLOL SUCCINATE 50 MG: 50 TABLET, EXTENDED RELEASE ORAL at 08:50

## 2019-03-12 RX ADMIN — INSULIN LISPRO 4 UNITS: 100 INJECTION, SOLUTION INTRAVENOUS; SUBCUTANEOUS at 21:45

## 2019-03-12 RX ADMIN — Medication: at 09:01

## 2019-03-12 RX ADMIN — BUDESONIDE 500 MCG: 0.5 INHALANT RESPIRATORY (INHALATION) at 07:33

## 2019-03-12 RX ADMIN — QUETIAPINE FUMARATE 300 MG: 100 TABLET ORAL at 21:43

## 2019-03-12 RX ADMIN — Medication 10 ML: at 06:44

## 2019-03-12 RX ADMIN — POTASSIUM CHLORIDE 20 MEQ: 200 INJECTION, SOLUTION INTRAVENOUS at 14:56

## 2019-03-12 RX ADMIN — APIXABAN 5 MG: 5 TABLET, FILM COATED ORAL at 08:50

## 2019-03-12 RX ADMIN — NYSTATIN: 100000 POWDER TOPICAL at 09:01

## 2019-03-12 RX ADMIN — Medication 10 ML: at 21:47

## 2019-03-12 RX ADMIN — PANTOPRAZOLE SODIUM 40 MG: 40 TABLET, DELAYED RELEASE ORAL at 06:42

## 2019-03-12 RX ADMIN — OXYCODONE HYDROCHLORIDE 20 MG: 5 TABLET ORAL at 08:49

## 2019-03-12 RX ADMIN — FLECAINIDE ACETATE 50 MG: 100 TABLET ORAL at 08:50

## 2019-03-12 RX ADMIN — BUDESONIDE 500 MCG: 0.5 INHALANT RESPIRATORY (INHALATION) at 19:49

## 2019-03-12 NOTE — PROGRESS NOTES
25cc deflated from balloon, 16Fr catheter removed, a new 16Fr inserted and inflated balloon with 25cc. NALDO Nelson assisted. Yellow colored urine followed. Pt tolerated well. Will continue to monitor care.

## 2019-03-12 NOTE — PROGRESS NOTES
MIDLINE Placement Note    PRE-PROCEDURE VERIFICATION  PROCEDURE DETAIL  Time out completed with Jael Schmidt RN, VAT and everyone in agreement with procedure. A single lumen Midline was started for vascular access.  The following documentation is in addition to the Midline properties in the lines/airways flowsheet :  Lot #: [id number]   Xylocaine used: yes  Mid-Arm Circumference: 35 (cm)  Internal Catheter Length: 8 (cm)  Internal Catheter Total Length: 8 (cm)  Vein Selection for Midline:left cephalic    Line is okay to use: yes    Leia Harrington RN, VAT

## 2019-03-12 NOTE — PROGRESS NOTES
Hospitalist Progress note     Admit Date:  3/10/2019  4:36 PM   Name:  Beverly Ambriz   Age:  62 y.o.  :  1955   MRN:  [id number]   PCP:  None  Treatment Team: Attending Provider: Nazanin Acosta MD; Care Manager: Segundo Enrique RN    SUBJECTIVE:   Patient is a 63 y/o female with hx paraplegia, suprapubic catheter, narcotic dependence, bipol;ar disorder, Hep C, HTN, DM admitted on 3/10 for altered mental status. She recently had suprapubic catheter replaced by [facility]. They were concerned she could be septic so sent to ED. She has a WBC ct of 16.1 but normal lactic acid. Glucose of 425 but no DKA. She is on numerous sedating medications at home including narcotics, benzodiazepines and psychotropics. Abdominal film shows large amount of fecal material. UA with pyuria but no bacteria. 3/12:  Pt seen at bedside. She is much more alert, awake and coherent today. She states that her suprapubic catheter was not changed, and it's been 2 months since the last change. She still feels weak and lethargic. Denies any pain, nausea/vomiting, diarrhea, fever, headache. No other overnight events. 10 point ROS negative except what mentioned above. Prior to Admission Medications   Prescriptions Last Dose Informant Patient Reported? Taking? ALPRAZolam (XANAX) 0.5 mg tablet   No No   Sig: Take 1 Tab by mouth two (2) times daily as needed for Anxiety. Max Daily Amount: 1 mg. Blood-Glucose Meter monitoring kit   No No   Sig: Check glucose at home daily   QUEtiapine (SEROQUEL) 100 mg tablet   Yes No   Sig: Take 300 mg by mouth nightly. albuterol (PROVENTIL HFA, VENTOLIN HFA, PROAIR HFA) 90 mcg/actuation inhaler   No No   Sig: Take 1 Puff by inhalation every four (4) hours as needed for Wheezing. albuterol (PROVENTIL VENTOLIN) 2.5 mg /3 mL (0.083 %) nebulizer solution   No No   Sig: Take 3 mL by inhalation every four (4) hours as needed for Wheezing.    apixaban (ELIQUIS) 5 mg tablet   No No   Sig: Take 1 Tab by mouth two (2) times a day. bisacodyl (DULCOLAX) 5 mg EC tablet   No No   Sig: Take 1 Tab by mouth daily as needed for Constipation. budesonide (PULMICORT) 0.5 mg/2 mL nbsp   No No   Si mL by Nebulization route two (2) times a day. budesonide (PULMICORT) 0.5 mg/2 mL nbsp   No No   Si mL by Nebulization route two (2) times a day. fentaNYL (DURAGESIC) 25 mcg/hr PATCH   No No   Si Patch by TransDERmal route every seventy-two (72) hours. Max Daily Amount: 1 Patch. flecainide (TAMBOCOR) 50 mg tablet   No No   Sig: Take 1 Tab by mouth every twelve (12) hours. flecainide (TAMBOCOR) 50 mg tablet   No No   Sig: Take 1 Tab by mouth every twelve (12) hours. insulin glargine (LANTUS) 100 unit/mL injection   No No   Si units at bedtime. insulin glargine (LANTUS) 100 unit/mL injection   No No   Si Units by SubCUTAneous route nightly. insulin lispro (HUMALOG) 100 unit/mL injection   No No   Sig: Less than 150 =   0 units           150 -199 =   2 units  200 -249 =   4 units  250 -299 =   6 units  300 -349 =   8 units  Before meals and at bedtime. metoprolol succinate (TOPROL-XL) 50 mg XL tablet   No No   Sig: Take 1 Tab by mouth daily. metoprolol succinate (TOPROL-XL) 50 mg XL tablet   No No   Sig: Take 1 Tab by mouth daily. nystatin (MYCOSTATIN) powder   No No   Sig: Apply  to affected area two (2) times a day. omeprazole (PRILOSEC) 40 mg capsule   Yes No   Sig: Take 40 mg by mouth daily. oxyCODONE IR (ROXICODONE) 20 mg immediate release tablet   No No   Sig: Take 1 Tab by mouth every eight (8) hours as needed. Max Daily Amount: 60 mg.   pantoprazole (PROTONIX) 40 mg tablet   No No   Sig: Take 1 Tab by mouth Daily (before breakfast). promethazine (PHENERGAN) 25 mg tablet   Yes No   Sig: Take 25 mg by mouth every six (6) hours as needed for Nausea. promethazine (PHENERGAN) 25 mg tablet   No No   Sig: Take 1 Tab by mouth every six (6) hours as needed.    zinc oxide-cod liver oil (DESITIN) 40 % paste   No No   Sig: Apply  to affected area two (2) times a day. Facility-Administered Medications: None         Objective:     Patient Vitals for the past 24 hrs:   Temp Pulse Resp BP SpO2   03/12/19 0449 97.6 °F (36.4 °C) 67 18 131/72 92 %   03/11/19 2342 97.8 °F (36.6 °C) 70 16 138/64 97 %   03/11/19 2002     94 %   03/11/19 1937 98.1 °F (36.7 °C) 63 18 146/70 94 %   03/11/19 1507 98.1 °F (36.7 °C) 67 16 137/60 96 %   03/11/19 1038 98.1 °F (36.7 °C) 89 18 142/80 93 %     Oxygen Therapy  O2 Sat (%): 92 % (03/12/19 0449)  Pulse via Oximetry: 57 beats per minute (03/11/19 2002)  O2 Device: Nasal cannula (03/11/19 2002)  O2 Flow Rate (L/min): 2 l/min (03/11/19 2002)    Intake/Output Summary (Last 24 hours) at 3/12/2019 0722  Last data filed at 3/12/2019 0454  Gross per 24 hour   Intake 2330 ml   Output 1050 ml   Net 1280 ml       Physical Exam:  General:    Obese, NAD, alert/awake, lethargic  Eyes:   Normal sclera. Extraocular movements intact. ENT:  Normocephalic, atraumatic. Moist mucous membranes  CV:   RRR. No murmur, rub, or gallop. Lungs:  CTAB. No wheezing, rhonchi, or rales. Abdomen: Soft, obese, non tender, non distended, BS normoactive  Extremities: Warm and dry. No cyanosis or edema. Neurologic: CN II-XII grossly intact. Paraplegic. Can slightly move feet  Skin:     No rashes or jaundice. No wounds. Psych:  Dysphoric mood with flat affect    I reviewed the labs, imaging, EKGs, telemetry, and other studies done this admission.   Data Review:   Recent Results (from the past 24 hour(s))   GLUCOSE, POC    Collection Time: 03/11/19 10:57 AM   Result Value Ref Range    Glucose (POC) 246 (H) 65 - 100 mg/dL   GLUCOSE, POC    Collection Time: 03/11/19  4:10 PM   Result Value Ref Range    Glucose (POC) 225 (H) 65 - 100 mg/dL   GLUCOSE, POC    Collection Time: 03/11/19  8:08 PM   Result Value Ref Range    Glucose (POC) 187 (H) 65 - 100 mg/dL       Imaging Studies:  CXR Results  (Last 48 hours)               03/10/19 1700  XR CHEST PORT Final result    Impression:  IMPRESSION:  Negative for acute change. Narrative:  CHEST X-RAY, one view. HISTORY:  Wheezing. TECHNIQUE:  AP upright portable view. COMPARISON: 14 February 2019. FINDINGS:  The lungs are clear. The heart size is normal. The costophrenic   angles are sharp. The pulmonary vasculature is unremarkable. Included portion of   the upper abdomen is unremarkable. CT Results  (Last 48 hours)               03/10/19 2123  CT HEAD WO CONT Final result    Impression:  IMPRESSION:       No acute intracranial amounts. Chronic bilateral maxillary sinusitis. Date of Dictation: 3/10/2019 9:13 PM           Narrative:  CT HEAD WITHOUT CONTRAST        HISTORY:  Transient alteration of awareness. COMPARISON: 1/31/2019       TECHNIQUE: Axial imaging was performed without intravenous contrast utilizing   5mm slice thickness. Sagittal and coronal reformats were performed. Radiation   dose reduction techniques were used for this study. Our CT scanner uses one or   all of the following:       Automated exposure control, adjustment of the MAS or KUB according to patient's   size and iterative reconstruction. FINDINGS:           *BRAIN:       -  There are no early signs of territorial or lacunar infarction by CT.      -  No intracranial mass, hematoma, or hydrocephalus. -  For patient's age, the scattered areas of white matter hypodensities may   represent a chronic small vessel white matter ischemia. However this is   nonspecific. *VISUALIZED PARANASAL SINUSES: Chronic bilateral maxillary sinusitis. *MASTOIDS:  Clear. *CALVARIUM AND SCALP: Unremarkable. 03/10/19 2123  CT ABD PELV W CONT Final result    Impression:  IMPRESSION:  No acute intra-abdominal abnormality. Small amounts of atelectasis. Large amount of stool in the colon. Narrative:  CT ABDOMEN AND PELVIS WITH CONTRAST. HISTORY: Abdominal pain. COMPARISON: None       TECHNIQUE: 5 mm axial scans from above the diaphragms to the pubic symphysis   following oral and 100 cc intravenous contrast without acute complication. Intravenous contrast was given to increase the sensitivity to acute   inflammation. Radiation dose reduction techniques were used for this study. Our CT scanners use one or more of the following: Automated exposure control,   adjustment of the mA and or kV according to patient size, iterative   reconstruction. FINDINGS:    Abdomen: The lung bases demonstrate small amounts of bibasilar atelectasis. The   liver and spleen appear homogeneous. No calcified gallstones. The biliary tree   is not dilated. The pancreas is unremarkable. No free fluid, acute inflammatory   changes or adenopathy. Bowel loops are not dilated. The kidneys enhance   uniformly. No radiopaque renal calculi. No hydronephrosis. The adrenal glands   are normal size. Aorta is normal caliber. Pelvis: No free fluid or acute inflammatory changes. The urinary bladder drained   by suprapubic Campbell catheter. There is large amount of stool in the rectum.                   Assessment and Plan:     Hospital Problems as of 3/12/2019 Date Reviewed: 1/31/2019          Codes Class Noted - Resolved POA    * (Principal) Acute encephalopathy ICD-10-CM: G93.40  ICD-9-CM: 348.30  3/10/2019 - Present Yes        Diabetes ([facility] [address] ICD-10-CM: E11.9  ICD-9-CM: 250.00  3/10/2019 - Present Yes        Hypertension ICD-10-CM: I10  ICD-9-CM: 401.9  3/10/2019 - Present Yes        Constipation ICD-10-CM: K59.00  ICD-9-CM: 564.00  3/10/2019 - Present Yes        Pyuria ICD-10-CM: N39.0  ICD-9-CM: 791.9  3/10/2019 - Present Yes        A-fib ([address] ICD-10-CM: I48.91  ICD-9-CM: 427.31  2/19/2019 - Present Yes        Leukocytosis ICD-10-CM: H69.340  ICD-9-CM: 288.60  2/5/2019 - Present Yes        Hyperglycemia due to type 2 diabetes mellitus (HCC) (Chronic) ICD-10-CM: E11.65  ICD-9-CM: 250.00  12/10/2016 - Present Yes        Paraplegia ([facility] (Chronic) ICD-10-CM: G82.20  ICD-9-CM: 344.1  12/10/2016 - Present Yes        Bipolar disorder without psychotic features ([facility] (Chronic) ICD-10-CM: F31.9  ICD-9-CM: 296.80  12/10/2016 - Present Yes        Chronic hepatitis C virus infection ([facility] (Chronic) ICD-10-CM: B18.2  ICD-9-CM: 070.54  12/10/2016 - Present Yes        Narcotic addiction ([facility] ICD-10-CM: F11.20  ICD-9-CM: 304.90  12/10/2016 - Present Yes              PLAN:  · Continue IV cefepime  · Urine culture positive for GNR  · Case discussed with pt's nurse to change the suprapubic catheter  · Potassium replaced IV and orally. 3.2 today, recheck in AM  · Leukocytosis improving, 13K << 16K  · Continue to hold sedatives  · Pt hypoglycemic this AM due to poor oral intake. Held pre meals humalog, decreased lantus to 35 units QHS.  Continue SSI  · Continue bipolar medications  · Continue dulcolax and miralax for constipation  · HR well controlled with flecainide  · continue prn IV hydralazine for elevated blood pressures  · Anticoagulated with eliquis for PAF    PT/OT eval  Dispo: likely discharge to home with [facility]/PT/OT in next 1-2 days  High risk with opioids on board    Signed:  Eder Wiggins MD

## 2019-03-12 NOTE — PROGRESS NOTES
Pharmacokinetic Consult to Pharmacist    Lucy Youngucier is a 62 y.o. female being treated for GPC in 1 of 2 Blood Cx with vancomycin. Already on cefepime for UTI. Height: 5' 5\" (165.1 cm)  Weight: 91.6 kg (202 lb)  Lab Results   Component Value Date/Time    BUN 12 03/11/2019 05:29 AM    Creatinine 0.78 03/11/2019 05:29 AM    WBC 13.5 (H) 03/11/2019 05:29 AM    Procalcitonin <0.1 03/10/2019 06:50 PM    Lactic acid 1.6 01/31/2019 06:14 PM    Lactic Acid (POC) 1.32 03/10/2019 05:58 PM      Estimated Creatinine Clearance: 82.5 mL/min (based on SCr of 0.78 mg/dL). CULTURES:  3/10 BCx: GPC (1 of 2), pending   UCx: NGTD    Day 1 of vancomycin. Goal trough is 15 -20. Vancomycin dose initiated at 2.5g load x1, then 1.5g q 12h. Will continue to follow patient.       Thank you,  Jesse Pereyra, PharmD  Clinical Pharmacist  [phone]

## 2019-03-12 NOTE — PROGRESS NOTES
20g 2.25\"  accucath piv attempted using us guidance by David Cho rn. Once skin was broken pt requested that she stop before vein was accessed so accdiana piv was removed from skin and pt informed that we would let her rn know.

## 2019-03-12 NOTE — PROGRESS NOTES
VAT came to place IV but unsuccessful stating she may have to get a central line. Called MD to make aware, he ordered a midline. Will continue to monitor.

## 2019-03-12 NOTE — PROGRESS NOTES
Interdisciplinary Rounds completed 03/12/19. Nursing, Case Management, Physician and PT present. Plan of care reviewed and updated. Plan home with family.   Probably in am.  Pt more alert

## 2019-03-12 NOTE — PROGRESS NOTES
Pt has been turned this shift, but refused to stay turned. A wedge ordered for pt but refused to allow staff to use it. excplained to pt it is important to turn, but she stated her back hurty to bad. Pt may benefit from an air bed if admission is prolonged.

## 2019-03-12 NOTE — DIABETES MGMT
Pt in bed alert and oriented. No family at bedside. Per provider note pt may discharge to home with home health in 1-2 days. Pt states she was diagnosed with diabetes \"3 years ago. \" Pt voices no known family history of diabetes. Pt states \"I have something in a box at home\" when questioned if she had a working glucometer at home. However, then pt states \"I think I'm out of something at home, but I don't know what it is. \" Pt states \"I may be out of the things I use to stick my finger. \" Pt will possibly need prescription for a glucometer kit at discharge. Pt states she currently takes Lantus and Humalog at home. Pt states \"at one time I was taking 57 units of Lantus, but now they cut me back to 20 something. \" Pt unsure of Humalog home dose, but states \"I only take it at dinner, because I skip breakfast and lunch. \" Yesterday, pt son stated pt insulin pen broke \"4-5 days ago. \" Pt states it is hard for her to see the syringe lines on her other insulin, stating her son has to draw it up. Per pt son is at home with her during the day. Pt denies hypoglycemia in the past. Pt also denies attending formal diabetic education in the past.    Pt given educational material, \"Diabetes Self-Management: A Patient Teaching Guide\", which was highlighted and reviewed with pt. Explained basic physiology of diabetes, as well as causes, s/s, and treatments for hypoglycemia and hyperglycemia. Pt states she was having \"dry mouth, frequent urination, headaches, and increased thirst at home. \" Described the effects of poor glycemic control on the development of long-term complications such as renal, eye, nerve, and cardiovascular disease. Described proper diabetic foot care and the importance of checking feet qd. Educated re: effects of carbohydrates on blood glucose, the \"plate method\" of healthy meal planning, basics of healthy meal plan, and Consistent Carbohydrate Diet. Pt states \"I don't know what foods have carbs. \" Reviewed plate method and emphasized portion control. Discussed meal examples to help pt categorize which foods have more carbs. Reviewed the basics of reading a nutrition label. Pt states she typically drinks regular pepsi and sweet tea at home stating \"I got tired of drinking diet sodas. \" Discussed alternative drink options to improve glycemic control. Also explained the relationship between hyperglycemia and infection. Discussed target goals for blood glucose and A1C. Pt verbalizes understanding of teaching. Explained the importance of blood glucose monitoring. Recommended frequency of qid ac, hs, and to record in log book to take to PCP appointment. \"My blood sugar log\" given to patient. Patient would likely benefit from continued diabetes outpatient education. Patient provided with contact information to [facility] program to pursue at their convenience after discharge with their PCP. Educated pt re: current basal/bolus regimen of Lantus 35 units nightly and Humalog SSI including type of insulin, timing with meals, onset, duration of effect, and peak of insulin dose. Reviewed the difference between SSI and prandial insulin. Pt verbalizes understanding. Encouraged compliance with discharge regimen. Encouraged patient to continue to work on lifestyle modifications and to follow up with PCP for further titration of regimen. Patient verbalized understanding and voices no questions at this time.

## 2019-03-12 NOTE — DIABETES MGMT
Patient admitted with acute encephalopathy. Blood glucose range yesterday 187-286 with pt receiving Lantus 98 units (per MAR one dose at 0009 and one dose at 2137) and Humalog 16 units. This AM blood glucose 85. Spoke with primary RN pt did not eat breakfast or lunch yesterday and had very poor appetite last night. Attempted to see pt for diabetic education. Pt son not currently in room, RN given educator work cell number to call when family arrives for diabetic education as pt condition allows. Spoke with provider, discussed pt poor appetite and glycemic control. New orders received to discontinue prandial insulin and decrease basal insulin to Lantus 35 units nightly.

## 2019-03-12 NOTE — PROGRESS NOTES
Problem: Self Care Deficits Care Plan (Adult)  Goal: *Acute Goals and Plan of Care (Insert Text)  Patient will complete lower body dressing with minimal assist to increase self care independence. Patient will complete toileting with moderate assist to increase self care independence. Patient will tolerate 38 minutes of OT treatment with incorporation of energy conservation techniques to increase activity tolerance to enhance participation in hobbies. Patient will complete chair transfer with minimal assist using adaptive equipment as needed. Patient will complete UE exercises with supervision to increase overall activity tolerance and strength. Timeframe: 7 visits        OCCUPATIONAL THERAPY: Initial Assessment and Daily Note 3/12/2019  INPATIENT: OT Visit Days: 1  Payor: [id number] / Plan: [place] / Product Type: Medicaid /      NAME/AGE/GENDER: Merlinda Jeans is a 62 y.o. female   PRIMARY DIAGNOSIS:  Acute encephalopathy [G93.40] Acute encephalopathy Acute encephalopathy       ICD-10: Treatment Diagnosis:    · Generalized Muscle Weakness (M62.81)  · Other lack of cordination (R27.8)   Precautions/Allergies:     Patient has no known allergies. ASSESSMENT:     Ms. Tyra Watson presents supine in bed with history of paraplegia and mild confusion from diagnosis above. Bed mobility was moderate/maximal assist, unable to laterally scoot. Static sitting without support ~12 minutes. Will need a slide board for transfer to chair. BUE WNL. Able to follow 1 and 2 step commands. Needs max assist all ADL's currently. She could benefit from skilled OT at d/c. This section established at most recent assessment   PROBLEM LIST (Impairments causing functional limitations):  1. Decreased Strength  2. Decreased ADL/Functional Activities  3. Decreased Transfer Abilities  4. Decreased Balance  5. Increased Pain  6. Decreased Activity Tolerance  7.  Decreased Cognition   INTERVENTIONS PLANNED: (Benefits and precautions of occupational therapy have been discussed with the patient.)  1. Activities of daily living training  2. Adaptive equipment training  3. Balance training  4. Neuromuscular re-eduation  5. Therapeutic activity  6. Therapeutic exercise     TREATMENT PLAN: Frequency/Duration: Follow patient 3x a week to address above goals. Rehabilitation Potential For Stated Goals: Good     RECOMMENDED REHABILITATION/EQUIPMENT: (at time of discharge pending progress): Due to the probability of continued deficits (see above) this patient will likely need continued skilled occupational therapy after discharge. Equipment:    None at this time              OCCUPATIONAL PROFILE AND HISTORY:   History of Present Injury/Illness (Reason for Referral):  See H&P  Past Medical History/Comorbidities:   Ms. Mylene Oliver  has a past medical history of Diabetes ([facility] [address] Gastrointestinal disorder, Hypertension, Ill-defined condition, Ill-defined condition, Ill-defined condition, Liver disease, Psychiatric disorder, Psychiatric disorder, and Thromboembolus ([facility] [address] Ms. Mylene Oliver  has no past surgical history on file. Social History/Living Environment:   Home Environment: Private residence  Living Alone: No  Support Systems: Child(urban)(2 sons)  Patient Expects to be Discharged to[de-identified] Private residence  Current DME Used/Available at Home: [address], Other (comment)(transfer board)  Prior Level of Function/Work/Activity:  Mostly modified independent per patient for ADL's (poor historian). Son assisted with all IADL's. Number of Personal Factors/Comorbidities that affect the Plan of Care: Expanded review of therapy/medical records (1-2):  MODERATE COMPLEXITY   ASSESSMENT OF OCCUPATIONAL PERFORMANCE[de-identified]   Activities of Daily Living:   Basic ADLs (From Assessment) Complex ADLs (From Assessment)   Feeding: Setup  Oral Facial Hygiene/Grooming: Stand-by assistance  Bathing:  Moderate assistance  Upper Body Dressing: Minimum assistance  Lower Body Dressing: Maximum assistance  Toileting: Maximum assistance     Grooming/Bathing/Dressing Activities of Daily Living                             Bed/Mat Mobility  Rolling: Moderate assistance  Supine to Sit: Maximum assistance  Sit to Supine: Maximum assistance  Sit to Stand: Other (comment)(parapalegic)  Scooting: Maximum assistance       Most Recent Physical Functioning:   Gross Assessment:                  Posture:  Posture (WDL): Exceptions to WDL  Posture Assessment: Forward head, Rounded shoulders, Trunk flexion  Balance:  Sitting - Static: Fair (occasional)  Sitting - Dynamic: Fair (occasional)  Standing: (unable parapalegia 6 years) Bed Mobility:  Rolling: Moderate assistance  Supine to Sit: Maximum assistance  Sit to Supine: Maximum assistance  Scooting: Maximum assistance  Wheelchair Mobility:     Transfers:  Sit to Stand: Other (comment)(parapalegic)            Patient Vitals for the past 6 hrs:   BP BP Patient Position SpO2 O2 Flow Rate (L/min) Pulse   19 0449 131/72 At rest 92 %  67   19 0734   93 % 2 l/min    19 0757 128/76  94 %  66       Mental Status  Neurologic State: Alert  Orientation Level: Oriented to person, Oriented to place  Cognition: Follows commands                          Physical Skills Involved:  1. Range of Motion  2. Balance  3. Strength  4. Activity Tolerance Cognitive Skills Affected (resulting in the inability to perform in a timely and safe manner):  1. Immediate Memory  2. Short Term Recall Psychosocial Skills Affected:  1. Habits/Routines  2. Social Interaction  3. Emotional Regulation   Number of elements that affect the Plan of Care: 3-5:  MODERATE COMPLEXITY   CLINICAL DECISION MAKIN [facility] [address] [address] AM-PAC 6 Clicks   Daily Activity Inpatient Short Form  How much help from another person does the patient currently need. .. Total A Lot A Little None   1. Putting on and taking off regular lower body clothing? [x] 1   [] 2   [] 3   [] 4   2. Bathing (including washing, rinsing, drying)? [] 1   [x] 2   [] 3   [] 4   3. Toileting, which includes using toilet, bedpan or urinal?   [x] 1   [] 2   [] 3   [] 4   4. Putting on and taking off regular upper body clothing? [] 1   [] 2   [x] 3   [] 4   5. Taking care of personal grooming such as brushing teeth? [] 1   [] 2   [x] 3   [] 4   6. Eating meals? [] 1   [] 2   [x] 3   [] 4   © 2007, Trustees of [address] [place] [address] [address] [address], under license to [employer]. All rights reserved      Score:  Initial: 13 Most Recent: X (Date: -- )    Interpretation of Tool:  Represents activities that are increasingly more difficult (i.e. Bed mobility, Transfers, Gait). Medical Necessity:     · Skilled intervention continues to be required due to deficits listed above. Reason for Services/Other Comments:  · Patient continues to require skilled intervention due to acute encephalopathy and debility. .   Use of outcome tool(s) and clinical judgement create a POC that gives a: MODERATE COMPLEXITY         TREATMENT:   (In addition to Assessment/Re-Assessment sessions the following treatments were rendered)     Pre-treatment Symptoms/Complaints:    Pain: Initial:   Pain Intensity 1: 4  Post Session:  3     Therapeutic Activity: (    15): Therapeutic activities including Bed transfers static sitting balance, scooting, rolling, UE reaching. Initial evaluation 15 minutes. Braces/Orthotics/Lines/Etc:   · IV  · O2 Device: Nasal cannula  Treatment/Session Assessment:    · Response to Treatment:  Good, supine in bed. · Interdisciplinary Collaboration:   o Occupational Therapist  o Registered Nurse  · After treatment position/precautions:   o Supine in bed  o Bed in low position  o Call light within reach  o RN notified  o Side rails x 3   · Compliance with Program/Exercises: Compliant all of the time. · Recommendations/Intent for next treatment session:   \"Next visit will focus on advancements to more challenging activities and reduction in assistance provided\".   Total Treatment Duration:  OT Patient Time In/Time Out  Time In: 0905  Time Out: [address], OT

## 2019-03-12 NOTE — PROGRESS NOTES
PT Note:  Attempted PT, however, patient having IV placed. Will attempt another time/day as schedule permits.   Willy Wilkes, PT, DPT

## 2019-03-13 LAB
ANION GAP SERPL CALC-SCNC: 8 MMOL/L (ref 7–16)
BACTERIA SPEC CULT: ABNORMAL
BACTERIA SPEC CULT: ABNORMAL
BUN SERPL-MCNC: 9 MG/DL (ref 8–23)
CALCIUM SERPL-MCNC: 8 MG/DL (ref 8.3–10.4)
CHLORIDE SERPL-SCNC: 113 MMOL/L (ref 98–107)
CO2 SERPL-SCNC: 23 MMOL/L (ref 21–32)
CREAT SERPL-MCNC: 0.73 MG/DL (ref 0.6–1)
ERYTHROCYTE [DISTWIDTH] IN BLOOD BY AUTOMATED COUNT: 15.2 % (ref 11.9–14.6)
GLUCOSE BLD STRIP.AUTO-MCNC: 114 MG/DL (ref 65–100)
GLUCOSE BLD STRIP.AUTO-MCNC: 145 MG/DL (ref 65–100)
GLUCOSE BLD STRIP.AUTO-MCNC: 206 MG/DL (ref 65–100)
GLUCOSE BLD STRIP.AUTO-MCNC: 86 MG/DL (ref 65–100)
GLUCOSE BLD STRIP.AUTO-MCNC: 97 MG/DL (ref 65–100)
GLUCOSE SERPL-MCNC: 80 MG/DL (ref 65–100)
GRAM STN SPEC: ABNORMAL
HCT VFR BLD AUTO: 37 % (ref 35.8–46.3)
HGB BLD-MCNC: 11.4 G/DL (ref 11.7–15.4)
MCH RBC QN AUTO: 28.9 PG (ref 26.1–32.9)
MCHC RBC AUTO-ENTMCNC: 30.8 G/DL (ref 31.4–35)
MCV RBC AUTO: 93.7 FL (ref 79.6–97.8)
NRBC # BLD: 0 K/UL (ref 0–0.2)
PLATELET # BLD AUTO: 243 K/UL (ref 150–450)
PMV BLD AUTO: 11.5 FL (ref 9.4–12.3)
POTASSIUM SERPL-SCNC: 3.7 MMOL/L (ref 3.5–5.1)
RBC # BLD AUTO: 3.95 M/UL (ref 4.05–5.2)
SERVICE CMNT-IMP: ABNORMAL
SODIUM SERPL-SCNC: 144 MMOL/L (ref 136–145)
WBC # BLD AUTO: 7.5 K/UL (ref 4.3–11.1)

## 2019-03-13 PROCEDURE — 74011636637 HC RX REV CODE- 636/637: Performed by: HOSPITALIST

## 2019-03-13 PROCEDURE — 74011000258 HC RX REV CODE- 258: Performed by: EMERGENCY MEDICINE

## 2019-03-13 PROCEDURE — 0T2BX0Z CHANGE DRAINAGE DEVICE IN BLADDER, EXTERNAL APPROACH: ICD-10-PCS | Performed by: HOSPITALIST

## 2019-03-13 PROCEDURE — 74011250636 HC RX REV CODE- 250/636

## 2019-03-13 PROCEDURE — 94640 AIRWAY INHALATION TREATMENT: CPT

## 2019-03-13 PROCEDURE — 74011000250 HC RX REV CODE- 250: Performed by: HOSPITALIST

## 2019-03-13 PROCEDURE — 74011250637 HC RX REV CODE- 250/637: Performed by: HOSPITALIST

## 2019-03-13 PROCEDURE — 80048 BASIC METABOLIC PNL TOTAL CA: CPT

## 2019-03-13 PROCEDURE — 74011250636 HC RX REV CODE- 250/636: Performed by: EMERGENCY MEDICINE

## 2019-03-13 PROCEDURE — 65270000029 HC RM PRIVATE

## 2019-03-13 PROCEDURE — 77010033678 HC OXYGEN DAILY

## 2019-03-13 PROCEDURE — 74011250636 HC RX REV CODE- 250/636: Performed by: HOSPITALIST

## 2019-03-13 PROCEDURE — 82962 GLUCOSE BLOOD TEST: CPT

## 2019-03-13 PROCEDURE — 85027 COMPLETE CBC AUTOMATED: CPT

## 2019-03-13 PROCEDURE — 94760 N-INVAS EAR/PLS OXIMETRY 1: CPT

## 2019-03-13 RX ORDER — ONDANSETRON 2 MG/ML
4 INJECTION INTRAMUSCULAR; INTRAVENOUS
Status: DISCONTINUED | OUTPATIENT
Start: 2019-03-13 | End: 2019-03-14 | Stop reason: HOSPADM

## 2019-03-13 RX ORDER — OXYCODONE HYDROCHLORIDE 5 MG/1
10 TABLET ORAL
Status: DISCONTINUED | OUTPATIENT
Start: 2019-03-13 | End: 2019-03-14 | Stop reason: HOSPADM

## 2019-03-13 RX ORDER — ONDANSETRON 8 MG/1
8 TABLET, ORALLY DISINTEGRATING ORAL
Status: DISCONTINUED | OUTPATIENT
Start: 2019-03-13 | End: 2019-03-13 | Stop reason: CLARIF

## 2019-03-13 RX ORDER — FLUCONAZOLE 100 MG/1
200 TABLET ORAL DAILY
Status: DISCONTINUED | OUTPATIENT
Start: 2019-03-14 | End: 2019-03-14 | Stop reason: HOSPADM

## 2019-03-13 RX ORDER — ONDANSETRON 2 MG/ML
INJECTION INTRAMUSCULAR; INTRAVENOUS
Status: COMPLETED
Start: 2019-03-13 | End: 2019-03-13

## 2019-03-13 RX ORDER — INSULIN LISPRO 100 [IU]/ML
4 INJECTION, SOLUTION INTRAVENOUS; SUBCUTANEOUS
Status: DISCONTINUED | OUTPATIENT
Start: 2019-03-13 | End: 2019-03-14 | Stop reason: HOSPADM

## 2019-03-13 RX ORDER — INSULIN GLARGINE 100 [IU]/ML
20 INJECTION, SOLUTION SUBCUTANEOUS
Status: DISCONTINUED | OUTPATIENT
Start: 2019-03-13 | End: 2019-03-14 | Stop reason: HOSPADM

## 2019-03-13 RX ADMIN — ONDANSETRON 4 MG: 2 INJECTION INTRAMUSCULAR; INTRAVENOUS at 18:34

## 2019-03-13 RX ADMIN — INSULIN GLARGINE 20 UNITS: 100 INJECTION, SOLUTION SUBCUTANEOUS at 21:00

## 2019-03-13 RX ADMIN — NYSTATIN: 100000 POWDER TOPICAL at 08:24

## 2019-03-13 RX ADMIN — Medication 10 ML: at 21:01

## 2019-03-13 RX ADMIN — FLECAINIDE ACETATE 50 MG: 100 TABLET ORAL at 08:23

## 2019-03-13 RX ADMIN — ALPRAZOLAM 0.5 MG: 0.5 TABLET ORAL at 18:22

## 2019-03-13 RX ADMIN — SODIUM CHLORIDE 75 ML/HR: 900 INJECTION, SOLUTION INTRAVENOUS at 08:27

## 2019-03-13 RX ADMIN — INSULIN LISPRO 4 UNITS: 100 INJECTION, SOLUTION INTRAVENOUS; SUBCUTANEOUS at 16:30

## 2019-03-13 RX ADMIN — INSULIN LISPRO 4 UNITS: 100 INJECTION, SOLUTION INTRAVENOUS; SUBCUTANEOUS at 11:30

## 2019-03-13 RX ADMIN — SODIUM CHLORIDE 1 G: 900 INJECTION, SOLUTION INTRAVENOUS at 20:51

## 2019-03-13 RX ADMIN — PANTOPRAZOLE SODIUM 40 MG: 40 TABLET, DELAYED RELEASE ORAL at 05:34

## 2019-03-13 RX ADMIN — ALPRAZOLAM 0.5 MG: 0.5 TABLET ORAL at 03:08

## 2019-03-13 RX ADMIN — QUETIAPINE FUMARATE 300 MG: 100 TABLET ORAL at 20:56

## 2019-03-13 RX ADMIN — POLYETHYLENE GLYCOL 3350 17 G: 17 POWDER, FOR SOLUTION ORAL at 08:22

## 2019-03-13 RX ADMIN — NYSTATIN: 100000 POWDER TOPICAL at 16:35

## 2019-03-13 RX ADMIN — METOPROLOL SUCCINATE 50 MG: 50 TABLET, EXTENDED RELEASE ORAL at 08:24

## 2019-03-13 RX ADMIN — APIXABAN 5 MG: 5 TABLET, FILM COATED ORAL at 08:23

## 2019-03-13 RX ADMIN — Medication 10 ML: at 14:00

## 2019-03-13 RX ADMIN — FLECAINIDE ACETATE 50 MG: 100 TABLET ORAL at 20:56

## 2019-03-13 RX ADMIN — INSULIN LISPRO 4 UNITS: 100 INJECTION, SOLUTION INTRAVENOUS; SUBCUTANEOUS at 08:23

## 2019-03-13 RX ADMIN — BUDESONIDE 500 MCG: 0.5 INHALANT RESPIRATORY (INHALATION) at 07:21

## 2019-03-13 RX ADMIN — OXYCODONE HYDROCHLORIDE 10 MG: 5 TABLET ORAL at 14:02

## 2019-03-13 RX ADMIN — INSULIN LISPRO 4 UNITS: 100 INJECTION, SOLUTION INTRAVENOUS; SUBCUTANEOUS at 21:00

## 2019-03-13 RX ADMIN — VANCOMYCIN HYDROCHLORIDE 1500 MG: 10 INJECTION, POWDER, LYOPHILIZED, FOR SOLUTION INTRAVENOUS at 00:13

## 2019-03-13 RX ADMIN — APIXABAN 5 MG: 5 TABLET, FILM COATED ORAL at 16:35

## 2019-03-13 RX ADMIN — OXYCODONE HYDROCHLORIDE 20 MG: 5 TABLET ORAL at 01:24

## 2019-03-13 RX ADMIN — OXYCODONE HYDROCHLORIDE 10 MG: 5 TABLET ORAL at 22:11

## 2019-03-13 RX ADMIN — SODIUM CHLORIDE 1 G: 900 INJECTION, SOLUTION INTRAVENOUS at 08:22

## 2019-03-13 RX ADMIN — Medication: at 08:24

## 2019-03-13 RX ADMIN — Medication: at 20:56

## 2019-03-13 NOTE — PROGRESS NOTES
Patient states her decreased dose of oxicodone is not controlling her pain. Requesting to go back up to 20 mg every 8 hours or one time dose of 10 mg now. Paged MD with request.  Patient informed that she was oversedated on MD rounds and not eating or participating in daily activities. No new orders for pain medications at this time. Patient called to desk to request \"something for my nerves\" and Phenergan. Informed patient there is no order for phenergan.

## 2019-03-13 NOTE — DIABETES MGMT
Patient admitted with acute encephalopathy. Blood glucose range yesterday  with pt receiving Lantus 35 units and Humalog 6 units. This AM blood glucose 80. Spoke with provider discussed pt glycemic control. Per pt she states she was recently switched to Lantus \"20 something\" units nightly. Per family PCP note on 2/22 pt was taking Lantus 15 units at home and he ordered Lantus 20 units every evening. New orders received to decrease Lantus to 20 units nightly to reduce pt risk for morning hypoglycemia. New orders also received to initiate prandial insulin Humalog 4 units with meals to improve glycemic control related to caloric intake during the day.

## 2019-03-13 NOTE — PROGRESS NOTES
Pt actually not current with [facility], per Suge[facility] with [facility]. [facility] just picked up pt this past week and have spoken with pt's son Myla Amador about resuming at [facility]. CM will notify Zak Aguila at [facility].

## 2019-03-13 NOTE — PROGRESS NOTES
PT note: Patient refused therapy today despite encouragement. States that she performs transfers only at baseline and does not want to try today. Will check back as schedule allows.      Nuria Sandoval, KILLIANT

## 2019-03-13 NOTE — PROGRESS NOTES
Interdisciplinary Rounds completed 03/13/19. Nursing, Case Management, Physician and PT present. Plan of care reviewed and updated. D/c when cultures completed.

## 2019-03-13 NOTE — PROGRESS NOTES
Hourly rounds complete this shift, no new complaints at this time, patient continues to c/o abd pain and leg pain, pain medication given patient continuously turned this evening: bed in low, locked position, call light and bedside table within reach,  all needs met. Will continue to monitor Report to day shift nurse.

## 2019-03-13 NOTE — PROGRESS NOTES
Hospitalist Progress note     Admit Date:  3/10/2019  4:36 PM   Name:  Ousmane Medina   Age:  62 y.o.  :  1955   MRN:  [id number]   PCP:  None  Treatment Team: Attending Provider: Nadiya Bryson MD; Care Manager: Victor Hugo Buckley, RN; Physical Therapist: Victorino Daley DPT    SUBJECTIVE:   Patient is a 61 y/o female with hx paraplegia, suprapubic catheter, narcotic dependence, bipol;ar disorder, Hep C, HTN, DM admitted on 3/10 for altered mental status. She recently had suprapubic catheter replaced by [facility]. They were concerned she could be septic so sent to ED. She has a WBC ct of 16.1 but normal lactic acid. Glucose of 425 but no DKA. She is on numerous sedating medications at home including narcotics, benzodiazepines and psychotropics. Abdominal film shows large amount of fecal material. UA with pyuria but no bacteria. 3/13:  Pt somnolent and lethargic this AM.  Falling asleep during conversation. She denies being in pain. No fever, diarrhea, chills reported overnight. 10 point ROS negative except what mentioned above. Prior to Admission Medications   Prescriptions Last Dose Informant Patient Reported? Taking? ALPRAZolam (XANAX) 0.5 mg tablet   No No   Sig: Take 1 Tab by mouth two (2) times daily as needed for Anxiety. Max Daily Amount: 1 mg. Blood-Glucose Meter monitoring kit   No No   Sig: Check glucose at home daily   QUEtiapine (SEROQUEL) 100 mg tablet   Yes No   Sig: Take 300 mg by mouth nightly. albuterol (PROVENTIL HFA, VENTOLIN HFA, PROAIR HFA) 90 mcg/actuation inhaler   No No   Sig: Take 1 Puff by inhalation every four (4) hours as needed for Wheezing. albuterol (PROVENTIL VENTOLIN) 2.5 mg /3 mL (0.083 %) nebulizer solution   No No   Sig: Take 3 mL by inhalation every four (4) hours as needed for Wheezing. apixaban (ELIQUIS) 5 mg tablet   No No   Sig: Take 1 Tab by mouth two (2) times a day.    bisacodyl (DULCOLAX) 5 mg EC tablet   No No   Sig: Take 1 Tab by mouth daily as needed for Constipation. budesonide (PULMICORT) 0.5 mg/2 mL nbsp   No No   Si mL by Nebulization route two (2) times a day. budesonide (PULMICORT) 0.5 mg/2 mL nbsp   No No   Si mL by Nebulization route two (2) times a day. fentaNYL (DURAGESIC) 25 mcg/hr PATCH   No No   Si Patch by TransDERmal route every seventy-two (72) hours. Max Daily Amount: 1 Patch. flecainide (TAMBOCOR) 50 mg tablet   No No   Sig: Take 1 Tab by mouth every twelve (12) hours. flecainide (TAMBOCOR) 50 mg tablet   No No   Sig: Take 1 Tab by mouth every twelve (12) hours. insulin glargine (LANTUS) 100 unit/mL injection   No No   Si units at bedtime. insulin glargine (LANTUS) 100 unit/mL injection   No No   Si Units by SubCUTAneous route nightly. insulin lispro (HUMALOG) 100 unit/mL injection   No No   Sig: Less than 150 =   0 units           150 -199 =   2 units  200 -249 =   4 units  250 -299 =   6 units  300 -349 =   8 units  Before meals and at bedtime. metoprolol succinate (TOPROL-XL) 50 mg XL tablet   No No   Sig: Take 1 Tab by mouth daily. metoprolol succinate (TOPROL-XL) 50 mg XL tablet   No No   Sig: Take 1 Tab by mouth daily. nystatin (MYCOSTATIN) powder   No No   Sig: Apply  to affected area two (2) times a day. omeprazole (PRILOSEC) 40 mg capsule   Yes No   Sig: Take 40 mg by mouth daily. oxyCODONE IR (ROXICODONE) 20 mg immediate release tablet   No No   Sig: Take 1 Tab by mouth every eight (8) hours as needed. Max Daily Amount: 60 mg.   pantoprazole (PROTONIX) 40 mg tablet   No No   Sig: Take 1 Tab by mouth Daily (before breakfast). promethazine (PHENERGAN) 25 mg tablet   Yes No   Sig: Take 25 mg by mouth every six (6) hours as needed for Nausea. promethazine (PHENERGAN) 25 mg tablet   No No   Sig: Take 1 Tab by mouth every six (6) hours as needed. zinc oxide-cod liver oil (DESITIN) 40 % paste   No No   Sig: Apply  to affected area two (2) times a day.       Facility-Administered Medications: None         Objective:     Patient Vitals for the past 24 hrs:   Temp Pulse Resp BP SpO2   03/13/19 0721 98.1 °F (36.7 °C) 73 18 107/65 91 %   03/13/19 0442 97.8 °F (36.6 °C) 62 18 119/68 91 %   03/12/19 2331 98.3 °F (36.8 °C) 70 18 112/63 92 %   03/12/19 1950     94 %   03/12/19 1935 98.4 °F (36.9 °C) 63 18 126/77 93 %   03/12/19 1542 98.2 °F (36.8 °C) 82 18 136/76 95 %   03/12/19 1021 98.2 °F (36.8 °C) 72 19 132/74 96 %   03/12/19 0757 97.9 °F (36.6 °C) 66 18 128/76 94 %     Oxygen Therapy  O2 Sat (%): 91 % (03/13/19 0721)  Pulse via Oximetry: 72 beats per minute (03/13/19 0721)  O2 Device: Nasal cannula (03/13/19 0721)  O2 Flow Rate (L/min): 2 l/min (03/13/19 0721)    Intake/Output Summary (Last 24 hours) at 3/13/2019 0747  Last data filed at 3/13/2019 0446  Gross per 24 hour   Intake 3345.42 ml   Output 1500 ml   Net 1845.42 ml       Physical Exam:  General:    Obese, NAD, somnolent, lethargic  Eyes:   Normal sclera. Extraocular movements intact. ENT:  Normocephalic, atraumatic. Moist mucous membranes  CV:   RRR. No murmur, rub, or gallop. Lungs:  CTAB. No wheezing, rhonchi, or rales. Abdomen: Soft, obese, non tender, non distended, BS normoactive  Extremities: Warm and dry. No cyanosis or edema. Neurologic: CN II-XII grossly intact. Paraplegic. Can slightly move feet  Skin:     No rashes or jaundice. No wounds. Psych:  Dysphoric mood with flat affect    I reviewed the labs, imaging, EKGs, telemetry, and other studies done this admission.   Data Review:   Recent Results (from the past 24 hour(s))   CBC W/O DIFF    Collection Time: 03/12/19  8:23 AM   Result Value Ref Range    WBC 11.1 4.3 - 11.1 K/uL    RBC 4.36 4.05 - 5.2 M/uL    HGB 12.6 11.7 - 15.4 g/dL    HCT 41.2 35.8 - 46.3 %    MCV 94.5 79.6 - 97.8 FL    MCH 28.9 26.1 - 32.9 PG    MCHC 30.6 (L) 31.4 - 35.0 g/dL    RDW 15.1 (H) 11.9 - 14.6 %    PLATELET 147 794 - 477 K/uL    MPV 11.2 9.4 - 12.3 FL    ABSOLUTE NRBC 0.00 0.0 - 0.2 K/uL   METABOLIC PANEL, BASIC    Collection Time: 03/12/19  8:23 AM   Result Value Ref Range    Sodium 144 136 - 145 mmol/L    Potassium 3.2 (L) 3.5 - 5.1 mmol/L    Chloride 112 (H) 98 - 107 mmol/L    CO2 24 21 - 32 mmol/L    Anion gap 8 7 - 16 mmol/L    Glucose 84 65 - 100 mg/dL    BUN 10 8 - 23 MG/DL    Creatinine 0.73 0.6 - 1.0 MG/DL    GFR est AA >60 >60 ml/min/1.73m2    GFR est non-AA >60 >60 ml/min/1.73m2    Calcium 8.4 8.3 - 10.4 MG/DL   GLUCOSE, POC    Collection Time: 03/12/19 10:56 AM   Result Value Ref Range    Glucose (POC) 114 (H) 65 - 100 mg/dL   GLUCOSE, POC    Collection Time: 03/12/19  4:08 PM   Result Value Ref Range    Glucose (POC) 197 (H) 65 - 100 mg/dL   GLUCOSE, POC    Collection Time: 03/12/19  8:11 PM   Result Value Ref Range    Glucose (POC) 223 (H) 65 - 100 mg/dL   CBC W/O DIFF    Collection Time: 03/13/19  5:45 AM   Result Value Ref Range    WBC 7.5 4.3 - 11.1 K/uL    RBC 3.95 (L) 4.05 - 5.2 M/uL    HGB 11.4 (L) 11.7 - 15.4 g/dL    HCT 37.0 35.8 - 46.3 %    MCV 93.7 79.6 - 97.8 FL    MCH 28.9 26.1 - 32.9 PG    MCHC 30.8 (L) 31.4 - 35.0 g/dL    RDW 15.2 (H) 11.9 - 14.6 %    PLATELET 223 267 - 634 K/uL    MPV 11.5 9.4 - 12.3 FL    ABSOLUTE NRBC 0.00 0.0 - 0.2 K/uL   METABOLIC PANEL, BASIC    Collection Time: 03/13/19  5:45 AM   Result Value Ref Range    Sodium 144 136 - 145 mmol/L    Potassium 3.7 3.5 - 5.1 mmol/L    Chloride 113 (H) 98 - 107 mmol/L    CO2 23 21 - 32 mmol/L    Anion gap 8 7 - 16 mmol/L    Glucose 80 65 - 100 mg/dL    BUN 9 8 - 23 MG/DL    Creatinine 0.73 0.6 - 1.0 MG/DL    GFR est AA >60 >60 ml/min/1.73m2    GFR est non-AA >60 >60 ml/min/1.73m2    Calcium 8.0 (L) 8.3 - 10.4 MG/DL   GLUCOSE, POC    Collection Time: 03/13/19  5:48 AM   Result Value Ref Range    Glucose (POC) 86 65 - 100 mg/dL       Imaging Studies:  CXR Results  (Last 48 hours)    None        CT Results  (Last 48 hours)    None          Assessment and Plan:     Hospital Problems as of 3/13/2019 Date Reviewed: 1/31/2019          Codes Class Noted - Resolved POA    * (Principal) Acute encephalopathy ICD-10-CM: G93.40  ICD-9-CM: 348.30  3/10/2019 - Present Yes        Diabetes ([facility] ICD-10-CM: E11.9  ICD-9-CM: 250.00  3/10/2019 - Present Yes        Hypertension ICD-10-CM: I10  ICD-9-CM: 401.9  3/10/2019 - Present Yes        Constipation ICD-10-CM: K59.00  ICD-9-CM: 564.00  3/10/2019 - Present Yes        Pyuria ICD-10-CM: N39.0  ICD-9-CM: 791.9  3/10/2019 - Present Yes        A-fib ([facility] ICD-10-CM: I48.91  ICD-9-CM: 427.31  2/19/2019 - Present Yes        Leukocytosis ICD-10-CM: Y39.245  ICD-9-CM: 288.60  2/5/2019 - Present Yes        Hyperglycemia due to type 2 diabetes mellitus (HCC) (Chronic) ICD-10-CM: E11.65  ICD-9-CM: 250.00  12/10/2016 - Present Yes        Paraplegia ([facility] (Chronic) ICD-10-CM: G82.20  ICD-9-CM: 344.1  12/10/2016 - Present Yes        Bipolar disorder without psychotic features ([facility] (Chronic) ICD-10-CM: F31.9  ICD-9-CM: 296.80  12/10/2016 - Present Yes        Chronic hepatitis C virus infection ([facility] (Chronic) ICD-10-CM: B18.2  ICD-9-CM: 070.54  12/10/2016 - Present Yes        Narcotic addiction ([facility] ICD-10-CM: F11.20  ICD-9-CM: 304.90  12/10/2016 - Present Yes              PLAN:  · Decreasing roxicodone from 20 mg to 10 mg q8h prn due to increased somnolence  · Continue IV cefepime, Vanc d'isma on 3/13  · Urine culture positive for yeast, report changed from GNR to yeast on 3/13. · Will complete 3 days of fluconazole 200 mg for 3 days  · Blood culture positive for CoNS  · Suprapubic catheter changed on 3/12  · Hypokalemia is corrected, 3.7 today  · Leukocytosis resolved, 7K today  · Continue to hold sedatives  · Pt hypoglycemic this AM due to poor oral intake. Held pre meals humalog, decreased lantus to 20 units QHS.  Continue SSI  · Continue bipolar medications  · Continue dulcolax and miralax for constipation  · HR well controlled with flecainide  · continue prn IV hydralazine for elevated blood pressures  · Anticoagulated with eliquis for PAF    PT/OT eval  Dispo: likely discharge to home with [facility]/PT/OT likely tomorrow  High risk with opioids on board    Signed:  Helen Agarwal MD

## 2019-03-14 VITALS
OXYGEN SATURATION: 95 % | SYSTOLIC BLOOD PRESSURE: 117 MMHG | BODY MASS INDEX: 33.66 KG/M2 | TEMPERATURE: 98.2 F | WEIGHT: 202 LBS | HEIGHT: 65 IN | RESPIRATION RATE: 18 BRPM | HEART RATE: 60 BPM | DIASTOLIC BLOOD PRESSURE: 70 MMHG

## 2019-03-14 LAB
ANION GAP SERPL CALC-SCNC: 8 MMOL/L (ref 7–16)
BACTERIA SPEC CULT: ABNORMAL
BACTERIA SPEC CULT: ABNORMAL
BUN SERPL-MCNC: 9 MG/DL (ref 8–23)
CALCIUM SERPL-MCNC: 8.2 MG/DL (ref 8.3–10.4)
CHLORIDE SERPL-SCNC: 112 MMOL/L (ref 98–107)
CO2 SERPL-SCNC: 21 MMOL/L (ref 21–32)
CREAT SERPL-MCNC: 0.75 MG/DL (ref 0.6–1)
ERYTHROCYTE [DISTWIDTH] IN BLOOD BY AUTOMATED COUNT: 14.9 % (ref 11.9–14.6)
GLUCOSE BLD STRIP.AUTO-MCNC: 138 MG/DL (ref 65–100)
GLUCOSE BLD STRIP.AUTO-MCNC: 161 MG/DL (ref 65–100)
GLUCOSE BLD STRIP.AUTO-MCNC: 213 MG/DL (ref 65–100)
GLUCOSE SERPL-MCNC: 195 MG/DL (ref 65–100)
HCT VFR BLD AUTO: 40.6 % (ref 35.8–46.3)
HGB BLD-MCNC: 12.5 G/DL (ref 11.7–15.4)
MCH RBC QN AUTO: 28.9 PG (ref 26.1–32.9)
MCHC RBC AUTO-ENTMCNC: 30.8 G/DL (ref 31.4–35)
MCV RBC AUTO: 93.8 FL (ref 79.6–97.8)
NRBC # BLD: 0 K/UL (ref 0–0.2)
PLATELET # BLD AUTO: 236 K/UL (ref 150–450)
PMV BLD AUTO: 11.9 FL (ref 9.4–12.3)
POTASSIUM SERPL-SCNC: 3.7 MMOL/L (ref 3.5–5.1)
RBC # BLD AUTO: 4.33 M/UL (ref 4.05–5.2)
SERVICE CMNT-IMP: ABNORMAL
SODIUM SERPL-SCNC: 141 MMOL/L (ref 136–145)
WBC # BLD AUTO: 8.3 K/UL (ref 4.3–11.1)

## 2019-03-14 PROCEDURE — 74011250636 HC RX REV CODE- 250/636: Performed by: HOSPITALIST

## 2019-03-14 PROCEDURE — 74011250637 HC RX REV CODE- 250/637: Performed by: HOSPITALIST

## 2019-03-14 PROCEDURE — 85027 COMPLETE CBC AUTOMATED: CPT

## 2019-03-14 PROCEDURE — 36415 COLL VENOUS BLD VENIPUNCTURE: CPT

## 2019-03-14 PROCEDURE — 74011250636 HC RX REV CODE- 250/636: Performed by: EMERGENCY MEDICINE

## 2019-03-14 PROCEDURE — 80048 BASIC METABOLIC PNL TOTAL CA: CPT

## 2019-03-14 PROCEDURE — 74011636637 HC RX REV CODE- 636/637: Performed by: HOSPITALIST

## 2019-03-14 PROCEDURE — 97530 THERAPEUTIC ACTIVITIES: CPT

## 2019-03-14 PROCEDURE — 82962 GLUCOSE BLOOD TEST: CPT

## 2019-03-14 PROCEDURE — 77030020263 HC SOL INJ SOD CL0.9% LFCR 1000ML

## 2019-03-14 PROCEDURE — 74011000258 HC RX REV CODE- 258: Performed by: EMERGENCY MEDICINE

## 2019-03-14 RX ORDER — INSULIN GLARGINE 100 [IU]/ML
20 INJECTION, SOLUTION SUBCUTANEOUS
Qty: 2 PEN | Refills: 2 | Status: ON HOLD | OUTPATIENT
Start: 2019-03-14 | End: 2019-04-09 | Stop reason: SDUPTHER

## 2019-03-14 RX ORDER — FLUCONAZOLE 200 MG/1
200 TABLET ORAL DAILY
Qty: 7 TAB | Refills: 0 | Status: SHIPPED | OUTPATIENT
Start: 2019-03-15 | End: 2019-03-22

## 2019-03-14 RX ORDER — ALPRAZOLAM 0.5 MG/1
0.5 TABLET ORAL
Qty: 14 TAB | Refills: 0 | Status: SHIPPED | OUTPATIENT
Start: 2019-03-14 | End: 2019-04-09

## 2019-03-14 RX ORDER — OXYCODONE HYDROCHLORIDE 20 MG/1
20 TABLET ORAL
Qty: 30 TAB | Refills: 0 | Status: SHIPPED | OUTPATIENT
Start: 2019-03-14 | End: 2019-03-19

## 2019-03-14 RX ADMIN — ALPRAZOLAM 0.5 MG: 0.5 TABLET ORAL at 17:17

## 2019-03-14 RX ADMIN — FLECAINIDE ACETATE 50 MG: 100 TABLET ORAL at 08:27

## 2019-03-14 RX ADMIN — Medication 10 ML: at 05:45

## 2019-03-14 RX ADMIN — METOPROLOL SUCCINATE 50 MG: 50 TABLET, EXTENDED RELEASE ORAL at 08:27

## 2019-03-14 RX ADMIN — APIXABAN 5 MG: 5 TABLET, FILM COATED ORAL at 17:03

## 2019-03-14 RX ADMIN — INSULIN LISPRO 4 UNITS: 100 INJECTION, SOLUTION INTRAVENOUS; SUBCUTANEOUS at 08:29

## 2019-03-14 RX ADMIN — OXYCODONE HYDROCHLORIDE 10 MG: 5 TABLET ORAL at 06:07

## 2019-03-14 RX ADMIN — INSULIN LISPRO 2 UNITS: 100 INJECTION, SOLUTION INTRAVENOUS; SUBCUTANEOUS at 17:04

## 2019-03-14 RX ADMIN — Medication: at 08:31

## 2019-03-14 RX ADMIN — FLUCONAZOLE 200 MG: 100 TABLET ORAL at 08:27

## 2019-03-14 RX ADMIN — APIXABAN 5 MG: 5 TABLET, FILM COATED ORAL at 08:27

## 2019-03-14 RX ADMIN — ONDANSETRON 4 MG: 2 INJECTION INTRAMUSCULAR; INTRAVENOUS at 01:49

## 2019-03-14 RX ADMIN — BISACODYL 5 MG: 5 TABLET, COATED ORAL at 12:19

## 2019-03-14 RX ADMIN — ALPRAZOLAM 0.5 MG: 0.5 TABLET ORAL at 08:27

## 2019-03-14 RX ADMIN — PANTOPRAZOLE SODIUM 40 MG: 40 TABLET, DELAYED RELEASE ORAL at 06:07

## 2019-03-14 RX ADMIN — INSULIN LISPRO 4 UNITS: 100 INJECTION, SOLUTION INTRAVENOUS; SUBCUTANEOUS at 12:15

## 2019-03-14 RX ADMIN — SODIUM CHLORIDE 75 ML/HR: 900 INJECTION, SOLUTION INTRAVENOUS at 01:54

## 2019-03-14 RX ADMIN — OXYCODONE HYDROCHLORIDE 10 MG: 5 TABLET ORAL at 14:01

## 2019-03-14 RX ADMIN — SODIUM CHLORIDE 1 G: 900 INJECTION, SOLUTION INTRAVENOUS at 08:22

## 2019-03-14 RX ADMIN — INSULIN LISPRO 4 UNITS: 100 INJECTION, SOLUTION INTRAVENOUS; SUBCUTANEOUS at 08:30

## 2019-03-14 RX ADMIN — INSULIN LISPRO 4 UNITS: 100 INJECTION, SOLUTION INTRAVENOUS; SUBCUTANEOUS at 17:03

## 2019-03-14 RX ADMIN — ONDANSETRON 4 MG: 2 INJECTION INTRAMUSCULAR; INTRAVENOUS at 08:27

## 2019-03-14 RX ADMIN — NYSTATIN: 100000 POWDER TOPICAL at 08:30

## 2019-03-14 NOTE — PROGRESS NOTES
Oxygen Qualifier       Room air: SpO2 with O2 and liter flow   Resting SpO2 95%      Ambulating SpO2 Patient Paralysed from waist down.  Cannot ambulate        Completed by:    Shirley Ospina

## 2019-03-14 NOTE — PROGRESS NOTES
Call [facility] on [facility].  Rosemary Gillette stated that they are still working on getting patient transportation at this time and do not have an estimated time of arrival.

## 2019-03-14 NOTE — PROGRESS NOTES
Discharge instructions and prescriptions given and reviewed with pt, verbalizes understanding, ML catheter removed,  neosporin and  pressure dsg applied, pt requesting transport home, will ask CM to arrange this.

## 2019-03-14 NOTE — PROGRESS NOTES
Problem: Self Care Deficits Care Plan (Adult)  Goal: *Acute Goals and Plan of Care (Insert Text)  Patient will complete lower body dressing with minimal assist to increase self care independence. Patient will complete toileting with moderate assist to increase self care independence. Patient will tolerate 38 minutes of OT treatment with incorporation of energy conservation techniques to increase activity tolerance to enhance participation in hobbies. Patient will complete chair transfer with minimal assist using adaptive equipment as needed. Patient will complete UE exercises with supervision to increase overall activity tolerance and strength. Timeframe: 7 visits        OCCUPATIONAL THERAPY: Daily Note and AM    3/14/2019  INPATIENT: OT Visit Days: 2  [facility] [address] [facility] [address] / Plan: [address] / Product Type: Medicaid /      NAME/AGE/GENDER: Yesika Shah is a 62 y.o. female   PRIMARY DIAGNOSIS:  Acute encephalopathy [G93.40] Acute encephalopathy Acute encephalopathy       ICD-10: Treatment Diagnosis:    · Generalized Muscle Weakness (M62.81)  · Other lack of cordination (R27.8)   Precautions/Allergies:     Patient has no known allergies. ASSESSMENT:     Ms. Rajendra Flores presents supine in bed with history of paraplegia and mild confusion from diagnosis above. 3/14/2019 Pt presents in supine upon arrival and agreeable to treatment with encouragement. Pt transferred to sitting with mod a. Pt sat edge of bed approximately 10 minutes while attempting lateral scooting. Pt stated she has a different hospital bed at home and thinks that she will be able to complete at home. PT entered room and pt was left sitting up with PT. Fair effort. Continue OT POC. This section established at most recent assessment   PROBLEM LIST (Impairments causing functional limitations):  1. Decreased Strength  2. Decreased ADL/Functional Activities  3. Decreased Transfer Abilities  4.  Decreased Balance  5. Increased Pain  6. Decreased Activity Tolerance  7. Decreased Cognition   INTERVENTIONS PLANNED: (Benefits and precautions of occupational therapy have been discussed with the patient.)  1. Activities of daily living training  2. Adaptive equipment training  3. Balance training  4. Neuromuscular re-eduation  5. Therapeutic activity  6. Therapeutic exercise     TREATMENT PLAN: Frequency/Duration: Follow patient 3x a week to address above goals. Rehabilitation Potential For Stated Goals: Good     RECOMMENDED REHABILITATION/EQUIPMENT: (at time of discharge pending progress): Due to the probability of continued deficits (see above) this patient will likely need continued skilled occupational therapy after discharge. Equipment:    None at this time              OCCUPATIONAL PROFILE AND HISTORY:   History of Present Injury/Illness (Reason for Referral):  See H&P  Past Medical History/Comorbidities:   Ms. Wes Berger  has a past medical history of Diabetes ([facility] [address] Gastrointestinal disorder, Hypertension, Ill-defined condition, Ill-defined condition, Ill-defined condition, Liver disease, Psychiatric disorder, Psychiatric disorder, and Thromboembolus ([facility] [address] Ms. Wes Berger  has no past surgical history on file. Social History/Living Environment:   Home Environment: Private residence  Living Alone: No  Support Systems: Child(urban)(2 sons)  Patient Expects to be Discharged to[de-identified] Private residence  Current DME Used/Available at Home: [address], Other (comment)(transfer board)  Prior Level of Function/Work/Activity:  Mostly modified independent per patient for ADL's (poor historian). Son assisted with all IADL's.     Number of Personal Factors/Comorbidities that affect the Plan of Care: Expanded review of therapy/medical records (1-2):  MODERATE COMPLEXITY   ASSESSMENT OF OCCUPATIONAL PERFORMANCE[de-identified]   Activities of Daily Living:   Basic ADLs (From Assessment) Complex ADLs (From Assessment)   Feeding: Setup  Oral Facial Hygiene/Grooming: Stand-by assistance  Bathing: Moderate assistance  Upper Body Dressing: Minimum assistance  Lower Body Dressing: Maximum assistance  Toileting: Maximum assistance     Grooming/Bathing/Dressing Activities of Daily Living                             Bed/Mat Mobility  Supine to Sit: Maximum assistance  Sit to Supine: Maximum assistance  Scooting: Maximum assistance       Most Recent Physical Functioning:   Gross Assessment:                  Posture:  Posture (WDL): Exceptions to WDL  Posture Assessment: Forward head, Rounded shoulders, Trunk flexion  Balance:  Sitting: Intact Bed Mobility:  Supine to Sit: Maximum assistance  Sit to Supine: Maximum assistance  Scooting: Maximum assistance  Interventions: Verbal cues; Visual cues; Safety awareness training; Tactile cues  Duration: 10 Minutes  Wheelchair Mobility:     Transfers:               Patient Vitals for the past 6 hrs:   BP SpO2 Pulse   03/14/19 0737 147/82 96 % 70   03/14/19 1150  97 %    03/14/19 1151 117/70 95 % 60       Mental Status  Neurologic State: Alert  Orientation Level: Oriented X4  Cognition: Appropriate decision making, Appropriate for age attention/concentration, Appropriate safety awareness, Follows commands                          Physical Skills Involved:  1. Range of Motion  2. Balance  3. Strength  4. Activity Tolerance Cognitive Skills Affected (resulting in the inability to perform in a timely and safe manner):  1. Immediate Memory  2. Short Term Recall Psychosocial Skills Affected:  1. Habits/Routines  2. Social Interaction  3. Emotional Regulation   Number of elements that affect the Plan of Care: 3-5:  MODERATE COMPLEXITY   CLINICAL DECISION MAKING:   [employer] AM-PAC 6 Clicks   Daily Activity Inpatient Short Form  How much help from another person does the patient currently need. .. Total A Lot A Little None   1. Putting on and taking off regular lower body clothing? [x] 1   [] 2   [] 3   [] 4   2. Bathing (including washing, rinsing, drying)? [] 1   [x] 2   [] 3   [] 4   3. Toileting, which includes using toilet, bedpan or urinal?   [x] 1   [] 2   [] 3   [] 4   4. Putting on and taking off regular upper body clothing? [] 1   [] 2   [x] 3   [] 4   5. Taking care of personal grooming such as brushing teeth? [] 1   [] 2   [x] 3   [] 4   6. Eating meals? [] 1   [] 2   [x] 3   [] 4   © 2007, Trustees of [facility] [employer], under license to [employer]. All rights reserved      Score:  Initial: 13 Most Recent: X (Date: -- )    Interpretation of Tool:  Represents activities that are increasingly more difficult (i.e. Bed mobility, Transfers, Gait). Medical Necessity:     · Skilled intervention continues to be required due to deficits listed above. Reason for Services/Other Comments:  · Patient continues to require skilled intervention due to acute encephalopathy and debility. .   Use of outcome tool(s) and clinical judgement create a POC that gives a: MODERATE COMPLEXITY         TREATMENT:   (In addition to Assessment/Re-Assessment sessions the following treatments were rendered)     Pre-treatment Symptoms/Complaints:    Pain: Initial:   Pain Intensity 1: 0  Post Session:  3       Therapeutic Activity:(10 minutes): Therapeutic activities including Bed transfers and edge of bed sitting to improve mobility, strength and balance. Required SBA to promote static and dynamic balance in sitting. Braces/Orthotics/Lines/Etc:   · IV  · O2 Device: Nasal cannula  Treatment/Session Assessment:    · Response to Treatment:  Good, supine in bed. · Interdisciplinary Collaboration:   o Certified Occupational Therapy Assistant  o Registered Nurse  · After treatment position/precautions:   o RN notified  o edge of bed with PT   · Compliance with Program/Exercises: Compliant all of the time. · Recommendations/Intent for next treatment session:   \"Next visit will focus on advancements to more challenging activities and reduction in assistance provided\".   Total Treatment Duration:  OT Patient Time In/Time Out  Time In: 1129  Time Out: 53 Tanmay Dubon

## 2019-03-14 NOTE — PROGRESS NOTES
[facility] called and stated that it would be at least 2130 before pt could be picked up. Pt stated that she could not wait that long and to call Benjamin Ford. Benjamin Ford has been set up and Elisabeth Batista has been cancelled.

## 2019-03-14 NOTE — DIABETES MGMT
Patient admitted with acute encephalopathy. Blood glucose range yesterday  with pt receiving Humalog 16 units and Lantus 20 units. This AM blood glucose 213. Pt in bed this AM, states \"I was up all night. \" Pt states during the night she drank tomato juice and ate peanut butter crackers. Reviewed pt current regimen: Lantus 20 units nightly, Humalog 4 units with meals, and Humalog SSI. Reviewed the importance of blood glucose monitoring and frequency of qid ac, hs, and to record in log book to take to PCP appointment. Pt will need prescription for glucometer supplies. See previous note. Pt unsure which insulin she is out of, pt will need insulin prescriptions at discharge. Primary RN made aware. Pt states she prefers the vial and syringe method, but states her son has to draw the insulin up for her because she has lost vision in her right eye. Pt states she may be going home today. Encouraged compliance with discharge regimen. Encouraged patient to continue to work on lifestyle modifications and to follow up with PCP for further titration of regimen. Patient verbalized understanding and voices no further questions regarding diabetes management. Pt interested in talking with case management. CM made aware.

## 2019-03-14 NOTE — PROGRESS NOTES
Hourly rounds completed this shift. All needs met. Bed low/locked. Pt alert/oriented. Pt requesting pain medication be increased multiple times, explained to pt MD saw pt oversedated. Pt states \"I had just stayed awake all night, I need my pain medication to be changed back\". Suprapubic jasso patent & draining yellow urine. Call light in reach. Will continue to monitor pt and give report to oncoming RN.

## 2019-03-14 NOTE — PROGRESS NOTES
Problem: Mobility Impaired (Adult and Pediatric)  Goal: *Acute Goals and Plan of Care (Insert Text)  LTG:  (1.)Ms. Matt Renteria will move from supine to sit and sit to supine, scoot up and down and roll side to side with MINIMAL ASSIST within 7 treatment day(s). (2.)Ms. Matt Renteria will transfer from bed to chair and chair to bed with MINIMAL ASSIST using the least restrictive device within 7 treatment day(s). (3.)Ms. Matt Renteria will ambulate with MODERATE ASSIST for 15+ feet with the least restrictive device within 7 treatment day(s). (4.)Ms. Matt Renteria will perform exercises per HEP for 10+ minutes to improve strength and mobility within 7 days. ________________________________________________________________________________________________    PHYSICAL THERAPY: Daily Note and AM 3/14/2019  INPATIENT: PT Visit Days : 1  Payor: [address] [place], [place] [address] / Plan: [address] [place] [address] Avenue / Product Type: Medicaid /       NAME/AGE/GENDER: Howard Urbano is a 62 y.o. female   PRIMARY DIAGNOSIS: Acute encephalopathy [G93.40] Acute encephalopathy Acute encephalopathy       ICD-10: Treatment Diagnosis:    · Generalized Muscle Weakness (M62.81)  · Difficulty in walking, Not elsewhere classified (R26.2)  · Other abnormalities of gait and mobility (R26.89)   Precaution/Allergies:  Patient has no known allergies. ASSESSMENT:     Ms. Matt Renteria is much more alert today. She needs encouragement to participate with therapy. Demonstrates intact seated balance at edge of bed statically and dynamically. Worked on seated tolerance and lateral scooting, weight shifts to prepare for slide board transfer to chair as she does at baseline. Fatigues quickly in sitting after ~5 minutes of activity and scooting. Declines further attempts. Mod-max A for return to supine and repositioning. Left with needs in reach and lunch set up. Overall good progress towards goals with improved mobility, activity tolerance, and balance.  Howard Lipps is still weaker than baseline with limited activity tolerance. She will need  PT at discharge. This section established at most recent assessment   PROBLEM LIST (Impairments causing functional limitations):  1. Decreased Strength  2. Decreased Transfer Abilities  3. Decreased Ambulation Ability/Technique  4. Decreased Balance  5. Decreased Activity Tolerance  6. Decreased Cognition   INTERVENTIONS PLANNED: (Benefits and precautions of physical therapy have been discussed with the patient.)  1. Balance Exercise  2. Bed Mobility  3. Gait Training  4. Home Exercise Program (HEP)  5. Therapeutic Activites  6. Therapeutic Exercise/Strengthening  7. Transfer Training     TREATMENT PLAN: Frequency/Duration: 3 times a week for duration of hospital stay  Rehabilitation Potential For Stated Goals: [facility] REHABILITATION/EQUIPMENT: (at time of discharge pending progress): Due to the probability of continued deficits (see above) this patient will likely need continued skilled physical therapy after discharge. Equipment:    tbd              HISTORY:   History of Present Injury/Illness (Reason for Referral):  Per H&P, \"Patient is a 63 y/o female with hx paraplegia, suprapubic catheter, narcotic dependence, bipol;ar disorder, Hep C, HTN, DM who presents from home with altered mental status. She was seen by home health and reportedly had her suprapubic catheter exchange. They were concerned she could be septic so sent to ED. She has a WBC ct of 16.1 but normal lactic acid. Glucose of 425 but no DKA. She is on numerous sedating medications at home including narcotics, benzodiazepines and psychotropics. Abdominal film shows large amount of fecal material. UA with pyuria but no bacteria. ED started IV cefipime and sent cultures. Head CT with no acute intracranial abnormalities.  Hospitalist service consulted for admission and further workup of encephalopathy\"    Past Medical History/Comorbidities:   Ms. Kirti Corey  has a past medical history of Diabetes ([facility] [address] Gastrointestinal disorder, Hypertension, Ill-defined condition, Ill-defined condition, Ill-defined condition, Liver disease, Psychiatric disorder, Psychiatric disorder, and Thromboembolus ([facility] [address] Ms. Bebe Galloway  has no past surgical history on file. Social History/Living Environment:   Home Environment: Private residence  Living Alone: No  Support Systems: Child(urban)(2 sons)  Patient Expects to be Discharged to[de-identified] Private residence  Current DME Used/Available at Home: [address], Other (comment)(transfer board)  Prior Level of Function/Work/Activity:  Lives with son. Has been home from rehab for ~ 1 month. Was apparently performing transfers without much assistance upon return home from [facility] but does not appear very mobile. Number of Personal Factors/Comorbidities that affect the Plan of Care: 1-2: MODERATE COMPLEXITY   EXAMINATION:   Most Recent Physical Functioning:   Gross Assessment:  AROM: Generally decreased, functional  Strength: Generally decreased, functional  Coordination: Generally decreased, functional               Posture:  Posture (WDL): Exceptions to WDL  Posture Assessment: Forward head, Rounded shoulders, Trunk flexion  Balance:  Sitting: Intact Bed Mobility:  Supine to Sit: Maximum assistance  Sit to Supine: Maximum assistance  Scooting: Maximum assistance  Interventions: Verbal cues; Visual cues; Safety awareness training; Tactile cues  Duration: 10 Minutes  Wheelchair Mobility:     Transfers:     Gait:            Body Structures Involved:  1. Muscles Body Functions Affected:  1. Mental  2. Sensory/Pain  3. Voice and Speech  4. Movement Related Activities and Participation Affected:  1. General Tasks and Demands  2. Mobility  3. Domestic Life  4.  Community, Social and [employer]   Number of elements that affect the Plan of Care: 4+: HIGH COMPLEXITY   CLINICAL PRESENTATION:   Presentation: Evolving clinical presentation with changing clinical characteristics: MODERATE COMPLEXITY   CLINICAL DECISION MAKIN[address] [place] [address] AM-PAC 6 Clicks   Basic Mobility Inpatient Short Form  How much difficulty does the patient currently have. .. Unable A Lot A Little None   1. Turning over in bed (including adjusting bedclothes, sheets and blankets)? [] 1   [x] 2   [] 3   [] 4   2. Sitting down on and standing up from a chair with arms ( e.g., wheelchair, bedside commode, etc.)   [x] 1   [] 2   [] 3   [] 4   3. Moving from lying on back to sitting on the side of the bed? [] 1   [x] 2   [] 3   [] 4   How much help from another person does the patient currently need. .. Total A Lot A Little None   4. Moving to and from a bed to a chair (including a wheelchair)? [x] 1   [] 2   [] 3   [] 4   5. Need to walk in hospital room? [x] 1   [] 2   [] 3   [] 4   6. Climbing 3-5 steps with a railing? [x] 1   [] 2   [] 3   [] 4   © 2007, Trustees of [address] [place] [address], under license to [employer]. All rights reserved      Score:  Initial: 8 Most Recent: X (Date: -- )    Interpretation of Tool:  Represents activities that are increasingly more difficult (i.e. Bed mobility, Transfers, Gait). Medical Necessity:     · Patient demonstrates fair rehab potential due to higher previous functional level. Reason for Services/Other Comments:  · Patient continues to demonstrate capacity to improve strength, mobility, transfers, balance, activity tolerance which will increase independence, decrease amount of assistance required from caregiver and increase safety.    Use of outcome tool(s) and clinical judgement create a POC that gives a: Questionable prediction of patient's progress: MODERATE COMPLEXITY            TREATMENT:   (In addition to Assessment/Re-Assessment sessions the following treatments were rendered)   Pre-treatment Symptoms/Complaints:  \"I just use my slide board\"  Pain: Initial:   Pain Intensity 1: 0  Pain Location 1: Back  Pain Intervention(s) 1: Repositioned, Ambulation/Increased Activity  Post Session:  0/10 visual     Therapeutic Activity: (10 Minutes   ):  Therapeutic activities including Bed mobility, seated activities/positioning, and lateral scooting/weight shifts improve mobility, strength, balance and coordination. Required moderate to maximal of 2 and increased verbal, visual, manual, and tactile cues   to promote static and dynamic balance in standing and promote motor control of bilateral, lower extremity(s). Braces/Orthotics/Lines/Etc:   · IV  · O2 Device: Room air  Treatment/Session Assessment:    · Response to Treatment:  Pt performs mobility with mod-max A of 1-2  · Interdisciplinary Collaboration:   o Physical Therapist  o Registered Nurse  · After treatment position/precautions:   o Bed/Chair-wheels locked  o Bed in low position  o Call light within reach  o sitting up in bed w/lunch in reach   · Compliance with Program/Exercises: Will assess as treatment progresses  · Recommendations/Intent for next treatment session: \"Next visit will focus on advancements to more challenging activities and reduction in assistance provided\".   Total Treatment Duration:  PT Patient Time In/Time Out  Time In: 1140  Time Out: Hermila Her 35, DPT

## 2019-03-14 NOTE — PROGRESS NOTES
Patient ready for discharge. Patient has medicaid. [facility] stretcher transportation called and setup. confirmation number is [id number]. [facility] will arrive between 30 min- 3 hour.

## 2019-03-14 NOTE — DISCHARGE INSTRUCTIONS
DISCHARGE SUMMARY from Nurse    PATIENT INSTRUCTIONS:    After general anesthesia or intravenous sedation, for 24 hours or while taking prescription Narcotics:  · Limit your activities  · Do not drive and operate hazardous machinery  · Do not make important personal or business decisions  · Do  not drink alcoholic beverages  · If you have not urinated within 8 hours after discharge, please contact your surgeon on call. Report the following to your surgeon:  · Excessive pain, swelling, redness or odor of or around the surgical area  · Temperature over 100.5  · Nausea and vomiting lasting longer than 4 hours or if unable to take medications  · Any signs of decreased circulation or nerve impairment to extremity: change in color, persistent  numbness, tingling, coldness or increase pain  · Any questions    What to do at Home:  Recommended activity: Activity as tolerated    If you experience any of the following symptoms increased pain, fever greater than 101, chills, changes in mental status, or catheter not draining, please follow up with your primary care physician. *  Please give a list of your current medications to your Primary Care Provider. *  Please update this list whenever your medications are discontinued, doses are      changed, or new medications (including over-the-counter products) are added. *  Please carry medication information at all times in case of emergency situations. These are general instructions for a healthy lifestyle:    No smoking/ No tobacco products/ Avoid exposure to second hand smoke  Surgeon General's Warning:  Quitting smoking now greatly reduces serious risk to your health.     Obesity, smoking, and sedentary lifestyle greatly increases your risk for illness    A healthy diet, regular physical exercise & weight monitoring are important for maintaining a healthy lifestyle    You may be retaining fluid if you have a history of heart failure or if you experience any of the following symptoms:  Weight gain of 3 pounds or more overnight or 5 pounds in a week, increased swelling in our hands or feet or shortness of breath while lying flat in bed. Please call your doctor as soon as you notice any of these symptoms; do not wait until your next office visit. Recognize signs and symptoms of STROKE:    F-face looks uneven    A-arms unable to move or move unevenly    S-speech slurred or non-existent    T-time-call 911 as soon as signs and symptoms begin-DO NOT go       Back to bed or wait to see if you get better-TIME IS BRAIN. Warning Signs of HEART ATTACK     Call 911 if you have these symptoms:   Chest discomfort. Most heart attacks involve discomfort in the center of the chest that lasts more than a few minutes, or that goes away and comes back. It can feel like uncomfortable pressure, squeezing, fullness, or pain.  Discomfort in other areas of the upper body. Symptoms can include pain or discomfort in one or both arms, the back, neck, jaw, or stomach.  Shortness of breath with or without chest discomfort.  Other signs may include breaking out in a cold sweat, nausea, or lightheadedness. Don't wait more than five minutes to call 911 - MINUTES MATTER! Fast action can save your life. Calling 911 is almost always the fastest way to get lifesaving treatment. Emergency Medical Services staff can begin treatment when they arrive -- up to an hour sooner than if someone gets to the hospital by car. The discharge information has been reviewed with the patient. The patient verbalized understanding. Discharge medications reviewed with the patient and appropriate educational materials and side effects teaching were provided.   ___________________________________________________________________________________________________________________________________

## 2019-03-14 NOTE — DISCHARGE SUMMARY
Hospitalist Discharge Summary     Patient ID:  Edie Rice  [id number]  61 y.o.  1955  Admit date: 3/10/2019  4:36 PM  Discharge date and time: 3/14/2019  Attending: Everett Rossi MD  PCP:  None  Treatment Team: Attending Provider: Everett Rossi MD; Care Manager: Ashley Nunez, RN; Occupational Therapy Assistant: Emily Preston; Physical Therapist: Kathi Staples DPT    Principal Diagnosis   Acute encephalopathy  Yeast UTI  Opioid dependence  DM-2        Principal Problem:    Acute encephalopathy (3/10/2019)    Active Problems:    Hyperglycemia due to type 2 diabetes mellitus ([address] (12/10/2016)      Paraplegia ([address] (12/10/2016)      Bipolar disorder without psychotic features ([address] (12/10/2016)      Chronic hepatitis C virus infection ([address] (12/10/2016)      Narcotic addiction ([address] (12/10/2016)      Leukocytosis (2/5/2019)      A-fib ([address] (2/19/2019)      Diabetes ([address] (3/10/2019)      Hypertension (3/10/2019)      Constipation (3/10/2019)      Pyuria (3/10/2019)             Hospital Course:  Please refer to the admission H&P for details of presentation. In summary, the patient is 63 y/o female with hx paraplegia, suprapubic catheter, narcotic dependence, bipol;ar disorder, Hep C, HTN, DM admitted on 3/10 for altered mental status. She recently had suprapubic catheter replaced by [facility]. They were concerned she could be septic so sent to ED. She has a WBC ct of 16.1 but normal lactic acid. Glucose of 425 but no DKA. She is on numerous sedating medications at home including narcotics, benzodiazepines and psychotropics. Abdominal film shows large amount of fecal material. UA with pyuria but no bacteria. Urine culture was positive for yeast, for which pt will be treated with 7 days of fluconazole. Pt's encephalopathy was likely from overmedication with narcotics.  Suprapubic catheter was changed in the hospital.  Pt's leukocytosis resolved completely and pt stayed hemodynamically stable. Pt is counseled for narcotic abuse. She is medically stable for discharge today. For further details, please refer to daily progress notes. Significant Diagnostic Studies:   CT ABDOMEN AND PELVIS WITH CONTRAST.     HISTORY: Abdominal pain.     COMPARISON: None     TECHNIQUE: 5 mm axial scans from above the diaphragms to the pubic symphysis  following oral and 100 cc intravenous contrast without acute complication. Intravenous contrast was given to increase the sensitivity to acute  inflammation. Radiation dose reduction techniques were used for this study. Our CT scanners use one or more of the following: Automated exposure control,  adjustment of the mA and or kV according to patient size, iterative  reconstruction.     FINDINGS:   Abdomen: The lung bases demonstrate small amounts of bibasilar atelectasis. The  liver and spleen appear homogeneous. No calcified gallstones. The biliary tree  is not dilated. The pancreas is unremarkable. No free fluid, acute inflammatory  changes or adenopathy. Bowel loops are not dilated. The kidneys enhance  uniformly. No radiopaque renal calculi. No hydronephrosis. The adrenal glands  are normal size. Aorta is normal caliber.     Pelvis: No free fluid or acute inflammatory changes. The urinary bladder drained  by suprapubic Campbell catheter. There is large amount of stool in the rectum.      IMPRESSION  IMPRESSION:  No acute intra-abdominal abnormality. Small amounts of atelectasis. Large amount of stool in the colon.       CT head: unremarkable    Labs: Results:       Chemistry Recent Labs     03/14/19  0700 03/13/19  0545 03/12/19  0823   * 80 84    144 144   K 3.7 3.7 3.2*   * 113* 112*   CO2 21 23 24   BUN 9 9 10   CREA 0.75 0.73 0.73   CA 8.2* 8.0* 8.4   AGAP 8 8 8      CBC w/Diff Recent Labs     03/14/19  0700 03/13/19  0545 03/12/19  0823   WBC 8.3 7.5 11.1   RBC 4.33 3.95* 4.36   HGB 12.5 11.4* 12.6   HCT 40.6 37.0 41.2    243 245      Cardiac Enzymes No results for input(s): CPK, CKND1, DEMETRA in the last 72 hours. No lab exists for component: CKRMB, TROIP   Coagulation No results for input(s): PTP, INR, APTT in the last 72 hours. No lab exists for component: INREXT    Lipid Panel No results found for: CHOL, CHOLPOCT, CHOLX, CHLST, CHOLV, 526605, HDL, LDL, LDLC, DLDLP, 541340, VLDLC, VLDL, TGLX, TRIGL, TRIGP, TGLPOCT, CHHD, CHHDX   BNP No results for input(s): BNPP in the last 72 hours. Liver Enzymes No results for input(s): TP, ALB, TBIL, AP, SGOT, GPT in the last 72 hours. No lab exists for component: DBIL   Thyroid Studies Lab Results   Component Value Date/Time    TSH 1.220 03/11/2019 05:29 AM            Discharge Exam:  Visit Vitals  /82   Pulse 70   Temp 98.5 °F (36.9 °C)   Resp 18   Ht 5' 5\" (1.651 m)   Wt 91.6 kg (202 lb)   SpO2 96%   BMI 33.61 kg/m²     General:          Obese, NAD  Eyes:               Normal sclera. Extraocular movements intact. ENT:                Normocephalic, atraumatic. Moist mucous membranes  CV:                  RRR. No murmur, rub, or gallop. Lungs:             CTAB. No wheezing, rhonchi, or rales. Abdomen:        Soft, obese, non tender, non distended, BS normoactive  Extremities:     Warm and dry. No cyanosis or edema. Neurologic:      CN II-XII grossly intact. Paraplegic. Can slightly move feet  Skin:                No rashes or jaundice. No wounds. Psych:            AO x3, mood and affect appropriate      Disposition: HOME WITH [facility]/PT/OT  Discharge Condition: stable  Patient Instructions:   Current Discharge Medication List      START taking these medications    Details   fluconazole (DIFLUCAN) 200 mg tablet Take 1 Tab by mouth daily for 7 days. FDA advises cautious prescribing of oral fluconazole in pregnancy.   Qty: 7 Tab, Refills: 0         CONTINUE these medications which have NOT CHANGED    Details   albuterol (PROVENTIL HFA, VENTOLIN HFA, PROAIR HFA) 90 mcg/actuation inhaler Take 1 Puff by inhalation every four (4) hours as needed for Wheezing. Qty: 1 Inhaler, Refills: 0      albuterol (PROVENTIL VENTOLIN) 2.5 mg /3 mL (0.083 %) nebulizer solution Take 3 mL by inhalation every four (4) hours as needed for Wheezing. Qty: 24 Each, Refills: 0      apixaban (ELIQUIS) 5 mg tablet Take 1 Tab by mouth two (2) times a day. Qty: 30 Tab, Refills: 0      Blood-Glucose Meter monitoring kit Check glucose at home daily  Qty: 1 Kit, Refills: 0      !! budesonide (PULMICORT) 0.5 mg/2 mL nbsp 2 mL by Nebulization route two (2) times a day. Qty: 1 Each, Refills: 0      nystatin (MYCOSTATIN) powder Apply  to affected area two (2) times a day. Qty: 1 Bottle, Refills: 0      pantoprazole (PROTONIX) 40 mg tablet Take 1 Tab by mouth Daily (before breakfast). Qty: 30 Tab, Refills: 0      flecainide (TAMBOCOR) 50 mg tablet Take 1 Tab by mouth every twelve (12) hours. Qty: 60 Tab, Refills: 0      metoprolol succinate (TOPROL-XL) 50 mg XL tablet Take 1 Tab by mouth daily. Qty: 30 Tab, Refills: 0      insulin lispro (HUMALOG) 100 unit/mL injection Less than 150 =   0 units           150 -199 =   2 units  200 -249 =   4 units  250 -299 =   6 units  300 -349 =   8 units  Before meals and at bedtime. Qty: 1 Vial, Refills: 0      ALPRAZolam (XANAX) 0.5 mg tablet Take 1 Tab by mouth two (2) times daily as needed for Anxiety. Max Daily Amount: 1 mg. Qty: 30 Tab, Refills: 0    Associated Diagnoses: Bipolar disorder without psychotic features ([facility]      fentaNYL (DURAGESIC) 25 mcg/hr PATCH 1 Patch by TransDERmal route every seventy-two (72) hours. Max Daily Amount: 1 Patch. Qty: 5 Patch, Refills: 0    Associated Diagnoses: Chronic midline low back pain without sciatica      oxyCODONE IR (ROXICODONE) 20 mg immediate release tablet Take 1 Tab by mouth every eight (8) hours as needed.  Max Daily Amount: 60 mg.  Qty: 30 Tab, Refills: 0    Associated Diagnoses: Chronic midline low back pain without sciatica      bisacodyl (DULCOLAX) 5 mg EC tablet Take 1 Tab by mouth daily as needed for Constipation. Qty: 30 Tab, Refills: 0      !! budesonide (PULMICORT) 0.5 mg/2 mL nbsp 2 mL by Nebulization route two (2) times a day. Qty: 60 Each, Refills: 0      zinc oxide-cod liver oil (DESITIN) 40 % paste Apply  to affected area two (2) times a day. Qty: 1 Tube, Refills: 1      omeprazole (PRILOSEC) 40 mg capsule Take 40 mg by mouth daily. promethazine (PHENERGAN) 25 mg tablet Take 25 mg by mouth every six (6) hours as needed for Nausea. QUEtiapine (SEROQUEL) 100 mg tablet Take 300 mg by mouth nightly. !! - Potential duplicate medications found. Please discuss with provider. STOP taking these medications       insulin glargine (LANTUS) 100 unit/mL injection Comments:   Reason for Stopping:               Activity: PT/OT per Home Health  Diet: Cardiac Diet and Diabetic Diet  Wound Care: None needed    Follow-up  · Follow up with PCP in 1 week.     Time spent to discharge patient 35 minutes  Signed:  Kevyn Anne MD  3/14/2019  8:42 AM

## 2019-03-15 LAB
BACTERIA SPEC CULT: NORMAL
SERVICE CMNT-IMP: NORMAL

## 2019-03-18 ENCOUNTER — HOSPITAL ENCOUNTER (EMERGENCY)
Age: 64
Discharge: HOME | End: 2019-03-18
Attending: EMERGENCY MEDICINE | Admitting: EMERGENCY MEDICINE
Payer: MEDICAID

## 2019-03-18 ENCOUNTER — APPOINTMENT (OUTPATIENT)
Dept: CT IMAGING | Age: 64
End: 2019-03-18
Attending: EMERGENCY MEDICINE
Payer: MEDICAID

## 2019-03-18 VITALS
HEART RATE: 70 BPM | OXYGEN SATURATION: 97 % | DIASTOLIC BLOOD PRESSURE: 83 MMHG | TEMPERATURE: 98 F | RESPIRATION RATE: 20 BRPM | SYSTOLIC BLOOD PRESSURE: 158 MMHG

## 2019-03-18 VITALS
SYSTOLIC BLOOD PRESSURE: 163 MMHG | BODY MASS INDEX: 33.32 KG/M2 | OXYGEN SATURATION: 95 % | DIASTOLIC BLOOD PRESSURE: 74 MMHG | RESPIRATION RATE: 13 BRPM | HEIGHT: 65 IN | HEART RATE: 67 BPM | TEMPERATURE: 98.1 F | WEIGHT: 200 LBS

## 2019-03-18 DIAGNOSIS — R51.9 NONINTRACTABLE HEADACHE, UNSPECIFIED CHRONICITY PATTERN, UNSPECIFIED HEADACHE TYPE: ICD-10-CM

## 2019-03-18 DIAGNOSIS — R11.2 NAUSEA AND VOMITING, INTRACTABILITY OF VOMITING NOT SPECIFIED, UNSPECIFIED VOMITING TYPE: Primary | ICD-10-CM

## 2019-03-18 DIAGNOSIS — G89.4 CHRONIC PAIN SYNDROME: ICD-10-CM

## 2019-03-18 DIAGNOSIS — N30.00 ACUTE CYSTITIS WITHOUT HEMATURIA: Primary | ICD-10-CM

## 2019-03-18 LAB
ALBUMIN SERPL-MCNC: 2.9 G/DL (ref 3.2–4.6)
ALBUMIN SERPL-MCNC: 3.1 G/DL (ref 3.2–4.6)
ALBUMIN/GLOB SERPL: 0.6 {RATIO} (ref 1.2–3.5)
ALBUMIN/GLOB SERPL: 0.7 {RATIO} (ref 1.2–3.5)
ALP SERPL-CCNC: 104 U/L (ref 50–136)
ALP SERPL-CCNC: 110 U/L (ref 50–136)
ALT SERPL-CCNC: 11 U/L (ref 12–65)
ALT SERPL-CCNC: 14 U/L (ref 12–65)
ANION GAP SERPL CALC-SCNC: 7 MMOL/L (ref 7–16)
ANION GAP SERPL CALC-SCNC: 8 MMOL/L (ref 7–16)
APPEARANCE UR: ABNORMAL
AST SERPL-CCNC: 13 U/L (ref 15–37)
AST SERPL-CCNC: 16 U/L (ref 15–37)
ATRIAL RATE: 62 BPM
BACTERIA URNS QL MICRO: ABNORMAL /HPF
BASOPHILS # BLD: 0.1 K/UL (ref 0–0.2)
BASOPHILS # BLD: 0.1 K/UL (ref 0–0.2)
BASOPHILS NFR BLD: 1 % (ref 0–2)
BASOPHILS NFR BLD: 1 % (ref 0–2)
BILIRUB SERPL-MCNC: 0.4 MG/DL (ref 0.2–1.1)
BILIRUB SERPL-MCNC: 0.5 MG/DL (ref 0.2–1.1)
BILIRUB UR QL: NEGATIVE
BUN SERPL-MCNC: 9 MG/DL (ref 8–23)
BUN SERPL-MCNC: 9 MG/DL (ref 8–23)
CALCIUM SERPL-MCNC: 8.9 MG/DL (ref 8.3–10.4)
CALCIUM SERPL-MCNC: 9 MG/DL (ref 8.3–10.4)
CALCULATED P AXIS, ECG09: 64 DEGREES
CALCULATED R AXIS, ECG10: 6 DEGREES
CALCULATED T AXIS, ECG11: 59 DEGREES
CHLORIDE SERPL-SCNC: 102 MMOL/L (ref 98–107)
CHLORIDE SERPL-SCNC: 103 MMOL/L (ref 98–107)
CO2 SERPL-SCNC: 27 MMOL/L (ref 21–32)
CO2 SERPL-SCNC: 27 MMOL/L (ref 21–32)
COLOR UR: YELLOW
CREAT SERPL-MCNC: 0.68 MG/DL (ref 0.6–1)
CREAT SERPL-MCNC: 0.7 MG/DL (ref 0.6–1)
DIAGNOSIS, 93000: NORMAL
DIFFERENTIAL METHOD BLD: ABNORMAL
DIFFERENTIAL METHOD BLD: ABNORMAL
EOSINOPHIL # BLD: 0.1 K/UL (ref 0–0.8)
EOSINOPHIL # BLD: 0.4 K/UL (ref 0–0.8)
EOSINOPHIL NFR BLD: 1 % (ref 0.5–7.8)
EOSINOPHIL NFR BLD: 3 % (ref 0.5–7.8)
ERYTHROCYTE [DISTWIDTH] IN BLOOD BY AUTOMATED COUNT: 14.6 % (ref 11.9–14.6)
ERYTHROCYTE [DISTWIDTH] IN BLOOD BY AUTOMATED COUNT: 14.7 % (ref 11.9–14.6)
GLOBULIN SER CALC-MCNC: 4.4 G/DL (ref 2.3–3.5)
GLOBULIN SER CALC-MCNC: 4.6 G/DL (ref 2.3–3.5)
GLUCOSE SERPL-MCNC: 268 MG/DL (ref 65–100)
GLUCOSE SERPL-MCNC: 271 MG/DL (ref 65–100)
GLUCOSE UR STRIP.AUTO-MCNC: >1000 MG/DL
HCT VFR BLD AUTO: 43.3 % (ref 35.8–46.3)
HCT VFR BLD AUTO: 44.6 % (ref 35.8–46.3)
HGB BLD-MCNC: 13.7 G/DL (ref 11.7–15.4)
HGB BLD-MCNC: 13.9 G/DL (ref 11.7–15.4)
HGB UR QL STRIP: NEGATIVE
IMM GRANULOCYTES # BLD AUTO: 0.1 K/UL (ref 0–0.5)
IMM GRANULOCYTES # BLD AUTO: 0.1 K/UL (ref 0–0.5)
IMM GRANULOCYTES NFR BLD AUTO: 1 % (ref 0–5)
IMM GRANULOCYTES NFR BLD AUTO: 1 % (ref 0–5)
KETONES UR QL STRIP.AUTO: NEGATIVE MG/DL
LACTATE BLD-SCNC: 0.94 MMOL/L (ref 0.5–1.9)
LEUKOCYTE ESTERASE UR QL STRIP.AUTO: ABNORMAL
LIPASE SERPL-CCNC: 293 U/L (ref 73–393)
LYMPHOCYTES # BLD: 2.6 K/UL (ref 0.5–4.6)
LYMPHOCYTES # BLD: 3.2 K/UL (ref 0.5–4.6)
LYMPHOCYTES NFR BLD: 23 % (ref 13–44)
LYMPHOCYTES NFR BLD: 24 % (ref 13–44)
MCH RBC QN AUTO: 28.8 PG (ref 26.1–32.9)
MCH RBC QN AUTO: 28.9 PG (ref 26.1–32.9)
MCHC RBC AUTO-ENTMCNC: 31.2 G/DL (ref 31.4–35)
MCHC RBC AUTO-ENTMCNC: 31.6 G/DL (ref 31.4–35)
MCV RBC AUTO: 91.4 FL (ref 79.6–97.8)
MCV RBC AUTO: 92.5 FL (ref 79.6–97.8)
MONOCYTES # BLD: 0.5 K/UL (ref 0.1–1.3)
MONOCYTES # BLD: 0.8 K/UL (ref 0.1–1.3)
MONOCYTES NFR BLD: 5 % (ref 4–12)
MONOCYTES NFR BLD: 6 % (ref 4–12)
NEUTS SEG # BLD: 8 K/UL (ref 1.7–8.2)
NEUTS SEG # BLD: 9 K/UL (ref 1.7–8.2)
NEUTS SEG NFR BLD: 66 % (ref 43–78)
NEUTS SEG NFR BLD: 71 % (ref 43–78)
NITRITE UR QL STRIP.AUTO: NEGATIVE
NRBC # BLD: 0 K/UL (ref 0–0.2)
NRBC # BLD: 0 K/UL (ref 0–0.2)
P-R INTERVAL, ECG05: 150 MS
PH UR STRIP: 6.5 [PH] (ref 5–9)
PLATELET # BLD AUTO: 438 K/UL (ref 150–450)
PLATELET # BLD AUTO: 448 K/UL (ref 150–450)
PMV BLD AUTO: 10.7 FL (ref 9.4–12.3)
PMV BLD AUTO: 10.8 FL (ref 9.4–12.3)
POTASSIUM SERPL-SCNC: 4.1 MMOL/L (ref 3.5–5.1)
POTASSIUM SERPL-SCNC: 4.4 MMOL/L (ref 3.5–5.1)
PROCALCITONIN SERPL-MCNC: <0.1 NG/ML
PROT SERPL-MCNC: 7.5 G/DL (ref 6.3–8.2)
PROT SERPL-MCNC: 7.5 G/DL (ref 6.3–8.2)
PROT UR STRIP-MCNC: ABNORMAL MG/DL
Q-T INTERVAL, ECG07: 430 MS
QRS DURATION, ECG06: 78 MS
QTC CALCULATION (BEZET), ECG08: 436 MS
RBC # BLD AUTO: 4.74 M/UL (ref 4.05–5.2)
RBC # BLD AUTO: 4.82 M/UL (ref 4.05–5.2)
RBC #/AREA URNS HPF: ABNORMAL /HPF
SODIUM SERPL-SCNC: 136 MMOL/L (ref 136–145)
SODIUM SERPL-SCNC: 138 MMOL/L (ref 136–145)
SP GR UR REFRACTOMETRY: 1.02 (ref 1–1.02)
UROBILINOGEN UR QL STRIP.AUTO: 1 EU/DL (ref 0.2–1)
VENTRICULAR RATE, ECG03: 62 BPM
WBC # BLD AUTO: 11.4 K/UL (ref 4.3–11.1)
WBC # BLD AUTO: 13.7 K/UL (ref 4.3–11.1)
WBC URNS QL MICRO: ABNORMAL /HPF
YEAST URNS QL MICRO: ABNORMAL

## 2019-03-18 PROCEDURE — 81001 URINALYSIS AUTO W/SCOPE: CPT

## 2019-03-18 PROCEDURE — 99285 EMERGENCY DEPT VISIT HI MDM: CPT | Performed by: EMERGENCY MEDICINE

## 2019-03-18 PROCEDURE — 74011000250 HC RX REV CODE- 250: Performed by: EMERGENCY MEDICINE

## 2019-03-18 PROCEDURE — 87088 URINE BACTERIA CULTURE: CPT

## 2019-03-18 PROCEDURE — 74011000258 HC RX REV CODE- 258: Performed by: EMERGENCY MEDICINE

## 2019-03-18 PROCEDURE — 87086 URINE CULTURE/COLONY COUNT: CPT

## 2019-03-18 PROCEDURE — 83690 ASSAY OF LIPASE: CPT

## 2019-03-18 PROCEDURE — 87186 SC STD MICRODIL/AGAR DIL: CPT

## 2019-03-18 PROCEDURE — 87040 BLOOD CULTURE FOR BACTERIA: CPT

## 2019-03-18 PROCEDURE — 74011250636 HC RX REV CODE- 250/636: Performed by: EMERGENCY MEDICINE

## 2019-03-18 PROCEDURE — 85025 COMPLETE CBC W/AUTO DIFF WBC: CPT

## 2019-03-18 PROCEDURE — 96375 TX/PRO/DX INJ NEW DRUG ADDON: CPT | Performed by: EMERGENCY MEDICINE

## 2019-03-18 PROCEDURE — 96372 THER/PROPH/DIAG INJ SC/IM: CPT | Performed by: EMERGENCY MEDICINE

## 2019-03-18 PROCEDURE — 99284 EMERGENCY DEPT VISIT MOD MDM: CPT | Performed by: EMERGENCY MEDICINE

## 2019-03-18 PROCEDURE — 83605 ASSAY OF LACTIC ACID: CPT

## 2019-03-18 PROCEDURE — 93005 ELECTROCARDIOGRAM TRACING: CPT | Performed by: EMERGENCY MEDICINE

## 2019-03-18 PROCEDURE — 84145 PROCALCITONIN (PCT): CPT

## 2019-03-18 PROCEDURE — 80053 COMPREHEN METABOLIC PANEL: CPT

## 2019-03-18 PROCEDURE — 70450 CT HEAD/BRAIN W/O DYE: CPT

## 2019-03-18 PROCEDURE — 96374 THER/PROPH/DIAG INJ IV PUSH: CPT | Performed by: EMERGENCY MEDICINE

## 2019-03-18 PROCEDURE — 96365 THER/PROPH/DIAG IV INF INIT: CPT | Performed by: EMERGENCY MEDICINE

## 2019-03-18 RX ORDER — PROMETHAZINE HYDROCHLORIDE 25 MG/1
25 TABLET ORAL
Qty: 15 TAB | Refills: 2 | Status: SHIPPED | OUTPATIENT
Start: 2019-03-18 | End: 2019-04-09

## 2019-03-18 RX ORDER — HYDROMORPHONE HYDROCHLORIDE 1 MG/ML
0.5 INJECTION, SOLUTION INTRAMUSCULAR; INTRAVENOUS; SUBCUTANEOUS
Status: DISCONTINUED | OUTPATIENT
Start: 2019-03-18 | End: 2019-03-18 | Stop reason: HOSPADM

## 2019-03-18 RX ORDER — HYDRALAZINE HYDROCHLORIDE 20 MG/ML
10 INJECTION INTRAMUSCULAR; INTRAVENOUS
Status: DISCONTINUED | OUTPATIENT
Start: 2019-03-18 | End: 2019-03-18 | Stop reason: HOSPADM

## 2019-03-18 RX ORDER — PROMETHAZINE HYDROCHLORIDE 25 MG/ML
25 INJECTION, SOLUTION INTRAMUSCULAR; INTRAVENOUS
Status: COMPLETED | OUTPATIENT
Start: 2019-03-18 | End: 2019-03-18

## 2019-03-18 RX ORDER — PROMETHAZINE HYDROCHLORIDE 25 MG/ML
25 INJECTION, SOLUTION INTRAMUSCULAR; INTRAVENOUS
Status: DISCONTINUED | OUTPATIENT
Start: 2019-03-18 | End: 2019-03-18

## 2019-03-18 RX ORDER — PROMETHAZINE HYDROCHLORIDE 25 MG/1
25 TABLET ORAL
Qty: 20 TAB | Refills: 0 | Status: ON HOLD | OUTPATIENT
Start: 2019-03-18 | End: 2019-04-09 | Stop reason: SDUPTHER

## 2019-03-18 RX ORDER — CEFPODOXIME PROXETIL 100 MG/1
100 TABLET, FILM COATED ORAL 2 TIMES DAILY
Qty: 14 TAB | Refills: 0 | Status: SHIPPED | OUTPATIENT
Start: 2019-03-18 | End: 2019-03-25

## 2019-03-18 RX ADMIN — HYDROMORPHONE HYDROCHLORIDE 0.5 MG: 1 INJECTION, SOLUTION INTRAMUSCULAR; INTRAVENOUS; SUBCUTANEOUS at 03:08

## 2019-03-18 RX ADMIN — CEFTRIAXONE SODIUM 1 G: 1 INJECTION, POWDER, FOR SOLUTION INTRAMUSCULAR; INTRAVENOUS at 04:54

## 2019-03-18 RX ADMIN — PROMETHAZINE HYDROCHLORIDE 25 MG: 25 INJECTION INTRAMUSCULAR; INTRAVENOUS at 03:08

## 2019-03-18 RX ADMIN — SODIUM CHLORIDE 500 ML: 900 INJECTION, SOLUTION INTRAVENOUS at 16:30

## 2019-03-18 RX ADMIN — PROMETHAZINE HYDROCHLORIDE 25 MG: 25 INJECTION INTRAMUSCULAR; INTRAVENOUS at 16:30

## 2019-03-18 NOTE — ED NOTES
I have reviewed discharge instructions with the patient. The patient verbalized understanding. Patient left ED via Discharge Method: stretcher to Home with [facility]. Opportunity for questions and clarification provided. Patient given 2 scripts. Pt in no acute distress at time of discharge       To continue your aftercare when you leave the hospital, you may receive an automated call from our care team to check in on how you are doing. This is a free service and part of our promise to provide the best care and service to meet your aftercare needs.  If you have questions, or wish to unsubscribe from this service please call [phone]. Thank you for Choosing our [facility] Emergency Department.

## 2019-03-18 NOTE — DISCHARGE INSTRUCTIONS
Patient Education        Urinary Tract Infection in Women: Care Instructions  Your Care Instructions    A urinary tract infection, or UTI, is a general term for an infection anywhere between the kidneys and the urethra (where urine comes out). Most UTIs are bladder infections. They often cause pain or burning when you urinate. UTIs are caused by bacteria and can be cured with antibiotics. Be sure to complete your treatment so that the infection goes away. Follow-up care is a key part of your treatment and safety. Be sure to make and go to all appointments, and call your doctor if you are having problems. It's also a good idea to know your test results and keep a list of the medicines you take. How can you care for yourself at home? · Take your antibiotics as directed. Do not stop taking them just because you feel better. You need to take the full course of antibiotics. · Drink extra water and other fluids for the next day or two. This may help wash out the bacteria that are causing the infection. (If you have kidney, heart, or liver disease and have to limit fluids, talk with your doctor before you increase your fluid intake.)  · Avoid drinks that are carbonated or have caffeine. They can irritate the bladder. · Urinate often. Try to empty your bladder each time. · To relieve pain, take a hot bath or lay a heating pad set on low over your lower belly or genital area. Never go to sleep with a heating pad in place. To prevent UTIs  · Drink plenty of water each day. This helps you urinate often, which clears bacteria from your system. (If you have kidney, heart, or liver disease and have to limit fluids, talk with your doctor before you increase your fluid intake.)  · Urinate when you need to. · Urinate right after you have sex. · Change sanitary pads often. · Avoid douches, bubble baths, feminine hygiene sprays, and other feminine hygiene products that have deodorants.   · After going to the bathroom, wipe from front to back. When should you call for help? Call your doctor now or seek immediate medical care if:    · Symptoms such as fever, chills, nausea, or vomiting get worse or appear for the first time.     · You have new pain in your back just below your rib cage. This is called flank pain.     · There is new blood or pus in your urine.     · You have any problems with your antibiotic medicine.    Watch closely for changes in your health, and be sure to contact your doctor if:    · You are not getting better after taking an antibiotic for 2 days.     · Your symptoms go away but then come back. Where can you learn more? Go to http://sri-hollie.info/. Enter I711 in the search box to learn more about \"Urinary Tract Infection in Women: Care Instructions. \"  Current as of: March 20, 2018  Content Version: 11.9  © 2563-5124 [employer]. Care instructions adapted under license by [employer] (which disclaims liability or warranty for this information). If you have questions about a medical condition or this instruction, always ask your healthcare professional. [address] any warranty or liability for your use of this information. Patient Education        Headache: Care Instructions  Your Care Instructions    Headaches have many possible causes. Most headaches aren't a sign of a more serious problem, and they will get better on their own. Home treatment may help you feel better faster. The doctor has checked you carefully, but problems can develop later. If you notice any problems or new symptoms, get medical treatment right away. Follow-up care is a key part of your treatment and safety. Be sure to make and go to all appointments, and call your doctor if you are having problems. It's also a good idea to know your test results and keep a list of the medicines you take. How can you care for yourself at home?   · Do not drive if you have taken a prescription pain medicine. · Rest in a quiet, dark room until your headache is gone. Close your eyes and try to relax or go to sleep. Don't watch TV or read. · Put a cold, moist cloth or cold pack on the painful area for 10 to 20 minutes at a time. Put a thin cloth between the cold pack and your skin. · Use a warm, moist towel or a heating pad set on low to relax tight shoulder and neck muscles. · Have someone gently massage your neck and shoulders. · Take pain medicines exactly as directed. ? If the doctor gave you a prescription medicine for pain, take it as prescribed. ? If you are not taking a prescription pain medicine, ask your doctor if you can take an over-the-counter medicine. · Be careful not to take pain medicine more often than the instructions allow, because you may get worse or more frequent headaches when the medicine wears off. · Do not ignore new symptoms that occur with a headache, such as a fever, weakness or numbness, vision changes, or confusion. These may be signs of a more serious problem. To prevent headaches  · Keep a headache diary so you can figure out what triggers your headaches. Avoiding triggers may help you prevent headaches. Record when each headache began, how long it lasted, and what the pain was like (throbbing, aching, stabbing, or dull). Write down any other symptoms you had with the headache, such as nausea, flashing lights or dark spots, or sensitivity to bright light or loud noise. Note if the headache occurred near your period. List anything that might have triggered the headache, such as certain foods (chocolate, cheese, wine) or odors, smoke, bright light, stress, or lack of sleep. · Find healthy ways to deal with stress. Headaches are most common during or right after stressful times. Take time to relax before and after you do something that has caused a headache in the past.  · Try to keep your muscles relaxed by keeping good posture.  Check your jaw, face, neck, and shoulder muscles for tension, and try relaxing them. When sitting at a desk, change positions often, and stretch for 30 seconds each hour. · Get plenty of sleep and exercise. · Eat regularly and well. Long periods without food can trigger a headache. · Treat yourself to a massage. Some people find that regular massages are very helpful in relieving tension. · Limit caffeine by not drinking too much coffee, tea, or soda. But don't quit caffeine suddenly, because that can also give you headaches. · Reduce eyestrain from computers by blinking frequently and looking away from the computer screen every so often. Make sure you have proper eyewear and that your monitor is set up properly, about an arm's length away. · Seek help if you have depression or anxiety. Your headaches may be linked to these conditions. Treatment can both prevent headaches and help with symptoms of anxiety or depression. When should you call for help? Call 911 anytime you think you may need emergency care. For example, call if:    · You have signs of a stroke. These may include:  ? Sudden numbness, paralysis, or weakness in your face, arm, or leg, especially on only one side of your body. ? Sudden vision changes. ? Sudden trouble speaking. ? Sudden confusion or trouble understanding simple statements. ? Sudden problems with walking or balance. ? A sudden, severe headache that is different from past headaches.    Call your doctor now or seek immediate medical care if:    · You have a new or worse headache.     · Your headache gets much worse. Where can you learn more? Go to http://sri-hollie.info/. Enter M271 in the search box to learn more about \"Headache: Care Instructions. \"  Current as of: Danielle 3, 2018  Content Version: 11.9  © 4005-7248 [employer]. Care instructions adapted under license by [employer] (which disclaims liability or warranty for this information).  If you have questions about a medical condition or this instruction, always ask your healthcare professional. [place] [address] any warranty or liability for your use of this information.

## 2019-03-18 NOTE — ED PROVIDER NOTES
Presents with headache and burning suprapubic area and persistent nausea and vomiting since discharge from the hospital.  Patient has a chronic indwelling Campbell and it was changed 6 days ago. She states she's had diarrhea. She denies any fever. She's had a Campbell for 3 years. She takes oxycodone at home for pain. The history is provided by the patient. Abdominal Pain    This is a new problem. The current episode started more than 2 days ago. The problem occurs constantly. The problem has been gradually worsening. The pain is associated with vomiting. The pain is located in the suprapubic region. The quality of the pain is burning. The pain is moderate. Associated symptoms include diarrhea, nausea, vomiting, dysuria and myalgias. Pertinent negatives include no fever and no constipation. Nothing worsens the pain. The pain is relieved by nothing. Her past medical history is significant for UTI. Past Medical History:   Diagnosis Date    Diabetes [facility] [address]     Gastrointestinal disorder     GERD    Hypertension     Ill-defined condition     neurogenic bladder    Ill-defined condition     transverse myelitis    Ill-defined condition     paraplegia    Liver disease     Hepatitis C    Psychiatric disorder     anxiety    Psychiatric disorder     bipolar    Thromboembolus ([facility] [address]        No past surgical history on file. No family history on file.     Social History     Socioeconomic History    Marital status:      Spouse name: Not on file    Number of children: Not on file    Years of education: Not on file    Highest education level: Not on file   Social Needs    Financial resource strain: Not on file    Food insecurity - worry: Not on file    Food insecurity - inability: Not on file    Transportation needs - medical: Not on file   [place] needs - non-medical: Not on file   Occupational History    Not on file   Tobacco Use    Smoking status: Current Every Day Smoker Packs/day: 0.50   Substance and Sexual Activity    Alcohol use: No    Drug use: No    Sexual activity: Not on file   Other Topics Concern    Not on file   Social History Narrative    Not on file         ALLERGIES: Patient has no known allergies. Review of Systems   Constitutional: Negative for chills and fever. Gastrointestinal: Positive for abdominal pain, diarrhea, nausea and vomiting. Negative for constipation. Genitourinary: Positive for dysuria. Musculoskeletal: Positive for myalgias. All other systems reviewed and are negative. Vitals:    03/18/19 0205 03/18/19 0227   BP: (!) 164/93    Pulse: 65    Resp: 20    Temp: 98.5 °F (36.9 °C)    SpO2: 95% 97%   Weight: 90.7 kg (200 lb)    Height: 5' 5\" (1.651 m)             Physical Exam   Constitutional: She is oriented to person, place, and time. She appears well-developed and well-nourished. No distress. HENT:   Head: Normocephalic and atraumatic. Eyes: Conjunctivae are normal. Pupils are equal, round, and reactive to light. Right eye exhibits no discharge. Left eye exhibits no discharge. Neck: Normal range of motion. Neck supple. Cardiovascular: Normal rate and regular rhythm. Pulmonary/Chest: Effort normal and breath sounds normal. No respiratory distress. Abdominal: Soft. She exhibits no distension. There is tenderness. Musculoskeletal: Normal range of motion. She exhibits no edema. Neurological: She is alert and oriented to person, place, and time. No cranial nerve deficit. Skin: Skin is warm and dry. Capillary refill takes less than 2 seconds. She is not diaphoretic. Psychiatric: Her mood appears anxious. Nursing note and vitals reviewed.        MDM  Number of Diagnoses or Management Options  Acute cystitis without hematuria:   Nonintractable headache, unspecified chronicity pattern, unspecified headache type:   Diagnosis management comments: Patient with elevated white count but normal lactic and procal.  Recently admitted for altered mental status and had a positive urine culture for Candida. She is on antifungal currently. No signs of dehydration with a complaint of nausea vomiting and diarrhea. Discharge home with vantin and phenergan.          Amount and/or Complexity of Data Reviewed  Clinical lab tests: ordered and reviewed  Review and summarize past medical records: yes  Independent visualization of images, tracings, or specimens: yes (NSR no ST elevation nl QRS)    Risk of Complications, Morbidity, and/or Mortality  Presenting problems: high  Diagnostic procedures: moderate  Management options: moderate    Patient Progress  Patient progress: improved         Procedures

## 2019-03-18 NOTE — ED TRIAGE NOTES
C/o head pain, n/v/d. Onset Friday. Discharged last Thursday after admission for delirium, back pain and insomnia. Pt reports diagnosed with uti at that time. Denies antibiotics at discharge. Denies fever or chills. Reports vaginal \"burning\". approx 1100 cc yellow urine noted to jasso bag on arrival.  Hx paraplegia.

## 2019-03-18 NOTE — ED TRIAGE NOTES
Pt arrives via [facility] from home with CC nausea and vomiting since discharge yesterday. Patient has current UTI, but not rico to fill prescription. Patient is diabetic and does not take her medications.      4mg zofran given left deltoid by EMS

## 2019-03-18 NOTE — ED PROVIDER NOTES
Patient has a history of chronic pain and an indwelling Campbell catheter. She states she has had vomiting and diarrhea since yesterday. She Was here last night for the same symptoms and discharged home. She states she went home and continue to have the same symptoms. She states that she also has had some burning \"down there\" with her indwelling Campbell catheter. She is on a fentanyl patch for chronic pain in her feet. She states she has been able to tolerate only water and ice. Elements of this note were made using speech recognition software. As such, errors of speech recognition may occur. Past Medical History:   Diagnosis Date    Diabetes ([facility]     Gastrointestinal disorder     GERD    Hypertension     Ill-defined condition     neurogenic bladder    Ill-defined condition     transverse myelitis    Ill-defined condition     paraplegia    Liver disease     Hepatitis C    Psychiatric disorder     anxiety    Psychiatric disorder     bipolar    Thromboembolus ([facility]        History reviewed. No pertinent surgical history. History reviewed. No pertinent family history. Social History     Socioeconomic History    Marital status:      Spouse name: Not on file    Number of children: Not on file    Years of education: Not on file    Highest education level: Not on file   Social Needs    Financial resource strain: Not on file    Food insecurity - worry: Not on file    Food insecurity - inability: Not on file    Transportation needs - medical: Not on file   [place] needs - non-medical: Not on file   Occupational History    Not on file   Tobacco Use    Smoking status: Current Every Day Smoker     Packs/day: 0.50   Substance and Sexual Activity    Alcohol use: No    Drug use: No    Sexual activity: Not on file   Other Topics Concern    Not on file   Social History Narrative    Not on file         ALLERGIES: Patient has no known allergies.     Review of Systems Constitutional: Negative for chills and fever. Gastrointestinal: Positive for diarrhea, nausea and vomiting. All other systems reviewed and are negative. Vitals:    03/18/19 1440   BP: 160/76   Pulse: 66   Resp: 20   Temp: 98.3 °F (36.8 °C)   SpO2: 95%            Physical Exam   Constitutional: She is oriented to person, place, and time. She appears well-developed and well-nourished. HENT:   Head: Normocephalic and atraumatic. Eyes: Conjunctivae are normal. Pupils are equal, round, and reactive to light. Neck: Normal range of motion. Neck supple. Cardiovascular: Normal rate and regular rhythm. Pulmonary/Chest: No respiratory distress. She has no wheezes. Abdominal: Soft. Bowel sounds are normal. She exhibits no distension. There is tenderness. Diffuse mild tenderness to palpation   Musculoskeletal: She exhibits no edema or tenderness. Neurological: She is alert and oriented to person, place, and time. Skin: Skin is warm and dry. Psychiatric: She has a normal mood and affect. Her behavior is normal.   Nursing note and vitals reviewed. MDM  Number of Diagnoses or Management Options  Chronic pain syndrome:   Nausea and vomiting, intractability of vomiting not specified, unspecified vomiting type: new and does not require workup  Diagnosis management comments: 3:38 PM Per patient, we have a very difficult time getting an IV in her. Per old records, she had normal labs and a normal head CT twelve hours ago.  She will be given Phenergan IM and we will reevaluate  5:06 PM Labs unchanged from yesterday, no vomiting while here       Amount and/or Complexity of Data Reviewed  Clinical lab tests: ordered and reviewed  Decide to obtain previous medical records or to obtain history from someone other than the patient: yes  Review and summarize past medical records: yes    Risk of Complications, Morbidity, and/or Mortality  Presenting problems: moderate  Diagnostic procedures: moderate  Management options: moderate    Patient Progress  Patient progress: improved         Procedures

## 2019-03-18 NOTE — ED NOTES
I have reviewed discharge instructions with the patient. The patient verbalized understanding. Patient left ED via Discharge sctrecher to Home with nirmala,   Opportunity for questions and clarification provided. Patient given 1 scripts. To continue your aftercare when you leave the hospital, you may receive an automated call from our care team to check in on how you are doing. This is a free service and part of our promise to provide the best care and service to meet your aftercare needs.  If you have questions, or wish to unsubscribe from this service please call [phone]. Thank you for Choosing our [facility] Emergency Department.

## 2019-03-22 LAB
BACTERIA SPEC CULT: ABNORMAL
SERVICE CMNT-IMP: ABNORMAL

## 2019-03-23 LAB
BACTERIA SPEC CULT: NORMAL
SERVICE CMNT-IMP: NORMAL

## 2019-03-31 ENCOUNTER — HOSPITAL ENCOUNTER (INPATIENT)
Age: 64
LOS: 8 days | Discharge: HOME HEALTH | DRG: 466 | End: 2019-04-09
Attending: EMERGENCY MEDICINE | Admitting: FAMILY MEDICINE
Payer: MEDICAID

## 2019-03-31 DIAGNOSIS — G82.20 PARAPLEGIA (HCC): Chronic | ICD-10-CM

## 2019-03-31 DIAGNOSIS — I48.91 ATRIAL FIBRILLATION WITH RVR (HCC): Primary | ICD-10-CM

## 2019-03-31 DIAGNOSIS — G93.40 ACUTE ENCEPHALOPATHY: ICD-10-CM

## 2019-03-31 LAB
BASOPHILS # BLD: 0.1 K/UL (ref 0–0.2)
BASOPHILS NFR BLD: 1 % (ref 0–2)
DIFFERENTIAL METHOD BLD: ABNORMAL
EOSINOPHIL # BLD: 0.2 K/UL (ref 0–0.8)
EOSINOPHIL NFR BLD: 1 % (ref 0.5–7.8)
ERYTHROCYTE [DISTWIDTH] IN BLOOD BY AUTOMATED COUNT: 14.7 % (ref 11.9–14.6)
HCT VFR BLD AUTO: 48.9 % (ref 35.8–46.3)
HGB BLD-MCNC: 15.7 G/DL (ref 11.7–15.4)
IMM GRANULOCYTES # BLD AUTO: 0.1 K/UL (ref 0–0.5)
IMM GRANULOCYTES NFR BLD AUTO: 1 % (ref 0–5)
LACTATE BLD-SCNC: 1.99 MMOL/L (ref 0.5–1.9)
LYMPHOCYTES # BLD: 3.1 K/UL (ref 0.5–4.6)
LYMPHOCYTES NFR BLD: 18 % (ref 13–44)
MCH RBC QN AUTO: 28.8 PG (ref 26.1–32.9)
MCHC RBC AUTO-ENTMCNC: 32.1 G/DL (ref 31.4–35)
MCV RBC AUTO: 89.7 FL (ref 79.6–97.8)
MONOCYTES # BLD: 1.1 K/UL (ref 0.1–1.3)
MONOCYTES NFR BLD: 6 % (ref 4–12)
NEUTS SEG # BLD: 12.7 K/UL (ref 1.7–8.2)
NEUTS SEG NFR BLD: 74 % (ref 43–78)
NRBC # BLD: 0 K/UL (ref 0–0.2)
PLATELET # BLD AUTO: 288 K/UL (ref 150–450)
PMV BLD AUTO: 12.2 FL (ref 9.4–12.3)
RBC # BLD AUTO: 5.45 M/UL (ref 4.05–5.2)
TROPONIN I BLD-MCNC: 0 NG/ML (ref 0.02–0.05)
WBC # BLD AUTO: 17.2 K/UL (ref 4.3–11.1)

## 2019-03-31 PROCEDURE — 84484 ASSAY OF TROPONIN QUANT: CPT

## 2019-03-31 PROCEDURE — 3E0A3GC INTRODUCTION OF OTHER THERAPEUTIC SUBSTANCE INTO BONE MARROW, PERCUTANEOUS APPROACH: ICD-10-PCS | Performed by: EMERGENCY MEDICINE

## 2019-03-31 PROCEDURE — 74011000258 HC RX REV CODE- 258: Performed by: EMERGENCY MEDICINE

## 2019-03-31 PROCEDURE — 85025 COMPLETE CBC W/AUTO DIFF WBC: CPT

## 2019-03-31 PROCEDURE — 75810000304 HC PLACE NEED INTRAOSSEOUS INFUS: Performed by: EMERGENCY MEDICINE

## 2019-03-31 PROCEDURE — 74011000250 HC RX REV CODE- 250: Performed by: EMERGENCY MEDICINE

## 2019-03-31 PROCEDURE — 96375 TX/PRO/DX INJ NEW DRUG ADDON: CPT | Performed by: EMERGENCY MEDICINE

## 2019-03-31 PROCEDURE — 80053 COMPREHEN METABOLIC PANEL: CPT

## 2019-03-31 PROCEDURE — 96376 TX/PRO/DX INJ SAME DRUG ADON: CPT | Performed by: EMERGENCY MEDICINE

## 2019-03-31 PROCEDURE — 83605 ASSAY OF LACTIC ACID: CPT

## 2019-03-31 PROCEDURE — 74011250636 HC RX REV CODE- 250/636: Performed by: EMERGENCY MEDICINE

## 2019-03-31 PROCEDURE — 99285 EMERGENCY DEPT VISIT HI MDM: CPT | Performed by: EMERGENCY MEDICINE

## 2019-03-31 PROCEDURE — 93005 ELECTROCARDIOGRAM TRACING: CPT | Performed by: EMERGENCY MEDICINE

## 2019-03-31 PROCEDURE — 96374 THER/PROPH/DIAG INJ IV PUSH: CPT | Performed by: EMERGENCY MEDICINE

## 2019-03-31 PROCEDURE — 87040 BLOOD CULTURE FOR BACTERIA: CPT

## 2019-03-31 RX ORDER — DILTIAZEM HYDROCHLORIDE 5 MG/ML
10 INJECTION INTRAVENOUS ONCE
Status: COMPLETED | OUTPATIENT
Start: 2019-03-31 | End: 2019-03-31

## 2019-03-31 RX ORDER — LORAZEPAM 2 MG/ML
1 INJECTION INTRAMUSCULAR
Status: COMPLETED | OUTPATIENT
Start: 2019-03-31 | End: 2019-03-31

## 2019-03-31 RX ORDER — MORPHINE SULFATE 4 MG/ML
4 INJECTION INTRAVENOUS ONCE
Status: COMPLETED | OUTPATIENT
Start: 2019-03-31 | End: 2019-03-31

## 2019-03-31 RX ADMIN — DILTIAZEM HYDROCHLORIDE 10 MG: 5 INJECTION INTRAVENOUS at 22:51

## 2019-03-31 RX ADMIN — DILTIAZEM HYDROCHLORIDE 10 MG: 5 INJECTION INTRAVENOUS at 22:43

## 2019-03-31 RX ADMIN — LORAZEPAM 1 MG: 2 INJECTION INTRAMUSCULAR; INTRAVENOUS at 23:38

## 2019-03-31 RX ADMIN — DILTIAZEM HYDROCHLORIDE 5 MG/HR: 5 INJECTION INTRAVENOUS at 23:52

## 2019-03-31 RX ADMIN — MORPHINE SULFATE 4 MG: 4 INJECTION INTRAVENOUS at 23:38

## 2019-04-01 ENCOUNTER — APPOINTMENT (OUTPATIENT)
Dept: GENERAL RADIOLOGY | Age: 64
DRG: 466 | End: 2019-04-01
Attending: EMERGENCY MEDICINE
Payer: MEDICAID

## 2019-04-01 LAB
ALBUMIN SERPL-MCNC: 2.9 G/DL (ref 3.2–4.6)
ALBUMIN/GLOB SERPL: 0.6 {RATIO} (ref 1.2–3.5)
ALP SERPL-CCNC: 127 U/L (ref 50–136)
ALT SERPL-CCNC: 16 U/L (ref 12–65)
ANION GAP SERPL CALC-SCNC: 13 MMOL/L (ref 7–16)
ANION GAP SERPL CALC-SCNC: 14 MMOL/L (ref 7–16)
AST SERPL-CCNC: 13 U/L (ref 15–37)
ATRIAL RATE: 106 BPM
BASOPHILS # BLD: 0.1 K/UL (ref 0–0.2)
BASOPHILS NFR BLD: 0 % (ref 0–2)
BILIRUB SERPL-MCNC: 0.7 MG/DL (ref 0.2–1.1)
BUN SERPL-MCNC: 12 MG/DL (ref 8–23)
BUN SERPL-MCNC: 12 MG/DL (ref 8–23)
CALCIUM SERPL-MCNC: 8.9 MG/DL (ref 8.3–10.4)
CALCIUM SERPL-MCNC: 9 MG/DL (ref 8.3–10.4)
CALCULATED P AXIS, ECG09: 64 DEGREES
CALCULATED R AXIS, ECG10: -18 DEGREES
CALCULATED T AXIS, ECG11: 81 DEGREES
CHLORIDE SERPL-SCNC: 102 MMOL/L (ref 98–107)
CHLORIDE SERPL-SCNC: 99 MMOL/L (ref 98–107)
CO2 SERPL-SCNC: 20 MMOL/L (ref 21–32)
CO2 SERPL-SCNC: 22 MMOL/L (ref 21–32)
CREAT SERPL-MCNC: 0.72 MG/DL (ref 0.6–1)
CREAT SERPL-MCNC: 0.75 MG/DL (ref 0.6–1)
DIAGNOSIS, 93000: NORMAL
DIFFERENTIAL METHOD BLD: ABNORMAL
EOSINOPHIL # BLD: 0.1 K/UL (ref 0–0.8)
EOSINOPHIL NFR BLD: 1 % (ref 0.5–7.8)
ERYTHROCYTE [DISTWIDTH] IN BLOOD BY AUTOMATED COUNT: 14.6 % (ref 11.9–14.6)
GLOBULIN SER CALC-MCNC: 5.2 G/DL (ref 2.3–3.5)
GLUCOSE BLD STRIP.AUTO-MCNC: 154 MG/DL (ref 65–100)
GLUCOSE BLD STRIP.AUTO-MCNC: 197 MG/DL (ref 65–100)
GLUCOSE BLD STRIP.AUTO-MCNC: 224 MG/DL (ref 65–100)
GLUCOSE BLD STRIP.AUTO-MCNC: 293 MG/DL (ref 65–100)
GLUCOSE BLD STRIP.AUTO-MCNC: 380 MG/DL (ref 65–100)
GLUCOSE BLD STRIP.AUTO-MCNC: 382 MG/DL (ref 65–100)
GLUCOSE SERPL-MCNC: 359 MG/DL (ref 65–100)
GLUCOSE SERPL-MCNC: 454 MG/DL (ref 65–100)
HCT VFR BLD AUTO: 47.7 % (ref 35.8–46.3)
HGB BLD-MCNC: 14.9 G/DL (ref 11.7–15.4)
IMM GRANULOCYTES # BLD AUTO: 0.1 K/UL (ref 0–0.5)
IMM GRANULOCYTES NFR BLD AUTO: 1 % (ref 0–5)
LYMPHOCYTES # BLD: 2.2 K/UL (ref 0.5–4.6)
LYMPHOCYTES NFR BLD: 14 % (ref 13–44)
MCH RBC QN AUTO: 28.5 PG (ref 26.1–32.9)
MCHC RBC AUTO-ENTMCNC: 31.2 G/DL (ref 31.4–35)
MCV RBC AUTO: 91.2 FL (ref 79.6–97.8)
MONOCYTES # BLD: 0.7 K/UL (ref 0.1–1.3)
MONOCYTES NFR BLD: 4 % (ref 4–12)
NEUTS SEG # BLD: 12.6 K/UL (ref 1.7–8.2)
NEUTS SEG NFR BLD: 80 % (ref 43–78)
NRBC # BLD: 0 K/UL (ref 0–0.2)
P-R INTERVAL, ECG05: 146 MS
PLATELET # BLD AUTO: 301 K/UL (ref 150–450)
PMV BLD AUTO: 12 FL (ref 9.4–12.3)
POTASSIUM SERPL-SCNC: 3.3 MMOL/L (ref 3.5–5.1)
POTASSIUM SERPL-SCNC: 3.8 MMOL/L (ref 3.5–5.1)
PROT SERPL-MCNC: 8.1 G/DL (ref 6.3–8.2)
Q-T INTERVAL, ECG07: 304 MS
QRS DURATION, ECG06: 78 MS
QTC CALCULATION (BEZET), ECG08: 403 MS
RBC # BLD AUTO: 5.23 M/UL (ref 4.05–5.2)
SODIUM SERPL-SCNC: 133 MMOL/L (ref 136–145)
SODIUM SERPL-SCNC: 137 MMOL/L (ref 136–145)
VENTRICULAR RATE, ECG03: 106 BPM
WBC # BLD AUTO: 15.8 K/UL (ref 4.3–11.1)

## 2019-04-01 PROCEDURE — 74011250636 HC RX REV CODE- 250/636: Performed by: FAMILY MEDICINE

## 2019-04-01 PROCEDURE — 74011250637 HC RX REV CODE- 250/637: Performed by: FAMILY MEDICINE

## 2019-04-01 PROCEDURE — 87088 URINE BACTERIA CULTURE: CPT

## 2019-04-01 PROCEDURE — 36415 COLL VENOUS BLD VENIPUNCTURE: CPT

## 2019-04-01 PROCEDURE — 65660000000 HC RM CCU STEPDOWN

## 2019-04-01 PROCEDURE — 74011000258 HC RX REV CODE- 258: Performed by: FAMILY MEDICINE

## 2019-04-01 PROCEDURE — 87086 URINE CULTURE/COLONY COUNT: CPT

## 2019-04-01 PROCEDURE — 80048 BASIC METABOLIC PNL TOTAL CA: CPT

## 2019-04-01 PROCEDURE — 77030037759

## 2019-04-01 PROCEDURE — 76937 US GUIDE VASCULAR ACCESS: CPT

## 2019-04-01 PROCEDURE — 71045 X-RAY EXAM CHEST 1 VIEW: CPT

## 2019-04-01 PROCEDURE — 74011000250 HC RX REV CODE- 250: Performed by: FAMILY MEDICINE

## 2019-04-01 PROCEDURE — C9113 INJ PANTOPRAZOLE SODIUM, VIA: HCPCS | Performed by: FAMILY MEDICINE

## 2019-04-01 PROCEDURE — 82962 GLUCOSE BLOOD TEST: CPT

## 2019-04-01 PROCEDURE — 85025 COMPLETE CBC W/AUTO DIFF WBC: CPT

## 2019-04-01 PROCEDURE — 87186 SC STD MICRODIL/AGAR DIL: CPT

## 2019-04-01 PROCEDURE — 74011636637 HC RX REV CODE- 636/637: Performed by: FAMILY MEDICINE

## 2019-04-01 PROCEDURE — 77030019605

## 2019-04-01 RX ORDER — BUDESONIDE 0.5 MG/2ML
500 INHALANT ORAL
Status: DISCONTINUED | OUTPATIENT
Start: 2019-04-01 | End: 2019-04-09 | Stop reason: HOSPADM

## 2019-04-01 RX ORDER — PHENAZOPYRIDINE HYDROCHLORIDE 95 MG/1
95 TABLET ORAL
Status: DISCONTINUED | OUTPATIENT
Start: 2019-04-01 | End: 2019-04-09 | Stop reason: HOSPADM

## 2019-04-01 RX ORDER — INSULIN LISPRO 100 [IU]/ML
0-10 INJECTION, SOLUTION INTRAVENOUS; SUBCUTANEOUS
Status: DISCONTINUED | OUTPATIENT
Start: 2019-04-01 | End: 2019-04-01

## 2019-04-01 RX ORDER — FENTANYL 25 UG/1
1 PATCH TRANSDERMAL
Status: DISCONTINUED | OUTPATIENT
Start: 2019-04-01 | End: 2019-04-09 | Stop reason: HOSPADM

## 2019-04-01 RX ORDER — INSULIN LISPRO 100 [IU]/ML
0-15 INJECTION, SOLUTION INTRAVENOUS; SUBCUTANEOUS
Status: DISCONTINUED | OUTPATIENT
Start: 2019-04-01 | End: 2019-04-09 | Stop reason: HOSPADM

## 2019-04-01 RX ORDER — INSULIN GLARGINE 100 [IU]/ML
20 INJECTION, SOLUTION SUBCUTANEOUS
Status: DISCONTINUED | OUTPATIENT
Start: 2019-04-01 | End: 2019-04-01

## 2019-04-01 RX ORDER — SODIUM CHLORIDE 0.9 % (FLUSH) 0.9 %
5-40 SYRINGE (ML) INJECTION AS NEEDED
Status: DISCONTINUED | OUTPATIENT
Start: 2019-04-01 | End: 2019-04-09 | Stop reason: HOSPADM

## 2019-04-01 RX ORDER — LORAZEPAM 2 MG/ML
0.5 INJECTION INTRAMUSCULAR
Status: DISCONTINUED | OUTPATIENT
Start: 2019-04-01 | End: 2019-04-02

## 2019-04-01 RX ORDER — MORPHINE SULFATE 2 MG/ML
2 INJECTION, SOLUTION INTRAMUSCULAR; INTRAVENOUS
Status: DISCONTINUED | OUTPATIENT
Start: 2019-04-01 | End: 2019-04-08

## 2019-04-01 RX ORDER — BUDESONIDE 0.5 MG/2ML
500 INHALANT ORAL 2 TIMES DAILY
Status: DISCONTINUED | OUTPATIENT
Start: 2019-04-01 | End: 2019-04-01

## 2019-04-01 RX ORDER — ATROPA BELLADONNA AND OPIUM 16.2; 6 MG/1; MG/1
1 SUPPOSITORY RECTAL
Status: DISCONTINUED | OUTPATIENT
Start: 2019-04-01 | End: 2019-04-09 | Stop reason: HOSPADM

## 2019-04-01 RX ORDER — ACETAMINOPHEN 325 MG/1
650 TABLET ORAL
Status: DISCONTINUED | OUTPATIENT
Start: 2019-04-01 | End: 2019-04-09 | Stop reason: HOSPADM

## 2019-04-01 RX ORDER — FLECAINIDE ACETATE 100 MG/1
50 TABLET ORAL EVERY 12 HOURS
Status: DISCONTINUED | OUTPATIENT
Start: 2019-04-01 | End: 2019-04-09 | Stop reason: HOSPADM

## 2019-04-01 RX ORDER — INSULIN GLARGINE 100 [IU]/ML
5 INJECTION, SOLUTION SUBCUTANEOUS
Status: DISCONTINUED | OUTPATIENT
Start: 2019-04-01 | End: 2019-04-04

## 2019-04-01 RX ORDER — INSULIN GLARGINE 100 [IU]/ML
5 INJECTION, SOLUTION SUBCUTANEOUS
Status: DISCONTINUED | OUTPATIENT
Start: 2019-04-02 | End: 2019-04-01

## 2019-04-01 RX ORDER — ONDANSETRON 2 MG/ML
4 INJECTION INTRAMUSCULAR; INTRAVENOUS
Status: DISCONTINUED | OUTPATIENT
Start: 2019-04-01 | End: 2019-04-09 | Stop reason: HOSPADM

## 2019-04-01 RX ORDER — METOPROLOL TARTRATE 5 MG/5ML
5 INJECTION INTRAVENOUS ONCE
Status: COMPLETED | OUTPATIENT
Start: 2019-04-01 | End: 2019-04-01

## 2019-04-01 RX ORDER — METOPROLOL TARTRATE 5 MG/5ML
5 INJECTION INTRAVENOUS EVERY 6 HOURS
Status: DISCONTINUED | OUTPATIENT
Start: 2019-04-01 | End: 2019-04-02

## 2019-04-01 RX ORDER — METOPROLOL TARTRATE 5 MG/5ML
2.5 INJECTION INTRAVENOUS
Status: DISCONTINUED | OUTPATIENT
Start: 2019-04-01 | End: 2019-04-03

## 2019-04-01 RX ORDER — SODIUM CHLORIDE 9 MG/ML
50 INJECTION, SOLUTION INTRAVENOUS CONTINUOUS
Status: DISCONTINUED | OUTPATIENT
Start: 2019-04-01 | End: 2019-04-03

## 2019-04-01 RX ORDER — METOPROLOL SUCCINATE 50 MG/1
50 TABLET, EXTENDED RELEASE ORAL DAILY
Status: DISCONTINUED | OUTPATIENT
Start: 2019-04-01 | End: 2019-04-01

## 2019-04-01 RX ORDER — ENOXAPARIN SODIUM 100 MG/ML
1 INJECTION SUBCUTANEOUS EVERY 12 HOURS
Status: DISCONTINUED | OUTPATIENT
Start: 2019-04-01 | End: 2019-04-07

## 2019-04-01 RX ORDER — QUETIAPINE FUMARATE 100 MG/1
300 TABLET, FILM COATED ORAL
Status: DISCONTINUED | OUTPATIENT
Start: 2019-04-01 | End: 2019-04-09 | Stop reason: HOSPADM

## 2019-04-01 RX ORDER — INSULIN LISPRO 100 [IU]/ML
15 INJECTION, SOLUTION INTRAVENOUS; SUBCUTANEOUS ONCE
Status: COMPLETED | OUTPATIENT
Start: 2019-04-01 | End: 2019-04-01

## 2019-04-01 RX ORDER — PANTOPRAZOLE SODIUM 40 MG/10ML
40 INJECTION, POWDER, LYOPHILIZED, FOR SOLUTION INTRAVENOUS EVERY 12 HOURS
Status: DISCONTINUED | OUTPATIENT
Start: 2019-04-01 | End: 2019-04-08

## 2019-04-01 RX ORDER — SODIUM CHLORIDE 0.9 % (FLUSH) 0.9 %
5-40 SYRINGE (ML) INJECTION EVERY 8 HOURS
Status: DISCONTINUED | OUTPATIENT
Start: 2019-04-01 | End: 2019-04-09 | Stop reason: HOSPADM

## 2019-04-01 RX ORDER — ALPRAZOLAM 0.5 MG/1
0.5 TABLET ORAL
Status: DISCONTINUED | OUTPATIENT
Start: 2019-04-01 | End: 2019-04-04

## 2019-04-01 RX ORDER — ALBUTEROL SULFATE 0.83 MG/ML
2.5 SOLUTION RESPIRATORY (INHALATION)
Status: DISCONTINUED | OUTPATIENT
Start: 2019-04-01 | End: 2019-04-09 | Stop reason: HOSPADM

## 2019-04-01 RX ORDER — BISACODYL 5 MG
5 TABLET, DELAYED RELEASE (ENTERIC COATED) ORAL
Status: DISCONTINUED | OUTPATIENT
Start: 2019-04-01 | End: 2019-04-09 | Stop reason: HOSPADM

## 2019-04-01 RX ADMIN — INSULIN GLARGINE 20 UNITS: 100 INJECTION, SOLUTION SUBCUTANEOUS at 02:30

## 2019-04-01 RX ADMIN — SODIUM CHLORIDE 100 ML/HR: 900 INJECTION, SOLUTION INTRAVENOUS at 01:16

## 2019-04-01 RX ADMIN — MORPHINE SULFATE 2 MG: 2 INJECTION, SOLUTION INTRAMUSCULAR; INTRAVENOUS at 13:40

## 2019-04-01 RX ADMIN — METOPROLOL TARTRATE 5 MG: 5 INJECTION INTRAVENOUS at 07:43

## 2019-04-01 RX ADMIN — METOPROLOL TARTRATE 2.5 MG: 5 INJECTION INTRAVENOUS at 18:12

## 2019-04-01 RX ADMIN — LORAZEPAM 0.5 MG: 2 INJECTION INTRAMUSCULAR; INTRAVENOUS at 18:44

## 2019-04-01 RX ADMIN — INSULIN LISPRO 6 UNITS: 100 INJECTION, SOLUTION INTRAVENOUS; SUBCUTANEOUS at 23:20

## 2019-04-01 RX ADMIN — SODIUM CHLORIDE 1 G: 900 INJECTION, SOLUTION INTRAVENOUS at 21:59

## 2019-04-01 RX ADMIN — Medication 10 ML: at 22:02

## 2019-04-01 RX ADMIN — INSULIN LISPRO 10 UNITS: 100 INJECTION, SOLUTION INTRAVENOUS; SUBCUTANEOUS at 02:44

## 2019-04-01 RX ADMIN — INSULIN LISPRO 15 UNITS: 100 INJECTION, SOLUTION INTRAVENOUS; SUBCUTANEOUS at 05:21

## 2019-04-01 RX ADMIN — INSULIN LISPRO 3 UNITS: 100 INJECTION, SOLUTION INTRAVENOUS; SUBCUTANEOUS at 17:27

## 2019-04-01 RX ADMIN — INSULIN LISPRO 3 UNITS: 100 INJECTION, SOLUTION INTRAVENOUS; SUBCUTANEOUS at 12:18

## 2019-04-01 RX ADMIN — INSULIN GLARGINE 5 UNITS: 100 INJECTION, SOLUTION SUBCUTANEOUS at 23:20

## 2019-04-01 RX ADMIN — ENOXAPARIN SODIUM 90 MG: 100 INJECTION SUBCUTANEOUS at 17:27

## 2019-04-01 RX ADMIN — INSULIN LISPRO 9 UNITS: 100 INJECTION, SOLUTION INTRAVENOUS; SUBCUTANEOUS at 07:49

## 2019-04-01 RX ADMIN — INSULIN GLARGINE 20 UNITS: 100 INJECTION, SOLUTION SUBCUTANEOUS at 02:40

## 2019-04-01 RX ADMIN — METOPROLOL TARTRATE 5 MG: 5 INJECTION INTRAVENOUS at 13:40

## 2019-04-01 RX ADMIN — BUDESONIDE 500 MCG: 0.5 INHALANT RESPIRATORY (INHALATION) at 07:56

## 2019-04-01 RX ADMIN — SODIUM CHLORIDE 100 ML/HR: 900 INJECTION, SOLUTION INTRAVENOUS at 17:34

## 2019-04-01 RX ADMIN — CEFTRIAXONE SODIUM 1 G: 1 INJECTION, POWDER, FOR SOLUTION INTRAMUSCULAR; INTRAVENOUS at 02:41

## 2019-04-01 RX ADMIN — PANTOPRAZOLE SODIUM 40 MG: 40 INJECTION, POWDER, FOR SOLUTION INTRAVENOUS at 22:00

## 2019-04-01 RX ADMIN — PANTOPRAZOLE SODIUM 40 MG: 40 INJECTION, POWDER, FOR SOLUTION INTRAVENOUS at 07:43

## 2019-04-01 RX ADMIN — MORPHINE SULFATE 2 MG: 2 INJECTION, SOLUTION INTRAMUSCULAR; INTRAVENOUS at 17:45

## 2019-04-01 RX ADMIN — METOPROLOL TARTRATE 5 MG: 5 INJECTION INTRAVENOUS at 22:00

## 2019-04-01 RX ADMIN — SODIUM CHLORIDE 1 G: 900 INJECTION, SOLUTION INTRAVENOUS at 07:57

## 2019-04-01 RX ADMIN — Medication 10 ML: at 14:00

## 2019-04-01 NOTE — PROGRESS NOTES
TRANSFER - OUT REPORT: 
 
Verbal report given to Anthony RN(name) on Nino Morrison  being transferred to [facility](unit) for routine progression of care Report consisted of patients Situation, Background, Assessment and  
Recommendations(SBAR). Information from the following report(s) SBAR, Kardex, ED Summary, Intake/Output, MAR, Accordion and Recent Results was reviewed with the receiving nurse. Lines:  
Intraosseous Line 03/31/19 Right;Tibia (Active) Site Assessment Clean, dry, & intact 4/1/2019  7:59 AM  
Dressing Status Clean, dry, & intact 4/1/2019  7:59 AM  
Dressing Type Transparent 4/1/2019  7:59 AM  
Status Infusing 4/1/2019  7:59 AM  
  
 
Opportunity for questions and clarification was provided. Patient transported with: 
 komoot

## 2019-04-01 NOTE — INTERDISCIPLINARY ROUNDS
Interdisciplinary team rounds were held 4/1/2019 with the following team members:Care Management, Nursing and Occupational Therapy and the patient. Plan of care discussed. See clinical pathway and/or care plan for interventions and desired outcomes.

## 2019-04-01 NOTE — PROGRESS NOTES
SPEECH PATHOLOGY NOTE: 
 
Speech therapy consult received and chart reviewed. Attempted to see patient for bedside swallow evaluation, however currently getting cleaned up. Will re-attempt at later time/date as schedule permits and patient available.   
 
 
Obey Ochoa, [facility], [facility], CF-SLP

## 2019-04-01 NOTE — PROGRESS NOTES
Skin assessment completed with secondary RN. Overall skin is dry and flaky. BUE with scattered scabs/healing. LUE with fentanyl patch. Sacrum with small red area with peeling skin. Sacrum red, blanchable with allevyn present. BLE edematous with trace edema. Mid lower abdomen with opening for suprapubic cath. Site C/D/I.

## 2019-04-01 NOTE — CDMP QUERY
Pt admitted with UTI/ Pt noted to have chronic Jasso catheter or previous procedure (date , post jasso). If possible, please document in the progress notes and d/c summary if you are evaluating and / or treating any of the following: 
 
? UTI due to chronic Jasso catheter 
? UTI due to previous Jasso catheter (date) ? UTI not due to Jasso ? Other ? Clinically unable to determine The medical record reflects the following: 
 
   Risk Factors: chronic indwelling sp catheter, uti, Clinical Indicators: hx of uti's,- on abx for uti per hospice. New sample sent. wbc- 15.8, Treatment: rocephin iv , sp cath changed this admission in ER. augmentin by hospice at home. Thank you, Zainab Burch [facility] 
[phone].

## 2019-04-01 NOTE — ROUTINE PROCESS
TRANSFER - OUT REPORT: 
 
Verbal report given to Pramod Cope RN on Nino Morrison  being transferred to [phone] for routine progression of care Report consisted of patients Situation, Background, Assessment and  
Recommendations(SBAR). Information from the following report(s) SBAR, ED Summary, [facility] and Recent Results was reviewed with the receiving nurse. Lines:  
Intraosseous Line 03/31/19 Right;Tibia (Active) Opportunity for questions and clarification was provided. Patient transported with: 
 [place]

## 2019-04-01 NOTE — CDMP QUERY
Pt admitted with htn and irregular heart beat. /Pt noted to have afib with rvr and possible uti. Patient confused. If possible, please document in the progress notes and d/c summary if you are evaluating and / or treating any of the following: ? Hypertensive Encephalopathy ? Metabolic Encephalopathy ? Septic Encephalopathy ? Toxic Encephalopathy 
? Encephalopathy due to medications or drugs (please specify) ? Toxic Metabolic Encephalopathy 
? Other Encephalopathy 
? Other, please specify ? Clinically unable to determine The medical record reflects the following: 
 
   Risk Factors: chronic indwelling sp catheter. Frequent uti's with possible uti this admissoin, htn. afib with rvr. Clinical Indicators: awaiting ua and culture obtained on admission. Stating uti in H&P. Increased confusion. ER stating acute encephalopathy. Treatment: iv abx, sp catheter change upon admission. Labs, cultures. Thank you, Justice Getting [address] [facility] 
[phone]

## 2019-04-01 NOTE — PROGRESS NOTES
Problem: Falls - Risk of 
Goal: *Absence of Falls Description Document Dave Reas Fall Risk and appropriate interventions in the flowsheet. Outcome: Progressing Towards Goal 
  
Problem: Patient Education: Go to Patient Education Activity Goal: Patient/Family Education Outcome: Progressing Towards Goal 
  
Problem: Pressure Injury - Risk of 
Goal: *Prevention of pressure injury Description Document Ras Scale and appropriate interventions in the flowsheet. Outcome: Progressing Towards Goal 
  
Problem: Patient Education: Go to Patient Education Activity Goal: Patient/Family Education Outcome: Progressing Towards Goal

## 2019-04-01 NOTE — ED PROVIDER NOTES
Patient has been a hospice patient. The patient, by her report wishes to be a hospice patient, though she additionally states she wants us to help her. She does not wish for us to provide no care. She was sent to our department with some history that her son had initiated this transition. I have explained to her that at this point, we will be approaching a moving forward as an inpatient status. She denies any chest pain. She purposely gives answers, but in between calls out for her mother. History provided from hospice nurse, Delmy Mayes: Is on hospice now since March 21 for COPD. She has multiple comorbidities. She was seen by hospice nurse yesterday. At that time. Report was she might be having some chills and was somewhat lethargic. There was blood noted in her urine. She had had very poor oral intake and they felt as though she might be impacted. She does have a suprapubic catheter and the physician was notified and Augmentin was begun. Medications are reviewed and seem consistent with what her INR list.  Addition to COPD. They have narcotic dependence, embolic events, bipolar disease, transverse myelitis, hepatitis C The history is provided by the patient. Hypertension Chronicity: uncertain. The problem has not changed since onset. Associated symptoms include confusion. Pertinent negatives include no chest pain, no orthopnea and no shortness of breath. Risk factors include no risk factors. Past Medical History:  
Diagnosis Date  Diabetes ([address]  Gastrointestinal disorder GERD  Hypertension  Ill-defined condition   
 neurogenic bladder  Ill-defined condition   
 transverse myelitis  Ill-defined condition   
 paraplegia  Liver disease Hepatitis C  
 Psychiatric disorder   
 anxiety  Psychiatric disorder   
 bipolar  Thromboembolus ([address] No past surgical history on file. No family history on file. Social History Socioeconomic History  Marital status:  Spouse name: Not on file  Number of children: Not on file  Years of education: Not on file  Highest education level: Not on file Occupational History  Not on file Social Needs  Financial resource strain: Not on file  Food insecurity:  
  Worry: Not on file Inability: Not on file  Transportation needs:  
  Medical: Not on file Non-medical: Not on file Tobacco Use  Smoking status: Current Every Day Smoker Packs/day: 0.50 Substance and Sexual Activity  Alcohol use: No  
 Drug use: No  
 Sexual activity: Not on file Lifestyle  Physical activity:  
  Days per week: Not on file Minutes per session: Not on file  Stress: Not on file Relationships  Social connections:  
  Talks on phone: Not on file Gets together: Not on file Attends Protestant service: Not on file Active member of club or organization: Not on file Attends meetings of clubs or organizations: Not on file Relationship status: Not on file  Intimate partner violence:  
  Fear of current or ex partner: Not on file Emotionally abused: Not on file Physically abused: Not on file Forced sexual activity: Not on file Other Topics Concern  Not on file Social History Narrative  Not on file ALLERGIES: Patient has no known allergies. Review of Systems Unable to perform ROS: Other (confusion) Constitutional: Negative for chills and fever. Report from EMS of poor by mouth intake Respiratory: Negative for shortness of breath. Cardiovascular: Negative for chest pain and orthopnea. Gastrointestinal: Positive for abdominal pain. Neurological: Negative for facial asymmetry. Psychiatric/Behavioral: Positive for confusion and decreased concentration. Vitals:  
 03/31/19 2035 03/31/19 2142 BP: (!) 189/102 (!) 156/107 Pulse: (!) 102 (!) 182 Resp: 20 21  
 Temp: 98.3 °F (36.8 °C) SpO2: 92% 94% Weight: 90.7 kg (200 lb) Height: 5' 5\" (1.651 m) Physical Exam  
Constitutional:  
Cries out  - \"please\"/ \"momma\"/ \"will you please\" HENT:  
Head: Atraumatic. Eyes: No scleral icterus. Neck: Neck supple. Cardiovascular: An irregularly irregular rhythm present. Pulses: 
     Femoral pulses are 2+ on the right side, and 2+ on the left side. Pulmonary/Chest: Effort normal.  
Abdominal: Soft. She exhibits no distension. There is no tenderness. Musculoskeletal: She exhibits no edema. Neurological: She is alert. Skin: She is not diaphoretic. No erythema. Psychiatric: She is agitated and actively hallucinating. She is not aggressive, not hyperactive and not combative. She is inattentive. Nursing note and vitals reviewed. MDM Number of Diagnoses or Management Options Diagnosis management comments: Recently place/began on hospice. Was evidently was terminated to come to the emergency room today. Denies any chest pain. Possibly has a urinary tract infection. Does have an existing suprapubic catheter. Unaware of any fever. Discussed before proceeding due to difficulty in access whether patient wished interventions. Patient willing to allow minimal amount of interventions. Not expressing a wish for no interventions to be done Current medication (new): Augmentin, begun yesterday Amount and/or Complexity of Data Reviewed Clinical lab tests: ordered and reviewed Independent visualization of images, tracings, or specimens: yes Risk of Complications, Morbidity, and/or Mortality Presenting problems: high Diagnostic procedures: low Management options: moderate Critical Care Total time providing critical care: (=================================================================== This patient is critically ill and there is a high probability of of imminent or life threatening deterioration in the patient's condition without immediate management. The nature of the patient's clinical problem is: AMS/ atrial fib with RVR I have spent 50 minutes in direct patient care, documentation, review of labs/xrays/old records, discussion with Staff, Nursing, hospitalist . The time involved in the performance of separately reportable procedures was not counted toward critical care time. Josy Ruiz MD; 3/31/2019 @11:50 PM 
=================================================================== 
 
) Intraosseous Insertion Date/Time: 3/31/2019 10:00 PM 
Performed by: Xiomara Martin MD 
Authorized by: Xiomara Martin MD  
Consent: Verbal consent obtained. Risks and benefits: risks, benefits and alternatives were discussed Consent given by: patient Patient understanding: patient states understanding of the procedure being performed Patient consent: the patient's understanding of the procedure matches consent given Procedure consent: procedure consent matches procedure scheduled Site marked: the operative site was marked Imaging studies: imaging studies available Required items: required blood products, implants, devices, and special equipment available Patient identity confirmed: verbally with patient and arm band Time out: Immediately prior to procedure a \"time out\" was called to verify the correct patient, procedure, equipment, support staff and site/side marked as required. Indications: vascular access Preparation: skin prepped with chlorhexidine, skin prepped with alcohol and sterile field established Location details: proximal tibia, right IO Size: 16 G Number of attempts: 1 Post procedure: secured with gauze and tape and fluid infusing Complications: none. Patient tolerance: Patient tolerated the procedure well with no immediate complications My total time at bedside, performing this procedure was 1-15 minutes.

## 2019-04-01 NOTE — H&P
HOSPITALIST H&P/CONSULTNAME:  Bola Hadley Age:  62 y.o. 
:   1955 MRN:   [id number] PCP: None Consulting MD: Treatment Team: Primary Nurse: Sandra Welsh RN 
HPI:  
Patient is a 65yoF with multiple chronic medical conditions, who was started on home hospice on 3/21, of which, her son with whom she lives, revoked hospice last night for EMS to bring her to the hospital.  History is obtained from staff as she is unaccompanied, and she is not currently able to provide any history herself. Per report, patient has been followed by her home hospice nurse, Eva Meier (with whom ER MD spoke with). She had been placed on hospice for end stage COPD with multiple comorbidities. The patient had developed some chills and was noted to have blood in her urine (chronic suprapubic catheter). She was started on Augmentin. She became more lethargic and tachycardic. Yesterday evening, patient's son revoked hospice as witnessed by EMS and hospice nurse, so that she could be admitted to the hospital. 
 
Patient is currently very somnolent. She awakes, but can only tell me that she \"feels bad. \"  She cannot verbalize person, place, time (when asked she mumbles \"yeah\" but cannot answer my questions). On ER evaluation, patient is hypertensive and in afib with RVR requiring cardizem drip through IO line in RLE. Patient will be admitted to the ICU for further care as hospice has been revoked. Unable to complete ROS 2/2 current medical condition. Past Medical History:  
Diagnosis Date  Diabetes ([address]  Gastrointestinal disorder GERD  Hypertension  Ill-defined condition   
 neurogenic bladder  Ill-defined condition   
 transverse myelitis  Ill-defined condition   
 paraplegia  Liver disease Hepatitis C  
 Psychiatric disorder   
 anxiety  Psychiatric disorder   
 bipolar  Thromboembolus ([address] No past surgical history on file. Prior to Admission Medications Prescriptions Last Dose Informant Patient Reported? Taking? ALPRAZolam (XANAX) 0.5 mg tablet   No No  
Sig: Take 1 Tab by mouth two (2) times daily as needed for Anxiety. Max Daily Amount: 1 mg. ALPRAZolam (XANAX) 0.5 mg tablet   No No  
Sig: Take 1 Tab by mouth two (2) times daily as needed for Anxiety. Max Daily Amount: 1 mg. Blood-Glucose Meter monitoring kit   No No  
Sig: Check glucose at home daily QUEtiapine (SEROQUEL) 100 mg tablet   Yes No  
Sig: Take 300 mg by mouth nightly. albuterol (PROVENTIL HFA, VENTOLIN HFA, PROAIR HFA) 90 mcg/actuation inhaler   No No  
Sig: Take 1 Puff by inhalation every four (4) hours as needed for Wheezing. albuterol (PROVENTIL VENTOLIN) 2.5 mg /3 mL (0.083 %) nebulizer solution   No No  
Sig: Take 3 mL by inhalation every four (4) hours as needed for Wheezing. apixaban (ELIQUIS) 5 mg tablet   No No  
Sig: Take 1 Tab by mouth two (2) times a day. bisacodyl (DULCOLAX) 5 mg EC tablet   No No  
Sig: Take 1 Tab by mouth daily as needed for Constipation. budesonide (PULMICORT) 0.5 mg/2 mL nbsp   No No  
Si mL by Nebulization route two (2) times a day. budesonide (PULMICORT) 0.5 mg/2 mL nbsp   No No  
Si mL by Nebulization route two (2) times a day. fentaNYL (DURAGESIC) 25 mcg/hr PATCH   No No  
Si Patch by TransDERmal route every seventy-two (72) hours. Max Daily Amount: 1 Patch. flecainide (TAMBOCOR) 50 mg tablet   No No  
Sig: Take 1 Tab by mouth every twelve (12) hours. insulin glargine (LANTUS,BASAGLAR) 100 unit/mL (3 mL) inpn   No No  
Si Units by SubCUTAneous route nightly for 30 days. insulin lispro (HUMALOG) 100 unit/mL injection   No No  
Sig: Less than 150 =   0 units          
150 -199 =   2 units 200 -249 =   4 units 250 -299 =   6 units 300 -349 =   8 units Before meals and at bedtime. metoprolol succinate (TOPROL-XL) 50 mg XL tablet   No No  
Sig: Take 1 Tab by mouth daily. nystatin (MYCOSTATIN) powder   No No  
Sig: Apply  to affected area two (2) times a day. omeprazole (PRILOSEC) 40 mg capsule   Yes No  
Sig: Take 40 mg by mouth daily. pantoprazole (PROTONIX) 40 mg tablet   No No  
Sig: Take 1 Tab by mouth Daily (before breakfast). promethazine (PHENERGAN) 25 mg tablet   No No  
Sig: Take 1 Tab by mouth every six (6) hours as needed for Nausea. promethazine (PHENERGAN) 25 mg tablet   No No  
Sig: Take 1 Tab by mouth every six (6) hours as needed for Nausea for up to 15 doses. zinc oxide-cod liver oil (DESITIN) 40 % paste   No No  
Sig: Apply  to affected area two (2) times a day. Facility-Administered Medications: None No Known Allergies Social History Tobacco Use  Smoking status: Current Every Day Smoker Packs/day: 0.50 Substance Use Topics  Alcohol use: No  
  
No family history on file. Objective:  
 
Visit Vitals BP (!) 141/98 Pulse (!) 157 Temp 98.6 °F (37 °C) Resp 14 Ht 5' 5\" (1.651 m) Wt 90.7 kg (200 lb) SpO2 95% BMI 33.28 kg/m² Temp (24hrs), Av.5 °F (36.9 °C), Min:98.3 °F (36.8 °C), Max:98.6 °F (37 °C) Oxygen Therapy O2 Sat (%): 95 % (19) Pulse via Oximetry: 122 beats per minute (19) O2 Device: Room air (19) Physical Exam: 
General:    Somnolent, mildly diaphoretic. Arousable, but confused Head:   Normocephalic, without obvious abnormality, atraumatic. Nose:  Nares normal. No drainage or sinus tenderness. Lungs:   Diminished bilaterally, but no expiratory wheezing. Heart:   Tachycardic, irregular. Abdomen:   Soft, non-tender. Not distended. Bowel sounds normal.  
Extremities: No cyanosis. No edema. No clubbing. IO line in anterior RLE. Skin:     Texture, turgor normal.  Not Jaundiced Neurologic: Somnolent. PERRL. Unable to participate in neuro exam.  Spontaneously moves [address] [place] [address]. Data Review:  
Recent Results (from the past 24 hour(s)) POC TROPONIN-I  
 Collection Time: 03/31/19 10:57 PM  
Result Value Ref Range Troponin-I (POC) 0 (L) 0.02 - 0.05 ng/ml POC LACTIC ACID Collection Time: 03/31/19 11:00 PM  
Result Value Ref Range Lactic Acid (POC) 1.99 (H) 0.5 - 1.9 mmol/L  
CBC WITH AUTOMATED DIFF Collection Time: 03/31/19 11:10 PM  
Result Value Ref Range WBC 17.2 (H) 4.3 - 11.1 K/uL  
 RBC 5.45 (H) 4.05 - 5.2 M/uL  
 HGB 15.7 (H) 11.7 - 15.4 g/dL HCT 48.9 (H) 35.8 - 46.3 % MCV 89.7 79.6 - 97.8 FL  
 MCH 28.8 26.1 - 32.9 PG  
 MCHC 32.1 31.4 - 35.0 g/dL  
 RDW 14.7 (H) 11.9 - 14.6 % PLATELET 497 978 - 873 K/uL MPV 12.2 9.4 - 12.3 FL ABSOLUTE NRBC 0.00 0.0 - 0.2 K/uL  
 DF AUTOMATED NEUTROPHILS 74 43 - 78 % LYMPHOCYTES 18 13 - 44 % MONOCYTES 6 4.0 - 12.0 % EOSINOPHILS 1 0.5 - 7.8 % BASOPHILS 1 0.0 - 2.0 % IMMATURE GRANULOCYTES 1 0.0 - 5.0 %  
 ABS. NEUTROPHILS 12.7 (H) 1.7 - 8.2 K/UL  
 ABS. LYMPHOCYTES 3.1 0.5 - 4.6 K/UL  
 ABS. MONOCYTES 1.1 0.1 - 1.3 K/UL  
 ABS. EOSINOPHILS 0.2 0.0 - 0.8 K/UL  
 ABS. BASOPHILS 0.1 0.0 - 0.2 K/UL  
 ABS. IMM. GRANS. 0.1 0.0 - 0.5 K/UL METABOLIC PANEL, COMPREHENSIVE Collection Time: 03/31/19 11:10 PM  
Result Value Ref Range Sodium 133 (L) 136 - 145 mmol/L Potassium 3.3 (L) 3.5 - 5.1 mmol/L Chloride 99 98 - 107 mmol/L  
 CO2 20 (L) 21 - 32 mmol/L Anion gap 14 7 - 16 mmol/L Glucose 454 (HH) 65 - 100 mg/dL BUN 12 8 - 23 MG/DL Creatinine 0.72 0.6 - 1.0 MG/DL  
 GFR est AA >60 >60 ml/min/1.73m2 GFR est non-AA >60 >60 ml/min/1.73m2 Calcium 8.9 8.3 - 10.4 MG/DL Bilirubin, total 0.7 0.2 - 1.1 MG/DL  
 ALT (SGPT) 16 12 - 65 U/L  
 AST (SGOT) 13 (L) 15 - 37 U/L Alk. phosphatase 127 50 - 136 U/L Protein, total 8.1 6.3 - 8.2 g/dL Albumin 2.9 (L) 3.2 - 4.6 g/dL Globulin 5.2 (H) 2.3 - 3.5 g/dL A-G Ratio 0.6 (L) 1.2 - 3.5 Imaging Justus Allen Assessment and Plan: Active Hospital Problems Diagnosis Date Noted  Acute encephalopathy 03/10/2019  Diabetes ([facility] 03/10/2019  Hypertension 03/10/2019  Atrial fibrillation with rapid ventricular response ([facility] 02/05/2019  UTI (urinary tract infection) 01/31/2019  Bipolar disorder without psychotic features ([facility] 12/10/2016  Chronic hepatitis C virus infection ([facility] 12/10/2016  Paraplegia ([facility] 12/10/2016 PLAN Atrial Fibrillation with RVR 
- On cardizem drip 
- HR was in the 180s, now somewhat improved to the 140s on my exam 
- Admit to ICU 
- Continue home metoprolol 
- Continue home eliquis HTN 
- Significantly elevated pressures as well 
- Hopefully this will also improve with cardizem drip UTI 
- Was started on Augmentin at home by hospice - Suprapubic catheter changed by ER 
- New sample to be obtained as well and sent for urine culture - Start Rocephin DM2 
- Home insulin 
- SSI 
 
H/O bipolar - Home meds Hep C 
- Stable COPD 
- Not currently in exacerbation 
- Continue home nebs Paraplegia 
- h/o transverse myelitis Code Status: Unclear. It has been documented that patient's son has revoked hospice. This does not necessarily indicate that her code status has changed. I cannot obtain code status from patient herself given her confusion. Unable to contact son (POA) to clarify code status at this time either. For time being, patient will default to being FULL code until contact with son can be accomplished. Anticipated discharge: 3-5 days, pending clinical course. Patient is critically ill at this time. Signed By: Randy Medina MD   
 April 1, 2019

## 2019-04-01 NOTE — PROGRESS NOTES
TRANSFER - IN REPORT: 
 
Verbal report received from [address] [place], [place] [address]mariam, RN (name) on Alpesh Meade  being received from ED (unit) for routine progression of care Report consisted of patients Situation, Background, Assessment and  
Recommendations(SBAR). Information from the following report(s) SBAR, Kardex, Intake/Output, MAR and Recent Results was reviewed with the receiving nurse. Opportunity for questions and clarification was provided. Assessment to be completed upon patients arrival to unit and care assumed.

## 2019-04-01 NOTE — PROGRESS NOTES
Attempted to contact son Madiha Magana [phone], message left for return call, pt inconsolable per nursing staff at this time and not sure she would be able to answer questions at this time. Pt was current with [facility] I have called spoke with ZENON PATTERSON and states she would need to speak with medical director about resuming care at [facility] but did not feel there would be a problem with taking pt back under there hospice care. Will attempt to speak with pt once she is calm and able or if pts son returns call to [address] [place] [address]. Discharge Location Discharge Placement: Unable to determine at this time

## 2019-04-01 NOTE — PROGRESS NOTES
Bedside and Verbal shift change report given to Edi Black RN (oncoming nurse) by self Denise [facility] ). Report included the following information SBAR, Kardex, MAR and Recent Results.

## 2019-04-01 NOTE — PROGRESS NOTES
Pt has eliquis PO ordered. Pt currently NPO. Spoke with Dr. Donald Shaffer and received orders to hold eliquis and stated he would order SQ lovenox. Will continue to monitor.

## 2019-04-01 NOTE — PROGRESS NOTES
Pt continuously yelling \"mama\" down the hallway. Pt cannot verbalize what the problem is. /100. Spoke with Dr. Raza Martinez regarding medications given earlier to control BP and agitation. Received new orders for B&O suppository. Will continue to monitor pt.

## 2019-04-01 NOTE — PROGRESS NOTES
Called to bedside for persistently elevated H, afib RVR per tele up to 160s, now 130s. Patient was initially on cardizem drip overnight but weaned off and bed request changed to remote tele. The patient is NPO because of her mental status and is not able to start her home metoprolol xr 50mg PO. Will change to IV scheduled 5mg metoprolol q6. If afib remains uncontrolled, will restart Cardizem and move to 3rd floor for titratable drip. Abx changed to cefepime for better pseudomonas coverage as per prior urine culture. Culture for this admission pending. Will discuss goals of care with son when available. Signed By: Ella Carney MD   
 April 1, 2019

## 2019-04-01 NOTE — PROGRESS NOTES
Acknowledge MST screen for EN/TPN prior to admission. In conversation with admitting nurse, unable to obtain full assessment of patient d/t mental status. Review of patient chart reveals no sign of EN/TPN PTA. Will follow for length of stay or other acute need as warranted. Clayton Adjutant, Dietetic Intern

## 2019-04-01 NOTE — PROGRESS NOTES
TRANSFER - IN REPORT: 
 
Verbal report received from Janae Archibald RN on Peter Many being received from 7th floor for routine progression of care Report consisted of patients Situation, Background, Assessment and Recommendations(SBAR). Information from the following report(s) SBAR, Kardex and Recent Results was reviewed with the receiving nurse. Opportunity for questions and clarification was provided. Assessment completed upon patients arrival to unit and care assumed. Telemetry monitor on. VS taken. Pt had BM so brief changed. Pt confused. Oxygen applied via NC on 3L. Bed alarm on. Bed low and locked. Side rails x 2. Call light within reach. Pt pointed to red sign on call light verbalizing she understood how to call for assistance.

## 2019-04-01 NOTE — PROGRESS NOTES
Pt yelling down hallway \"mama\" continuously. Upon assessing pt, pt unable to tell RN what is wrong. Pt very agitated and restless. Spoke with Dr. Leroy Acosta MD and received orders for IV ativan. Will continue to monitor pt.

## 2019-04-01 NOTE — ED TRIAGE NOTES
Pt arrives via [facility]. Pt is on hospice and dx with an UTI. Pt c/o abd pain. Per ems pt hypertensive. BP: 230/130 HR: 120 BGL: 320 Hospice nurse gave pt oxycodone before EMS left scene.

## 2019-04-01 NOTE — PROGRESS NOTES
04/01/19 1500 Dual Skin Pressure Injury Assessment Dual Skin Pressure Injury Assessment X Second Care Provider (Based on [address] [place] [address]) Rajesh Padron RN Skin Integumentary Skin Integumentary (WDL) X Skin Color Pale Skin Condition/Temp Dry;Flaky;Fragile; Warm 
(Thick, dry toenails. ) Skin Integrity Excoriation;Scars (comment) (Scattered excoriation and scars. ) Turgor Non-tenting Hair Growth Present Varicosities Absent Wound Prevention and Protection Methods Orientation of Wound Prevention Posterior Location of Wound Prevention Sacrum/Coccyx Wound Offloading (Prevention Methods) Bed, pressure reduction mattress;Pillows;Repositioning Sacral area intact, reddened, allevyn applied for prevention. Skin turgor good. Capillary refill <3 seconds. Reddened zak area. Scattered scars and ecchymosis.

## 2019-04-01 NOTE — PROGRESS NOTES
Initial visit to assess pt's spiritual needs. Pt thought I was her mother, and continuously referred to me as Martina Garcia", asking me to care for her; nurse with pt. Ended the visit.  
 
Chaplain Ilan Reese MDiv,ThM,PhD

## 2019-04-02 LAB
ANION GAP SERPL CALC-SCNC: 9 MMOL/L (ref 7–16)
BASOPHILS # BLD: 0.1 K/UL (ref 0–0.2)
BASOPHILS NFR BLD: 1 % (ref 0–2)
BUN SERPL-MCNC: 10 MG/DL (ref 8–23)
CALCIUM SERPL-MCNC: 8.7 MG/DL (ref 8.3–10.4)
CHLORIDE SERPL-SCNC: 108 MMOL/L (ref 98–107)
CO2 SERPL-SCNC: 25 MMOL/L (ref 21–32)
CREAT SERPL-MCNC: 0.55 MG/DL (ref 0.6–1)
DIFFERENTIAL METHOD BLD: ABNORMAL
EOSINOPHIL # BLD: 0.2 K/UL (ref 0–0.8)
EOSINOPHIL NFR BLD: 2 % (ref 0.5–7.8)
ERYTHROCYTE [DISTWIDTH] IN BLOOD BY AUTOMATED COUNT: 14.6 % (ref 11.9–14.6)
GLUCOSE BLD STRIP.AUTO-MCNC: 173 MG/DL (ref 65–100)
GLUCOSE BLD STRIP.AUTO-MCNC: 187 MG/DL (ref 65–100)
GLUCOSE BLD STRIP.AUTO-MCNC: 192 MG/DL (ref 65–100)
GLUCOSE BLD STRIP.AUTO-MCNC: 261 MG/DL (ref 65–100)
GLUCOSE SERPL-MCNC: 207 MG/DL (ref 65–100)
HCT VFR BLD AUTO: 45.5 % (ref 35.8–46.3)
HGB BLD-MCNC: 14 G/DL (ref 11.7–15.4)
IMM GRANULOCYTES # BLD AUTO: 0.1 K/UL (ref 0–0.5)
IMM GRANULOCYTES NFR BLD AUTO: 1 % (ref 0–5)
LYMPHOCYTES # BLD: 1.9 K/UL (ref 0.5–4.6)
LYMPHOCYTES NFR BLD: 21 % (ref 13–44)
MCH RBC QN AUTO: 28.3 PG (ref 26.1–32.9)
MCHC RBC AUTO-ENTMCNC: 30.8 G/DL (ref 31.4–35)
MCV RBC AUTO: 91.9 FL (ref 79.6–97.8)
MONOCYTES # BLD: 0.5 K/UL (ref 0.1–1.3)
MONOCYTES NFR BLD: 5 % (ref 4–12)
NEUTS SEG # BLD: 6.6 K/UL (ref 1.7–8.2)
NEUTS SEG NFR BLD: 72 % (ref 43–78)
NRBC # BLD: 0 K/UL (ref 0–0.2)
PLATELET # BLD AUTO: 250 K/UL (ref 150–450)
PMV BLD AUTO: 12 FL (ref 9.4–12.3)
POTASSIUM SERPL-SCNC: 3.1 MMOL/L (ref 3.5–5.1)
RBC # BLD AUTO: 4.95 M/UL (ref 4.05–5.2)
SODIUM SERPL-SCNC: 142 MMOL/L (ref 136–145)
WBC # BLD AUTO: 9.3 K/UL (ref 4.3–11.1)

## 2019-04-02 PROCEDURE — 94760 N-INVAS EAR/PLS OXIMETRY 1: CPT

## 2019-04-02 PROCEDURE — 65660000000 HC RM CCU STEPDOWN

## 2019-04-02 PROCEDURE — 74011000258 HC RX REV CODE- 258: Performed by: FAMILY MEDICINE

## 2019-04-02 PROCEDURE — 80048 BASIC METABOLIC PNL TOTAL CA: CPT

## 2019-04-02 PROCEDURE — 77030020263 HC SOL INJ SOD CL0.9% LFCR 1000ML

## 2019-04-02 PROCEDURE — 94640 AIRWAY INHALATION TREATMENT: CPT

## 2019-04-02 PROCEDURE — 74011250637 HC RX REV CODE- 250/637: Performed by: FAMILY MEDICINE

## 2019-04-02 PROCEDURE — 74011000250 HC RX REV CODE- 250: Performed by: FAMILY MEDICINE

## 2019-04-02 PROCEDURE — 77010033678 HC OXYGEN DAILY

## 2019-04-02 PROCEDURE — 74011636637 HC RX REV CODE- 636/637: Performed by: FAMILY MEDICINE

## 2019-04-02 PROCEDURE — C9113 INJ PANTOPRAZOLE SODIUM, VIA: HCPCS | Performed by: FAMILY MEDICINE

## 2019-04-02 PROCEDURE — 74011250636 HC RX REV CODE- 250/636: Performed by: FAMILY MEDICINE

## 2019-04-02 PROCEDURE — 85025 COMPLETE CBC W/AUTO DIFF WBC: CPT

## 2019-04-02 PROCEDURE — 92610 EVALUATE SWALLOWING FUNCTION: CPT

## 2019-04-02 PROCEDURE — 82962 GLUCOSE BLOOD TEST: CPT

## 2019-04-02 PROCEDURE — 36415 COLL VENOUS BLD VENIPUNCTURE: CPT

## 2019-04-02 PROCEDURE — 74011250637 HC RX REV CODE- 250/637: Performed by: INTERNAL MEDICINE

## 2019-04-02 RX ORDER — HALOPERIDOL 5 MG/ML
2 INJECTION INTRAMUSCULAR
Status: ACTIVE | OUTPATIENT
Start: 2019-04-02 | End: 2019-04-03

## 2019-04-02 RX ORDER — LORAZEPAM 2 MG/ML
1 INJECTION INTRAMUSCULAR
Status: DISCONTINUED | OUTPATIENT
Start: 2019-04-02 | End: 2019-04-02

## 2019-04-02 RX ORDER — METOPROLOL TARTRATE 25 MG/1
25 TABLET, FILM COATED ORAL EVERY 6 HOURS
Status: DISPENSED | OUTPATIENT
Start: 2019-04-02 | End: 2019-04-04

## 2019-04-02 RX ORDER — LORAZEPAM 2 MG/ML
1 INJECTION INTRAMUSCULAR
Status: DISCONTINUED | OUTPATIENT
Start: 2019-04-02 | End: 2019-04-09 | Stop reason: HOSPADM

## 2019-04-02 RX ORDER — POTASSIUM CHLORIDE 20 MEQ/1
40 TABLET, EXTENDED RELEASE ORAL
Status: COMPLETED | OUTPATIENT
Start: 2019-04-02 | End: 2019-04-02

## 2019-04-02 RX ORDER — HYDRALAZINE HYDROCHLORIDE 25 MG/1
25 TABLET, FILM COATED ORAL
Status: DISCONTINUED | OUTPATIENT
Start: 2019-04-02 | End: 2019-04-09 | Stop reason: HOSPADM

## 2019-04-02 RX ADMIN — INSULIN GLARGINE 5 UNITS: 100 INJECTION, SOLUTION SUBCUTANEOUS at 21:20

## 2019-04-02 RX ADMIN — INSULIN LISPRO 3 UNITS: 100 INJECTION, SOLUTION INTRAVENOUS; SUBCUTANEOUS at 21:20

## 2019-04-02 RX ADMIN — ENOXAPARIN SODIUM 90 MG: 100 INJECTION SUBCUTANEOUS at 17:23

## 2019-04-02 RX ADMIN — METOPROLOL TARTRATE 2.5 MG: 5 INJECTION INTRAVENOUS at 00:45

## 2019-04-02 RX ADMIN — LORAZEPAM 1 MG: 2 INJECTION INTRAMUSCULAR; INTRAVENOUS at 06:45

## 2019-04-02 RX ADMIN — LORAZEPAM 1 MG: 2 INJECTION INTRAMUSCULAR; INTRAVENOUS at 20:14

## 2019-04-02 RX ADMIN — FLECAINIDE ACETATE 50 MG: 100 TABLET ORAL at 21:23

## 2019-04-02 RX ADMIN — MORPHINE SULFATE 2 MG: 2 INJECTION, SOLUTION INTRAMUSCULAR; INTRAVENOUS at 04:01

## 2019-04-02 RX ADMIN — MORPHINE SULFATE 2 MG: 2 INJECTION, SOLUTION INTRAMUSCULAR; INTRAVENOUS at 00:06

## 2019-04-02 RX ADMIN — METOPROLOL TARTRATE 5 MG: 5 INJECTION INTRAVENOUS at 08:34

## 2019-04-02 RX ADMIN — LORAZEPAM 1 MG: 2 INJECTION INTRAMUSCULAR; INTRAVENOUS at 00:40

## 2019-04-02 RX ADMIN — Medication 5 ML: at 06:35

## 2019-04-02 RX ADMIN — INSULIN LISPRO 9 UNITS: 100 INJECTION, SOLUTION INTRAVENOUS; SUBCUTANEOUS at 11:30

## 2019-04-02 RX ADMIN — LORAZEPAM 1 MG: 2 INJECTION INTRAMUSCULAR; INTRAVENOUS at 15:13

## 2019-04-02 RX ADMIN — QUETIAPINE FUMARATE 300 MG: 100 TABLET ORAL at 21:23

## 2019-04-02 RX ADMIN — SODIUM CHLORIDE 1 G: 900 INJECTION, SOLUTION INTRAVENOUS at 19:53

## 2019-04-02 RX ADMIN — PANTOPRAZOLE SODIUM 40 MG: 40 INJECTION, POWDER, FOR SOLUTION INTRAVENOUS at 21:22

## 2019-04-02 RX ADMIN — SODIUM CHLORIDE 100 ML/HR: 900 INJECTION, SOLUTION INTRAVENOUS at 03:54

## 2019-04-02 RX ADMIN — ENOXAPARIN SODIUM 90 MG: 100 INJECTION SUBCUTANEOUS at 06:35

## 2019-04-02 RX ADMIN — METOPROLOL TARTRATE 5 MG: 5 INJECTION INTRAVENOUS at 03:50

## 2019-04-02 RX ADMIN — BUDESONIDE 500 MCG: 0.5 INHALANT RESPIRATORY (INHALATION) at 07:44

## 2019-04-02 RX ADMIN — Medication 10 ML: at 21:23

## 2019-04-02 RX ADMIN — INSULIN LISPRO 3 UNITS: 100 INJECTION, SOLUTION INTRAVENOUS; SUBCUTANEOUS at 17:23

## 2019-04-02 RX ADMIN — SODIUM CHLORIDE 1 G: 900 INJECTION, SOLUTION INTRAVENOUS at 08:34

## 2019-04-02 RX ADMIN — METOPROLOL TARTRATE 25 MG: 25 TABLET ORAL at 11:12

## 2019-04-02 RX ADMIN — BUDESONIDE 500 MCG: 0.5 INHALANT RESPIRATORY (INHALATION) at 20:32

## 2019-04-02 RX ADMIN — METOPROLOL TARTRATE 25 MG: 25 TABLET ORAL at 17:23

## 2019-04-02 RX ADMIN — POTASSIUM CHLORIDE 40 MEQ: 20 TABLET, EXTENDED RELEASE ORAL at 18:42

## 2019-04-02 RX ADMIN — PANTOPRAZOLE SODIUM 40 MG: 40 INJECTION, POWDER, FOR SOLUTION INTRAVENOUS at 08:34

## 2019-04-02 RX ADMIN — FLECAINIDE ACETATE 50 MG: 100 TABLET ORAL at 11:12

## 2019-04-02 NOTE — PROGRESS NOTES
Paged Dr. Lazarus Hews about pt NPO status with PO tambocor and PO seroquel ordered. Orders to hold PO meds for now.

## 2019-04-02 NOTE — PROGRESS NOTES
Problem: Dysphagia (Adult) Goal: *Speech Goal: (INSERT TEXT) Description LTG: Patient will tolerate least restrictive diet without signs/symptoms of aspiration at discharge for safe swallow function. STG: Patient will tolerate puree diet/thin liquids without overt signs/symptoms of airway compromise STG: Patient will participate in trials of chewable textures to diet advancement without overt s/sx of airway compromise Outcome: Progressing Towards Goal 
 SPEECH LANGUAGE PATHOLOGY: BEDSIDE SWALLOW NOTE: INITIAL ASSESSMENT 
 
NAME/AGE/GENDER: Martina Tong is a 62 y.o. female DATE: 4/2/2019 PRIMARY DIAGNOSIS: Atrial fibrillation with rapid ventricular response ([address] [I48.91] Atrial fibrillation with rapid ventricular response ([address] [I48.91] Atrial fibrillation with rapid ventricular response ([address] [I48.91] ICD-10: Treatment Diagnosis: oral dysphagia R13.11 INTERDISCIPLINARY COLLABORATION: Registered Nurse PRECAUTIONS/ALLERGIES: Patient has no known allergies. ASSESSMENT:  
Based on the objective data described below, Ms. Meeta Mathew presents with oral dysphagia. Patient presented with ice chips x2, thin liquid via cup and straw, puree, mixed, and mechanical soft consistencies. Decreased acceptance of solid trials requiring significant encouragement. Accepted 1 bite of mechanical soft and 1 bite of pears, refused coarse solids. Impulsive with liquids. Talking perseveratively with decreased attention to bolus requiring cueing to stop talking while eating. Timely swallow initiation, and single swallows upon palpation. Adequate oral clearing. No overt signs or symptoms of airway compromise observed with liquid, puree, mixed, or mechanical soft trials. Recommend initiate puree diet/thin liquids with 1:1 assistance to ensure small bites/sips and slow rate of intake. Float medications in puree.  Ensure patient fully upright and alert for all po intake. Patient will benefit from skilled intervention to address the below impairments. Will follow up for diet tolerance and chewable trials. ?????? ? ? This section established at most recent assessment?????????? 
PROBLEM LIST (Impairments causing functional limitations): Oral dysphagia REHABILITATION POTENTIAL FOR STATED GOALS: Good PLAN OF CARE:  
Patient will benefit from skilled intervention to address the following impairments. RECOMMENDATIONS AND PLANNED INTERVENTIONS (Benefits and precautions of therapy have been discussed with the patient.): 
PO:  Pureed Liquids:  regular thin MEDICATIONS: 
Whole in puree Crushed in puree ASPIRATION PRECAUTIONS: 
Slow rate of intake Small bites/sips Upright at 90 degrees during meal 
COMPENSATORY STRATEGIES/MODIFICATIONS INCLUDING: 
Small sips and bites 1:1 assistance OTHER RECOMMENDATIONS (including follow up treatment recommendations):  
Family training/education Patient education RECOMMENDED DIET MODIFICATIONS DISCUSSED WITH: 
Nursing Patient FREQUENCY/DURATION: Continue to follow patient 2 times a week for duration of hospital stay to address above goals. RECOMMENDED REHABILITATION/EQUIPMENT: (at time of discharge pending progress): Due to the probability of continued deficits (see above) this patient will not likely need continued skilled speech therapy after discharge. SUBJECTIVE:  
Confused, perseverative. \"mama mama mama\". History of Present Injury/Illness: Ms. Miley Jones  has a past medical history of Diabetes ([facility] Gastrointestinal disorder, Hypertension, Ill-defined condition, Ill-defined condition, Ill-defined condition, Liver disease, Psychiatric disorder, Psychiatric disorder, and Thromboembolus ([facility] [address] [address] .  She also  has no past surgical history on file. Present Symptoms:   
Pain Scale 1: Numeric (0 - 10) Pain Intensity 1: 0 Current Medications: No current facility-administered medications on file prior to encounter. Current Outpatient Medications on File Prior to Encounter Medication Sig Dispense Refill  
 promethazine (PHENERGAN) 25 mg tablet Take 1 Tab by mouth every six (6) hours as needed for Nausea. 20 Tab 0  
 promethazine (PHENERGAN) 25 mg tablet Take 1 Tab by mouth every six (6) hours as needed for Nausea for up to 15 doses. 15 Tab 2 ALPRAZolam (XANAX) 0.5 mg tablet Take 1 Tab by mouth two (2) times daily as needed for Anxiety. Max Daily Amount: 1 mg. 14 Tab 0  
 insulin glargine (LANTUS,BASAGLAR) 100 unit/mL (3 mL) inpn 20 Units by SubCUTAneous route nightly for 30 days. 2 Pen 2  
 albuterol (PROVENTIL HFA, VENTOLIN HFA, PROAIR HFA) 90 mcg/actuation inhaler Take 1 Puff by inhalation every four (4) hours as needed for Wheezing. 1 Inhaler 0  
 albuterol (PROVENTIL VENTOLIN) 2.5 mg /3 mL (0.083 %) nebulizer solution Take 3 mL by inhalation every four (4) hours as needed for Wheezing. 24 Each 0  
 apixaban (ELIQUIS) 5 mg tablet Take 1 Tab by mouth two (2) times a day. 30 Tab 0 Blood-Glucose Meter monitoring kit Check glucose at home daily 1 Kit 0  
 budesonide (PULMICORT) 0.5 mg/2 mL nbsp 2 mL by Nebulization route two (2) times a day. 1 Each 0  
 nystatin (MYCOSTATIN) powder Apply  to affected area two (2) times a day. 1 Bottle 0  
 pantoprazole (PROTONIX) 40 mg tablet Take 1 Tab by mouth Daily (before breakfast). 30 Tab 0  
 flecainide (TAMBOCOR) 50 mg tablet Take 1 Tab by mouth every twelve (12) hours. 60 Tab 0  
 metoprolol succinate (TOPROL-XL) 50 mg XL tablet Take 1 Tab by mouth daily. 30 Tab 0  
 insulin lispro (HUMALOG) 100 unit/mL injection Less than 150 =   0 units 150 -199 =   2 units 200 -249 =   4 units 250 -299 =   6 units 300 -349 =   8 units Before meals and at bedtime. 1 Vial 0 ALPRAZolam (XANAX) 0.5 mg tablet Take 1 Tab by mouth two (2) times daily as needed for Anxiety.  Max Daily Amount: 1 mg. 30 Tab 0  
 fentaNYL (DURAGESIC) 25 mcg/hr PATCH 1 Patch by TransDERmal route every seventy-two (72) hours. Max Daily Amount: 1 Patch. 5 Patch 0  
 bisacodyl (DULCOLAX) 5 mg EC tablet Take 1 Tab by mouth daily as needed for Constipation. 30 Tab 0  
 budesonide (PULMICORT) 0.5 mg/2 mL nbsp 2 mL by Nebulization route two (2) times a day. 60 Each 0  
 zinc oxide-cod liver oil (DESITIN) 40 % paste Apply  to affected area two (2) times a day. 1 Tube 1  
 omeprazole (PRILOSEC) 40 mg capsule Take 40 mg by mouth daily. QUEtiapine (SEROQUEL) 100 mg tablet Take 300 mg by mouth nightly. Current Dietary Status: NPO Social History/Home Situation:   
Home Environment: Private residence Living Alone: No 
Support Systems: Child(urban) Patient Expects to be Discharged to[de-identified] Unknown OBJECTIVE:  
Respiratory Status:  Nasal cannula  2 l/min CXR Results:Normal chest. 
MRI/CT Results:no acute Oral Motor Structure/Speech:  Oral-Motor Structure/Motor Speech Labial: No impairment Dentition: (upper dentition) Oral Hygiene: dry oral cavity Lingual: No impairment Cognitive and Communication Status: 
Neurologic State: Confused; Alert Orientation Level: (name only) Cognition: Decreased command following BEDSIDE SWALLOW EVALUATION Oral Assessment: 
Oral Assessment Labial: No impairment Dentition: (upper dentition) Oral Hygiene: dry oral cavity Lingual: No impairment P.O. Trials: 
Patient Position: upright in bed The patient was given the following:  
Consistency Presented: Puree; Thin liquid;Mixed consistency;Mechanical soft; Ice chips How Presented: SLP-fed/presented;Cup/sip; Self-fed/presented;Cup/gulp; Spoon;Straw;Successive swallows ORAL PHASE: 
Bolus Acceptance: No impairment Bolus Formation/Control: Impaired Propulsion: No impairment Type of Impairment: Mastication Oral Residue: Less than 10% of bolus PHARYNGEAL PHASE: 
Initiation of Swallow: No impairment Laryngeal Elevation: Functional 
Aspiration Signs/Symptoms: None Vocal Quality: No impairment Pharyngeal Phase Characteristics: No impairment, issues, or problems OTHER OBSERVATIONS: 
Rate/bite size: Impaired Endurance:  Questionable Comments: Tool Used: Dysphagia Outcome and Severity Scale (AYAD) Score Comments Normal Diet  ? 7 With no strategies or extra time needed Functional Swallow  ? 6 May have mild oral or pharyngeal delay Mild Dysphagia ? 5 Which may require one diet consistency restricted (those who demonstrate penetration which is entirely cleared on MBS would be included) Mild-Moderate Dysphagia  ? 4 With 1-2 diet consistencies restricted Moderate Dysphagia  ? 3 With 2 or more diet consistencies restricted Moderately Severe Dysphagia  ? 2 With partial PO strategies (trials with ST only) Severe Dysphagia  ? 1 With inability to tolerate any PO safely Score:  Initial: 5 Most Recent: X (Date: --) Interpretation of Tool: The Dysphagia Outcome and Severity Scale (AYAD) is a simple, easy-to-use, 7-point scale developed to systematically rate the functional severity of dysphagia based on objective assessment and make recommendations for diet level, independence level, and type of nutrition. Payor: [id number] / Plan: [address] [place] [address] Avenue / Product Type: Medicaid /  
 
TREATMENT:  
 (In addition to Assessment/Re-Assessment sessions the following treatments were rendered) Assessment/Reassessment only, no treatment provided today MODALITIES:  
  
  
  
  
  
  
  
  
  
  
    
  
  
  
  
  
  
  
  
   
 
ORAL MOTOR  EXERCISES: 
  
  
  
  
  
  
  
  
  
  
  
  
  
  
  
  
  
  
  
  
  
  
  
  
  
  
    
  
  
  
  
  
  
  
  
  
  
  
  
  
  
  
  
  
  
  
  
  
  
  
  
  
   
 
LARYNGEAL / PHARYNGEAL EXERCISES: 
  
  
  
  
  
  
  
  
  
  
  
  
  
  
  
  
  
  
  
  
  
    
  
  
  
  
  
  
  
  
  
  
  
  
  
  
  
  
  
  
  
   
 
 __________________________________________________________________________________________________ Safety: After treatment position/precautions: 
Call light within reach RN notified Upright in Bed Treatment Assessment:  participated in dysphagia evaluation without medical complications. Progression/Medical Necessity:  
Patient is expected to demonstrate progress in swallow strength, swallow timeliness, swallow function, diet tolerance and swallow safety 
 to improve swallow safety, work toward diet advancement and decrease aspiration risk Margarita German Compliance with Program/Exercises: Will assess as treatment progresses Reason for Continuation of Services/Other Comments: 
Patient continues to require skilled intervention due to patient unable to attend/participate in therapy as expected Margarita German Recommendations/Intent for next treatment session: \"Treatment next visit will focus on diet tolerance and chewable trials \". Total Treatment Duration: 
Time In: 3296 Time Out: 5846 Beryle Dudley, [address] [facility]-SLP

## 2019-04-02 NOTE — PROGRESS NOTES
Hospitalist Progress Note Admit Date:  3/31/2019  8:29 PM  
Name:  Alesha Ace Age:  62 y.o. 
:  1955 MRN:  [id number] PCP:  None Treatment Team: Attending Provider: Pat Blair DO; Care Manager: Gilberto Noland RN Subjective:  
 
From H and P HPI: 
 
Patient is a 65yoF with multiple chronic medical conditions, who was started on home hospice on 3/21, of which, her son with whom she lives, revoked hospice last night for EMS to bring her to the hospital.  History is obtained from staff as she is unaccompanied, and she is not currently able to provide any history herself. 
  
Per report, patient has been followed by her home hospice nurse, Hayder Fernandes (with whom ER MD spoke with). She had been placed on hospice for end stage COPD with multiple comorbidities. The patient had developed some chills and was noted to have blood in her urine (chronic suprapubic catheter). She was started on Augmentin. She became more lethargic and tachycardic. Yesterday evening, patient's son revoked hospice as witnessed by EMS and hospice nurse, so that she could be admitted to the hospital. 
  
Patient is currently very somnolent. She awakes, but can only tell me that she \"feels bad. \"  She cannot verbalize person, place, time (when asked she mumbles \"yeah\" but cannot answer my questions). 
  
On ER evaluation, patient is hypertensive and in afib with RVR requiring cardizem drip through IO line in RLE. Patient will be admitted to the ICU for further care as hospice has been revoked. 19: 
Anxious, perseverating. Late stage copd was on home hospice. admit with afib rvr. Unreliable historian ? Alert to person. Hx of bipolar disorder. 
  
Unable to complete ROS 2/2 current medical condition. Objective:  
 
Patient Vitals for the past 24 hrs: 
 Temp Pulse Resp BP SpO2  
19 1708 97.9 °F (36.6 °C) 65 18 (!) 170/100 96 % 19 [address] [place] [address] 04/02/19 1228 97.3 °F (36.3 °C) (!) 133 18 (!) 143/100 97 % 04/02/19 0900  (!) 153     
04/02/19 0834    (!) 175/98   
04/02/19 0803 97.6 °F (36.4 °C) 79 21 (!) 188/110 95 % 04/02/19 0748     95 % 04/02/19 0442 97.1 °F (36.2 °C) 66 23 171/85 94 % 04/02/19 0350  67  170/78   
04/02/19 0120  70  164/82   
04/02/19 0045 97.6 °F (36.4 °C) 83 22 (!) 197/118 95 % 04/01/19 2159  73  (!) 174/92   
04/01/19 2042 97.4 °F (36.3 °C) 75 18 (!) 173/96 97 % 04/01/19 1812  80  (!) 184/97  Oxygen Therapy O2 Sat (%): 96 % (04/02/19 1708) Pulse via Oximetry: 75 beats per minute (04/02/19 0748) O2 Device: Nasal cannula (04/02/19 1708) O2 Flow Rate (L/min): 2 l/min (04/02/19 1708) Intake/Output Summary (Last 24 hours) at 4/2/2019 1811 Last data filed at 4/2/2019 1801 Gross per 24 hour Intake 0 ml Output 825 ml Net -825 ml REVIEW OF SYSTEMS: Comprehensive ROS performed and negative except as stated in HPI. Physical Examination: 
Physical Exam  
Constitutional: No distress. HENT:  
Mouth/Throat: Oropharynx is clear and moist. No oropharyngeal exudate. Eyes: Conjunctivae are normal. No scleral icterus. Neck: No tracheal deviation present. Cardiovascular: Intact distal pulses. irregular Pulmonary/Chest: No respiratory distress. She has no wheezes. Abdominal: Soft. She exhibits no distension. There is no tenderness. Musculoskeletal: She exhibits no tenderness or deformity. Lymphadenopathy:  
  She has no cervical adenopathy. Neurological: She is alert. No cranial nerve deficit. Skin: Skin is dry. No rash noted. She is not diaphoretic. Psychiatric:  
anxious Data Review: 
I have reviewed all labs, meds, telemetry events, and studies from the last 24 hours. Recent Results (from the past 24 hour(s)) GLUCOSE, POC Collection Time: 04/01/19  8:49 PM  
Result Value Ref Range Glucose (POC) 224 (H) 65 - 100 mg/dL METABOLIC PANEL, BASIC  
 Collection Time: 04/02/19  4:15 AM  
Result Value Ref Range Sodium 142 136 - 145 mmol/L Potassium 3.1 (L) 3.5 - 5.1 mmol/L Chloride 108 (H) 98 - 107 mmol/L  
 CO2 25 21 - 32 mmol/L Anion gap 9 7 - 16 mmol/L Glucose 207 (H) 65 - 100 mg/dL BUN 10 8 - 23 MG/DL Creatinine 0.55 (L) 0.6 - 1.0 MG/DL  
 GFR est AA >60 >60 ml/min/1.73m2 GFR est non-AA >60 >60 ml/min/1.73m2 Calcium 8.7 8.3 - 10.4 MG/DL  
CBC WITH AUTOMATED DIFF Collection Time: 04/02/19  4:15 AM  
Result Value Ref Range WBC 9.3 4.3 - 11.1 K/uL  
 RBC 4.95 4.05 - 5.2 M/uL  
 HGB 14.0 11.7 - 15.4 g/dL HCT 45.5 35.8 - 46.3 % MCV 91.9 79.6 - 97.8 FL  
 MCH 28.3 26.1 - 32.9 PG  
 MCHC 30.8 (L) 31.4 - 35.0 g/dL  
 RDW 14.6 11.9 - 14.6 % PLATELET 589 384 - 601 K/uL MPV 12.0 9.4 - 12.3 FL ABSOLUTE NRBC 0.00 0.0 - 0.2 K/uL  
 DF AUTOMATED NEUTROPHILS 72 43 - 78 % LYMPHOCYTES 21 13 - 44 % MONOCYTES 5 4.0 - 12.0 % EOSINOPHILS 2 0.5 - 7.8 % BASOPHILS 1 0.0 - 2.0 % IMMATURE GRANULOCYTES 1 0.0 - 5.0 %  
 ABS. NEUTROPHILS 6.6 1.7 - 8.2 K/UL  
 ABS. LYMPHOCYTES 1.9 0.5 - 4.6 K/UL  
 ABS. MONOCYTES 0.5 0.1 - 1.3 K/UL  
 ABS. EOSINOPHILS 0.2 0.0 - 0.8 K/UL  
 ABS. BASOPHILS 0.1 0.0 - 0.2 K/UL  
 ABS. IMM. GRANS. 0.1 0.0 - 0.5 K/UL GLUCOSE, POC Collection Time: 04/02/19  6:14 AM  
Result Value Ref Range Glucose (POC) 187 (H) 65 - 100 mg/dL GLUCOSE, POC Collection Time: 04/02/19 11:16 AM  
Result Value Ref Range Glucose (POC) 261 (H) 65 - 100 mg/dL GLUCOSE, POC Collection Time: 04/02/19  4:20 PM  
Result Value Ref Range Glucose (POC) 192 (H) 65 - 100 mg/dL All Micro Results Procedure Component Value Units Date/Time CULTURE, URINE [[id number]]  (Abnormal) Collected:  04/01/19 0210 Order Status:  Completed Specimen:  Suprapubic Urine Updated:  04/02/19 7505 Special Requests: NO SPECIAL REQUESTS Culture result: 10,000 to 50,000 COLONIES/mL GRAM NEGATIVE RODS SUBCULTURE IN PROGRESS  
     
 CULTURE, BLOOD [[id number]] Collected:  03/31/19 2255 Order Status:  Completed Specimen:  Blood Updated:  04/02/19 2206 Special Requests: LEFT HAND Culture result: NO GROWTH 2 DAYS Current Meds: 
Current Facility-Administered Medications Medication Dose Route Frequency  LORazepam (ATIVAN) injection 1 mg  1 mg IntraVENous Q6H PRN  
 metoprolol tartrate (LOPRESSOR) tablet 25 mg  25 mg Oral Q6H  
 pantoprazole (PROTONIX) injection 40 mg  40 mg IntraVENous Q12H  
 QUEtiapine (SEROquel) tablet 300 mg  300 mg Oral QHS  bisacodyl (DULCOLAX) tablet 5 mg  5 mg Oral DAILY PRN  
 ALPRAZolam (XANAX) tablet 0.5 mg  0.5 mg Oral BID PRN  
 fentaNYL (DURAGESIC) 25 mcg/hr patch 1 Patch  1 Patch TransDERmal Q72H  flecainide (TAMBOCOR) tablet 50 mg  50 mg Oral Q12H  
 albuterol (PROVENTIL VENTOLIN) nebulizer solution 2.5 mg  2.5 mg Inhalation Q4H PRN  
 sodium chloride (NS) flush 5-40 mL  5-40 mL IntraVENous Q8H  
 sodium chloride (NS) flush 5-40 mL  5-40 mL IntraVENous PRN  
 acetaminophen (TYLENOL) tablet 650 mg  650 mg Oral Q4H PRN  
 ondansetron (ZOFRAN) injection 4 mg  4 mg IntraVENous Q4H PRN  
 0.9% sodium chloride infusion  100 mL/hr IntraVENous CONTINUOUS  
 insulin lispro (HUMALOG) injection 0-15 Units  0-15 Units SubCUTAneous AC&HS  cefepime (MAXIPIME) 1 g in 0.9% sodium chloride (MBP/ADV) 50 mL ADV  1 g IntraVENous Q12H  
 metoprolol (LOPRESSOR) injection 2.5 mg  2.5 mg IntraVENous Q6H PRN  
 budesonide (PULMICORT) 500 mcg/2 ml nebulizer suspension  500 mcg Nebulization BID RT  
 phenazopyridine (PYRIDIUM) tab 95 mg  95 mg Oral TID PRN  
 morphine injection 2 mg  2 mg IntraVENous Q4H PRN  
 enoxaparin (LOVENOX) injection 90 mg  1 mg/kg SubCUTAneous Q12H  
 opium-belladonna (B&O 16-A SUPPRETTE) 16.2-60 mg suppository 1 Suppository  1 Suppository Rectal Q6H PRN  
  insulin glargine (LANTUS) injection 5 Units  5 Units SubCUTAneous QHS  dilTIAZem (CARDIZEM) 125 mg in dextrose 5% 125 mL infusion  0-15 mg/hr IntraVENous TITRATE Diet: DIET CARDIAC Other Studies (last 24 hours): No results found. Assessment and Plan:  
 
Hospital Problems as of 4/2/2019 Date Reviewed: 1/31/2019 Codes Class Noted - Resolved POA Acute encephalopathy ICD-10-CM: G93.40 ICD-9-CM: 348.30  3/10/2019 - Present Yes Diabetes ([facility] [address] ICD-10-CM: E11.9 ICD-9-CM: 250.00  3/10/2019 - Present Yes Hypertension ICD-10-CM: I10 
ICD-9-CM: 401.9  3/10/2019 - Present Yes * (Principal) Atrial fibrillation with rapid ventricular response (HCC) ICD-10-CM: I48.91 
ICD-9-CM: 427.31  2/5/2019 - Present Unknown UTI (urinary tract infection) ICD-10-CM: N39.0 ICD-9-CM: 599.0  1/31/2019 - Present Unknown Paraplegia (HCC) (Chronic) ICD-10-CM: S94.08 ICD-9-CM: 344.1  12/10/2016 - Present Yes Bipolar disorder without psychotic features (HCC) (Chronic) ICD-10-CM: F31.9 ICD-9-CM: 296.80  12/10/2016 - Present Yes Chronic hepatitis C virus infection (HCC) (Chronic) ICD-10-CM: B18.2 ICD-9-CM: 070.54  12/10/2016 - Present Yes A/P:   
Atrial Fibrillation with RVR 
- back on cardizem drip 
- Continue home metoprolol 
- Continue home eliquis Echo pending. tsh ordered  
HTN 
- continue oral bb metoprolol tartrate, diltiaz. Gtt and add prn hydralazine Anxiety likely contributing 
  
UTI 
- Was started on Augmentin at home by hospice - Suprapubic catheter changed by ER 
- New sample to be obtained as well and sent for urine culture - Start Rocephin -ngtd 
  
DM2 
- Home insulin 
- SSI 
  
H/O bipolar - Home meds 
  
Hep C 
- Stable 
  
COPD 
-stable continue same meds 
  
Paraplegia 
- h/o transverse myelitis

## 2019-04-02 NOTE — PROGRESS NOTES
Care Management Interventions PCP Verified by CM: (Dr. Robin Richter at . [address] was to see Feb 2019) Palliative Care Criteria Met (RRAT>21 & CHF Dx)?: No(RRAT 24 Dx Medicaid ) Transition of Care Consult (CM Consult): Discharge Planning Discharge Durable Medical Equipment: No 
Physical Therapy Consult: No 
Occupational Therapy Consult: No 
Speech Therapy Consult: Yes Current Support Network: Other(lives with her sons Zakiya Torres [phone] and Olinda Le ) Confirm Follow Up Transport: Other (see comment)([employer] [facility]) Discharge Location Discharge Placement: Unable to determine at this time CM familiar with patient from previous admissions. Patient is currently confused yelling out \"Momma\". Prior to admission she was living with her sons Zakiya Torres and Olinda Le and was current with [facility] [phone] and Fax [phone] but this was revoked by her son Olinda Le and she was brought to the ER for treatment. Patient is a paraplegic and has suprapubic cathter with previous admission of altered mental status and UTI. Previous admission patient was [facility] to [facility], [employer] and APS worker Addis Hendrix [phone]( left message to see if patient still current with APS). Patient lives in [place] and transportation is provided by [employer] [facility] or [facility]. Previous DME included wheelchair, hospital bed, dixon lift and one touch Verio glucometer. She has Medicaid for her insurance and prescriptions and her pharmacy is [facility] on [facility] [phone]. Attempted to call alie Torres at [phone] with message that phone number was unable to accept calls and Sue Wyliekarol [phone] went to voice mail. Unsure of d/c plan as patient is not alert to discuss plans and no family in room to discuss plan of care. Prior to admission patient was [facility] who stated they can accept patient back when medically stable.  CM following for d/c plans.

## 2019-04-02 NOTE — PROGRESS NOTES
Pt currently yelling \"mama\" down hallway. Pt confused and inconsolable. Paged Dr. Janny Isidro. Orders to increase ativan dose to 1mg Q6H.

## 2019-04-02 NOTE — PROGRESS NOTES
Verbal bedside report received from [employer], [facility]. Assumed care of patient.

## 2019-04-02 NOTE — PROGRESS NOTES
Verbal bedside report given to [facility], oncoming RN. Patient's situation, background, assessment and recommendations provided. Opportunity for questions provided. Oncoming RN assumed care of patient.

## 2019-04-02 NOTE — PROGRESS NOTES
Paged Dr. Denny Dewey about pt  and NPO orders. Current insulin orders modified from Lantus 20 units to Lantus 5 units. Will continue to monitor.

## 2019-04-03 PROBLEM — E83.42 HYPOMAGNESEMIA: Status: ACTIVE | Noted: 2019-04-03

## 2019-04-03 LAB
ANION GAP SERPL CALC-SCNC: 8 MMOL/L (ref 7–16)
BASOPHILS # BLD: 0.1 K/UL (ref 0–0.2)
BASOPHILS NFR BLD: 1 % (ref 0–2)
BUN SERPL-MCNC: 8 MG/DL (ref 8–23)
CALCIUM SERPL-MCNC: 8.3 MG/DL (ref 8.3–10.4)
CHLORIDE SERPL-SCNC: 110 MMOL/L (ref 98–107)
CO2 SERPL-SCNC: 23 MMOL/L (ref 21–32)
CREAT SERPL-MCNC: 0.52 MG/DL (ref 0.6–1)
DIFFERENTIAL METHOD BLD: ABNORMAL
EOSINOPHIL # BLD: 0.4 K/UL (ref 0–0.8)
EOSINOPHIL NFR BLD: 5 % (ref 0.5–7.8)
ERYTHROCYTE [DISTWIDTH] IN BLOOD BY AUTOMATED COUNT: 14.6 % (ref 11.9–14.6)
GLUCOSE BLD STRIP.AUTO-MCNC: 131 MG/DL (ref 65–100)
GLUCOSE BLD STRIP.AUTO-MCNC: 192 MG/DL (ref 65–100)
GLUCOSE BLD STRIP.AUTO-MCNC: 262 MG/DL (ref 65–100)
GLUCOSE BLD STRIP.AUTO-MCNC: 271 MG/DL (ref 65–100)
GLUCOSE SERPL-MCNC: 137 MG/DL (ref 65–100)
HCT VFR BLD AUTO: 42.6 % (ref 35.8–46.3)
HGB BLD-MCNC: 12.9 G/DL (ref 11.7–15.4)
IMM GRANULOCYTES # BLD AUTO: 0 K/UL (ref 0–0.5)
IMM GRANULOCYTES NFR BLD AUTO: 1 % (ref 0–5)
LYMPHOCYTES # BLD: 2.8 K/UL (ref 0.5–4.6)
LYMPHOCYTES NFR BLD: 37 % (ref 13–44)
MAGNESIUM SERPL-MCNC: 1.6 MG/DL (ref 1.8–2.4)
MCH RBC QN AUTO: 28.5 PG (ref 26.1–32.9)
MCHC RBC AUTO-ENTMCNC: 30.3 G/DL (ref 31.4–35)
MCV RBC AUTO: 94 FL (ref 79.6–97.8)
MONOCYTES # BLD: 0.5 K/UL (ref 0.1–1.3)
MONOCYTES NFR BLD: 6 % (ref 4–12)
NEUTS SEG # BLD: 3.8 K/UL (ref 1.7–8.2)
NEUTS SEG NFR BLD: 51 % (ref 43–78)
NRBC # BLD: 0 K/UL (ref 0–0.2)
PLATELET # BLD AUTO: 228 K/UL (ref 150–450)
PMV BLD AUTO: 12 FL (ref 9.4–12.3)
POTASSIUM SERPL-SCNC: 3.8 MMOL/L (ref 3.5–5.1)
RBC # BLD AUTO: 4.53 M/UL (ref 4.05–5.2)
SODIUM SERPL-SCNC: 141 MMOL/L (ref 136–145)
TSH SERPL DL<=0.005 MIU/L-ACNC: 3.56 UIU/ML (ref 0.36–3.74)
WBC # BLD AUTO: 7.6 K/UL (ref 4.3–11.1)

## 2019-04-03 PROCEDURE — 77010033678 HC OXYGEN DAILY

## 2019-04-03 PROCEDURE — 94760 N-INVAS EAR/PLS OXIMETRY 1: CPT

## 2019-04-03 PROCEDURE — 94640 AIRWAY INHALATION TREATMENT: CPT

## 2019-04-03 PROCEDURE — 36415 COLL VENOUS BLD VENIPUNCTURE: CPT

## 2019-04-03 PROCEDURE — 74011000250 HC RX REV CODE- 250: Performed by: FAMILY MEDICINE

## 2019-04-03 PROCEDURE — 74011636637 HC RX REV CODE- 636/637: Performed by: FAMILY MEDICINE

## 2019-04-03 PROCEDURE — 80048 BASIC METABOLIC PNL TOTAL CA: CPT

## 2019-04-03 PROCEDURE — 74011250637 HC RX REV CODE- 250/637: Performed by: INTERNAL MEDICINE

## 2019-04-03 PROCEDURE — 82962 GLUCOSE BLOOD TEST: CPT

## 2019-04-03 PROCEDURE — 65660000000 HC RM CCU STEPDOWN

## 2019-04-03 PROCEDURE — 74011250636 HC RX REV CODE- 250/636: Performed by: FAMILY MEDICINE

## 2019-04-03 PROCEDURE — 83735 ASSAY OF MAGNESIUM: CPT

## 2019-04-03 PROCEDURE — 92526 ORAL FUNCTION THERAPY: CPT

## 2019-04-03 PROCEDURE — 77030020263 HC SOL INJ SOD CL0.9% LFCR 1000ML

## 2019-04-03 PROCEDURE — C9113 INJ PANTOPRAZOLE SODIUM, VIA: HCPCS | Performed by: FAMILY MEDICINE

## 2019-04-03 PROCEDURE — 74011250637 HC RX REV CODE- 250/637: Performed by: FAMILY MEDICINE

## 2019-04-03 PROCEDURE — 74011250636 HC RX REV CODE- 250/636: Performed by: INTERNAL MEDICINE

## 2019-04-03 PROCEDURE — 74011000258 HC RX REV CODE- 258: Performed by: FAMILY MEDICINE

## 2019-04-03 PROCEDURE — 85025 COMPLETE CBC W/AUTO DIFF WBC: CPT

## 2019-04-03 PROCEDURE — 84443 ASSAY THYROID STIM HORMONE: CPT

## 2019-04-03 RX ORDER — METOPROLOL SUCCINATE 100 MG/1
100 TABLET, EXTENDED RELEASE ORAL DAILY
Status: DISCONTINUED | OUTPATIENT
Start: 2019-04-04 | End: 2019-04-09

## 2019-04-03 RX ORDER — CEFPODOXIME PROXETIL 200 MG/1
200 TABLET, FILM COATED ORAL EVERY 12 HOURS
Status: DISCONTINUED | OUTPATIENT
Start: 2019-04-03 | End: 2019-04-05

## 2019-04-03 RX ORDER — LANOLIN ALCOHOL/MO/W.PET/CERES
400 CREAM (GRAM) TOPICAL 2 TIMES DAILY
Status: DISCONTINUED | OUTPATIENT
Start: 2019-04-03 | End: 2019-04-09 | Stop reason: HOSPADM

## 2019-04-03 RX ORDER — MAGNESIUM SULFATE HEPTAHYDRATE 40 MG/ML
2 INJECTION, SOLUTION INTRAVENOUS ONCE
Status: COMPLETED | OUTPATIENT
Start: 2019-04-03 | End: 2019-04-03

## 2019-04-03 RX ADMIN — BUDESONIDE 500 MCG: 0.5 INHALANT RESPIRATORY (INHALATION) at 07:33

## 2019-04-03 RX ADMIN — BUDESONIDE 500 MCG: 0.5 INHALANT RESPIRATORY (INHALATION) at 19:47

## 2019-04-03 RX ADMIN — MAGNESIUM SULFATE HEPTAHYDRATE 2 G: 40 INJECTION, SOLUTION INTRAVENOUS at 16:01

## 2019-04-03 RX ADMIN — QUETIAPINE FUMARATE 300 MG: 100 TABLET ORAL at 21:12

## 2019-04-03 RX ADMIN — MAGNESIUM OXIDE TAB 400 MG (241.3 MG ELEMENTAL MG) 400 MG: 400 (241.3 MG) TAB at 17:07

## 2019-04-03 RX ADMIN — METOPROLOL TARTRATE 25 MG: 25 TABLET ORAL at 17:07

## 2019-04-03 RX ADMIN — METOPROLOL TARTRATE 25 MG: 25 TABLET ORAL at 12:36

## 2019-04-03 RX ADMIN — INSULIN GLARGINE 5 UNITS: 100 INJECTION, SOLUTION SUBCUTANEOUS at 23:17

## 2019-04-03 RX ADMIN — Medication 10 ML: at 21:12

## 2019-04-03 RX ADMIN — MORPHINE SULFATE 2 MG: 2 INJECTION, SOLUTION INTRAMUSCULAR; INTRAVENOUS at 10:56

## 2019-04-03 RX ADMIN — FLECAINIDE ACETATE 50 MG: 100 TABLET ORAL at 21:12

## 2019-04-03 RX ADMIN — SODIUM CHLORIDE 50 ML/HR: 900 INJECTION, SOLUTION INTRAVENOUS at 08:36

## 2019-04-03 RX ADMIN — ALBUTEROL SULFATE 2.5 MG: 2.5 SOLUTION RESPIRATORY (INHALATION) at 07:33

## 2019-04-03 RX ADMIN — FLECAINIDE ACETATE 50 MG: 100 TABLET ORAL at 08:36

## 2019-04-03 RX ADMIN — ENOXAPARIN SODIUM 90 MG: 100 INJECTION SUBCUTANEOUS at 17:07

## 2019-04-03 RX ADMIN — PANTOPRAZOLE SODIUM 40 MG: 40 INJECTION, POWDER, FOR SOLUTION INTRAVENOUS at 08:35

## 2019-04-03 RX ADMIN — SODIUM CHLORIDE 1 G: 900 INJECTION, SOLUTION INTRAVENOUS at 21:11

## 2019-04-03 RX ADMIN — SODIUM CHLORIDE 1 G: 900 INJECTION, SOLUTION INTRAVENOUS at 08:36

## 2019-04-03 RX ADMIN — CEFPODOXIME PROXETIL 200 MG: 200 TABLET, FILM COATED ORAL at 21:12

## 2019-04-03 RX ADMIN — ENOXAPARIN SODIUM 90 MG: 100 INJECTION SUBCUTANEOUS at 05:43

## 2019-04-03 RX ADMIN — INSULIN LISPRO 3 UNITS: 100 INJECTION, SOLUTION INTRAVENOUS; SUBCUTANEOUS at 23:17

## 2019-04-03 RX ADMIN — INSULIN LISPRO 3 UNITS: 100 INJECTION, SOLUTION INTRAVENOUS; SUBCUTANEOUS at 12:36

## 2019-04-03 RX ADMIN — PANTOPRAZOLE SODIUM 40 MG: 40 INJECTION, POWDER, FOR SOLUTION INTRAVENOUS at 21:11

## 2019-04-03 RX ADMIN — INSULIN LISPRO 9 UNITS: 100 INJECTION, SOLUTION INTRAVENOUS; SUBCUTANEOUS at 17:07

## 2019-04-03 RX ADMIN — LORAZEPAM 1 MG: 2 INJECTION INTRAMUSCULAR; INTRAVENOUS at 17:08

## 2019-04-03 NOTE — PROGRESS NOTES
Hospitalist Progress Note Admit Date:  3/31/2019  8:29 PM  
Name:  Jose Bender Age:  62 y.o. 
:  1955 MRN:  [id number] PCP:  None Treatment Team: Attending Provider: Willie Portillo DO; Care Manager: Yodit Roberson RN Subjective:  
From H and P HPI: 
 
Patient is a 65yoF with multiple chronic medical conditions, who was started on home hospice on 3/21, of which, her son with whom she lives, revoked hospice last night for EMS to bring her to the hospital. Brett Cobb is obtained from staff as she is unaccompanied, and she is not currently able to provide any history herself. 
  
Per report, patient has been followed by her home hospice nurse, Rod Bragg (with whom ER MD spoke with).  She had been placed on hospice for end stage COPD with multiple comorbidities.  The patient had developed some chills and was noted to have blood in her urine (chronic suprapubic catheter).  She was started on Augmentin.  She became more lethargic and tachycardic.  Yesterday evening, patient's son revoked hospice as witnessed by EMS and hospice nurse, so that she could be admitted to the hospital. 
  
Patient is currently very somnolent.  She awakes, but can only tell me that she \"feels bad. \"  She cannot verbalize person, place, time (when asked she mumbles \"yeah\" but cannot answer my questions). 
  
On ER evaluation, patient is hypertensive and in afib with RVR requiring cardizem drip through IO line in RLE.  Patient will be admitted to the ICU for further care as hospice has been revoked. 
  
4/3/19: 
Sedate but arousable. Converted to sinus rhythm sometime last pm. She has home hospice and son wishes her to return to this. Discussed with case mgmt re: discharge when environment safe - back to home hospice. No uti on culture (<100k cfu) Was on home hospice and son told case management that all of her controlled medications are lost. 
  
 
 
 
 
 
Objective:  
 
Patient Vitals for the past 24 hrs: Temp Pulse Resp BP SpO2  
04/03/19 1233 97.8 °F (36.6 °C) (!) 57 18 141/83 90 % 04/03/19 0842 97.6 °F (36.4 °C) 62 19 124/83 92 % 04/03/19 0734     96 % 04/03/19 0459 97.7 °F (36.5 °C) 66 20 129/73 99 % 04/03/19 0056 97.3 °F (36.3 °C) (!) 59 19 136/72 98 % 04/02/19 2033     96 % 04/02/19 2014 98 °F (36.7 °C) 61 20 (!) 171/100 96 % 04/02/19 1708 97.9 °F (36.6 °C) 65 18 (!) 170/100 96 % Oxygen Therapy O2 Sat (%): 90 % (04/03/19 1233) Pulse via Oximetry: 61 beats per minute (04/03/19 0734) O2 Device: Nasal cannula (04/03/19 1233) O2 Flow Rate (L/min): 2 l/min (04/03/19 1233) FIO2 (%): 28 % (04/02/19 2033) Intake/Output Summary (Last 24 hours) at 4/3/2019 1419 Last data filed at 4/3/2019 1234 Gross per 24 hour Intake 280 ml Output 876 ml Net -596 ml REVIEW OF SYSTEMS: Comprehensive ROS performed and negative except as stated in HPI. Physical Examination: 
Physical Exam  
Constitutional: She is oriented to person, place, and time. No distress. HENT:  
Head: Atraumatic. Mouth/Throat: Oropharynx is clear and moist.  
Eyes: Pupils are equal, round, and reactive to light. EOM are normal.  
Neck: No tracheal deviation present. No thyromegaly present. Cardiovascular: Normal rate. Exam reveals no gallop and no friction rub. Pulmonary/Chest: Breath sounds normal. No respiratory distress. Abdominal: Bowel sounds are normal. She exhibits no distension. Musculoskeletal: She exhibits no tenderness or deformity. Neurological: She is alert and oriented to person, place, and time. Skin: Skin is dry. No rash noted. She is not diaphoretic. Psychiatric: Affect and judgment normal.  
 
 
Data Review: 
I have reviewed all labs, meds, telemetry events, and studies from the last 24 hours. Recent Results (from the past 24 hour(s)) GLUCOSE, POC Collection Time: 04/02/19  4:20 PM  
Result Value Ref Range Glucose (POC) 192 (H) 65 - 100 mg/dL GLUCOSE, POC  
 Collection Time: 04/02/19  8:17 PM  
Result Value Ref Range Glucose (POC) 173 (H) 65 - 100 mg/dL METABOLIC PANEL, BASIC Collection Time: 04/03/19  4:43 AM  
Result Value Ref Range Sodium 141 136 - 145 mmol/L Potassium 3.8 3.5 - 5.1 mmol/L Chloride 110 (H) 98 - 107 mmol/L  
 CO2 23 21 - 32 mmol/L Anion gap 8 7 - 16 mmol/L Glucose 137 (H) 65 - 100 mg/dL BUN 8 8 - 23 MG/DL Creatinine 0.52 (L) 0.6 - 1.0 MG/DL  
 GFR est AA >60 >60 ml/min/1.73m2 GFR est non-AA >60 >60 ml/min/1.73m2 Calcium 8.3 8.3 - 10.4 MG/DL  
CBC WITH AUTOMATED DIFF Collection Time: 04/03/19  4:43 AM  
Result Value Ref Range WBC 7.6 4.3 - 11.1 K/uL  
 RBC 4.53 4.05 - 5.2 M/uL  
 HGB 12.9 11.7 - 15.4 g/dL HCT 42.6 35.8 - 46.3 % MCV 94.0 79.6 - 97.8 FL  
 MCH 28.5 26.1 - 32.9 PG  
 MCHC 30.3 (L) 31.4 - 35.0 g/dL  
 RDW 14.6 11.9 - 14.6 % PLATELET 561 077 - 828 K/uL MPV 12.0 9.4 - 12.3 FL ABSOLUTE NRBC 0.00 0.0 - 0.2 K/uL  
 DF AUTOMATED NEUTROPHILS 51 43 - 78 % LYMPHOCYTES 37 13 - 44 % MONOCYTES 6 4.0 - 12.0 % EOSINOPHILS 5 0.5 - 7.8 % BASOPHILS 1 0.0 - 2.0 % IMMATURE GRANULOCYTES 1 0.0 - 5.0 %  
 ABS. NEUTROPHILS 3.8 1.7 - 8.2 K/UL  
 ABS. LYMPHOCYTES 2.8 0.5 - 4.6 K/UL  
 ABS. MONOCYTES 0.5 0.1 - 1.3 K/UL  
 ABS. EOSINOPHILS 0.4 0.0 - 0.8 K/UL  
 ABS. BASOPHILS 0.1 0.0 - 0.2 K/UL  
 ABS. IMM. GRANS. 0.0 0.0 - 0.5 K/UL MAGNESIUM Collection Time: 04/03/19  4:43 AM  
Result Value Ref Range Magnesium 1.6 (L) 1.8 - 2.4 mg/dL TSH 3RD GENERATION Collection Time: 04/03/19  4:43 AM  
Result Value Ref Range TSH 3.560 0.358 - 3.740 uIU/mL GLUCOSE, POC Collection Time: 04/03/19  6:25 AM  
Result Value Ref Range Glucose (POC) 131 (H) 65 - 100 mg/dL GLUCOSE, POC Collection Time: 04/03/19 11:10 AM  
Result Value Ref Range Glucose (POC) 192 (H) 65 - 100 mg/dL All Micro Results Procedure Component Value Units Date/Time CULTURE, BLOOD [[id number]] Collected:  03/31/19 2027 Order Status:  Completed Specimen:  Blood Updated:  04/03/19 0911 Special Requests: LEFT HAND Culture result: NO GROWTH 3 DAYS     
 CULTURE, URINE [[id number]]  (Abnormal) Collected:  04/01/19 0210 Order Status:  Completed Specimen:  Suprapubic Urine Updated:  04/03/19 5150 Special Requests: NO SPECIAL REQUESTS Culture result:    
  10,000 to 50,000 COLONIES/mL GRAM NEGATIVE RODS IDENTIFICATION AND SUSCEPTIBILITY TO FOLLOW Current Meds: 
Current Facility-Administered Medications Medication Dose Route Frequency  metoprolol tartrate (LOPRESSOR) tablet 25 mg  25 mg Oral Q6H  
 hydrALAZINE (APRESOLINE) tablet 25 mg  25 mg Oral TID PRN  
 LORazepam (ATIVAN) injection 1 mg  1 mg IntraVENous Q4H PRN  
 haloperidol lactate (HALDOL) injection 2 mg  2 mg IntraMUSCular ONCE PRN  pantoprazole (PROTONIX) injection 40 mg  40 mg IntraVENous Q12H  
 QUEtiapine (SEROquel) tablet 300 mg  300 mg Oral QHS  bisacodyl (DULCOLAX) tablet 5 mg  5 mg Oral DAILY PRN  
 ALPRAZolam (XANAX) tablet 0.5 mg  0.5 mg Oral BID PRN  
 fentaNYL (DURAGESIC) 25 mcg/hr patch 1 Patch  1 Patch TransDERmal Q72H  flecainide (TAMBOCOR) tablet 50 mg  50 mg Oral Q12H  
 albuterol (PROVENTIL VENTOLIN) nebulizer solution 2.5 mg  2.5 mg Inhalation Q4H PRN  
 sodium chloride (NS) flush 5-40 mL  5-40 mL IntraVENous Q8H  
 sodium chloride (NS) flush 5-40 mL  5-40 mL IntraVENous PRN  
 acetaminophen (TYLENOL) tablet 650 mg  650 mg Oral Q4H PRN  
 ondansetron (ZOFRAN) injection 4 mg  4 mg IntraVENous Q4H PRN  
 0.9% sodium chloride infusion  50 mL/hr IntraVENous CONTINUOUS  
 insulin lispro (HUMALOG) injection 0-15 Units  0-15 Units SubCUTAneous AC&HS  cefepime (MAXIPIME) 1 g in 0.9% sodium chloride (MBP/ADV) 50 mL ADV  1 g IntraVENous Q12H  
 metoprolol (LOPRESSOR) injection 2.5 mg  2.5 mg IntraVENous Q6H PRN  
  budesonide (PULMICORT) 500 mcg/2 ml nebulizer suspension  500 mcg Nebulization BID RT  
 phenazopyridine (PYRIDIUM) tab 95 mg  95 mg Oral TID PRN  
 morphine injection 2 mg  2 mg IntraVENous Q4H PRN  
 enoxaparin (LOVENOX) injection 90 mg  1 mg/kg SubCUTAneous Q12H  
 opium-belladonna (B&O 16-A SUPPRETTE) 16.2-60 mg suppository 1 Suppository  1 Suppository Rectal Q6H PRN  
 insulin glargine (LANTUS) injection 5 Units  5 Units SubCUTAneous QHS  dilTIAZem (CARDIZEM) 125 mg in dextrose 5% 125 mL infusion  0-15 mg/hr IntraVENous TITRATE Diet: DIET CARDIAC Other Studies (last 24 hours): No results found. Assessment and Plan:  
 
Hospital Problems as of 4/3/2019 Date Reviewed: 1/31/2019 Codes Class Noted - Resolved POA Acute encephalopathy ICD-10-CM: G93.40 ICD-9-CM: 348.30  3/10/2019 - Present Yes Diabetes ([facility] [address] ICD-10-CM: E11.9 ICD-9-CM: 250.00  3/10/2019 - Present Yes Hypertension ICD-10-CM: I10 
ICD-9-CM: 401.9  3/10/2019 - Present Yes * (Principal) Atrial fibrillation with rapid ventricular response (HCC) ICD-10-CM: I48.91 
ICD-9-CM: 427.31  2/5/2019 - Present Unknown UTI (urinary tract infection) ICD-10-CM: N39.0 ICD-9-CM: 599.0  1/31/2019 - Present Unknown Paraplegia (HCC) (Chronic) ICD-10-CM: B06.98 ICD-9-CM: 344.1  12/10/2016 - Present Yes Bipolar disorder without psychotic features (HCC) (Chronic) ICD-10-CM: F31.9 ICD-9-CM: 296.80  12/10/2016 - Present Yes Chronic hepatitis C virus infection (HCC) (Chronic) ICD-10-CM: B18.2 ICD-9-CM: 070.54  12/10/2016 - Present Yes A/P:   
Atrial Fibrillation with RVR--now conversion to nsr Continue current meds. ??compliance is questioned Confusion on admission Apparently chronic and pt with narcotic pain meds, and bipolar disorder. Chronic anxiety / agitation Hypomagnesemia 1.6--oral and iv supplement.  Continue mgoxide oral  
 
  
 UTI-- diagnosed here for reason follow up to confirm treated appropriately 
- diagnosed as outpt and has chronic jasso therefore CAUTI (catheter associated) 
- oral vantin dc rocephin -<100,000cfu 
  
DM2 
- Home insulin 
- SSI--controlled 
  
H/O bipolar - Home meds 
  
Hep C 
- Stable 
  
COPD 
-stable continue same meds 
  
Paraplegia 
- h/o transverse myelitis

## 2019-04-03 NOTE — PROGRESS NOTES
Problem: Dysphagia (Adult) Goal: *Speech Goal: (INSERT TEXT) Description LTG: Patient will tolerate least restrictive diet without signs/symptoms of aspiration at discharge for safe swallow function. - MET 04/03/19 STG: Patient will tolerate puree diet/nectar liquids without overt signs/symptoms of airway compromise EDITED AND MET 4/3/19 STG: Patient will participate in trials of chewable textures to diet advancement without overt s/sx of airway compromise MET 04/03/19 Outcome: Progressing Towards Goal 
 SPEECH LANGUAGE PATHOLOGY: BEDSIDE SWALLOW NOTE: Daily Note and Discharge NAME/AGE/GENDER: Imelda Mayes is a 62 y.o. female DATE: 4/3/2019 PRIMARY DIAGNOSIS: Atrial fibrillation with rapid ventricular response ([address] [I48.91] Atrial fibrillation with rapid ventricular response ([address] [I48.91] Atrial fibrillation with rapid ventricular response ([address] [I48.91] ICD-10: Treatment Diagnosis: oral dysphagia R13.11 INTERDISCIPLINARY COLLABORATION: Registered Nurse PRECAUTIONS/ALLERGIES: Patient has no known allergies. ASSESSMENT:  
Seen for second session of speech therapy today per RN request. Patient now more awake and requesting thin liquids. Upon clinician's arrival, patient had self-fed entire mechanical soft meal (received in ERROR from kitchen). Thin liquids presented via straw sips. Serial sips totally 6 ounces of liquids consumed without overt s/s of airway compromise. She also consumed 4 ounces of mixed consistencies. Mildly impaired mastication due to dentition, but adequate oral clearing. Recommend upgrade to mechanical soft diet/thin liquids. Medications whole with liquid wash. No further ST indicated at this time. ?????? ? ? This section established at most recent assessment?????????? 
PROBLEM LIST (Impairments causing functional limitations): 1. Oral dysphagia REHABILITATION POTENTIAL FOR STATED GOALS: Good PLAN OF CARE:  
 Patient will benefit from skilled intervention to address the following impairments. RECOMMENDATIONS AND PLANNED INTERVENTIONS (Benefits and precautions of therapy have been discussed with the patient.): 
· PO:  Mechanical soft · Liquids:  regular thin MEDICATIONS: 
· Whole in puree · Crushed in puree ASPIRATION PRECAUTIONS: 
· Slow rate of intake · Small bites/sips · Upright at 90 degrees during meal 
COMPENSATORY STRATEGIES/MODIFICATIONS INCLUDING: 
· Small sips and bites · 1:1 assistance OTHER RECOMMENDATIONS (including follow up treatment recommendations): · Family training/education · Patient education RECOMMENDED DIET MODIFICATIONS DISCUSSED WITH: 
· Nursing · Patient FREQUENCY/DURATION No additional ST indicated at this time as all goals are met. RECOMMENDED REHABILITATION/EQUIPMENT: (at time of discharge pending progress): Due to the probability of continued deficits (see above) this patient will not likely need continued skilled speech therapy after discharge. SUBJECTIVE:  
Much more alert this afternoon. History of Present Injury/Illness: Ms. Tiera Quintana  has a past medical history of Diabetes ([facility] [address] Gastrointestinal disorder, Hypertension, Ill-defined condition, Ill-defined condition, Ill-defined condition, Liver disease, Psychiatric disorder, Psychiatric disorder, and Thromboembolus ([facility] [address] .  She also  has no past surgical history on file. Present Symptoms:   
Pain Scale 1: Numeric (0 - 10) Pain Intensity 1: 0 Current Medications: No current facility-administered medications on file prior to encounter. Current Outpatient Medications on File Prior to Encounter Medication Sig Dispense Refill  promethazine (PHENERGAN) 25 mg tablet Take 1 Tab by mouth every six (6) hours as needed for Nausea. 20 Tab 0  promethazine (PHENERGAN) 25 mg tablet Take 1 Tab by mouth every six (6) hours as needed for Nausea for up to 15 doses.  15 Tab 2  
  ALPRAZolam (XANAX) 0.5 mg tablet Take 1 Tab by mouth two (2) times daily as needed for Anxiety. Max Daily Amount: 1 mg. 14 Tab 0  
 insulin glargine (LANTUS,BASAGLAR) 100 unit/mL (3 mL) inpn 20 Units by SubCUTAneous route nightly for 30 days. 2 Pen 2  
 albuterol (PROVENTIL HFA, VENTOLIN HFA, PROAIR HFA) 90 mcg/actuation inhaler Take 1 Puff by inhalation every four (4) hours as needed for Wheezing. 1 Inhaler 0  
 albuterol (PROVENTIL VENTOLIN) 2.5 mg /3 mL (0.083 %) nebulizer solution Take 3 mL by inhalation every four (4) hours as needed for Wheezing. 24 Each 0  
 apixaban (ELIQUIS) 5 mg tablet Take 1 Tab by mouth two (2) times a day. 30 Tab 0  Blood-Glucose Meter monitoring kit Check glucose at home daily 1 Kit 0  
 budesonide (PULMICORT) 0.5 mg/2 mL nbsp 2 mL by Nebulization route two (2) times a day. 1 Each 0  
 nystatin (MYCOSTATIN) powder Apply  to affected area two (2) times a day. 1 Bottle 0  
 pantoprazole (PROTONIX) 40 mg tablet Take 1 Tab by mouth Daily (before breakfast). 30 Tab 0  
 flecainide (TAMBOCOR) 50 mg tablet Take 1 Tab by mouth every twelve (12) hours. 60 Tab 0  
 metoprolol succinate (TOPROL-XL) 50 mg XL tablet Take 1 Tab by mouth daily. 30 Tab 0  
 insulin lispro (HUMALOG) 100 unit/mL injection Less than 150 =   0 units          
150 -199 =   2 units 200 -249 =   4 units 250 -299 =   6 units 300 -349 =   8 units Before meals and at bedtime. 1 Vial 0  
 ALPRAZolam (XANAX) 0.5 mg tablet Take 1 Tab by mouth two (2) times daily as needed for Anxiety. Max Daily Amount: 1 mg. 30 Tab 0  
 fentaNYL (DURAGESIC) 25 mcg/hr PATCH 1 Patch by TransDERmal route every seventy-two (72) hours. Max Daily Amount: 1 Patch. 5 Patch 0  
 bisacodyl (DULCOLAX) 5 mg EC tablet Take 1 Tab by mouth daily as needed for Constipation. 30 Tab 0  
 budesonide (PULMICORT) 0.5 mg/2 mL nbsp 2 mL by Nebulization route two (2) times a day.  60 Each 0  
  zinc oxide-cod liver oil (DESITIN) 40 % paste Apply  to affected area two (2) times a day. 1 Tube 1  
 omeprazole (PRILOSEC) 40 mg capsule Take 40 mg by mouth daily.  QUEtiapine (SEROQUEL) 100 mg tablet Take 300 mg by mouth nightly. Current Dietary Status: NPO Social History/Home Situation:   
Home Environment: Private residence Living Alone: No 
Support Systems: Child(urban) Patient Expects to be Discharged to[de-identified] Unknown OBJECTIVE:  
Respiratory Status:  Nasal cannula  2 l/min CXR Results:Normal chest. 
MRI/CT Results:no acute Oral Motor Structure/Speech:  Oral-Motor Structure/Motor Speech Labial: No impairment Dentition: (upper dentition) Oral Hygiene: dry oral cavity Lingual: No impairment Cognitive and Communication Status: 
Neurologic State: Alert Orientation Level: Oriented to person Cognition: Follows commands Perception: Appears intact Perseveration: Perseverates during conversation Safety/Judgement: Decreased insight into deficits BEDSIDE SWALLOW EVALUATION Oral Assessment: 
Oral Assessment Labial: No impairment P.O. Trials: 
Patient Position: Up in bed The patient was given the following:  
Consistency Presented: Mixed consistency; Thin liquid How Presented: Self-fed/presented;Cup/sip;Spoon;Straw;Successive swallows ORAL PHASE: 
Bolus Acceptance: No impairment Bolus Formation/Control: No impairment Propulsion: No impairment Type of Impairment: Mastication Oral Residue: None PHARYNGEAL PHASE: 
Initiation of Swallow: No impairment Laryngeal Elevation: Functional 
Aspiration Signs/Symptoms: None Vocal Quality: No impairment Pharyngeal Phase Characteristics: No impairment, issues, or problems OTHER OBSERVATIONS: 
Rate/bite size: Impaired Endurance:  Questionable Comments: Tool Used: Dysphagia Outcome and Severity Scale (AYAD) Score Comments Normal Diet  ? 7 With no strategies or extra time needed Functional Swallow  ? 6 May have mild oral or pharyngeal delay Mild Dysphagia ? 5 Which may require one diet consistency restricted (those who demonstrate penetration which is entirely cleared on MBS would be included) Mild-Moderate Dysphagia  ? 4 With 1-2 diet consistencies restricted Moderate Dysphagia  ? 3 With 2 or more diet consistencies restricted Moderately Severe Dysphagia  ? 2 With partial PO strategies (trials with ST only) Severe Dysphagia  ? 1 With inability to tolerate any PO safely Score:  Initial: 5 Most Recent: X (Date: --) Interpretation of Tool: The Dysphagia Outcome and Severity Scale (AYAD) is a simple, easy-to-use, 7-point scale developed to systematically rate the functional severity of dysphagia based on objective assessment and make recommendations for diet level, independence level, and type of nutrition. Payor: [id number] / Plan: [address] [place] [address] Avenue / Product Type: Medicaid /  
 
TREATMENT:  
 (In addition to Assessment/Re-Assessment sessions the following treatments were rendered) Dysphagia Activities: Activities/Procedures listed utilized to improve progress in swallow function and diet tolerance. Required moderate cueing to work toward diet advancement and decrease aspiration risk. MODALITIES:  
  
  
  
  
  
  
  
  
  
  
    
  
  
  
  
  
  
  
  
   
 
ORAL MOTOR  EXERCISES: 
  
  
  
  
  
  
  
  
  
  
  
  
  
  
  
  
  
  
  
  
  
  
  
  
  
  
    
  
  
  
  
  
  
  
  
  
  
  
  
  
  
  
  
  
  
  
  
  
  
  
  
  
   
 
LARYNGEAL / PHARYNGEAL EXERCISES: 
  
  
  
  
  
  
  
  
  
  
  
  
  
  
  
  
  
  
  
  
  
    
  
  
  
  
  
  
  
  
  
  
  
  
  
  
  
  
  
  
  
   
 
__________________________________________________________________________________________________ Safety: After treatment position/precautions: 
· Call light within reach · RN notified · Upright in Bed ·  
 Recommendations/Intent for next treatment session: No further ST indicated at this time as she is tolerating baseline diet without overt difficulty. Total Treatment Duration: 
Time In: 9907 Time Out: [address] CCC-SLP

## 2019-04-03 NOTE — PROGRESS NOTES
Verbal bedside report received from [facility]. Assumed care of patient.

## 2019-04-03 NOTE — PROGRESS NOTES
Problem: Dysphagia (Adult) Goal: *Speech Goal: (INSERT TEXT) Description LTG: Patient will tolerate least restrictive diet without signs/symptoms of aspiration at discharge for safe swallow function. STG: Patient will tolerate puree diet/nectar liquids without overt signs/symptoms of airway compromise EDITED 4/3/19 STG: Patient will participate in trials of chewable textures to diet advancement without overt s/sx of airway compromise Outcome: Progressing Towards Goal 
 SPEECH LANGUAGE PATHOLOGY: BEDSIDE SWALLOW NOTE: Daily Note 1 NAME/AGE/GENDER: Royce Dewey is a 62 y.o. female DATE: 4/3/2019 PRIMARY DIAGNOSIS: Atrial fibrillation with rapid ventricular response ([address] [I48.91] Atrial fibrillation with rapid ventricular response ([address] [I48.91] Atrial fibrillation with rapid ventricular response ([address] [I48.91] ICD-10: Treatment Diagnosis: oral dysphagia R13.11 INTERDISCIPLINARY COLLABORATION: Registered Nurse PRECAUTIONS/ALLERGIES: Patient has no known allergies. ASSESSMENT:  
Patient seen for diet tolerance this AM. RN reports poor acceptance of food, but that patient has been drinking thin liquids well. Upon arrival, patient expressing thirst and stating \"please\". When straw sips were presented, she turned head away to refuse intake. Moderate encouragement for patient to accept thin liquid trials. Immediate strong coughing on 4/5 thin liquid sips. Improved tolerance with nectar thick as no overt s/s of airway compromise observed. Inconsistent, brief bolus holding with puree, but adequate oral clearing after the swallow. Recommend puree diet/nectar thick liquids. Medications as tolerated. Speech to follow for diet tolerance and po trials for potential diet upgrade. ?????? ? ? This section established at most recent assessment?????????? 
PROBLEM LIST (Impairments causing functional limitations): 1. Oral dysphagia REHABILITATION POTENTIAL FOR STATED GOALS: Good PLAN OF CARE:  
 Patient will benefit from skilled intervention to address the following impairments. RECOMMENDATIONS AND PLANNED INTERVENTIONS (Benefits and precautions of therapy have been discussed with the patient.): 
· PO:  Pureed · Liquids:  nectar MEDICATIONS: 
· Whole in puree · Crushed in puree ASPIRATION PRECAUTIONS: 
· Slow rate of intake · Small bites/sips · Upright at 90 degrees during meal 
COMPENSATORY STRATEGIES/MODIFICATIONS INCLUDING: 
· Small sips and bites · 1:1 assistance OTHER RECOMMENDATIONS (including follow up treatment recommendations): · Family training/education · Patient education RECOMMENDED DIET MODIFICATIONS DISCUSSED WITH: 
· Nursing · Patient FREQUENCY/DURATION: Continue to follow patient 2 times a week for duration of hospital stay to address above goals. RECOMMENDED REHABILITATION/EQUIPMENT: (at time of discharge pending progress): Due to the probability of continued deficits (see above) this patient will not likely need continued skilled speech therapy after discharge. SUBJECTIVE:  
Perseveratively stating \"please\", but unable to express wishes. History of Present Injury/Illness: Ms. Tonny Jensen  has a past medical history of Diabetes ([facility] [address] Gastrointestinal disorder, Hypertension, Ill-defined condition, Ill-defined condition, Ill-defined condition, Liver disease, Psychiatric disorder, Psychiatric disorder, and Thromboembolus ([facility] [address] .  She also  has no past surgical history on file. Present Symptoms:   
Pain Scale 1: Numeric (0 - 10) Pain Intensity 1: 0 Current Medications: No current facility-administered medications on file prior to encounter. Current Outpatient Medications on File Prior to Encounter Medication Sig Dispense Refill  promethazine (PHENERGAN) 25 mg tablet Take 1 Tab by mouth every six (6) hours as needed for Nausea. 20 Tab 0  promethazine (PHENERGAN) 25 mg tablet Take 1 Tab by mouth every six (6) hours as needed for Nausea for up to 15 doses. 15 Tab 2  ALPRAZolam (XANAX) 0.5 mg tablet Take 1 Tab by mouth two (2) times daily as needed for Anxiety. Max Daily Amount: 1 mg. 14 Tab 0  
 insulin glargine (LANTUS,BASAGLAR) 100 unit/mL (3 mL) inpn 20 Units by SubCUTAneous route nightly for 30 days. 2 Pen 2  
 albuterol (PROVENTIL HFA, VENTOLIN HFA, PROAIR HFA) 90 mcg/actuation inhaler Take 1 Puff by inhalation every four (4) hours as needed for Wheezing. 1 Inhaler 0  
 albuterol (PROVENTIL VENTOLIN) 2.5 mg /3 mL (0.083 %) nebulizer solution Take 3 mL by inhalation every four (4) hours as needed for Wheezing. 24 Each 0  
 apixaban (ELIQUIS) 5 mg tablet Take 1 Tab by mouth two (2) times a day. 30 Tab 0  Blood-Glucose Meter monitoring kit Check glucose at home daily 1 Kit 0  
 budesonide (PULMICORT) 0.5 mg/2 mL nbsp 2 mL by Nebulization route two (2) times a day. 1 Each 0  
 nystatin (MYCOSTATIN) powder Apply  to affected area two (2) times a day. 1 Bottle 0  
 pantoprazole (PROTONIX) 40 mg tablet Take 1 Tab by mouth Daily (before breakfast). 30 Tab 0  
 flecainide (TAMBOCOR) 50 mg tablet Take 1 Tab by mouth every twelve (12) hours. 60 Tab 0  
 metoprolol succinate (TOPROL-XL) 50 mg XL tablet Take 1 Tab by mouth daily. 30 Tab 0  
 insulin lispro (HUMALOG) 100 unit/mL injection Less than 150 =   0 units          
150 -199 =   2 units 200 -249 =   4 units 250 -299 =   6 units 300 -349 =   8 units Before meals and at bedtime. 1 Vial 0  
 ALPRAZolam (XANAX) 0.5 mg tablet Take 1 Tab by mouth two (2) times daily as needed for Anxiety. Max Daily Amount: 1 mg. 30 Tab 0  
 fentaNYL (DURAGESIC) 25 mcg/hr PATCH 1 Patch by TransDERmal route every seventy-two (72) hours. Max Daily Amount: 1 Patch. 5 Patch 0  
 bisacodyl (DULCOLAX) 5 mg EC tablet Take 1 Tab by mouth daily as needed for Constipation. 30 Tab 0  
 budesonide (PULMICORT) 0.5 mg/2 mL nbsp 2 mL by Nebulization route two (2) times a day.  60 Each 0  
  zinc oxide-cod liver oil (DESITIN) 40 % paste Apply  to affected area two (2) times a day. 1 Tube 1  
 omeprazole (PRILOSEC) 40 mg capsule Take 40 mg by mouth daily.  QUEtiapine (SEROQUEL) 100 mg tablet Take 300 mg by mouth nightly. Current Dietary Status: NPO Social History/Home Situation:   
Home Environment: Private residence Living Alone: No 
Support Systems: Child(urban) Patient Expects to be Discharged to[de-identified] Unknown OBJECTIVE:  
Respiratory Status:  Nasal cannula  2 l/min CXR Results:Normal chest. 
MRI/CT Results:no acute Oral Motor Structure/Speech:  Oral-Motor Structure/Motor Speech Labial: No impairment Dentition: (upper dentition) Oral Hygiene: dry oral cavity Lingual: No impairment Cognitive and Communication Status: 
Neurologic State: Drowsy Orientation Level: Oriented to person Cognition: Decreased attention/concentration;Decreased command following Perception: Appears intact Perseveration: Perseverates during conversation Safety/Judgement: Decreased insight into deficits BEDSIDE SWALLOW EVALUATION Oral Assessment: 
Oral Assessment Labial: No impairment P.O. Trials: 
Patient Position: up in bed The patient was given the following:  
Consistency Presented: Puree; Thin liquid; Nectar thick liquid How Presented: Self-fed/presented;Spoon;Straw;Successive swallows ORAL PHASE: 
Bolus Acceptance: No impairment Bolus Formation/Control: No impairment Propulsion: No impairment;Delayed (# of seconds) Oral Residue: None PHARYNGEAL PHASE: 
Initiation of Swallow: No impairment Laryngeal Elevation: Functional 
Aspiration Signs/Symptoms: Strong cough Vocal Quality: No impairment OTHER OBSERVATIONS: 
Rate/bite size: Impaired Endurance:  Questionable Comments: Tool Used: Dysphagia Outcome and Severity Scale (AYAD) Score Comments Normal Diet  ? 7 With no strategies or extra time needed Functional Swallow  ? 6 May have mild oral or pharyngeal delay Mild Dysphagia ? 5 Which may require one diet consistency restricted (those who demonstrate penetration which is entirely cleared on MBS would be included) Mild-Moderate Dysphagia  ? 4 With 1-2 diet consistencies restricted Moderate Dysphagia  ? 3 With 2 or more diet consistencies restricted Moderately Severe Dysphagia  ? 2 With partial PO strategies (trials with ST only) Severe Dysphagia  ? 1 With inability to tolerate any PO safely Score:  Initial: 5 Most Recent: X (Date: --) Interpretation of Tool: The Dysphagia Outcome and Severity Scale (AYAD) is a simple, easy-to-use, 7-point scale developed to systematically rate the functional severity of dysphagia based on objective assessment and make recommendations for diet level, independence level, and type of nutrition. Payor: [id number] / Plan: [address] [place] [address] Avenue / Product Type: Medicaid /  
 
TREATMENT:  
 (In addition to Assessment/Re-Assessment sessions the following treatments were rendered) Dysphagia Activities: Activities/Procedures listed utilized to improve progress in swallow function and diet tolerance. Required moderate cueing to work toward diet advancement and decrease aspiration risk. MODALITIES:  
  
  
  
  
  
  
  
  
  
  
    
  
  
  
  
  
  
  
  
   
 
ORAL MOTOR  EXERCISES: 
  
  
  
  
  
  
  
  
  
  
  
  
  
  
  
  
  
  
  
  
  
  
  
  
  
  
    
  
  
  
  
  
  
  
  
  
  
  
  
  
  
  
  
  
  
  
  
  
  
  
  
  
   
 
LARYNGEAL / PHARYNGEAL EXERCISES: 
  
  
  
  
  
  
  
  
  
  
  
  
  
  
  
  
  
  
  
  
  
    
  
  
  
  
  
  
  
  
  
  
  
  
  
  
  
  
  
  
  
   
 
__________________________________________________________________________________________________ Safety: After treatment position/precautions: 
· Call light within reach · RN notified · Upright in Bed Treatment Assessment:  participated in dysphagia evaluation without medical complications. Progression/Medical Necessity:  
· Patient is expected to demonstrate progress in swallow strength, swallow timeliness, swallow function, diet tolerance and swallow safety ·  to improve swallow safety, work toward diet advancement and decrease aspiration risk · . Compliance with Program/Exercises: Will assess as treatment progresses Reason for Continuation of Services/Other Comments: 
· Patient continues to require skilled intervention due to patient unable to attend/participate in therapy as expected · . Recommendations/Intent for next treatment session: \"Treatment next visit will focus on diet tolerance and chewable trials \". Total Treatment Duration: 
Time In: 6755 Time Out: [address] CCC-SLP

## 2019-04-03 NOTE — PROGRESS NOTES
Paged Dr. Ziyad Mcgrath. Metoprolol tartrate 25 mg due with HR sustaining at 58. Orders to hold dose now and place parameters on med stating \"Hold for HR <65 or SBP <100\"

## 2019-04-03 NOTE — PROGRESS NOTES
Received call from Milan Ortiz at [place] [id number] stating patient does have open case on patient. Attempted to return call and left message for her to call CM. Patient son Jignesh Mon came to see patient and states that his brother Emily Crooks is in alf for 25 years and will no longer be available to help with patient. Patient has a daughter that lives in [address] [place] [address] that per Jignesh Mon she plans on coming up and seeing his mother. CM following for d/c.

## 2019-04-03 NOTE — PROGRESS NOTES
Verbal bedside report given to [facility], oncoming RN. Patient's situation, background, assessment and recommendations provided. Opportunity for questions provided. Oncoming RN assumed care of patient.

## 2019-04-04 LAB
ANION GAP SERPL CALC-SCNC: 12 MMOL/L (ref 7–16)
BACTERIA SPEC CULT: ABNORMAL
BACTERIA SPEC CULT: ABNORMAL
BASOPHILS # BLD: 0.1 K/UL (ref 0–0.2)
BASOPHILS NFR BLD: 1 % (ref 0–2)
BUN SERPL-MCNC: 8 MG/DL (ref 8–23)
CALCIUM SERPL-MCNC: 8.1 MG/DL (ref 8.3–10.4)
CHLORIDE SERPL-SCNC: 108 MMOL/L (ref 98–107)
CO2 SERPL-SCNC: 20 MMOL/L (ref 21–32)
CREAT SERPL-MCNC: 0.59 MG/DL (ref 0.6–1)
DIFFERENTIAL METHOD BLD: ABNORMAL
EOSINOPHIL # BLD: 0.4 K/UL (ref 0–0.8)
EOSINOPHIL NFR BLD: 5 % (ref 0.5–7.8)
ERYTHROCYTE [DISTWIDTH] IN BLOOD BY AUTOMATED COUNT: 14.4 % (ref 11.9–14.6)
GLUCOSE BLD STRIP.AUTO-MCNC: 216 MG/DL (ref 65–100)
GLUCOSE BLD STRIP.AUTO-MCNC: 269 MG/DL (ref 65–100)
GLUCOSE BLD STRIP.AUTO-MCNC: 320 MG/DL (ref 65–100)
GLUCOSE BLD STRIP.AUTO-MCNC: 352 MG/DL (ref 65–100)
GLUCOSE SERPL-MCNC: 220 MG/DL (ref 65–100)
HCT VFR BLD AUTO: 40.9 % (ref 35.8–46.3)
HGB BLD-MCNC: 12.5 G/DL (ref 11.7–15.4)
IMM GRANULOCYTES # BLD AUTO: 0.1 K/UL (ref 0–0.5)
IMM GRANULOCYTES NFR BLD AUTO: 1 % (ref 0–5)
LYMPHOCYTES # BLD: 2.3 K/UL (ref 0.5–4.6)
LYMPHOCYTES NFR BLD: 30 % (ref 13–44)
MAGNESIUM SERPL-MCNC: 2 MG/DL (ref 1.8–2.4)
MCH RBC QN AUTO: 28.2 PG (ref 26.1–32.9)
MCHC RBC AUTO-ENTMCNC: 30.6 G/DL (ref 31.4–35)
MCV RBC AUTO: 92.3 FL (ref 79.6–97.8)
MONOCYTES # BLD: 0.5 K/UL (ref 0.1–1.3)
MONOCYTES NFR BLD: 6 % (ref 4–12)
NEUTS SEG # BLD: 4.4 K/UL (ref 1.7–8.2)
NEUTS SEG NFR BLD: 57 % (ref 43–78)
NRBC # BLD: 0 K/UL (ref 0–0.2)
PLATELET # BLD AUTO: 230 K/UL (ref 150–450)
PMV BLD AUTO: 11.8 FL (ref 9.4–12.3)
POTASSIUM SERPL-SCNC: 3.2 MMOL/L (ref 3.5–5.1)
RBC # BLD AUTO: 4.43 M/UL (ref 4.05–5.2)
SERVICE CMNT-IMP: ABNORMAL
SODIUM SERPL-SCNC: 140 MMOL/L (ref 136–145)
WBC # BLD AUTO: 7.7 K/UL (ref 4.3–11.1)

## 2019-04-04 PROCEDURE — 82962 GLUCOSE BLOOD TEST: CPT

## 2019-04-04 PROCEDURE — 74011250636 HC RX REV CODE- 250/636: Performed by: FAMILY MEDICINE

## 2019-04-04 PROCEDURE — 36415 COLL VENOUS BLD VENIPUNCTURE: CPT

## 2019-04-04 PROCEDURE — 77030019605

## 2019-04-04 PROCEDURE — 94760 N-INVAS EAR/PLS OXIMETRY 1: CPT

## 2019-04-04 PROCEDURE — 74011250637 HC RX REV CODE- 250/637: Performed by: INTERNAL MEDICINE

## 2019-04-04 PROCEDURE — 94640 AIRWAY INHALATION TREATMENT: CPT

## 2019-04-04 PROCEDURE — 74011636637 HC RX REV CODE- 636/637: Performed by: INTERNAL MEDICINE

## 2019-04-04 PROCEDURE — 74011000250 HC RX REV CODE- 250: Performed by: FAMILY MEDICINE

## 2019-04-04 PROCEDURE — 85025 COMPLETE CBC W/AUTO DIFF WBC: CPT

## 2019-04-04 PROCEDURE — 83735 ASSAY OF MAGNESIUM: CPT

## 2019-04-04 PROCEDURE — 74011000250 HC RX REV CODE- 250

## 2019-04-04 PROCEDURE — 77010033678 HC OXYGEN DAILY

## 2019-04-04 PROCEDURE — 74011636637 HC RX REV CODE- 636/637: Performed by: FAMILY MEDICINE

## 2019-04-04 PROCEDURE — 65270000029 HC RM PRIVATE

## 2019-04-04 PROCEDURE — 74011250637 HC RX REV CODE- 250/637: Performed by: FAMILY MEDICINE

## 2019-04-04 PROCEDURE — C9113 INJ PANTOPRAZOLE SODIUM, VIA: HCPCS | Performed by: FAMILY MEDICINE

## 2019-04-04 PROCEDURE — 80048 BASIC METABOLIC PNL TOTAL CA: CPT

## 2019-04-04 RX ORDER — POTASSIUM CHLORIDE 20 MEQ/1
40 TABLET, EXTENDED RELEASE ORAL
Status: COMPLETED | OUTPATIENT
Start: 2019-04-04 | End: 2019-04-04

## 2019-04-04 RX ORDER — BUDESONIDE 0.5 MG/2ML
INHALANT ORAL
Status: COMPLETED
Start: 2019-04-04 | End: 2019-04-04

## 2019-04-04 RX ORDER — ALPRAZOLAM 0.25 MG/1
0.25 TABLET ORAL
Status: DISCONTINUED | OUTPATIENT
Start: 2019-04-04 | End: 2019-04-09 | Stop reason: HOSPADM

## 2019-04-04 RX ORDER — INSULIN GLARGINE 100 [IU]/ML
15 INJECTION, SOLUTION SUBCUTANEOUS
Status: DISCONTINUED | OUTPATIENT
Start: 2019-04-04 | End: 2019-04-05

## 2019-04-04 RX ADMIN — MAGNESIUM OXIDE TAB 400 MG (241.3 MG ELEMENTAL MG) 400 MG: 400 (241.3 MG) TAB at 09:17

## 2019-04-04 RX ADMIN — ALPRAZOLAM 0.5 MG: 0.5 TABLET ORAL at 01:15

## 2019-04-04 RX ADMIN — INSULIN LISPRO 6 UNITS: 100 INJECTION, SOLUTION INTRAVENOUS; SUBCUTANEOUS at 09:16

## 2019-04-04 RX ADMIN — MORPHINE SULFATE 2 MG: 2 INJECTION, SOLUTION INTRAMUSCULAR; INTRAVENOUS at 14:58

## 2019-04-04 RX ADMIN — LORAZEPAM 1 MG: 2 INJECTION INTRAMUSCULAR; INTRAVENOUS at 04:41

## 2019-04-04 RX ADMIN — Medication 10 ML: at 14:39

## 2019-04-04 RX ADMIN — PANTOPRAZOLE SODIUM 40 MG: 40 INJECTION, POWDER, FOR SOLUTION INTRAVENOUS at 09:17

## 2019-04-04 RX ADMIN — BUDESONIDE 500 MCG: 0.5 INHALANT RESPIRATORY (INHALATION) at 20:41

## 2019-04-04 RX ADMIN — CEFPODOXIME PROXETIL 200 MG: 200 TABLET, FILM COATED ORAL at 09:17

## 2019-04-04 RX ADMIN — CEFPODOXIME PROXETIL 200 MG: 200 TABLET, FILM COATED ORAL at 22:36

## 2019-04-04 RX ADMIN — HYDRALAZINE HYDROCHLORIDE 25 MG: 25 TABLET ORAL at 12:33

## 2019-04-04 RX ADMIN — METOPROLOL SUCCINATE 100 MG: 100 TABLET, EXTENDED RELEASE ORAL at 09:17

## 2019-04-04 RX ADMIN — FLECAINIDE ACETATE 50 MG: 100 TABLET ORAL at 09:17

## 2019-04-04 RX ADMIN — LORAZEPAM 1 MG: 2 INJECTION INTRAMUSCULAR; INTRAVENOUS at 22:36

## 2019-04-04 RX ADMIN — MAGNESIUM OXIDE TAB 400 MG (241.3 MG ELEMENTAL MG) 400 MG: 400 (241.3 MG) TAB at 17:29

## 2019-04-04 RX ADMIN — FLECAINIDE ACETATE 50 MG: 100 TABLET ORAL at 22:36

## 2019-04-04 RX ADMIN — BUDESONIDE 500 MCG: 0.5 INHALANT RESPIRATORY (INHALATION) at 08:06

## 2019-04-04 RX ADMIN — ENOXAPARIN SODIUM 90 MG: 100 INJECTION SUBCUTANEOUS at 04:41

## 2019-04-04 RX ADMIN — Medication 5 ML: at 04:51

## 2019-04-04 RX ADMIN — POTASSIUM CHLORIDE 40 MEQ: 20 TABLET, EXTENDED RELEASE ORAL at 14:58

## 2019-04-04 RX ADMIN — METOPROLOL TARTRATE 25 MG: 25 TABLET ORAL at 01:03

## 2019-04-04 RX ADMIN — PANTOPRAZOLE SODIUM 40 MG: 40 INJECTION, POWDER, FOR SOLUTION INTRAVENOUS at 22:36

## 2019-04-04 RX ADMIN — INSULIN GLARGINE 15 UNITS: 100 INJECTION, SOLUTION SUBCUTANEOUS at 22:39

## 2019-04-04 RX ADMIN — Medication 5 ML: at 23:02

## 2019-04-04 RX ADMIN — INSULIN LISPRO 20 UNITS: 100 INJECTION, SOLUTION INTRAVENOUS; SUBCUTANEOUS at 11:30

## 2019-04-04 RX ADMIN — INSULIN LISPRO 9 UNITS: 100 INJECTION, SOLUTION INTRAVENOUS; SUBCUTANEOUS at 16:30

## 2019-04-04 RX ADMIN — QUETIAPINE FUMARATE 300 MG: 100 TABLET ORAL at 22:36

## 2019-04-04 RX ADMIN — MORPHINE SULFATE 2 MG: 2 INJECTION, SOLUTION INTRAMUSCULAR; INTRAVENOUS at 01:16

## 2019-04-04 RX ADMIN — ENOXAPARIN SODIUM 90 MG: 100 INJECTION SUBCUTANEOUS at 16:29

## 2019-04-04 RX ADMIN — INSULIN LISPRO 12 UNITS: 100 INJECTION, SOLUTION INTRAVENOUS; SUBCUTANEOUS at 22:41

## 2019-04-04 NOTE — PROGRESS NOTES
Bedside and Verbal shift change report given to [address] [place] [address] (oncoming nurse) by myself (offgoing nurse). Report included the following information SBAR, Kardex, Intake/Output, MAR, Recent Results, Med Rec Status and Cardiac Rhythm SR/SB.

## 2019-04-04 NOTE — PROGRESS NOTES
Follow up with patient for d/c planning. Patient awake and is able to state name and her children's name and knows she is in the hospital. Patient states she wants to return home with Jignesh Mon and Pravin Solo her 2 sons. CM was told by Jignesh Mon on 4/3/19 that patient's son Pravin Solo is in senior living and will be there for a long time but did not specify reason he was in senior living. Prior to admission she was current with [facility]. CM left message for Jignesh Mon to call to discuss d/c options and have left message with APS worker [employer] to see if they need any information or follow up. Patient teary eyed says not sure why she keeps having infections and gets confused. Patient and her son request d/c back home with hospice but patient still with periods of confusing. CM following.

## 2019-04-04 NOTE — PROGRESS NOTES
Hospitalist Progress Note Admit Date:  3/31/2019  8:29 PM  
Name:  Alpesh Meade Age:  62 y.o. 
:  1955 MRN:  [id number] PCP:  None Treatment Team: Attending Provider: Concepcion Riojas DO; Care Manager: Leopold Brim, RN Subjective:  
 
From H and P HPI: 
  
Patient is a 65yoF with multiple chronic medical conditions, who was started on home hospice on 3/21, of which, her son with whom she lives, revoked hospice last night for EMS to bring her to the hospital. Kenny Mai is obtained from staff as she is unaccompanied, and she is not currently able to provide any history herself. 
  
Per report, patient has been followed by her home hospice nurse, Treasure Cadena (with whom ER MD spoke with).  She had been placed on hospice for end stage COPD with multiple comorbidities.  The patient had developed some chills and was noted to have blood in her urine (chronic suprapubic catheter).  She was started on Augmentin.  She became more lethargic and tachycardic.  Yesterday evening, patient's son revoked hospice as witnessed by EMS and hospice nurse, so that she could be admitted to the hospital. 
  
Patient is currently very somnolent.  She awakes, but can only tell me that she \"feels bad. \"  She cannot verbalize person, place, time (when asked she mumbles \"yeah\" but cannot answer my questions). 
  
On ER evaluation, patient is hypertensive and in afib with RVR requiring cardizem drip through IO line in RLE.  Patient will be admitted to the ICU for further care as hospice has been revoked. 
  
4/3/19: 
Sedate but arousable. Converted to sinus rhythm sometime last pm. She has home hospice and son wishes her to return to this. Discussed with case mgmt re: discharge when environment safe - back to home hospice. No uti on culture (<100k cfu) Was on home hospice and son told case management that all of her controlled medications are lost. 
  
19 Sedate again but arousable. I have decreased benzodiaz. And am discontinuing iv morphine--may have oral narcotic prn breaktrhough. Has a chronic fentanyl patch. Placement issues. Objective:  
 
Patient Vitals for the past 24 hrs: 
 Temp Pulse Resp BP SpO2  
04/04/19 1232 97.4 °F (36.3 °C) 64 17 (!) 176/97 94 % 04/04/19 1122     95 % 04/04/19 1118     96 % 04/04/19 0947 97.4 °F (36.3 °C)  18 (!) 143/92 96 % 04/04/19 0807     97 % 04/04/19 0451 96.9 °F (36.1 °C) 67 18 129/86 94 % 04/04/19 0103 97.3 °F (36.3 °C) 79 18 (!) 171/107 90 % 04/03/19 2038 98.3 °F (36.8 °C) 70 18 146/83 93 % 04/03/19 1947     94 % 04/03/19 1716 98.5 °F (36.9 °C) 67 18 (!) 163/93 92 % Oxygen Therapy O2 Sat (%): 94 % (04/04/19 1232) Pulse via Oximetry: 63 beats per minute (04/04/19 0807) O2 Device: Nasal cannula (04/04/19 1232) O2 Flow Rate (L/min): 2 l/min (04/04/19 1232) FIO2 (%): 28 % (04/02/19 2033) Intake/Output Summary (Last 24 hours) at 4/4/2019 1449 Last data filed at 4/4/2019 5058 Gross per 24 hour Intake 120 ml Output 625 ml Net -505 ml REVIEW OF SYSTEMS: Comprehensive ROS performed and negative except as stated in HPI. Physical Examination: 
Physical Exam  
Constitutional: No distress. HENT:  
Head: Atraumatic. Nose: Nose normal.  
Eyes: Pupils are equal, round, and reactive to light. Conjunctivae are normal.  
Neck: No JVD present. No thyromegaly present. Cardiovascular: Exam reveals no gallop and no friction rub. Pulmonary/Chest: No respiratory distress. She has no wheezes. Abdominal: Bowel sounds are normal. She exhibits no distension. Musculoskeletal: She exhibits no tenderness or deformity. Neurological: She displays normal reflexes. She exhibits normal muscle tone. Skin: Skin is dry. No rash noted. She is not diaphoretic. Psychiatric:  
Sedate but arousable Data Review: I have reviewed all labs, meds, telemetry events, and studies from the last 24 hours. Recent Results (from the past 24 hour(s)) GLUCOSE, POC Collection Time: 04/03/19  3:57 PM  
Result Value Ref Range Glucose (POC) 262 (H) 65 - 100 mg/dL GLUCOSE, POC Collection Time: 04/03/19  9:49 PM  
Result Value Ref Range Glucose (POC) 271 (H) 65 - 100 mg/dL GLUCOSE, POC Collection Time: 04/04/19  6:31 AM  
Result Value Ref Range Glucose (POC) 216 (H) 65 - 100 mg/dL METABOLIC PANEL, BASIC Collection Time: 04/04/19  6:40 AM  
Result Value Ref Range Sodium 140 136 - 145 mmol/L Potassium 3.2 (L) 3.5 - 5.1 mmol/L Chloride 108 (H) 98 - 107 mmol/L  
 CO2 20 (L) 21 - 32 mmol/L Anion gap 12 7 - 16 mmol/L Glucose 220 (H) 65 - 100 mg/dL BUN 8 8 - 23 MG/DL Creatinine 0.59 (L) 0.6 - 1.0 MG/DL  
 GFR est AA >60 >60 ml/min/1.73m2 GFR est non-AA >60 >60 ml/min/1.73m2 Calcium 8.1 (L) 8.3 - 10.4 MG/DL  
CBC WITH AUTOMATED DIFF Collection Time: 04/04/19  6:40 AM  
Result Value Ref Range WBC 7.7 4.3 - 11.1 K/uL  
 RBC 4.43 4.05 - 5.2 M/uL  
 HGB 12.5 11.7 - 15.4 g/dL HCT 40.9 35.8 - 46.3 % MCV 92.3 79.6 - 97.8 FL  
 MCH 28.2 26.1 - 32.9 PG  
 MCHC 30.6 (L) 31.4 - 35.0 g/dL  
 RDW 14.4 11.9 - 14.6 % PLATELET 248 287 - 704 K/uL MPV 11.8 9.4 - 12.3 FL ABSOLUTE NRBC 0.00 0.0 - 0.2 K/uL  
 DF AUTOMATED NEUTROPHILS 57 43 - 78 % LYMPHOCYTES 30 13 - 44 % MONOCYTES 6 4.0 - 12.0 % EOSINOPHILS 5 0.5 - 7.8 % BASOPHILS 1 0.0 - 2.0 % IMMATURE GRANULOCYTES 1 0.0 - 5.0 %  
 ABS. NEUTROPHILS 4.4 1.7 - 8.2 K/UL  
 ABS. LYMPHOCYTES 2.3 0.5 - 4.6 K/UL  
 ABS. MONOCYTES 0.5 0.1 - 1.3 K/UL  
 ABS. EOSINOPHILS 0.4 0.0 - 0.8 K/UL  
 ABS. BASOPHILS 0.1 0.0 - 0.2 K/UL  
 ABS. IMM. GRANS. 0.1 0.0 - 0.5 K/UL MAGNESIUM Collection Time: 04/04/19  6:40 AM  
Result Value Ref Range Magnesium 2.0 1.8 - 2.4 mg/dL GLUCOSE, POC  Collection Time: 04/04/19 11:20 AM  
 Result Value Ref Range Glucose (POC) 352 (H) 65 - 100 mg/dL All Micro Results Procedure Component Value Units Date/Time CULTURE, URINE [[id number]]  (Abnormal)  (Susceptibility) Collected:  04/01/19 0210 Order Status:  Completed Specimen:  Suprapubic Urine Updated:  04/04/19 0720 Special Requests: NO SPECIAL REQUESTS Culture result:    
  10,000 to 50,000 COLONIES/mL ESCHERICHIA COLI ** (EXTENDED SPECTRUM BETA LACTAMASE ) ** RESULTS VERIFIED, PHONED TO AND READ BACK BY 
[address] [place] [address] ON 4/4/19 @0718 TA 
  
 CULTURE, BLOOD [[id number]] Collected:  03/31/19 6328 Order Status:  Completed Specimen:  Blood Updated:  04/04/19 7086 Special Requests: LEFT HAND Culture result: NO GROWTH 4 DAYS Current Meds: 
Current Facility-Administered Medications Medication Dose Route Frequency  lip protectant (BLISTEX) ointment   Topical PRN  
 budesonide (PULMICORT) 0.5 mg/2 mL nebulizer suspension  potassium chloride (K-DUR, KLOR-CON) SR tablet 40 mEq  40 mEq Oral NOW  insulin glargine (LANTUS) injection 15 Units  15 Units SubCUTAneous QHS  ALPRAZolam (XANAX) tablet 0.25 mg  0.25 mg Oral BID PRN  
 magnesium oxide (MAG-OX) tablet 400 mg  400 mg Oral BID  metoprolol succinate (TOPROL-XL) tablet 100 mg  100 mg Oral DAILY  cefpodoxime (VANTIN) tablet 200 mg  200 mg Oral Q12H  hydrALAZINE (APRESOLINE) tablet 25 mg  25 mg Oral TID PRN  
 LORazepam (ATIVAN) injection 1 mg  1 mg IntraVENous Q4H PRN  pantoprazole (PROTONIX) injection 40 mg  40 mg IntraVENous Q12H  
 QUEtiapine (SEROquel) tablet 300 mg  300 mg Oral QHS  bisacodyl (DULCOLAX) tablet 5 mg  5 mg Oral DAILY PRN  
 fentaNYL (DURAGESIC) 25 mcg/hr patch 1 Patch  1 Patch TransDERmal Q72H  flecainide (TAMBOCOR) tablet 50 mg  50 mg Oral Q12H  
 albuterol (PROVENTIL VENTOLIN) nebulizer solution 2.5 mg  2.5 mg Inhalation Q4H PRN  
  sodium chloride (NS) flush 5-40 mL  5-40 mL IntraVENous Q8H  
 sodium chloride (NS) flush 5-40 mL  5-40 mL IntraVENous PRN  
 acetaminophen (TYLENOL) tablet 650 mg  650 mg Oral Q4H PRN  
 ondansetron (ZOFRAN) injection 4 mg  4 mg IntraVENous Q4H PRN  
 insulin lispro (HUMALOG) injection 0-15 Units  0-15 Units SubCUTAneous AC&HS  budesonide (PULMICORT) 500 mcg/2 ml nebulizer suspension  500 mcg Nebulization BID RT  
 phenazopyridine (PYRIDIUM) tab 95 mg  95 mg Oral TID PRN  
 morphine injection 2 mg  2 mg IntraVENous Q4H PRN  
 enoxaparin (LOVENOX) injection 90 mg  1 mg/kg SubCUTAneous Q12H  
 opium-belladonna (B&O 16-A SUPPRETTE) 16.2-60 mg suppository 1 Suppository  1 Suppository Rectal Q6H PRN Diet: DIET CARDIAC Other Studies (last 24 hours): No results found. Assessment and Plan:  
 
Hospital Problems as of 4/4/2019 Date Reviewed: 1/31/2019 Codes Class Noted - Resolved POA Hypomagnesemia ICD-10-CM: P06.23 
ICD-9-CM: 275.2  4/3/2019 - Present Unknown Acute encephalopathy ICD-10-CM: G93.40 ICD-9-CM: 348.30  3/10/2019 - Present Yes Diabetes ([facility] [address] ICD-10-CM: E11.9 ICD-9-CM: 250.00  3/10/2019 - Present Yes Hypertension ICD-10-CM: I10 
ICD-9-CM: 401.9  3/10/2019 - Present Yes * (Principal) Atrial fibrillation with rapid ventricular response (HCC) ICD-10-CM: I48.91 
ICD-9-CM: 427.31  2/5/2019 - Present Unknown UTI (urinary tract infection) ICD-10-CM: N39.0 ICD-9-CM: 599.0  1/31/2019 - Present Unknown Paraplegia (HCC) (Chronic) ICD-10-CM: D07.08 ICD-9-CM: 344.1  12/10/2016 - Present Yes Bipolar disorder without psychotic features (HCC) (Chronic) ICD-10-CM: F31.9 ICD-9-CM: 296.80  12/10/2016 - Present Yes Chronic hepatitis C virus infection (HCC) (Chronic) ICD-10-CM: B18.2 ICD-9-CM: 070.54  12/10/2016 - Present Yes A/P:   
Atrial Fibrillation with RVR--as presenting reason for admission Now sinus mechanism. Suspect noncompliance meds including antiarrhythmic 
  
Confusion on admission Apparently chronic and pt with narcotic pain meds, and bipolar disorder. Chronic anxiety / agitation -more relaxed but at times oversedate with her fentanyl patch, oral alprazolam and prn iv mso4--decrease meds as above PLACEMENT ISSUES -- WAS HOME WITH HOSPICE WITH SON and apparently her meds are missing. JASSO CATHE[employer] AND TELEMETRY--may discontinue telemetry may aihca jasso as she has end of life care / hospice for comfort 
  
Hypomagnesemia 1.6--oral and iv supplement. Continue mgoxide oral  
  
  
UTI-- diagnosed here for reason follow up to confirm treated appropriately 
- diagnosed as outpt and has chronic jasso therefore CAUTI (catheter associated) 
- oral vantin dc rocephin -<100,000cfu 
  
DM2 
- TITRATE INSULIN SUGARS TRENDING UP AND NOW >300- INCREASE LANTUS 
? INCREASED ORAL INTAKE. 
  
H/O bipolar - Home meds 
  
Hep C 
- Stable 
  
COPD 
-stable continue same meds 
  
Paraplegia 
- h/o transverse myelitis 
  
 
 
  
Hypomagnesemia 
           corrected- Continue mgoxide oral  
  
  
UTI-- diagnosed here for reason follow up to confirm treated appropriately 
- diagnosed as outpt and has chronic jasso therefore CAUTI (catheter associated) 
- oral vantin dc rocephin -<100,000cfu--this is same 
  
  
H/O bipolar - Home meds 
  
Hep C 
- Stable 
  
COPD 
-stable continue same meds--check abg if not more alert with med changes 
  
Paraplegia 
- h/o transverse myelitis--this is unchanged

## 2019-04-04 NOTE — PROGRESS NOTES
Bloodsugar noted at 352. Dr Yennifer Lundy made aware per protocol and orders received to give an extra 5 units of Humalog for total of 20 units

## 2019-04-04 NOTE — PROGRESS NOTES
Verbal bedside report given to [facility], oncoming RN. Patient's situation, background, assessment and recommendations provided. Opportunity for questions provided. Oncoming RN assumed care of patient.

## 2019-04-05 PROBLEM — N39.0 UTI DUE TO EXTENDED-SPECTRUM BETA LACTAMASE (ESBL) PRODUCING ESCHERICHIA COLI: Status: ACTIVE | Noted: 2019-04-05

## 2019-04-05 PROBLEM — Z16.12 UTI DUE TO EXTENDED-SPECTRUM BETA LACTAMASE (ESBL) PRODUCING ESCHERICHIA COLI: Status: ACTIVE | Noted: 2019-04-05

## 2019-04-05 PROBLEM — B96.29 UTI DUE TO EXTENDED-SPECTRUM BETA LACTAMASE (ESBL) PRODUCING ESCHERICHIA COLI: Status: ACTIVE | Noted: 2019-04-05

## 2019-04-05 LAB
ANION GAP SERPL CALC-SCNC: 7 MMOL/L (ref 7–16)
BACTERIA SPEC CULT: NORMAL
BASOPHILS # BLD: 0.1 K/UL (ref 0–0.2)
BASOPHILS NFR BLD: 1 % (ref 0–2)
BUN SERPL-MCNC: 6 MG/DL (ref 8–23)
CALCIUM SERPL-MCNC: 8.4 MG/DL (ref 8.3–10.4)
CHLORIDE SERPL-SCNC: 110 MMOL/L (ref 98–107)
CO2 SERPL-SCNC: 25 MMOL/L (ref 21–32)
CREAT SERPL-MCNC: 0.56 MG/DL (ref 0.6–1)
DIFFERENTIAL METHOD BLD: NORMAL
EOSINOPHIL # BLD: 0.4 K/UL (ref 0–0.8)
EOSINOPHIL NFR BLD: 5 % (ref 0.5–7.8)
ERYTHROCYTE [DISTWIDTH] IN BLOOD BY AUTOMATED COUNT: 14.2 % (ref 11.9–14.6)
GLUCOSE BLD STRIP.AUTO-MCNC: 152 MG/DL (ref 65–100)
GLUCOSE BLD STRIP.AUTO-MCNC: 209 MG/DL (ref 65–100)
GLUCOSE BLD STRIP.AUTO-MCNC: 268 MG/DL (ref 65–100)
GLUCOSE BLD STRIP.AUTO-MCNC: 284 MG/DL (ref 65–100)
GLUCOSE SERPL-MCNC: 164 MG/DL (ref 65–100)
HCT VFR BLD AUTO: 37.5 % (ref 35.8–46.3)
HGB BLD-MCNC: 12.2 G/DL (ref 11.7–15.4)
IMM GRANULOCYTES # BLD AUTO: 0.1 K/UL (ref 0–0.5)
IMM GRANULOCYTES NFR BLD AUTO: 1 % (ref 0–5)
LYMPHOCYTES # BLD: 2.5 K/UL (ref 0.5–4.6)
LYMPHOCYTES NFR BLD: 35 % (ref 13–44)
MCH RBC QN AUTO: 29 PG (ref 26.1–32.9)
MCHC RBC AUTO-ENTMCNC: 32.5 G/DL (ref 31.4–35)
MCV RBC AUTO: 89.3 FL (ref 79.6–97.8)
MONOCYTES # BLD: 0.5 K/UL (ref 0.1–1.3)
MONOCYTES NFR BLD: 7 % (ref 4–12)
NEUTS SEG # BLD: 3.7 K/UL (ref 1.7–8.2)
NEUTS SEG NFR BLD: 52 % (ref 43–78)
NRBC # BLD: 0 K/UL (ref 0–0.2)
PLATELET # BLD AUTO: 247 K/UL (ref 150–450)
PMV BLD AUTO: 12.1 FL (ref 9.4–12.3)
POTASSIUM SERPL-SCNC: 3.2 MMOL/L (ref 3.5–5.1)
RBC # BLD AUTO: 4.2 M/UL (ref 4.05–5.2)
SERVICE CMNT-IMP: NORMAL
SODIUM SERPL-SCNC: 142 MMOL/L (ref 136–145)
WBC # BLD AUTO: 7.2 K/UL (ref 4.3–11.1)

## 2019-04-05 PROCEDURE — 94760 N-INVAS EAR/PLS OXIMETRY 1: CPT

## 2019-04-05 PROCEDURE — 86580 TB INTRADERMAL TEST: CPT | Performed by: INTERNAL MEDICINE

## 2019-04-05 PROCEDURE — 74011636637 HC RX REV CODE- 636/637: Performed by: FAMILY MEDICINE

## 2019-04-05 PROCEDURE — 74011250637 HC RX REV CODE- 250/637: Performed by: INTERNAL MEDICINE

## 2019-04-05 PROCEDURE — 94640 AIRWAY INHALATION TREATMENT: CPT

## 2019-04-05 PROCEDURE — 85025 COMPLETE CBC W/AUTO DIFF WBC: CPT

## 2019-04-05 PROCEDURE — 74011250637 HC RX REV CODE- 250/637: Performed by: FAMILY MEDICINE

## 2019-04-05 PROCEDURE — 74011000250 HC RX REV CODE- 250: Performed by: FAMILY MEDICINE

## 2019-04-05 PROCEDURE — 65270000029 HC RM PRIVATE

## 2019-04-05 PROCEDURE — 82962 GLUCOSE BLOOD TEST: CPT

## 2019-04-05 PROCEDURE — 74011000302 HC RX REV CODE- 302: Performed by: INTERNAL MEDICINE

## 2019-04-05 PROCEDURE — 74011250636 HC RX REV CODE- 250/636: Performed by: FAMILY MEDICINE

## 2019-04-05 PROCEDURE — 74011636637 HC RX REV CODE- 636/637: Performed by: INTERNAL MEDICINE

## 2019-04-05 PROCEDURE — C9113 INJ PANTOPRAZOLE SODIUM, VIA: HCPCS | Performed by: FAMILY MEDICINE

## 2019-04-05 PROCEDURE — 74011000258 HC RX REV CODE- 258: Performed by: INTERNAL MEDICINE

## 2019-04-05 PROCEDURE — 36415 COLL VENOUS BLD VENIPUNCTURE: CPT

## 2019-04-05 PROCEDURE — 77010033678 HC OXYGEN DAILY

## 2019-04-05 PROCEDURE — 74011250636 HC RX REV CODE- 250/636: Performed by: INTERNAL MEDICINE

## 2019-04-05 PROCEDURE — 80048 BASIC METABOLIC PNL TOTAL CA: CPT

## 2019-04-05 RX ORDER — INSULIN GLARGINE 100 [IU]/ML
25 INJECTION, SOLUTION SUBCUTANEOUS
Status: DISCONTINUED | OUTPATIENT
Start: 2019-04-05 | End: 2019-04-09 | Stop reason: HOSPADM

## 2019-04-05 RX ORDER — POTASSIUM CHLORIDE 20 MEQ/1
40 TABLET, EXTENDED RELEASE ORAL EVERY 4 HOURS
Status: COMPLETED | OUTPATIENT
Start: 2019-04-05 | End: 2019-04-05

## 2019-04-05 RX ADMIN — INSULIN LISPRO 6 UNITS: 100 INJECTION, SOLUTION INTRAVENOUS; SUBCUTANEOUS at 21:08

## 2019-04-05 RX ADMIN — PANTOPRAZOLE SODIUM 40 MG: 40 INJECTION, POWDER, FOR SOLUTION INTRAVENOUS at 09:44

## 2019-04-05 RX ADMIN — MEROPENEM 500 MG: 500 INJECTION, POWDER, FOR SOLUTION INTRAVENOUS at 19:23

## 2019-04-05 RX ADMIN — ENOXAPARIN SODIUM 90 MG: 100 INJECTION SUBCUTANEOUS at 05:06

## 2019-04-05 RX ADMIN — TUBERCULIN PURIFIED PROTEIN DERIVATIVE 5 UNITS: 5 INJECTION, SOLUTION INTRADERMAL at 12:09

## 2019-04-05 RX ADMIN — INSULIN LISPRO 9 UNITS: 100 INJECTION, SOLUTION INTRAVENOUS; SUBCUTANEOUS at 18:02

## 2019-04-05 RX ADMIN — MAGNESIUM OXIDE TAB 400 MG (241.3 MG ELEMENTAL MG) 400 MG: 400 (241.3 MG) TAB at 17:53

## 2019-04-05 RX ADMIN — POTASSIUM CHLORIDE 40 MEQ: 20 TABLET, EXTENDED RELEASE ORAL at 21:09

## 2019-04-05 RX ADMIN — CEFPODOXIME PROXETIL 200 MG: 200 TABLET, FILM COATED ORAL at 09:43

## 2019-04-05 RX ADMIN — FLECAINIDE ACETATE 50 MG: 100 TABLET ORAL at 09:43

## 2019-04-05 RX ADMIN — Medication 10 ML: at 21:41

## 2019-04-05 RX ADMIN — BUDESONIDE 500 MCG: 0.5 INHALANT RESPIRATORY (INHALATION) at 20:44

## 2019-04-05 RX ADMIN — QUETIAPINE FUMARATE 300 MG: 100 TABLET ORAL at 21:10

## 2019-04-05 RX ADMIN — MAGNESIUM OXIDE TAB 400 MG (241.3 MG ELEMENTAL MG) 400 MG: 400 (241.3 MG) TAB at 09:43

## 2019-04-05 RX ADMIN — INSULIN LISPRO 6 UNITS: 100 INJECTION, SOLUTION INTRAVENOUS; SUBCUTANEOUS at 12:08

## 2019-04-05 RX ADMIN — POTASSIUM CHLORIDE 40 MEQ: 20 TABLET, EXTENDED RELEASE ORAL at 17:55

## 2019-04-05 RX ADMIN — PANTOPRAZOLE SODIUM 40 MG: 40 INJECTION, POWDER, FOR SOLUTION INTRAVENOUS at 21:09

## 2019-04-05 RX ADMIN — INSULIN GLARGINE 25 UNITS: 100 INJECTION, SOLUTION SUBCUTANEOUS at 22:00

## 2019-04-05 RX ADMIN — FLECAINIDE ACETATE 50 MG: 100 TABLET ORAL at 21:18

## 2019-04-05 RX ADMIN — Medication 5 ML: at 05:06

## 2019-04-05 RX ADMIN — Medication 10 ML: at 14:55

## 2019-04-05 RX ADMIN — BUDESONIDE 500 MCG: 0.5 INHALANT RESPIRATORY (INHALATION) at 08:10

## 2019-04-05 RX ADMIN — METOPROLOL SUCCINATE 100 MG: 100 TABLET, EXTENDED RELEASE ORAL at 09:43

## 2019-04-05 RX ADMIN — ENOXAPARIN SODIUM 90 MG: 100 INJECTION SUBCUTANEOUS at 17:56

## 2019-04-05 RX ADMIN — MORPHINE SULFATE 2 MG: 2 INJECTION, SOLUTION INTRAMUSCULAR; INTRAVENOUS at 19:27

## 2019-04-05 RX ADMIN — ACETAMINOPHEN 650 MG: 325 TABLET, FILM COATED ORAL at 11:03

## 2019-04-05 NOTE — PROGRESS NOTES
TRANSFER - IN REPORT: 
 
Verbal report received Shakila(name) on Nino Morrison  being received from 3 Tele(unit) for routine progression of care Report consisted of patients Situation, Background, Assessment and  
Recommendations(SBAR). Information from the following report(s) SBAR was reviewed with the receiving nurse. Opportunity for questions and clarification was provided. Assessment completed upon patients arrival to unit and care assumed.

## 2019-04-05 NOTE — PROGRESS NOTES
Attempted to complete admission assessment, unable to do so related to patient's orientation status.

## 2019-04-05 NOTE — PROGRESS NOTES
04/05/19 5189 Oxygen Therapy O2 Sat (%) (!) 86 % Pulse via Oximetry 78 beats per minute O2 Device Room air 
(found on RA, placed on 2L) Procedures  
$$ Subsequent Procedure Aerosol Delivery Source Breath Actuated Nebulizer Aerosolized Medications Pulmicort

## 2019-04-05 NOTE — PROGRESS NOTES
04/05/19 8677 Dual Skin Pressure Injury Assessment Dual Skin Pressure Injury Assessment X Second Care Provider (Based on [address] [place] [address]) Stacey Peterson RN Sacrum  Mid Pt has redness at the mid sacrum area with no breakdown. Allevyn is present there and at upper mid back. Pt is a paraplegic, Legs are in fetal position. Heels have no breakdown. Suprapubic jasso in place, but appears to be leaking. Inflated balloon with 5cc NS, leakage seemed to stop. Pt does have a delayed response

## 2019-04-05 NOTE — PROGRESS NOTES
Hospitalist Progress Note Admit Date:  3/31/2019  8:29 PM  
Name:  Jose Bender Age:  62 y.o. 
:  1955 MRN:  [id number] PCP:  None Treatment Team: Attending Provider: Willie Portillo DO; Care Manager: Yodit Roberson RN Subjective:  
From H and P HPI: 
  
Patient is a 65yoF with multiple chronic medical conditions, who was started on home hospice on 3/21, of which, her son with whom she lives, revoked hospice last night for EMS to bring her to the hospital. rBett Cobb is obtained from staff as she is unaccompanied, and she is not currently able to provide any history herself. 
  
Per report, patient has been followed by her home hospice nurse, Rod Bragg (with whom ER MD spoke with).  She had been placed on hospice for end stage COPD with multiple comorbidities.  The patient had developed some chills and was noted to have blood in her urine (chronic suprapubic catheter).  She was started on Augmentin.  She became more lethargic and tachycardic.  Yesterday evening, patient's son revoked hospice as witnessed by EMS and hospice nurse, so that she could be admitted to the hospital. 
  
Patient is currently very somnolent.  She awakes, but can only tell me that she \"feels bad. \"  She cannot verbalize person, place, time (when asked she mumbles \"yeah\" but cannot answer my questions). 
  
On ER evaluation, patient is hypertensive and in afib with RVR requiring cardizem drip through IO line in RLE.  Patient will be admitted to the ICU for further care as hospice has been revoked. 
  
4/3/19: 
Sedate but arousable. Converted to sinus rhythm sometime last pm. She has home hospice and son wishes her to return to this. Discussed with case mgmt re: discharge when environment safe - back to home hospice. No uti on culture (<100k cfu) Was on home hospice and son told case management that all of her controlled medications are lost. 
  
19 Sedate again but arousable. I have decreased benzodiaz.  And am discontinuing iv morphine--may have oral narcotic prn breaktrhough. Has a chronic fentanyl patch. Placement issues. 4/5/19 Cc: weakness Much more alert with benzo and narcotic prn wean. Conversant. Urine culture < 100k but clinical uti with clinical improvement with empiric antibx. culure with ESBL ECOLI And based on above, ams I would treat with merrem . Needs pt but non ambulatory following remote transverse myelits. afib rvr improved. Objective:  
 
Patient Vitals for the past 24 hrs: 
 Temp Pulse Resp BP SpO2  
04/05/19 1425 96.8 °F (36 °C) (!) 59 18 136/66 95 % 04/05/19 0947 97.1 °F (36.2 °C) 62 16 146/88 98 % 04/05/19 0810     (!) 86 % 04/05/19 0117 97.7 °F (36.5 °C) 64 18 125/75 96 % 04/04/19 2204  65 19 143/79 95 % 04/04/19 2042     95 % 04/04/19 1729 97.6 °F (36.4 °C)  17 116/71 94 % Oxygen Therapy O2 Sat (%): 95 % (04/05/19 1425) Pulse via Oximetry: 78 beats per minute (04/05/19 0810) O2 Device: Nasal cannula (04/05/19 1231) O2 Flow Rate (L/min): 2 l/min (04/05/19 1231) FIO2 (%): 28 % (04/04/19 2042) Intake/Output Summary (Last 24 hours) at 4/5/2019 1616 Last data filed at 4/5/2019 1232 Gross per 24 hour Intake 60 ml Output 2451 ml Net -2391 ml REVIEW OF SYSTEMS: Comprehensive ROS performed and negative except as stated in HPI. Physical Examination: 
Physical Exam  
Constitutional: She is oriented to person, place, and time. No distress. HENT:  
Head: Atraumatic. Nose: Nose normal.  
Eyes: Pupils are equal, round, and reactive to light. EOM are normal.  
Neck: Neck supple. Cardiovascular: Normal rate. Exam reveals no gallop. Pulmonary/Chest: No respiratory distress. She has no wheezes. Abdominal: She exhibits no distension. There is no tenderness. There is no guarding. Musculoskeletal: She exhibits no tenderness or deformity. Lymphadenopathy:  
  She has no cervical adenopathy. Neurological: She is oriented to person, place, and time. She has normal reflexes. Lower ext no motor following transv. Myelitis remote Skin: Skin is dry. No rash noted. She is not diaphoretic. Psychiatric: Affect normal.  
 
 
Data Review: 
I have reviewed all labs, meds, telemetry events, and studies from the last 24 hours. Recent Results (from the past 24 hour(s)) GLUCOSE, POC Collection Time: 04/04/19  4:17 PM  
Result Value Ref Range Glucose (POC) 269 (H) 65 - 100 mg/dL GLUCOSE, POC Collection Time: 04/04/19  9:49 PM  
Result Value Ref Range Glucose (POC) 320 (H) 65 - 100 mg/dL METABOLIC PANEL, BASIC Collection Time: 04/05/19  3:58 AM  
Result Value Ref Range Sodium 142 136 - 145 mmol/L Potassium 3.2 (L) 3.5 - 5.1 mmol/L Chloride 110 (H) 98 - 107 mmol/L  
 CO2 25 21 - 32 mmol/L Anion gap 7 7 - 16 mmol/L Glucose 164 (H) 65 - 100 mg/dL BUN 6 (L) 8 - 23 MG/DL Creatinine 0.56 (L) 0.6 - 1.0 MG/DL  
 GFR est AA >60 >60 ml/min/1.73m2 GFR est non-AA >60 >60 ml/min/1.73m2 Calcium 8.4 8.3 - 10.4 MG/DL  
CBC WITH AUTOMATED DIFF Collection Time: 04/05/19  3:58 AM  
Result Value Ref Range WBC 7.2 4.3 - 11.1 K/uL  
 RBC 4.20 4.05 - 5.2 M/uL  
 HGB 12.2 11.7 - 15.4 g/dL HCT 37.5 35.8 - 46.3 % MCV 89.3 79.6 - 97.8 FL  
 MCH 29.0 26.1 - 32.9 PG  
 MCHC 32.5 31.4 - 35.0 g/dL  
 RDW 14.2 11.9 - 14.6 % PLATELET 030 357 - 576 K/uL MPV 12.1 9.4 - 12.3 FL ABSOLUTE NRBC 0.00 0.0 - 0.2 K/uL  
 DF AUTOMATED NEUTROPHILS 52 43 - 78 % LYMPHOCYTES 35 13 - 44 % MONOCYTES 7 4.0 - 12.0 % EOSINOPHILS 5 0.5 - 7.8 % BASOPHILS 1 0.0 - 2.0 % IMMATURE GRANULOCYTES 1 0.0 - 5.0 %  
 ABS. NEUTROPHILS 3.7 1.7 - 8.2 K/UL  
 ABS. LYMPHOCYTES 2.5 0.5 - 4.6 K/UL  
 ABS. MONOCYTES 0.5 0.1 - 1.3 K/UL  
 ABS. EOSINOPHILS 0.4 0.0 - 0.8 K/UL  
 ABS. BASOPHILS 0.1 0.0 - 0.2 K/UL  
 ABS. IMM. GRANS. 0.1 0.0 - 0.5 K/UL GLUCOSE, POC  
 Collection Time: 04/05/19  6:27 AM  
Result Value Ref Range Glucose (POC) 152 (H) 65 - 100 mg/dL GLUCOSE, POC Collection Time: 04/05/19 11:47 AM  
Result Value Ref Range Glucose (POC) 209 (H) 65 - 100 mg/dL All Micro Results Procedure Component Value Units Date/Time CULTURE, BLOOD [[id number]] Collected:  03/31/19 2257 Order Status:  Completed Specimen:  Blood Updated:  04/05/19 3980 Special Requests: LEFT HAND Culture result: NO GROWTH 5 DAYS     
 CULTURE, URINE [[id number]]  (Abnormal)  (Susceptibility) Collected:  04/01/19 0210 Order Status:  Completed Specimen:  Suprapubic Urine Updated:  04/04/19 0720 Special Requests: NO SPECIAL REQUESTS Culture result:    
  10,000 to 50,000 COLONIES/mL ESCHERICHIA COLI ** (EXTENDED SPECTRUM BETA LACTAMASE ) ** RESULTS VERIFIED, PHONED TO AND READ BACK BY 
[address] [place] [address] ON 4/4/19 @0718 TA Current Meds: 
Current Facility-Administered Medications Medication Dose Route Frequency  tuberculin injection 5 Units  5 Units IntraDERMal ONCE  
 meropenem (MERREM) 500 mg in 0.9% sodium chloride (MBP/ADV) 50 mL  0.5 g IntraVENous Q6H  
 insulin glargine (LANTUS) injection 25 Units  25 Units SubCUTAneous QHS  potassium chloride (K-DUR, KLOR-CON) SR tablet 40 mEq  40 mEq Oral Q4H  
 lip protectant (BLISTEX) ointment   Topical PRN  
 ALPRAZolam (XANAX) tablet 0.25 mg  0.25 mg Oral BID PRN  
 magnesium oxide (MAG-OX) tablet 400 mg  400 mg Oral BID  metoprolol succinate (TOPROL-XL) tablet 100 mg  100 mg Oral DAILY  hydrALAZINE (APRESOLINE) tablet 25 mg  25 mg Oral TID PRN  
 LORazepam (ATIVAN) injection 1 mg  1 mg IntraVENous Q4H PRN  pantoprazole (PROTONIX) injection 40 mg  40 mg IntraVENous Q12H  
 QUEtiapine (SEROquel) tablet 300 mg  300 mg Oral QHS  bisacodyl (DULCOLAX) tablet 5 mg  5 mg Oral DAILY PRN  
 fentaNYL (DURAGESIC) 25 mcg/hr patch 1 Patch  1 Patch TransDERmal Q72H  flecainide (TAMBOCOR) tablet 50 mg  50 mg Oral Q12H  
 albuterol (PROVENTIL VENTOLIN) nebulizer solution 2.5 mg  2.5 mg Inhalation Q4H PRN  
 sodium chloride (NS) flush 5-40 mL  5-40 mL IntraVENous Q8H  
 sodium chloride (NS) flush 5-40 mL  5-40 mL IntraVENous PRN  
 acetaminophen (TYLENOL) tablet 650 mg  650 mg Oral Q4H PRN  
 ondansetron (ZOFRAN) injection 4 mg  4 mg IntraVENous Q4H PRN  
 insulin lispro (HUMALOG) injection 0-15 Units  0-15 Units SubCUTAneous AC&HS  budesonide (PULMICORT) 500 mcg/2 ml nebulizer suspension  500 mcg Nebulization BID RT  
 phenazopyridine (PYRIDIUM) tab 95 mg  95 mg Oral TID PRN  
 morphine injection 2 mg  2 mg IntraVENous Q4H PRN  
 enoxaparin (LOVENOX) injection 90 mg  1 mg/kg SubCUTAneous Q12H  
 opium-belladonna (B&O 16-A SUPPRETTE) 16.2-60 mg suppository 1 Suppository  1 Suppository Rectal Q6H PRN Diet: DIET CARDIAC Other Studies (last 24 hours): No results found. Assessment and Plan:  
 
Hospital Problems as of 4/5/2019 Date Reviewed: 1/31/2019 Codes Class Noted - Resolved POA UTI due to extended-spectrum beta lactamase (ESBL) producing Escherichia coli ICD-10-CM: N39.0, B96.29, Z16.12 
ICD-9-CM: 599.0, 041.49, V09.1  4/5/2019 - Present Unknown Hypomagnesemia ICD-10-CM: W12.18 
ICD-9-CM: 275.2  4/3/2019 - Present Unknown Acute encephalopathy ICD-10-CM: G93.40 ICD-9-CM: 348.30  3/10/2019 - Present Yes Diabetes ([facility] [address] ICD-10-CM: E11.9 ICD-9-CM: 250.00  3/10/2019 - Present Yes Hypertension ICD-10-CM: I10 
ICD-9-CM: 401.9  3/10/2019 - Present Yes * (Principal) Atrial fibrillation with rapid ventricular response (HCC) ICD-10-CM: I48.91 
ICD-9-CM: 427.31  2/5/2019 - Present Unknown UTI (urinary tract infection) ICD-10-CM: N39.0 ICD-9-CM: 599.0  1/31/2019 - Present Unknown Paraplegia (HCC) (Chronic) ICD-10-CM: D75.23 ICD-9-CM: 344.1  12/10/2016 - Present Yes Bipolar disorder without psychotic features (HCC) (Chronic) ICD-10-CM: F31.9 ICD-9-CM: 296.80  12/10/2016 - Present Yes Chronic hepatitis C virus infection (HCC) (Chronic) ICD-10-CM: B18.2 ICD-9-CM: 070.54  12/10/2016 - Present Yes A/P:   
Atrial Fibrillation with RVR--as presenting reason for admission Now sinus mechanism. Suspect noncompliance meds including antiarrhythmic--this is unchanged 
  
Confusion on admission 
            Apparently chronic and pt with narcotic pain meds, and bipolar disorder. Chronic anxiety / agitation -more relaxed but at times oversedate with her fentanyl patch, oral alprazolam and prn iv mso4--decrease meds as above PLACEMENT ISSUES -- WAS HOME WITH HOSPICE WITH SON and apparently her meds are missing. I spoke with case management at length and I feel not safe to discharge patient. Open APS case with patient living with sons, one now incarcerated and not expected to return home anytime soon. Missing controlled meds, and noncompliance with current cardiac / other meds. May need placement. Currently need iv merrem re: ESBL UTI.   
  
PENA CATHETER RE:INCONT. AND HOSPICE PRIOR TO ADMIT 
  
Hypomagnesemia              Continue mgoxide oral -SUPPLEMENT K 4 AND MG 2 GOALS 
  
  
UTI-- < 100K BUT LEUKOCYTOSIS AND CLINICAL UTI, TREAT ESBL ECOLI--7 DAY IV MERREM--follow up urine c and s next week. This as a CAUTI 
  
DM2 
- TITRATE INSULIN- sugars remain uncontrolled , I suspect increase now that more alert hopeful oral intake will improve. 
  
H/O bipolar - Home meds- improved behavior today ( at least at time of my exam ) 
  Hep C by hx.  
  
COPD 
-stable continue same meds--was on hospice for end stage but appears stable. 
  
Paraplegia 
- h/o transverse myelitis, ? Neurogenic bladder? 
  
  
  
  
Hypomagnesemia            corrected- Continue mgoxide oral

## 2019-04-05 NOTE — PROGRESS NOTES
CM had left message for patient son Alison Loco and he has not returned call. Chrystal Paget [facility] [phone] requesting call back. CM had received call earlier from [address] stating they have an open case and CM has been trying to reach her to see if patient can return home when medically stable and where the case is with APS. Patient still with periods of confusion and sleeps most of the time. Alison Loco her son has not been in to see patient yesterday and did not return phone calls. Prior to admission patient was at home with [facility]. CM spoke with patient yesterday and she states want to go back home but was confused to time and place and states didn't understand why she is having this confusion. CM following.

## 2019-04-05 NOTE — PROGRESS NOTES
Received call from Mercedes Pillai at [place] [id number] and they state patient can return home when medically stable. CM has left message for son Tara Morrison about possible d/c over weekend awaiting return call.

## 2019-04-06 LAB
ANION GAP SERPL CALC-SCNC: 10 MMOL/L (ref 7–16)
BASOPHILS # BLD: 0.1 K/UL (ref 0–0.2)
BASOPHILS NFR BLD: 1 % (ref 0–2)
BUN SERPL-MCNC: 6 MG/DL (ref 8–23)
CALCIUM SERPL-MCNC: 8.6 MG/DL (ref 8.3–10.4)
CHLORIDE SERPL-SCNC: 109 MMOL/L (ref 98–107)
CO2 SERPL-SCNC: 19 MMOL/L (ref 21–32)
CREAT SERPL-MCNC: 0.66 MG/DL (ref 0.6–1)
DIFFERENTIAL METHOD BLD: ABNORMAL
EOSINOPHIL # BLD: 0.4 K/UL (ref 0–0.8)
EOSINOPHIL NFR BLD: 5 % (ref 0.5–7.8)
ERYTHROCYTE [DISTWIDTH] IN BLOOD BY AUTOMATED COUNT: 14.6 % (ref 11.9–14.6)
GLUCOSE BLD STRIP.AUTO-MCNC: 184 MG/DL (ref 65–100)
GLUCOSE BLD STRIP.AUTO-MCNC: 267 MG/DL (ref 65–100)
GLUCOSE BLD STRIP.AUTO-MCNC: 271 MG/DL (ref 65–100)
GLUCOSE BLD STRIP.AUTO-MCNC: 282 MG/DL (ref 65–100)
GLUCOSE SERPL-MCNC: 206 MG/DL (ref 65–100)
HCT VFR BLD AUTO: 43.9 % (ref 35.8–46.3)
HGB BLD-MCNC: 13.6 G/DL (ref 11.7–15.4)
IMM GRANULOCYTES # BLD AUTO: 0.1 K/UL (ref 0–0.5)
IMM GRANULOCYTES NFR BLD AUTO: 1 % (ref 0–5)
LYMPHOCYTES # BLD: 3.3 K/UL (ref 0.5–4.6)
LYMPHOCYTES NFR BLD: 40 % (ref 13–44)
MAGNESIUM SERPL-MCNC: 1.9 MG/DL (ref 1.8–2.4)
MCH RBC QN AUTO: 28.8 PG (ref 26.1–32.9)
MCHC RBC AUTO-ENTMCNC: 31 G/DL (ref 31.4–35)
MCV RBC AUTO: 93 FL (ref 79.6–97.8)
MONOCYTES # BLD: 0.6 K/UL (ref 0.1–1.3)
MONOCYTES NFR BLD: 7 % (ref 4–12)
NEUTS SEG # BLD: 3.8 K/UL (ref 1.7–8.2)
NEUTS SEG NFR BLD: 46 % (ref 43–78)
NRBC # BLD: 0 K/UL (ref 0–0.2)
PLATELET # BLD AUTO: 258 K/UL (ref 150–450)
PMV BLD AUTO: 11.7 FL (ref 9.4–12.3)
POTASSIUM SERPL-SCNC: 4.4 MMOL/L (ref 3.5–5.1)
RBC # BLD AUTO: 4.72 M/UL (ref 4.05–5.2)
SODIUM SERPL-SCNC: 138 MMOL/L (ref 136–145)
WBC # BLD AUTO: 8.3 K/UL (ref 4.3–11.1)

## 2019-04-06 PROCEDURE — 77030037759

## 2019-04-06 PROCEDURE — 36415 COLL VENOUS BLD VENIPUNCTURE: CPT

## 2019-04-06 PROCEDURE — 74011636637 HC RX REV CODE- 636/637: Performed by: INTERNAL MEDICINE

## 2019-04-06 PROCEDURE — 74011000258 HC RX REV CODE- 258: Performed by: INTERNAL MEDICINE

## 2019-04-06 PROCEDURE — C9113 INJ PANTOPRAZOLE SODIUM, VIA: HCPCS | Performed by: FAMILY MEDICINE

## 2019-04-06 PROCEDURE — 65270000029 HC RM PRIVATE

## 2019-04-06 PROCEDURE — 74011250636 HC RX REV CODE- 250/636: Performed by: FAMILY MEDICINE

## 2019-04-06 PROCEDURE — 74011250637 HC RX REV CODE- 250/637: Performed by: INTERNAL MEDICINE

## 2019-04-06 PROCEDURE — 74011636637 HC RX REV CODE- 636/637: Performed by: FAMILY MEDICINE

## 2019-04-06 PROCEDURE — 74011250637 HC RX REV CODE- 250/637: Performed by: FAMILY MEDICINE

## 2019-04-06 PROCEDURE — 76937 US GUIDE VASCULAR ACCESS: CPT

## 2019-04-06 PROCEDURE — 82962 GLUCOSE BLOOD TEST: CPT

## 2019-04-06 PROCEDURE — 74011250636 HC RX REV CODE- 250/636: Performed by: INTERNAL MEDICINE

## 2019-04-06 PROCEDURE — 94760 N-INVAS EAR/PLS OXIMETRY 1: CPT

## 2019-04-06 PROCEDURE — 83735 ASSAY OF MAGNESIUM: CPT

## 2019-04-06 PROCEDURE — 77010033678 HC OXYGEN DAILY

## 2019-04-06 PROCEDURE — 85025 COMPLETE CBC W/AUTO DIFF WBC: CPT

## 2019-04-06 PROCEDURE — 74011000250 HC RX REV CODE- 250: Performed by: FAMILY MEDICINE

## 2019-04-06 PROCEDURE — 94640 AIRWAY INHALATION TREATMENT: CPT

## 2019-04-06 PROCEDURE — 80048 BASIC METABOLIC PNL TOTAL CA: CPT

## 2019-04-06 RX ADMIN — MEROPENEM 500 MG: 500 INJECTION, POWDER, FOR SOLUTION INTRAVENOUS at 07:00

## 2019-04-06 RX ADMIN — ENOXAPARIN SODIUM 90 MG: 100 INJECTION SUBCUTANEOUS at 16:32

## 2019-04-06 RX ADMIN — Medication 10 ML: at 21:09

## 2019-04-06 RX ADMIN — Medication 10 ML: at 06:07

## 2019-04-06 RX ADMIN — FLECAINIDE ACETATE 50 MG: 100 TABLET ORAL at 21:09

## 2019-04-06 RX ADMIN — FLECAINIDE ACETATE 50 MG: 100 TABLET ORAL at 09:00

## 2019-04-06 RX ADMIN — BUDESONIDE 500 MCG: 0.5 INHALANT RESPIRATORY (INHALATION) at 08:35

## 2019-04-06 RX ADMIN — PANTOPRAZOLE SODIUM 40 MG: 40 INJECTION, POWDER, FOR SOLUTION INTRAVENOUS at 21:07

## 2019-04-06 RX ADMIN — MAGNESIUM OXIDE TAB 400 MG (241.3 MG ELEMENTAL MG) 400 MG: 400 (241.3 MG) TAB at 09:00

## 2019-04-06 RX ADMIN — METOPROLOL SUCCINATE 100 MG: 100 TABLET, EXTENDED RELEASE ORAL at 09:00

## 2019-04-06 RX ADMIN — MORPHINE SULFATE 2 MG: 2 INJECTION, SOLUTION INTRAMUSCULAR; INTRAVENOUS at 21:08

## 2019-04-06 RX ADMIN — MAGNESIUM OXIDE TAB 400 MG (241.3 MG ELEMENTAL MG) 400 MG: 400 (241.3 MG) TAB at 17:10

## 2019-04-06 RX ADMIN — INSULIN LISPRO 9 UNITS: 100 INJECTION, SOLUTION INTRAVENOUS; SUBCUTANEOUS at 21:18

## 2019-04-06 RX ADMIN — MORPHINE SULFATE 2 MG: 2 INJECTION, SOLUTION INTRAMUSCULAR; INTRAVENOUS at 10:06

## 2019-04-06 RX ADMIN — PANTOPRAZOLE SODIUM 40 MG: 40 INJECTION, POWDER, FOR SOLUTION INTRAVENOUS at 09:00

## 2019-04-06 RX ADMIN — MEROPENEM 500 MG: 500 INJECTION, POWDER, FOR SOLUTION INTRAVENOUS at 19:36

## 2019-04-06 RX ADMIN — INSULIN LISPRO 9 UNITS: 100 INJECTION, SOLUTION INTRAVENOUS; SUBCUTANEOUS at 16:28

## 2019-04-06 RX ADMIN — ALPRAZOLAM 0.25 MG: 0.25 TABLET ORAL at 02:52

## 2019-04-06 RX ADMIN — BUDESONIDE 500 MCG: 0.5 INHALANT RESPIRATORY (INHALATION) at 19:55

## 2019-04-06 RX ADMIN — INSULIN LISPRO 3 UNITS: 100 INJECTION, SOLUTION INTRAVENOUS; SUBCUTANEOUS at 07:55

## 2019-04-06 RX ADMIN — MORPHINE SULFATE 2 MG: 2 INJECTION, SOLUTION INTRAMUSCULAR; INTRAVENOUS at 06:07

## 2019-04-06 RX ADMIN — ENOXAPARIN SODIUM 90 MG: 100 INJECTION SUBCUTANEOUS at 05:03

## 2019-04-06 RX ADMIN — MEROPENEM 500 MG: 500 INJECTION, POWDER, FOR SOLUTION INTRAVENOUS at 01:26

## 2019-04-06 RX ADMIN — QUETIAPINE FUMARATE 300 MG: 100 TABLET ORAL at 21:08

## 2019-04-06 RX ADMIN — MEROPENEM 500 MG: 500 INJECTION, POWDER, FOR SOLUTION INTRAVENOUS at 13:12

## 2019-04-06 RX ADMIN — MORPHINE SULFATE 2 MG: 2 INJECTION, SOLUTION INTRAMUSCULAR; INTRAVENOUS at 16:00

## 2019-04-06 RX ADMIN — INSULIN LISPRO 9 UNITS: 100 INJECTION, SOLUTION INTRAVENOUS; SUBCUTANEOUS at 12:10

## 2019-04-06 RX ADMIN — INSULIN GLARGINE 25 UNITS: 100 INJECTION, SOLUTION SUBCUTANEOUS at 21:18

## 2019-04-06 RX ADMIN — Medication 10 ML: at 14:02

## 2019-04-06 RX ADMIN — MORPHINE SULFATE 2 MG: 2 INJECTION, SOLUTION INTRAMUSCULAR; INTRAVENOUS at 01:27

## 2019-04-06 NOTE — PROGRESS NOTES
Hourly rounds complete this shift, no new complaints at this time, : bed in low, locked position, call light and bedside table within reach,  all needs met. Will continue to monitor Report to day shift nurse.

## 2019-04-06 NOTE — PROGRESS NOTES
Hourly rounds completed throughout this shift. pt slept most of the day. Pt resting in bed; denies needs at this time. Will continue to monitor and report to oncoming night shift nurse.

## 2019-04-06 NOTE — PROGRESS NOTES
Progress Note Patient: Georgette Libman MRN: [id number]  SSN: [id number] YOB: 1955  Age: 62 y.o. Sex: female Admit Date: 3/31/2019 LOS: 5 days Subjective:  
Patient with PMH including paraplegia, COPD, AF with RVR, DM, hypertension. Now with UTI with ESBL. Patient had dysuria. Now feeling better with Merrem. No fever. Patent on full dose of Lovenox as well for her AF. Patient was lethargic and somnolent before. Now feeing alert and eating well. Objective:  
 
Vitals:  
 04/05/19 2320 04/06/19 4320 04/06/19 0423 04/06/19 8929 BP: 122/85 138/78  131/70 Pulse: 63 75  67 Resp: 18 18  18 Temp: 97.8 °F (36.6 °C) 98.7 °F (37.1 °C)  98.6 °F (37 °C) SpO2:  94% 96% 92% Weight:      
Height:      
  
 
Intake and Output: 
Current Shift: No intake/output data recorded. Last three shifts: 04/04 1901 - 04/06 0700 In: 61 [P.O.:60] Out: 4358 [[facility]:5927] Physical Exam:  
General:                    The patient is a pleasant female in no acute distress. She is sitting up in bed. Head:                                   Normocephalic/atraumatic. Eyes:                                   No palpebral pallor or scleral icterus. ENT:                                    External auricular and nasal exam within normal limits. Mucous membranes are moist. 
Neck:                                   Supple, non-tender, no JVD. Lungs:                       Clear to auscultation bilaterally without wheezes or crackles. No respiratory distress or accessory muscle use. Heart:                                  Regular rate and rhythm, without murmurs, rubs, or gallops. Abdomen:                  Soft, non-tender, mildly distended with normoactive bowel sounds. Genitourinary:           No tenderness over the bladder or bilateral CVAs. Extremities:               atrophy of both legs. Skin:                                    Normal color, texture, and turgor. No rashes, lesions, or jaundice. Pulses:                      Radial and dorsalis pedis pulses present 2+ bilaterally. Capillary refill <2s. Neurologic:                CN II-XII grossly intact and symmetrical.  
                                            Moving all four extremities well with no focal deficits. Psychiatric:                Pleasant demeanor, appropriate affect. Alert and oriented x 3 Lab/Data Review: 
 
Recent Results (from the past 24 hour(s)) GLUCOSE, POC Collection Time: 04/05/19 11:47 AM  
Result Value Ref Range Glucose (POC) 209 (H) 65 - 100 mg/dL GLUCOSE, POC Collection Time: 04/05/19  5:59 PM  
Result Value Ref Range Glucose (POC) 284 (H) 65 - 100 mg/dL GLUCOSE, POC Collection Time: 04/05/19  8:28 PM  
Result Value Ref Range Glucose (POC) 268 (H) 65 - 100 mg/dL CBC WITH AUTOMATED DIFF Collection Time: 04/06/19  5:40 AM  
Result Value Ref Range WBC 8.3 4.3 - 11.1 K/uL  
 RBC 4.72 4.05 - 5.2 M/uL  
 HGB 13.6 11.7 - 15.4 g/dL HCT 43.9 35.8 - 46.3 % MCV 93.0 79.6 - 97.8 FL  
 MCH 28.8 26.1 - 32.9 PG  
 MCHC 31.0 (L) 31.4 - 35.0 g/dL  
 RDW 14.6 11.9 - 14.6 % PLATELET 228 478 - 539 K/uL MPV 11.7 9.4 - 12.3 FL ABSOLUTE NRBC 0.00 0.0 - 0.2 K/uL  
 DF AUTOMATED NEUTROPHILS 46 43 - 78 % LYMPHOCYTES 40 13 - 44 % MONOCYTES 7 4.0 - 12.0 % EOSINOPHILS 5 0.5 - 7.8 % BASOPHILS 1 0.0 - 2.0 % IMMATURE GRANULOCYTES 1 0.0 - 5.0 %  
 ABS. NEUTROPHILS 3.8 1.7 - 8.2 K/UL  
 ABS. LYMPHOCYTES 3.3 0.5 - 4.6 K/UL  
 ABS. MONOCYTES 0.6 0.1 - 1.3 K/UL  
 ABS. EOSINOPHILS 0.4 0.0 - 0.8 K/UL  
 ABS. BASOPHILS 0.1 0.0 - 0.2 K/UL  
 ABS. IMM. GRANS. 0.1 0.0 - 0.5 K/UL METABOLIC PANEL, BASIC Collection Time: 04/06/19  5:40 AM  
Result Value Ref Range Sodium 138 136 - 145 mmol/L  Potassium 4.4 3.5 - 5.1 mmol/L  
 Chloride 109 (H) 98 - 107 mmol/L  
 CO2 19 (L) 21 - 32 mmol/L Anion gap 10 7 - 16 mmol/L Glucose 206 (H) 65 - 100 mg/dL BUN 6 (L) 8 - 23 MG/DL Creatinine 0.66 0.6 - 1.0 MG/DL  
 GFR est AA >60 >60 ml/min/1.73m2 GFR est non-AA >60 >60 ml/min/1.73m2 Calcium 8.6 8.3 - 10.4 MG/DL MAGNESIUM Collection Time: 04/06/19  5:40 AM  
Result Value Ref Range Magnesium 1.9 1.8 - 2.4 mg/dL GLUCOSE, POC Collection Time: 04/06/19  6:57 AM  
Result Value Ref Range Glucose (POC) 184 (H) 65 - 100 mg/dL All Micro Results Procedure Component Value Units Date/Time CULTURE, BLOOD [[id number]] Collected:  03/31/19 2257 Order Status:  Completed Specimen:  Blood Updated:  04/05/19 3100 Special Requests: LEFT HAND Culture result: NO GROWTH 5 DAYS     
 CULTURE, URINE [[id number]]  (Abnormal)  (Susceptibility) Collected:  04/01/19 0210 Order Status:  Completed Specimen:  Suprapubic Urine Updated:  04/04/19 0720 Special Requests: NO SPECIAL REQUESTS Culture result:    
  10,000 to 50,000 COLONIES/mL ESCHERICHIA COLI ** (EXTENDED SPECTRUM BETA LACTAMASE ) ** RESULTS VERIFIED, PHONED TO AND READ BACK BY 
TARA BOWMAN ON 4/4/19 @0718 TA 
  
  
 
XR chest  
4-1-2019 IMPRESSION: Normal chest. 
 
EKG  
3- 
!! AGE AND GENDER SPECIFIC ECG ANALYSIS !! Sinus tachycardia Biatrial enlargement Nonspecific ST and T wave abnormality Abnormal ECG When compared with ECG of 18-MAR-2019 03:20,  
Vent. rate has increased BY  44 BPM  
Nonspecific T wave abnormality now evident in Inferior leads Nonspecific T wave abnormality, worse in Anterolateral leads Current Facility-Administered Medications:  
  tuberculin injection 5 Units, 5 Units, IntraDERMal, ONCE, Christiano Green DO, 5 Units at 04/05/19 1209 
  insulin glargine (LANTUS) injection 25 Units, 25 Units, SubCUTAneous, QHS, Yo Hayes DO, 25 Units at 04/05/19 2200   meropenem (MERREM) 500 mg in 0.9% sodium chloride (MBP/ADV) 50 mL, 0.5 g, IntraVENous, Q6H, Fisher, Reginia Libman, DO, Last Rate: 100 mL/hr at 04/06/19 0126, 500 mg at 04/06/19 0126   lip protectant (BLISTEX) ointment, , Topical, PRN, Fisher, Reginia Libman, DO 
  ALPRAZolam Alvina Moose) tablet 0.25 mg, 0.25 mg, Oral, BID PRN, Karla Gilbert DO, 0.25 mg at 04/06/19 5670   magnesium oxide (MAG-OX) tablet 400 mg, 400 mg, Oral, BID, Fisher, Reginia Libman, DO, 400 mg at 04/05/19 1753   metoprolol succinate (TOPROL-XL) tablet 100 mg, 100 mg, Oral, DAILY, Fisher, Reginia Libman, DO, 100 mg at 04/05/19 0627   hydrALAZINE (APRESOLINE) tablet 25 mg, 25 mg, Oral, TID PRN, Karla Gilbert DO, 25 mg at 04/04/19 1233   LORazepam (ATIVAN) injection 1 mg, 1 mg, IntraVENous, Q4H PRN, Jacklyn JEFFREY MD, 1 mg at 04/04/19 2236   pantoprazole (PROTONIX) injection 40 mg, 40 mg, IntraVENous, Q12H, Aurelio Mcdermott MD, 40 mg at 04/05/19 2109   QUEtiapine (SEROquel) tablet 300 mg, 300 mg, Oral, QHS, Aurelio Mcdermott MD, 300 mg at 04/05/19 2110 
  bisacodyl (DULCOLAX) tablet 5 mg, 5 mg, Oral, DAILY PRN, Aurelio Mcdermott MD 
  fentaNYL (DURAGESIC) 25 mcg/hr patch 1 Patch, 1 Patch, TransDERmal, Q72H, Aurelio Mcdermott MD, 1 Patch at 04/04/19 4392   flecainide (TAMBOCOR) tablet 50 mg, 50 mg, Oral, Q12H, Aurelio Mcdermott MD, 50 mg at 04/05/19 2118   albuterol (PROVENTIL VENTOLIN) nebulizer solution 2.5 mg, 2.5 mg, Inhalation, Q4H PRN, Aurelio Mcdermott MD, 2.5 mg at 04/03/19 8880   sodium chloride (NS) flush 5-40 mL, 5-40 mL, IntraVENous, Q8H, Aurelio Mcdermott MD, 10 mL at 04/06/19 6532   sodium chloride (NS) flush 5-40 mL, 5-40 mL, IntraVENous, PRN, Aurelio Mcdermott MD 
  acetaminophen (TYLENOL) tablet 650 mg, 650 mg, Oral, Q4H PRN, Aurelio Mcdermott MD, 650 mg at 04/05/19 1103 
  ondansetron (ZOFRAN) injection 4 mg, 4 mg, IntraVENous, Q4H PRN, Aurelio Mcdermott MD 
   insulin lispro (HUMALOG) injection 0-15 Units, 0-15 Units, SubCUTAneous, AC&HS, Demario Fields MD, 6 Units at 04/05/19 2108   budesonide (PULMICORT) 500 mcg/2 ml nebulizer suspension, 500 mcg, Nebulization, BID RT, Demario Fields MD, 500 mcg at 04/05/19 2044   phenazopyridine (PYRIDIUM) tab 95 mg, 95 mg, Oral, TID PRN, Migue Reagan MD 
  morphine injection 2 mg, 2 mg, IntraVENous, Q4H PRN, Migue Reagan MD, 2 mg at 04/06/19 9850   enoxaparin (LOVENOX) injection 90 mg, 1 mg/kg, SubCUTAneous, Q12H, Migue Reagan MD, 90 mg at 04/06/19 7494   opium-belladonna (B&O 16-A SUPPRETTE) 16.2-60 mg suppository 1 Suppository, 1 Suppository, Rectal, Q6H PRN, Demario Fields MD 
 
 
Assessment:  
 
Principal Problem: 
  Atrial fibrillation with rapid ventricular response ([address] (2/5/2019) Active Problems: 
  Paraplegia ([address] (12/10/2016) Bipolar disorder without psychotic features ([address] (12/10/2016) Chronic hepatitis C virus infection ([address] (12/10/2016) UTI (urinary tract infection) (1/31/2019) Acute encephalopathy (3/10/2019) Diabetes ([address] (3/10/2019) Hypertension (3/10/2019) Hypomagnesemia (4/3/2019) UTI due to extended-spectrum beta lactamase (ESBL) producing Escherichia coli (4/5/2019) Plan: AF with RVR Now HR is controlled and in normal sinus. On Lovenox SC therapeutic dose. Paraplegia Patient is bed bound mostly. Prone to UTI. UTI with ESBL Continue Merrem. May need ID to help. Diabetes mellitus type 2 Monitor blood sugar. Cover current regimen with insulin sliding scale accordingly. Hypertension Monitor blood pressure and manage accordingly. Continue home medications. Acute encephalopathy Improved. Alert and oriented and asked and answered well. Bipolar disorder Continue current medications. Hypomagnesemia On supplement. I have discussed the plan of care with patient. DVT prophylaxis : on Lovenox SC full dose already. Signed By: Davis Santiago MD   
 April 6, 2019

## 2019-04-06 NOTE — PROGRESS NOTES
18 gauge, 2.25cm AccuCath IV established in left cephalic with ultrasound guidance. Patient tolerated well.

## 2019-04-07 LAB
GLUCOSE BLD STRIP.AUTO-MCNC: 113 MG/DL (ref 65–100)
GLUCOSE BLD STRIP.AUTO-MCNC: 268 MG/DL (ref 65–100)
GLUCOSE BLD STRIP.AUTO-MCNC: 272 MG/DL (ref 65–100)
GLUCOSE BLD STRIP.AUTO-MCNC: 278 MG/DL (ref 65–100)
MM INDURATION POC: NORMAL MM (ref 0–5)
MM INDURATION POC: NORMAL MM (ref 0–5)
PPD POC: NORMAL NEGATIVE
PPD POC: NORMAL NEGATIVE

## 2019-04-07 PROCEDURE — 74011250637 HC RX REV CODE- 250/637: Performed by: INTERNAL MEDICINE

## 2019-04-07 PROCEDURE — 74011250637 HC RX REV CODE- 250/637: Performed by: FAMILY MEDICINE

## 2019-04-07 PROCEDURE — 65270000029 HC RM PRIVATE

## 2019-04-07 PROCEDURE — C9113 INJ PANTOPRAZOLE SODIUM, VIA: HCPCS | Performed by: FAMILY MEDICINE

## 2019-04-07 PROCEDURE — 94640 AIRWAY INHALATION TREATMENT: CPT

## 2019-04-07 PROCEDURE — 77030020263 HC SOL INJ SOD CL0.9% LFCR 1000ML

## 2019-04-07 PROCEDURE — 94760 N-INVAS EAR/PLS OXIMETRY 1: CPT

## 2019-04-07 PROCEDURE — 74011000258 HC RX REV CODE- 258: Performed by: INTERNAL MEDICINE

## 2019-04-07 PROCEDURE — 74011636637 HC RX REV CODE- 636/637: Performed by: FAMILY MEDICINE

## 2019-04-07 PROCEDURE — 74011250636 HC RX REV CODE- 250/636: Performed by: FAMILY MEDICINE

## 2019-04-07 PROCEDURE — 74011636637 HC RX REV CODE- 636/637: Performed by: INTERNAL MEDICINE

## 2019-04-07 PROCEDURE — 74011000250 HC RX REV CODE- 250: Performed by: FAMILY MEDICINE

## 2019-04-07 PROCEDURE — 77010033678 HC OXYGEN DAILY

## 2019-04-07 PROCEDURE — 82962 GLUCOSE BLOOD TEST: CPT

## 2019-04-07 PROCEDURE — 74011250636 HC RX REV CODE- 250/636: Performed by: INTERNAL MEDICINE

## 2019-04-07 RX ADMIN — Medication 10 ML: at 06:11

## 2019-04-07 RX ADMIN — Medication 10 ML: at 14:30

## 2019-04-07 RX ADMIN — MEROPENEM 500 MG: 500 INJECTION, POWDER, FOR SOLUTION INTRAVENOUS at 01:48

## 2019-04-07 RX ADMIN — MORPHINE SULFATE 2 MG: 2 INJECTION, SOLUTION INTRAMUSCULAR; INTRAVENOUS at 21:39

## 2019-04-07 RX ADMIN — ONDANSETRON 4 MG: 2 INJECTION INTRAMUSCULAR; INTRAVENOUS at 10:46

## 2019-04-07 RX ADMIN — MAGNESIUM OXIDE TAB 400 MG (241.3 MG ELEMENTAL MG) 400 MG: 400 (241.3 MG) TAB at 18:20

## 2019-04-07 RX ADMIN — INSULIN LISPRO 9 UNITS: 100 INJECTION, SOLUTION INTRAVENOUS; SUBCUTANEOUS at 12:28

## 2019-04-07 RX ADMIN — QUETIAPINE FUMARATE 300 MG: 100 TABLET ORAL at 21:40

## 2019-04-07 RX ADMIN — MORPHINE SULFATE 2 MG: 2 INJECTION, SOLUTION INTRAMUSCULAR; INTRAVENOUS at 08:13

## 2019-04-07 RX ADMIN — MEROPENEM 500 MG: 500 INJECTION, POWDER, FOR SOLUTION INTRAVENOUS at 18:21

## 2019-04-07 RX ADMIN — METOPROLOL SUCCINATE 100 MG: 100 TABLET, EXTENDED RELEASE ORAL at 08:13

## 2019-04-07 RX ADMIN — MEROPENEM 500 MG: 500 INJECTION, POWDER, FOR SOLUTION INTRAVENOUS at 08:12

## 2019-04-07 RX ADMIN — INSULIN GLARGINE 25 UNITS: 100 INJECTION, SOLUTION SUBCUTANEOUS at 21:43

## 2019-04-07 RX ADMIN — MORPHINE SULFATE 2 MG: 2 INJECTION, SOLUTION INTRAMUSCULAR; INTRAVENOUS at 12:31

## 2019-04-07 RX ADMIN — BUDESONIDE 500 MCG: 0.5 INHALANT RESPIRATORY (INHALATION) at 07:18

## 2019-04-07 RX ADMIN — ENOXAPARIN SODIUM 90 MG: 100 INJECTION SUBCUTANEOUS at 06:10

## 2019-04-07 RX ADMIN — PANTOPRAZOLE SODIUM 40 MG: 40 INJECTION, POWDER, FOR SOLUTION INTRAVENOUS at 21:40

## 2019-04-07 RX ADMIN — FLECAINIDE ACETATE 50 MG: 100 TABLET ORAL at 21:40

## 2019-04-07 RX ADMIN — BUDESONIDE 500 MCG: 0.5 INHALANT RESPIRATORY (INHALATION) at 17:43

## 2019-04-07 RX ADMIN — INSULIN LISPRO 9 UNITS: 100 INJECTION, SOLUTION INTRAVENOUS; SUBCUTANEOUS at 21:42

## 2019-04-07 RX ADMIN — MORPHINE SULFATE 2 MG: 2 INJECTION, SOLUTION INTRAMUSCULAR; INTRAVENOUS at 16:55

## 2019-04-07 RX ADMIN — FLECAINIDE ACETATE 50 MG: 100 TABLET ORAL at 08:15

## 2019-04-07 RX ADMIN — APIXABAN 5 MG: 5 TABLET, FILM COATED ORAL at 18:20

## 2019-04-07 RX ADMIN — Medication 10 ML: at 21:43

## 2019-04-07 RX ADMIN — MEROPENEM 500 MG: 500 INJECTION, POWDER, FOR SOLUTION INTRAVENOUS at 12:34

## 2019-04-07 RX ADMIN — MAGNESIUM OXIDE TAB 400 MG (241.3 MG ELEMENTAL MG) 400 MG: 400 (241.3 MG) TAB at 08:13

## 2019-04-07 RX ADMIN — PANTOPRAZOLE SODIUM 40 MG: 40 INJECTION, POWDER, FOR SOLUTION INTRAVENOUS at 08:13

## 2019-04-07 RX ADMIN — INSULIN LISPRO 9 UNITS: 100 INJECTION, SOLUTION INTRAVENOUS; SUBCUTANEOUS at 17:00

## 2019-04-07 NOTE — PROGRESS NOTES
Progress Note Patient: Katrine Scheuermann MRN: [id number]  SSN: [id number] YOB: 1955  Age: 62 y.o. Sex: female Admit Date: 3/31/2019 LOS: 6 days Subjective:  
Patient with PMH including paraplegia, COPD, AF with RVR, DM, hypertension. Now with UTI with ESBL. Patient had dysuria. Now feeling better with Merrem. No fever. Patent on full dose of Lovenox as well for her AF. No bleeding. Patient has been alert now that her sedative medications have been reduced. Patient is asking for more pain medications however. Objective:  
 
Vitals:  
 04/06/19 2303 04/07/19 0424 04/07/19 3626 04/07/19 6062 BP: 136/81 122/78 108/77 Pulse: 63 (!) 58 93 Resp: 18 16 18 Temp: 99.5 °F (37.5 °C) 97.5 °F (36.4 °C) 98.1 °F (36.7 °C) SpO2: 95% 98% 96% 95% Weight:  81.6 kg (180 lb) Height:      
  
 
Intake and Output: 
Current Shift: No intake/output data recorded. Last three shifts: 04/05 1901 - 04/07 0700 In: 1075 [P.O.:1075] Out: 2375 [Urine:2375] Physical Exam:  
General:                    The patient is a pleasant female in no acute distress. She is lying down in bed. No shortness of breath Head:                                   Normocephalic/atraumatic. Eyes:                                   No palpebral pallor or scleral icterus. ENT:                                    External auricular and nasal exam within normal limits. Mucous membranes are moist. 
Neck:                                   Supple, non-tender, no JVD. Lungs:                       Clear to auscultation bilaterally without wheezes or crackles. No respiratory distress or accessory muscle use. Heart:                                  Regular rate and rhythm, without murmurs, rubs, or gallops. Abdomen:                  Soft, non-tender, mildly distended with normoactive bowel sounds. Genitourinary:           No tenderness over the bladder or bilateral CVAs. Extremities:               atrophy of both legs. Skin:                                    Normal color, texture, and turgor. No rashes, lesions, or jaundice. Pulses:                      Radial and dorsalis pedis pulses present 2+ bilaterally. Capillary refill <2s. Neurologic:                CN II-XII grossly intact and symmetrical.  
                                            Moving all four extremities well with no focal deficits. Psychiatric:                Pleasant demeanor, appropriate affect. Alert and oriented x 3 Lab/Data Review: 
 
Recent Results (from the past 24 hour(s)) GLUCOSE, POC Collection Time: 04/06/19 11:00 AM  
Result Value Ref Range Glucose (POC) 271 (H) 65 - 100 mg/dL GLUCOSE, POC Collection Time: 04/06/19  4:04 PM  
Result Value Ref Range Glucose (POC) 267 (H) 65 - 100 mg/dL GLUCOSE, POC Collection Time: 04/06/19  8:31 PM  
Result Value Ref Range Glucose (POC) 282 (H) 65 - 100 mg/dL GLUCOSE, POC Collection Time: 04/07/19  7:08 AM  
Result Value Ref Range Glucose (POC) 113 (H) 65 - 100 mg/dL All Micro Results Procedure Component Value Units Date/Time CULTURE, BLOOD [[id number]] Collected:  03/31/19 1310 Order Status:  Completed Specimen:  Blood Updated:  04/05/19 6867 Special Requests: LEFT HAND Culture result: NO GROWTH 5 DAYS     
 CULTURE, URINE [[id number]]  (Abnormal)  (Susceptibility) Collected:  04/01/19 0210 Order Status:  Completed Specimen:  Suprapubic Urine Updated:  04/04/19 5520 Special Requests: NO SPECIAL REQUESTS Culture result:    
  10,000 to 50,000 COLONIES/mL ESCHERICHIA COLI ** (EXTENDED SPECTRUM BETA LACTAMASE ) ** RESULTS VERIFIED, PHONED TO AND READ BACK BY 
TARA BOWMAN ON 4/4/19 @0718 TA 
  
  
 
XR chest  
4-1-2019 IMPRESSION: Normal chest. 
 
 EKG  
3- 
!! AGE AND GENDER SPECIFIC ECG ANALYSIS !! Sinus tachycardia Biatrial enlargement Nonspecific ST and T wave abnormality Abnormal ECG When compared with ECG of 18-MAR-2019 03:20,  
Vent. rate has increased BY  44 BPM  
Nonspecific T wave abnormality now evident in Inferior leads Nonspecific T wave abnormality, worse in Anterolateral leads Current Facility-Administered Medications:  
  insulin glargine (LANTUS) injection 25 Units, 25 Units, SubCUTAneous, QHS, Kameron Mota DO, 25 Units at 04/06/19 2118   meropenem (MERREM) 500 mg in 0.9% sodium chloride (MBP/ADV) 50 mL, 0.5 g, IntraVENous, Q6H, Epi Green DO, Last Rate: 100 mL/hr at 04/07/19 0812, 500 mg at 04/07/19 5926   lip protectant (BLISTEX) ointment, , Topical, PRN, Epi Green DO 
  ALPRAZolam Sarihy Kanner) tablet 0.25 mg, 0.25 mg, Oral, BID PRN, Kameron Mota DO, 0.25 mg at 04/06/19 8940   magnesium oxide (MAG-OX) tablet 400 mg, 400 mg, Oral, BID, Epi Green DO, 400 mg at 04/07/19 0813 
  metoprolol succinate (TOPROL-XL) tablet 100 mg, 100 mg, Oral, DAILY, Epi Green DO, 100 mg at 04/07/19 7328   hydrALAZINE (APRESOLINE) tablet 25 mg, 25 mg, Oral, TID PRN, Kameron Mota DO, 25 mg at 04/04/19 1233   LORazepam (ATIVAN) injection 1 mg, 1 mg, IntraVENous, Q4H PRN, Aren JEFFREY MD, 1 mg at 04/04/19 2236   pantoprazole (PROTONIX) injection 40 mg, 40 mg, IntraVENous, Q12H, Bryan Martínez MD, 40 mg at 04/07/19 9107   QUEtiapine (SEROquel) tablet 300 mg, 300 mg, Oral, QHS, Bryan Martínez MD, 300 mg at 04/06/19 2108   bisacodyl (DULCOLAX) tablet 5 mg, 5 mg, Oral, DAILY PRN, Bryan Martínez MD 
  fentaNYL (DURAGESIC) 25 mcg/hr patch 1 Patch, 1 Patch, TransDERmal, Q72H, Bryan Martínez MD, 1 Patch at 04/07/19 1217   flecainide (TAMBOCOR) tablet 50 mg, 50 mg, Oral, Q12H, Bryan Martínez MD, 50 mg at 04/07/19 8958   albuterol (PROVENTIL VENTOLIN) nebulizer solution 2.5 mg, 2.5 mg, Inhalation, Q4H PRN, Fabien Tomlinson MD, 2.5 mg at 04/03/19 2270   sodium chloride (NS) flush 5-40 mL, 5-40 mL, IntraVENous, Q8H, Lali Gallego MD, 10 mL at 04/07/19 6832 
  sodium chloride (NS) flush 5-40 mL, 5-40 mL, IntraVENous, PRN, Fabien Tomlinson MD 
  acetaminophen (TYLENOL) tablet 650 mg, 650 mg, Oral, Q4H PRN, Fabien Tomlinson MD, 650 mg at 04/05/19 1103 
  ondansetron (ZOFRAN) injection 4 mg, 4 mg, IntraVENous, Q4H PRN, Fabien Tomlinson MD 
  insulin lispro (HUMALOG) injection 0-15 Units, 0-15 Units, SubCUTAneous, AC&HS, Fabien Tomlinson MD, Stopped at 04/07/19 0730   budesonide (PULMICORT) 500 mcg/2 ml nebulizer suspension, 500 mcg, Nebulization, BID RT, Fabien Tomlinson MD, 500 mcg at 04/07/19 2646   phenazopyridine (PYRIDIUM) tab 95 mg, 95 mg, Oral, TID PRN, Zuri Motta MD 
  morphine injection 2 mg, 2 mg, IntraVENous, Q4H PRN, Zuri Motta MD, 2 mg at 04/07/19 0534   enoxaparin (LOVENOX) injection 90 mg, 1 mg/kg, SubCUTAneous, Q12H, Zuri Motta MD, 90 mg at 04/07/19 0610 
  opium-belladonna (B&O 16-A SUPPRETTE) 16.2-60 mg suppository 1 Suppository, 1 Suppository, Rectal, Q6H PRN, Fabien Tomlinson MD 
 
 
Assessment:  
 
Principal Problem: 
  [facility] (2/5/2019) Active Problems: 
  Paraplegia ([facility] (12/10/2016) Bipolar disorder without psychotic features ([facility] (12/10/2016) Chronic hepatitis C virus infection ([facility] (12/10/2016) UTI (urinary tract infection) (1/31/2019) Acute encephalopathy (3/10/2019) Diabetes ([facility] (3/10/2019) Hypertension (3/10/2019) Hypomagnesemia (4/3/2019) UTI due to extended-spectrum beta lactamase (ESBL) producing Escherichia coli (4/5/2019) Plan: AF with RVR Now HR is controlled and in normal sinus. On Lovenox SC therapeutic dose. Will change to [facility]. Paraplegia Patient is bed bound mostly. Prone to UTI. UTI with ESBL Continue Merrem. May need ID to help. Will consult ID tomorrow. Diabetes mellitus type 2 Monitor blood sugar. Cover current regimen with insulin sliding scale accordingly. Hypertension Monitor blood pressure and manage accordingly. Continue home medications. Acute encephalopathy Improved. Alert and oriented and asked and answered well. Try to limit sedatives. Bipolar disorder Continue current medications. Hypomagnesemia On supplement. I have discussed the plan of care with patient. DVT prophylaxis : on Lovenox SC full dose already. Will change to [address] [place] [address]. Signed By: Davis Santiago MD   
 April 7, 2019

## 2019-04-07 NOTE — PROGRESS NOTES
Hourly rounds complete this shift, no new complaints at this time, patient continues to c/o knee and leg pain, medication given bed in low, locked position, call light and bedside table within reach,  all needs met. Will continue to monitor Report to day shift nurse.

## 2019-04-08 LAB
GLUCOSE BLD STRIP.AUTO-MCNC: 208 MG/DL (ref 65–100)
GLUCOSE BLD STRIP.AUTO-MCNC: 226 MG/DL (ref 65–100)
GLUCOSE BLD STRIP.AUTO-MCNC: 248 MG/DL (ref 65–100)
GLUCOSE BLD STRIP.AUTO-MCNC: 274 MG/DL (ref 65–100)

## 2019-04-08 PROCEDURE — 74011250637 HC RX REV CODE- 250/637: Performed by: INTERNAL MEDICINE

## 2019-04-08 PROCEDURE — 77010033678 HC OXYGEN DAILY

## 2019-04-08 PROCEDURE — 02HV33Z INSERTION OF INFUSION DEVICE INTO SUPERIOR VENA CAVA, PERCUTANEOUS APPROACH: ICD-10-PCS | Performed by: INTERNAL MEDICINE

## 2019-04-08 PROCEDURE — 65270000029 HC RM PRIVATE

## 2019-04-08 PROCEDURE — 74011250636 HC RX REV CODE- 250/636: Performed by: INTERNAL MEDICINE

## 2019-04-08 PROCEDURE — 94760 N-INVAS EAR/PLS OXIMETRY 1: CPT

## 2019-04-08 PROCEDURE — 76937 US GUIDE VASCULAR ACCESS: CPT

## 2019-04-08 PROCEDURE — 74011250637 HC RX REV CODE- 250/637: Performed by: FAMILY MEDICINE

## 2019-04-08 PROCEDURE — 82962 GLUCOSE BLOOD TEST: CPT

## 2019-04-08 PROCEDURE — 74011636637 HC RX REV CODE- 636/637: Performed by: INTERNAL MEDICINE

## 2019-04-08 PROCEDURE — 36569 INSJ PICC 5 YR+ W/O IMAGING: CPT | Performed by: NURSE PRACTITIONER

## 2019-04-08 PROCEDURE — 74011000258 HC RX REV CODE- 258: Performed by: INTERNAL MEDICINE

## 2019-04-08 PROCEDURE — 74011636637 HC RX REV CODE- 636/637: Performed by: FAMILY MEDICINE

## 2019-04-08 PROCEDURE — 74011000250 HC RX REV CODE- 250: Performed by: FAMILY MEDICINE

## 2019-04-08 PROCEDURE — C1751 CATH, INF, PER/CENT/MIDLINE: HCPCS

## 2019-04-08 PROCEDURE — 77030034849

## 2019-04-08 PROCEDURE — 94640 AIRWAY INHALATION TREATMENT: CPT

## 2019-04-08 PROCEDURE — 74011250636 HC RX REV CODE- 250/636: Performed by: FAMILY MEDICINE

## 2019-04-08 RX ORDER — SODIUM CHLORIDE 0.9 % (FLUSH) 0.9 %
10 SYRINGE (ML) INJECTION EVERY 8 HOURS
Status: DISCONTINUED | OUTPATIENT
Start: 2019-04-08 | End: 2019-04-09 | Stop reason: HOSPADM

## 2019-04-08 RX ORDER — OXYCODONE HYDROCHLORIDE 5 MG/1
5 TABLET ORAL
Status: DISPENSED | OUTPATIENT
Start: 2019-04-08 | End: 2019-04-09

## 2019-04-08 RX ORDER — HEPARIN 100 UNIT/ML
300 SYRINGE INTRAVENOUS AS NEEDED
Status: DISCONTINUED | OUTPATIENT
Start: 2019-04-08 | End: 2019-04-09 | Stop reason: HOSPADM

## 2019-04-08 RX ORDER — SODIUM CHLORIDE 0.9 % (FLUSH) 0.9 %
10 SYRINGE (ML) INJECTION AS NEEDED
Status: DISCONTINUED | OUTPATIENT
Start: 2019-04-08 | End: 2019-04-09 | Stop reason: HOSPADM

## 2019-04-08 RX ORDER — HEPARIN 100 UNIT/ML
300 SYRINGE INTRAVENOUS EVERY 8 HOURS
Status: DISCONTINUED | OUTPATIENT
Start: 2019-04-08 | End: 2019-04-09 | Stop reason: HOSPADM

## 2019-04-08 RX ORDER — PANTOPRAZOLE SODIUM 40 MG/1
40 TABLET, DELAYED RELEASE ORAL
Status: DISCONTINUED | OUTPATIENT
Start: 2019-04-08 | End: 2019-04-09 | Stop reason: HOSPADM

## 2019-04-08 RX ADMIN — Medication 10 ML: at 21:16

## 2019-04-08 RX ADMIN — MORPHINE SULFATE 2 MG: 2 INJECTION, SOLUTION INTRAMUSCULAR; INTRAVENOUS at 01:59

## 2019-04-08 RX ADMIN — INSULIN GLARGINE 25 UNITS: 100 INJECTION, SOLUTION SUBCUTANEOUS at 21:15

## 2019-04-08 RX ADMIN — BISACODYL 5 MG: 5 TABLET, COATED ORAL at 18:29

## 2019-04-08 RX ADMIN — Medication 10 ML: at 18:10

## 2019-04-08 RX ADMIN — OXYCODONE HYDROCHLORIDE 5 MG: 5 TABLET ORAL at 19:36

## 2019-04-08 RX ADMIN — APIXABAN 5 MG: 5 TABLET, FILM COATED ORAL at 20:20

## 2019-04-08 RX ADMIN — SODIUM CHLORIDE, PRESERVATIVE FREE 300 UNITS: 5 INJECTION INTRAVENOUS at 18:00

## 2019-04-08 RX ADMIN — INSULIN LISPRO 6 UNITS: 100 INJECTION, SOLUTION INTRAVENOUS; SUBCUTANEOUS at 11:27

## 2019-04-08 RX ADMIN — INSULIN LISPRO 9 UNITS: 100 INJECTION, SOLUTION INTRAVENOUS; SUBCUTANEOUS at 16:45

## 2019-04-08 RX ADMIN — INSULIN LISPRO 6 UNITS: 100 INJECTION, SOLUTION INTRAVENOUS; SUBCUTANEOUS at 08:00

## 2019-04-08 RX ADMIN — SODIUM CHLORIDE, PRESERVATIVE FREE 300 UNITS: 5 INJECTION INTRAVENOUS at 21:15

## 2019-04-08 RX ADMIN — MORPHINE SULFATE 2 MG: 2 INJECTION, SOLUTION INTRAMUSCULAR; INTRAVENOUS at 06:35

## 2019-04-08 RX ADMIN — MEROPENEM 500 MG: 500 INJECTION, POWDER, FOR SOLUTION INTRAVENOUS at 06:35

## 2019-04-08 RX ADMIN — OXYCODONE HYDROCHLORIDE 5 MG: 5 TABLET ORAL at 15:37

## 2019-04-08 RX ADMIN — PANTOPRAZOLE SODIUM 40 MG: 40 TABLET, DELAYED RELEASE ORAL at 16:30

## 2019-04-08 RX ADMIN — MEROPENEM 500 MG: 500 INJECTION, POWDER, FOR SOLUTION INTRAVENOUS at 02:00

## 2019-04-08 RX ADMIN — QUETIAPINE FUMARATE 300 MG: 100 TABLET ORAL at 20:20

## 2019-04-08 RX ADMIN — METOPROLOL SUCCINATE 100 MG: 100 TABLET, EXTENDED RELEASE ORAL at 08:49

## 2019-04-08 RX ADMIN — FLECAINIDE ACETATE 50 MG: 100 TABLET ORAL at 20:20

## 2019-04-08 RX ADMIN — URINARY PAIN RELIEF 95 MG: 95 TABLET ORAL at 18:29

## 2019-04-08 RX ADMIN — FLECAINIDE ACETATE 50 MG: 100 TABLET ORAL at 08:49

## 2019-04-08 RX ADMIN — Medication 10 ML: at 06:00

## 2019-04-08 RX ADMIN — OXYCODONE HYDROCHLORIDE 5 MG: 5 TABLET ORAL at 11:23

## 2019-04-08 RX ADMIN — BUDESONIDE 500 MCG: 0.5 INHALANT RESPIRATORY (INHALATION) at 09:01

## 2019-04-08 RX ADMIN — INSULIN LISPRO 6 UNITS: 100 INJECTION, SOLUTION INTRAVENOUS; SUBCUTANEOUS at 21:14

## 2019-04-08 RX ADMIN — MAGNESIUM OXIDE TAB 400 MG (241.3 MG ELEMENTAL MG) 400 MG: 400 (241.3 MG) TAB at 08:49

## 2019-04-08 RX ADMIN — MAGNESIUM OXIDE TAB 400 MG (241.3 MG ELEMENTAL MG) 400 MG: 400 (241.3 MG) TAB at 18:12

## 2019-04-08 RX ADMIN — Medication 10 ML: at 14:00

## 2019-04-08 RX ADMIN — MEROPENEM 500 MG: 500 INJECTION, POWDER, FOR SOLUTION INTRAVENOUS at 18:08

## 2019-04-08 RX ADMIN — APIXABAN 5 MG: 5 TABLET, FILM COATED ORAL at 08:49

## 2019-04-08 NOTE — PROGRESS NOTES
SP cath exchanged. Sterile technique per protocol. Campbell bag dated and labeled. Stat lock on right thigh.

## 2019-04-08 NOTE — PROGRESS NOTES
PICC Placement Note PRE-PROCEDURE VERIFICATION Correct Procedure: yes. Time out completed with assistant Esvin Rebolledo RN and all persons present in agreement with time out. Correct Site:  yes Temperature: Temp: 98.5 °F (36.9 °C), Temperature Source: Temp Source: Oral 
Recent Labs 04/06/19 
0540 BUN 6*  
CREA 0.66  WBC 8.3 Allergies: Patient has no known allergies. Education materials for [facility] given to patient or family. PROCEDURE DETAIL A single lumen PICC line was started for vascular access and antibiotic therapy. The following documentation is in addition to the PICC properties in the lines/airways flowsheet : 
Lot #: [id number] 
xylocaine used: yes Mid-Arm Circumference: 36 (cm) Internal Catheter Length: 36 (cm) Internal Catheter Total Length: 36 (cm) Vein Selection for PICC:right basilic Central Line Bundle followed yes Complication Related to Insertion: none Both the insertion guidewire and ECG guidewire were removed intact all ports have positive blood return and were flush well with normal saline. The location of the tip of the PICC is verified using ECG technology. The tip is in the SVC per ECG reading. See image below. Line is okay to use: yes

## 2019-04-08 NOTE — PROGRESS NOTES
Progress Note Patient: Kaitlin Guerra MRN: [id number]  SSN: [id number] YOB: 1955  Age: 62 y.o. Sex: female Admit Date: 3/31/2019 LOS: 7 days Subjective:  
Patient with PMH including paraplegia, COPD, AF with RVR, DM, hypertension. Now with UTI with ESBL. Patient had dysuria. Now feeling better with Merrem. No fever. Patent is now on Apixaban for her AF. No bleeding. Patient has been alert now that her sedative medications have been reduced. Patient is asking for more Ativan however. Objective:  
 
Vitals:  
 04/07/19 2326 04/08/19 3073 04/08/19 0888 04/08/19 2374 BP: 136/61 102/56 126/67 Pulse: 69 64 64 Resp: 18 16 18 Temp: 98.1 °F (36.7 °C) 98.3 °F (36.8 °C) 98.5 °F (36.9 °C) SpO2: 98% 98% 94% 96% Weight:      
Height:      
  
 
Intake and Output: 
Current Shift: No intake/output data recorded. Last three shifts: 04/06 1901 - 04/08 0700 In: 1065 [P.O.:1065] Out: 7984 [KASTW:0466] Physical Exam:  
General:                    The patient is a pleasant female in no acute distress. She is lying down in bed. No shortness of breath Head:                                   Normocephalic/atraumatic. Eyes:                                   No palpebral pallor or scleral icterus. ENT:                                    External auricular and nasal exam within normal limits. Mucous membranes are moist. 
Neck:                                   Supple, non-tender, no JVD. Lungs:                       Clear to auscultation bilaterally without wheezes or crackles. No respiratory distress or accessory muscle use. Heart:                                  Regular rate and rhythm, without murmurs, rubs, or gallops. Abdomen:                  Soft, non-tender, mildly distended with normoactive bowel sounds. Genitourinary:           No tenderness over the bladder or bilateral CVAs. Extremities:               atrophy of both legs. Skin:                                    Normal color, texture, and turgor. No rashes, lesions, or jaundice. Pulses:                      Radial and dorsalis pedis pulses present 2+ bilaterally. Capillary refill <2s. Neurologic:                CN II-XII grossly intact and symmetrical.  
                                            Moving all four extremities well with no focal deficits. Psychiatric:                Pleasant demeanor, appropriate affect. Alert and oriented x 3 Lab/Data Review: 
 
Recent Results (from the past 24 hour(s)) GLUCOSE, POC Collection Time: 04/07/19 10:59 AM  
Result Value Ref Range Glucose (POC) 278 (H) 65 - 100 mg/dL PLEASE READ & DOCUMENT PPD TEST IN 48 HRS Collection Time: 04/07/19 12:36 PM  
Result Value Ref Range PPD  Negative  
 mm Induration  0 - 5 mm GLUCOSE, POC Collection Time: 04/07/19  3:48 PM  
Result Value Ref Range Glucose (POC) 268 (H) 65 - 100 mg/dL GLUCOSE, POC Collection Time: 04/07/19  8:21 PM  
Result Value Ref Range Glucose (POC) 272 (H) 65 - 100 mg/dL GLUCOSE, POC Collection Time: 04/08/19  7:43 AM  
Result Value Ref Range Glucose (POC) 208 (H) 65 - 100 mg/dL All Micro Results Procedure Component Value Units Date/Time CULTURE, BLOOD [[id number]] Collected:  03/31/19 2257 Order Status:  Completed Specimen:  Blood Updated:  04/05/19 1516 Special Requests: LEFT HAND Culture result: NO GROWTH 5 DAYS     
 CULTURE, URINE [[id number]]  (Abnormal)  (Susceptibility) Collected:  04/01/19 0210 Order Status:  Completed Specimen:  Suprapubic Urine Updated:  04/04/19 0720 Special Requests: NO SPECIAL REQUESTS   Culture result:    
  10,000 to 50,000 COLONIES/mL ESCHERICHIA COLI ** (EXTENDED SPECTRUM BETA LACTAMASE ) **  
 RESULTS VERIFIED, PHONED TO AND READ BACK BY 
TARA BOWMAN ON 4/4/19 @0718 TA 
  
  
 
XR chest  
4-1-2019 IMPRESSION: Normal chest. 
 
EKG  
3- 
!! AGE AND GENDER SPECIFIC ECG ANALYSIS !! Sinus tachycardia Biatrial enlargement Nonspecific ST and T wave abnormality Abnormal ECG When compared with ECG of 18-MAR-2019 03:20,  
Vent. rate has increased BY  44 BPM  
Nonspecific T wave abnormality now evident in Inferior leads Nonspecific T wave abnormality, worse in Anterolateral leads Current Facility-Administered Medications:  
  apixaban (ELIQUIS) tablet 5 mg, 5 mg, Oral, Q12H, Moose Card MD, 5 mg at 04/08/19 0849 
  insulin glargine (LANTUS) injection 25 Units, 25 Units, SubCUTAneous, QHS, Macel Chew, DO, 25 Units at 04/07/19 2143   meropenem (MERREM) 500 mg in 0.9% sodium chloride (MBP/ADV) 50 mL, 0.5 g, IntraVENous, Q6H, Isauro Greeng, DO, Last Rate: 100 mL/hr at 04/08/19 0635, 500 mg at 04/08/19 8539   lip protectant (BLISTEX) ointment, , Topical, PRN, Isauro Greeng, DO 
  ALPRAZolam Nivia Puff) tablet 0.25 mg, 0.25 mg, Oral, BID PRN, Macel Chew, DO, 0.25 mg at 04/06/19 8492   magnesium oxide (MAG-OX) tablet 400 mg, 400 mg, Oral, BID, Isauro Greeng, DO, 400 mg at 04/08/19 0849 
  metoprolol succinate (TOPROL-XL) tablet 100 mg, 100 mg, Oral, DAILY, Isauro Green Lucien, DO, 100 mg at 04/08/19 1656   hydrALAZINE (APRESOLINE) tablet 25 mg, 25 mg, Oral, TID PRN, Macel Chew, DO, 25 mg at 04/04/19 1233   LORazepam (ATIVAN) injection 1 mg, 1 mg, IntraVENous, Q4H PRN, Racheal JEFFREY MD, 1 mg at 04/04/19 2236   pantoprazole (PROTONIX) injection 40 mg, 40 mg, IntraVENous, Q12H, Deny Pac, MD, 40 mg at 04/07/19 2140   QUEtiapine (SEROquel) tablet 300 mg, 300 mg, Oral, QHS, Deny Hdez MD, 300 mg at 04/07/19 2140   bisacodyl (DULCOLAX) tablet 5 mg, 5 mg, Oral, DAILY PRN, Deny Hdez MD 
   fentaNYL (DURAGESIC) 25 mcg/hr patch 1 Patch, 1 Patch, TransDERmal, Q72H, Fernando Patel MD, 1 Patch at 04/07/19 4623   flecainide (TAMBOCOR) tablet 50 mg, 50 mg, Oral, Q12H, Fernando Patel MD, 50 mg at 04/08/19 0849 
  albuterol (PROVENTIL VENTOLIN) nebulizer solution 2.5 mg, 2.5 mg, Inhalation, Q4H PRN, Fernando Patel MD, 2.5 mg at 04/03/19 0861   sodium chloride (NS) flush 5-40 mL, 5-40 mL, IntraVENous, Q8H, Gladys Gallego MD, 10 mL at 04/08/19 0600 
  sodium chloride (NS) flush 5-40 mL, 5-40 mL, IntraVENous, PRN, Fernando Patel MD 
  acetaminophen (TYLENOL) tablet 650 mg, 650 mg, Oral, Q4H PRN, Fernando Patel MD, 650 mg at 04/05/19 1103 
  ondansetron (ZOFRAN) injection 4 mg, 4 mg, IntraVENous, Q4H PRN, Fernando Patel MD, 4 mg at 04/07/19 1046 
  insulin lispro (HUMALOG) injection 0-15 Units, 0-15 Units, SubCUTAneous, AC&HS, Fernando Patel MD, 6 Units at 04/08/19 0800   budesonide (PULMICORT) 500 mcg/2 ml nebulizer suspension, 500 mcg, Nebulization, BID RT, Fernando Patel MD, 500 mcg at 04/08/19 1248   phenazopyridine (PYRIDIUM) tab 95 mg, 95 mg, Oral, TID PRN, Abhijit Saunders MD 
  morphine injection 2 mg, 2 mg, IntraVENous, Q4H PRN, Abhijit Saunders MD, 2 mg at 04/08/19 3354   opium-belladonna (B&O 16-A SUPPRETTE) 16.2-60 mg suppository 1 Suppository, 1 Suppository, Rectal, Q6H PRN, Fernando Patel MD 
 
 
Assessment:  
 
Principal Problem: 
  Atrial fibrillation with rapid [facility] response [facility] (2/5/2019) Active Problems: 
  Paraplegia ([facility] (12/10/2016) Bipolar disorder without psychotic features ([facility] (12/10/2016) Chronic hepatitis C virus infection ([facility] (12/10/2016) UTI (urinary tract infection) (1/31/2019) Acute encephalopathy (3/10/2019) Diabetes ([facility] (3/10/2019) Hypertension (3/10/2019) Hypomagnesemia (4/3/2019) UTI due to extended-spectrum beta lactamase (ESBL) producing Escherichia coli (4/5/2019) Plan: AF with RVR Now HR is controlled and in normal sinus. On Apixaban now. Paraplegia Patient is bed bound mostly. Prone to UTI. UTI with ESBL Continue Merrem. Consult ID to help with advise and recommendation. Diabetes mellitus type 2 Monitor blood sugar. Cover current regimen with insulin sliding scale accordingly. Hypertension Monitor blood pressure and manage accordingly. Continue home medications. Acute encephalopathy Improved. Alert and oriented and asked and answered well. Try to limit sedatives. Bipolar disorder Continue current medications. Hypomagnesemia On supplement. I have discussed the plan of care with patient. DVT prophylaxis : on Apixaban already. Signed By: Llewellyn Lundborg, MD   
 April 8, 2019

## 2019-04-08 NOTE — PROGRESS NOTES
Nutrition LOS Note:  
Assessment Diet order(s): 4-1: Cardiac, 4-2: Pureed cardiac, 4-3 Mechanical soft, cardiac Food,Nutrition, and Pertinent History: Pt is hungry and waiting on a second lunch tray because she got rice on her tray and does not like rice She says if she gets anything on her plate that she does not like, she won't eat any of the other items. She says she just started eating a few days ago and she has a good appetite and ate 100% of \"what little\" breakfast she has this morning. Anthropometrics: Height: 5' 5\" (165.1 cm), Weight Source: Bed, Weight: 81.6 kg (180 lb), Body mass index is 29.95 kg/m². BMI class of overweight. Macronutrient Needs: 
· EER:  4730-2476 kcal /day (20-25 kcal/kg ABW) · EPR:  65-82 grams protein/day (0.8-1 grams/kg IBW) Intake/Comparative Standards:  Average intake for past 7 day(s)/13 recorded meal(s): 55%. This potentially meets ~% of kcal and ~% of protein needs Nutrition Diagnosis: No nutrition diagnosis at this time Intervention:  
Meals and snacks: [facility]t hx of [facility]. Continue mechanical soft per SLP eval and decreased mastication. Provided pt with [employer] meal at 1500. [facility] Discharge nutrition plan: Continue current diet. [facility], [address], [address] [place] [address], [address] [place] [address]

## 2019-04-08 NOTE — CONSULTS
Infectious Disease Consult    Today's Date: 4/8/2019   Admit Date: 3/31/2019    Impression:   · ESBL E coli UTI  · Paraplegic with chronic suprapubic catheter  · DM  · Afib with RVR  · Bipolar disease  · COPD (Hospice 3/21-4/1)  Plan:   · Continue IV Merrem for now  · Exchange suprapubic catheter today  · Discharge to home is planned  · When ready for discharge transition to IV Invanz to complete 7 days of therapy    OPAT Orders:  Invanz 1 gm IV Q 24 hrs with EOT 4/12/19  Routine PICC care  Q Monday CBC with diff, Scr, LFT's  Please fax results to [phone] PICC after last dose on Invanz on 4/12/19  No ID follow-up is required  ID will sign-off today. Please call with questions      Anti-infectives:   · Merrem (4/5-  · Rocephin X 1 dose (4/1)  · PO Vantin (4/3-4/5)  · Cefepime (4/2-4/3)  · PO Augmentin PTA    Subjective:   Date of Consultation:  April 8, 2019  Referring Physician: Dr. Hang Neilsen    Patient is a 62 y.o. female with a history of COPD, was on home Hospice until 4/1 (son revoked), DM HTN, Afib,  HCV, bipolar disorder, & paraplegia who was delivered to ER via EMS from son's home with c/o chills and hematuria. She does have a chronic suprapubic urinary catheter. Prior to revoking Hospice status and transport to [facility] ER the patient became lethargic and tachycardic. Patient has had  IV Cefepime, Rocephin, PO Vantin, and Merrem sice admission 4/1/19. Currently on IV Merrem 500 mg Q 6 hrs started on 4/5/19. Urine cx grew ESBL E coli (4/1). Blood cx 3/31 NG and previous admission 3/18 NG. Previous urine cx grew Pseudomonas. Initially presented with leukocytosis 15.8 now WNL. No fevers noted. ID is asked to evaluate patient for abx recommendations.     Patient Active Problem List   Diagnosis Code    Hyperglycemia due to type 2 diabetes mellitus ([address] E11.65    Paraplegia ([address] G82.20    Bipolar disorder without psychotic features ([facility] F31.9    Chronic hepatitis C virus infection ([facility] B18.2    Narcotic addiction ([facility] F11.20    UTI (urinary tract infection) N39.0    Leukocytosis D72.829    Atrial fibrillation with rapid ventricular response (HCC) I48.91    Chronic midline low back pain without sciatica M54.5, G89.29    Hyperkalemia E87.5    Nausea & vomiting R11.2    A-fib (HCC) I48.91    Acute encephalopathy G93.40    Diabetes ([facility] E11.9    Hypertension I10    Constipation K59.00    Pyuria N39.0    Hypomagnesemia E83.42    UTI due to extended-spectrum beta lactamase (ESBL) producing Escherichia coli N39.0, B96.29, Z16.12     Past Medical History:   Diagnosis Date    Diabetes ([facility]     Gastrointestinal disorder     GERD    Hypertension     Ill-defined condition     neurogenic bladder    Ill-defined condition     transverse myelitis    Ill-defined condition     paraplegia    Liver disease     Hepatitis C    Psychiatric disorder     anxiety    Psychiatric disorder     bipolar    Thromboembolus ([facility]       No family history on file. Social History     Tobacco Use    Smoking status: Current Every Day Smoker     Packs/day: 0.50   Substance Use Topics    Alcohol use: No     No past surgical history on file. Prior to Admission medications    Medication Sig Start Date End Date Taking? Authorizing Provider   promethazine (PHENERGAN) 25 mg tablet Take 1 Tab by mouth every six (6) hours as needed for Nausea. 3/18/19   Gabriela Fontana MD   promethazine (PHENERGAN) 25 mg tablet Take 1 Tab by mouth every six (6) hours as needed for Nausea for up to 15 doses. 3/18/19   Douglas Mercer MD   ALPRAZolam [place]) 0.5 mg tablet Take 1 Tab by mouth two (2) times daily as needed for Anxiety. Max Daily Amount: 1 mg. 3/14/19   Muna Smith MD   insulin glargine (LANTUS,BASAGLAR) 100 unit/mL (3 mL) inpn 20 Units by SubCUTAneous route nightly for 30 days.  3/14/19 4/13/19  Muna Smith MD   albuterol (PROVENTIL HFA, VENTOLIN HFA, PROAIR HFA) 90 mcg/actuation inhaler Take 1 Puff by inhalation every four (4) hours as needed for Wheezing. 2/18/19   Rebecca Rodarte MD   albuterol (PROVENTIL VENTOLIN) 2.5 mg /3 mL (0.083 %) nebulizer solution Take 3 mL by inhalation every four (4) hours as needed for Wheezing. 2/18/19   Rebecca Rodarte MD   apixaban (ELIQUIS) 5 mg tablet Take 1 Tab by mouth two (2) times a day. 2/18/19   Rebecca Rodarte MD   Blood-Glucose Meter monitoring kit Check glucose at home daily 2/18/19   Rebecca Rodarte MD   budesonide (PULMICORT) 0.5 mg/2 mL nbsp 2 mL by Nebulization route two (2) times a day. 2/18/19   Rebecca Rodarte MD   nystatin (MYCOSTATIN) powder Apply  to affected area two (2) times a day. 2/18/19   Rebecca Rodarte MD   pantoprazole (PROTONIX) 40 mg tablet Take 1 Tab by mouth Daily (before breakfast). 2/19/19   Rebecca Rodarte MD   flecainide (TAMBOCOR) 50 mg tablet Take 1 Tab by mouth every twelve (12) hours. 2/13/19   Chris Quispe,    metoprolol succinate (TOPROL-XL) 50 mg XL tablet Take 1 Tab by mouth daily. 2/14/19   Chris Diggsr, DO   insulin lispro (HUMALOG) 100 unit/mL injection Less than 150 =   0 units           150 -199 =   2 units  200 -249 =   4 units  250 -299 =   6 units  300 -349 =   8 units  Before meals and at bedtime. 2/13/19   Chris Quispe, DO   ALPRAZolam [place]) 0.5 mg tablet Take 1 Tab by mouth two (2) times daily as needed for Anxiety. Max Daily Amount: 1 mg. 2/9/19   Paul Guthrie MD   fentaNYL (DURAGESIC) 25 mcg/hr PATCH 1 Patch by TransDERmal route every seventy-two (72) hours. Max Daily Amount: 1 Patch. 2/9/19   Paul Guthrie MD   bisacodyl (DULCOLAX) 5 mg EC tablet Take 1 Tab by mouth daily as needed for Constipation. 12/21/16   Teresa Ly MD   budesonide (PULMICORT) 0.5 mg/2 mL nbsp 2 mL by Nebulization route two (2) times a day. 12/21/16   Teresa Ly MD   zinc oxide-cod liver oil (DESITIN) 40 % paste Apply  to affected area two (2) times a day.  12/21/16   Teresa Ly MD omeprazole (PRILOSEC) 40 mg capsule Take 40 mg by mouth daily. Dominique Sinha MD   QUEtiapine (SEROQUEL) 100 mg tablet Take 300 mg by mouth nightly. Dominique Sinha MD       No Known Allergies     Review of Systems:  A comprehensive review of systems was negative except for that written in the History of Present Illness. Objective:     Visit Vitals  /67   Pulse 64   Temp 98.5 °F (36.9 °C)   Resp 18   Ht 5' 5\" (1.651 m)   Wt 81.6 kg (180 lb)   SpO2 94%   Breastfeeding? No   BMI 29.95 kg/m²     Temp (24hrs), Av.1 °F (36.7 °C), Min:97.7 °F (36.5 °C), Max:98.5 °F (36.9 °C)       Lines:  Peripheral IV:       Physical Exam:    General:  Alert, cooperative, well noursished, well developed, appears stated age   Eyes:  Sclera anicteric. Pupils equally round and reactive to light. Mouth/Throat: Mucous membranes normal, oral pharynx clear   Neck: Supple   Lungs:   Clear to auscultation bilaterally, good effort   CV:  Regular rate and rhythm,no murmur, click, rub or gallop   Abdomen:   Soft, non-tender. bowel sounds normal. non-distended   Extremities: No cyanosis or edema   Skin: Skin color, texture, turgor normal. no acute rash or lesions   Lymph nodes: Cervical and supraclavicular normal   Musculoskeletal: No swelling or deformity   Lines/Devices:  Intact, no erythema, drainage or tenderness   Psych: Alert and oriented, normal mood affect given the setting       Data Review:     CBC:  Recent Labs     19  0540   WBC 8.3   GRANS 46   MONOS 7   EOS 5   ANEU 3.8   ABL 3.3   HGB 13.6   HCT 43.9          BMP:  Recent Labs     19  0540   CREA 0.66   BUN 6*      K 4.4   *   CO2 19*   AGAP 10   *       LFTS:  No results for input(s): TBILI, ALT, SGOT, AP, TP, ALB in the last 72 hours.     Microbiology:     All Micro Results     Procedure Component Value Units Date/Time    CULTURE, BLOOD [[id number]] Collected:  19 2990    Order Status:  Completed Specimen:  Blood Updated: 04/05/19 0617     Special Requests: LEFT HAND     Culture result: NO GROWTH 5 DAYS       CULTURE, URINE [[id number]]  (Abnormal)  (Susceptibility) Collected:  04/01/19 0210    Order Status:  Completed Specimen:  Suprapubic Urine Updated:  04/04/19 0720     Special Requests: NO SPECIAL REQUESTS        Culture result:       10,000 to 50,000 COLONIES/mL ESCHERICHIA COLI ** (EXTENDED SPECTRUM BETA LACTAMASE ) **                  RESULTS VERIFIED, PHONED TO AND READ BACK BY  TARA BOWMAN ON 4/4/19 @8894 TA            Imaging:   No new    Signed By: i3 membrane Pat, NP     April 8, 2019

## 2019-04-08 NOTE — PROGRESS NOTES
Consult received for IV ABT. Referral sent to [facility] for pricing. Awaiting response. Referral sent to Bertha Holm.

## 2019-04-08 NOTE — PROGRESS NOTES
Hourly rounds completed throughout this shift. Pain medicine given per MAR.pt also requested for nausea medicine x 1 this shift. pt had no complaints this shift. Pt resting in bed; denies needs at this time. Will continue to monitor and report to oncoming night shift nurse.

## 2019-04-08 NOTE — PROGRESS NOTES
Visit with patient to build rapport with . Patient is calm. Receptive to  presence. Encouraged and assured patient of our continued care. Elisa Reveles,  Staff  C: [phone]  /  [email or web address]

## 2019-04-08 NOTE — PROGRESS NOTES
END OF SHIFT NOTE: 
 
INTAKE/OUTPUT 
04/07 0701 - 04/08 0700 In: 1065 [P.O.:1065] Out: 2000 [XNNKA:[id number]] Voiding: NO 
Catheter: YES-SP Cath was exchanged today. Color: clear Drain:  
Supra-pubic Catheter 04/01/19 (Active) Indications for Use Acute urinary retention/bladder outlet obstruction 4/8/2019 11:31 AM  
Status Draining;Patent 4/8/2019 11:31 AM  
Site Condition No abnormalities 4/8/2019 11:31 AM  
Campbell Catheter Care Completed Yes 4/8/2019 11:31 AM  
Drainage Tube Clipped to Bed Yes 4/8/2019 11:31 AM  
Catheter Secured to Thigh Yes 4/8/2019 11:31 AM  
Tamper Seal Intact Yes 4/8/2019 11:31 AM  
Bag Below Bladder/Not on Floor Yes 4/8/2019 11:31 AM  
Lack of Dependent Loop in Tubing Yes 4/8/2019 11:31 AM  
Drainage Bag Less Than Half Full Yes 4/8/2019 11:31 AM  
Sterile Solution Used for  Irrigation N/A 4/7/2019 11:26 PM  
Urine Output (mL) 1100 ml 4/8/2019 11:31 AM  
 
 
 
 
 
 
DIET Mechanical Soft Cardiac Flatus: Patient does have flatus present. Stool:  0 occurrences. Characteristics: 
Stool Assessment Stool Color: Desma Pickle Stool Appearance: Soft Stool Amount: Small Stool Source/Status: Rectum Ambulating No-paraplegic Emesis: 0 occurrences. Characteristics: VITAL SIGNS Patient Vitals for the past 12 hrs: 
 Temp Pulse Resp BP SpO2  
04/08/19 1609 98.5 °F (36.9 °C) 66 18 134/75 92 % 04/08/19 1104 98.4 °F (36.9 °C) 68 18 122/70 97 % 04/08/19 0902     96 % 04/08/19 0748 98.5 °F (36.9 °C) 64 18 126/67 94 % Pain Assessment Pain Intensity 1: 7 (04/08/19 1537) Pain Location 1: (see above) Pain Intervention(s) 1: Medication (see MAR) Patient Stated Pain Goal: 5 Caty Gerber RN

## 2019-04-08 NOTE — INTERDISCIPLINARY ROUNDS
Interdisciplinary Rounds completed 04/08/19. Nursing, Case Management, Physician and PT present. 
  
Plan of care reviewed and updated.

## 2019-04-09 VITALS
SYSTOLIC BLOOD PRESSURE: 162 MMHG | DIASTOLIC BLOOD PRESSURE: 78 MMHG | HEIGHT: 65 IN | TEMPERATURE: 98.6 F | WEIGHT: 180 LBS | BODY MASS INDEX: 29.99 KG/M2 | HEART RATE: 60 BPM | RESPIRATION RATE: 18 BRPM | OXYGEN SATURATION: 96 %

## 2019-04-09 LAB
GLUCOSE BLD STRIP.AUTO-MCNC: 142 MG/DL (ref 65–100)
GLUCOSE BLD STRIP.AUTO-MCNC: 267 MG/DL (ref 65–100)

## 2019-04-09 PROCEDURE — 74011250636 HC RX REV CODE- 250/636: Performed by: FAMILY MEDICINE

## 2019-04-09 PROCEDURE — 74011250637 HC RX REV CODE- 250/637: Performed by: FAMILY MEDICINE

## 2019-04-09 PROCEDURE — 74011250637 HC RX REV CODE- 250/637: Performed by: INTERNAL MEDICINE

## 2019-04-09 PROCEDURE — 77030020256 HC SOL INJ NACL 0.9%  500ML

## 2019-04-09 PROCEDURE — 74011636637 HC RX REV CODE- 636/637: Performed by: FAMILY MEDICINE

## 2019-04-09 PROCEDURE — 82962 GLUCOSE BLOOD TEST: CPT

## 2019-04-09 PROCEDURE — 74011000258 HC RX REV CODE- 258: Performed by: INTERNAL MEDICINE

## 2019-04-09 PROCEDURE — 74011250636 HC RX REV CODE- 250/636: Performed by: INTERNAL MEDICINE

## 2019-04-09 RX ORDER — OXYCODONE HYDROCHLORIDE 5 MG/1
10 TABLET ORAL
Status: DISCONTINUED | OUTPATIENT
Start: 2019-04-09 | End: 2019-04-09 | Stop reason: HOSPADM

## 2019-04-09 RX ORDER — QUETIAPINE FUMARATE 100 MG/1
300 TABLET, FILM COATED ORAL
Qty: 15 TAB | Refills: 0 | Status: SHIPPED | OUTPATIENT
Start: 2019-04-09

## 2019-04-09 RX ORDER — FLECAINIDE ACETATE 50 MG/1
50 TABLET ORAL EVERY 12 HOURS
Qty: 60 TAB | Refills: 0 | Status: ON HOLD | OUTPATIENT
Start: 2019-04-09 | End: 2019-05-01 | Stop reason: SDUPTHER

## 2019-04-09 RX ORDER — OXYCODONE HYDROCHLORIDE 5 MG/1
10 TABLET ORAL
Qty: 15 TAB | Refills: 0 | Status: SHIPPED | OUTPATIENT
Start: 2019-04-09 | End: 2019-04-09

## 2019-04-09 RX ORDER — FENTANYL 25 UG/1
1 PATCH TRANSDERMAL
Qty: 2 PATCH | Refills: 0 | Status: SHIPPED | OUTPATIENT
Start: 2019-04-10 | End: 2019-04-14

## 2019-04-09 RX ORDER — MORPHINE SULFATE 15 MG/1
15 TABLET, FILM COATED, EXTENDED RELEASE ORAL EVERY 12 HOURS
Status: DISCONTINUED | OUTPATIENT
Start: 2019-04-09 | End: 2019-04-09

## 2019-04-09 RX ORDER — AMLODIPINE BESYLATE 5 MG/1
5 TABLET ORAL DAILY
Qty: 30 TAB | Refills: 0 | Status: SHIPPED | OUTPATIENT
Start: 2019-04-09 | End: 2021-01-01

## 2019-04-09 RX ORDER — METOPROLOL SUCCINATE 50 MG/1
50 TABLET, EXTENDED RELEASE ORAL DAILY
Qty: 30 TAB | Refills: 0 | Status: SHIPPED | OUTPATIENT
Start: 2019-04-10 | End: 2019-05-02

## 2019-04-09 RX ORDER — ACETAMINOPHEN 325 MG/1
650 TABLET ORAL
Qty: 30 TAB | Refills: 0 | Status: SHIPPED
Start: 2019-04-09

## 2019-04-09 RX ORDER — PANTOPRAZOLE SODIUM 40 MG/1
40 TABLET, DELAYED RELEASE ORAL
Qty: 30 TAB | Refills: 0 | Status: SHIPPED | OUTPATIENT
Start: 2019-04-09

## 2019-04-09 RX ORDER — POLYETHYLENE GLYCOL 3350 17 G/17G
17 POWDER, FOR SOLUTION ORAL
Qty: 15 PACKET | Refills: 0 | Status: SHIPPED | OUTPATIENT
Start: 2019-04-09

## 2019-04-09 RX ORDER — NALOXONE HYDROCHLORIDE 4 MG/.1ML
SPRAY NASAL
Qty: 1 EACH | Refills: 0 | Status: SHIPPED | OUTPATIENT
Start: 2019-04-09

## 2019-04-09 RX ORDER — PROMETHAZINE HYDROCHLORIDE 25 MG/1
25 TABLET ORAL
Qty: 20 TAB | Refills: 0 | Status: SHIPPED
Start: 2019-04-09 | End: 2021-01-01

## 2019-04-09 RX ORDER — OXYCODONE HYDROCHLORIDE 10 MG/1
10 TABLET ORAL
Qty: 15 TAB | Refills: 0 | Status: SHIPPED | OUTPATIENT
Start: 2019-04-09 | End: 2019-04-10 | Stop reason: SDUPTHER

## 2019-04-09 RX ORDER — ERTAPENEM 1 G/1
1 INJECTION, POWDER, LYOPHILIZED, FOR SOLUTION INTRAMUSCULAR; INTRAVENOUS EVERY 24 HOURS
Qty: 3 G | Refills: 0 | Status: SHIPPED | OUTPATIENT
Start: 2019-04-09 | End: 2019-04-12

## 2019-04-09 RX ORDER — METOPROLOL SUCCINATE 50 MG/1
50 TABLET, EXTENDED RELEASE ORAL DAILY
Status: DISCONTINUED | OUTPATIENT
Start: 2019-04-10 | End: 2019-04-09 | Stop reason: HOSPADM

## 2019-04-09 RX ORDER — INSULIN GLARGINE 100 [IU]/ML
30 INJECTION, SOLUTION SUBCUTANEOUS
Qty: 1 PEN | Refills: 2 | Status: ON HOLD | OUTPATIENT
Start: 2019-04-09 | End: 2019-05-01 | Stop reason: SDUPTHER

## 2019-04-09 RX ADMIN — MAGNESIUM OXIDE TAB 400 MG (241.3 MG ELEMENTAL MG) 400 MG: 400 (241.3 MG) TAB at 09:45

## 2019-04-09 RX ADMIN — Medication 10 ML: at 13:22

## 2019-04-09 RX ADMIN — MEROPENEM 500 MG: 500 INJECTION, POWDER, FOR SOLUTION INTRAVENOUS at 00:01

## 2019-04-09 RX ADMIN — Medication 10 ML: at 05:53

## 2019-04-09 RX ADMIN — FLECAINIDE ACETATE 50 MG: 100 TABLET ORAL at 09:40

## 2019-04-09 RX ADMIN — MEROPENEM 500 MG: 500 INJECTION, POWDER, FOR SOLUTION INTRAVENOUS at 05:54

## 2019-04-09 RX ADMIN — MEROPENEM 500 MG: 500 INJECTION, POWDER, FOR SOLUTION INTRAVENOUS at 12:49

## 2019-04-09 RX ADMIN — SODIUM CHLORIDE, PRESERVATIVE FREE 300 UNITS: 5 INJECTION INTRAVENOUS at 14:00

## 2019-04-09 RX ADMIN — ACETAMINOPHEN 650 MG: 325 TABLET, FILM COATED ORAL at 10:16

## 2019-04-09 RX ADMIN — ONDANSETRON 4 MG: 2 INJECTION INTRAMUSCULAR; INTRAVENOUS at 14:42

## 2019-04-09 RX ADMIN — APIXABAN 5 MG: 5 TABLET, FILM COATED ORAL at 09:45

## 2019-04-09 RX ADMIN — INSULIN LISPRO 9 UNITS: 100 INJECTION, SOLUTION INTRAVENOUS; SUBCUTANEOUS at 11:45

## 2019-04-09 RX ADMIN — OXYCODONE HYDROCHLORIDE 5 MG: 5 TABLET ORAL at 07:46

## 2019-04-09 RX ADMIN — PANTOPRAZOLE SODIUM 40 MG: 40 TABLET, DELAYED RELEASE ORAL at 05:53

## 2019-04-09 RX ADMIN — MORPHINE SULFATE 15 MG: 15 TABLET, FILM COATED, EXTENDED RELEASE ORAL at 12:45

## 2019-04-09 RX ADMIN — SODIUM CHLORIDE, PRESERVATIVE FREE 300 UNITS: 5 INJECTION INTRAVENOUS at 05:53

## 2019-04-09 NOTE — PROGRESS NOTES
Received call from [facility] stating they cannot accept patient d/t family dynamics in the home. Referral sent to [facility]. Awaiting response.

## 2019-04-09 NOTE — PROGRESS NOTES
Problem: Falls - Risk of 
Goal: *Absence of Falls Description Document Елена Gutierrez Fall Risk and appropriate interventions in the flowsheet. Outcome: Progressing Towards Goal 
  
Problem: Patient Education: Go to Patient Education Activity Goal: Patient/Family Education Outcome: Progressing Towards Goal 
  
Problem: Pressure Injury - Risk of 
Goal: *Prevention of pressure injury Description Document Ras Scale and appropriate interventions in the flowsheet. Outcome: Progressing Towards Goal 
  
Problem: Patient Education: Go to Patient Education Activity Goal: Patient/Family Education Outcome: Progressing Towards Goal 
  
Problem: Dysphagia (Adult) Goal: *Speech Goal: (INSERT TEXT) Description LTG: Patient will tolerate least restrictive diet without signs/symptoms of aspiration at discharge for safe swallow function. STG: Patient will tolerate puree diet/thin liquids without overt signs/symptoms of airway compromise STG: Patient will participate in trials of chewable textures to diet advancement without overt s/sx of airway compromise Outcome: Progressing Towards Goal 
  
Problem: Patient Education: Go to Patient Education Activity Goal: Patient/Family Education Outcome: Progressing Towards Goal 
  
Problem: Risk for Spread of Infection Goal: Prevent transmission of infectious organism to others Description Prevent the transmission of infectious organisms to other patients, staff members, and visitors. Outcome: Progressing Towards Goal 
  
Problem: Patient Education:  Go to Education Activity Goal: Patient/Family Education Outcome: Progressing Towards Goal 
  
Problem: Breathing Pattern - Ineffective Goal: *Absence of hypoxia Outcome: Progressing Towards Goal 
Goal: *Use of effective breathing techniques Outcome: Progressing Towards Goal 
Goal: *PALLIATIVE CARE:  Alleviation of Dyspnea Outcome: Progressing Towards Goal 
  
Problem: Nutrition Deficit Goal: *Optimize nutritional status Outcome: Progressing Towards Goal

## 2019-04-09 NOTE — DISCHARGE SUMMARY
Discharge Summary     Patient: Duke Ralph MRN: [id number]  SSN: [id number]    YOB: 1955  Age: 62 y.o.   Sex: female       Admit Date: 3/31/2019    Discharge Date: 4/9/2019      Admission Diagnoses: Atrial fibrillation with rapid ventricular response ([facility] [I48.91]  Atrial fibrillation with rapid ventricular response ([facility]) [I48.91]  Atrial fibrillation with rapid ventricular response ([facility] [I48.91]    Discharge Diagnoses:   Problem List as of 4/9/2019 Date Reviewed: 1/31/2019          Codes Class Noted - Resolved    UTI due to extended-spectrum beta lactamase (ESBL) producing Escherichia coli ICD-10-CM: N39.0, B96.29, Z16.12  ICD-9-CM: 599.0, 041.49, V09.1  4/5/2019 - Present        Hypomagnesemia ICD-10-CM: E83.42  ICD-9-CM: 275.2  4/3/2019 - Present        Acute encephalopathy ICD-10-CM: G93.40  ICD-9-CM: 348.30  3/10/2019 - Present        Diabetes ([facility] ICD-10-CM: E11.9  ICD-9-CM: 250.00  3/10/2019 - Present        Hypertension ICD-10-CM: I10  ICD-9-CM: 401.9  3/10/2019 - Present        Constipation ICD-10-CM: K59.00  ICD-9-CM: 564.00  3/10/2019 - Present        Pyuria ICD-10-CM: N39.0  ICD-9-CM: 791.9  3/10/2019 - Present        A-fib ([facility] ICD-10-CM: I48.91  ICD-9-CM: 427.31  2/19/2019 - Present        Nausea & vomiting ICD-10-CM: R11.2  ICD-9-CM: 787.01  2/14/2019 - Present        Hyperkalemia ICD-10-CM: E87.5  ICD-9-CM: 276.7  2/11/2019 - Present        Leukocytosis ICD-10-CM: Z16.698  ICD-9-CM: 288.60  2/5/2019 - Present        * (Principal) Atrial fibrillation with rapid ventricular response (HCC) ICD-10-CM: I48.91  ICD-9-CM: 427.31  2/5/2019 - Present        Chronic midline low back pain without sciatica ICD-10-CM: M54.5, G89.29  ICD-9-CM: 724.2, 338.29  2/5/2019 - Present        UTI (urinary tract infection) ICD-10-CM: N39.0  ICD-9-CM: 599.0  1/31/2019 - Present        Hyperglycemia due to type 2 diabetes mellitus (HCC) (Chronic) ICD-10-CM: E11.65  ICD-9-CM: 250.00  12/10/2016 - Present Paraplegia (HCC) (Chronic) ICD-10-CM: G82.20  ICD-9-CM: 344.1  12/10/2016 - Present        Bipolar disorder without psychotic features ([facility] (Chronic) ICD-10-CM: F31.9  ICD-9-CM: 296.80  12/10/2016 - Present        Chronic hepatitis C virus infection ([facility] (Chronic) ICD-10-CM: B18.2  ICD-9-CM: 070.54  12/10/2016 - Present        Narcotic addiction ([facility] ICD-10-CM: T74.25  ICD-9-CM: 304.90  12/10/2016 - Present        RESOLVED: Atrial fibrillation with RVR (HCC) ICD-10-CM: I48.91  ICD-9-CM: 427.31  2/14/2019 - 2/14/2019        RESOLVED: GLENNA (acute kidney injury) ([place] [address].) ICD-10-CM: N17.9  ICD-9-CM: 584.9  2/11/2019 - 2/14/2019        RESOLVED: Acute respiratory failure with hypoxia ([facility] ICD-10-CM: J96.01  ICD-9-CM: 518.81  2/8/2019 - 2/14/2019        RESOLVED: Dyspnea ICD-10-CM: R06.00  ICD-9-CM: 786.09  2/5/2019 - 2/14/2019        RESOLVED: Hypertensive urgency, malignant ICD-10-CM: I16.0  ICD-9-CM: 401.0  2/5/2019 - 2/14/2019        RESOLVED: Altered mental status ICD-10-CM: R41.82  ICD-9-CM: 780.97  1/31/2019 - 2/14/2019        RESOLVED: Sepsis ([facility] ICD-10-CM: A41.9  ICD-9-CM: 038.9, 995.91  1/31/2019 - 2/14/2019        RESOLVED: Atrial fibrillation with RVR ([facility] ICD-10-CM: I48.91  ICD-9-CM: 427.31  1/31/2019 - 2/14/2019        RESOLVED: Elevated lactic acid level ICD-10-CM: R79.89  ICD-9-CM: 276.2  1/31/2019 - 2/14/2019        RESOLVED: Drug-induced encephalopathy ICD-10-CM: G92  ICD-9-CM: 349.82  12/20/2016 - 2/14/2019        RESOLVED: Hypokalemia ICD-10-CM: E87.6  ICD-9-CM: 276.8  12/10/2016 - 2/14/2019        RESOLVED: Acute cystitis without hematuria ICD-10-CM: N30.00  ICD-9-CM: 595.0  12/10/2016 - 2/14/2019        RESOLVED: Closed displaced comminuted fracture of shaft of left femur ([facility] ICD-10-CM: X22.653O  ICD-9-CM: 821.01  12/9/2016 - 2/14/2019               Discharge Condition: [facility] Course:      This is a 61 YO female patient with PMH of  paraplegia, COPD, Afib, DM, hypertension, previously on home hospice before this admission. She was admitted on 3/31 with a history of chills, progressive weakness and lethargy. She was found to have  UTI with ESBL. Patient had dysuria. She was started on  Merrem. She was seen by ID and they recommended PICC line insertion with IV Ertapen to be completed on 4/12. Evidently she has been using high doses of  pain medications. The [place] controlled substance database has been checked and looks like she has received recent prescriptions for MS contin 30 mg po bid , roxycodone prn ( 10 and 15 mg pills) , fentanyl patches and ativan. Is very likely she was overdosed the day she came to the hospital. I have recommended her to continue a regimen of Fentanyl patch + Oxycodone q8h prn. No prescription for MS contin given to her. I think she has a high risk of narcotic/ benzo overdose. Ideally her supra pubic catheter should be exchanged every 3 weeks to try to avoid UTIs.      PE    Physical Exam   Constitutional: She is well-developed, well-nourished, and in no distress. Eyes: Pupils are equal, round, and reactive to light. Neck: Normal range of motion. Cardiovascular:   IRR   Pulmonary/Chest: Effort normal.   Abdominal: Soft. Bowel sounds are normal.   Genitourinary:   Genitourinary Comments: Supra pubic catheter   Musculoskeletal:   Paraplegic    Neurological:   paraplegic   Skin: Skin is warm. Consults: Infectious Disease    Significant Diagnostic Studies: see hospital course     Disposition: home    Discharge Medications:       Current Discharge Medication List      START taking these medications    Details   acetaminophen (TYLENOL) 325 mg tablet Take 2 Tabs by mouth every six (6) hours as needed for Pain or Fever. Qty: 30 Tab, Refills: 0      ertapenem (INVANZ) 1 gram injection 1 g by IntraVENous route every twenty-four (24) hours for 3 days. Qty: 3 g, Refills: 0      naloxone (NARCAN) 4 mg/actuation nasal spray Use 1 spray intranasally, then discard. Repeat with new spray every 2 min as needed for opioid overdose symptoms, alternating nostrils. Qty: 1 Each, Refills: 0      amLODIPine (NORVASC) 5 mg tablet Take 1 Tab by mouth daily. Qty: 30 Tab, Refills: 0      oxyCODONE IR (ROXICODONE) 10 mg tab immediate release tablet Take 1 Tab by mouth every eight (8) hours as needed for Pain for up to 3 days. Max Daily Amount: 30 mg.  Qty: 15 Tab, Refills: 0    Associated Diagnoses: Paraplegia ([address]         CONTINUE these medications which have CHANGED    Details   promethazine (PHENERGAN) 25 mg tablet Take 1 Tab by mouth every eight (8) hours as needed for Nausea. Qty: 20 Tab, Refills: 0      QUEtiapine (SEROQUEL) 100 mg tablet Take 3 Tabs by mouth nightly. Qty: 15 Tab, Refills: 0      flecainide (TAMBOCOR) 50 mg tablet Take 1 Tab by mouth every twelve (12) hours. Qty: 60 Tab, Refills: 0      insulin glargine (LANTUS,BASAGLAR) 100 unit/mL (3 mL) inpn 30 Units by SubCUTAneous route nightly for 30 days. Qty: 1 Pen, Refills: 2      pantoprazole (PROTONIX) 40 mg tablet Take 1 Tab by mouth Daily (before breakfast). Qty: 30 Tab, Refills: 0      apixaban (ELIQUIS) 5 mg tablet Take 1 Tab by mouth two (2) times a day. Qty: 60 Tab, Refills: 0         CONTINUE these medications which have NOT CHANGED    Details   albuterol (PROVENTIL HFA, VENTOLIN HFA, PROAIR HFA) 90 mcg/actuation inhaler Take 1 Puff by inhalation every four (4) hours as needed for Wheezing. Qty: 1 Inhaler, Refills: 0      albuterol (PROVENTIL VENTOLIN) 2.5 mg /3 mL (0.083 %) nebulizer solution Take 3 mL by inhalation every four (4) hours as needed for Wheezing. Qty: 24 Each, Refills: 0      Blood-Glucose Meter monitoring kit Check glucose at home daily  Qty: 1 Kit, Refills: 0      nystatin (MYCOSTATIN) powder Apply  to affected area two (2) times a day. Qty: 1 Bottle, Refills: 0      metoprolol succinate (TOPROL-XL) 50 mg XL tablet Take 1 Tab by mouth daily.   Qty: 30 Tab, Refills: 0      insulin lispro (HUMALOG) 100 unit/mL injection Less than 150 =   0 units           150 -199 =   2 units  200 -249 =   4 units  250 -299 =   6 units  300 -349 =   8 units  Before meals and at bedtime. Qty: 1 Vial, Refills: 0      ALPRAZolam (XANAX) 0.5 mg tablet Take 1 Tab by mouth two (2) times daily as needed for Anxiety. Max Daily Amount: 1 mg. Qty: 30 Tab, Refills: 0    Associated Diagnoses: Bipolar disorder without psychotic features ([facility]      fentaNYL (DURAGESIC) 25 mcg/hr PATCH 1 Patch by TransDERmal route every seventy-two (72) hours. Max Daily Amount: 1 Patch. Qty: 5 Patch, Refills: 0    Associated Diagnoses: Chronic midline low back pain without sciatica      bisacodyl (DULCOLAX) 5 mg EC tablet Take 1 Tab by mouth daily as needed for Constipation. Qty: 30 Tab, Refills: 0      budesonide (PULMICORT) 0.5 mg/2 mL nbsp 2 mL by Nebulization route two (2) times a day. Qty: 60 Each, Refills: 0      zinc oxide-cod liver oil (DESITIN) 40 % paste Apply  to affected area two (2) times a day.   Qty: 1 Tube, Refills: 1         STOP taking these medications       omeprazole (PRILOSEC) 40 mg capsule Comments:   Reason for Stopping:                      Activity: Activity as tolerated  Diet: Regular Diet  Wound Care: None needed    Follow-up Appointments   Procedures    FOLLOW UP VISIT Appointment in: 3 - 5 Days pcp     pcp     Standing Status:   Standing     Number of Occurrences:   1     Order Specific Question:   Appointment in     Answer:   3 - 5 Days       Signed By: Jackie Machado MD     April 9, 2019

## 2019-04-09 NOTE — PROGRESS NOTES
Pt to DC home once receiving a dose of Invanz. [employer] has provided teaching to pt and her son Brook Ozuna. [facility] will provide [facility] services. [facility] and [employer] notified of DC. No other needs voiced. Care Management Interventions PCP Verified by CM: Yes(Dr. Nellie Burton at [facility]  was to see Feb 2019) Palliative Care Criteria Met (RRAT>21 & CHF Dx)?: No(RRAT 24 Dx Medicaid ) Mode of Transport at Discharge: Other (see comment) Transition of Care Consult (CM Consult): Discharge Planning, Home Health [address]: No 
Reason Outside [place]: Patient already serviced by other home care/hospice agency Discharge Durable Medical Equipment: No 
Physical Therapy Consult: No 
Occupational Therapy Consult: No 
Speech Therapy Consult: Yes Current Support Network: Other, Relative's Home(lives with her sons Brook Ozuna [phone] and Fantasma Paz ) Confirm Follow Up Transport: Other (see comment)([facility] or [facility]) Discharge Location Discharge Placement: Home with home health

## 2019-04-09 NOTE — PROGRESS NOTES
[employer] plus here to provide teaching for home IV ABT. CM spoke with [facility] and they will pick patient up.

## 2019-04-09 NOTE — PROGRESS NOTES
Assisted to cab with staff x3 including male staffer for increased help. Transferred pt to back seat. All of pt belongings as well.

## 2019-04-09 NOTE — PROGRESS NOTES
Discharge instructions and prescriptions provided and explained to patient, patient voiced understanding. Medication side effect sheet reviewed with pt. No home meds or valuables to return. Opportunity for questions provided. [employer] to be here at 4:30.

## 2019-04-09 NOTE — INTERDISCIPLINARY ROUNDS
Interdisciplinary Rounds completed 04/09/19. Nursing, Case Management, Physician and PT present. 
  
Plan of care reviewed and updated. Pt to receive abx dose then d/c.

## 2019-04-09 NOTE — PROGRESS NOTES
[facility] Pt informed and stated that she can go by [employer]. Sliding board will be provided by [facility] for entry into car. RNs will assist pt into cab. Upon arrival home, pt's alie GRISSOM will assist pt out of the cab with pt's slide board and WC. [employer] arranged for 4:40pm.   
 
Care Management Interventions PCP Verified by CM: Yes(Dr. Rick Caicedo at [place] [address] was to see Feb 2019) Palliative Care Criteria Met (RRAT>21 & CHF Dx)?: No(RRAT 24 Dx Medicaid ) Mode of Transport at Discharge: Other (see comment) Transition of Care Consult (CM Consult): Discharge Planning, Home Health [address] [place] [address] Road: No 
Reason Outside [place]: Patient already serviced by other home care/hospice agency Discharge Durable Medical Equipment: No 
Physical Therapy Consult: No 
Occupational Therapy Consult: No 
Speech Therapy Consult: Yes Current Support Network: Other, Relative's Home(lives with her tamy GRISSOM [phone] and Ramírez Flores ) Confirm Follow Up Transport: Other (see comment)([facility] Yusuf or [address] ambulance) Discharge Location Discharge Placement: Home with home health

## 2019-04-09 NOTE — DISCHARGE INSTRUCTIONS
Patient Education     DISCHARGE SUMMARY from Nurse    PATIENT INSTRUCTIONS:    After general anesthesia or intravenous sedation, for 24 hours or while taking prescription Narcotics:  · Limit your activities  · Do not drive and operate hazardous machinery  · Do not make important personal or business decisions  · Do  not drink alcoholic beverages  · If you have not urinated within 8 hours after discharge, please contact your surgeon on call. Report the following to your surgeon:  · Excessive pain, swelling, redness or odor of or around the surgical area  · Temperature over 100.5  · Nausea and vomiting lasting longer than 4 hours or if unable to take medications  · Any signs of decreased circulation or nerve impairment to extremity: change in color, persistent  numbness, tingling, coldness or increase pain  · Any questions    What to do at Home:  Recommended activity: Activity as tolerated,     If you experience any of the following symptoms fever, chills, nausea or vomiting, new or unrelieved pain, complications with PICC line or any other worrisome symptoms, please follow up with PCP. *  Please give a list of your current medications to your Primary Care Provider. *  Please update this list whenever your medications are discontinued, doses are      changed, or new medications (including over-the-counter products) are added. *  Please carry medication information at all times in case of emergency situations. These are general instructions for a healthy lifestyle:    No smoking/ No tobacco products/ Avoid exposure to second hand smoke  Surgeon General's Warning:  Quitting smoking now greatly reduces serious risk to your health.     Obesity, smoking, and sedentary lifestyle greatly increases your risk for illness    A healthy diet, regular physical exercise & weight monitoring are important for maintaining a healthy lifestyle    You may be retaining fluid if you have a history of heart failure or if you experience any of the following symptoms:  Weight gain of 3 pounds or more overnight or 5 pounds in a week, increased swelling in our hands or feet or shortness of breath while lying flat in bed. Please call your doctor as soon as you notice any of these symptoms; do not wait until your next office visit. Recognize signs and symptoms of STROKE:    F-face looks uneven    A-arms unable to move or move unevenly    S-speech slurred or non-existent    T-time-call 911 as soon as signs and symptoms begin-DO NOT go       Back to bed or wait to see if you get better-TIME IS BRAIN. Warning Signs of HEART ATTACK     Call 911 if you have these symptoms:   Chest discomfort. Most heart attacks involve discomfort in the center of the chest that lasts more than a few minutes, or that goes away and comes back. It can feel like uncomfortable pressure, squeezing, fullness, or pain.  Discomfort in other areas of the upper body. Symptoms can include pain or discomfort in one or both arms, the back, neck, jaw, or stomach.  Shortness of breath with or without chest discomfort.  Other signs may include breaking out in a cold sweat, nausea, or lightheadedness. Don't wait more than five minutes to call 911 - MINUTES MATTER! Fast action can save your life. Calling 911 is almost always the fastest way to get lifesaving treatment. Emergency Medical Services staff can begin treatment when they arrive -- up to an hour sooner than if someone gets to the hospital by car. The discharge information has been reviewed with the patient. The patient verbalized understanding. Discharge medications reviewed with the patient and appropriate educational materials and side effects teaching were provided.   ___________________________________________________________________________________________________________________________________     Atrial Fibrillation: Care Instructions  Your Care Instructions    Atrial fibrillation is an irregular and often fast heartbeat. Treating this condition is important for several reasons. It can cause blood clots, which can travel from your heart to your brain and cause a stroke. If you have a fast heartbeat, you may feel lightheaded, dizzy, and weak. An irregular heartbeat can also increase your risk for heart failure. Atrial fibrillation is often the result of another heart condition, such as high blood pressure or coronary artery disease. Making changes to improve your heart condition will help you stay healthy and active. Follow-up care is a key part of your treatment and safety. Be sure to make and go to all appointments, and call your doctor if you are having problems. It's also a good idea to know your test results and keep a list of the medicines you take. How can you care for yourself at home? Medicines    · Take your medicines exactly as prescribed. Call your doctor if you think you are having a problem with your medicine. You will get more details on the specific medicines your doctor prescribes.     · If your doctor has given you a blood thinner to prevent a stroke, be sure you get instructions about how to take your medicine safely. Blood thinners can cause serious bleeding problems.     · Do not take any vitamins, over-the-counter drugs, or herbal products without talking to your doctor first.    Lifestyle changes    · Do not smoke. Smoking can increase your chance of a stroke and heart attack. If you need help quitting, talk to your doctor about stop-smoking programs and medicines. These can increase your chances of quitting for good.     · Eat a heart-healthy diet.     · Stay at a healthy weight. Lose weight if you need to.     · Limit alcohol to 2 drinks a day for men and 1 drink a day for women. Too much alcohol can cause health problems.     · Avoid colds and flu. Get a pneumococcal vaccine shot. If you have had one before, ask your doctor whether you need another dose.  Get a flu shot every year. If you must be around people with colds or flu, wash your hands often. Activity    · If your doctor recommends it, get more exercise. Walking is a good choice. Bit by bit, increase the amount you walk every day. Try for at least 30 minutes on most days of the week. You also may want to swim, bike, or do other activities. Your doctor may suggest that you join a cardiac rehabilitation program so that you can have help increasing your physical activity safely.     · Start light exercise if your doctor says it is okay. Even a small amount will help you get stronger, have more energy, and manage stress. Walking is an easy way to get exercise. Start out by walking a little more than you did in the hospital. Gradually increase the amount you walk.     · When you exercise, watch for signs that your heart is working too hard. You are pushing too hard if you cannot talk while you are exercising. If you become short of breath or dizzy or have chest pain, sit down and rest immediately.     · Check your pulse regularly. Place two fingers on the artery at the palm side of your wrist, in line with your thumb. If your heartbeat seems uneven or fast, talk to your doctor. When should you call for help? Call 911 anytime you think you may need emergency care. For example, call if:    · You have symptoms of a heart attack. These may include:  ? Chest pain or pressure, or a strange feeling in the chest.  ? Sweating. ? Shortness of breath. ? Nausea or vomiting. ? Pain, pressure, or a strange feeling in the back, neck, jaw, or upper belly or in one or both shoulders or arms. ? Lightheadedness or sudden weakness. ? A fast or irregular heartbeat. After you call 911, the  may tell you to chew 1 adult-strength or 2 to 4 low-dose aspirin. Wait for an ambulance. Do not try to drive yourself.     · You have symptoms of a stroke.  These may include:  ? Sudden numbness, tingling, weakness, or loss of movement in your face, arm, or leg, especially on only one side of your body. ? Sudden vision changes. ? Sudden trouble speaking. ? Sudden confusion or trouble understanding simple statements. ? Sudden problems with walking or balance. ? A sudden, severe headache that is different from past headaches.     · You passed out (lost consciousness).    Call your doctor now or seek immediate medical care if:    · You have new or increased shortness of breath.     · You feel dizzy or lightheaded, or you feel like you may faint.     · Your heart rate becomes irregular.     · You can feel your heart flutter in your chest or skip heartbeats. Tell your doctor if these symptoms are new or worse.    Watch closely for changes in your health, and be sure to contact your doctor if you have any problems. Where can you learn more? Go to http://sri-hollie.info/. Enter U020 in the search box to learn more about \"Atrial Fibrillation: Care Instructions. \"  Current as of: July 22, 2018  Content Version: 11.9  © 1588-6119 "[employer] Incorporated. Care instructions adapted under license by [employer] (which disclaims liability or warranty for this information). If you have questions about a medical condition or this instruction, always ask your healthcare professional. [address] any warranty or liability for your use of this information.

## 2019-04-10 ENCOUNTER — HOSPITAL ENCOUNTER (EMERGENCY)
Age: 64
Discharge: HOME | End: 2019-04-10
Attending: EMERGENCY MEDICINE
Payer: MEDICAID

## 2019-04-10 VITALS
OXYGEN SATURATION: 94 % | HEIGHT: 65 IN | SYSTOLIC BLOOD PRESSURE: 198 MMHG | TEMPERATURE: 98.5 F | HEART RATE: 67 BPM | BODY MASS INDEX: 29.95 KG/M2 | RESPIRATION RATE: 18 BRPM | DIASTOLIC BLOOD PRESSURE: 97 MMHG

## 2019-04-10 DIAGNOSIS — B96.29 UTI DUE TO EXTENDED-SPECTRUM BETA LACTAMASE (ESBL) PRODUCING ESCHERICHIA COLI: Primary | ICD-10-CM

## 2019-04-10 DIAGNOSIS — Z16.12 UTI DUE TO EXTENDED-SPECTRUM BETA LACTAMASE (ESBL) PRODUCING ESCHERICHIA COLI: Primary | ICD-10-CM

## 2019-04-10 DIAGNOSIS — N39.0 UTI DUE TO EXTENDED-SPECTRUM BETA LACTAMASE (ESBL) PRODUCING ESCHERICHIA COLI: Primary | ICD-10-CM

## 2019-04-10 LAB
ANION GAP SERPL CALC-SCNC: 9 MMOL/L
BASOPHILS # BLD: 0.1 K/UL (ref 0–0.2)
BASOPHILS NFR BLD: 1 % (ref 0–2)
BUN SERPL-MCNC: 11 MG/DL (ref 8–23)
CALCIUM SERPL-MCNC: 9.5 MG/DL (ref 8.3–10.4)
CHLORIDE SERPL-SCNC: 96 MMOL/L (ref 98–107)
CO2 SERPL-SCNC: 30 MMOL/L (ref 21–32)
CREAT SERPL-MCNC: 0.66 MG/DL (ref 0.6–1)
DIFFERENTIAL METHOD BLD: ABNORMAL
EOSINOPHIL # BLD: 0.2 K/UL (ref 0–0.8)
EOSINOPHIL NFR BLD: 1 % (ref 0.5–7.8)
ERYTHROCYTE [DISTWIDTH] IN BLOOD BY AUTOMATED COUNT: 14.3 % (ref 11.9–14.6)
GLUCOSE BLD STRIP.AUTO-MCNC: 248 MG/DL (ref 65–100)
GLUCOSE SERPL-MCNC: 288 MG/DL (ref 65–100)
HCT VFR BLD AUTO: 44 % (ref 35.8–46.3)
HGB BLD-MCNC: 13.9 G/DL (ref 11.7–15.4)
IMM GRANULOCYTES # BLD AUTO: 0.1 K/UL (ref 0–0.5)
IMM GRANULOCYTES NFR BLD AUTO: 1 % (ref 0–5)
LYMPHOCYTES # BLD: 2.2 K/UL (ref 0.5–4.6)
LYMPHOCYTES NFR BLD: 19 % (ref 13–44)
MCH RBC QN AUTO: 28.5 PG (ref 26.1–32.9)
MCHC RBC AUTO-ENTMCNC: 31.6 G/DL (ref 31.4–35)
MCV RBC AUTO: 90.2 FL (ref 79.6–97.8)
MONOCYTES # BLD: 0.6 K/UL (ref 0.1–1.3)
MONOCYTES NFR BLD: 5 % (ref 4–12)
NEUTS SEG # BLD: 8.6 K/UL (ref 1.7–8.2)
NEUTS SEG NFR BLD: 74 % (ref 43–78)
NRBC # BLD: 0 K/UL (ref 0–0.2)
PLATELET # BLD AUTO: 452 K/UL (ref 150–450)
PMV BLD AUTO: 10.6 FL (ref 9.4–12.3)
POTASSIUM SERPL-SCNC: 4.4 MMOL/L (ref 3.5–5.1)
RBC # BLD AUTO: 4.88 M/UL (ref 4.05–5.2)
SODIUM SERPL-SCNC: 135 MMOL/L (ref 136–145)
WBC # BLD AUTO: 11.7 K/UL (ref 4.3–11.1)

## 2019-04-10 PROCEDURE — 74011250636 HC RX REV CODE- 250/636: Performed by: EMERGENCY MEDICINE

## 2019-04-10 PROCEDURE — 82962 GLUCOSE BLOOD TEST: CPT

## 2019-04-10 PROCEDURE — 85025 COMPLETE CBC W/AUTO DIFF WBC: CPT

## 2019-04-10 PROCEDURE — 80048 BASIC METABOLIC PNL TOTAL CA: CPT

## 2019-04-10 PROCEDURE — 74011250637 HC RX REV CODE- 250/637: Performed by: EMERGENCY MEDICINE

## 2019-04-10 PROCEDURE — 96365 THER/PROPH/DIAG IV INF INIT: CPT | Performed by: EMERGENCY MEDICINE

## 2019-04-10 PROCEDURE — 74011250636 HC RX REV CODE- 250/636

## 2019-04-10 PROCEDURE — 96375 TX/PRO/DX INJ NEW DRUG ADDON: CPT | Performed by: EMERGENCY MEDICINE

## 2019-04-10 PROCEDURE — 99285 EMERGENCY DEPT VISIT HI MDM: CPT | Performed by: EMERGENCY MEDICINE

## 2019-04-10 PROCEDURE — 74011636637 HC RX REV CODE- 636/637

## 2019-04-10 PROCEDURE — 74011000258 HC RX REV CODE- 258

## 2019-04-10 PROCEDURE — 74011250637 HC RX REV CODE- 250/637

## 2019-04-10 RX ORDER — SODIUM CHLORIDE 9 MG/ML
1000 INJECTION, SOLUTION INTRAVENOUS ONCE
Status: COMPLETED | OUTPATIENT
Start: 2019-04-10 | End: 2019-04-10

## 2019-04-10 RX ORDER — OXYCODONE HYDROCHLORIDE 10 MG/1
10 TABLET ORAL
Qty: 15 TAB | Refills: 0 | Status: SHIPPED | OUTPATIENT
Start: 2019-04-10 | End: 2019-04-15

## 2019-04-10 RX ORDER — ACETAMINOPHEN 500 MG
1000 TABLET ORAL
Status: COMPLETED | OUTPATIENT
Start: 2019-04-10 | End: 2019-04-10

## 2019-04-10 RX ORDER — PROCHLORPERAZINE EDISYLATE 5 MG/ML
10 INJECTION INTRAMUSCULAR; INTRAVENOUS
Status: COMPLETED | OUTPATIENT
Start: 2019-04-10 | End: 2019-04-10

## 2019-04-10 RX ORDER — FLUCONAZOLE 100 MG/1
200 TABLET ORAL
Status: COMPLETED | OUTPATIENT
Start: 2019-04-10 | End: 2019-04-10

## 2019-04-10 RX ORDER — OXYCODONE HYDROCHLORIDE 5 MG/1
10 TABLET ORAL
Status: COMPLETED | OUTPATIENT
Start: 2019-04-10 | End: 2019-04-10

## 2019-04-10 RX ORDER — METOPROLOL SUCCINATE 25 MG/1
50 TABLET, EXTENDED RELEASE ORAL
Status: COMPLETED | OUTPATIENT
Start: 2019-04-10 | End: 2019-04-10

## 2019-04-10 RX ADMIN — ACETAMINOPHEN 1000 MG: 500 TABLET, FILM COATED ORAL at 10:14

## 2019-04-10 RX ADMIN — METOPROLOL SUCCINATE 50 MG: 25 TABLET, EXTENDED RELEASE ORAL at 06:36

## 2019-04-10 RX ADMIN — SODIUM CHLORIDE 1000 ML: 900 INJECTION, SOLUTION INTRAVENOUS at 10:59

## 2019-04-10 RX ADMIN — INSULIN HUMAN 4 UNITS: 100 INJECTION, SOLUTION PARENTERAL at 10:58

## 2019-04-10 RX ADMIN — ERTAPENEM SODIUM 1 G: 1 INJECTION, POWDER, LYOPHILIZED, FOR SOLUTION INTRAMUSCULAR; INTRAVENOUS at 11:48

## 2019-04-10 RX ADMIN — OXYCODONE HYDROCHLORIDE 10 MG: 5 TABLET ORAL at 05:21

## 2019-04-10 RX ADMIN — FLUCONAZOLE 200 MG: 100 TABLET ORAL at 05:21

## 2019-04-10 RX ADMIN — PROCHLORPERAZINE EDISYLATE 10 MG: 5 INJECTION INTRAMUSCULAR; INTRAVENOUS at 05:22

## 2019-04-10 NOTE — PROGRESS NOTES
Per Dr Rui Mittal - no medical necessity for admission. Call to [facility] and spoke with Eddie Kasper who states medication should have been delivered to their home yesterday. States she will come and see the patient in the ER. Call to [facility] and spoke with Carla Ness who states they will go to the home tomorrow and the next day to assist patient in giving the [address]. Discussed with Dr Tanika Rey and we will give today's dose of [address] in the ED. Patient informed of above.

## 2019-04-10 NOTE — PROGRESS NOTES
Pt called floor stating prescription for oxycodone was not signed. Confirmed with pharmacy. Dr. Chiquis Man to write and sign new prescription for Oxycodone and leave at 6th floor nurses station. [facility] pharmacist Braulio Thomas is shredding previous prescription for Oxycodone. Family aware they have to pick prescription up at nurses station.

## 2019-04-10 NOTE — ED NOTES
I have reviewed discharge instructions with the patient. The patient verbalized understanding. Patient left ED via Discharge Method: stretcher to Home with self. Transported by [employer]. Opportunity for questions and clarification provided. Patient given 0 scripts. Pt has IV antibiotics already delivered to the house and home health we be to house tomorrow and Thursday to help patient administer her antibiotics. No new medications given for discharge. To continue your aftercare when you leave the hospital, you may receive an automated call from our care team to check in on how you are doing. This is a free service and part of our promise to provide the best care and service to meet your aftercare needs.  If you have questions, or wish to unsubscribe from this service please call [phone]. Thank you for Choosing our [address] [place] [address] Emergency Department.

## 2019-04-10 NOTE — DISCHARGE INSTRUCTIONS
[facility] will be coming to your home tomorrow and Thursday to reinforce IV antibiotic therapy teaching to make sure you know how to administer your antibiotics. Learning About ESBL Infection  What is an ESBL infection? ESBL stands for extended spectrum beta-lactamase. It's an enzyme found in some strains of bacteria. ESBL-producing bacteria can't be killed by many of the antibiotics that doctors use to treat infections, like penicillins and some cephalosporins. This makes it harder to treat. An infection with ESBL germs can be in any part of the body, including blood, organs, skin, and sites where surgery was done. There are many ways ESBL germs can be spread. The most common ways are by touching a person or thing that has the bacteria on it. The infection is more likely to spread in a hospital. For some people, especially those who are weak or ill, an ESBL infection can be serious. What are the symptoms? Symptoms of an ESBL infection are similar to other infections. Which symptoms you have will depend on where the infection is. For example:  · If the infection is in the skin, that area may be red or tender. · If it's in the urinary tract, you may have back pain, a burning sensation when you urinate, or a need to urinate more often than usual.  · If it's in the lungs, you may have a cough and trouble breathing. You may also:  · Have diarrhea. · Feel weak and sick. · Have fever and chills. How is an ESBL infection treated? Your doctor will send a sample of your infected skin tissue, blood, or urine to a lab. The lab will grow the germs from the sample. Then they will test them to see which antibiotics can kill them. To treat the infection, your doctor will give you antibiotics. Take your antibiotics as directed. Do not stop taking them just because you feel better. You need to take the full course of antibiotics.  Taking only some of the medicine may cause antibiotic-resistant bacteria to develop. You may have to stay in the hospital for treatment. How can you prevent the spread of an ESBL infection? If you have an ESBL infection in the hospital:  · Your doctor may want to keep you away from others to reduce the chances of spreading the bacteria. You may be in a special room, called an isolation room. Visitors may be limited to prevent ESBL germs from being carried outside your room. Children, pregnant women, people who are ill, and some others might not be allowed into the room. That's because they can be more likely to get a serious infection. · Everyone who comes in the room will need to wash their hands with soap and water or use an alcohol-based hand . Clean hands help stop the germs from spreading. · Visitors and caregivers may have to use disposable gloves and a gown over their clothes. This helps prevent ESBL germs from spreading. Practice good hygiene  · Wash your hands often. Hand-washing is the best way to avoid spreading germs. When washing hands with soap and water:  ? Wet your hands with running water, and apply soap. ? Rub your hands together to make a lather. Scrub well for at least 20 seconds. ? Pay special attention to your wrists, the backs of your hands, between your fingers, and under your fingernails. ? Rinse your hands well under running water. ? Use a clean towel to dry your hands, or air-dry your hands. You may want to use a clean towel as a barrier between the faucet and your clean hands when you turn off the water. · You can also use an alcohol-based hand . If you use , rub your hands and fingers until they are dry. You don't need to use water. The alcohol quickly kills many types of germs on your hands. · If you're in the hospital, ask everyone to wash their hands before they come in the room. · Keep cuts and scrapes clean and covered with a bandage. Wash your hands after you touch elastic bandages or other dressings over a wound. This can keep bacteria from spreading. Wrap bandages in a plastic bag before you throw them away. Avoid contact with other people's wounds or bandages. · Do not share towels, washcloths, razors, clothing, or other items that touched your wound or bandage. Wash your sheets, towels, and clothes with warm water and detergent. Dry them in a hot dryer, if you can. · Keep shared areas clean by wiping down surfaces (such as counters, doorknobs, and light switches) with a disinfectant. Follow-up care is a key part of your treatment and safety. Be sure to make and go to all appointments, and call your doctor if you are having problems. It's also a good idea to know your test results and keep a list of the medicines you take. Where can you learn more? Go to http://sri-hollie.info/. Enter D014 in the search box to learn more about \"Learning About ESBL Infection. \"  Current as of: July 30, 2018  Content Version: 11.9  © 9338-3024 [employer]. Care instructions adapted under license by [employer] (which disclaims liability or warranty for this information). If you have questions about a medical condition or this instruction, always ask your healthcare professional. [address] any warranty or liability for your use of this information.

## 2019-04-10 NOTE — PROGRESS NOTES
Visited with patient. States she and her son Tamiko Keller have no car and no one that could  her medications. States she 'just can't do it' at home. States she also can't find her needles for her Lantus and has been unable to take that as well. Call to [address] who states patient just needs 3 more doses of Ivanz. Call to Dr Nitesh Guerrero who will evaluate for admission.

## 2019-04-10 NOTE — ED NOTES
Pt cleaned of stool and new brief placed by this RN and Marisol Koch RN. Legs repositioned as requested and breakfast tray given.

## 2019-04-10 NOTE — ED NOTES
C-diff sample obtained by Erika Turner RN and shown to this RN. Specimen does not meet criteria for c-diff infection and therefore not sent to pharmacy.

## 2019-04-10 NOTE — ED NOTES
Report from Nadeen Wesley, [facility]. Pt is awaiting social work to obtain help with IV antibiotics for next couple days until [facility] is set up. [place] was set up while patient was in hospital. Pt's PICC line is accessible by patient but patient states \"I just can't do it. They only showed me how to do it once and I've just got too much stuff going on and I just can't do it. \" Pt states she does live with someone but roommate wasn't there when IV teaching was completed. Pt also tells previous RN that even though she lives with someone and has a son, no one can help her or go to the pharmacy to obtain her medications.

## 2019-04-10 NOTE — ED PROVIDER NOTES
Patient returns to the emergency department complaining of nausea vomiting and pain in her legs and a burning sensation in the perineum. She was discharged from the hospital less than 24 hours ago after a weeklong treatment for urinary tract infection. The patient has a suprapubic catheter and is paraplegic. She states after getting home with her prescriptions for medications she had nobody to fill the medications and she is also supposed to do IV antibiotics for proximally 5 more days but but realized that she would probably be unable to do this herself or with her son. She states she has vomited about 5 times. She has not had her oral pain medication that was prescribed since returning home. She does have a 25 µg Duragesic patch currently applied. She did receive a dose of antibiotic prior to discharge from the hospital. 
 
The history is provided by the patient. Vomiting This is a new problem. The current episode started 12 to 24 hours ago. The problem occurs 5 to 10 times per day. The problem has not changed since onset. The emesis has an appearance of stomach contents. There has been no fever. Pertinent negatives include no chills, no fever, no sweats, no abdominal pain, no diarrhea, no headaches, no arthralgias, no myalgias, no cough, no URI and no headaches. Risk factors: possibly related narcotic withdrawal as the patient has not had oral narcotic medication approximately 24 hours and has a history of very high dosages of narcotics preceding her discharge from the hospital.  
  
 
Past Medical History:  
Diagnosis Date  Diabetes ([facility] [address]  Gastrointestinal disorder GERD  Hypertension  Ill-defined condition   
 neurogenic bladder  Ill-defined condition   
 transverse myelitis  Ill-defined condition   
 paraplegia  Liver disease Hepatitis C  
 Psychiatric disorder   
 anxiety  Psychiatric disorder   
 bipolar  Thromboembolus ([facility] [address] Past Surgical History: Procedure Laterality Date  HX HYSTERECTOMY No family history on file. Social History Socioeconomic History  Marital status: LEGALLY  Spouse name: Not on file  Number of children: Not on file  Years of education: Not on file  Highest education level: Not on file Occupational History  Not on file Social Needs  Financial resource strain: Not on file  Food insecurity:  
  Worry: Not on file Inability: Not on file  Transportation needs:  
  Medical: Not on file Non-medical: Not on file Tobacco Use  Smoking status: Current Every Day Smoker Packs/day: 0.50 Substance and Sexual Activity  Alcohol use: No  
 Drug use: No  
 Sexual activity: Not on file Lifestyle  Physical activity:  
  Days per week: Not on file Minutes per session: Not on file  Stress: Not on file Relationships  Social connections:  
  Talks on phone: Not on file Gets together: Not on file Attends [employer] service: Not on file Active member of club or organization: Not on file Attends meetings of clubs or organizations: Not on file Relationship status: Not on file  Intimate partner violence:  
  Fear of current or ex partner: Not on file Emotionally abused: Not on file Physically abused: Not on file Forced sexual activity: Not on file Other Topics Concern  Not on file Social History Narrative  Not on file ALLERGIES: Patient has no known allergies. Review of Systems Constitutional: Negative for chills and fever. Respiratory: Negative for cough. Gastrointestinal: Positive for vomiting. Negative for abdominal pain and diarrhea. Musculoskeletal: Negative for arthralgias and myalgias. Neurological: Negative for headaches. All other systems reviewed and are negative. Vitals:  
 04/10/19 8481 04/10/19 0013 BP: (!) 204/84 Pulse: 66 Resp: 18 Temp: 98.4 °F (36.9 °C) SpO2: 93% 94% Height: 5' 5\" (1.651 m) Physical Exam  
Constitutional: She is oriented to person, place, and time. She appears well-developed and well-nourished. No distress. HENT:  
Head: Normocephalic and atraumatic. Eyes: Pupils are equal, round, and reactive to light. Conjunctivae and EOM are normal.  
Neck: Normal range of motion. Neck supple. Cardiovascular: Normal rate and regular rhythm. Pulmonary/Chest: Effort normal and breath sounds normal.  
Abdominal: Soft. Bowel sounds are normal. There is no tenderness. Genitourinary:  
Genitourinary Comments: Patient does exhibit some mild erythema of the perineum. Diarrhea is noted Musculoskeletal: Normal range of motion. Neurological: She is alert and oriented to person, place, and time. Skin: Skin is warm and dry. Capillary refill takes less than 2 seconds. She is not diaphoretic. Psychiatric: She has a normal mood and affect. Her behavior is normal.  
Nursing note and vitals reviewed. MDM Number of Diagnoses or Management Options Amount and/or Complexity of Data Reviewed Clinical lab tests: ordered and reviewed Review and summarize past medical records: yes Discuss the patient with other providers: yes Independent visualization of images, tracings, or specimens: yes Risk of Complications, Morbidity, and/or Mortality Presenting problems: moderate Diagnostic procedures: moderate Management options: moderate General comments: Lab results were discussed with the patient. She is now adamant about not wishing to go home due to concerns about the ability to care for herself or be cared for. He mentioned the possibility of placement in a skilled nursing facility or temporary rehabilitation and she is interested in that. We'll consult with case management when they arrive in the morning. Patient Progress Patient progress: stable Procedures

## 2019-04-23 ENCOUNTER — APPOINTMENT (OUTPATIENT)
Dept: GENERAL RADIOLOGY | Age: 64
DRG: 466 | End: 2019-04-23
Attending: EMERGENCY MEDICINE
Payer: MEDICAID

## 2019-04-23 ENCOUNTER — HOSPITAL ENCOUNTER (INPATIENT)
Age: 64
LOS: 9 days | Discharge: HOME HEALTH | DRG: 466 | End: 2019-05-02
Attending: EMERGENCY MEDICINE | Admitting: FAMILY MEDICINE
Payer: MEDICAID

## 2019-04-23 DIAGNOSIS — E11.10 DIABETIC KETOACIDOSIS WITHOUT COMA ASSOCIATED WITH TYPE 2 DIABETES MELLITUS (HCC): ICD-10-CM

## 2019-04-23 DIAGNOSIS — E86.0 DEHYDRATION: ICD-10-CM

## 2019-04-23 DIAGNOSIS — I48.91 ATRIAL FIBRILLATION WITH RAPID VENTRICULAR RESPONSE (HCC): Primary | ICD-10-CM

## 2019-04-23 DIAGNOSIS — N39.0 URINARY TRACT INFECTION ASSOCIATED WITH CYSTOSTOMY CATHETER, SUBSEQUENT ENCOUNTER: ICD-10-CM

## 2019-04-23 DIAGNOSIS — T83.510D URINARY TRACT INFECTION ASSOCIATED WITH CYSTOSTOMY CATHETER, SUBSEQUENT ENCOUNTER: ICD-10-CM

## 2019-04-23 PROBLEM — E83.42 HYPOMAGNESEMIA: Status: RESOLVED | Noted: 2019-04-03 | Resolved: 2019-04-23

## 2019-04-23 PROBLEM — R82.81 PYURIA: Status: RESOLVED | Noted: 2019-03-10 | Resolved: 2019-04-23

## 2019-04-23 PROBLEM — R10.9 ABDOMINAL PAIN: Status: ACTIVE | Noted: 2019-04-23

## 2019-04-23 PROBLEM — K59.00 CONSTIPATION: Status: RESOLVED | Noted: 2019-03-10 | Resolved: 2019-04-23

## 2019-04-23 PROBLEM — E87.5 HYPERKALEMIA: Status: RESOLVED | Noted: 2019-02-11 | Resolved: 2019-04-23

## 2019-04-23 PROBLEM — B96.29 UTI DUE TO EXTENDED-SPECTRUM BETA LACTAMASE (ESBL) PRODUCING ESCHERICHIA COLI: Status: RESOLVED | Noted: 2019-04-05 | Resolved: 2019-04-23

## 2019-04-23 PROBLEM — R11.2 NAUSEA & VOMITING: Status: RESOLVED | Noted: 2019-02-14 | Resolved: 2019-04-23

## 2019-04-23 PROBLEM — G93.40 ACUTE ENCEPHALOPATHY: Status: RESOLVED | Noted: 2019-03-10 | Resolved: 2019-04-23

## 2019-04-23 PROBLEM — Z16.12 UTI DUE TO EXTENDED-SPECTRUM BETA LACTAMASE (ESBL) PRODUCING ESCHERICHIA COLI: Status: RESOLVED | Noted: 2019-04-05 | Resolved: 2019-04-23

## 2019-04-23 LAB
ALBUMIN SERPL-MCNC: 3.4 G/DL (ref 3.2–4.6)
ALBUMIN/GLOB SERPL: 0.7 {RATIO} (ref 1.2–3.5)
ALP SERPL-CCNC: 122 U/L (ref 50–136)
ALT SERPL-CCNC: 21 U/L (ref 12–65)
ANION GAP SERPL CALC-SCNC: 19 MMOL/L (ref 7–16)
APPEARANCE UR: ABNORMAL
AST SERPL-CCNC: 15 U/L (ref 15–37)
BACTERIA URNS QL MICRO: ABNORMAL /HPF
BASOPHILS # BLD: 0.1 K/UL (ref 0–0.2)
BASOPHILS NFR BLD: 1 % (ref 0–2)
BILIRUB SERPL-MCNC: 0.6 MG/DL (ref 0.2–1.1)
BILIRUB UR QL: ABNORMAL
BUN SERPL-MCNC: 12 MG/DL (ref 8–23)
CALCIUM SERPL-MCNC: 10 MG/DL (ref 8.3–10.4)
CASTS URNS QL MICRO: ABNORMAL /LPF
CHLORIDE SERPL-SCNC: 97 MMOL/L (ref 98–107)
CO2 SERPL-SCNC: 13 MMOL/L (ref 21–32)
COLOR UR: YELLOW
CREAT SERPL-MCNC: 0.94 MG/DL (ref 0.6–1)
DIFFERENTIAL METHOD BLD: ABNORMAL
EOSINOPHIL # BLD: 0.1 K/UL (ref 0–0.8)
EOSINOPHIL NFR BLD: 1 % (ref 0.5–7.8)
EPI CELLS #/AREA URNS HPF: ABNORMAL /HPF
ERYTHROCYTE [DISTWIDTH] IN BLOOD BY AUTOMATED COUNT: 14.2 % (ref 11.9–14.6)
GLOBULIN SER CALC-MCNC: 5 G/DL (ref 2.3–3.5)
GLUCOSE SERPL-MCNC: 437 MG/DL (ref 65–100)
GLUCOSE UR STRIP.AUTO-MCNC: >1000 MG/DL
HCT VFR BLD AUTO: 50.7 % (ref 35.8–46.3)
HGB BLD-MCNC: 16.7 G/DL (ref 11.7–15.4)
HGB UR QL STRIP: ABNORMAL
IMM GRANULOCYTES # BLD AUTO: 0.1 K/UL (ref 0–0.5)
IMM GRANULOCYTES NFR BLD AUTO: 1 % (ref 0–5)
KETONES UR QL STRIP.AUTO: >80 MG/DL
LACTATE BLD-SCNC: 1.7 MMOL/L (ref 0.5–1.9)
LEUKOCYTE ESTERASE UR QL STRIP.AUTO: ABNORMAL
LYMPHOCYTES # BLD: 3.1 K/UL (ref 0.5–4.6)
LYMPHOCYTES NFR BLD: 21 % (ref 13–44)
MCH RBC QN AUTO: 29.3 PG (ref 26.1–32.9)
MCHC RBC AUTO-ENTMCNC: 32.9 G/DL (ref 31.4–35)
MCV RBC AUTO: 88.9 FL (ref 79.6–97.8)
MONOCYTES # BLD: 0.7 K/UL (ref 0.1–1.3)
MONOCYTES NFR BLD: 5 % (ref 4–12)
NEUTS SEG # BLD: 10.2 K/UL (ref 1.7–8.2)
NEUTS SEG NFR BLD: 72 % (ref 43–78)
NITRITE UR QL STRIP.AUTO: POSITIVE
NRBC # BLD: 0 K/UL (ref 0–0.2)
PH UR STRIP: 5.5 [PH] (ref 5–9)
PLATELET # BLD AUTO: 443 K/UL (ref 150–450)
PMV BLD AUTO: 11.1 FL (ref 9.4–12.3)
POTASSIUM SERPL-SCNC: 4.2 MMOL/L (ref 3.5–5.1)
PROT SERPL-MCNC: 8.4 G/DL (ref 6.3–8.2)
PROT UR STRIP-MCNC: 100 MG/DL
RBC # BLD AUTO: 5.7 M/UL (ref 4.05–5.2)
RBC #/AREA URNS HPF: ABNORMAL /HPF
SODIUM SERPL-SCNC: 129 MMOL/L (ref 136–145)
SP GR UR REFRACTOMETRY: 1.04 (ref 1–1.02)
TROPONIN I BLD-MCNC: 0 NG/ML (ref 0.02–0.05)
UROBILINOGEN UR QL STRIP.AUTO: 1 EU/DL (ref 0.2–1)
WBC # BLD AUTO: 14.3 K/UL (ref 4.3–11.1)
WBC URNS QL MICRO: ABNORMAL /HPF
YEAST URNS QL MICRO: ABNORMAL

## 2019-04-23 PROCEDURE — 74011000258 HC RX REV CODE- 258: Performed by: EMERGENCY MEDICINE

## 2019-04-23 PROCEDURE — 99285 EMERGENCY DEPT VISIT HI MDM: CPT | Performed by: EMERGENCY MEDICINE

## 2019-04-23 PROCEDURE — 87186 SC STD MICRODIL/AGAR DIL: CPT

## 2019-04-23 PROCEDURE — 87205 SMEAR GRAM STAIN: CPT

## 2019-04-23 PROCEDURE — 71045 X-RAY EXAM CHEST 1 VIEW: CPT

## 2019-04-23 PROCEDURE — 83605 ASSAY OF LACTIC ACID: CPT

## 2019-04-23 PROCEDURE — 74011000250 HC RX REV CODE- 250: Performed by: EMERGENCY MEDICINE

## 2019-04-23 PROCEDURE — 74011250636 HC RX REV CODE- 250/636: Performed by: EMERGENCY MEDICINE

## 2019-04-23 PROCEDURE — 65270000029 HC RM PRIVATE

## 2019-04-23 PROCEDURE — 81001 URINALYSIS AUTO W/SCOPE: CPT

## 2019-04-23 PROCEDURE — 87641 MR-STAPH DNA AMP PROBE: CPT

## 2019-04-23 PROCEDURE — 80053 COMPREHEN METABOLIC PANEL: CPT

## 2019-04-23 PROCEDURE — 84484 ASSAY OF TROPONIN QUANT: CPT

## 2019-04-23 PROCEDURE — 96375 TX/PRO/DX INJ NEW DRUG ADDON: CPT | Performed by: EMERGENCY MEDICINE

## 2019-04-23 PROCEDURE — 96365 THER/PROPH/DIAG IV INF INIT: CPT | Performed by: EMERGENCY MEDICINE

## 2019-04-23 PROCEDURE — 96374 THER/PROPH/DIAG INJ IV PUSH: CPT | Performed by: EMERGENCY MEDICINE

## 2019-04-23 PROCEDURE — 87086 URINE CULTURE/COLONY COUNT: CPT

## 2019-04-23 PROCEDURE — 87040 BLOOD CULTURE FOR BACTERIA: CPT

## 2019-04-23 PROCEDURE — 85025 COMPLETE CBC W/AUTO DIFF WBC: CPT

## 2019-04-23 PROCEDURE — 93005 ELECTROCARDIOGRAM TRACING: CPT | Performed by: EMERGENCY MEDICINE

## 2019-04-23 PROCEDURE — 87088 URINE BACTERIA CULTURE: CPT

## 2019-04-23 RX ORDER — FLUCONAZOLE 100 MG/1
200 TABLET ORAL
Status: COMPLETED | OUTPATIENT
Start: 2019-04-23 | End: 2019-04-24

## 2019-04-23 RX ORDER — VANCOMYCIN 2 GRAM/500 ML IN 0.9 % SODIUM CHLORIDE INTRAVENOUS
2000
Status: COMPLETED | OUTPATIENT
Start: 2019-04-23 | End: 2019-04-24

## 2019-04-23 RX ORDER — DILTIAZEM HYDROCHLORIDE 5 MG/ML
10 INJECTION INTRAVENOUS ONCE
Status: COMPLETED | OUTPATIENT
Start: 2019-04-23 | End: 2019-04-23

## 2019-04-23 RX ORDER — MORPHINE SULFATE 4 MG/ML
4 INJECTION INTRAVENOUS
Status: COMPLETED | OUTPATIENT
Start: 2019-04-23 | End: 2019-04-23

## 2019-04-23 RX ADMIN — VANCOMYCIN HYDROCHLORIDE 2000 MG: 10 INJECTION, POWDER, LYOPHILIZED, FOR SOLUTION INTRAVENOUS at 23:35

## 2019-04-23 RX ADMIN — PIPERACILLIN SODIUM,TAZOBACTAM SODIUM 4.5 G: 4; .5 INJECTION, POWDER, FOR SOLUTION INTRAVENOUS at 22:21

## 2019-04-23 RX ADMIN — SODIUM CHLORIDE 1000 ML: 900 INJECTION, SOLUTION INTRAVENOUS at 23:00

## 2019-04-23 RX ADMIN — DILTIAZEM HYDROCHLORIDE: 5 INJECTION INTRAVENOUS at 22:14

## 2019-04-23 RX ADMIN — MORPHINE SULFATE 4 MG: 4 INJECTION INTRAVENOUS at 22:14

## 2019-04-23 RX ADMIN — DILTIAZEM HYDROCHLORIDE 10 MG: 5 INJECTION INTRAVENOUS at 23:28

## 2019-04-23 RX ADMIN — SODIUM CHLORIDE 1000 ML: 900 INJECTION, SOLUTION INTRAVENOUS at 22:21

## 2019-04-24 PROBLEM — R65.10 SIRS WITHOUT ACUTE ORGAN DYSFUNCTION DUE TO NON-INFECTIOUS PROCESS (HCC): Status: ACTIVE | Noted: 2019-04-24

## 2019-04-24 LAB
ADMINISTERED INITIALS, ADMINIT: NORMAL
ANION GAP SERPL CALC-SCNC: 11 MMOL/L (ref 7–16)
ANION GAP SERPL CALC-SCNC: 14 MMOL/L (ref 7–16)
ANION GAP SERPL CALC-SCNC: 15 MMOL/L (ref 7–16)
ARTERIAL PATENCY WRIST A: YES
ATRIAL RATE: 441 BPM
ATRIAL RATE: 80 BPM
BASE DEFICIT BLD-SCNC: 15 MMOL/L
BDY SITE: ABNORMAL
BODY TEMPERATURE: 98.6
BUN SERPL-MCNC: 10 MG/DL (ref 8–23)
BUN SERPL-MCNC: 11 MG/DL (ref 8–23)
BUN SERPL-MCNC: 11 MG/DL (ref 8–23)
CALCIUM SERPL-MCNC: 8 MG/DL (ref 8.3–10.4)
CALCIUM SERPL-MCNC: 8.7 MG/DL (ref 8.3–10.4)
CALCIUM SERPL-MCNC: 8.9 MG/DL (ref 8.3–10.4)
CALCULATED P AXIS, ECG09: -66 DEGREES
CALCULATED R AXIS, ECG10: 2 DEGREES
CALCULATED R AXIS, ECG10: 7 DEGREES
CALCULATED T AXIS, ECG11: -94 DEGREES
CALCULATED T AXIS, ECG11: 129 DEGREES
CHLORIDE SERPL-SCNC: 110 MMOL/L (ref 98–107)
CHLORIDE SERPL-SCNC: 112 MMOL/L (ref 98–107)
CHLORIDE SERPL-SCNC: 117 MMOL/L (ref 98–107)
CO2 BLD-SCNC: 12 MMOL/L
CO2 SERPL-SCNC: 11 MMOL/L (ref 21–32)
CO2 SERPL-SCNC: 13 MMOL/L (ref 21–32)
CO2 SERPL-SCNC: 14 MMOL/L (ref 21–32)
COLLECT TIME,HTIME: 1255
CREAT SERPL-MCNC: 0.74 MG/DL (ref 0.6–1)
CREAT SERPL-MCNC: 0.75 MG/DL (ref 0.6–1)
CREAT SERPL-MCNC: 0.76 MG/DL (ref 0.6–1)
D50 ADMINISTERED, D50ADM: 0 ML
D50 ORDER, D50ORD: 0 ML
DIAGNOSIS, 93000: NORMAL
DIAGNOSIS, 93000: NORMAL
EST. AVERAGE GLUCOSE BLD GHB EST-MCNC: 292 MG/DL
GAS FLOW.O2 O2 DELIVERY SYS: ABNORMAL L/MIN
GLSCOM COMMENTS: NORMAL
GLUCOSE BLD STRIP.AUTO-MCNC: 180 MG/DL (ref 65–100)
GLUCOSE BLD STRIP.AUTO-MCNC: 189 MG/DL (ref 65–100)
GLUCOSE BLD STRIP.AUTO-MCNC: 201 MG/DL (ref 65–100)
GLUCOSE BLD STRIP.AUTO-MCNC: 210 MG/DL (ref 65–100)
GLUCOSE BLD STRIP.AUTO-MCNC: 214 MG/DL (ref 65–100)
GLUCOSE BLD STRIP.AUTO-MCNC: 243 MG/DL (ref 65–100)
GLUCOSE BLD STRIP.AUTO-MCNC: 290 MG/DL (ref 65–100)
GLUCOSE BLD STRIP.AUTO-MCNC: 312 MG/DL (ref 65–100)
GLUCOSE BLD STRIP.AUTO-MCNC: 335 MG/DL (ref 65–100)
GLUCOSE BLD STRIP.AUTO-MCNC: 403 MG/DL (ref 65–100)
GLUCOSE SERPL-MCNC: 215 MG/DL (ref 65–100)
GLUCOSE SERPL-MCNC: 261 MG/DL (ref 65–100)
GLUCOSE SERPL-MCNC: 276 MG/DL (ref 65–100)
GLUCOSE, GLC: 180 MG/DL
GLUCOSE, GLC: 189 MG/DL
GLUCOSE, GLC: 201 MG/DL
GLUCOSE, GLC: 210 MG/DL
GLUCOSE, GLC: 243 MG/DL
GLUCOSE, GLC: 290 MG/DL
GLUCOSE, GLC: 335 MG/DL
HBA1C MFR BLD: 11.8 % (ref 4.8–6)
HCO3 BLD-SCNC: 10.9 MMOL/L (ref 22–26)
HIGH TARGET, HITG: 250 MG/DL
INSULIN ADMINSTERED, INSADM: 3.6 UNITS/HOUR
INSULIN ADMINSTERED, INSADM: 3.9 UNITS/HOUR
INSULIN ADMINSTERED, INSADM: 4.2 UNITS/HOUR
INSULIN ADMINSTERED, INSADM: 4.5 UNITS/HOUR
INSULIN ADMINSTERED, INSADM: 5.5 UNITS/HOUR
INSULIN ADMINSTERED, INSADM: 5.5 UNITS/HOUR
INSULIN ADMINSTERED, INSADM: 6.9 UNITS/HOUR
INSULIN ORDER, INSORD: 3.6 UNITS/HOUR
INSULIN ORDER, INSORD: 3.9 UNITS/HOUR
INSULIN ORDER, INSORD: 4.2 UNITS/HOUR
INSULIN ORDER, INSORD: 4.5 UNITS/HOUR
INSULIN ORDER, INSORD: 5.5 UNITS/HOUR
INSULIN ORDER, INSORD: 5.5 UNITS/HOUR
INSULIN ORDER, INSORD: 6.9 UNITS/HOUR
LOW TARGET, LOT: 150 MG/DL
MAGNESIUM SERPL-MCNC: 1.5 MG/DL (ref 1.8–2.4)
MAGNESIUM SERPL-MCNC: 1.5 MG/DL (ref 1.8–2.4)
MINUTES UNTIL NEXT BG, NBG: 60 MIN
MULTIPLIER, MUL: 0.02
MULTIPLIER, MUL: 0.03
ORDER INITIALS, ORDINIT: NORMAL
P-R INTERVAL, ECG05: 132 MS
PCO2 BLD: 26.7 MMHG (ref 35–45)
PH BLD: 7.22 [PH] (ref 7.35–7.45)
PHOSPHATE SERPL-MCNC: 2 MG/DL (ref 2.3–3.7)
PHOSPHATE SERPL-MCNC: 2.1 MG/DL (ref 2.3–3.7)
PO2 BLD: 79 MMHG (ref 75–100)
POTASSIUM SERPL-SCNC: 2.7 MMOL/L (ref 3.5–5.1)
POTASSIUM SERPL-SCNC: 3.7 MMOL/L (ref 3.5–5.1)
POTASSIUM SERPL-SCNC: 4.8 MMOL/L (ref 3.5–5.1)
Q-T INTERVAL, ECG07: 254 MS
Q-T INTERVAL, ECG07: 350 MS
QRS DURATION, ECG06: 74 MS
QRS DURATION, ECG06: 74 MS
QTC CALCULATION (BEZET), ECG08: 403 MS
QTC CALCULATION (BEZET), ECG08: 425 MS
SAO2 % BLD: 93 % (ref 95–98)
SERVICE CMNT-IMP: ABNORMAL
SERVICE CMNT-IMP: ABNORMAL
SODIUM SERPL-SCNC: 138 MMOL/L (ref 136–145)
SODIUM SERPL-SCNC: 139 MMOL/L (ref 136–145)
SODIUM SERPL-SCNC: 140 MMOL/L (ref 136–145)
SPECIMEN TYPE: ABNORMAL
VENTRICULAR RATE, ECG03: 169 BPM
VENTRICULAR RATE, ECG03: 80 BPM

## 2019-04-24 PROCEDURE — 74011000250 HC RX REV CODE- 250

## 2019-04-24 PROCEDURE — 74011000258 HC RX REV CODE- 258: Performed by: FAMILY MEDICINE

## 2019-04-24 PROCEDURE — 82803 BLOOD GASES ANY COMBINATION: CPT

## 2019-04-24 PROCEDURE — 36600 WITHDRAWAL OF ARTERIAL BLOOD: CPT

## 2019-04-24 PROCEDURE — 84100 ASSAY OF PHOSPHORUS: CPT

## 2019-04-24 PROCEDURE — 74011250636 HC RX REV CODE- 250/636: Performed by: FAMILY MEDICINE

## 2019-04-24 PROCEDURE — 74011636637 HC RX REV CODE- 636/637: Performed by: INTERNAL MEDICINE

## 2019-04-24 PROCEDURE — 36415 COLL VENOUS BLD VENIPUNCTURE: CPT

## 2019-04-24 PROCEDURE — 83036 HEMOGLOBIN GLYCOSYLATED A1C: CPT

## 2019-04-24 PROCEDURE — 74011250636 HC RX REV CODE- 250/636: Performed by: INTERNAL MEDICINE

## 2019-04-24 PROCEDURE — 83735 ASSAY OF MAGNESIUM: CPT

## 2019-04-24 PROCEDURE — 77030020263 HC SOL INJ SOD CL0.9% LFCR 1000ML

## 2019-04-24 PROCEDURE — 74011250637 HC RX REV CODE- 250/637: Performed by: INTERNAL MEDICINE

## 2019-04-24 PROCEDURE — 74011250637 HC RX REV CODE- 250/637: Performed by: FAMILY MEDICINE

## 2019-04-24 PROCEDURE — 82962 GLUCOSE BLOOD TEST: CPT

## 2019-04-24 PROCEDURE — 74011000250 HC RX REV CODE- 250: Performed by: FAMILY MEDICINE

## 2019-04-24 PROCEDURE — 80048 BASIC METABOLIC PNL TOTAL CA: CPT

## 2019-04-24 PROCEDURE — 74011000250 HC RX REV CODE- 250: Performed by: INTERNAL MEDICINE

## 2019-04-24 PROCEDURE — C8929 TTE W OR WO FOL WCON,DOPPLER: HCPCS

## 2019-04-24 PROCEDURE — 65660000000 HC RM CCU STEPDOWN

## 2019-04-24 PROCEDURE — 93005 ELECTROCARDIOGRAM TRACING: CPT | Performed by: FAMILY MEDICINE

## 2019-04-24 PROCEDURE — 74011250636 HC RX REV CODE- 250/636: Performed by: NURSE PRACTITIONER

## 2019-04-24 PROCEDURE — 74011250637 HC RX REV CODE- 250/637: Performed by: EMERGENCY MEDICINE

## 2019-04-24 PROCEDURE — 74011636637 HC RX REV CODE- 636/637: Performed by: FAMILY MEDICINE

## 2019-04-24 RX ORDER — POTASSIUM CHLORIDE 29.8 MG/ML
40 INJECTION INTRAVENOUS ONCE
Status: COMPLETED | OUTPATIENT
Start: 2019-04-24 | End: 2019-04-24

## 2019-04-24 RX ORDER — SODIUM CHLORIDE 0.9 % (FLUSH) 0.9 %
5-40 SYRINGE (ML) INJECTION AS NEEDED
Status: DISCONTINUED | OUTPATIENT
Start: 2019-04-24 | End: 2019-05-02 | Stop reason: HOSPADM

## 2019-04-24 RX ORDER — METOPROLOL SUCCINATE 50 MG/1
50 TABLET, EXTENDED RELEASE ORAL DAILY
Status: DISCONTINUED | OUTPATIENT
Start: 2019-04-24 | End: 2019-05-02 | Stop reason: HOSPADM

## 2019-04-24 RX ORDER — MORPHINE SULFATE 2 MG/ML
1 INJECTION, SOLUTION INTRAMUSCULAR; INTRAVENOUS ONCE
Status: COMPLETED | OUTPATIENT
Start: 2019-04-24 | End: 2019-04-24

## 2019-04-24 RX ORDER — PANTOPRAZOLE SODIUM 40 MG/1
40 TABLET, DELAYED RELEASE ORAL
Status: DISCONTINUED | OUTPATIENT
Start: 2019-04-24 | End: 2019-04-26

## 2019-04-24 RX ORDER — OXYCODONE HYDROCHLORIDE 5 MG/1
10 TABLET ORAL
Status: DISCONTINUED | OUTPATIENT
Start: 2019-04-24 | End: 2019-05-02 | Stop reason: HOSPADM

## 2019-04-24 RX ORDER — NYSTATIN 100000 [USP'U]/G
POWDER TOPICAL 2 TIMES DAILY
Status: DISCONTINUED | OUTPATIENT
Start: 2019-04-24 | End: 2019-05-02 | Stop reason: HOSPADM

## 2019-04-24 RX ORDER — DILTIAZEM HYDROCHLORIDE 5 MG/ML
INJECTION INTRAVENOUS
Status: COMPLETED
Start: 2019-04-24 | End: 2019-04-24

## 2019-04-24 RX ORDER — SODIUM CHLORIDE 9 MG/ML
200 INJECTION, SOLUTION INTRAVENOUS CONTINUOUS
Status: DISCONTINUED | OUTPATIENT
Start: 2019-04-24 | End: 2019-04-24

## 2019-04-24 RX ORDER — INSULIN LISPRO 100 [IU]/ML
INJECTION, SOLUTION INTRAVENOUS; SUBCUTANEOUS
Status: DISCONTINUED | OUTPATIENT
Start: 2019-04-24 | End: 2019-04-25

## 2019-04-24 RX ORDER — ADHESIVE BANDAGE
30 BANDAGE TOPICAL DAILY PRN
Status: DISCONTINUED | OUTPATIENT
Start: 2019-04-24 | End: 2019-05-02 | Stop reason: HOSPADM

## 2019-04-24 RX ORDER — SODIUM CHLORIDE 0.9 % (FLUSH) 0.9 %
5-40 SYRINGE (ML) INJECTION EVERY 8 HOURS
Status: DISCONTINUED | OUTPATIENT
Start: 2019-04-24 | End: 2019-05-02 | Stop reason: HOSPADM

## 2019-04-24 RX ORDER — BISACODYL 5 MG
5 TABLET, DELAYED RELEASE (ENTERIC COATED) ORAL
Status: DISCONTINUED | OUTPATIENT
Start: 2019-04-24 | End: 2019-05-02 | Stop reason: HOSPADM

## 2019-04-24 RX ORDER — AMLODIPINE BESYLATE 5 MG/1
5 TABLET ORAL DAILY
Status: DISCONTINUED | OUTPATIENT
Start: 2019-04-24 | End: 2019-04-24

## 2019-04-24 RX ORDER — SODIUM CHLORIDE 9 MG/ML
75 INJECTION, SOLUTION INTRAVENOUS CONTINUOUS
Status: DISCONTINUED | OUTPATIENT
Start: 2019-04-24 | End: 2019-04-26

## 2019-04-24 RX ORDER — DILTIAZEM HYDROCHLORIDE 5 MG/ML
20 INJECTION INTRAVENOUS
Status: COMPLETED | OUTPATIENT
Start: 2019-04-24 | End: 2019-04-24

## 2019-04-24 RX ORDER — FENTANYL 25 UG/1
1 PATCH TRANSDERMAL
Status: DISCONTINUED | OUTPATIENT
Start: 2019-04-24 | End: 2019-04-28

## 2019-04-24 RX ORDER — FLECAINIDE ACETATE 100 MG/1
50 TABLET ORAL EVERY 12 HOURS
Status: DISCONTINUED | OUTPATIENT
Start: 2019-04-24 | End: 2019-05-02 | Stop reason: HOSPADM

## 2019-04-24 RX ORDER — BUDESONIDE 0.5 MG/2ML
500 INHALANT ORAL 2 TIMES DAILY
Status: DISCONTINUED | OUTPATIENT
Start: 2019-04-24 | End: 2019-05-02 | Stop reason: HOSPADM

## 2019-04-24 RX ORDER — DEXTROSE MONOHYDRATE 25 G/50ML
25-50 INJECTION, SOLUTION INTRAVENOUS AS NEEDED
Status: DISCONTINUED | OUTPATIENT
Start: 2019-04-24 | End: 2019-04-26

## 2019-04-24 RX ORDER — ACETAMINOPHEN 325 MG/1
650 TABLET ORAL
Status: DISCONTINUED | OUTPATIENT
Start: 2019-04-24 | End: 2019-05-02 | Stop reason: HOSPADM

## 2019-04-24 RX ORDER — LORAZEPAM 2 MG/ML
1 INJECTION INTRAMUSCULAR ONCE
Status: COMPLETED | OUTPATIENT
Start: 2019-04-24 | End: 2019-04-24

## 2019-04-24 RX ORDER — DEXTROSE, SODIUM CHLORIDE, AND POTASSIUM CHLORIDE 5; .9; .15 G/100ML; G/100ML; G/100ML
150 INJECTION INTRAVENOUS CONTINUOUS
Status: DISCONTINUED | OUTPATIENT
Start: 2019-04-24 | End: 2019-04-24

## 2019-04-24 RX ORDER — MAGNESIUM SULFATE HEPTAHYDRATE 40 MG/ML
2 INJECTION, SOLUTION INTRAVENOUS ONCE
Status: COMPLETED | OUTPATIENT
Start: 2019-04-24 | End: 2019-04-24

## 2019-04-24 RX ORDER — QUETIAPINE FUMARATE 100 MG/1
300 TABLET, FILM COATED ORAL
Status: DISCONTINUED | OUTPATIENT
Start: 2019-04-24 | End: 2019-05-02 | Stop reason: HOSPADM

## 2019-04-24 RX ORDER — DEXTROSE 40 %
15 GEL (GRAM) ORAL AS NEEDED
Status: DISCONTINUED | OUTPATIENT
Start: 2019-04-24 | End: 2019-04-24

## 2019-04-24 RX ORDER — INSULIN GLARGINE 100 [IU]/ML
30 INJECTION, SOLUTION SUBCUTANEOUS DAILY
Status: DISCONTINUED | OUTPATIENT
Start: 2019-04-24 | End: 2019-04-25

## 2019-04-24 RX ORDER — ALPRAZOLAM 0.5 MG/1
0.5 TABLET ORAL
Status: DISCONTINUED | OUTPATIENT
Start: 2019-04-24 | End: 2019-05-02 | Stop reason: HOSPADM

## 2019-04-24 RX ADMIN — Medication 10 ML: at 21:17

## 2019-04-24 RX ADMIN — DILTIAZEM HYDROCHLORIDE 20 MG: 5 INJECTION INTRAVENOUS at 03:55

## 2019-04-24 RX ADMIN — Medication: at 21:00

## 2019-04-24 RX ADMIN — POTASSIUM CHLORIDE 40 MEQ: 400 INJECTION, SOLUTION INTRAVENOUS at 06:36

## 2019-04-24 RX ADMIN — QUETIAPINE FUMARATE 300 MG: 100 TABLET ORAL at 00:37

## 2019-04-24 RX ADMIN — CEFEPIME HYDROCHLORIDE 2 G: 2 INJECTION, POWDER, FOR SOLUTION INTRAVENOUS at 11:40

## 2019-04-24 RX ADMIN — QUETIAPINE FUMARATE 300 MG: 100 TABLET ORAL at 21:06

## 2019-04-24 RX ADMIN — METOPROLOL SUCCINATE 50 MG: 50 TABLET, EXTENDED RELEASE ORAL at 08:41

## 2019-04-24 RX ADMIN — POTASSIUM CHLORIDE, DEXTROSE MONOHYDRATE AND SODIUM CHLORIDE 150 ML/HR: 150; 5; 900 INJECTION, SOLUTION INTRAVENOUS at 10:27

## 2019-04-24 RX ADMIN — NYSTATIN: 100000 POWDER TOPICAL at 18:00

## 2019-04-24 RX ADMIN — OXYCODONE HYDROCHLORIDE 10 MG: 5 TABLET ORAL at 23:29

## 2019-04-24 RX ADMIN — SODIUM CHLORIDE 5.5 UNITS/HR: 900 INJECTION, SOLUTION INTRAVENOUS at 04:28

## 2019-04-24 RX ADMIN — INSULIN LISPRO 4 UNITS: 100 INJECTION, SOLUTION INTRAVENOUS; SUBCUTANEOUS at 11:36

## 2019-04-24 RX ADMIN — CEFEPIME HYDROCHLORIDE 2 G: 2 INJECTION, POWDER, FOR SOLUTION INTRAVENOUS at 01:53

## 2019-04-24 RX ADMIN — FLECAINIDE ACETATE 50 MG: 100 TABLET ORAL at 21:06

## 2019-04-24 RX ADMIN — PERFLUTREN 1 ML: 6.52 INJECTION, SUSPENSION INTRAVENOUS at 09:00

## 2019-04-24 RX ADMIN — INSULIN LISPRO 18 UNITS: 100 INJECTION, SOLUTION INTRAVENOUS; SUBCUTANEOUS at 17:38

## 2019-04-24 RX ADMIN — PANTOPRAZOLE SODIUM 40 MG: 40 TABLET, DELAYED RELEASE ORAL at 08:41

## 2019-04-24 RX ADMIN — SODIUM CHLORIDE 5.5 UNITS/HR: 900 INJECTION, SOLUTION INTRAVENOUS at 02:00

## 2019-04-24 RX ADMIN — MORPHINE SULFATE 1 MG: 2 INJECTION, SOLUTION INTRAMUSCULAR; INTRAVENOUS at 00:36

## 2019-04-24 RX ADMIN — SODIUM CHLORIDE 75 ML/HR: 900 INJECTION, SOLUTION INTRAVENOUS at 15:33

## 2019-04-24 RX ADMIN — DILTIAZEM HYDROCHLORIDE 2.5 MG/HR: 5 INJECTION INTRAVENOUS at 03:58

## 2019-04-24 RX ADMIN — LORAZEPAM 1 MG: 2 INJECTION INTRAMUSCULAR; INTRAVENOUS at 00:36

## 2019-04-24 RX ADMIN — CEFEPIME HYDROCHLORIDE 2 G: 2 INJECTION, POWDER, FOR SOLUTION INTRAVENOUS at 23:31

## 2019-04-24 RX ADMIN — OXYCODONE HYDROCHLORIDE 10 MG: 5 TABLET ORAL at 18:20

## 2019-04-24 RX ADMIN — NYSTATIN: 100000 POWDER TOPICAL at 08:40

## 2019-04-24 RX ADMIN — FLUCONAZOLE 200 MG: 100 TABLET ORAL at 00:37

## 2019-04-24 RX ADMIN — INSULIN GLARGINE 30 UNITS: 100 INJECTION, SOLUTION SUBCUTANEOUS at 09:29

## 2019-04-24 RX ADMIN — SODIUM CHLORIDE 200 ML/HR: 900 INJECTION, SOLUTION INTRAVENOUS at 00:46

## 2019-04-24 RX ADMIN — FLECAINIDE ACETATE 50 MG: 100 TABLET ORAL at 08:41

## 2019-04-24 RX ADMIN — APIXABAN 5 MG: 5 TABLET, FILM COATED ORAL at 17:38

## 2019-04-24 RX ADMIN — SODIUM CHLORIDE 200 ML/HR: 900 INJECTION, SOLUTION INTRAVENOUS at 07:30

## 2019-04-24 RX ADMIN — PROMETHAZINE HYDROCHLORIDE 12.5 MG: 25 INJECTION INTRAMUSCULAR; INTRAVENOUS at 02:50

## 2019-04-24 RX ADMIN — MAGNESIUM SULFATE HEPTAHYDRATE 2 G: 40 INJECTION, SOLUTION INTRAVENOUS at 11:41

## 2019-04-24 RX ADMIN — Medication: at 08:40

## 2019-04-24 RX ADMIN — APIXABAN 5 MG: 5 TABLET, FILM COATED ORAL at 08:41

## 2019-04-24 NOTE — CONSULTS
[facility] Cardiology Consult                Date of  Admission: 4/23/2019  9:02 PM       Referring Physician:  Dr. Ashley Kirkland Physician:  Dr. Shubham perez    CC/Reason for consult: a fib with RVR      Bola Hadley is a 62 y.o. female morbidly obese WF with h/o DM II, PAF, prior DVT, HTN, paraplegia with chronic supra pubic cath due to neurogenic bladder, bipolar disorder, anxiety, chronic pain on opiate and xanax and chronic hepatitis C who has had recurrent admissions due to not filling medications and poor care at home.   She presented to the ED with abdominal pain for 3 days. BGL was found to be elevated at 447. Labs show WBC 14.3, h7h 16.7/50.7, plt 443. UC are negative thus far. EKG showed a fib with RVR. Patient was given cardizem bolus and drip started. She was started on an insulin drip and admitted to ICU. Patient admits to not taking medications at home and states it is due to insurance issues. On exam she is back in NSR and her home prescribed medications of toprol xl, flecainide, and eliquis. Discussed again the need for medication compliance. She was admitted 1/31-2/4 for AF w/ RVR and CAUTI. Then admitted 2/5-2/9 for AF w/RVR and back pain. She was started [facility] and was discharged in NSR.  She was unable to get the [facility] and returned to the ER on 2/10.  was consulted and reportedly made arrangements for her to have transportation to pharmacy for [facility]. She was still unable to fill prescription. After leaving the ER, she found herself to be feeling anxious and unwell as she did last time she was in RVR. She was admitted again by the hospitalist and started on IV Cardizem. Anxiety and pain was controlled after home meds restarted in [facility] Cardiology was consulted to evaluate and treat AF. Echo on 1/31/19 showed preserved EF 55-60%, no WMA and mild LAE. When seen for consult, Pt was in NSR w/o complaints.  She was prescribed Flecainide and Toprol XL and she was again DC on 2/13 by the hospitalist. Her son left and didn't return and therefore her DC was postponed to 2/14 when she was DC home. She returned 4-5 hours later to the ER reporting \"feeling sick. \" She was admitted by the hospitalist and later went back into AF while still in ER. She again did not have her meds filled at DC. She was started on IV Cardizem and converted back to NSR. Pt was started back on Flecainide and Toprol prior to that discharge. SW has attempted to help, but her home situation is difficult. She was just discharged on 4-9-19 after being treated for ESBL UTI and again was in a fib with RVR, and had not taken any medications. Patient Active Problem List   Diagnosis Code    Hyperglycemia due to type 2 diabetes mellitus ([facility] [address] E11.65    Hypokalemia E87.6    Paraplegia ([facility]) G82.20    Bipolar disorder without psychotic features ([facility] [address] F31.9    Chronic hepatitis C virus infection ([facility] [address] B18.2    Narcotic addiction ([facility] [address] F11.20    UTI (urinary tract infection) N39.0    Leukocytosis D72.829    Chronic midline low back pain without sciatica M54.5, G89.29    Atrial fibrillation with RVR ([facility]) I48.91    A-fib ([facility]) I48.91    Diabetes ([facility] [address] E11.9    Hypertension I10    Hypomagnesemia E83.42    DKA (diabetic ketoacidoses) ([facility]) E13.10    Abdominal pain R10.9    SIRS without acute organ dysfunction due to non-infectious process ([facility] [address] R65.10       Past Medical History:   Diagnosis Date    Diabetes ([facility] [address]     Gastrointestinal disorder     GERD    Hypertension     Ill-defined condition     neurogenic bladder    Ill-defined condition     transverse myelitis    Ill-defined condition     paraplegia    Liver disease     Hepatitis C    Psychiatric disorder     anxiety    Psychiatric disorder     bipolar    Thromboembolus ([facility] [address]       Past Surgical History:   Procedure Laterality Date    HX HYSTERECTOMY       No Known Allergies   No family history on file.      Current Facility-Administered Medications Medication Dose Route Frequency    ALPRAZolam (XANAX) tablet 0.5 mg  0.5 mg Oral BID PRN    apixaban (ELIQUIS) tablet 5 mg  5 mg Oral BID    bisacodyl (DULCOLAX) tablet 5 mg  5 mg Oral DAILY PRN    budesonide (PULMICORT) 500 mcg/2 ml nebulizer suspension  500 mcg Nebulization BID    fentaNYL (DURAGESIC) 25 mcg/hr patch 1 Patch  1 Patch TransDERmal Q72H    flecainide (TAMBOCOR) tablet 50 mg  50 mg Oral Q12H    metoprolol succinate (TOPROL-XL) XL tablet 50 mg  50 mg Oral DAILY    nystatin (MYCOSTATIN) 100,000 unit/gram powder   Topical BID    pantoprazole (PROTONIX) tablet 40 mg  40 mg Oral ACB    QUEtiapine (SEROquel) tablet 300 mg  300 mg Oral QHS    zinc oxide-cod liver oil (DESITIN) 40 % paste   Topical BID    insulin regular (NOVOLIN R, HUMULIN R) 100 Units in 0.9% sodium chloride 100 mL infusion  0-50 Units/hr IntraVENous TITRATE    dextrose 40% (GLUTOSE) oral gel 1 Tube  15 g Oral PRN    glucagon (GLUCAGEN) injection 1 mg  1 mg IntraMUSCular PRN    dextrose (D50) infusion 12.5-25 g  25-50 mL IntraVENous PRN    sodium chloride (NS) flush 5-40 mL  5-40 mL IntraVENous Q8H    sodium chloride (NS) flush 5-40 mL  5-40 mL IntraVENous PRN    acetaminophen (TYLENOL) tablet 650 mg  650 mg Oral Q4H PRN    magnesium hydroxide (MILK OF MAGNESIA) 400 mg/5 mL oral suspension 30 mL  30 mL Oral DAILY PRN    dilTIAZem (CARDIZEM) 125 mg in dextrose 5% 125 mL infusion  0-15 mg/hr IntraVENous TITRATE    promethazine (PHENERGAN) with saline injection 12.5 mg  12.5 mg IntraVENous Q4H PRN    potassium chloride 40 mEq IVPB  40 mEq IntraVENous ONCE    dextrose 5% - 0.9% NaCl with KCl 20 mEq/L infusion  150 mL/hr IntraVENous CONTINUOUS    insulin glargine (LANTUS) injection 30 Units  30 Units SubCUTAneous DAILY    cefepime (MAXIPIME) 2 g in 0.9% sodium chloride (MBP/ADV) 100 mL  2 g IntraVENous Q12H       Review of Systems   Constitutional: Negative. HENT: Negative. Eyes: Negative.     Respiratory: Negative. Gastrointestinal: Positive for abdominal pain. Genitourinary: Positive for flank pain. Skin: Negative. Neurological: Negative. Endo/Heme/Allergies: Negative. Psychiatric/Behavioral: Negative. Physical Exam  Vitals:    04/24/19 0616 04/24/19 0633 04/24/19 0644 04/24/19 0714   BP:   120/56    Pulse: 82 92 98    Resp: 17 16 19    Temp:   97.8 °F (36.6 °C) 97.9 °F (36.6 °C)   SpO2:   94%    Weight:    80.2 kg (176 lb 12.9 oz)   Height:    5' 5\" (1.651 m)       Physical Exam:  Physical Exam   Constitutional: She is oriented to person, place, and time. She appears unhealthy. HENT:   Head: Normocephalic. Eyes: Pupils are equal, round, and reactive to light. Neck: Normal range of motion. Cardiovascular: Normal rate and regular rhythm. Pulmonary/Chest: Effort normal and breath sounds normal.   Abdominal: Soft. There is tenderness. Musculoskeletal: Normal range of motion. Neurological: She is alert and oriented to person, place, and time. Skin: Skin is warm and dry. Psychiatric: Mood, memory, affect and judgment normal.       Cardiographics    Telemetry: normal sinus rhythm  ECG: atrial fibrillation, rate 163      Labs:   Recent Labs     04/24/19  0811 04/24/19  0426 04/23/19  2158    138 129*   K 3.7 2.7* 4.2   MG 1.5* 1.5*  --    BUN 11 11 12   CREA 0.76 0.74 0.94   * 261* 437*   WBC  --   --  14.3*   HGB  --   --  16.7*   HCT  --   --  50.7*   PLT  --   --  443        Assessment/Plan:     Assessment:      Principal Problem:    DKA (diabetic ketoacidoses)-- on insulin drip, per primary     Atrial fibrillation with RVR -- in NSR in 70s. Home medications resumed, Toprol XL and Flecainide. Eliquis for [facility]. Patient had not been taking medications at home.         Hypokalemia -- resolved       Paraplegia-- per primary      Bipolar disorder without psychotic features -- per primary      Chronic hepatitis C virus infection       UTI (urinary tract infection) -- on abx      Leukocytosis-- on abx        Diabetes-- see above      Hypertension -- stable. Continue to monitor      Hypomagnesemia -- replacement needed. Mag level 1.5, Mag 2 gm ordered. Abdominal pain -- per primary      SIRS without acute organ dysfunction due to non-infectious process           Thank you very much for this referral. We appreciate the opportunity to participate in this patient's care. We will sign off     Anthony De La Garza NP  Consulting MD: Wally Winkler    Pt seen and examined. Tough case. Now in nsr. and heading to the floor. Unfortunately not much to offer due to her lack of adherence.

## 2019-04-24 NOTE — PROGRESS NOTES
Hospitalist Progress Note Patient: Corazon Simental MRN: [id number]  SSN: [id number] YOB: 1955  Age: 62 y.o. Sex: female Admit Date: 4/23/2019 LOS: 1 day Subjective:  
 
From H&P: \"62 y.o. female with a past medical history of DM type II, HTN, atrial fibrillation, GERD, BPAD who presents to the ER with report of abdominal pain for the past 3 days. She reports that the pain feels like previous UTIs. Admits to dyspepsia as well. Admits that she has not been able to get her medicaitons refilled for the past 1 week, including her insulin and metformin. \" 
 
4/24 - She is drowsy this AM. States that abdominal pain is improved. Denies CP/SOB. Denies F/C/N/V. She is thirsty. Review of systems negative except stated above. Objective:  
 
Visit Vitals /56 Pulse 98 Temp 97.9 °F (36.6 °C) Resp 19 Ht 5' 5\" (1.651 m) Wt 80.2 kg (176 lb 12.9 oz) SpO2 94% BMI 29.42 kg/m² Oxygen Therapy O2 Sat (%): 94 % (04/24/19 0644) Pulse via Oximetry: 138 beats per minute (04/24/19 0510) O2 Device: Room air (04/24/19 9894) Intake and Output:  
 
Intake/Output Summary (Last 24 hours) at 4/24/2019 1659 Last data filed at 4/24/2019 5751 Gross per 24 hour Intake 3700 ml Output 600 ml Net 3100 ml Physical Exam:  
GENERAL: alert, cooperative, no distress, appears stated age EYE: conjunctivae/corneas clear. PERRL. THROAT & NECK: normal and no erythema or exudates noted. LUNG: clear to auscultation bilaterally HEART: irregular rate and rhythm, S1S2, no murmur, no JVD ABDOMEN: soft, non-tender, non-distended. Bowel sounds normal.  
EXTREMITIES:  No edema, 2+ pedal/radial pulses bilaterally SKIN: no rash or abnormalities NEUROLOGIC: Alert. Cranial nerves 2-12 grossly intact. Lab/Data Review: 
Recent Results (from the past 24 hour(s)) EKG, 12 LEAD, INITIAL Collection Time: 04/23/19  9:31 PM  
Result Value Ref Range  Ventricular Rate 169 BPM  
 Atrial Rate 441 BPM  
 QRS Duration 74 ms Q-T Interval 254 ms QTC Calculation (Bezet) 425 ms Calculated R Axis 7 degrees Calculated T Axis 129 degrees Diagnosis Atrial fibrillation with rapid ventricular response T wave abnormality, consider lateral ischemia or digitalis effect Abnormal ECG When compared with ECG of 31-MAR-2019 20:52, Atrial fibrillation has replaced Sinus rhythm Vent. rate has increased BY  63 BPM 
ST no longer depressed in Inferior leads Nonspecific T wave abnormality no longer evident in Inferior leads Inverted T waves have replaced nonspecific T wave abnormality in Lateral  
leads Confirmed by Cash Blanca ([id number]) on 4/24/2019 6:38:43 AM 
  
CBC WITH AUTOMATED DIFF Collection Time: 04/23/19  9:58 PM  
Result Value Ref Range WBC 14.3 (H) 4.3 - 11.1 K/uL  
 RBC 5.70 (H) 4.05 - 5.2 M/uL  
 HGB 16.7 (H) 11.7 - 15.4 g/dL HCT 50.7 (H) 35.8 - 46.3 % MCV 88.9 79.6 - 97.8 FL  
 MCH 29.3 26.1 - 32.9 PG  
 MCHC 32.9 31.4 - 35.0 g/dL  
 RDW 14.2 11.9 - 14.6 % PLATELET 292 812 - 358 K/uL MPV 11.1 9.4 - 12.3 FL ABSOLUTE NRBC 0.00 0.0 - 0.2 K/uL  
 DF AUTOMATED NEUTROPHILS 72 43 - 78 % LYMPHOCYTES 21 13 - 44 % MONOCYTES 5 4.0 - 12.0 % EOSINOPHILS 1 0.5 - 7.8 % BASOPHILS 1 0.0 - 2.0 % IMMATURE GRANULOCYTES 1 0.0 - 5.0 %  
 ABS. NEUTROPHILS 10.2 (H) 1.7 - 8.2 K/UL  
 ABS. LYMPHOCYTES 3.1 0.5 - 4.6 K/UL  
 ABS. MONOCYTES 0.7 0.1 - 1.3 K/UL  
 ABS. EOSINOPHILS 0.1 0.0 - 0.8 K/UL  
 ABS. BASOPHILS 0.1 0.0 - 0.2 K/UL  
 ABS. IMM. GRANS. 0.1 0.0 - 0.5 K/UL METABOLIC PANEL, COMPREHENSIVE Collection Time: 04/23/19  9:58 PM  
Result Value Ref Range Sodium 129 (L) 136 - 145 mmol/L Potassium 4.2 3.5 - 5.1 mmol/L Chloride 97 (L) 98 - 107 mmol/L  
 CO2 13 (L) 21 - 32 mmol/L Anion gap 19 (H) 7 - 16 mmol/L Glucose 437 (H) 65 - 100 mg/dL BUN 12 8 - 23 MG/DL Creatinine 0.94 0.6 - 1.0 MG/DL  
 GFR est AA >60 >60 ml/min/1.73m2 GFR est non-AA >60 >60 ml/min/1.73m2 Calcium 10.0 8.3 - 10.4 MG/DL Bilirubin, total 0.6 0.2 - 1.1 MG/DL  
 ALT (SGPT) 21 12 - 65 U/L  
 AST (SGOT) 15 15 - 37 U/L Alk. phosphatase 122 50 - 136 U/L Protein, total 8.4 (H) 6.3 - 8.2 g/dL Albumin 3.4 3.2 - 4.6 g/dL Globulin 5.0 (H) 2.3 - 3.5 g/dL A-G Ratio 0.7 (L) 1.2 - 3.5 CULTURE, BLOOD Collection Time: 04/23/19  9:58 PM  
Result Value Ref Range Special Requests: NO SPECIAL REQUESTS 
RIGHT 
ARM Culture result: NO GROWTH AFTER 9 HOURS    
CULTURE, BLOOD Collection Time: 04/23/19  9:58 PM  
Result Value Ref Range Special Requests: NO SPECIAL REQUESTS 
LEFT 
ARM Culture result: NO GROWTH AFTER 9 HOURS    
POC LACTIC ACID Collection Time: 04/23/19 10:02 PM  
Result Value Ref Range Lactic Acid (POC) 1.70 0.5 - 1.9 mmol/L  
POC TROPONIN-I Collection Time: 04/23/19 10:02 PM  
Result Value Ref Range Troponin-I (POC) 0 (L) 0.02 - 0.05 ng/ml CULTURE, URINE Collection Time: 04/23/19 11:24 PM  
Result Value Ref Range Special Requests: NO SPECIAL REQUESTS Culture result:     
  NO GROWTH AFTER SHORT PERIOD OF INCUBATION. FURTHER RESULTS TO FOLLOW AFTER OVERNIGHT INCUBATION. URINALYSIS W/ RFLX MICROSCOPIC Collection Time: 04/23/19 11:24 PM  
Result Value Ref Range Color YELLOW Appearance CLOUDY Specific gravity 1.036 (H) 1.001 - 1.023    
 pH (UA) 5.5 5.0 - 9.0 Protein 100 (A) NEG mg/dL Glucose >1,000 mg/dL Ketone >80 (A) NEG mg/dL Bilirubin SMALL (A) NEG Blood SMALL (A) NEG Urobilinogen 1.0 0.2 - 1.0 EU/dL Nitrites POSITIVE (A) NEG Leukocyte Esterase TRACE (A) NEG    
 WBC 20-50 0 /hpf  
 RBC 0-3 0 /hpf Epithelial cells 0-3 0 /hpf Bacteria TRACE 0 /hpf Casts 0-3 0 /lpf Yeast MODERATE    
EKG, 12 LEAD, INITIAL Collection Time: 04/24/19 12:46 AM  
Result Value Ref Range Ventricular Rate 80 BPM  
 Atrial Rate 80 BPM  
 P-R Interval 132 ms QRS Duration 74 ms Q-T Interval 350 ms QTC Calculation (Bezet) 403 ms Calculated P Axis -66 degrees Calculated R Axis 2 degrees Calculated T Axis -94 degrees Diagnosis    
  !! AGE AND GENDER SPECIFIC ECG ANALYSIS !! 
ectopic atrial rhythm- short pr 
ST & T wave abnormality, consider anterior ischemia Abnormal ECG When compared with ECG of 23-APR-2019 23:48, 
Junctional rhythm has replaced Ectopic atrial rhythm Inverted T waves have replaced nonspecific T wave abnormality in Anterior  
leads Confirmed by [facility]  MD ()UGO ([id number]) on 4/24/2019 8:49:51 AM 
  
POC G3 Collection Time: 04/24/19 12:55 AM  
Result Value Ref Range Device: ROOM AIR    
 pH (POC) 7.218 (LL) 7.35 - 7.45    
 pCO2 (POC) 26.7 (L) 35 - 45 MMHG  
 pO2 (POC) 79 75 - 100 MMHG  
 HCO3 (POC) 10.9 (L) 22 - 26 MMOL/L  
 sO2 (POC) 93 (L) 95 - 98 % Base deficit (POC) 15 mmol/L Allens test (POC) YES Site RIGHT RADIAL Patient temp. 98.6 Specimen type (POC) ARTERIAL Performed by Rosa   
 CO2, POC 12 MMOL/L Respiratory comment: PhysicianNotified COLLECT TIME 1,255 GLUCOSE, POC Collection Time: 04/24/19  1:53 AM  
Result Value Ref Range Glucose (POC) 335 (H) 65 - 100 mg/dL [facility] Collection Time: 04/24/19  1:56 AM  
Result Value Ref Range Glucose 335 mg/dL Insulin order 5.5 units/hour Insulin adminstered 5.5 units/hour Multiplier 0.020 Low target 150 mg/dL High target 250 mg/dL D50 order 0.0 ml  
 D50 administered 0.00 ml Minutes until next BG 60 min Order initials rh   
 Administered initials rh   
 GLSCOM Comments GLUCOSE, POC Collection Time: 04/24/19  3:08 AM  
Result Value Ref Range Glucose (POC) 290 (H) 65 - 100 mg/dL [facility] Collection Time: 04/24/19  3:10 AM  
Result Value Ref Range Glucose 290 mg/dL Insulin order 6.9 units/hour Insulin adminstered 6.9 units/hour Multiplier 0.030 Low target 150 mg/dL High target 250 mg/dL D50 order 0.0 ml  
 D50 administered 0.00 ml Minutes until next BG 60 min Order initials rh   
 Administered initials rh   
 GLSCOM Comments GLUCOSE, POC Collection Time: 04/24/19  4:21 AM  
Result Value Ref Range Glucose (POC) 243 (H) 65 - 100 mg/dL METABOLIC PANEL, BASIC Collection Time: 04/24/19  4:26 AM  
Result Value Ref Range Sodium 138 136 - 145 mmol/L Potassium 2.7 (LL) 3.5 - 5.1 mmol/L Chloride 110 (H) 98 - 107 mmol/L  
 CO2 14 (L) 21 - 32 mmol/L Anion gap 14 7 - 16 mmol/L Glucose 261 (H) 65 - 100 mg/dL BUN 11 8 - 23 MG/DL Creatinine 0.74 0.6 - 1.0 MG/DL  
 GFR est AA >60 >60 ml/min/1.73m2 GFR est non-AA >60 >60 ml/min/1.73m2 Calcium 8.0 (L) 8.3 - 10.4 MG/DL MAGNESIUM Collection Time: 04/24/19  4:26 AM  
Result Value Ref Range Magnesium 1.5 (L) 1.8 - 2.4 mg/dL HEMOGLOBIN A1C WITH EAG Collection Time: 04/24/19  4:26 AM  
Result Value Ref Range Hemoglobin A1c 11.8 (H) 4.8 - 6.0 % Est. average glucose 292 mg/dL PHOSPHORUS Collection Time: 04/24/19  4:26 AM  
Result Value Ref Range Phosphorus 2.0 (L) 2.3 - 3.7 MG/DL  
GLUCOSTABILIZER Collection Time: 04/24/19  4:27 AM  
Result Value Ref Range Glucose 243 mg/dL Insulin order 5.5 units/hour Insulin adminstered 5.5 units/hour Multiplier 0.030 Low target 150 mg/dL High target 250 mg/dL D50 order 0.0 ml  
 D50 administered 0.00 ml Minutes until next BG 60 min Order initials AG Administered initials MJ   
 GLSCOM Comments GLUCOSE, POC Collection Time: 04/24/19  5:34 AM  
Result Value Ref Range Glucose (POC) 210 (H) 65 - 100 mg/dL [place] Collection Time: 04/24/19  5:35 AM  
Result Value Ref Range Glucose 210 mg/dL Insulin order 4.5 units/hour Insulin adminstered 4.5 units/hour Multiplier 0.030 Low target 150 mg/dL High target 250 mg/dL D50 order 0.0 ml  
 D50 administered 0.00 ml Minutes until next BG 60 min Order initials AG Administered initials [address] Comments GLUCOSE, POC Collection Time: 04/24/19  6:40 AM  
Result Value Ref Range Glucose (POC) 189 (H) 65 - 100 mg/dL [facility] Collection Time: 04/24/19  6:41 AM  
Result Value Ref Range Glucose 189 mg/dL Insulin order 3.9 units/hour Insulin adminstered 3.9 units/hour Multiplier 0.030 Low target 150 mg/dL High target 250 mg/dL D50 order 0.0 ml  
 D50 administered 0.00 ml Minutes until next BG 60 min Order initials Ag Administered initials [address] Comments GLUCOSE, POC Collection Time: 04/24/19  7:43 AM  
Result Value Ref Range Glucose (POC) 201 (H) 65 - 100 mg/dL [facility] Collection Time: 04/24/19  7:44 AM  
Result Value Ref Range Glucose 201 mg/dL Insulin order 4.2 units/hour Insulin adminstered 4.2 units/hour Multiplier 0.030 Low target 150 mg/dL High target 250 mg/dL D50 order 0.0 ml  
 D50 administered 0.00 ml Minutes until next BG 60 min Order initials jnf Administered initials jnf GLSCOM Comments METABOLIC PANEL, BASIC Collection Time: 04/24/19  8:11 AM  
Result Value Ref Range Sodium 140 136 - 145 mmol/L Potassium 3.7 3.5 - 5.1 mmol/L Chloride 112 (H) 98 - 107 mmol/L  
 CO2 13 (L) 21 - 32 mmol/L Anion gap 15 7 - 16 mmol/L Glucose 215 (H) 65 - 100 mg/dL BUN 11 8 - 23 MG/DL Creatinine 0.76 0.6 - 1.0 MG/DL  
 GFR est AA >60 >60 ml/min/1.73m2 GFR est non-AA >60 >60 ml/min/1.73m2 Calcium 8.9 8.3 - 10.4 MG/DL MAGNESIUM Collection Time: 04/24/19  8:11 AM  
Result Value Ref Range Magnesium 1.5 (L) 1.8 - 2.4 mg/dL GLUCOSE, POC Collection Time: 04/24/19  8:45 AM  
Result Value Ref Range Glucose (POC) 180 (H) 65 - 100 mg/dL Diana Sánchez Collection Time: 04/24/19  8:46 AM  
Result Value Ref Range Glucose 180 mg/dL Insulin order 3.6 units/hour Insulin adminstered 3.6 units/hour Multiplier 0.030 Low target 150 mg/dL High target 250 mg/dL D50 order 0.0 ml  
 D50 administered 0.00 ml Minutes until next BG 60 min Order initials jnf Administered initials jnf GLSCOM Comments Imaging: Xr Chest Ish Call Result Date: 4/23/2019 EXAM: TEMPORARY INDICATION: Sepsis COMPARISON: 4/1/2019 FINDINGS: A portable AP radiograph of the chest was obtained at 2142 hours. The patient is on a cardiac monitor. The lungs are clear. The cardiac and mediastinal contours and pulmonary vascularity are normal.  The bones and soft tissues are grossly within normal limits. IMPRESSION: Normal chest. 
 
Xr Chest Ish Call Result Date: 4/1/2019 EXAM: XR CHEST PORT INDICATION: altered mental status COMPARISON: 3/10/2019 FINDINGS: A portable AP radiograph of the chest was obtained at 0006 hours. The patient is on a cardiac monitor. The lungs are clear. The cardiac and mediastinal contours and pulmonary vascularity are normal.  The bones and soft tissues are grossly within normal limits. IMPRESSION: Normal chest. 
 
No results found for this visit on 04/23/19. Cultures: All Micro Results Procedure Component Value Units Date/Time CULTURE, BLOOD [[id number]] Collected:  04/23/19 2158 Order Status:  Completed Specimen:  Blood Updated:  04/24/19 0809 Special Requests: --     
  NO SPECIAL REQUESTS 
RIGHT 
ARM Culture result: NO GROWTH AFTER 9 HOURS     
 CULTURE, BLOOD [[id number]] Collected:  04/23/19 2158 Order Status:  Completed Specimen:  Blood Updated:  04/24/19 0809 Special Requests: --     
  NO SPECIAL REQUESTS 
LEFT 
ARM Culture result: NO GROWTH AFTER 9 HOURS     
 CULTURE, URINE [[id number]] Collected:  04/23/19 2324 Order Status:  Completed Specimen:  Urine from [place] Specimen Updated:  04/24/19 5237 Special Requests: NO SPECIAL REQUESTS Culture result:    
  NO GROWTH AFTER SHORT PERIOD OF INCUBATION. FURTHER RESULTS TO FOLLOW AFTER OVERNIGHT INCUBATION. Assessment/Plan:  
 
Principal Problem: 
  DKA (diabetic ketoacidoses) ([facility] [address] (4/23/2019) - Likely due to medication non-compliance (A1C 11.8) + acute UTI 
- Continue insulin gtt - Restart home Lantus 30U daily --> stop insulin gtt afterwards - Stop normal saline --> start D5-NS with 20meq potassium - AG closed x 3 
- Check BMP Q4H --> change to Q12H if remains closed - Check Phos Q4H 
- Check Mag daily - Add Humalog SSI once off insulin gtt 
- Diabetes educators consulted Active Problems: 
  [facility] (2/14/2019) - Likely due to acute illness --> DKA + UTI 
- Continue Cardizem gtt for now - Continue Flecainide - Continue Toprol 
- Continue Eliquis 
- ECHO pending - Cardiology consulted for assistance SIRS without acute organ dysfunction due to non-infectious process [facility] (4/24/2019) - Most likely due to DKA 
- Resolving 
- Continue IVFs 
- Continue Cefepime - Blood cultures taken in ER Hypokalemia (12/10/2016) - Resolved - Monitor closely while on insulin gtt 
 
  UTI (urinary tract infection) (1/31/2019) 
- UA suggestive of pyruria 
- Continue Cefepime based on previous cultures of ESBL - Await final urine culture Leukocytosis (2/5/2019) - Likely reactive to DKA + UTI 
- Continue IVFs 
- Continue Cefepime - Check CBC daily Hypomagnesemia (4/3/2019) - Mg 1.5 
- Replaced with IV 
- Check daily Abdominal pain (4/23/2019) - Likely due to DKA + UTI 
- Resolving - Monitor for acute changes Hypertension (3/10/2019) - Stable - Stop Amlodipine since on Cardizem gtt 
- Continue Toprol Paraplegia ([facility] [address] (12/10/2016) Bipolar disorder without psychotic features ([facility] [address] (12/10/2016) - Continue Seroquel Diabetes ([facility] [address] (3/10/2019) - See #1 Chronic hepatitis C virus infection ([facility] [address] (12/10/2016) Narcotic addiction ([facility] [address] (12/10/2016) Dispo - Start home Lantus. Can transfer to floor if AG remains closed this afternoon. DIET DIABETIC CONSISTENT CARB Regular DVT Prophylaxis: Eliquis Signed By: Becky Bowden DO April 24, 2019

## 2019-04-24 NOTE — PROGRESS NOTES
Pt arrived to bed 3308 from ED, AXO, drowsy when not stimulated, on glucostabilizer and insulin gtt. Dual skin assessment performed with [facility] AT EDDIE OSORIO RN. Findings: Sacrum intact. Deep groove above coccyx but not a wound or open. Allevyn placed. Scattered bruising. Multiple attempts for IV access. #24g obtained in R hand. Pt receiving K+, NS, and cardizem @ 10. Last anion gap was 14, next BMP due @ 0830. No family currently in waiting room. Will continue to monitor.

## 2019-04-24 NOTE — H&P
HOSPITALIST INITIAL HISTORY AND PHYSICAL 
NAME:  Luis Poon Age:  62 y.o. 
:   1955 MRN:   [id number] PCP: Talon Wesley MD 
Consulting MD: Treatment Team: Attending Provider: Meche Burroughs MD; Primary Nurse: Jett Deleon RN 
 
CHIEF COMPLAINT: abdominal pain HISTORY OF PRESENT ILLNESS:  
Luis Poon is a 62 y.o. female with a past medical history of DM type II, HTN, atrial fibrillation, GERD, BPAD who presents to the ER with report of abdominal pain for the past 3 days. She reports that the pain feels like previous UTIs. Admits to dyspepsia as well. Admits that she has not been able to get her medicaitons refilled for the past 1 week, including her insulin and metformin. REVIEW OF SYSTEMS: Comprehensive ROS performed and negative except as stated in HPI. Past Medical History:  
Diagnosis Date  Diabetes ([facility]  Gastrointestinal disorder GERD  Hypertension  Ill-defined condition   
 neurogenic bladder  Ill-defined condition   
 transverse myelitis  Ill-defined condition   
 paraplegia  Liver disease Hepatitis C  
 Psychiatric disorder   
 anxiety  Psychiatric disorder   
 bipolar  Thromboembolus ([facility] [address] Past Surgical History:  
Procedure Laterality Date  HX HYSTERECTOMY Prior to Admission Medications Prescriptions Last Dose Informant Patient Reported? Taking? ALPRAZolam (XANAX) 0.5 mg tablet   No No  
Sig: Take 1 Tab by mouth two (2) times daily as needed for Anxiety. Max Daily Amount: 1 mg. Blood-Glucose Meter monitoring kit   No No  
Sig: Check glucose at home daily QUEtiapine (SEROQUEL) 100 mg tablet   No No  
Sig: Take 3 Tabs by mouth nightly. acetaminophen (TYLENOL) 325 mg tablet   No No  
Sig: Take 2 Tabs by mouth every six (6) hours as needed for Pain or Fever.   
albuterol (PROVENTIL HFA, VENTOLIN HFA, PROAIR HFA) 90 mcg/actuation inhaler   No No  
 Sig: Take 1 Puff by inhalation every four (4) hours as needed for Wheezing. albuterol (PROVENTIL VENTOLIN) 2.5 mg /3 mL (0.083 %) nebulizer solution   No No  
Sig: Take 3 mL by inhalation every four (4) hours as needed for Wheezing. amLODIPine (NORVASC) 5 mg tablet   No No  
Sig: Take 1 Tab by mouth daily. apixaban (ELIQUIS) 5 mg tablet   No No  
Sig: Take 1 Tab by mouth two (2) times a day. bisacodyl (DULCOLAX) 5 mg EC tablet   No No  
Sig: Take 1 Tab by mouth daily as needed for Constipation. budesonide (PULMICORT) 0.5 mg/2 mL nbsp   No No  
Si mL by Nebulization route two (2) times a day. fentaNYL (DURAGESIC) 25 mcg/hr PATCH   No No  
Si Patch by TransDERmal route every seventy-two (72) hours. Max Daily Amount: 1 Patch. flecainide (TAMBOCOR) 50 mg tablet   No No  
Sig: Take 1 Tab by mouth every twelve (12) hours. insulin glargine (LANTUS,BASAGLAR) 100 unit/mL (3 mL) inpn   No No  
Si Units by SubCUTAneous route nightly for 30 days. insulin lispro (HUMALOG) 100 unit/mL injection   No No  
Sig: Less than 150 =   0 units          
150 -199 =   2 units 200 -249 =   4 units 250 -299 =   6 units 300 -349 =   8 units Before meals and at bedtime. metoprolol succinate (TOPROL-XL) 50 mg XL tablet   No No  
Sig: Take 1 Tab by mouth daily. metoprolol succinate (TOPROL-XL) 50 mg XL tablet   No No  
Sig: Take 1 Tab by mouth daily. naloxone (NARCAN) 4 mg/actuation nasal spray   No No  
Sig: Use 1 spray intranasally, then discard. Repeat with new spray every 2 min as needed for opioid overdose symptoms, alternating nostrils. nystatin (MYCOSTATIN) powder   No No  
Sig: Apply  to affected area two (2) times a day. pantoprazole (PROTONIX) 40 mg tablet   No No  
Sig: Take 1 Tab by mouth Daily (before breakfast). polyethylene glycol (MIRALAX) 17 gram packet   No No  
Sig: Take 1 Packet by mouth daily as needed for Other (to have a daily BM). promethazine (PHENERGAN) 25 mg tablet   No No  
Sig: Take 1 Tab by mouth every eight (8) hours as needed for Nausea. zinc oxide-cod liver oil (DESITIN) 40 % paste   No No  
Sig: Apply  to affected area two (2) times a day. Facility-Administered Medications: None No Known Allergies FAMILY HISTORY: Reviewed. Negative except No family history on file. Social History Tobacco Use  Smoking status: Current Every Day Smoker Packs/day: 0.50 Substance Use Topics  Alcohol use: No  
 
 
 
Objective:  
 
Visit Vitals /87 (BP 1 Location: Left arm, BP Patient Position: At rest) Pulse (!) 165 Temp 98.1 °F (36.7 °C) Resp 16 Ht 5' 5\" (1.651 m) Wt 90.7 kg (200 lb) SpO2 94% BMI 33.28 kg/m² Temp (24hrs), Av.1 °F (36.7 °C), Min:98.1 °F (36.7 °C), Max:98.1 °F (36.7 °C) Oxygen Therapy O2 Sat (%): 94 % (19) O2 Device: Room air (19) Physical Exam: 
General:    The patient is a pleasant middle aged female appearing much older than stated age in no acute distress. Head:   Normocephalic/atraumatic. Eyes:  No palpebral pallor or scleral icterus. ENT:  External auricular and nasal exam within normal limits. Mucous membranes are moist. 
Neck:  Supple, non-tender, no JVD. Lungs:   Clear to auscultation bilaterally without wheezes or crackles. No respiratory distress or accessory muscle use. Heart:   Regular rate and rhythm, without murmurs, rubs, or gallops. Abdomen:   Soft, non-tender, non-distended with normoactive bowel sounds. Genitourinary: No tenderness over the bladder or bilateral CVAs. Extremities: Without clubbing, cyanosis, or edema. Skin:     Normal color, texture, and turgor. No rashes, lesions, or jaundice. Pulses: Radial and dorsalis pedis pulses present 2+ bilaterally. Capillary refill <2s. Neurologic: CN II-XII grossly intact and symmetrical.  
  Moving BUE well, baseline paraplegia. Psychiatric: Anxious affect. Alert and oriented x 3 Data Review:  
Recent Results (from the past 24 hour(s)) EKG, 12 LEAD, INITIAL Collection Time: 04/23/19  9:31 PM  
Result Value Ref Range Ventricular Rate 169 BPM  
 Atrial Rate 441 BPM  
 QRS Duration 74 ms Q-T Interval 254 ms QTC Calculation (Bezet) 425 ms Calculated R Axis 7 degrees Calculated T Axis 129 degrees Diagnosis    
  !! AGE AND GENDER SPECIFIC ECG ANALYSIS !! Atrial fibrillation with rapid ventricular response T wave abnormality, consider lateral ischemia or digitalis effect Abnormal ECG When compared with ECG of 31-MAR-2019 20:52, Atrial fibrillation has replaced Sinus rhythm Vent. rate has increased BY  63 BPM 
ST no longer depressed in Inferior leads Nonspecific T wave abnormality no longer evident in Inferior leads Inverted T waves have replaced nonspecific T wave abnormality in Lateral  
leads CBC WITH AUTOMATED DIFF Collection Time: 04/23/19  9:58 PM  
Result Value Ref Range WBC 14.3 (H) 4.3 - 11.1 K/uL  
 RBC 5.70 (H) 4.05 - 5.2 M/uL  
 HGB 16.7 (H) 11.7 - 15.4 g/dL HCT 50.7 (H) 35.8 - 46.3 % MCV 88.9 79.6 - 97.8 FL  
 MCH 29.3 26.1 - 32.9 PG  
 MCHC 32.9 31.4 - 35.0 g/dL  
 RDW 14.2 11.9 - 14.6 % PLATELET 907 585 - 689 K/uL MPV 11.1 9.4 - 12.3 FL ABSOLUTE NRBC 0.00 0.0 - 0.2 K/uL  
 DF AUTOMATED NEUTROPHILS 72 43 - 78 % LYMPHOCYTES 21 13 - 44 % MONOCYTES 5 4.0 - 12.0 % EOSINOPHILS 1 0.5 - 7.8 % BASOPHILS 1 0.0 - 2.0 % IMMATURE GRANULOCYTES 1 0.0 - 5.0 %  
 ABS. NEUTROPHILS 10.2 (H) 1.7 - 8.2 K/UL  
 ABS. LYMPHOCYTES 3.1 0.5 - 4.6 K/UL  
 ABS. MONOCYTES 0.7 0.1 - 1.3 K/UL  
 ABS. EOSINOPHILS 0.1 0.0 - 0.8 K/UL  
 ABS. BASOPHILS 0.1 0.0 - 0.2 K/UL  
 ABS. IMM. GRANS. 0.1 0.0 - 0.5 K/UL METABOLIC PANEL, COMPREHENSIVE Collection Time: 04/23/19  9:58 PM  
Result Value Ref Range Sodium 129 (L) 136 - 145 mmol/L  Potassium 4.2 3.5 - 5.1 mmol/L  
 Chloride 97 (L) 98 - 107 mmol/L  
 CO2 13 (L) 21 - 32 mmol/L Anion gap 19 (H) 7 - 16 mmol/L Glucose 437 (H) 65 - 100 mg/dL BUN 12 8 - 23 MG/DL Creatinine 0.94 0.6 - 1.0 MG/DL  
 GFR est AA >60 >60 ml/min/1.73m2 GFR est non-AA >60 >60 ml/min/1.73m2 Calcium 10.0 8.3 - 10.4 MG/DL Bilirubin, total 0.6 0.2 - 1.1 MG/DL  
 ALT (SGPT) 21 12 - 65 U/L  
 AST (SGOT) 15 15 - 37 U/L Alk. phosphatase 122 50 - 136 U/L Protein, total 8.4 (H) 6.3 - 8.2 g/dL Albumin 3.4 3.2 - 4.6 g/dL Globulin 5.0 (H) 2.3 - 3.5 g/dL A-G Ratio 0.7 (L) 1.2 - 3.5 POC LACTIC ACID Collection Time: 04/23/19 10:02 PM  
Result Value Ref Range Lactic Acid (POC) 1.70 0.5 - 1.9 mmol/L  
POC TROPONIN-I Collection Time: 04/23/19 10:02 PM  
Result Value Ref Range Troponin-I (POC) 0 (L) 0.02 - 0.05 ng/ml URINALYSIS W/ RFLX MICROSCOPIC Collection Time: 04/23/19 11:24 PM  
Result Value Ref Range Color YELLOW Appearance CLOUDY Specific gravity 1.036 (H) 1.001 - 1.023    
 pH (UA) 5.5 5.0 - 9.0 Protein 100 (A) NEG mg/dL Glucose >1,000 mg/dL Ketone >80 (A) NEG mg/dL Bilirubin SMALL (A) NEG Blood SMALL (A) NEG Urobilinogen 1.0 0.2 - 1.0 EU/dL Nitrites POSITIVE (A) NEG Leukocyte Esterase TRACE (A) NEG Imaging Varun Andria /Studies: Xr Chest [facility] Result Date: 4/23/2019 IMPRESSION: Normal chest. 
  
 
 
Assessment and Plan:  
 
Principal Problem: 
  DKA (diabetic ketoacidoses) ([address] (4/23/2019) IVF, glucostabilizer protocol. DM ed consult Active Problems: 
  Atrial fibrillation with RVR ([facility] [address] (2/14/2019) IV Diltiazem gtt. Cardiac monitor, TTE, consult cardiology. Abdominal pain (4/23/2019) Likely 2/2 DKA, UTI. 
 
  UTI. Complicated by suprapubic cath. IV Cefepime given hx of ESBL E coli UTI. Urine culture pending. Leukocytosis (2/5/2019) [facility] (2/19/2019) Per above. Diabetes ([facility] [address] (3/10/2019) Per above. Hypertension (3/10/2019) Stable, continue home meds Paraplegia ([address] (12/10/2016) Stable. - DVT Prophylaxis: Home Eliquis - Code Status: FULL CODE 
 
- Disposition: Admit to ICU for evaluation and treatment as per above. - Anticipated discharge: 2-3 days - Critical care time: 35 minutes More than 50% of the time documented was spent in face-to-face contact with the patient and in the care of the patient on the floor/unit where the patient is located. Signed By: Jessenia Basilio MD   
 April 23, 2019

## 2019-04-24 NOTE — PROGRESS NOTES
Per Dr. Jose J Fajardo, give 30 units lantus, stop insulin gtt in 30 minutes. Diet ordered, IV fluids changed. Will continue to monitor.

## 2019-04-24 NOTE — INTERDISCIPLINARY ROUNDS
Interdisciplinary team rounds were held 4/24/2019 with the following team members:Care Management, Nursing, Nurse Practitioner, Nutrition, Palliative Care, Pastoral Care, Pharmacy, Physical Therapy, Physician, Respiratory Therapy and Clinical Coordinator and the patient. Plan of care discussed. See clinical pathway and/or care plan for interventions and desired outcomes.

## 2019-04-24 NOTE — ED PROVIDER NOTES
HPI: 
62 female multiple medical problems including A. Fib, internal and fully catheter, paraplegia hypertension, diabetes is here with complaint of abdominal pain x 3 days with \"I think I have another uTI\". Has been out of her medication for the past one week. Problem with insurance not paying for medication. On Eliquis. Has not taken any medication including her insulin. Unsure about fever. No cough. Anterior abdominal pain. Described as burning in the upper abdomen. She denies chest pain Stated I feel sick ROS Constitutional: No fever, no chills Skin: no rash Eye: No vision changes ENMT: No sore throat Respiratory: No shortness of breath, no cough Cardiovascular: No chest pain, no palpitations Gastrointestinal: No vomiting, no nausea, no diarrhea, + abdominal pain :  
MSK: No back pain, no muscle pain Neuro: No headache, no change in mental status, no numbness, no tingling, + weakness Psych:  
Endocrine:  
All other review of systems positive per history of present illness and the above otherwise negative or noncontributory. Visit Vitals /87 (BP 1 Location: Left arm, BP Patient Position: At rest) Pulse (!) 175 Temp 98.1 °F (36.7 °C) Resp 16 Ht 5' 5\" (1.651 m) Wt 90.7 kg (200 lb) SpO2 94% BMI 33.28 kg/m² Past Medical History:  
Diagnosis Date  Diabetes ([facility] [address]  Gastrointestinal disorder GERD  Hypertension  Ill-defined condition   
 neurogenic bladder  Ill-defined condition   
 transverse myelitis  Ill-defined condition   
 paraplegia  Liver disease Hepatitis C  
 Psychiatric disorder   
 anxiety  Psychiatric disorder   
 bipolar  [facility] Past Surgical History:  
Procedure Laterality Date  HX HYSTERECTOMY Prior to Admission Medications Prescriptions Last Dose Informant Patient Reported? Taking?   
ALPRAZolam (XANAX) 0.5 mg tablet   No No  
 Sig: Take 1 Tab by mouth two (2) times daily as needed for Anxiety. Max Daily Amount: 1 mg. Blood-Glucose Meter monitoring kit   No No  
Sig: Check glucose at home daily QUEtiapine (SEROQUEL) 100 mg tablet   No No  
Sig: Take 3 Tabs by mouth nightly. acetaminophen (TYLENOL) 325 mg tablet   No No  
Sig: Take 2 Tabs by mouth every six (6) hours as needed for Pain or Fever. albuterol (PROVENTIL HFA, VENTOLIN HFA, PROAIR HFA) 90 mcg/actuation inhaler   No No  
Sig: Take 1 Puff by inhalation every four (4) hours as needed for Wheezing. albuterol (PROVENTIL VENTOLIN) 2.5 mg /3 mL (0.083 %) nebulizer solution   No No  
Sig: Take 3 mL by inhalation every four (4) hours as needed for Wheezing. amLODIPine (NORVASC) 5 mg tablet   No No  
Sig: Take 1 Tab by mouth daily. apixaban (ELIQUIS) 5 mg tablet   No No  
Sig: Take 1 Tab by mouth two (2) times a day. bisacodyl (DULCOLAX) 5 mg EC tablet   No No  
Sig: Take 1 Tab by mouth daily as needed for Constipation. budesonide (PULMICORT) 0.5 mg/2 mL nbsp   No No  
Si mL by Nebulization route two (2) times a day. fentaNYL (DURAGESIC) 25 mcg/hr PATCH   No No  
Si Patch by TransDERmal route every seventy-two (72) hours. Max Daily Amount: 1 Patch. flecainide (TAMBOCOR) 50 mg tablet   No No  
Sig: Take 1 Tab by mouth every twelve (12) hours. insulin glargine (LANTUS,BASAGLAR) 100 unit/mL (3 mL) inpn   No No  
Si Units by SubCUTAneous route nightly for 30 days. insulin lispro (HUMALOG) 100 unit/mL injection   No No  
Sig: Less than 150 =   0 units          
150 -199 =   2 units 200 -249 =   4 units 250 -299 =   6 units 300 -349 =   8 units Before meals and at bedtime. metoprolol succinate (TOPROL-XL) 50 mg XL tablet   No No  
Sig: Take 1 Tab by mouth daily. metoprolol succinate (TOPROL-XL) 50 mg XL tablet   No No  
Sig: Take 1 Tab by mouth daily.   
naloxone (NARCAN) 4 mg/actuation nasal spray   No No  
 Sig: Use 1 spray intranasally, then discard. Repeat with new spray every 2 min as needed for opioid overdose symptoms, alternating nostrils. nystatin (MYCOSTATIN) powder   No No  
Sig: Apply  to affected area two (2) times a day. pantoprazole (PROTONIX) 40 mg tablet   No No  
Sig: Take 1 Tab by mouth Daily (before breakfast). polyethylene glycol (MIRALAX) 17 gram packet   No No  
Sig: Take 1 Packet by mouth daily as needed for Other (to have a daily BM). promethazine (PHENERGAN) 25 mg tablet   No No  
Sig: Take 1 Tab by mouth every eight (8) hours as needed for Nausea. zinc oxide-cod liver oil (DESITIN) 40 % paste   No No  
Sig: Apply  to affected area two (2) times a day. Facility-Administered Medications: None Adult Exam  
General: alert, no acute distress Head: normocephalic, atraumatic ENT: dry mucous membranes Neck: supple, non-tender; full range of motion Cardiovascular: tachcyardic and irregularly irregular, normal peripheral perfusion, no edema Respiratory:  normal respirations; no wheezing. Trace crackle in the lung bases Gastrointestinal: soft, tenderness to palpation and upper abdomen.; no rebound or guarding, no peritoneal signs, no distension Suprapubic catheter in the midline suprapubic region. No discharge, erythema. Back: non-tender, full range of motion Musculoskeletal: range of motion and strength at baseline no gross deformities Neurological: alert and oriented x 4, no gross focal deficits; normal speech Psychiatric: cooperative; appropriate mood and affect MDM: 
Patient was moved from the hallway to a bed. Placed on EKG and monitor. Heart rate now in the 170. Consistent with atrial fibrillation with rapid ventricular response. eKG rate of 169. A. Fib. Normal axis. No STEMI urticaria noted. Nonspecific ischemic changes likely secondary to rate. Very difficult IV stick.   Nursing staffwas able to successfully attempt one on the right chest wall with my permission. I was able to also obtain an ultrasound guided IV in the left antecubital. 
 
Appear very dry. Has not taken any medication including insulin. Concern for possible DKA. Now also in A. Fib. Will give Cardizem. Will give IV fluid. We'll prophylactically treat with vancomycin and Zosyn due to indwelling Campbell catheter with history of recurrent UTI with ESBL on previous urine culture Blood culture has been obtained. 12:01 AM 
Urine consistent with UTI. Thank and Zosyn has been given. Due to difficult IV access we unfortunately lost the IV in the left before meals. We'll give another 10 of Cardizem IV. After that her heart rate is in the 80s and holding. We'll cancel Cardizem drip. She is currently getting antibiotics. Ketones elevated. Appear consistent with DKA. I updated the hospitalist.  And agreement for DKA. Will need to be admitted. He has ordered a glucose stabilizes. Patient will need ICU. 
 
=================================================================== This patient is critically ill and there is a high probability of of imminent or life threatening deterioration in the patient's condition without immediate management. The nature of the patient's clinical problem is: DKA, UTI, atrial fibrillation with RVR, dehydration I have spent at least 45 minutes in direct patient care, documentation, review of labs/xrays/old records, discussion with Hospitalist 
 
The time involved in the performance of separately reportable procedures was not counted toward critical care time. Susan Tran MD; 4/24/2019 @12:02 AM 
=================================================================== 
 
 
 
 
 
================================ 
Procedure note - ultrasound guided IV Ultrasound used to obtain an IV in the left antecubital.  Area was cleaned and prepped with ChloraPrep. Using typical technique. Successfully place a 20-gauge IV. Total time of duration 15 minutes 
================================ No results found. Recent Results (from the past 24 hour(s)) EKG, 12 LEAD, INITIAL Collection Time: 04/23/19  9:31 PM  
Result Value Ref Range Ventricular Rate 169 BPM  
 Atrial Rate 441 BPM  
 QRS Duration 74 ms Q-T Interval 254 ms QTC Calculation (Bezet) 425 ms Calculated R Axis 7 degrees Calculated T Axis 129 degrees Diagnosis    
  !! AGE AND GENDER SPECIFIC ECG ANALYSIS !! Atrial fibrillation with rapid ventricular response T wave abnormality, consider lateral ischemia or digitalis effect Abnormal ECG When compared with ECG of 31-MAR-2019 20:52, Atrial fibrillation has replaced Sinus rhythm Vent. rate has increased BY  63 BPM 
ST no longer depressed in Inferior leads Nonspecific T wave abnormality no longer evident in Inferior leads Inverted T waves have replaced nonspecific T wave abnormality in Lateral  
leads CBC WITH AUTOMATED DIFF Collection Time: 04/23/19  9:58 PM  
Result Value Ref Range WBC 14.3 (H) 4.3 - 11.1 K/uL  
 RBC 5.70 (H) 4.05 - 5.2 M/uL  
 HGB 16.7 (H) 11.7 - 15.4 g/dL HCT 50.7 (H) 35.8 - 46.3 % MCV 88.9 79.6 - 97.8 FL  
 MCH 29.3 26.1 - 32.9 PG  
 MCHC 32.9 31.4 - 35.0 g/dL  
 RDW 14.2 11.9 - 14.6 % PLATELET 550 479 - 235 K/uL MPV 11.1 9.4 - 12.3 FL ABSOLUTE NRBC 0.00 0.0 - 0.2 K/uL  
 DF AUTOMATED NEUTROPHILS 72 43 - 78 % LYMPHOCYTES 21 13 - 44 % MONOCYTES 5 4.0 - 12.0 % EOSINOPHILS 1 0.5 - 7.8 % BASOPHILS 1 0.0 - 2.0 % IMMATURE GRANULOCYTES 1 0.0 - 5.0 %  
 ABS. NEUTROPHILS 10.2 (H) 1.7 - 8.2 K/UL  
 ABS. LYMPHOCYTES 3.1 0.5 - 4.6 K/UL  
 ABS. MONOCYTES 0.7 0.1 - 1.3 K/UL  
 ABS. EOSINOPHILS 0.1 0.0 - 0.8 K/UL  
 ABS. BASOPHILS 0.1 0.0 - 0.2 K/UL  
 ABS. IMM. GRANS. 0.1 0.0 - 0.5 K/UL POC LACTIC ACID Collection Time: 04/23/19 10:02 PM  
Result Value Ref Range  Lactic Acid (POC) 1.70 0.5 - 1.9 mmol/L  
POC TROPONIN-I  
 Collection Time: 04/23/19 10:02 PM  
Result Value Ref Range Troponin-I (POC) 0 (L) 0.02 - 0.05 ng/ml Dragon voice recognition software was used to create this note. Although the note has been reviewed and corrected where necessary, additional errors may have been overlooked and remain in the text.

## 2019-04-24 NOTE — PROGRESS NOTES
04/24/19 1640 Dual Skin Pressure Injury Assessment Dual Skin Pressure Injury Assessment [facility] Second Care Provider (Based on [address] [place] [address]) Lizet Zamarripa RN

## 2019-04-24 NOTE — ED TRIAGE NOTES
Pt arrived from home via [facility]; called out for abdomen pain, pt presented with possible UTI. Pt is A&Ox4, VSS, . Pt states she has been out of her routine medication x 7 days d/t insurance issue. Pt states, \"My stomach's been burring for 3 days. I think I've got a UTI, I get them all the time. \" Pt has a suprapubic catheter, states this is related to incontinence issue. Pt states hx of IDDM, paraplegia, a-fib, HTN, and \"there's more, I can't think now. \"

## 2019-04-24 NOTE — PROGRESS NOTES
TRANSFER - IN REPORT: 
 
Verbal report received from Adrian Nunez RN on Royce Dewey  being received from CCU for routine progression of care Report consisted of patients Situation, Background, Assessment and  
Recommendations(SBAR). Information from the following report(s) SBAR, Kardex, ED Summary, Intake/Output, MAR, Recent Results and Cardiac Rhythm NSR was reviewed with the receiving nurse. Opportunity for questions and clarification was provided. Assessment completed upon patients arrival to unit and care assumed.

## 2019-04-24 NOTE — PROGRESS NOTES
TRANSFER - IN REPORT: 
 
Verbal report received from ED RN on Corazon Sour  being received from ED for routine progression of care Report consisted of patients Situation, Background, Assessment and  
Recommendations(SBAR). Information from the following report(s) ED Summary was reviewed with the receiving nurse. Opportunity for questions and clarification was provided. Assessment completed upon patients arrival to unit and care assumed.

## 2019-04-24 NOTE — ROUTINE PROCESS
TRANSFER - OUT REPORT: 
 
Verbal report given to Lita Bates RN on Kathy Obregon  being transferred to [phone] for routine progression of care Report consisted of patients Situation, Background, Assessment and  
Recommendations(SBAR). Information from the following report(s) SBAR, Kardex, ED Summary, MAR, Recent Results and Cardiac Rhythm Normal Sinus was reviewed with the receiving nurse. Lines: PICC Single Lumen 34/36/25 Right;Basilic (Active) Peripheral IV 04/23/19 Right Other(comment) (Active) Site Assessment Clean, dry, & intact 4/23/2019 11:46 PM  
Phlebitis Assessment 0 4/23/2019 11:46 PM  
Infiltration Assessment 0 4/23/2019 11:46 PM  
Dressing Status Clean, dry, & intact 4/23/2019 11:46 PM  
Hub Color/Line Status Pink 4/23/2019 11:46 PM  
Action Taken Catheter retaped 4/23/2019 11:46 PM  
   
Peripheral IV 04/24/19 Left Arm (Active) Site Assessment Clean, dry, & intact 4/24/2019 12:22 AM  
Phlebitis Assessment 0 4/24/2019 12:22 AM  
Infiltration Assessment 0 4/24/2019 12:22 AM  
Dressing Status Clean, dry, & intact 4/24/2019 12:22 AM  
Dressing Type 4 X 4 4/24/2019 12:22 AM  
Hub Color/Line Status Pink 4/24/2019 12:22 AM  
Alcohol Cap Used Yes 4/24/2019 12:22 AM  
  
 
Opportunity for questions and clarification was provided. Patient transported with: 
 Monitor Registered Nurse

## 2019-04-24 NOTE — PROGRESS NOTES
TRANSFER - OUT REPORT: 
 
Verbal report given to Kezia Borrero RN on Gypsy Cordoba  being transferred to [phone] for routine progression of care Report consisted of patients Situation, Background, Assessment and  
Recommendations(SBAR). Information from the following report(s) SBAR, Kardex, ED Summary, Intake/Output, MAR, Recent Results and Cardiac Rhythm NSR was reviewed with the receiving nurse. Lines: PICC Single Lumen 78/70/30 Right;Basilic (Active) Peripheral IV 04/23/19 Right Other(comment) (Active) Site Assessment Clean, dry, & intact 4/24/2019  7:15 AM  
Phlebitis Assessment 0 4/24/2019  7:15 AM  
Infiltration Assessment 0 4/24/2019  7:15 AM  
Dressing Status Clean, dry, & intact 4/24/2019  7:15 AM  
Dressing Type Tape;Transparent 4/24/2019  7:15 AM  
Hub Color/Line Status Pink; Infusing 4/24/2019  7:15 AM  
Action Taken Catheter retaped 4/23/2019 11:46 PM  
Alcohol Cap Used No 4/24/2019  7:15 AM  
   
Peripheral IV 04/24/19 Right Hand (Active) Site Assessment Clean, dry, & intact 4/24/2019  7:15 AM  
Phlebitis Assessment 0 4/24/2019  7:15 AM  
Infiltration Assessment 0 4/24/2019  7:15 AM  
Dressing Status New 4/24/2019  7:15 AM  
Dressing Type Tape;Transparent 4/24/2019  7:15 AM  
Hub Color/Line Status Yellow; Infusing 4/24/2019  7:15 AM  
Alcohol Cap Used No 4/24/2019  7:15 AM  
  
 
Opportunity for questions and clarification was provided. Patient transported with: 
 Monitor Registered Nurse Tech

## 2019-04-24 NOTE — DIABETES MGMT
Patient admitted with DKA. Admitting blood glucose 437. HbA1c 11.8 (eAG 292). Pt currently on insulin gtt. Most recent FSBS 180. Creatinine 0.76. GFR >60. Anion gap 15. K+ 3.7. Pt is well known to diabetes management team as she was last seen by this educator during previous admission in March. Pt currently drowsy no family at bedside. Will defer education until pt is able and willing to participate.

## 2019-04-24 NOTE — ED NOTES
Bedside report received from NALDO You for continuation of patient care upon shift change. Patient care transferred at this time.

## 2019-04-24 NOTE — PROGRESS NOTES
Hourly rounds completed. Patient sleeping with covers over head. Answers questions. Alert and oriented. Taking pills whole. Dinner time glucose was 403, MD was paged and ordered to give 18 units of humalog. Patient resting, no needs. Will continue to monitor and report to oncoming RN.

## 2019-04-24 NOTE — ED NOTES
Patient in avib rvr. Ganesh Fatima MD made aware. Verbal order for 20mg Cardizem bolus and to start cardizem drip.

## 2019-04-25 LAB
ANION GAP SERPL CALC-SCNC: 12 MMOL/L (ref 7–16)
ANION GAP SERPL CALC-SCNC: 12 MMOL/L (ref 7–16)
BUN SERPL-MCNC: 5 MG/DL (ref 8–23)
BUN SERPL-MCNC: 5 MG/DL (ref 8–23)
CALCIUM SERPL-MCNC: 8.1 MG/DL (ref 8.3–10.4)
CALCIUM SERPL-MCNC: 8.8 MG/DL (ref 8.3–10.4)
CHLORIDE SERPL-SCNC: 113 MMOL/L (ref 98–107)
CHLORIDE SERPL-SCNC: 114 MMOL/L (ref 98–107)
CO2 SERPL-SCNC: 14 MMOL/L (ref 21–32)
CO2 SERPL-SCNC: 16 MMOL/L (ref 21–32)
CREAT SERPL-MCNC: 0.75 MG/DL (ref 0.6–1)
CREAT SERPL-MCNC: 0.78 MG/DL (ref 0.6–1)
ERYTHROCYTE [DISTWIDTH] IN BLOOD BY AUTOMATED COUNT: 14.9 % (ref 11.9–14.6)
GLUCOSE BLD STRIP.AUTO-MCNC: 294 MG/DL (ref 65–100)
GLUCOSE BLD STRIP.AUTO-MCNC: 318 MG/DL (ref 65–100)
GLUCOSE BLD STRIP.AUTO-MCNC: 328 MG/DL (ref 65–100)
GLUCOSE BLD STRIP.AUTO-MCNC: 381 MG/DL (ref 65–100)
GLUCOSE SERPL-MCNC: 338 MG/DL (ref 65–100)
GLUCOSE SERPL-MCNC: 417 MG/DL (ref 65–100)
HCT VFR BLD AUTO: 39.2 % (ref 35.8–46.3)
HGB BLD-MCNC: 12.5 G/DL (ref 11.7–15.4)
MAGNESIUM SERPL-MCNC: 1.7 MG/DL (ref 1.8–2.4)
MCH RBC QN AUTO: 29.1 PG (ref 26.1–32.9)
MCHC RBC AUTO-ENTMCNC: 31.9 G/DL (ref 31.4–35)
MCV RBC AUTO: 91.2 FL (ref 79.6–97.8)
NRBC # BLD: 0.07 K/UL (ref 0–0.2)
PHOSPHATE SERPL-MCNC: 2.5 MG/DL (ref 2.3–3.7)
PLATELET # BLD AUTO: 219 K/UL (ref 150–450)
PMV BLD AUTO: 11.4 FL (ref 9.4–12.3)
POTASSIUM SERPL-SCNC: 3.8 MMOL/L (ref 3.5–5.1)
POTASSIUM SERPL-SCNC: 3.9 MMOL/L (ref 3.5–5.1)
RBC # BLD AUTO: 4.3 M/UL (ref 4.05–5.2)
SODIUM SERPL-SCNC: 139 MMOL/L (ref 136–145)
SODIUM SERPL-SCNC: 142 MMOL/L (ref 136–145)
WBC # BLD AUTO: 6.3 K/UL (ref 4.3–11.1)

## 2019-04-25 PROCEDURE — 74011250637 HC RX REV CODE- 250/637: Performed by: FAMILY MEDICINE

## 2019-04-25 PROCEDURE — 80048 BASIC METABOLIC PNL TOTAL CA: CPT

## 2019-04-25 PROCEDURE — 82962 GLUCOSE BLOOD TEST: CPT

## 2019-04-25 PROCEDURE — 84100 ASSAY OF PHOSPHORUS: CPT

## 2019-04-25 PROCEDURE — 77030020263 HC SOL INJ SOD CL0.9% LFCR 1000ML

## 2019-04-25 PROCEDURE — 83735 ASSAY OF MAGNESIUM: CPT

## 2019-04-25 PROCEDURE — 74011250636 HC RX REV CODE- 250/636: Performed by: FAMILY MEDICINE

## 2019-04-25 PROCEDURE — 74011636637 HC RX REV CODE- 636/637: Performed by: FAMILY MEDICINE

## 2019-04-25 PROCEDURE — 85027 COMPLETE CBC AUTOMATED: CPT

## 2019-04-25 PROCEDURE — 74011250636 HC RX REV CODE- 250/636: Performed by: INTERNAL MEDICINE

## 2019-04-25 PROCEDURE — 74011250637 HC RX REV CODE- 250/637: Performed by: INTERNAL MEDICINE

## 2019-04-25 PROCEDURE — 36415 COLL VENOUS BLD VENIPUNCTURE: CPT

## 2019-04-25 PROCEDURE — 94760 N-INVAS EAR/PLS OXIMETRY 1: CPT

## 2019-04-25 PROCEDURE — 74011000258 HC RX REV CODE- 258: Performed by: FAMILY MEDICINE

## 2019-04-25 PROCEDURE — 65660000000 HC RM CCU STEPDOWN

## 2019-04-25 PROCEDURE — 74011000250 HC RX REV CODE- 250: Performed by: FAMILY MEDICINE

## 2019-04-25 PROCEDURE — 94640 AIRWAY INHALATION TREATMENT: CPT

## 2019-04-25 PROCEDURE — 74011636637 HC RX REV CODE- 636/637: Performed by: INTERNAL MEDICINE

## 2019-04-25 RX ORDER — INSULIN LISPRO 100 [IU]/ML
INJECTION, SOLUTION INTRAVENOUS; SUBCUTANEOUS
Status: DISCONTINUED | OUTPATIENT
Start: 2019-04-25 | End: 2019-05-02 | Stop reason: HOSPADM

## 2019-04-25 RX ORDER — INSULIN GLARGINE 100 [IU]/ML
10 INJECTION, SOLUTION SUBCUTANEOUS ONCE
Status: COMPLETED | OUTPATIENT
Start: 2019-04-25 | End: 2019-04-25

## 2019-04-25 RX ORDER — INSULIN LISPRO 100 [IU]/ML
5 INJECTION, SOLUTION INTRAVENOUS; SUBCUTANEOUS
Status: DISCONTINUED | OUTPATIENT
Start: 2019-04-25 | End: 2019-04-26

## 2019-04-25 RX ORDER — INSULIN GLARGINE 100 [IU]/ML
40 INJECTION, SOLUTION SUBCUTANEOUS DAILY
Status: DISCONTINUED | OUTPATIENT
Start: 2019-04-26 | End: 2019-04-28

## 2019-04-25 RX ORDER — VANCOMYCIN 2 GRAM/500 ML IN 0.9 % SODIUM CHLORIDE INTRAVENOUS
2000 ONCE
Status: COMPLETED | OUTPATIENT
Start: 2019-04-25 | End: 2019-04-25

## 2019-04-25 RX ORDER — MAGNESIUM SULFATE HEPTAHYDRATE 40 MG/ML
2 INJECTION, SOLUTION INTRAVENOUS ONCE
Status: COMPLETED | OUTPATIENT
Start: 2019-04-25 | End: 2019-04-25

## 2019-04-25 RX ORDER — VANCOMYCIN HYDROCHLORIDE
1250 EVERY 12 HOURS
Status: DISCONTINUED | OUTPATIENT
Start: 2019-04-25 | End: 2019-04-25

## 2019-04-25 RX ADMIN — MAGNESIUM SULFATE HEPTAHYDRATE 2 G: 40 INJECTION, SOLUTION INTRAVENOUS at 16:58

## 2019-04-25 RX ADMIN — INSULIN LISPRO 5 UNITS: 100 INJECTION, SOLUTION INTRAVENOUS; SUBCUTANEOUS at 17:00

## 2019-04-25 RX ADMIN — NYSTATIN: 100000 POWDER TOPICAL at 17:00

## 2019-04-25 RX ADMIN — QUETIAPINE FUMARATE 300 MG: 100 TABLET ORAL at 22:12

## 2019-04-25 RX ADMIN — Medication: at 08:00

## 2019-04-25 RX ADMIN — APIXABAN 5 MG: 5 TABLET, FILM COATED ORAL at 09:19

## 2019-04-25 RX ADMIN — Medication 10 ML: at 05:36

## 2019-04-25 RX ADMIN — NYSTATIN: 100000 POWDER TOPICAL at 09:00

## 2019-04-25 RX ADMIN — SODIUM CHLORIDE 75 ML/HR: 900 INJECTION, SOLUTION INTRAVENOUS at 21:11

## 2019-04-25 RX ADMIN — Medication 10 ML: at 22:23

## 2019-04-25 RX ADMIN — INSULIN GLARGINE 10 UNITS: 100 INJECTION, SOLUTION SUBCUTANEOUS at 16:30

## 2019-04-25 RX ADMIN — OXYCODONE HYDROCHLORIDE 10 MG: 5 TABLET ORAL at 19:53

## 2019-04-25 RX ADMIN — INSULIN LISPRO 5 UNITS: 100 INJECTION, SOLUTION INTRAVENOUS; SUBCUTANEOUS at 12:07

## 2019-04-25 RX ADMIN — OXYCODONE HYDROCHLORIDE 10 MG: 5 TABLET ORAL at 09:19

## 2019-04-25 RX ADMIN — INSULIN GLARGINE 30 UNITS: 100 INJECTION, SOLUTION SUBCUTANEOUS at 09:00

## 2019-04-25 RX ADMIN — INSULIN LISPRO 6 UNITS: 100 INJECTION, SOLUTION INTRAVENOUS; SUBCUTANEOUS at 17:00

## 2019-04-25 RX ADMIN — INSULIN LISPRO 10 UNITS: 100 INJECTION, SOLUTION INTRAVENOUS; SUBCUTANEOUS at 22:10

## 2019-04-25 RX ADMIN — SODIUM CHLORIDE 75 ML/HR: 900 INJECTION, SOLUTION INTRAVENOUS at 02:05

## 2019-04-25 RX ADMIN — METOPROLOL SUCCINATE 50 MG: 50 TABLET, EXTENDED RELEASE ORAL at 09:19

## 2019-04-25 RX ADMIN — BUDESONIDE 500 MCG: 0.5 INHALANT RESPIRATORY (INHALATION) at 07:46

## 2019-04-25 RX ADMIN — VANCOMYCIN HYDROCHLORIDE 2000 MG: 10 INJECTION, POWDER, LYOPHILIZED, FOR SOLUTION INTRAVENOUS at 02:01

## 2019-04-25 RX ADMIN — INSULIN LISPRO 8 UNITS: 100 INJECTION, SOLUTION INTRAVENOUS; SUBCUTANEOUS at 12:07

## 2019-04-25 RX ADMIN — FLECAINIDE ACETATE 50 MG: 100 TABLET ORAL at 22:12

## 2019-04-25 RX ADMIN — Medication: at 21:00

## 2019-04-25 RX ADMIN — INSULIN LISPRO 8 UNITS: 100 INJECTION, SOLUTION INTRAVENOUS; SUBCUTANEOUS at 07:30

## 2019-04-25 RX ADMIN — APIXABAN 5 MG: 5 TABLET, FILM COATED ORAL at 16:58

## 2019-04-25 RX ADMIN — MEROPENEM 500 MG: 500 INJECTION, POWDER, FOR SOLUTION INTRAVENOUS at 16:57

## 2019-04-25 RX ADMIN — MEROPENEM 500 MG: 500 INJECTION, POWDER, FOR SOLUTION INTRAVENOUS at 22:13

## 2019-04-25 RX ADMIN — VANCOMYCIN HYDROCHLORIDE 1000 MG: 1 INJECTION, POWDER, LYOPHILIZED, FOR SOLUTION INTRAVENOUS at 15:40

## 2019-04-25 RX ADMIN — PANTOPRAZOLE SODIUM 40 MG: 40 TABLET, DELAYED RELEASE ORAL at 05:36

## 2019-04-25 RX ADMIN — MEROPENEM 500 MG: 500 INJECTION, POWDER, FOR SOLUTION INTRAVENOUS at 12:06

## 2019-04-25 RX ADMIN — FLECAINIDE ACETATE 50 MG: 100 TABLET ORAL at 09:19

## 2019-04-25 RX ADMIN — INSULIN GLARGINE 10 UNITS: 100 INJECTION, SOLUTION SUBCUTANEOUS at 23:00

## 2019-04-25 NOTE — PROGRESS NOTES
Hospitalist Progress Note Patient: Roberto Devine MRN: [id number]  SSN: [id number] YOB: 1955  Age: 62 y.o. Sex: female Admit Date: 4/23/2019 LOS: 2 days Subjective:  
 
From H&P: \"62 y.o. female with a past medical history of DM type II, HTN, atrial fibrillation, GERD, BPAD who presents to the ER with report of abdominal pain for the past 3 days. She reports that the pain feels like previous UTIs. Admits to dyspepsia as well. Admits that she has not been able to get her medicaitons refilled for the past 1 week, including her insulin and metformin. \" 
 
4/25 - Pt is feeling better this morning, tolerating breakfast without n/v. Lab draws delayed and no metabolic panel yet this morning, reordered stat. Denies CP/SOB. Denies F/C/N/V/abd pain Seen by cards, restarted on home meds, in sinus Review of systems negative except stated above. Objective:  
 
Visit Vitals /64 (BP 1 Location: Left arm, BP Patient Position: At rest) Pulse 72 Temp 97.6 °F (36.4 °C) Resp 18 Ht 5' 5\" (1.651 m) Wt 80.2 kg (176 lb 12.9 oz) SpO2 95% BMI 29.42 kg/m² Oxygen Therapy O2 Sat (%): 95 % (04/25/19 0747) Pulse via Oximetry: 75 beats per minute (04/25/19 0747) O2 Device: Room air (04/25/19 0747) Intake and Output:  
 
Intake/Output Summary (Last 24 hours) at 4/25/2019 1667 Last data filed at 4/25/2019 9466 Gross per 24 hour Intake 2699.2 ml Output 2075 ml Net 624.2 ml Physical Exam:  
GENERAL: alert, cooperative, no distress, appears stated age EYE: conjunctivae/corneas clear. PERRL. THROAT & NECK: normal and no erythema or exudates noted. LUNG: clear to auscultation bilaterally HEART: irregular rate and rhythm, S1S2, no murmur, no JVD ABDOMEN: soft, non-tender, non-distended. Bowel sounds normal.  
EXTREMITIES:  No edema, 2+ pedal/radial pulses bilaterally SKIN: no rash or abnormalities NEUROLOGIC: Alert. Cranial nerves 2-12 grossly intact. Lab/Data Review: 
Recent Results (from the past 24 hour(s)) GLUCOSE, POC Collection Time: 04/24/19 11:30 AM  
Result Value Ref Range Glucose (POC) 214 (H) 65 - 100 mg/dL METABOLIC PANEL, BASIC Collection Time: 04/24/19 12:12 PM  
Result Value Ref Range Sodium 139 136 - 145 mmol/L Potassium 4.8 3.5 - 5.1 mmol/L Chloride 117 (H) 98 - 107 mmol/L  
 CO2 11 (L) 21 - 32 mmol/L Anion gap 11 7 - 16 mmol/L Glucose 276 (H) 65 - 100 mg/dL BUN 10 8 - 23 MG/DL Creatinine 0.75 0.6 - 1.0 MG/DL  
 GFR est AA >60 >60 ml/min/1.73m2 GFR est non-AA >60 >60 ml/min/1.73m2 Calcium 8.7 8.3 - 10.4 MG/DL  
PHOSPHORUS Collection Time: 04/24/19 12:12 PM  
Result Value Ref Range Phosphorus 2.1 (L) 2.3 - 3.7 MG/DL  
GLUCOSE, POC Collection Time: 04/24/19  4:58 PM  
Result Value Ref Range Glucose (POC) 403 (H) 65 - 100 mg/dL GLUCOSE, POC Collection Time: 04/24/19  8:44 PM  
Result Value Ref Range Glucose (POC) 312 (H) 65 - 100 mg/dL GLUCOSE, POC Collection Time: 04/25/19  8:01 AM  
Result Value Ref Range Glucose (POC) 318 (H) 65 - 100 mg/dL Imaging: Xr Chest [facility] Result Date: 4/23/2019 EXAM: TEMPORARY INDICATION: Sepsis COMPARISON: 4/1/2019 FINDINGS: A portable AP radiograph of the chest was obtained at 2142 hours. The patient is on a cardiac monitor. The lungs are clear. The cardiac and mediastinal contours and pulmonary vascularity are normal.  The bones and soft tissues are grossly within normal limits. IMPRESSION: Normal chest. 
 
Xr Chest [facility] Result Date: 4/1/2019 EXAM: XR CHEST PORT INDICATION: altered mental status COMPARISON: 3/10/2019 FINDINGS: A portable AP radiograph of the chest was obtained at 0006 hours. The patient is on a cardiac monitor. The lungs are clear. The cardiac and mediastinal contours and pulmonary vascularity are normal.  The bones and soft tissues are grossly within normal limits.   
 
IMPRESSION: Normal chest. 
 
 Results for orders placed or performed during the hospital encounter of 19  
2D ECHO COMPLETE ADULT (TTE) W OR WO CONTR Narrative [facility] One [address], [address] [facility] 
[phone] Transthoracic Echocardiogram 
2D, M-mode, Doppler, and Color Doppler Patient: Lucius Bamberger 
MR #: [id number] : 1955 Age: 62 years Gender: Female Study date: 2019 Account #: [de-identified] Height: 65 in 
Weight: 199.5 lb 
BSA: 1.98 mï¾² Status:Routine Location: [facility] BP: 120/ 56 Allergies: NO KNOWN ALLERGENS Sonographer:  Jose Tolbert RDCS Group:  [facility] Referring Physician:  Jaiden Juan MD 
Reading Physician:  Uzma Galicia. Chu Lin MD [facility] INDICATIONS: Afib with RVR PROCEDURE: This was a routine study. A transthoracic echocardiogram was 
performed. The study included complete 2D imaging, M-mode, complete spectral 
Doppler, and color Doppler. Intravenous contrast (Definity, 3 ml) was 
administered. Echocardiographic views were limited by poor patient compliance 
and poor acoustic window availability. The parasternal window was low, thus  
no 
M-mode measurements were recorded. This was a technically difficult study. LEFT VENTRICLE: Size was normal. Systolic function was normal. Ejection 
fraction was estimated in the range of 60 % to 65 %. Elevated gradients noted 
in the LV apex during systole, suggesting hyperdynamic apical walls. There  
were 
no regional wall motion abnormalities. Wall thickness was normal. Left 
ventricular diastolic function was indeterminate. Average E/e' of 8.2. RIGHT VENTRICLE: Not well visualized. LEFT ATRIUM: The atrium was mildly dilated. RIGHT ATRIUM: Not well visualized. SYSTEMIC VEINS: IVC: The inferior vena cava was normal in size and course.  
 
AORTIC VALVE: The valve appears to be structurally normal, tri-commissural. 
 Images were obtained subcostally. Image quality for PSAX at the AV level not 
optimal for evaluation. There was no evidence for stenosis. There was no 
insufficiency. MITRAL VALVE: Valve structure was normal. There was no evidence for stenosis. There was no regurgitation. TRICUSPID VALVE: The valve structure was normal. There was no evidence for 
stenosis. There was trivial regurgitation. PULMONIC VALVE: Not well visualized. PERICARDIUM: There was no pericardial effusion. AORTA: The root exhibited normal size. SUMMARY: 
 
-  Left ventricle: Systolic function was normal. Ejection fraction was 
estimated in the range of 60 % to 65 %. Elevated gradients noted in the LV  
apex 
during systole, suggesting hyperdynamic apical walls. There were no regional 
wall motion abnormalities. -  Left atrium: The atrium was mildly dilated. SYSTEM MEASUREMENT TABLES 
 
2D mode Left Atrium Systolic Volume Index; Method of Disks, Biplane; 2D mode;: 36  
ml/m2 IVS/LVPW (2D): 1 IVSd (2D): 0.9 cm LVIDd (2D): 4.1 cm 
LVIDs (2D): 2.9 cm 
LVPWd (2D): 0.9 cm RVIDd (2D): 3.4 cm Unspecified Scan Mode Peak Grad; Mean; Antegrade Flow: 9 mm[Hg] Vmax; Antegrade Flow: 152 cm/s Prepared and signed by 
 
Uzma Galicia. Chu Lin MD [facility] Signed 24-Apr-2019 13:09:54 Cultures: All Micro Results Procedure Component Value Units Date/Time CULTURE, URINE [[id number]]  (Abnormal) Collected:  04/23/19 0184 Order Status:  Completed Specimen:  Urine from [place] Specimen Updated:  04/25/19 0679 Special Requests: NO SPECIAL REQUESTS Culture result:    
  >100,000 COLONIES/mL GRAM NEGATIVE RODS IDENTIFICATION AND SUSCEPTIBILITY TO FOLLOW CULTURE, BLOOD [[id number]] Collected:  04/23/19 2158 Order Status:  Completed Specimen:  Blood Updated:  04/25/19 0243   Special Requests: --     
  NO SPECIAL REQUESTS 
RIGHT 
ARM 
  
  GRAM STAIN    
  GRAM POS COCCI IN CLUSTERS  
     
 AEROBIC BOTTLE POSITIVE RESULTS VERIFIED, PHONED TO AND READ BACK BY Sisi Arellano RN ON 04/25/2019 AT 8585 Culture result:    
  CULTURE IN PROGRESS,FURTHER UPDATES TO FOLLOW  
     
      
  SA target DNA sequence not detected within the sample. Test performed by PCR  
     
 CULTURE, BLOOD [[id number]] Collected:  04/23/19 2150 Order Status:  Completed Specimen:  Blood Updated:  04/24/19 0827 Special Requests: --     
  NO SPECIAL REQUESTS 
LEFT 
ARM Culture result: NO GROWTH AFTER 9 HOURS Assessment/Plan:  
 
Principal Problem: 
  DKA (diabetic ketoacidoses) ([address] (4/23/2019) - Likely due to medication non-compliance (A1C 11.8) + acute UTI 
- s/p insulin drip 
- Home Lantus 30U daily, SSI. Sugar elevated after drip stopped, adding 5u premeal 
- AG closed yesterday, today BMP pending 
- Diabetes educators consulted Active Problems: 
  Atrial fibrillation with RVR ([facility] (2/14/2019) - Likely due to acute illness and med noncompliance --> DKA + UTI  
- Resolved after restarting flecainde/toprol 
- Continue Eliquis - Appreciate cardiology eval 
 
  SIRS without acute organ dysfunction due to non-infectious process ([facility] (4/24/2019) - Most likely due to DKA 
- Resolving 
- Continue IVFs 
- Continue abx for UTI as below - Blood cultures taken in ER Bacteremia: 
- 1/2 bcx with GPC, received vanco. Await speciation Hypokalemia (12/10/2016) - Replace PRN 
 
  UTI (urinary tract infection) (1/31/2019) 
- UCx with GNR, ID/SN pending 
- has been on Cefepime, change to meropenem based on previous cultures of ESBL Hypomagnesemia (4/3/2019) - Replace prn Abdominal pain (4/23/2019) - Likely due to DKA + UTI 
- Resolving - Monitor for acute changes Hypertension (3/10/2019) - Stable Paraplegia ([address] (12/10/2016) Bipolar disorder without psychotic features ([facility] (12/10/2016) - Continue Seroquel Diabetes ([facility] (3/10/2019) - See #1 Chronic hepatitis C virus infection ([facility] (12/10/2016) Narcotic addiction ([facility] (12/10/2016) Dispo - Home in next day or two DIET DIABETIC CONSISTENT CARB Regular DVT Prophylaxis: Eliquis Signed By: Agatha Barrett MD   
 April 25, 2019

## 2019-04-25 NOTE — DIABETES MGMT
Spoke with provider, discussed pt glycemic control and most recent FSBS 328. New orders received to increase basal insulin to Lantus 40 units daily and new orders received for Lantus 10 units now.

## 2019-04-25 NOTE — PROGRESS NOTES
Pharmacokinetic Consult to Pharmacist 
 
Claudia Zurita is a 62 y.o. female being treated for blood culture positive with staph with vancomycin. Height: 5' 5\" (165.1 cm)  Weight: 80.2 kg (176 lb 12.9 oz) Lab Results Component Value Date/Time BUN 10 04/24/2019 12:12 PM  
 Creatinine 0.75 04/24/2019 12:12 PM  
 WBC 14.3 (H) 04/23/2019 09:58 PM  
 Procalcitonin <0.1 03/18/2019 02:44 AM  
 Lactic acid 1.6 01/31/2019 06:14 PM  
 Lactic Acid (POC) 1.70 04/23/2019 10:02 PM  
  
Estimated Creatinine Clearance: 80.4 mL/min (based on SCr of 0.75 mg/dL). CULTURES: 
All Micro Results Procedure Component Value Units Date/Time CULTURE, BLOOD [[id number]] Collected:  04/23/19 2158 Order Status:  Completed Specimen:  Blood Updated:  04/25/19 0114 Special Requests: --     
  NO SPECIAL REQUESTS 
RIGHT 
ARM 
  
  GRAM STAIN    
  GRAM POS COCCI IN CLUSTERS  
     
   AEROBIC BOTTLE POSITIVE RESULTS VERIFIED, PHONED TO AND READ BACK BY Sisi Arellano RN ON 04/25/2019 AT 9962 Culture result:    
  CULTURE IN PROGRESS,FURTHER UPDATES TO FOLLOW CULTURE, BLOOD [[id number]] Collected:  04/23/19 2158 Order Status:  Completed Specimen:  Blood Updated:  04/24/19 0809 Special Requests: --     
  NO SPECIAL REQUESTS 
LEFT 
ARM Culture result: NO GROWTH AFTER 9 HOURS     
 CULTURE, URINE [[id number]] Collected:  04/23/19 4261 Order Status:  Completed Specimen:  Urine from [facility] Specimen Updated:  04/24/19 0588 Special Requests: NO SPECIAL REQUESTS Culture result:    
  NO GROWTH AFTER SHORT PERIOD OF INCUBATION. FURTHER RESULTS TO FOLLOW AFTER OVERNIGHT INCUBATION. Day 1 of vancomycin. Goal trough is 15-20. Vancomycin dose initiated at 2000 mg x 1 dose; followed by Vanc 1250 mg IV q12h. Will continue to follow patient. Thank you, Cruz Ramirez, Pharm D.

## 2019-04-25 NOTE — PROGRESS NOTES
Patient has [employer] and CM cannot voucher any medications d/t patient having insurance. Patient is well known to CM team and patient is aware that we cannot voucher medications.

## 2019-04-25 NOTE — PROGRESS NOTES
Hourly rounds completed. Patient slept most of shift. Tried to encourage patient to participate in education of drawing up and injecting insulin, but patient refused. Patient resting in bed at this time, no needs. Will continue to monitor and report to oncoming RN.

## 2019-04-25 NOTE — PROGRESS NOTES
Pharmacokinetic Consult to Pharmacist 
 
Imelda Mayes is a 62 y.o. female being treated for blood culture positive with GPC with vancomycin as well as meropenem for GNR UTI and history of ESBL. Height: 5' 5\" (165.1 cm)  Weight: 80.2 kg (176 lb 12.9 oz) Lab Results Component Value Date/Time BUN 10 04/24/2019 12:12 PM  
 Creatinine 0.75 04/24/2019 12:12 PM  
 WBC 14.3 (H) 04/23/2019 09:58 PM  
 Procalcitonin <0.1 03/18/2019 02:44 AM  
 Lactic acid 1.6 01/31/2019 06:14 PM  
 Lactic Acid (POC) 1.70 04/23/2019 10:02 PM  
  
Estimated Creatinine Clearance: 80.4 mL/min (based on SCr of 0.75 mg/dL). CULTURES: 
All Micro Results Procedure Component Value Units Date/Time CULTURE, BLOOD [[id number]] Collected:  04/23/19 2158 Order Status:  Completed Specimen:  Blood Updated:  04/25/19 5346 Special Requests: --     
  NO SPECIAL REQUESTS 
LEFT 
ARM Culture result: NO GROWTH 2 DAYS     
 CULTURE, URINE [[id number]]  (Abnormal) Collected:  04/23/19 1664 Order Status:  Completed Specimen:  Urine from [facility] Specimen Updated:  04/25/19 7206 Special Requests: NO SPECIAL REQUESTS Culture result:    
  >100,000 COLONIES/mL GRAM NEGATIVE RODS IDENTIFICATION AND SUSCEPTIBILITY TO FOLLOW CULTURE, BLOOD [[id number]] Collected:  04/23/19 2158 Order Status:  Completed Specimen:  Blood Updated:  04/25/19 0243 Special Requests: --     
  NO SPECIAL REQUESTS 
RIGHT 
ARM 
  
  GRAM STAIN    
  GRAM POS COCCI IN CLUSTERS  
     
   AEROBIC BOTTLE POSITIVE RESULTS VERIFIED, PHONED TO AND READ BACK BY Sisi Arellano RN ON 04/25/2019 AT 1165 Culture result:    
  CULTURE IN PROGRESS,FURTHER UPDATES TO FOLLOW  
     
      
  SA target DNA sequence not detected within the sample. Test performed by PCR Day 1 of vancomycin. Goal trough is 15-20.   Previous dosing has been reviewed and I will adjust the ordered dose to 1000mg q12h to begin this afternoon. Consider de-escalation of therapy as cultures result. Baseline SrCr<1. Trend. Thank you, Breonna Lafleur, PharmD, [facility] Clinical Pharmacist 
[phone]

## 2019-04-25 NOTE — PROGRESS NOTES
Initial visit by  to convey care and concern and encourage patient that  services are available if desired. No needs were voiced during the visit. Provided 's business card for future reference. Chaplains remain available for support. Debra Cobb MDiv Board Certified [employer]

## 2019-04-25 NOTE — DIABETES MGMT
Patient admitted with DKA, hx of paraplegia, bipolar disorder, hepatitis C. Blood glucose range yesterday 180-403 with pt transitioned of insulin gtt and receiving Lantus 30 units and Humalog 22 units. This AM blood glucose 318. Pt in bed, son- Jeffrey Pham sleeping at bedside, awakens when educator entered the room, but did not engage with educator and closed eyes again. Pt states she was diagnosed with diabetes \"3 years ago\" and voices no known family history of diabetes. Per pt she ran out of \"all her medications last week. \" Pt states she went to her doctor, but was told \"he didn't have time to work on my medications. \" Pt states she still has Lantus pens at home, however pt states \"I never want another pen again! \" Questioned if pt would like diabetic educator to review insulin pen administration with pt, to which pt became agitated stating \"I already told you I don't want it! \" Pt uses vial and syringe method of insulin administration at home, but states she is out of Humalog and out of syringes. Pt has difficulty seeing out of her right eye, but denies difficulties with her left eye. During previous admission pt had stated her son helps her draw up her insulin. Per pt today, Eletha Peabody, he hates needles, my other son helps me sometimes. \"  
 
Educated attempted to question pt how often she checks her blood glucose level. Pt again became agitated and tearful stating she is out of strips, however then pt states \"if I had strips I would check it every day. \" Attempted to question pt what time of day she would check her blood glucose level if she had test strips to which pt replied \"I told you every day! \" When questioning pt how she typically takes her Humalog SSI versus premeal she responded \"I just guess how much I need. \" Educated pt on the importance of not self dosing insulin and to check FSBS before insulin administration.  However, pt adamant about not being able to afford diabetic supplies. Pt given educational handouts regarding over-the-counter glucometer and [employer] insulins, to which pt replies \"How am I going to look at that, how do you think I can afford that. \"  Case management made aware. Noted pt has [employer]. Pt states she has a good appetite. Noted pt has a Snickers bar and bag of [employer] potato chips. Educated pt re: current basal/bolus regimen of Lantus 30 units daily and Humalog 5 units with meals and SSI including type of insulin, timing with meals, onset, duration of effect, and peak of insulin dose. Pt given education handout explaining how to take basal/bolus insulin and when to call PCP for further titration of regimen. Pt verbalizes understanding. Pt will need prescription for glucometer, glucometer supplies, and insulin at discharge. Pt refusing insulin pens. Educator unsure of pt son compliance with helping pt draw up insulin. Unsure of pt visual dexterity to safely draw insulin out of a vial. Before end of conversation pt looking away from educator. Spoke with primary RN. Plan is to have pt and pt son draw up insulin using vial and syringe method to determine if pt is able to safely and properly self administer insulin using vial and syringe method.

## 2019-04-25 NOTE — PROGRESS NOTES
PT Note: 
 
Order received, chart reviewed. Upon entering pt very drowsy, refusing PT evaluation this afternoon. RN notified. Will re-attempt as schedule permits and pt availability allows. Thank you, Jimmy Lai, PT, DPT

## 2019-04-26 ENCOUNTER — PATIENT OUTREACH (OUTPATIENT)
Dept: CASE MANAGEMENT | Age: 64
End: 2019-04-26

## 2019-04-26 LAB
ANION GAP SERPL CALC-SCNC: 10 MMOL/L (ref 7–16)
BACTERIA SPEC CULT: ABNORMAL
BUN SERPL-MCNC: 5 MG/DL (ref 8–23)
CALCIUM SERPL-MCNC: 8.7 MG/DL (ref 8.3–10.4)
CHLORIDE SERPL-SCNC: 117 MMOL/L (ref 98–107)
CO2 SERPL-SCNC: 18 MMOL/L (ref 21–32)
CREAT SERPL-MCNC: 0.56 MG/DL (ref 0.6–1)
ERYTHROCYTE [DISTWIDTH] IN BLOOD BY AUTOMATED COUNT: 14.9 % (ref 11.9–14.6)
GLUCOSE BLD STRIP.AUTO-MCNC: 126 MG/DL (ref 65–100)
GLUCOSE BLD STRIP.AUTO-MCNC: 200 MG/DL (ref 65–100)
GLUCOSE BLD STRIP.AUTO-MCNC: 246 MG/DL (ref 65–100)
GLUCOSE BLD STRIP.AUTO-MCNC: 266 MG/DL (ref 65–100)
GLUCOSE BLD STRIP.AUTO-MCNC: 272 MG/DL (ref 65–100)
GLUCOSE SERPL-MCNC: 166 MG/DL (ref 65–100)
GRAM STN SPEC: ABNORMAL
HCT VFR BLD AUTO: 37.1 % (ref 35.8–46.3)
HGB BLD-MCNC: 12 G/DL (ref 11.7–15.4)
MAGNESIUM SERPL-MCNC: 2.1 MG/DL (ref 1.8–2.4)
MCH RBC QN AUTO: 29.6 PG (ref 26.1–32.9)
MCHC RBC AUTO-ENTMCNC: 32.3 G/DL (ref 31.4–35)
MCV RBC AUTO: 91.6 FL (ref 79.6–97.8)
NRBC # BLD: 0 K/UL (ref 0–0.2)
PHOSPHATE SERPL-MCNC: 2.4 MG/DL (ref 2.3–3.7)
PLATELET # BLD AUTO: 233 K/UL (ref 150–450)
PMV BLD AUTO: 11.5 FL (ref 9.4–12.3)
POTASSIUM SERPL-SCNC: 3.3 MMOL/L (ref 3.5–5.1)
RBC # BLD AUTO: 4.05 M/UL (ref 4.05–5.2)
SERVICE CMNT-IMP: ABNORMAL
SODIUM SERPL-SCNC: 145 MMOL/L (ref 136–145)
WBC # BLD AUTO: 6.7 K/UL (ref 4.3–11.1)

## 2019-04-26 PROCEDURE — 84100 ASSAY OF PHOSPHORUS: CPT

## 2019-04-26 PROCEDURE — 74011250636 HC RX REV CODE- 250/636: Performed by: HOSPITALIST

## 2019-04-26 PROCEDURE — 74011250636 HC RX REV CODE- 250/636: Performed by: FAMILY MEDICINE

## 2019-04-26 PROCEDURE — 77030034849

## 2019-04-26 PROCEDURE — 74011250637 HC RX REV CODE- 250/637: Performed by: FAMILY MEDICINE

## 2019-04-26 PROCEDURE — 80048 BASIC METABOLIC PNL TOTAL CA: CPT

## 2019-04-26 PROCEDURE — 74011250637 HC RX REV CODE- 250/637: Performed by: INTERNAL MEDICINE

## 2019-04-26 PROCEDURE — 74011636637 HC RX REV CODE- 636/637: Performed by: HOSPITALIST

## 2019-04-26 PROCEDURE — 74011250637 HC RX REV CODE- 250/637: Performed by: HOSPITALIST

## 2019-04-26 PROCEDURE — 97166 OT EVAL MOD COMPLEX 45 MIN: CPT

## 2019-04-26 PROCEDURE — 74011000258 HC RX REV CODE- 258: Performed by: HOSPITALIST

## 2019-04-26 PROCEDURE — 74011636637 HC RX REV CODE- 636/637: Performed by: FAMILY MEDICINE

## 2019-04-26 PROCEDURE — 97161 PT EVAL LOW COMPLEX 20 MIN: CPT

## 2019-04-26 PROCEDURE — 77030020263 HC SOL INJ SOD CL0.9% LFCR 1000ML

## 2019-04-26 PROCEDURE — 65270000029 HC RM PRIVATE

## 2019-04-26 PROCEDURE — 83735 ASSAY OF MAGNESIUM: CPT

## 2019-04-26 PROCEDURE — 82962 GLUCOSE BLOOD TEST: CPT

## 2019-04-26 PROCEDURE — 36415 COLL VENOUS BLD VENIPUNCTURE: CPT

## 2019-04-26 PROCEDURE — 85027 COMPLETE CBC AUTOMATED: CPT

## 2019-04-26 PROCEDURE — 74011000258 HC RX REV CODE- 258: Performed by: FAMILY MEDICINE

## 2019-04-26 RX ORDER — POTASSIUM CHLORIDE 20 MEQ/1
40 TABLET, EXTENDED RELEASE ORAL
Status: COMPLETED | OUTPATIENT
Start: 2019-04-26 | End: 2019-04-26

## 2019-04-26 RX ORDER — INSULIN LISPRO 100 [IU]/ML
10 INJECTION, SOLUTION INTRAVENOUS; SUBCUTANEOUS
Status: DISCONTINUED | OUTPATIENT
Start: 2019-04-26 | End: 2019-04-27

## 2019-04-26 RX ORDER — LANOLIN ALCOHOL/MO/W.PET/CERES
400 CREAM (GRAM) TOPICAL DAILY
Status: COMPLETED | OUTPATIENT
Start: 2019-04-26 | End: 2019-05-02

## 2019-04-26 RX ADMIN — Medication 10 ML: at 06:00

## 2019-04-26 RX ADMIN — Medication: at 15:20

## 2019-04-26 RX ADMIN — INSULIN LISPRO 6 UNITS: 100 INJECTION, SOLUTION INTRAVENOUS; SUBCUTANEOUS at 11:25

## 2019-04-26 RX ADMIN — INSULIN LISPRO 6 UNITS: 100 INJECTION, SOLUTION INTRAVENOUS; SUBCUTANEOUS at 01:40

## 2019-04-26 RX ADMIN — FLECAINIDE ACETATE 50 MG: 100 TABLET ORAL at 22:39

## 2019-04-26 RX ADMIN — NYSTATIN: 100000 POWDER TOPICAL at 08:15

## 2019-04-26 RX ADMIN — OXYCODONE HYDROCHLORIDE 10 MG: 5 TABLET ORAL at 22:39

## 2019-04-26 RX ADMIN — Medication 10 ML: at 22:42

## 2019-04-26 RX ADMIN — Medication 10 ML: at 15:21

## 2019-04-26 RX ADMIN — FLECAINIDE ACETATE 50 MG: 100 TABLET ORAL at 08:16

## 2019-04-26 RX ADMIN — Medication 400 MG: at 08:16

## 2019-04-26 RX ADMIN — VANCOMYCIN HYDROCHLORIDE 1000 MG: 1 INJECTION, POWDER, LYOPHILIZED, FOR SOLUTION INTRAVENOUS at 02:12

## 2019-04-26 RX ADMIN — ALPRAZOLAM 0.5 MG: 0.5 TABLET ORAL at 01:45

## 2019-04-26 RX ADMIN — INSULIN LISPRO 10 UNITS: 100 INJECTION, SOLUTION INTRAVENOUS; SUBCUTANEOUS at 11:24

## 2019-04-26 RX ADMIN — OXYCODONE HYDROCHLORIDE 10 MG: 5 TABLET ORAL at 01:45

## 2019-04-26 RX ADMIN — INSULIN GLARGINE 40 UNITS: 100 INJECTION, SOLUTION SUBCUTANEOUS at 08:15

## 2019-04-26 RX ADMIN — PANTOPRAZOLE SODIUM 40 MG: 40 TABLET, DELAYED RELEASE ORAL at 05:33

## 2019-04-26 RX ADMIN — INSULIN LISPRO 4 UNITS: 100 INJECTION, SOLUTION INTRAVENOUS; SUBCUTANEOUS at 22:41

## 2019-04-26 RX ADMIN — ALPRAZOLAM 0.5 MG: 0.5 TABLET ORAL at 17:50

## 2019-04-26 RX ADMIN — APIXABAN 5 MG: 5 TABLET, FILM COATED ORAL at 17:00

## 2019-04-26 RX ADMIN — QUETIAPINE FUMARATE 300 MG: 100 TABLET ORAL at 22:39

## 2019-04-26 RX ADMIN — MEROPENEM 500 MG: 500 INJECTION, POWDER, FOR SOLUTION INTRAVENOUS at 05:33

## 2019-04-26 RX ADMIN — OXYCODONE HYDROCHLORIDE 10 MG: 5 TABLET ORAL at 11:15

## 2019-04-26 RX ADMIN — MEROPENEM 500 MG: 500 INJECTION, POWDER, FOR SOLUTION INTRAVENOUS at 11:23

## 2019-04-26 RX ADMIN — OXYCODONE HYDROCHLORIDE 10 MG: 5 TABLET ORAL at 16:55

## 2019-04-26 RX ADMIN — METOPROLOL SUCCINATE 50 MG: 50 TABLET, EXTENDED RELEASE ORAL at 08:16

## 2019-04-26 RX ADMIN — APIXABAN 5 MG: 5 TABLET, FILM COATED ORAL at 08:16

## 2019-04-26 RX ADMIN — POTASSIUM CHLORIDE 40 MEQ: 20 TABLET, EXTENDED RELEASE ORAL at 08:16

## 2019-04-26 RX ADMIN — Medication: at 21:00

## 2019-04-26 RX ADMIN — NYSTATIN: 100000 POWDER TOPICAL at 17:02

## 2019-04-26 RX ADMIN — MEROPENEM 500 MG: 500 INJECTION, POWDER, FOR SOLUTION INTRAVENOUS at 18:40

## 2019-04-26 RX ADMIN — INSULIN LISPRO 10 UNITS: 100 INJECTION, SOLUTION INTRAVENOUS; SUBCUTANEOUS at 16:56

## 2019-04-26 RX ADMIN — INSULIN LISPRO 4 UNITS: 100 INJECTION, SOLUTION INTRAVENOUS; SUBCUTANEOUS at 16:57

## 2019-04-26 NOTE — PROGRESS NOTES
Hourly rounds performed.   
  
Patient BG trending in 300's. Covered per MAR. Encouraged patient to demonstrate insulin administration, but patient refused.   
  
Found a candy bar in patient's room and emphasized need to be compliant with diet. Patient requested multiple snacks: diet Pepsi, chicken broth, crackers throughout this shift, at least every 2 hours. Education reinforced about compliance with diet and snacks given as RN deemed appropriate, but not at each request.    
  
 
All needs meet. Bed low/locked. Call light within reach. Patient denies needs at this time.   Will continue to monitor and report to oncoming RN

## 2019-04-26 NOTE — PROGRESS NOTES
Hospitalist Progress Note Patient: Gypsy Cordoba MRN: [id number]  SSN: [id number] YOB: 1955  Age: 62 y.o. Sex: female Admit Date: 4/23/2019 LOS: 3 days Subjective:  
 
From H&P: \"62 y.o. female with a past medical history of DM type II, HTN, atrial fibrillation, GERD, BPAD who presents to the ER with report of abdominal pain for the past 3 days. She reports that the pain feels like previous UTIs. Admits to dyspepsia as well. Admits that she has not been able to get her medicaitons refilled for the past 1 week, including her insulin and metformin. \" Today, had some left abd and back pains. No fever Objective:  
 
Visit Vitals /75 (BP 1 Location: Left arm, BP Patient Position: Head of bed elevated (Comment degrees)) Comment (BP Patient Position): 35 Pulse 75 Temp 98.5 °F (36.9 °C) Resp 18 Ht 5' 5\" (1.651 m) Wt 80.2 kg (176 lb 12.9 oz) SpO2 98% BMI 29.42 kg/m² Oxygen Therapy O2 Sat (%): 98 % (04/26/19 1537) Pulse via Oximetry: 75 beats per minute (04/25/19 0747) O2 Device: Room air (04/25/19 0747) Intake and Output:  
 
Intake/Output Summary (Last 24 hours) at 4/26/2019 1658 Last data filed at 4/26/2019 1539 Gross per 24 hour Intake 5641 ml Output 3625 ml Net 2016 ml Physical Exam:  
GENERAL: alert, cooperative, no distress, appears stated age LUNG: clear to auscultation bilaterally HEART: irregular rate and rhythm, S1S2, no murmur, no JVD ABDOMEN: soft, mild L side tender, non-distended. Bowel sounds normal.  
EXTREMITIES:  No edema, 2+ pedal/radial pulses bilaterally SKIN: no rash or abnormalities NEUROLOGIC: Alert. Cranial nerves 2-12 grossly intact. Lab/Data Review: 
Recent Results (from the past 24 hour(s)) METABOLIC PANEL, BASIC Collection Time: 04/25/19  6:48 PM  
Result Value Ref Range Sodium 142 136 - 145 mmol/L Potassium 3.9 3.5 - 5.1 mmol/L  Chloride 114 (H) 98 - 107 mmol/L  
 CO2 16 (L) 21 - 32 mmol/L Anion gap 12 7 - 16 mmol/L Glucose 338 (H) 65 - 100 mg/dL BUN 5 (L) 8 - 23 MG/DL Creatinine 0.78 0.6 - 1.0 MG/DL  
 GFR est AA >60 >60 ml/min/1.73m2 GFR est non-AA >60 >60 ml/min/1.73m2 Calcium 8.8 8.3 - 10.4 MG/DL  
GLUCOSE, POC Collection Time: 04/25/19  8:52 PM  
Result Value Ref Range Glucose (POC) 381 (H) 65 - 100 mg/dL GLUCOSE, POC Collection Time: 04/26/19 12:21 AM  
Result Value Ref Range Glucose (POC) 272 (H) 65 - 100 mg/dL CBC W/O DIFF Collection Time: 04/26/19  4:35 AM  
Result Value Ref Range WBC 6.7 4.3 - 11.1 K/uL  
 RBC 4.05 4.05 - 5.2 M/uL  
 HGB 12.0 11.7 - 15.4 g/dL HCT 37.1 35.8 - 46.3 % MCV 91.6 79.6 - 97.8 FL  
 MCH 29.6 26.1 - 32.9 PG  
 MCHC 32.3 31.4 - 35.0 g/dL  
 RDW 14.9 (H) 11.9 - 14.6 % PLATELET 513 759 - 656 K/uL MPV 11.5 9.4 - 12.3 FL ABSOLUTE NRBC 0.00 0.0 - 0.2 K/uL MAGNESIUM Collection Time: 04/26/19  4:35 AM  
Result Value Ref Range Magnesium 2.1 1.8 - 2.4 mg/dL METABOLIC PANEL, BASIC Collection Time: 04/26/19  4:35 AM  
Result Value Ref Range Sodium 145 136 - 145 mmol/L Potassium 3.3 (L) 3.5 - 5.1 mmol/L Chloride 117 (H) 98 - 107 mmol/L  
 CO2 18 (L) 21 - 32 mmol/L Anion gap 10 7 - 16 mmol/L Glucose 166 (H) 65 - 100 mg/dL BUN 5 (L) 8 - 23 MG/DL Creatinine 0.56 (L) 0.6 - 1.0 MG/DL  
 GFR est AA >60 >60 ml/min/1.73m2 GFR est non-AA >60 >60 ml/min/1.73m2 Calcium 8.7 8.3 - 10.4 MG/DL  
PHOSPHORUS Collection Time: 04/26/19  4:35 AM  
Result Value Ref Range Phosphorus 2.4 2.3 - 3.7 MG/DL  
GLUCOSE, POC Collection Time: 04/26/19  7:49 AM  
Result Value Ref Range Glucose (POC) 126 (H) 65 - 100 mg/dL GLUCOSE, POC Collection Time: 04/26/19 11:07 AM  
Result Value Ref Range Glucose (POC) 266 (H) 65 - 100 mg/dL GLUCOSE, POC Collection Time: 04/26/19  4:24 PM  
Result Value Ref Range Glucose (POC) 200 (H) 65 - 100 mg/dL Imaging: Xr Chest Philipp Tate Result Date: 2019 EXAM: TEMPORARY INDICATION: Sepsis COMPARISON: 2019 FINDINGS: A portable AP radiograph of the chest was obtained at 2142 hours. The patient is on a cardiac monitor. The lungs are clear. The cardiac and mediastinal contours and pulmonary vascularity are normal.  The bones and soft tissues are grossly within normal limits. IMPRESSION: Normal chest. 
 
Xr Chest Philipp Tate Result Date: 2019 EXAM: XR CHEST PORT INDICATION: altered mental status COMPARISON: 3/10/2019 FINDINGS: A portable AP radiograph of the chest was obtained at 0006 hours. The patient is on a cardiac monitor. The lungs are clear. The cardiac and mediastinal contours and pulmonary vascularity are normal.  The bones and soft tissues are grossly within normal limits. IMPRESSION: Normal chest. 
 
Results for orders placed or performed during the hospital encounter of 19  
2D ECHO COMPLETE ADULT (TTE) W OR WO CONTR Narrative Agustín Filiberto Moncho [address] [place], [address] [facility] 
[phone] Transthoracic Echocardiogram 
2D, M-mode, Doppler, and Color Doppler Patient: Sabrina Esteves 
MR #: [id number] : 1955 Age: 62 years Gender: Female Study date: 2019 Account #: [de-identified] Height: 65 in 
Weight: 199.5 lb 
BSA: 1.98 mï¾² Status:Routine Location: [facility] BP: 120/ 56 Allergies: NO KNOWN ALLERGENS Sonographer:  Helen Mayorga [facility] Group:  [facility] Referring Physician:  Doreen Pringle MD 
Reading Physician:  Cass Willis. Antonio Tuttle MD [facility] INDICATIONS: Afib with RVR PROCEDURE: This was a routine study. A transthoracic echocardiogram was 
performed. The study included complete 2D imaging, M-mode, complete spectral 
Doppler, and color Doppler. Intravenous contrast (Definity, 3 ml) was 
administered. Echocardiographic views were limited by poor patient compliance and poor acoustic window availability. The parasternal window was low, thus  
no 
M-mode measurements were recorded. This was a technically difficult study. LEFT VENTRICLE: Size was normal. Systolic function was normal. Ejection 
fraction was estimated in the range of 60 % to 65 %. Elevated gradients noted 
in the LV apex during systole, suggesting hyperdynamic apical walls. There  
were 
no regional wall motion abnormalities. Wall thickness was normal. Left 
ventricular diastolic function was indeterminate. Average E/e' of 8.2. RIGHT VENTRICLE: Not well visualized. LEFT ATRIUM: The atrium was mildly dilated. RIGHT ATRIUM: Not well visualized. SYSTEMIC VEINS: IVC: The inferior vena cava was normal in size and course. AORTIC VALVE: The valve appears to be structurally normal, tri-commissural. 
Images were obtained subcostally. Image quality for PSAX at the AV level not 
optimal for evaluation. There was no evidence for stenosis. There was no 
insufficiency. MITRAL VALVE: Valve structure was normal. There was no evidence for stenosis. There was no regurgitation. TRICUSPID VALVE: The valve structure was normal. There was no evidence for 
stenosis. There was trivial regurgitation. PULMONIC VALVE: Not well visualized. PERICARDIUM: There was no pericardial effusion. AORTA: The root exhibited normal size. SUMMARY: 
 
-  Left ventricle: Systolic function was normal. Ejection fraction was 
estimated in the range of 60 % to 65 %. Elevated gradients noted in the LV  
apex 
during systole, suggesting hyperdynamic apical walls. There were no regional 
wall motion abnormalities. -  Left atrium: The atrium was mildly dilated. SYSTEM MEASUREMENT TABLES 
 
2D mode Left Atrium Systolic Volume Index; Method of Disks, Biplane; 2D mode;: 36  
ml/m2 IVS/LVPW (2D): 1 IVSd (2D): 0.9 cm LVIDd (2D): 4.1 cm 
LVIDs (2D): 2.9 cm 
LVPWd (2D): 0.9 cm RVIDd (2D): 3.4 cm Unspecified Scan Mode Peak Grad; Mean; Antegrade Flow: 9 mm[Hg] Vmax; Antegrade Flow: 152 cm/s Prepared and signed by 
 
Devika Lora. Dale Haywood MD [facility] Signed 24-Apr-2019 13:09:54 Cultures: All Micro Results Procedure Component Value Units Date/Time CULTURE, URINE [[id number]]  (Abnormal) Collected:  04/23/19 2324 Order Status:  Completed Specimen:  Urine from [place] Specimen Updated:  04/26/19 8815 Special Requests: NO SPECIAL REQUESTS Culture result:    
  >100,000 COLONIES/mL ESCHERICHIA COLI  
     
   REPEATING SUSCEPTIBILITY  
      
  10,000 to 50,000 COLONIES/mL MIXED SKIN GERARD ISOLATED  
     
 CULTURE, BLOOD [[id number]] Collected:  04/23/19 2158 Order Status:  Completed Specimen:  Blood Updated:  04/26/19 0459 Special Requests: --     
  NO SPECIAL REQUESTS 
LEFT 
ARM Culture result: NO GROWTH 3 DAYS     
 CULTURE, BLOOD [[id number]]  (Abnormal) Collected:  04/23/19 2158 Order Status:  Completed Specimen:  Blood Updated:  04/26/19 6290 Special Requests: --     
  NO SPECIAL REQUESTS 
RIGHT 
ARM 
  
  GRAM STAIN    
  GRAM POS COCCI IN CLUSTERS  
     
   AEROBIC BOTTLE POSITIVE RESULTS VERIFIED, PHONED TO AND READ BACK BY Sisi Arellano RN ON 04/25/2019 AT 2777 Culture result: STAPHYLOCOCCUS SPECIES, COAGULASE NEGATIVE  
     
      
  SA target DNA sequence not detected within the sample. Test performed by PCR  
     
      
  THIS ORGANISM MAY BE INDICATIVE OF CULTURE CONTAMINATION, HOWEVER, CLINICAL CORRELATION NEEDS TO BE EVALUATED, AS EACH CASE IS UNIQUE. Assessment/Plan:  
 
continue Merrem for multiresistent E coli bacteremia Replace K and Mg 
DC planning unclear as d/w CM, pt is evicted this month. Principal Problem: 
  DKA (diabetic ketoacidoses) ([facility] [address] (4/23/2019) - Likely due to medication non-compliance (A1C 11.8) + acute UTI 
- s/p insulin drip 
- Home Lantus 30U daily, SSI.   Sugar elevated after drip stopped, adding 5u premeal 
- AG closed yesterday, today BMP pending 
- Diabetes educators consulted Active Problems: 
  Atrial fibrillation with RVR ([address] (2/14/2019) - Likely due to acute illness and med noncompliance --> DKA + UTI  
- Resolved after restarting flecainde/toprol 
- Continue Eliquis - Appreciate cardiology eval 
 
  SIRS without acute organ dysfunction due to non-infectious process ([address] (4/24/2019) - Most likely due to DKA 
- Resolving 
- Continue IVFs 
- Continue abx for UTI as below - Blood cultures taken in ER Bacteremia: 
- 1/2 bcx with GPC, received vanco. Await speciation Hypokalemia (12/10/2016) - Replace PRN 
 
  UTI (urinary tract infection) (1/31/2019) 
- UCx with GNR, ID/SN pending 
- has been on Cefepime, change to meropenem based on previous cultures of ESBL Hypomagnesemia (4/3/2019) - Replace prn Abdominal pain (4/23/2019) - Likely due to DKA + UTI 
- Resolving - Monitor for acute changes Hypertension (3/10/2019) - Stable Paraplegia ([address] (12/10/2016) Bipolar disorder without psychotic features ([address] (12/10/2016) - Continue Seroquel Diabetes ([address] (3/10/2019) - See #1 Chronic hepatitis C virus infection ([address] (12/10/2016) Narcotic addiction ([address] (12/10/2016) Dispo - Home in next day or two DIET DIABETIC CONSISTENT CARB Regular DVT Prophylaxis: Eliquis Signed By: Doris Quinonez MD   
 April 26, 2019

## 2019-04-26 NOTE — PROGRESS NOTES
Problem: Mobility Impaired (Adult and Pediatric) Goal: *Acute Goals and Plan of Care 
 
N/A. Eval & DC. Outcome: Resolved/Met PHYSICAL THERAPY: Initial Assessment and AM 4/26/2019 INPATIENT:   
Payor: [address] / Plan: [place] MEDICAID [facility] / Product Type: Medicaid /   
  
NAME/AGE/GENDER: Peter Rangel is a 62 y.o. female PRIMARY DIAGNOSIS: DKA (diabetic ketoacidoses) ([facility] [address] [E13.10] DKA (diabetic ketoacidoses) ([facility] [address] DKA (diabetic ketoacidoses) ([facility] [address] ICD-10: Treatment Diagnosis:  
 Generalized Muscle Weakness (M62.81) Other abnormalities of gait and mobility (R26.89) Precaution/Allergies: 
Patient has no known allergies. ASSESSMENT:  
 
Ms. Tatyana Tony is a 61 yo F who presented to ED with abdominal pain, possible UTI, DKA. Prior to admission pt lives with her son and friend in an apartment, uses a [facility] for mobility and has assistance to use a slide board to transfer in/out of  . Upon entering pt drowsy, requiring max encouragement to participate in evaluation. BLE assessment indicates sensation impaired d/t neuropathy (pt reports feet are very painful), AROM impaired due to diminished strength of BLE - significant PMH of paraplegia. Pt unwilling to sit on EOB, max A x2 - total A to raise up to sit long sitting in bed. Total A to roll side to side in supine. Pt declined any further mobility. Pt presents with limited participation in mobility this evaluation. Pt will be d/c from PT caseload due to noncompliance. This section established at most recent assessment PROBLEM LIST (Impairments causing functional limitations): 
None INTERVENTIONS PLANNED: (Benefits and precautions of physical therapy have been discussed with the patient.) None TREATMENT PLAN: Frequency/Duration: N/A. Eval & DC ; no acute PT needs to be addressed. REHAB RECOMMENDATIONS (at time of discharge pending progress):   
Placement: It is my opinion, based on this patient's performance to date, that Ms. Tino Myers may benefit from being discharged with NO further skilled therapy due to her ability to function at her baseline. Equipment:  
None at this time HISTORY:  
History of Present Injury/Illness (Reason for Referral): 
See H&P. Past Medical History/Comorbidities: Ms. Tino Myers  has a past medical history of Diabetes ([facility] [address] Gastrointestinal disorder, Hypertension, Ill-defined condition, Ill-defined condition, Ill-defined condition, Liver disease, Psychiatric disorder, Psychiatric disorder, and Thromboembolus ([facility] [address] Ms. Tino Myers  has a past surgical history that includes hx hysterectomy. Social History/Living Environment:  
Home Environment: Apartment One/Two Story Residence: One story Living Alone: No 
Support Systems: Family member(s), Friends \ neighbors Patient Expects to be Discharged to[de-identified] Unknown Current DME Used/Available at Home: Wheelchair, Other (comment)(sliding board) Prior Level of Function/Work/Activity: 
 for mobility, lives with son and friend, limited mobility. Number of Personal Factors/Comorbidities that affect the Plan of Care: 1-2: MODERATE COMPLEXITY EXAMINATION:  
Most Recent Physical Functioning:  
Gross Assessment: 
AROM: Grossly decreased, non-functional 
Strength: Grossly decreased, non-functional 
         
  
Posture: 
  
Balance: 
Sitting: Impaired Sitting - Static: Poor (constant support) Bed Mobility: 
Rolling: Maximum assistance; Total assistance;Assist x2 Scooting: Total assistance Wheelchair Mobility: 
  
Transfers: 
  
Gait: 
  
   
  
Body Structures Involved: 
Nerves Metabolic Endocrine Muscles Body Functions Affected: 
Neuromusculoskeletal Activities and Participation Affected: Mobility Number of elements that affect the Plan of Care: 3: MODERATE COMPLEXITY CLINICAL PRESENTATION:  
Presentation: Stable and uncomplicated: LOW COMPLEXITY CLINICAL DECISION MAKING:  
 [address] [facility] [address]? ?6 Clicks? Basic Mobility Inpatient Short Form How much difficulty does the patient currently have. .. Unable A Lot A Little None 1. Turning over in bed (including adjusting bedclothes, sheets and blankets)? ? 1   ? 2   ? 3   ? 4  
2. Sitting down on and standing up from a chair with arms ( e.g., wheelchair, bedside commode, etc.)   ? 1   ? 2   ? 3   ? 4  
3. Moving from lying on back to sitting on the side of the bed?   ? 1   ? 2   ? 3   ? 4 How much help from another person does the patient currently need. .. Total A Lot A Little None 4. Moving to and from a bed to a chair (including a wheelchair)? ? 1   ? 2   ? 3   ? 4  
5. Need to walk in hospital room? ? 1   ? 2   ? 3   ? 4  
6. Climbing 3-5 steps with a railing? ? 1   ? 2   ? 3   ? 4  
© 2007, Trustees of [facility] [employer], under license to [employer]. All rights reserved Score:  Initial: 7 Most Recent: X (Date: -- ) Interpretation of Tool:  Represents activities that are increasingly more difficult (i.e. Bed mobility, Transfers, Gait). Medical Necessity:    N/A. Pt functioning close to baseline & not motivated to participate in PT ; no acute PT needs at this time, no skilled intervention indciated. Use of outcome tool(s) and clinical judgement create a POC that gives a: Clear prediction of patient's progress: LOW COMPLEXITY  
  
 
 
 
TREATMENT:  
(In addition to Assessment/Re-Assessment sessions the following treatments were rendered) Pre-treatment Symptoms/Complaints:  \"I told you I don't feel good. \" 
Pain: Initial:  
Pain Intensity 1: 6 Pain Location 1: Foot Pain Orientation 1: Left, Right  Post Session:  Unchanged. Assessment/Reassessment only, no treatment provided today Braces/Orthotics/Lines/Etc:  
IV 
jasso catheter O2 Device: Room air Treatment/Session Assessment:   
Response to Treatment:  Max Ax2 - Total Assist for just sitting up in bed and rolling; pt refuses to come to sit on EOB or participate any further in mobility. Interdisciplinary Collaboration:  
Physical Therapist 
Occupational Therapist 
Registered Nurse  Certified Nursing Assistant/Patient Care Technician After treatment position/precautions:  
Supine in bed Bed alarm/tab alert on Bed/Chair-wheels locked Bed in low position Call light within reach RN notified Compliance with Program/Exercises: Noncompliant much of the time Recommendations/Intent for next treatment session: N/A. Pt discharged from PT caselaod. Total Treatment Duration: PT Patient Time In/Time Out Time In: 1030 Time Out: 6435 Kelechi Whitney, PT, DPT

## 2019-04-26 NOTE — PROGRESS NOTES
Problem: Diabetes Self-Management Goal: *Disease process and treatment process Description Define diabetes and identify own type of diabetes; list 3 options for treating diabetes. Outcome: Progressing Towards Goal 
Goal: *Developing strategies to promote health/change behavior Description Define the ABC's of diabetes; identify appropriate screenings, schedule and personal plan for screenings. Outcome: Progressing Towards Goal 
Goal: *Using medications safely Description State effect of diabetes medications on diabetes; name diabetes medication taking, action and side effects. Outcome: Progressing Towards Goal 
Goal: *Monitoring blood glucose, interpreting and using results Description Identify recommended blood glucose targets  and personal targets. Outcome: Progressing Towards Goal 
Goal: *Prevention, detection, treatment of acute complications Description List symptoms of hyper- and hypoglycemia; describe how to treat low blood sugar and actions for lowering  high blood glucose level. Outcome: Progressing Towards Goal 
Goal: *Prevention, detection and treatment of chronic complications Description Define the natural course of diabetes and describe the relationship of blood glucose levels to long term complications of diabetes. Outcome: Progressing Towards Goal 
Goal: *Developing strategies to address psychosocial issues Description Describe feelings about living with diabetes; identify support needed and support network Outcome: Progressing Towards Goal 
  
Problem: Patient Education: Go to Patient Education Activity Goal: Patient/Family Education Outcome: Progressing Towards Goal 
  
Problem: Falls - Risk of 
Goal: *Absence of Falls Description Document Susan Green Fall Risk and appropriate interventions in the flowsheet. Outcome: Progressing Towards Goal 
  
Problem: Patient Education: Go to Patient Education Activity Goal: Patient/Family Education Outcome: Progressing Towards Goal 
  
 Problem: Pressure Injury - Risk of 
Goal: *Prevention of pressure injury Description Document Ras Scale and appropriate interventions in the flowsheet. Outcome: Progressing Towards Goal 
  
Problem: Patient Education: Go to Patient Education Activity Goal: Patient/Family Education Outcome: Progressing Towards Goal 
  
Problem: Risk for Spread of Infection Goal: Prevent transmission of infectious organism to others Description Prevent the transmission of infectious organisms to other patients, staff members, and visitors. Outcome: Progressing Towards Goal 
  
Problem: Patient Education:  Go to Education Activity Goal: Patient/Family Education Outcome: Progressing Towards Goal

## 2019-04-26 NOTE — PROGRESS NOTES
Patient identified as High Risk for Readmission - referral via IP . RRAT 21 - Admitted Tuesday 4/23 for DKA [Medication noncompliance, UTI] - hospitalized 3/31 for AFib 
 - hospitalized 3/10 for Acute Encephalopathy  
 - hospitalized 2/14, 2/10 for Atrial Fib/RVR 
 - multiple ED visits Patient history includes - paraplegia - Bipolar disorder - Seroquel - Narcotic addiction 
 - Diabetes - HgbA1c 11.8 (4/24) - Acute UTI Patient has not been able to get her Medications refilled - Patient has [place] Plan - follow with IP Case Management Link patient with RN Community Care Manager - Elmer Simons - for DSME and assess for ongoing needs. May also require SW Case Management as Per chart review patient has poor social situation

## 2019-04-26 NOTE — PROGRESS NOTES
[facility] OUTREACH NURSE PROGRESS REPORT SUBJECTIVE: In to assess patient secondary to transfer from unit MEWS Score: 1 (04/26/19 0451) Vitals:  
 04/25/19 2339 04/26/19 0451 04/26/19 0749 04/26/19 1105 BP: 115/74 107/70 111/68 126/66 Pulse: 77 71 67 69 Resp: 18 18 16 Temp: 98.1 °F (36.7 °C) 97.5 °F (36.4 °C) 98.2 °F (36.8 °C) 98.2 °F (36.8 °C) SpO2: 93% 94% 98% 96% Weight:      
Height:      
   
 
LAB DATA: 
 
Recent Labs  
  04/26/19 
0435 04/25/19 
1848 04/25/19 
1004 04/24/19 
1212 04/24/19 
4104  04/23/19 
2158  142 139 139 140   < > 129*  
K 3.3* 3.9 3.8 4.8 3.7   < > 4.2 * 114* 113* 117* 112*   < > 97* CO2 18* 16* 14* 11* 13*   < > 13* AGAP 10 12 12 11 15   < > 19* * 338* 417* 276* 215*   < > 437* BUN 5* 5* 5* 10 11   < > 12 CREA 0.56* 0.78 0.75 0.75 0.76   < > 0.94 GFRAA >60 >60 >60 >60 >60   < > >60 GFRNA >60 >60 >60 >60 >60   < > >60  
CA 8.7 8.8 8.1* 8.7 8.9   < > 10.0 MG 2.1  --  1.7*  --  1.5*   < >  --   
PHOS 2.4  --  2.5 2.1*  --    < >  --   
ALB  --   --   --   --   --   --  3.4 TP  --   --   --   --   --   --  8.4*  
GLOB  --   --   --   --   --   --  5.0* AGRAT  --   --   --   --   --   --  0.7* ALT  --   --   --   --   --   --  21  
 < > = values in this interval not displayed. Recent Labs  
  04/26/19 
0435 04/25/19 
1004 04/23/19 
2158 WBC 6.7 6.3 14.3* HGB 12.0 12.5 16.7* HCT 37.1 39.2 50.7*  219 443 OBJECTIVE: On arrival to room, I found patient to be resting in bed with head covered. States she is cold. Temp adjusted in room Visit Vitals /66 (BP 1 Location: Left arm, BP Patient Position: Lying right side) Pulse 69 Temp 98.2 °F (36.8 °C) Resp 16 Ht 5' 5\" (1.651 m) Wt 80.2 kg (176 lb 12.9 oz) SpO2 96% BMI 29.42 kg/m² General appearance: alert, cooperative, no distress, appears stated age Lungs: clear to auscultation bilaterally Neurologic: Grossly normal 
 
 
 Pain Assessment Pain Intensity 1: 0 (04/26/19 0230) Pain Location 1: Foot Pain Intervention(s) 1: Medication (see MAR) Patient Stated Pain Goal: 0 
 
 
ASSESSMENT:  SpO2 96% pulse 72. complaining of feet hurting states she usually takes Oxycodone, Lyrica and neurontin. PLAN: Will continue to follow per protocol

## 2019-04-26 NOTE — PROGRESS NOTES
Previous SP Catheter removed and new SP Catheter placed as ordered. Pt. Tolerated well. Clear yellow drainage present.

## 2019-04-26 NOTE — PROGRESS NOTES
CM met with pt to discuss placement following DC. Pt adamantly against long term placement, stating, \"I don't want to be put in a home. My son needs to be with me. \"  Pt referring to son Tamiko Keller that still resides with pt. Son Bentley Alvarezn incarcerated for drug involvement for one year, per pt. Cm offered a list of affordable housing information and encouraged pt and son Tamiko Keller to start making calls. Pt and son Tamiko Keller both draw  and have funds to cover housing. Pt emotional and expressed willingness  to makes calls on her own behalf. Cm will follow up Monday morning.

## 2019-04-26 NOTE — PROGRESS NOTES
OCCUPATIONAL THERAPY: Initial Assessment, Discharge and AM 4/26/2019 INPATIENT:   
Payor: [id number] / Plan: [place] MEDICAID [facility] / Product Type: Medicaid /  
  
NAME/AGE/GENDER: Kristel Kaur is a 62 y.o. female PRIMARY DIAGNOSIS:  DKA (diabetic ketoacidoses) ([facility] [E13.10] DKA (diabetic ketoacidoses) ([facility] DKA (diabetic ketoacidoses) ([facility] ICD-10: Treatment Diagnosis:  
 · Generalized Muscle Weakness (M62.81) · Other lack of cordination (R27.8) · Abnormal posture (R29.3) Precautions/Allergies: 
   Patient has no known allergies. ASSESSMENT:  
Ms. Tiera Quintana presents to the hospital with DKA. Pt has hx of paraplegia and reports she has been wheelchair bound for the past 7 years. Pt lives with her son and a friend and states she has essentially been bed bound for the past few months. Pt reports she went to rehab and \"they made me worse! \" Pt reports previously she was transferring by herself via a sliding board by herself. Pt has been needing assistance with ADL in the bed at home. Upon arrival pt has blankets over her head and needs maximal encouragement just to open her eyes. Pt is not cooperative with therapy through the majority of the session. Pt c/o significant pain in her LEs and demands therapists to cover her legs throughout. Pt completed long-sitting for pressure relief of her back needing maximal to total assistance x 2 (not much effort with pulling with her UEs to assist). Pt refused to even attempt to sit to the edge of the bed. Pt demonstrates functional but decreased strength of her upper body to assist with functional transfers. Pt needed maximal to total assistance x 2 to roll to the side and pt was very resistive. Pt positioned on her R side with pillow in between her legs. Pt was non-compliant with activity today and need maximal encouragement for minimal participation.  Pt will be discharged from OT services at this time. Re-consult in the future if patient is willing to actively participate. This section established at most recent assessment PROBLEM LIST (Impairments causing functional limitations): 1. Decreased Strength 2. Decreased ADL/Functional Activities 3. Decreased Transfer Abilities 4. Decreased Ambulation Ability/Technique 5. Decreased Balance 6. Increased Pain 7. Decreased Activity Tolerance 8. Decreased Flexibility/Joint Mobility 9. Decreased Skin Integrity/Hygeine 10. Decreased [place] with Home Exercise Program 
11. Decreased Cognition INTERVENTIONS PLANNED: (Benefits and precautions of occupational therapy have been discussed with the patient.) 1. N/A  
TREATMENT PLAN: Frequency/Duration: Discharge Rehabilitation Potential For Stated Goals: n/a  
 
REHAB RECOMMENDATIONS (at time of discharge pending progress):   
Placement: It is my opinion, based on this patient's performance to date, that Ms. Lakisha Koch may benefit from being discharged with NO further skilled therapy due to non-compliance. Equipment:  
? None at this time OCCUPATIONAL PROFILE AND HISTORY:  
History of Present Injury/Illness (Reason for Referral): 
See H&P Past Medical History/Comorbidities: Ms. Lakisha Koch  has a past medical history of Diabetes ([address] Gastrointestinal disorder, Hypertension, Ill-defined condition, Ill-defined condition, Ill-defined condition, Liver disease, Psychiatric disorder, Psychiatric disorder, and Thromboembolus ([address] Ms. Lakisha Koch  has a past surgical history that includes hx hysterectomy. Social History/Living Environment:  
Home Environment: Apartment One/Two Story Residence: One story Living Alone: No 
Support Systems: Family member(s), Friends \ neighbors Patient Expects to be Discharged to[de-identified] Unknown Current DME Used/Available at Home: Wheelchair, Other (comment)(sliding board) Prior Level of Function/Work/Activity: Pt has hx of paraplegia and reports she has been wheelchair bound for the past 7 years. Pt lives with her son and a friend and states she has essentially been bed bound for the past few months. Pt reports she went to rehab and \"they made me worse! \" Pt reports previously she was transferring by herself via a sliding board by herself. Pt has been needing assistance with ADL in the bed at home. Personal Factors:   
      Past/Current Experience:  Hx of rehab stay in January in which she states rehab made her worse Overall Behavior:  Non-cooperative Other factors that influence how disability is experienced by the patient:  Multiple co-morbidities Number of Personal Factors/Comorbidities that affect the Plan of Care: Extensive review of physical, cognitive, and psychosocial performance (3+):  HIGH COMPLEXITY ASSESSMENT OF OCCUPATIONAL PERFORMANCE[de-identified]  
Activities of Daily Living:  
Basic ADLs (From Assessment) Complex ADLs (From Assessment) Feeding: Minimum assistance Oral Facial Hygiene/Grooming: Minimum assistance Bathing: Moderate assistance Upper Body Dressing: Moderate assistance Lower Body Dressing: Total assistance Toileting: Total assistance Instrumental ADL Meal Preparation: Total assistance Homemaking: Total assistance Grooming/Bathing/Dressing Activities of Daily Living Bed/Mat Mobility Rolling: Maximum assistance; Total assistance;Assist x2 Scooting: Total assistance Most Recent Physical Functioning:  
Gross Assessment: 
AROM: Generally decreased, functional 
Strength: Generally decreased, functional 
Coordination: Generally decreased, functional 
         
  
Posture: 
  
Balance: 
Sitting: Impaired Sitting - Static: Poor (constant support)(long-sitting) Bed Mobility: 
Rolling: Maximum assistance; Total assistance;Assist x2 Scooting: Total assistance Wheelchair Mobility: 
  
Transfers: 
   
 
    
 
Patient Vitals for the past 6 hrs: BP BP Patient Position SpO2 Pulse 04/26/19 0749 111/68 Head of bed elevated (Comment degrees) 98 % 67  
04/26/19 1105 126/66 Lying right side 96 % 69 Mental Status Neurologic State: Drowsy Orientation Level: Disoriented to time, Oriented to person Cognition: Follows commands Physical Skills Involved: 1. Range of Motion 2. Balance 3. Strength 4. Activity Tolerance 5. Sensation 6. Gross Motor Control 7. Pain (acute) 8. Pain (Chronic) 9. Skin Integrity Cognitive Skills Affected (resulting in the inability to perform in a timely and safe manner): 1. Executive Function Psychosocial Skills Affected: 1. Habits/Routines 2. Social Interaction 3. Emotional Regulation 4. Self-Awareness Number of elements that affect the Plan of Care: 5+:  HIGH COMPLEXITY CLINICAL DECISION MAKING:  
[facility] AM-PAC 6 Clicks Daily Activity Inpatient Short Form How much help from another person does the patient currently need. .. Total A Lot A Little None 1. Putting on and taking off regular lower body clothing? x  ? 1   ? 2   ? 3   ? 4  
2. Bathing (including washing, rinsing, drying)? ? 1  x ? 2   ? 3   ? 4  
3. Toileting, which includes using toilet, bedpan or urinal? x  ? 1   ? 2   ? 3   ? 4  
4. Putting on and taking off regular upper body clothing? ? 1 x  ? 2   ? 3   ? 4  
5. Taking care of personal grooming such as brushing teeth? ? 1   ? 2 x  ? 3   ? 4  
6. Eating meals? ? 1   ? 2 x  ? 3   ? 4  
© 2007, Trustees of [facility], under license to [employer]. All rights reserved Score:  Initial: 12 Most Recent: X (Date: -- ) Interpretation of Tool:  Represents activities that are increasingly more difficult (i.e. Bed mobility, Transfers, Gait). Medical Necessity:    
· Patient demonstrates good rehab potential due to higher previous functional level. Reason for Services/Other Comments: · Patient continues to require skilled intervention due to decreased independence with ADL/functional transfers. Use of outcome tool(s) and clinical judgement create a POC that gives a: MODERATE COMPLEXITY  
 
 
 
TREATMENT:  
(In addition to Assessment/Re-Assessment sessions the following treatments were rendered) Pre-treatment Symptoms/Complaints:   
Pain: Initial:  
Pain Intensity 1: 6 Pain Location 1: Foot Pain Orientation 1: Left, Right Pain Intervention(s) 1: Repositioned  Post Session:  same Assessment/Reassessment only, no treatment provided today Braces/Orthotics/Lines/Etc:  
· IV 
· jasso catheter · O2 Device: Room air Treatment/Session Assessment:   
· Response to Treatment:  Eval only. · Interdisciplinary Collaboration:  
o Occupational Therapist 
o Registered Nurse · After treatment position/precautions:  
o Supine in bed 
o Bed/Chair-wheels locked 
o Call light within reach 
o RN notified · Compliance with Program/Exercises: n/a. · Recommendations/Intent for next treatment session: Discharge. Total Treatment Duration: OT Patient Time In/Time Out Time In: 46 Time Out: 1055 Aletta Dancer, OT

## 2019-04-26 NOTE — PROGRESS NOTES
Date of Outreach Update: 
Kathy Obregon was seen and assessed. Previous Outreach assessment has been reviewed. There have been no significant clinical changes since the completion of the last dated Outreach assessment. In bed alert SpO2 97% on RA pulse 76. States her blood sugar has been running in the 200's. Protocol complete Signed By:   Milton Lynn RN   April 26, 2019 4:57 PM

## 2019-04-27 LAB
GLUCOSE BLD STRIP.AUTO-MCNC: 116 MG/DL (ref 65–100)
GLUCOSE BLD STRIP.AUTO-MCNC: 205 MG/DL (ref 65–100)
GLUCOSE BLD STRIP.AUTO-MCNC: 246 MG/DL (ref 65–100)
GLUCOSE BLD STRIP.AUTO-MCNC: 253 MG/DL (ref 65–100)

## 2019-04-27 PROCEDURE — 74011636637 HC RX REV CODE- 636/637: Performed by: FAMILY MEDICINE

## 2019-04-27 PROCEDURE — 74011000258 HC RX REV CODE- 258: Performed by: HOSPITALIST

## 2019-04-27 PROCEDURE — 74011250637 HC RX REV CODE- 250/637: Performed by: INTERNAL MEDICINE

## 2019-04-27 PROCEDURE — 65270000029 HC RM PRIVATE

## 2019-04-27 PROCEDURE — 74011250637 HC RX REV CODE- 250/637: Performed by: HOSPITALIST

## 2019-04-27 PROCEDURE — 74011636637 HC RX REV CODE- 636/637: Performed by: HOSPITALIST

## 2019-04-27 PROCEDURE — 74011250637 HC RX REV CODE- 250/637: Performed by: FAMILY MEDICINE

## 2019-04-27 PROCEDURE — 74011250636 HC RX REV CODE- 250/636: Performed by: HOSPITALIST

## 2019-04-27 PROCEDURE — 74011000250 HC RX REV CODE- 250: Performed by: FAMILY MEDICINE

## 2019-04-27 PROCEDURE — 94760 N-INVAS EAR/PLS OXIMETRY 1: CPT

## 2019-04-27 PROCEDURE — 82962 GLUCOSE BLOOD TEST: CPT

## 2019-04-27 PROCEDURE — 94640 AIRWAY INHALATION TREATMENT: CPT

## 2019-04-27 RX ORDER — INSULIN LISPRO 100 [IU]/ML
15 INJECTION, SOLUTION INTRAVENOUS; SUBCUTANEOUS
Status: DISCONTINUED | OUTPATIENT
Start: 2019-04-27 | End: 2019-04-27

## 2019-04-27 RX ORDER — INSULIN LISPRO 100 [IU]/ML
12 INJECTION, SOLUTION INTRAVENOUS; SUBCUTANEOUS
Status: DISCONTINUED | OUTPATIENT
Start: 2019-04-27 | End: 2019-04-28

## 2019-04-27 RX ADMIN — INSULIN GLARGINE 40 UNITS: 100 INJECTION, SOLUTION SUBCUTANEOUS at 09:00

## 2019-04-27 RX ADMIN — INSULIN LISPRO 12 UNITS: 100 INJECTION, SOLUTION INTRAVENOUS; SUBCUTANEOUS at 17:31

## 2019-04-27 RX ADMIN — ALPRAZOLAM 0.5 MG: 0.5 TABLET ORAL at 02:22

## 2019-04-27 RX ADMIN — FLECAINIDE ACETATE 50 MG: 100 TABLET ORAL at 08:54

## 2019-04-27 RX ADMIN — BUDESONIDE 500 MCG: 0.5 INHALANT RESPIRATORY (INHALATION) at 20:37

## 2019-04-27 RX ADMIN — INSULIN LISPRO 4 UNITS: 100 INJECTION, SOLUTION INTRAVENOUS; SUBCUTANEOUS at 08:54

## 2019-04-27 RX ADMIN — OXYCODONE HYDROCHLORIDE 10 MG: 5 TABLET ORAL at 17:31

## 2019-04-27 RX ADMIN — INSULIN LISPRO 12 UNITS: 100 INJECTION, SOLUTION INTRAVENOUS; SUBCUTANEOUS at 12:01

## 2019-04-27 RX ADMIN — Medication 10 ML: at 06:08

## 2019-04-27 RX ADMIN — MEROPENEM 500 MG: 500 INJECTION, POWDER, FOR SOLUTION INTRAVENOUS at 17:31

## 2019-04-27 RX ADMIN — INSULIN LISPRO 10 UNITS: 100 INJECTION, SOLUTION INTRAVENOUS; SUBCUTANEOUS at 07:30

## 2019-04-27 RX ADMIN — APIXABAN 5 MG: 5 TABLET, FILM COATED ORAL at 17:31

## 2019-04-27 RX ADMIN — Medication 400 MG: at 08:54

## 2019-04-27 RX ADMIN — MEROPENEM 500 MG: 500 INJECTION, POWDER, FOR SOLUTION INTRAVENOUS at 06:08

## 2019-04-27 RX ADMIN — MEROPENEM 500 MG: 500 INJECTION, POWDER, FOR SOLUTION INTRAVENOUS at 00:45

## 2019-04-27 RX ADMIN — INSULIN LISPRO 4 UNITS: 100 INJECTION, SOLUTION INTRAVENOUS; SUBCUTANEOUS at 18:23

## 2019-04-27 RX ADMIN — METOPROLOL SUCCINATE 50 MG: 50 TABLET, EXTENDED RELEASE ORAL at 08:54

## 2019-04-27 RX ADMIN — Medication 10 ML: at 22:41

## 2019-04-27 RX ADMIN — FLECAINIDE ACETATE 50 MG: 100 TABLET ORAL at 22:39

## 2019-04-27 RX ADMIN — NYSTATIN: 100000 POWDER TOPICAL at 09:00

## 2019-04-27 RX ADMIN — ALPRAZOLAM 0.5 MG: 0.5 TABLET ORAL at 13:03

## 2019-04-27 RX ADMIN — INSULIN LISPRO 6 UNITS: 100 INJECTION, SOLUTION INTRAVENOUS; SUBCUTANEOUS at 22:40

## 2019-04-27 RX ADMIN — MEROPENEM 500 MG: 500 INJECTION, POWDER, FOR SOLUTION INTRAVENOUS at 12:02

## 2019-04-27 RX ADMIN — NYSTATIN: 100000 POWDER TOPICAL at 17:36

## 2019-04-27 RX ADMIN — QUETIAPINE FUMARATE 300 MG: 100 TABLET ORAL at 22:39

## 2019-04-27 RX ADMIN — OXYCODONE HYDROCHLORIDE 10 MG: 5 TABLET ORAL at 22:39

## 2019-04-27 RX ADMIN — OXYCODONE HYDROCHLORIDE 10 MG: 5 TABLET ORAL at 12:02

## 2019-04-27 RX ADMIN — Medication: at 22:41

## 2019-04-27 RX ADMIN — APIXABAN 5 MG: 5 TABLET, FILM COATED ORAL at 08:54

## 2019-04-27 NOTE — PROGRESS NOTES
Hourly rounds performed. All needs meet. Good UOP this shift. Patient still not cooperative with participation in insulin administration. Bed low/locked. Call light within reach. Patient denies needs at this time.   Will continue to monitor and report to oncoming RN

## 2019-04-27 NOTE — PROGRESS NOTES
Hourly rounds completed. Patient alert and oriented, pleasant this shift. Still not participating in insulin administration. C/o pain, medicated per MAR. Resting in bed at this time,  Will continue to monitor and report to oncoming RN.

## 2019-04-27 NOTE — PROGRESS NOTES
Hospitalist Progress Note Patient: Peter Rangel MRN: [id number]  SSN: [id number] YOB: 1955  Age: 62 y.o. Sex: female Admit Date: 4/23/2019 LOS: 4 days Subjective:  
 
From H&P: \"62 y.o. female with a past medical history of DM type II, HTN, atrial fibrillation, GERD, BPAD who presents to the ER with report of abdominal pain for the past 3 days. She reports that the pain feels like previous UTIs. Admits to dyspepsia as well. Admits that she has not been able to get her medicaitons refilled for the past 1 week, including her insulin and metformin. \" Today, no significant pain today. No fever Objective:  
 
Visit Vitals /70 (BP 1 Location: Left arm, BP Patient Position: Head of bed elevated (Comment degrees)) Comment (BP Patient Position): 35 Pulse 65 Temp 97.4 °F (36.3 °C) Resp 16 Ht 5' 5\" (1.651 m) Wt 80.2 kg (176 lb 12.9 oz) SpO2 98% BMI 29.42 kg/m² Oxygen Therapy O2 Sat (%): 98 % (04/27/19 1153) Pulse via Oximetry: 75 beats per minute (04/25/19 0747) O2 Device: Room air (04/25/19 0747) Intake and Output:  
 
Intake/Output Summary (Last 24 hours) at 4/27/2019 1432 Last data filed at 4/27/2019 1417 Gross per 24 hour Intake 1320 ml Output 5050 ml Net -3730 ml Physical Exam:  
GENERAL: alert, cooperative, moderate distress, appears stated age LUNG: clear to auscultation bilaterally HEART: irregular rate and rhythm, S1S2, no murmur, no JVD ABDOMEN: soft, mild L side tender, non-distended. Bowel sounds normal.  
EXTREMITIES:  Trace diana LL edema, SKIN: no rash or abnormalities NEUROLOGIC: grossly intact. Lab/Data Review: 
Recent Results (from the past 24 hour(s)) GLUCOSE, POC Collection Time: 04/26/19  4:24 PM  
Result Value Ref Range Glucose (POC) 200 (H) 65 - 100 mg/dL GLUCOSE, POC Collection Time: 04/26/19  9:01 PM  
Result Value Ref Range Glucose (POC) 246 (H) 65 - 100 mg/dL GLUCOSE, POC  
 Collection Time: 19  8:28 AM  
Result Value Ref Range Glucose (POC) 205 (H) 65 - 100 mg/dL GLUCOSE, POC Collection Time: 19 11:51 AM  
Result Value Ref Range Glucose (POC) 116 (H) 65 - 100 mg/dL Imaging: Xr Chest [facility] ER Result Date: 2019 EXAM: TEMPORARY INDICATION: Sepsis COMPARISON: 2019 FINDINGS: A portable AP radiograph of the chest was obtained at 2142 hours. The patient is on a cardiac monitor. The lungs are clear. The cardiac and mediastinal contours and pulmonary vascularity are normal.  The bones and soft tissues are grossly within normal limits. IMPRESSION: Normal chest. 
 
Xr Chest [facility] ER Result Date: 2019 EXAM: XR CHEST PORT INDICATION: altered mental status COMPARISON: 3/10/2019 FINDINGS: A portable AP radiograph of the chest was obtained at 0006 hours. The patient is on a cardiac monitor. The lungs are clear. The cardiac and mediastinal contours and pulmonary vascularity are normal.  The bones and soft tissues are grossly within normal limits. IMPRESSION: Normal chest. 
 
Results for orders placed or performed during the hospital encounter of 19  
2D ECHO COMPLETE ADULT (TTE) W OR WO CONTR Narrative [address] [place], [address] [facility] 
[phone] Transthoracic Echocardiogram 
2D, M-mode, Doppler, and Color Doppler Patient: Elena Roberts 
MR #: [id number] : 1955 Age: 62 years Gender: Female Study date: 2019 Account #: [de-identified] Height: 65 in 
Weight: 199.5 lb 
BSA: 1.98 mï¾² Status:Routine Location: 9484 BP: 120/ 56 Allergies: NO KNOWN ALLERGENS Sonographer:  Lena Mata RD Group:  [facility] Referring Physician:  Tejas Lopez MD 
Reading Physician:  Janis Hernandez. Homa Doyle MD [facility] INDICATIONS: Afib with RVR PROCEDURE: This was a routine study. A transthoracic echocardiogram was performed. The study included complete 2D imaging, M-mode, complete spectral 
Doppler, and color Doppler. Intravenous contrast (Definity, 3 ml) was 
administered. Echocardiographic views were limited by poor patient compliance 
and poor acoustic window availability. The parasternal window was low, thus  
no 
M-mode measurements were recorded. This was a technically difficult study. LEFT VENTRICLE: Size was normal. Systolic function was normal. Ejection 
fraction was estimated in the range of 60 % to 65 %. Elevated gradients noted 
in the LV apex during systole, suggesting hyperdynamic apical walls. There  
were 
no regional wall motion abnormalities. Wall thickness was normal. Left 
ventricular diastolic function was indeterminate. Average E/e' of 8.2. RIGHT VENTRICLE: Not well visualized. LEFT ATRIUM: The atrium was mildly dilated. RIGHT ATRIUM: Not well visualized. SYSTEMIC VEINS: IVC: The inferior vena cava was normal in size and course. AORTIC VALVE: The valve appears to be structurally normal, tri-commissural. 
Images were obtained subcostally. Image quality for PSAX at the AV level not 
optimal for evaluation. There was no evidence for stenosis. There was no 
insufficiency. MITRAL VALVE: Valve structure was normal. There was no evidence for stenosis. There was no regurgitation. TRICUSPID VALVE: The valve structure was normal. There was no evidence for 
stenosis. There was trivial regurgitation. PULMONIC VALVE: Not well visualized. PERICARDIUM: There was no pericardial effusion. AORTA: The root exhibited normal size. SUMMARY: 
 
-  Left ventricle: Systolic function was normal. Ejection fraction was 
estimated in the range of 60 % to 65 %. Elevated gradients noted in the LV  
apex 
during systole, suggesting hyperdynamic apical walls. There were no regional 
wall motion abnormalities. -  Left atrium: The atrium was mildly dilated. SYSTEM MEASUREMENT TABLES 
 
2D mode Left Atrium Systolic Volume Index; Method of Disks, Biplane; 2D mode;: 36  
ml/m2 IVS/LVPW (2D): 1 IVSd (2D): 0.9 cm LVIDd (2D): 4.1 cm 
LVIDs (2D): 2.9 cm 
LVPWd (2D): 0.9 cm RVIDd (2D): 3.4 cm Unspecified Scan Mode Peak Grad; Mean; Antegrade Flow: 9 mm[Hg] Vmax; Antegrade Flow: 152 cm/s Prepared and signed by 
 
Wilbert Wallace. Siddhartha Traylor MD [facility] Signed 24-Apr-2019 13:09:54 Cultures: All Micro Results Procedure Component Value Units Date/Time CULTURE, URINE [[id number]]  (Abnormal)  (Susceptibility) Collected:  04/23/19 2324 Order Status:  Completed Specimen:  Urine from [place] Specimen Updated:  04/27/19 5666 Special Requests: NO SPECIAL REQUESTS Culture result:    
  >100,000 COLONIES/mL ESCHERICHIA COLI ** (EXTENDED SPECTRUM BETA LACTAMASE ) **  
     
      
  ** MULTI-DRUG RESISTANT ORGANISM ** PATIENT IS A KNOWN ESBL     
      
  10,000 to 50,000 COLONIES/mL MIXED SKIN GERARD ISOLATED FOSFOMYCIN TO FOLLOW CULTURE, BLOOD [[id number]] Collected:  04/23/19 2158 Order Status:  Completed Specimen:  Blood Updated:  04/27/19 3952 Special Requests: --     
  NO SPECIAL REQUESTS 
LEFT 
ARM Culture result: NO GROWTH 4 DAYS     
 CULTURE, BLOOD [[id number]]  (Abnormal) Collected:  04/23/19 2158 Order Status:  Completed Specimen:  Blood Updated:  04/26/19 3210 Special Requests: --     
  NO SPECIAL REQUESTS 
RIGHT 
ARM 
  
  GRAM STAIN    
  GRAM POS COCCI IN CLUSTERS  
     
   AEROBIC BOTTLE POSITIVE RESULTS VERIFIED, PHONED TO AND READ BACK BY Sisi Arellano RN ON 04/25/2019 AT 5307 Culture result: STAPHYLOCOCCUS SPECIES, COAGULASE NEGATIVE  
     
      
  SA target DNA sequence not detected within the sample. Test performed by PCR  
     
      
  THIS ORGANISM MAY BE INDICATIVE OF CULTURE CONTAMINATION, HOWEVER, CLINICAL CORRELATION NEEDS TO BE EVALUATED, AS EACH CASE IS UNIQUE. Assessment/Plan:  
 
continue Merrem IV for multiresistent E coli bacteremia Replace K and Mg as needed DC planning unclear yet as d/w CM, pt is evicted this month. Principal Problem: 
  DKA (diabetic ketoacidoses) ([address] (4/23/2019) - Likely due to medication non-compliance (A1C 11.8) + acute UTI 
- s/p insulin drip 
- Home Lantus 30U daily, SSI. Sugar elevated after drip stopped, adding 5u premeal 
- AG closed yesterday, today BMP pending 
- Diabetes educators consulted Active Problems: 
  Atrial fibrillation with RVR ([address] (2/14/2019) - Likely due to acute illness and med noncompliance --> DKA + UTI  
- Resolved after restarting flecainde/toprol 
- Continue Eliquis - Appreciate cardiology eval 
 
  SIRS without acute organ dysfunction due to non-infectious process ([address] (4/24/2019) - Most likely due to DKA 
- Resolving 
- Continue IVFs 
- Continue abx for UTI as below - Blood cultures taken in ER Bacteremia: 
- 1/2 bcx with GPC, received vanco. Await speciation Hypokalemia (12/10/2016) - Replace PRN 
 
  UTI (urinary tract infection) (1/31/2019) 
- UCx with GNR, ID/SN pending 
- has been on Cefepime, change to meropenem based on previous cultures of ESBL Hypomagnesemia (4/3/2019) - Replace prn Abdominal pain (4/23/2019) - Likely due to DKA + UTI 
- Resolving - Monitor for acute changes Hypertension (3/10/2019) - Stable Paraplegia ([address] (12/10/2016) Bipolar disorder without psychotic features ([address] (12/10/2016) - Continue Seroquel Diabetes ([address] (3/10/2019) - See #1 Chronic hepatitis C virus infection ([address] (12/10/2016) Narcotic addiction ([address] (12/10/2016) Dispo - Home in next day or two DIET DIABETIC CONSISTENT CARB Regular DVT Prophylaxis: Eliquis Signed By: Pauline Tamayo MD   
 April 27, 2019

## 2019-04-27 NOTE — PROGRESS NOTES
Problem: Diabetes Self-Management Goal: *Disease process and treatment process Description Define diabetes and identify own type of diabetes; list 3 options for treating diabetes. Outcome: Progressing Towards Goal 
Goal: *Using medications safely Description State effect of diabetes medications on diabetes; name diabetes medication taking, action and side effects. Outcome: Progressing Towards Goal 
Goal: *Monitoring blood glucose, interpreting and using results Description Identify recommended blood glucose targets  and personal targets. Outcome: Progressing Towards Goal 
Goal: *Prevention, detection, treatment of acute complications Description List symptoms of hyper- and hypoglycemia; describe how to treat low blood sugar and actions for lowering  high blood glucose level. Outcome: Progressing Towards Goal 
Goal: *Prevention, detection and treatment of chronic complications Description Define the natural course of diabetes and describe the relationship of blood glucose levels to long term complications of diabetes. Outcome: Progressing Towards Goal 
Goal: *Developing strategies to address psychosocial issues Description Describe feelings about living with diabetes; identify support needed and support network Outcome: Progressing Towards Goal 
  
Problem: Patient Education: Go to Patient Education Activity Goal: Patient/Family Education Outcome: Progressing Towards Goal 
  
Problem: Falls - Risk of 
Goal: *Absence of Falls Description Document Antoni Bush Fall Risk and appropriate interventions in the flowsheet. Outcome: Progressing Towards Goal 
  
Problem: Patient Education: Go to Patient Education Activity Goal: Patient/Family Education Outcome: Progressing Towards Goal 
  
Problem: Pressure Injury - Risk of 
Goal: *Prevention of pressure injury Description Document Ras Scale and appropriate interventions in the flowsheet.  
Outcome: Progressing Towards Goal 
  
 Problem: Patient Education:  Go to Education Activity Goal: Patient/Family Education Outcome: Progressing Towards Goal

## 2019-04-28 LAB
BACTERIA SPEC CULT: ABNORMAL
BACTERIA SPEC CULT: NORMAL
GLUCOSE BLD STRIP.AUTO-MCNC: 139 MG/DL (ref 65–100)
GLUCOSE BLD STRIP.AUTO-MCNC: 176 MG/DL (ref 65–100)
GLUCOSE BLD STRIP.AUTO-MCNC: 181 MG/DL (ref 65–100)
GLUCOSE BLD STRIP.AUTO-MCNC: 197 MG/DL (ref 65–100)
SERVICE CMNT-IMP: ABNORMAL
SERVICE CMNT-IMP: NORMAL

## 2019-04-28 PROCEDURE — 74011250636 HC RX REV CODE- 250/636: Performed by: HOSPITALIST

## 2019-04-28 PROCEDURE — 74011250637 HC RX REV CODE- 250/637: Performed by: HOSPITALIST

## 2019-04-28 PROCEDURE — 74011250637 HC RX REV CODE- 250/637: Performed by: FAMILY MEDICINE

## 2019-04-28 PROCEDURE — 74011250637 HC RX REV CODE- 250/637: Performed by: INTERNAL MEDICINE

## 2019-04-28 PROCEDURE — 74011000258 HC RX REV CODE- 258: Performed by: HOSPITALIST

## 2019-04-28 PROCEDURE — 74011636637 HC RX REV CODE- 636/637: Performed by: HOSPITALIST

## 2019-04-28 PROCEDURE — 74011636637 HC RX REV CODE- 636/637: Performed by: FAMILY MEDICINE

## 2019-04-28 PROCEDURE — 74011000250 HC RX REV CODE- 250: Performed by: FAMILY MEDICINE

## 2019-04-28 PROCEDURE — 94640 AIRWAY INHALATION TREATMENT: CPT

## 2019-04-28 PROCEDURE — 77030020256 HC SOL INJ NACL 0.9%  500ML

## 2019-04-28 PROCEDURE — 65270000029 HC RM PRIVATE

## 2019-04-28 PROCEDURE — 82962 GLUCOSE BLOOD TEST: CPT

## 2019-04-28 PROCEDURE — 94760 N-INVAS EAR/PLS OXIMETRY 1: CPT

## 2019-04-28 RX ORDER — PANTOPRAZOLE SODIUM 40 MG/1
40 TABLET, DELAYED RELEASE ORAL
Status: DISCONTINUED | OUTPATIENT
Start: 2019-04-29 | End: 2019-05-02 | Stop reason: HOSPADM

## 2019-04-28 RX ORDER — INSULIN LISPRO 100 [IU]/ML
15 INJECTION, SOLUTION INTRAVENOUS; SUBCUTANEOUS
Status: DISCONTINUED | OUTPATIENT
Start: 2019-04-28 | End: 2019-05-02 | Stop reason: HOSPADM

## 2019-04-28 RX ORDER — FENTANYL 25 UG/1
2 PATCH TRANSDERMAL
Status: DISCONTINUED | OUTPATIENT
Start: 2019-04-30 | End: 2019-05-02 | Stop reason: HOSPADM

## 2019-04-28 RX ORDER — INSULIN GLARGINE 100 [IU]/ML
45 INJECTION, SOLUTION SUBCUTANEOUS DAILY
Status: DISCONTINUED | OUTPATIENT
Start: 2019-04-29 | End: 2019-05-02 | Stop reason: HOSPADM

## 2019-04-28 RX ADMIN — OXYCODONE HYDROCHLORIDE 10 MG: 5 TABLET ORAL at 15:44

## 2019-04-28 RX ADMIN — OXYCODONE HYDROCHLORIDE 10 MG: 5 TABLET ORAL at 21:04

## 2019-04-28 RX ADMIN — Medication 10 ML: at 20:56

## 2019-04-28 RX ADMIN — MEROPENEM 500 MG: 500 INJECTION, POWDER, FOR SOLUTION INTRAVENOUS at 17:49

## 2019-04-28 RX ADMIN — INSULIN LISPRO 15 UNITS: 100 INJECTION, SOLUTION INTRAVENOUS; SUBCUTANEOUS at 17:49

## 2019-04-28 RX ADMIN — INSULIN LISPRO 15 UNITS: 100 INJECTION, SOLUTION INTRAVENOUS; SUBCUTANEOUS at 12:49

## 2019-04-28 RX ADMIN — FLECAINIDE ACETATE 50 MG: 100 TABLET ORAL at 08:54

## 2019-04-28 RX ADMIN — NYSTATIN: 100000 POWDER TOPICAL at 09:00

## 2019-04-28 RX ADMIN — Medication 10 ML: at 05:44

## 2019-04-28 RX ADMIN — INSULIN GLARGINE 40 UNITS: 100 INJECTION, SOLUTION SUBCUTANEOUS at 08:55

## 2019-04-28 RX ADMIN — Medication 10 ML: at 14:00

## 2019-04-28 RX ADMIN — NYSTATIN: 100000 POWDER TOPICAL at 17:56

## 2019-04-28 RX ADMIN — Medication 400 MG: at 08:54

## 2019-04-28 RX ADMIN — INSULIN LISPRO 12 UNITS: 100 INJECTION, SOLUTION INTRAVENOUS; SUBCUTANEOUS at 08:55

## 2019-04-28 RX ADMIN — FLECAINIDE ACETATE 50 MG: 100 TABLET ORAL at 20:55

## 2019-04-28 RX ADMIN — MEROPENEM 500 MG: 500 INJECTION, POWDER, FOR SOLUTION INTRAVENOUS at 05:43

## 2019-04-28 RX ADMIN — ALPRAZOLAM 0.5 MG: 0.5 TABLET ORAL at 09:04

## 2019-04-28 RX ADMIN — Medication: at 21:00

## 2019-04-28 RX ADMIN — ALPRAZOLAM 0.5 MG: 0.5 TABLET ORAL at 21:51

## 2019-04-28 RX ADMIN — MEROPENEM 500 MG: 500 INJECTION, POWDER, FOR SOLUTION INTRAVENOUS at 12:58

## 2019-04-28 RX ADMIN — INSULIN LISPRO 2 UNITS: 100 INJECTION, SOLUTION INTRAVENOUS; SUBCUTANEOUS at 08:57

## 2019-04-28 RX ADMIN — METOPROLOL SUCCINATE 50 MG: 50 TABLET, EXTENDED RELEASE ORAL at 08:53

## 2019-04-28 RX ADMIN — OXYCODONE HYDROCHLORIDE 10 MG: 5 TABLET ORAL at 09:04

## 2019-04-28 RX ADMIN — MEROPENEM 500 MG: 500 INJECTION, POWDER, FOR SOLUTION INTRAVENOUS at 00:35

## 2019-04-28 RX ADMIN — Medication: at 08:00

## 2019-04-28 RX ADMIN — INSULIN LISPRO 2 UNITS: 100 INJECTION, SOLUTION INTRAVENOUS; SUBCUTANEOUS at 12:49

## 2019-04-28 RX ADMIN — BUDESONIDE 500 MCG: 0.5 INHALANT RESPIRATORY (INHALATION) at 20:36

## 2019-04-28 RX ADMIN — APIXABAN 5 MG: 5 TABLET, FILM COATED ORAL at 17:49

## 2019-04-28 RX ADMIN — MEROPENEM 500 MG: 500 INJECTION, POWDER, FOR SOLUTION INTRAVENOUS at 23:07

## 2019-04-28 RX ADMIN — QUETIAPINE FUMARATE 300 MG: 100 TABLET ORAL at 20:55

## 2019-04-28 RX ADMIN — APIXABAN 5 MG: 5 TABLET, FILM COATED ORAL at 08:53

## 2019-04-28 RX ADMIN — INSULIN LISPRO 2 UNITS: 100 INJECTION, SOLUTION INTRAVENOUS; SUBCUTANEOUS at 17:55

## 2019-04-28 NOTE — PROGRESS NOTES
Hospitalist Progress Note Patient: Georgette Libman MRN: [id number]  SSN: [id number] YOB: 1955  Age: 62 y.o. Sex: female Admit Date: 4/23/2019 LOS: 5 days Subjective:  
 
From H&P: \"62 y.o. female with a past medical history of DM type II, HTN, atrial fibrillation, GERD, BPAD who presents to the ER with report of abdominal pain for the past 3 days. She reports that the pain feels like previous UTIs. Admits to dyspepsia as well. Admits that she has not been able to get her medicaitons refilled for the past 1 week, including her insulin and metformin. \" Today, ask for longer lasting pain meds. and prilosec No fever Objective:  
 
Visit Vitals /89 (BP 1 Location: Left arm, BP Patient Position: Head of bed elevated (Comment degrees)) Comment (BP Patient Position): 45 Pulse (!) 59 Temp 97.7 °F (36.5 °C) Resp 18 Ht 5' 5\" (1.651 m) Wt 80.2 kg (176 lb 12.9 oz) SpO2 95% BMI 29.42 kg/m² Oxygen Therapy O2 Sat (%): 95 % (04/28/19 1639) Pulse via Oximetry: 62 beats per minute (04/27/19 2037) O2 Device: Room air (04/27/19 2037) Intake and Output:  
 
Intake/Output Summary (Last 24 hours) at 4/28/2019 1714 Last data filed at 4/28/2019 8982 Gross per 24 hour Intake 1075 ml Output 4575 ml Net -3500 ml Physical Exam:  
GENERAL: alert, cooperative, mild distress, appears stated age LUNG: clear to auscultation bilaterally HEART: irregular rate and rhythm, S1S2, no murmur, no JVD ABDOMEN: soft, mild L side tender, non-distended. Bowel sounds normal.  
EXTREMITIES:  Trace diana LL edema, SKIN: no rash or abnormalities NEUROLOGIC: grossly intact. Lab/Data Review: 
Recent Results (from the past 24 hour(s)) GLUCOSE, POC Collection Time: 04/27/19  6:16 PM  
Result Value Ref Range Glucose (POC) 246 (H) 65 - 100 mg/dL GLUCOSE, POC Collection Time: 04/27/19  9:55 PM  
Result Value Ref Range Glucose (POC) 253 (H) 65 - 100 mg/dL GLUCOSE, POC Collection Time: 19  7:50 AM  
Result Value Ref Range Glucose (POC) 176 (H) 65 - 100 mg/dL GLUCOSE, POC Collection Time: 19 11:51 AM  
Result Value Ref Range Glucose (POC) 197 (H) 65 - 100 mg/dL GLUCOSE, POC Collection Time: 19  4:41 PM  
Result Value Ref Range Glucose (POC) 181 (H) 65 - 100 mg/dL Imaging: Xr Chest [facility] Result Date: 2019 EXAM: TEMPORARY INDICATION: Sepsis COMPARISON: 2019 FINDINGS: A portable AP radiograph of the chest was obtained at 2142 hours. The patient is on a cardiac monitor. The lungs are clear. The cardiac and mediastinal contours and pulmonary vascularity are normal.  The bones and soft tissues are grossly within normal limits. IMPRESSION: Normal chest. 
 
Xr Chest [facility] Result Date: 2019 EXAM: XR CHEST PORT INDICATION: altered mental status COMPARISON: 3/10/2019 FINDINGS: A portable AP radiograph of the chest was obtained at 0006 hours. The patient is on a cardiac monitor. The lungs are clear. The cardiac and mediastinal contours and pulmonary vascularity are normal.  The bones and soft tissues are grossly within normal limits. IMPRESSION: Normal chest. 
 
Results for orders placed or performed during the hospital encounter of 19  
2D ECHO COMPLETE ADULT (TTE) W OR WO CONTR Narrative [address] [place], [address] [facility] 
[phone] Transthoracic Echocardiogram 
2D, M-mode, Doppler, and Color Doppler Patient: Anusha Pritchard 
MR #: [id number] : 1955 Age: 62 years Gender: Female Study date: 2019 Account #: [de-identified] Height: 65 in 
Weight: 199.5 lb 
BSA: 1.98 mï¾² Status:Routine Location: 0292 BP: 120/ 56 Allergies: NO KNOWN ALLERGENS Sonographer:  Jw Cook RDCS Group:  [facility] Referring Physician:  Ziyad Mcgrath MD 
Reading Physician:  Mayelin Ramírez.  Yi Nieves MD [facility] 
 
 INDICATIONS: Afib with RVR PROCEDURE: This was a routine study. A transthoracic echocardiogram was 
performed. The study included complete 2D imaging, M-mode, complete spectral 
Doppler, and color Doppler. Intravenous contrast (Definity, 3 ml) was 
administered. Echocardiographic views were limited by poor patient compliance 
and poor acoustic window availability. The parasternal window was low, thus  
no 
M-mode measurements were recorded. This was a technically difficult study. LEFT VENTRICLE: Size was normal. Systolic function was normal. Ejection 
fraction was estimated in the range of 60 % to 65 %. Elevated gradients noted 
in the LV apex during systole, suggesting hyperdynamic apical walls. There  
were 
no regional wall motion abnormalities. Wall thickness was normal. Left 
ventricular diastolic function was indeterminate. Average E/e' of 8.2. RIGHT VENTRICLE: Not well visualized. LEFT ATRIUM: The atrium was mildly dilated. RIGHT ATRIUM: Not well visualized. SYSTEMIC VEINS: IVC: The inferior vena cava was normal in size and course. AORTIC VALVE: The valve appears to be structurally normal, tri-commissural. 
Images were obtained subcostally. Image quality for PSAX at the AV level not 
optimal for evaluation. There was no evidence for stenosis. There was no 
insufficiency. MITRAL VALVE: Valve structure was normal. There was no evidence for stenosis. There was no regurgitation. TRICUSPID VALVE: The valve structure was normal. There was no evidence for 
stenosis. There was trivial regurgitation. PULMONIC VALVE: Not well visualized. PERICARDIUM: There was no pericardial effusion. AORTA: The root exhibited normal size. SUMMARY: 
 
-  Left ventricle: Systolic function was normal. Ejection fraction was 
estimated in the range of 60 % to 65 %. Elevated gradients noted in the LV  
apex 
during systole, suggesting hyperdynamic apical walls.  There were no regional 
 wall motion abnormalities. -  Left atrium: The atrium was mildly dilated. SYSTEM MEASUREMENT TABLES 
 
2D mode Left Atrium Systolic Volume Index; Method of Disks, Biplane; 2D mode;: 36  
ml/m2 IVS/LVPW (2D): 1 IVSd (2D): 0.9 cm LVIDd (2D): 4.1 cm 
LVIDs (2D): 2.9 cm 
LVPWd (2D): 0.9 cm RVIDd (2D): 3.4 cm Unspecified Scan Mode Peak Grad; Mean; Antegrade Flow: 9 mm[Hg] Vmax; Antegrade Flow: 152 cm/s Prepared and signed by 
 
Tez Mitchell. Shawna Gonzales MD [facility] Signed 24-Apr-2019 13:09:54 Cultures: All Micro Results Procedure Component Value Units Date/Time CULTURE, URINE [[id number]]  (Abnormal)  (Susceptibility) Collected:  04/23/19 0095 Order Status:  Completed Specimen:  Urine from [facility] Specimen Updated:  04/28/19 0311 Special Requests: NO SPECIAL REQUESTS Culture result:    
  >100,000 COLONIES/mL ESCHERICHIA COLI ** (EXTENDED SPECTRUM BETA LACTAMASE ) **  
     
      
  ** MULTI-DRUG RESISTANT ORGANISM ** PATIENT IS A KNOWN ESBL     
      
  10,000 to 50,000 COLONIES/mL MIXED SKIN GERARD ISOLATED  
     
 CULTURE, BLOOD [[id number]] Collected:  04/23/19 2158 Order Status:  Completed Specimen:  Blood Updated:  04/28/19 0565 Special Requests: --     
  NO SPECIAL REQUESTS 
LEFT 
ARM Culture result: NO GROWTH 5 DAYS     
 CULTURE, BLOOD [[id number]]  (Abnormal) Collected:  04/23/19 2158 Order Status:  Completed Specimen:  Blood Updated:  04/26/19 0384 Special Requests: --     
  NO SPECIAL REQUESTS 
RIGHT 
ARM 
  
  GRAM STAIN    
  GRAM POS COCCI IN CLUSTERS  
     
   AEROBIC BOTTLE POSITIVE RESULTS VERIFIED, PHONED TO AND READ BACK BY Sisi Arellano RN ON 04/25/2019 AT 0259 Culture result: STAPHYLOCOCCUS SPECIES, COAGULASE NEGATIVE  
     
      
  SA target DNA sequence not detected within the sample.  Test performed by PCR  
     
      
  THIS ORGANISM MAY BE INDICATIVE OF CULTURE CONTAMINATION, HOWEVER, CLINICAL CORRELATION NEEDS TO BE EVALUATED, AS EACH CASE IS UNIQUE. Assessment/Plan:  
 
continue Merrem IV for multiresistent E coli bacteremia Replace K and Mg as needed DC planning unclear yet as d/w CM, pt is evicted this month. Principal Problem: 
  DKA (diabetic ketoacidoses) ([address] (4/23/2019) - Likely due to medication non-compliance (A1C 11.8) + acute UTI 
- s/p insulin drip 
- Home Lantus 30U daily, SSI. Sugar elevated after drip stopped, adding 5u premeal 
- AG closed yesterday, today BMP pending 
- Diabetes educators consulted Active Problems: 
  Atrial fibrillation with RVR ([address] (2/14/2019) - Likely due to acute illness and med noncompliance --> DKA + UTI  
- Resolved after restarting flecainde/toprol 
- Continue Eliquis - Appreciate cardiology eval 
 
  SIRS without acute organ dysfunction due to non-infectious process ([address] (4/24/2019) - Most likely due to DKA 
- Resolving 
- Continue IVFs 
- Continue abx for UTI as below - Blood cultures taken in ER Bacteremia: 
- 1/2 bcx with GPC, received vanco. Await speciation Hypokalemia (12/10/2016) - Replace PRN 
 
  UTI (urinary tract infection) (1/31/2019) 
- UCx with GNR, ID/SN pending 
- has been on Cefepime, change to meropenem based on previous cultures of ESBL Hypomagnesemia (4/3/2019) - Replace prn Abdominal pain (4/23/2019) - Likely due to DKA + UTI 
- Resolving - Monitor for acute changes Hypertension (3/10/2019) - Stable Paraplegia ([address] (12/10/2016) Bipolar disorder without psychotic features ([address] (12/10/2016) - Continue Seroquel Diabetes ([address] (3/10/2019) - See #1 Chronic hepatitis C virus infection ([address] (12/10/2016) Narcotic addiction ([address] (12/10/2016) Dispo - Home in next day or two DIET DIABETIC CONSISTENT CARB Regular DVT Prophylaxis: Eliquis Signed By: Tony Martinez MD   
 April 28, 2019

## 2019-04-28 NOTE — PROGRESS NOTES
Hourly rounds performed. All needs meet. Bed low/locked. Call light within reach. Patient denies needs at this time.   Will continue to monitor and report to oncoming RN

## 2019-04-28 NOTE — PROGRESS NOTES
Problem: Diabetes Self-Management Goal: *Disease process and treatment process Description Define diabetes and identify own type of diabetes; list 3 options for treating diabetes. Outcome: Progressing Towards Goal 
  
Problem: Diabetes Self-Management Goal: *Developing strategies to promote health/change behavior Description Define the ABC's of diabetes; identify appropriate screenings, schedule and personal plan for screenings. Outcome: Progressing Towards Goal 
  
Problem: Diabetes Self-Management Goal: *Using medications safely Description State effect of diabetes medications on diabetes; name diabetes medication taking, action and side effects. Outcome: Progressing Towards Goal 
  
Problem: Diabetes Self-Management Goal: *Prevention, detection, treatment of acute complications Description List symptoms of hyper- and hypoglycemia; describe how to treat low blood sugar and actions for lowering  high blood glucose level. Outcome: Progressing Towards Goal 
  
Problem: Diabetes Self-Management Goal: *Prevention, detection and treatment of chronic complications Description Define the natural course of diabetes and describe the relationship of blood glucose levels to long term complications of diabetes. Outcome: Progressing Towards Goal 
  
Problem: Falls - Risk of 
Goal: *Absence of Falls Description Document Rakan More Fall Risk and appropriate interventions in the flowsheet.  
Outcome: Progressing Towards Goal

## 2019-04-29 LAB
ANION GAP SERPL CALC-SCNC: 9 MMOL/L (ref 7–16)
BUN SERPL-MCNC: 11 MG/DL (ref 8–23)
CALCIUM SERPL-MCNC: 7.3 MG/DL (ref 8.3–10.4)
CHLORIDE SERPL-SCNC: 107 MMOL/L (ref 98–107)
CO2 SERPL-SCNC: 25 MMOL/L (ref 21–32)
CREAT SERPL-MCNC: 0.48 MG/DL (ref 0.6–1)
GLUCOSE BLD STRIP.AUTO-MCNC: 135 MG/DL (ref 65–100)
GLUCOSE BLD STRIP.AUTO-MCNC: 161 MG/DL (ref 65–100)
GLUCOSE BLD STRIP.AUTO-MCNC: 173 MG/DL (ref 65–100)
GLUCOSE BLD STRIP.AUTO-MCNC: 204 MG/DL (ref 65–100)
GLUCOSE SERPL-MCNC: 126 MG/DL (ref 65–100)
POTASSIUM SERPL-SCNC: 3.8 MMOL/L (ref 3.5–5.1)
SODIUM SERPL-SCNC: 141 MMOL/L (ref 136–145)

## 2019-04-29 PROCEDURE — 82962 GLUCOSE BLOOD TEST: CPT

## 2019-04-29 PROCEDURE — 74011636637 HC RX REV CODE- 636/637: Performed by: HOSPITALIST

## 2019-04-29 PROCEDURE — 94640 AIRWAY INHALATION TREATMENT: CPT

## 2019-04-29 PROCEDURE — 74011000258 HC RX REV CODE- 258: Performed by: HOSPITALIST

## 2019-04-29 PROCEDURE — 74011250637 HC RX REV CODE- 250/637: Performed by: HOSPITALIST

## 2019-04-29 PROCEDURE — 74011636637 HC RX REV CODE- 636/637: Performed by: FAMILY MEDICINE

## 2019-04-29 PROCEDURE — 74011250637 HC RX REV CODE- 250/637: Performed by: INTERNAL MEDICINE

## 2019-04-29 PROCEDURE — 94760 N-INVAS EAR/PLS OXIMETRY 1: CPT

## 2019-04-29 PROCEDURE — 80048 BASIC METABOLIC PNL TOTAL CA: CPT

## 2019-04-29 PROCEDURE — 74011250636 HC RX REV CODE- 250/636: Performed by: HOSPITALIST

## 2019-04-29 PROCEDURE — 36415 COLL VENOUS BLD VENIPUNCTURE: CPT

## 2019-04-29 PROCEDURE — 65270000029 HC RM PRIVATE

## 2019-04-29 PROCEDURE — 74011250637 HC RX REV CODE- 250/637: Performed by: FAMILY MEDICINE

## 2019-04-29 PROCEDURE — 74011250636 HC RX REV CODE- 250/636: Performed by: FAMILY MEDICINE

## 2019-04-29 PROCEDURE — 74011000250 HC RX REV CODE- 250: Performed by: FAMILY MEDICINE

## 2019-04-29 RX ORDER — PROMETHAZINE HYDROCHLORIDE 25 MG/1
25 TABLET ORAL
Status: DISCONTINUED | OUTPATIENT
Start: 2019-04-29 | End: 2019-05-02 | Stop reason: HOSPADM

## 2019-04-29 RX ADMIN — INSULIN LISPRO 15 UNITS: 100 INJECTION, SOLUTION INTRAVENOUS; SUBCUTANEOUS at 08:06

## 2019-04-29 RX ADMIN — NYSTATIN: 100000 POWDER TOPICAL at 17:10

## 2019-04-29 RX ADMIN — APIXABAN 5 MG: 5 TABLET, FILM COATED ORAL at 08:22

## 2019-04-29 RX ADMIN — NYSTATIN: 100000 POWDER TOPICAL at 08:24

## 2019-04-29 RX ADMIN — OXYCODONE HYDROCHLORIDE 10 MG: 5 TABLET ORAL at 16:19

## 2019-04-29 RX ADMIN — ALPRAZOLAM 0.5 MG: 0.5 TABLET ORAL at 22:43

## 2019-04-29 RX ADMIN — INSULIN GLARGINE 45 UNITS: 100 INJECTION, SOLUTION SUBCUTANEOUS at 08:07

## 2019-04-29 RX ADMIN — MEROPENEM 500 MG: 500 INJECTION, POWDER, FOR SOLUTION INTRAVENOUS at 11:00

## 2019-04-29 RX ADMIN — Medication 10 ML: at 13:42

## 2019-04-29 RX ADMIN — FLECAINIDE ACETATE 50 MG: 100 TABLET ORAL at 20:22

## 2019-04-29 RX ADMIN — METOPROLOL SUCCINATE 50 MG: 50 TABLET, EXTENDED RELEASE ORAL at 08:23

## 2019-04-29 RX ADMIN — OXYCODONE HYDROCHLORIDE 10 MG: 5 TABLET ORAL at 20:23

## 2019-04-29 RX ADMIN — Medication: at 08:25

## 2019-04-29 RX ADMIN — MEROPENEM 500 MG: 500 INJECTION, POWDER, FOR SOLUTION INTRAVENOUS at 05:55

## 2019-04-29 RX ADMIN — BUDESONIDE 500 MCG: 0.5 INHALANT RESPIRATORY (INHALATION) at 20:51

## 2019-04-29 RX ADMIN — OXYCODONE HYDROCHLORIDE 10 MG: 5 TABLET ORAL at 08:21

## 2019-04-29 RX ADMIN — OXYCODONE HYDROCHLORIDE 10 MG: 5 TABLET ORAL at 02:49

## 2019-04-29 RX ADMIN — QUETIAPINE FUMARATE 300 MG: 100 TABLET ORAL at 20:23

## 2019-04-29 RX ADMIN — APIXABAN 5 MG: 5 TABLET, FILM COATED ORAL at 17:10

## 2019-04-29 RX ADMIN — PROMETHAZINE HYDROCHLORIDE 12.5 MG: 25 INJECTION INTRAMUSCULAR; INTRAVENOUS at 16:18

## 2019-04-29 RX ADMIN — INSULIN LISPRO 15 UNITS: 100 INJECTION, SOLUTION INTRAVENOUS; SUBCUTANEOUS at 16:32

## 2019-04-29 RX ADMIN — MEROPENEM 500 MG: 500 INJECTION, POWDER, FOR SOLUTION INTRAVENOUS at 17:10

## 2019-04-29 RX ADMIN — INSULIN LISPRO 3 UNITS: 100 INJECTION, SOLUTION INTRAVENOUS; SUBCUTANEOUS at 20:21

## 2019-04-29 RX ADMIN — Medication 400 MG: at 08:22

## 2019-04-29 RX ADMIN — ALPRAZOLAM 0.5 MG: 0.5 TABLET ORAL at 10:44

## 2019-04-29 RX ADMIN — INSULIN LISPRO 2 UNITS: 100 INJECTION, SOLUTION INTRAVENOUS; SUBCUTANEOUS at 08:05

## 2019-04-29 RX ADMIN — Medication: at 20:27

## 2019-04-29 RX ADMIN — FLECAINIDE ACETATE 50 MG: 100 TABLET ORAL at 08:23

## 2019-04-29 RX ADMIN — INSULIN LISPRO 4 UNITS: 100 INJECTION, SOLUTION INTRAVENOUS; SUBCUTANEOUS at 16:32

## 2019-04-29 RX ADMIN — Medication 10 ML: at 05:56

## 2019-04-29 RX ADMIN — Medication 10 ML: at 20:27

## 2019-04-29 RX ADMIN — PANTOPRAZOLE SODIUM 40 MG: 40 TABLET, DELAYED RELEASE ORAL at 05:56

## 2019-04-29 NOTE — PROGRESS NOTES
CM left VM with pt's son regarding progress on calling apartment complexes for housing once pt is discharged. Awaiting response.

## 2019-04-29 NOTE — PROGRESS NOTES
Hourly rounds performed;all pt needs met. PRN pain medication administered x2 for complaints of constant foot pain. Pt alert and oriented this shift; states she is feeling some better. Bed in low position and call light/ personal items within reach. Will continue to monitor and give bedside shift report to oncoming day shift nurse.

## 2019-04-29 NOTE — PROGRESS NOTES
Hospitalist Progress Note Patient: Jean Marie Middleton MRN: [id number]  SSN: [id number] YOB: 1955  Age: 62 y.o. Sex: female Admit Date: 4/23/2019 LOS: 6 days Subjective:  
 
From H&P: \"62 y.o. female with a past medical history of DM type II, HTN, atrial fibrillation, GERD, BPAD who presents to the ER with report of abdominal pain for the past 3 days. She reports that the pain feels like previous UTIs. Admits to dyspepsia as well. Admits that she has not been able to get her medicaitons refilled for the past 1 week, including her insulin and metformin. \" 
 
4/29: Luz Maria Jerome, no new concerns. Has not been up out of bed much. CM following for dispo planning; has refused placement Objective:  
 
Visit Vitals /70 (BP 1 Location: Left arm, BP Patient Position: At rest) Pulse 62 Temp 98.1 °F (36.7 °C) Resp 18 Ht 5' 5\" (1.651 m) Wt 80.2 kg (176 lb 12.9 oz) SpO2 91% BMI 29.42 kg/m² Oxygen Therapy O2 Sat (%): 91 % (04/29/19 0752) Pulse via Oximetry: 61 beats per minute (04/28/19 2036) O2 Device: Room air (04/29/19 0752) Intake and Output:  
 
Intake/Output Summary (Last 24 hours) at 4/29/2019 1925 Last data filed at 4/29/2019 5375 Gross per 24 hour Intake 950 ml Output 3925 ml Net -2975 ml Physical Exam:  
GENERAL: alert, cooperative, mild distress, appears stated age LUNG: clear to auscultation bilaterally HEART: irregular rate and rhythm, S1S2, no murmur, no JVD ABDOMEN: soft, mild L side tender, non-distended. Bowel sounds normal.  
EXTREMITIES:  Trace diana LL edema, SKIN: no rash or abnormalities NEUROLOGIC: grossly intact. Lab/Data Review: 
Recent Results (from the past 24 hour(s)) GLUCOSE, POC Collection Time: 04/28/19 11:51 AM  
Result Value Ref Range Glucose (POC) 197 (H) 65 - 100 mg/dL GLUCOSE, POC Collection Time: 04/28/19  4:41 PM  
Result Value Ref Range Glucose (POC) 181 (H) 65 - 100 mg/dL GLUCOSE, POC  
 Collection Time: 19  8:31 PM  
Result Value Ref Range Glucose (POC) 139 (H) 65 - 100 mg/dL GLUCOSE, POC Collection Time: 19  7:46 AM  
Result Value Ref Range Glucose (POC) 173 (H) 65 - 100 mg/dL Imaging: Xr Chest [facility] Result Date: 2019 EXAM: TEMPORARY INDICATION: Sepsis COMPARISON: 2019 FINDINGS: A portable AP radiograph of the chest was obtained at 2142 hours. The patient is on a cardiac monitor. The lungs are clear. The cardiac and mediastinal contours and pulmonary vascularity are normal.  The bones and soft tissues are grossly within normal limits. IMPRESSION: Normal chest. 
 
Xr Chest [facility] Result Date: 2019 EXAM: XR CHEST PORT INDICATION: altered mental status COMPARISON: 3/10/2019 FINDINGS: A portable AP radiograph of the chest was obtained at 0006 hours. The patient is on a cardiac monitor. The lungs are clear. The cardiac and mediastinal contours and pulmonary vascularity are normal.  The bones and soft tissues are grossly within normal limits. IMPRESSION: Normal chest. 
 
Results for orders placed or performed during the hospital encounter of 19  
2D ECHO COMPLETE ADULT (TTE) W OR WO CONTR Narrative [facility] [address] [place], [address] [facility] 
[phone] Transthoracic Echocardiogram 
2D, M-mode, Doppler, and Color Doppler Patient: Renee Morgan 
MR #: [id number] : 1955 Age: 62 years Gender: Female Study date: 2019 Account #: [de-identified] Height: 65 in 
Weight: 199.5 lb 
BSA: 1.98 mï¾² Status:Routine Location: [facility] BP: 120/ 56 Allergies: NO KNOWN ALLERGENS Sonographer:  [facility] Group:  [facility] Referring Physician:  Gary eRdman MD 
Reading Physician:  Shayy Moulton MD [facility] INDICATIONS: Afib with RVR PROCEDURE: This was a routine study. A transthoracic echocardiogram was performed. The study included complete 2D imaging, M-mode, complete spectral 
Doppler, and color Doppler. Intravenous contrast (Definity, 3 ml) was 
administered. Echocardiographic views were limited by poor patient compliance 
and poor acoustic window availability. The parasternal window was low, thus  
no 
M-mode measurements were recorded. This was a technically difficult study. LEFT VENTRICLE: Size was normal. Systolic function was normal. Ejection 
fraction was estimated in the range of 60 % to 65 %. Elevated gradients noted 
in the LV apex during systole, suggesting hyperdynamic apical walls. There  
were 
no regional wall motion abnormalities. Wall thickness was normal. Left 
ventricular diastolic function was indeterminate. Average E/e' of 8.2. RIGHT VENTRICLE: Not well visualized. LEFT ATRIUM: The atrium was mildly dilated. RIGHT ATRIUM: Not well visualized. SYSTEMIC VEINS: IVC: The inferior vena cava was normal in size and course. AORTIC VALVE: The valve appears to be structurally normal, tri-commissural. 
Images were obtained subcostally. Image quality for PSAX at the AV level not 
optimal for evaluation. There was no evidence for stenosis. There was no 
insufficiency. MITRAL VALVE: Valve structure was normal. There was no evidence for stenosis. There was no regurgitation. TRICUSPID VALVE: The valve structure was normal. There was no evidence for 
stenosis. There was trivial regurgitation. PULMONIC VALVE: Not well visualized. PERICARDIUM: There was no pericardial effusion. AORTA: The root exhibited normal size. SUMMARY: 
 
-  Left ventricle: Systolic function was normal. Ejection fraction was 
estimated in the range of 60 % to 65 %. Elevated gradients noted in the LV  
apex 
during systole, suggesting hyperdynamic apical walls. There were no regional 
wall motion abnormalities. -  Left atrium: The atrium was mildly dilated. SYSTEM MEASUREMENT TABLES 
 
2D mode Left Atrium Systolic Volume Index; Method of Disks, Biplane; 2D mode;: 36  
ml/m2 IVS/LVPW (2D): 1 IVSd (2D): 0.9 cm LVIDd (2D): 4.1 cm 
LVIDs (2D): 2.9 cm 
LVPWd (2D): 0.9 cm RVIDd (2D): 3.4 cm Unspecified Scan Mode Peak Grad; Mean; Antegrade Flow: 9 mm[Hg] Vmax; Antegrade Flow: 152 cm/s Prepared and signed by 
 
Lucio Kunz. Ellyn Clemens MD [facility] Signed 24-Apr-2019 13:09:54 Cultures: All Micro Results Procedure Component Value Units Date/Time CULTURE, URINE [[id number]]  (Abnormal)  (Susceptibility) Collected:  04/23/19 2324 Order Status:  Completed Specimen:  Urine from [facility] Specimen Updated:  04/28/19 4969 Special Requests: NO SPECIAL REQUESTS Culture result:    
  >100,000 COLONIES/mL ESCHERICHIA COLI ** (EXTENDED SPECTRUM BETA LACTAMASE ) **  
     
      
  ** MULTI-DRUG RESISTANT ORGANISM ** PATIENT IS A KNOWN ESBL     
      
  10,000 to 50,000 COLONIES/mL MIXED SKIN GERARD ISOLATED  
     
 CULTURE, BLOOD [[id number]] Collected:  04/23/19 2158 Order Status:  Completed Specimen:  Blood Updated:  04/28/19 5295 Special Requests: --     
  NO SPECIAL REQUESTS 
LEFT 
ARM Culture result: NO GROWTH 5 DAYS     
 CULTURE, BLOOD [[id number]]  (Abnormal) Collected:  04/23/19 2158 Order Status:  Completed Specimen:  Blood Updated:  04/26/19 9541 Special Requests: --     
  NO SPECIAL REQUESTS 
RIGHT 
ARM 
  
  GRAM STAIN    
  GRAM POS COCCI IN CLUSTERS  
     
   AEROBIC BOTTLE POSITIVE RESULTS VERIFIED, PHONED TO AND READ BACK BY Sisi Arellano RN ON 04/25/2019 AT 3208 Culture result: STAPHYLOCOCCUS SPECIES, COAGULASE NEGATIVE  
     
      
  SA target DNA sequence not detected within the sample. Test performed by PCR  
     
      
  THIS ORGANISM MAY BE INDICATIVE OF CULTURE CONTAMINATION, HOWEVER, CLINICAL CORRELATION NEEDS TO BE EVALUATED, AS EACH CASE IS UNIQUE. Assessment/Plan: continue Merrem IV ESBL e coli UTI - day 5 Replace K and Mg as needed DC planning unclear yet as d/w CM, pt is evicted this month. UTI (urinary tract infection) (1/31/2019) 
- UCx with ESBL 
- Day 5 IV meropenem Principal Problem: 
  DKA (diabetic ketoacidoses) ([facility] (4/23/2019) - Likely due to medication non-compliance (A1C 11.8) + acute UTI 
- s/p insulin drip 
- titrating lantus and premeal; BS improving 
- today BMP pending 
- Diabetes educators consulted Atrial fibrillation with RVR ([facility] (2/14/2019) - Likely due to acute illness and med noncompliance --> DKA + UTI  
- Resolved after restarting flecainde/toprol 
- Continue Eliquis - Appreciate cardiology eval 
 
  SIRS without acute organ dysfunction due to non-infectious process ([facility] (4/24/2019) - Most likely due to DKA 
- Resolving 
- Continue IVFs 
- Continue abx for UTI as below - Blood cultures taken in ER Bacteremia: 
- 1/2 bcx with coag negative staph, likely contaminant Hypokalemia/Hypomagnesemia (4/3/2019) - Replace prn Bipolar disorder without psychotic features ([facility] (12/10/2016) - Continue Seroquel Dispo - Home in next day or two pending CM dispo plan DIET DIABETIC CONSISTENT CARB Regular DVT Prophylaxis: Eliquis Signed By: Светлана Ramirez MD   
 April 29, 2019

## 2019-04-29 NOTE — PROGRESS NOTES
Cm spoke with [facility]. [phone] She stated that they will hold pt's apartment until pt is discharged. At that time, pt can gather her belongings and relocate. CM will follow up with son today about any progress he has made in finding new housing.

## 2019-04-29 NOTE — PROGRESS NOTES
Problem: Falls - Risk of 
Goal: *Absence of Falls Description Document Antoni Bush Fall Risk and appropriate interventions in the flowsheet. Outcome: Progressing Towards Goal 
  
Problem: Patient Education: Go to Patient Education Activity Goal: Patient/Family Education Outcome: Progressing Towards Goal 
  
Problem: Pressure Injury - Risk of 
Goal: *Prevention of pressure injury Description Document Ras Scale and appropriate interventions in the flowsheet. Outcome: Progressing Towards Goal 
  
Problem: Risk for Spread of Infection Goal: Prevent transmission of infectious organism to others Description Prevent the transmission of infectious organisms to other patients, staff members, and visitors. Outcome: Progressing Towards Goal 
  
Problem: Patient Education:  Go to Education Activity Goal: Patient/Family Education Outcome: Progressing Towards Goal
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
## 2019-04-30 LAB
ANION GAP SERPL CALC-SCNC: 10 MMOL/L (ref 7–16)
BUN SERPL-MCNC: 13 MG/DL (ref 8–23)
CALCIUM SERPL-MCNC: 8.2 MG/DL (ref 8.3–10.4)
CHLORIDE SERPL-SCNC: 107 MMOL/L (ref 98–107)
CO2 SERPL-SCNC: 24 MMOL/L (ref 21–32)
CREAT SERPL-MCNC: 0.56 MG/DL (ref 0.6–1)
GLUCOSE BLD STRIP.AUTO-MCNC: 123 MG/DL (ref 65–100)
GLUCOSE BLD STRIP.AUTO-MCNC: 185 MG/DL (ref 65–100)
GLUCOSE BLD STRIP.AUTO-MCNC: 193 MG/DL (ref 65–100)
GLUCOSE BLD STRIP.AUTO-MCNC: 241 MG/DL (ref 65–100)
GLUCOSE SERPL-MCNC: 221 MG/DL (ref 65–100)
POTASSIUM SERPL-SCNC: 4.8 MMOL/L (ref 3.5–5.1)
SODIUM SERPL-SCNC: 141 MMOL/L (ref 136–145)

## 2019-04-30 PROCEDURE — 74011250636 HC RX REV CODE- 250/636: Performed by: HOSPITALIST

## 2019-04-30 PROCEDURE — 74011636637 HC RX REV CODE- 636/637: Performed by: FAMILY MEDICINE

## 2019-04-30 PROCEDURE — 74011250637 HC RX REV CODE- 250/637: Performed by: HOSPITALIST

## 2019-04-30 PROCEDURE — 80048 BASIC METABOLIC PNL TOTAL CA: CPT

## 2019-04-30 PROCEDURE — 82962 GLUCOSE BLOOD TEST: CPT

## 2019-04-30 PROCEDURE — 74011250637 HC RX REV CODE- 250/637: Performed by: FAMILY MEDICINE

## 2019-04-30 PROCEDURE — 94640 AIRWAY INHALATION TREATMENT: CPT

## 2019-04-30 PROCEDURE — 65270000029 HC RM PRIVATE

## 2019-04-30 PROCEDURE — 74011636637 HC RX REV CODE- 636/637: Performed by: HOSPITALIST

## 2019-04-30 PROCEDURE — 74011250637 HC RX REV CODE- 250/637: Performed by: INTERNAL MEDICINE

## 2019-04-30 PROCEDURE — 94760 N-INVAS EAR/PLS OXIMETRY 1: CPT

## 2019-04-30 PROCEDURE — 74011000258 HC RX REV CODE- 258: Performed by: HOSPITALIST

## 2019-04-30 PROCEDURE — 74011250636 HC RX REV CODE- 250/636: Performed by: FAMILY MEDICINE

## 2019-04-30 PROCEDURE — 36415 COLL VENOUS BLD VENIPUNCTURE: CPT

## 2019-04-30 PROCEDURE — 74011000250 HC RX REV CODE- 250: Performed by: FAMILY MEDICINE

## 2019-04-30 RX ADMIN — ALPRAZOLAM 0.5 MG: 0.5 TABLET ORAL at 11:57

## 2019-04-30 RX ADMIN — BUDESONIDE 500 MCG: 0.5 INHALANT RESPIRATORY (INHALATION) at 20:11

## 2019-04-30 RX ADMIN — INSULIN LISPRO 15 UNITS: 100 INJECTION, SOLUTION INTRAVENOUS; SUBCUTANEOUS at 11:52

## 2019-04-30 RX ADMIN — MEROPENEM 500 MG: 500 INJECTION, POWDER, FOR SOLUTION INTRAVENOUS at 01:33

## 2019-04-30 RX ADMIN — FLECAINIDE ACETATE 50 MG: 100 TABLET ORAL at 07:22

## 2019-04-30 RX ADMIN — ALPRAZOLAM 0.5 MG: 0.5 TABLET ORAL at 23:14

## 2019-04-30 RX ADMIN — OXYCODONE HYDROCHLORIDE 10 MG: 5 TABLET ORAL at 01:47

## 2019-04-30 RX ADMIN — NYSTATIN: 100000 POWDER TOPICAL at 08:01

## 2019-04-30 RX ADMIN — INSULIN LISPRO 15 UNITS: 100 INJECTION, SOLUTION INTRAVENOUS; SUBCUTANEOUS at 07:30

## 2019-04-30 RX ADMIN — INSULIN LISPRO 2 UNITS: 100 INJECTION, SOLUTION INTRAVENOUS; SUBCUTANEOUS at 21:27

## 2019-04-30 RX ADMIN — MEROPENEM 500 MG: 500 INJECTION, POWDER, FOR SOLUTION INTRAVENOUS at 06:33

## 2019-04-30 RX ADMIN — MEROPENEM 500 MG: 500 INJECTION, POWDER, FOR SOLUTION INTRAVENOUS at 18:00

## 2019-04-30 RX ADMIN — PROMETHAZINE HYDROCHLORIDE 12.5 MG: 25 INJECTION INTRAMUSCULAR; INTRAVENOUS at 09:54

## 2019-04-30 RX ADMIN — OXYCODONE HYDROCHLORIDE 10 MG: 5 TABLET ORAL at 16:46

## 2019-04-30 RX ADMIN — OXYCODONE HYDROCHLORIDE 10 MG: 5 TABLET ORAL at 11:57

## 2019-04-30 RX ADMIN — MEROPENEM 500 MG: 500 INJECTION, POWDER, FOR SOLUTION INTRAVENOUS at 11:52

## 2019-04-30 RX ADMIN — INSULIN LISPRO 2 UNITS: 100 INJECTION, SOLUTION INTRAVENOUS; SUBCUTANEOUS at 07:30

## 2019-04-30 RX ADMIN — INSULIN LISPRO 15 UNITS: 100 INJECTION, SOLUTION INTRAVENOUS; SUBCUTANEOUS at 16:47

## 2019-04-30 RX ADMIN — Medication: at 21:28

## 2019-04-30 RX ADMIN — QUETIAPINE FUMARATE 300 MG: 100 TABLET ORAL at 21:27

## 2019-04-30 RX ADMIN — Medication 400 MG: at 07:22

## 2019-04-30 RX ADMIN — INSULIN GLARGINE 45 UNITS: 100 INJECTION, SOLUTION SUBCUTANEOUS at 08:00

## 2019-04-30 RX ADMIN — OXYCODONE HYDROCHLORIDE 10 MG: 5 TABLET ORAL at 21:27

## 2019-04-30 RX ADMIN — PROMETHAZINE HYDROCHLORIDE 25 MG: 25 TABLET ORAL at 17:16

## 2019-04-30 RX ADMIN — FLECAINIDE ACETATE 50 MG: 100 TABLET ORAL at 21:27

## 2019-04-30 RX ADMIN — INSULIN LISPRO 4 UNITS: 100 INJECTION, SOLUTION INTRAVENOUS; SUBCUTANEOUS at 16:47

## 2019-04-30 RX ADMIN — OXYCODONE HYDROCHLORIDE 10 MG: 5 TABLET ORAL at 07:21

## 2019-04-30 RX ADMIN — APIXABAN 5 MG: 5 TABLET, FILM COATED ORAL at 16:47

## 2019-04-30 RX ADMIN — PROMETHAZINE HYDROCHLORIDE 25 MG: 25 TABLET ORAL at 04:27

## 2019-04-30 RX ADMIN — METOPROLOL SUCCINATE 50 MG: 50 TABLET, EXTENDED RELEASE ORAL at 07:21

## 2019-04-30 RX ADMIN — Medication: at 08:01

## 2019-04-30 RX ADMIN — APIXABAN 5 MG: 5 TABLET, FILM COATED ORAL at 07:21

## 2019-04-30 RX ADMIN — PANTOPRAZOLE SODIUM 40 MG: 40 TABLET, DELAYED RELEASE ORAL at 06:34

## 2019-04-30 RX ADMIN — Medication 10 ML: at 21:28

## 2019-04-30 RX ADMIN — NYSTATIN: 100000 POWDER TOPICAL at 18:00

## 2019-04-30 NOTE — PROGRESS NOTES
Problem: Delirium Treatment Goal: *Absence of falls Outcome: Progressing Towards Goal 
  
Problem: Falls - Risk of 
Goal: *Absence of Falls Description Document Scottyt President Fall Risk and appropriate interventions in the flowsheet. Outcome: Progressing Towards Goal 
  
Problem: Pressure Injury - Risk of 
Goal: *Prevention of pressure injury Description Document Ras Scale and appropriate interventions in the flowsheet. Outcome: Progressing Towards Goal 
  
Problem: Risk for Spread of Infection Goal: Prevent transmission of infectious organism to others Description Prevent the transmission of infectious organisms to other patients, staff members, and visitors. Outcome: Progressing Towards Goal 
  
Problem: Patient Education: Go to Patient Education Activity Goal: Patient/Family Education Outcome: Progressing Towards Goal 
  
Problem: Breathing Pattern - Ineffective Goal: *Absence of hypoxia Outcome: Progressing Towards Goal

## 2019-04-30 NOTE — PROGRESS NOTES
Hospitalist Progress Note Patient: Eliceo Rios MRN: [id number]  SSN: [id number] YOB: 1955  Age: 62 y.o. Sex: female Admit Date: 4/23/2019 LOS: 7 days Subjective:  
 
From H&P: \"62 y.o. female with a past medical history of DM type II, HTN, atrial fibrillation, GERD, BPAD who presents to the ER with report of abdominal pain for the past 3 days. She reports that the pain feels like previous UTIs. Admits to dyspepsia as well. Admits that she has not been able to get her medicaitons refilled for the past 1 week, including her insulin and metformin. \" 
 
4/30: Светлана Nix, no new concerns. Has not been up out of bed much. Anxious and depressed regarding social/financial situation. Objective:  
 
Visit Vitals /67 Pulse 64 Temp 98.7 °F (37.1 °C) Resp 18 Ht 5' 5\" (1.651 m) Wt 80.2 kg (176 lb 12.9 oz) SpO2 92% BMI 29.42 kg/m² Oxygen Therapy O2 Sat (%): 92 % (04/30/19 1251) Pulse via Oximetry: 64 beats per minute (04/29/19 2051) O2 Device: Room air (04/29/19 2051) Intake and Output:  
 
Intake/Output Summary (Last 24 hours) at 4/30/2019 1548 Last data filed at 4/30/2019 4467 Gross per 24 hour Intake  Output 2700 ml Net -2700 ml Physical Exam:  
GENERAL: alert, cooperative, mild distress, appears stated age LUNG: clear to auscultation bilaterally HEART: irregular rate and rhythm, S1S2, no murmur, no JVD ABDOMEN: soft, mild L side tender, non-distended. Bowel sounds normal.  
EXTREMITIES:  Trace diana LL edema, SKIN: no rash or abnormalities NEUROLOGIC: grossly intact. Lab/Data Review: 
Recent Results (from the past 24 hour(s)) GLUCOSE, POC Collection Time: 04/29/19  4:16 PM  
Result Value Ref Range Glucose (POC) 204 (H) 65 - 100 mg/dL GLUCOSE, POC Collection Time: 04/29/19  7:53 PM  
Result Value Ref Range Glucose (POC) 161 (H) 65 - 100 mg/dL METABOLIC PANEL, BASIC  Collection Time: 04/30/19  4:40 AM  
 Result Value Ref Range Sodium 141 136 - 145 mmol/L Potassium 4.8 3.5 - 5.1 mmol/L Chloride 107 98 - 107 mmol/L  
 CO2 24 21 - 32 mmol/L Anion gap 10 7 - 16 mmol/L Glucose 221 (H) 65 - 100 mg/dL BUN 13 8 - 23 MG/DL Creatinine 0.56 (L) 0.6 - 1.0 MG/DL  
 GFR est AA >60 >60 ml/min/1.73m2 GFR est non-AA >60 >60 ml/min/1.73m2 Calcium 8.2 (L) 8.3 - 10.4 MG/DL  
GLUCOSE, POC Collection Time: 19  7:10 AM  
Result Value Ref Range Glucose (POC) 193 (H) 65 - 100 mg/dL GLUCOSE, POC Collection Time: 19 11:31 AM  
Result Value Ref Range Glucose (POC) 123 (H) 65 - 100 mg/dL Imaging: Xr Chest [facility] Result Date: 2019 EXAM: TEMPORARY INDICATION: Sepsis COMPARISON: 2019 FINDINGS: A portable AP radiograph of the chest was obtained at 2142 hours. The patient is on a cardiac monitor. The lungs are clear. The cardiac and mediastinal contours and pulmonary vascularity are normal.  The bones and soft tissues are grossly within normal limits. IMPRESSION: Normal chest. 
 
Xr Chest [facility] Result Date: 2019 EXAM: XR CHEST PORT INDICATION: altered mental status COMPARISON: 3/10/2019 FINDINGS: A portable AP radiograph of the chest was obtained at 0006 hours. The patient is on a cardiac monitor. The lungs are clear. The cardiac and mediastinal contours and pulmonary vascularity are normal.  The bones and soft tissues are grossly within normal limits. IMPRESSION: Normal chest. 
 
Results for orders placed or performed during the hospital encounter of 19  
2D ECHO COMPLETE ADULT (TTE) W OR WO CONTR Narrative [facility] [address] [place], [address] [facility] 
[phone] Transthoracic Echocardiogram 
2D, M-mode, Doppler, and Color Doppler Patient: Marlene Stone 
MR #: [id number] : 1955 Age: 62 years Gender: Female Study date: 2019 Account #: [de-identified] Height: 65 in 
Weight: 199.5 lb BSA: 1.98 mï¾² Status:Routine Location: 7890 BP: 120/ 56 Allergies: NO KNOWN ALLERGENS Sonographer:  Amanda Encinas RDCS Group:  [facility] Referring Physician:  Debora Nguyen MD 
Reading Physician:  Earle Frederick MD [facility] [place] INDICATIONS: Afib with RVR PROCEDURE: This was a routine study. A transthoracic echocardiogram was 
performed. The study included complete 2D imaging, M-mode, complete spectral 
Doppler, and color Doppler. Intravenous contrast (Definity, 3 ml) was 
administered. Echocardiographic views were limited by poor patient compliance 
and poor acoustic window availability. The parasternal window was low, thus  
no 
M-mode measurements were recorded. This was a technically difficult study. LEFT VENTRICLE: Size was normal. Systolic function was normal. Ejection 
fraction was estimated in the range of 60 % to 65 %. Elevated gradients noted 
in the LV apex during systole, suggesting hyperdynamic apical walls. There  
were 
no regional wall motion abnormalities. Wall thickness was normal. Left 
ventricular diastolic function was indeterminate. Average E/e' of 8.2. RIGHT VENTRICLE: Not well visualized. LEFT ATRIUM: The atrium was mildly dilated. RIGHT ATRIUM: Not well visualized. SYSTEMIC VEINS: IVC: The inferior vena cava was normal in size and course. AORTIC VALVE: The valve appears to be structurally normal, tri-commissural. 
Images were obtained subcostally. Image quality for PSAX at the AV level not 
optimal for evaluation. There was no evidence for stenosis. There was no 
insufficiency. MITRAL VALVE: Valve structure was normal. There was no evidence for stenosis. There was no regurgitation. TRICUSPID VALVE: The valve structure was normal. There was no evidence for 
stenosis. There was trivial regurgitation. PULMONIC VALVE: Not well visualized. PERICARDIUM: There was no pericardial effusion. AORTA: The root exhibited normal size.  
 
SUMMARY: 
 
 -  Left ventricle: Systolic function was normal. Ejection fraction was 
estimated in the range of 60 % to 65 %. Elevated gradients noted in the LV  
apex 
during systole, suggesting hyperdynamic apical walls. There were no regional 
wall motion abnormalities. -  Left atrium: The atrium was mildly dilated. SYSTEM MEASUREMENT TABLES 
 
2D mode Left Atrium Systolic Volume Index; Method of Disks, Biplane; 2D mode;: 36  
ml/m2 IVS/LVPW (2D): 1 IVSd (2D): 0.9 cm LVIDd (2D): 4.1 cm 
LVIDs (2D): 2.9 cm 
LVPWd (2D): 0.9 cm RVIDd (2D): 3.4 cm Unspecified Scan Mode Peak Grad; Mean; Antegrade Flow: 9 mm[Hg] Vmax; Antegrade Flow: 152 cm/s Prepared and signed by 
 
Estefania Dickerson. Cecil Saucedo MD [facility] [place] Signed 24-Apr-2019 13:09:54 Cultures: All Micro Results Procedure Component Value Units Date/Time CULTURE, URINE [[id number]]  (Abnormal)  (Susceptibility) Collected:  04/23/19 4534 Order Status:  Completed Specimen:  Urine from [place] Specimen Updated:  04/28/19 2374 Special Requests: NO SPECIAL REQUESTS Culture result:    
  >100,000 COLONIES/mL ESCHERICHIA COLI ** (EXTENDED SPECTRUM BETA LACTAMASE ) **  
     
      
  ** MULTI-DRUG RESISTANT ORGANISM ** PATIENT IS A KNOWN ESBL     
      
  10,000 to 50,000 COLONIES/mL MIXED SKIN GERARD ISOLATED  
     
 CULTURE, BLOOD [[id number]] Collected:  04/23/19 2158 Order Status:  Completed Specimen:  Blood Updated:  04/28/19 0772 Special Requests: --     
  NO SPECIAL REQUESTS 
LEFT 
ARM Culture result: NO GROWTH 5 DAYS     
 CULTURE, BLOOD [[id number]]  (Abnormal) Collected:  04/23/19 2158 Order Status:  Completed Specimen:  Blood Updated:  04/26/19 9041 Special Requests: --     
  NO SPECIAL REQUESTS 
RIGHT 
ARM 
  
  GRAM STAIN    
  GRAM POS COCCI IN CLUSTERS  
     
   AEROBIC BOTTLE POSITIVE RESULTS VERIFIED, PHONED TO AND READ BACK BY Sisi Arellano RN ON 04/25/2019 AT 3182 Culture result: STAPHYLOCOCCUS SPECIES, COAGULASE NEGATIVE  
     
      
  SA target DNA sequence not detected within the sample. Test performed by PCR  
     
      
  THIS ORGANISM MAY BE INDICATIVE OF CULTURE CONTAMINATION, HOWEVER, CLINICAL CORRELATION NEEDS TO BE EVALUATED, AS EACH CASE IS UNIQUE. Assessment/Plan: UTI  
- UCx with ESBL e coli 
- Day 6 IV meropenem - last dose tomorrow DKA resolved - Likely due to medication non-compliance (A1C 11.8) + acute UTI 
- s/p insulin drip 
- titrating lantus and premeal; BS improving 
- Diabetes educators consulted Atrial fibrillation with RVR  
- Likely due to acute illness and med noncompliance --> DKA + UTI  
- Resolved after restarting flecainde/toprol 
- Continue Eliquis - Appreciate cardiology eval 
 
  SIRS without acute organ dysfunction due to non-infectious process - Most likely due to DKA more than UTI. Resolved. Bacteremia: 
- 1/2 bcx with coag negative staph, likely contaminant Hypokalemia/Hypomagnesemia - Replace prn Bipolar disorder without psychotic features - Continue Seroquel Dispo - Home tomorrow after meropenem. CM following for DC plan and is able to go back to prior apartment at least for now. Refuses SNF. DIET DIABETIC CONSISTENT CARB Regular DVT Prophylaxis: Eliquis Signed By: Rosie Dong MD   
 April 30, 2019

## 2019-04-30 NOTE — PROGRESS NOTES
Call received from Caitlin Hansen from [place] [id number] stating patient still has an open case and [facility] Kareem Post stated that she has called apartment and they were unsure if eviction was confirmed.  Kareem Post stated will follow up with patient once she discharges from the hospital.

## 2019-04-30 NOTE — PROGRESS NOTES
Patient requested to speak with CM. Patient asked for medicaid phone number and number to her apartment complex. Provided patient with medicaid number and her apartment phone number. Encouraged patient to again to call apartment complex and see if they can extend stay there until she can find another place to live. Patient remains adamant that she is not going to a nursing home. CM left message for Sushant ABDUL , [phone], to see if patient still had an open case at this time. Awaiting call back.

## 2019-05-01 ENCOUNTER — PATIENT OUTREACH (OUTPATIENT)
Dept: CASE MANAGEMENT | Age: 64
End: 2019-05-01

## 2019-05-01 LAB
ANION GAP SERPL CALC-SCNC: 9 MMOL/L (ref 7–16)
BUN SERPL-MCNC: 9 MG/DL (ref 8–23)
CALCIUM SERPL-MCNC: 8.8 MG/DL (ref 8.3–10.4)
CHLORIDE SERPL-SCNC: 105 MMOL/L (ref 98–107)
CO2 SERPL-SCNC: 27 MMOL/L (ref 21–32)
CREAT SERPL-MCNC: 0.49 MG/DL (ref 0.6–1)
ERYTHROCYTE [DISTWIDTH] IN BLOOD BY AUTOMATED COUNT: 15.1 % (ref 11.9–14.6)
GLUCOSE BLD STRIP.AUTO-MCNC: 163 MG/DL (ref 65–100)
GLUCOSE BLD STRIP.AUTO-MCNC: 165 MG/DL (ref 65–100)
GLUCOSE BLD STRIP.AUTO-MCNC: 95 MG/DL (ref 65–100)
GLUCOSE SERPL-MCNC: 157 MG/DL (ref 65–100)
HCT VFR BLD AUTO: 40.6 % (ref 35.8–46.3)
HGB BLD-MCNC: 12.6 G/DL (ref 11.7–15.4)
MCH RBC QN AUTO: 28.8 PG (ref 26.1–32.9)
MCHC RBC AUTO-ENTMCNC: 31 G/DL (ref 31.4–35)
MCV RBC AUTO: 92.7 FL (ref 79.6–97.8)
NRBC # BLD: 0 K/UL (ref 0–0.2)
PLATELET # BLD AUTO: 260 K/UL (ref 150–450)
PMV BLD AUTO: 11.1 FL (ref 9.4–12.3)
POTASSIUM SERPL-SCNC: 3.7 MMOL/L (ref 3.5–5.1)
RBC # BLD AUTO: 4.38 M/UL (ref 4.05–5.2)
SODIUM SERPL-SCNC: 141 MMOL/L (ref 136–145)
WBC # BLD AUTO: 5.8 K/UL (ref 4.3–11.1)

## 2019-05-01 PROCEDURE — 65270000029 HC RM PRIVATE

## 2019-05-01 PROCEDURE — 74011250636 HC RX REV CODE- 250/636: Performed by: HOSPITALIST

## 2019-05-01 PROCEDURE — 74011636637 HC RX REV CODE- 636/637: Performed by: FAMILY MEDICINE

## 2019-05-01 PROCEDURE — 74011636637 HC RX REV CODE- 636/637: Performed by: HOSPITALIST

## 2019-05-01 PROCEDURE — 36415 COLL VENOUS BLD VENIPUNCTURE: CPT

## 2019-05-01 PROCEDURE — 82962 GLUCOSE BLOOD TEST: CPT

## 2019-05-01 PROCEDURE — 74011000258 HC RX REV CODE- 258: Performed by: HOSPITALIST

## 2019-05-01 PROCEDURE — 74011250637 HC RX REV CODE- 250/637: Performed by: FAMILY MEDICINE

## 2019-05-01 PROCEDURE — 80048 BASIC METABOLIC PNL TOTAL CA: CPT

## 2019-05-01 PROCEDURE — 85027 COMPLETE CBC AUTOMATED: CPT

## 2019-05-01 PROCEDURE — 74011250637 HC RX REV CODE- 250/637: Performed by: HOSPITALIST

## 2019-05-01 PROCEDURE — 74011250637 HC RX REV CODE- 250/637: Performed by: INTERNAL MEDICINE

## 2019-05-01 RX ORDER — METOPROLOL SUCCINATE 50 MG/1
50 TABLET, EXTENDED RELEASE ORAL DAILY
Qty: 30 TAB | Refills: 0 | Status: SHIPPED | OUTPATIENT
Start: 2019-05-01

## 2019-05-01 RX ORDER — FLECAINIDE ACETATE 50 MG/1
50 TABLET ORAL EVERY 12 HOURS
Qty: 60 TAB | Refills: 0 | Status: SHIPPED | OUTPATIENT
Start: 2019-05-01 | End: 2021-01-01

## 2019-05-01 RX ORDER — INSULIN LISPRO 100 [IU]/ML
INJECTION, SOLUTION INTRAVENOUS; SUBCUTANEOUS
Qty: 1 VIAL | Refills: 0 | Status: SHIPPED | OUTPATIENT
Start: 2019-05-01

## 2019-05-01 RX ORDER — INSULIN GLARGINE 100 [IU]/ML
30 INJECTION, SOLUTION SUBCUTANEOUS
Qty: 1 PEN | Refills: 2 | Status: SHIPPED | OUTPATIENT
Start: 2019-05-01 | End: 2019-05-31

## 2019-05-01 RX ADMIN — INSULIN LISPRO 2 UNITS: 100 INJECTION, SOLUTION INTRAVENOUS; SUBCUTANEOUS at 17:04

## 2019-05-01 RX ADMIN — Medication 400 MG: at 08:01

## 2019-05-01 RX ADMIN — ALPRAZOLAM 0.5 MG: 0.5 TABLET ORAL at 17:15

## 2019-05-01 RX ADMIN — APIXABAN 5 MG: 5 TABLET, FILM COATED ORAL at 17:09

## 2019-05-01 RX ADMIN — MEROPENEM 500 MG: 500 INJECTION, POWDER, FOR SOLUTION INTRAVENOUS at 00:49

## 2019-05-01 RX ADMIN — OXYCODONE HYDROCHLORIDE 10 MG: 5 TABLET ORAL at 06:43

## 2019-05-01 RX ADMIN — APIXABAN 5 MG: 5 TABLET, FILM COATED ORAL at 08:01

## 2019-05-01 RX ADMIN — PANTOPRAZOLE SODIUM 40 MG: 40 TABLET, DELAYED RELEASE ORAL at 05:54

## 2019-05-01 RX ADMIN — METOPROLOL SUCCINATE 50 MG: 50 TABLET, EXTENDED RELEASE ORAL at 08:01

## 2019-05-01 RX ADMIN — OXYCODONE HYDROCHLORIDE 10 MG: 5 TABLET ORAL at 17:08

## 2019-05-01 RX ADMIN — INSULIN LISPRO 15 UNITS: 100 INJECTION, SOLUTION INTRAVENOUS; SUBCUTANEOUS at 17:04

## 2019-05-01 RX ADMIN — INSULIN LISPRO 2 UNITS: 100 INJECTION, SOLUTION INTRAVENOUS; SUBCUTANEOUS at 08:00

## 2019-05-01 RX ADMIN — ACETAMINOPHEN 650 MG: 325 TABLET, FILM COATED ORAL at 23:31

## 2019-05-01 RX ADMIN — MEROPENEM 500 MG: 500 INJECTION, POWDER, FOR SOLUTION INTRAVENOUS at 12:28

## 2019-05-01 RX ADMIN — PROMETHAZINE HYDROCHLORIDE 25 MG: 25 TABLET ORAL at 03:36

## 2019-05-01 RX ADMIN — PROMETHAZINE HYDROCHLORIDE 25 MG: 25 TABLET ORAL at 19:57

## 2019-05-01 RX ADMIN — PROMETHAZINE HYDROCHLORIDE 25 MG: 25 TABLET ORAL at 14:17

## 2019-05-01 RX ADMIN — Medication 10 ML: at 05:55

## 2019-05-01 RX ADMIN — OXYCODONE HYDROCHLORIDE 10 MG: 5 TABLET ORAL at 02:23

## 2019-05-01 RX ADMIN — INSULIN LISPRO 15 UNITS: 100 INJECTION, SOLUTION INTRAVENOUS; SUBCUTANEOUS at 07:59

## 2019-05-01 RX ADMIN — INSULIN GLARGINE 45 UNITS: 100 INJECTION, SOLUTION SUBCUTANEOUS at 08:00

## 2019-05-01 RX ADMIN — MEROPENEM 500 MG: 500 INJECTION, POWDER, FOR SOLUTION INTRAVENOUS at 05:54

## 2019-05-01 RX ADMIN — Medication: at 08:02

## 2019-05-01 RX ADMIN — FLECAINIDE ACETATE 50 MG: 100 TABLET ORAL at 21:13

## 2019-05-01 RX ADMIN — OXYCODONE HYDROCHLORIDE 10 MG: 5 TABLET ORAL at 21:13

## 2019-05-01 RX ADMIN — OXYCODONE HYDROCHLORIDE 10 MG: 5 TABLET ORAL at 12:14

## 2019-05-01 RX ADMIN — Medication: at 21:00

## 2019-05-01 RX ADMIN — NYSTATIN: 100000 POWDER TOPICAL at 08:01

## 2019-05-01 RX ADMIN — NYSTATIN: 100000 POWDER TOPICAL at 18:00

## 2019-05-01 RX ADMIN — FLECAINIDE ACETATE 50 MG: 100 TABLET ORAL at 08:01

## 2019-05-01 RX ADMIN — QUETIAPINE FUMARATE 300 MG: 100 TABLET ORAL at 21:13

## 2019-05-01 NOTE — PROGRESS NOTES
Tonio Holcomb(Chet) here to  patient but unable to because transporter states patient told him she needed a stretcher to go home in. Called [employer] [facility] to arrange this type transport. Staff states that transport will be back to pick pt up with a stretcher.

## 2019-05-01 NOTE — ROUTINE PROCESS
esign down. Paper copy of discharge instructions signed and placed on chart. Discharge instructions and prescriptions provided and explained to patient, patient voiced understanding. Medication side effect sheet reviewed with pt. No home meds or valuables to return. Opportunity for questions provided. Pt requesting transport home.

## 2019-05-01 NOTE — PROGRESS NOTES
Pt to DC to [facility] today with resumption of care from [facility]. DC summary faxed to Marcelo Machado to transport pt home. They have her set up as WC transport-she does not have her stretcher certification. Arrival time 1-3 hours. Sejal Baltazar will call [phone] upon arrival.  Reference number [id number]. VM left with pt's son LISA CENTER that he needs to be home upon her arrival. 
 
Care Management Interventions PCP Verified by CM: Yes Mode of Transport at Discharge: Other (see comment) Transition of Care Consult (CM Consult): Discharge Planning Discharge Durable Medical Equipment: No 
Physical Therapy Consult: Yes Occupational Therapy Consult: Yes Current Support Network: Own Home Confirm Follow Up Transport: Family Plan discussed with Pt/Family/Caregiver: Yes Freedom of Choice Offered: Yes Discharge Location Discharge Placement: Home

## 2019-05-01 NOTE — PROGRESS NOTES
Jelena Watson-APS-notified that pt will be going back to [facility] today. She will attempt to contact pt's son as well. CM has not been able to reach him.

## 2019-05-01 NOTE — PROGRESS NOTES
Non [facility] PCP referral received from RAHSID Carrizales, NALDO CM, today. RN CM will attempt ARNULFO call tomorrow. HALIE DIALLO contacted APS c'worker, Charlee Kelley, and left message for call back. HALIE DIALLO contacted intake dept, [facility], and requested call back from pt's assigned Melani Bocanegrar. This note will not be viewable in [address]

## 2019-05-01 NOTE — DISCHARGE SUMMARY
Hospitalist Discharge Summary     Patient ID:  Kathy Obregon  [id number]  35 y.o.  1955  Admit date: 4/23/2019  9:02 PM  Discharge date and time: 5/1/2019  Attending: Karen Oppenheim, MD  PCP:  Zachery Hurtado MD  Treatment Team: Attending Provider: Karen Oppenheim, MD; Primary Nurse: Collette Mention; Care Manager: Etelvina Royal RN    Principal Diagnosis DKA (diabetic ketoacidoses) [facility])   Principal Problem:    DKA (diabetic ketoacidoses) ([address] (4/23/2019)    Active Problems:    Hypokalemia (12/10/2016)      Paraplegia ([address] (12/10/2016)      Bipolar disorder without psychotic features ([address] (12/10/2016)      Chronic hepatitis C virus infection ([address] (12/10/2016)      Narcotic addiction ([address] (12/10/2016)      UTI (urinary tract infection) (1/31/2019)      Leukocytosis (2/5/2019)      Atrial fibrillation with RVR ([address] (2/14/2019)      Diabetes ([address] (3/10/2019)      Hypertension (3/10/2019)      Hypomagnesemia (4/3/2019)      Abdominal pain (4/23/2019)      SIRS without acute organ dysfunction due to non-infectious process ([address] (4/24/2019)             Hospital Course:  Please refer to the admission H&P for details of presentation. In summary, the patient is a 62 y. o. F with hx  DM type II, HTN, atrial fibrillation, GERD, BPAD who presented to the ER with report of abdominal pain for the past 3 days similar to prior UTI. Has chronic suprapubic cath. Urine culture found to be positive for ESBL e. Coli, which was treated with 7d meropenem while inpatient. Found to have DKA, which was resolved with fluids and insulin. Electrolytes replaced. Pt had been out of home insulin for several days. Also noted to have afib RVR. The patient was similarly out of her home cardiac meds for several days. Resolved back on home meds.     The patient has significant social challenges to health and was followed closely by CM. Also is followed by APS.       The patient was asking for refills of home pain meds, but review of [place]  shows last filled oxycodone 20 #90 on 4/18  Sent home with refills for essential diabetes and cardiac meds. Significant Diagnostic Studies:   XR CHEST PORT   Final Result            Labs: Results:       Chemistry Recent Labs     05/01/19  0729 04/30/19  0440 04/29/19  1013   * 221* 126*    141 141   K 3.7 4.8 3.8    107 107   CO2 27 24 25   BUN 9 13 11   CREA 0.49* 0.56* 0.48*   CA 8.8 8.2* 7.3*   AGAP 9 10 9      CBC w/Diff Recent Labs     05/01/19  0729   WBC 5.8   RBC 4.38   HGB 12.6   HCT 40.6         Cardiac Enzymes No results for input(s): CPK, CKND1, DEMETRA in the last 72 hours. No lab exists for component: CKRMB, TROIP   Coagulation No results for input(s): PTP, INR, APTT in the last 72 hours. No lab exists for component: INREXT, INREXT    Lipid Panel No results found for: CHOL, CHOLPOCT, CHOLX, CHLST, CHOLV, 397157, HDL, LDL, LDLC, DLDLP, 274476, VLDLC, VLDL, TGLX, TRIGL, TRIGP, TGLPOCT, CHHD, CHHDX   BNP No results for input(s): BNPP in the last 72 hours. Liver Enzymes No results for input(s): TP, ALB, TBIL, AP, SGOT, GPT in the last 72 hours. No lab exists for component: DBIL   Thyroid Studies Lab Results   Component Value Date/Time    TSH 3.560 04/03/2019 04:43 AM            Discharge Exam:  Visit Vitals  /60   Pulse 69   Temp 98.2 °F (36.8 °C)   Resp 18   Ht 5' 5\" (1.651 m)   Wt 80.2 kg (176 lb 12.9 oz)   SpO2 92%   BMI 29.42 kg/m²     GENERAL: alert, cooperative, no distress, appears stated age  LUNG: clear to auscultation bilaterally  HEART: irregular rate and rhythm, S1S2, no murmur, no JVD  ABDOMEN: soft, mild L side tender, non-distended. Bowel sounds normal.   EXTREMITIES:  Trace diana LL edema,   SKIN: no rash or abnormalities  NEUROLOGIC: grossly intact.       Disposition: home with [facility]  Discharge Condition: stable   Patient Instructions: compliance with medications stressed  Current Discharge Medication List      CONTINUE these medications which have CHANGED    Details   flecainide (TAMBOCOR) 50 mg tablet Take 1 Tab by mouth every twelve (12) hours. Qty: 60 Tab, Refills: 0      insulin glargine (LANTUS,BASAGLAR) 100 unit/mL (3 mL) inpn 30 Units by SubCUTAneous route nightly for 30 days. Qty: 1 Pen, Refills: 2      insulin lispro (HUMALOG) 100 unit/mL injection Less than 150 =   0 units           150 -199 =   2 units  200 -249 =   4 units  250 -299 =   6 units  300 -349 =   8 units  Before meals and at bedtime. Qty: 1 Vial, Refills: 0      metoprolol succinate (TOPROL-XL) 50 mg XL tablet Take 1 Tab by mouth daily. Qty: 30 Tab, Refills: 0      apixaban (ELIQUIS) 5 mg tablet Take 1 Tab by mouth two (2) times a day. Qty: 60 Tab, Refills: 0         CONTINUE these medications which have NOT CHANGED    Details   promethazine (PHENERGAN) 25 mg tablet Take 1 Tab by mouth every eight (8) hours as needed for Nausea. Qty: 20 Tab, Refills: 0      QUEtiapine (SEROQUEL) 100 mg tablet Take 3 Tabs by mouth nightly. Qty: 15 Tab, Refills: 0      pantoprazole (PROTONIX) 40 mg tablet Take 1 Tab by mouth Daily (before breakfast). Qty: 30 Tab, Refills: 0      acetaminophen (TYLENOL) 325 mg tablet Take 2 Tabs by mouth every six (6) hours as needed for Pain or Fever. Qty: 30 Tab, Refills: 0      naloxone (NARCAN) 4 mg/actuation nasal spray Use 1 spray intranasally, then discard. Repeat with new spray every 2 min as needed for opioid overdose symptoms, alternating nostrils. Qty: 1 Each, Refills: 0      amLODIPine (NORVASC) 5 mg tablet Take 1 Tab by mouth daily. Qty: 30 Tab, Refills: 0      polyethylene glycol (MIRALAX) 17 gram packet Take 1 Packet by mouth daily as needed for Other (to have a daily BM). Qty: 15 Packet, Refills: 0      albuterol (PROVENTIL HFA, VENTOLIN HFA, PROAIR HFA) 90 mcg/actuation inhaler Take 1 Puff by inhalation every four (4) hours as needed for Wheezing.   Qty: 1 Inhaler, Refills: 0      albuterol (PROVENTIL VENTOLIN) 2.5 mg /3 mL (0.083 %) nebulizer solution Take 3 mL by inhalation every four (4) hours as needed for Wheezing. Qty: 24 Each, Refills: 0      Blood-Glucose Meter monitoring kit Check glucose at home daily  Qty: 1 Kit, Refills: 0      nystatin (MYCOSTATIN) powder Apply  to affected area two (2) times a day. Qty: 1 Bottle, Refills: 0      ALPRAZolam (XANAX) 0.5 mg tablet Take 1 Tab by mouth two (2) times daily as needed for Anxiety. Max Daily Amount: 1 mg. Qty: 30 Tab, Refills: 0    Associated Diagnoses: Bipolar disorder without psychotic features ([facility]      fentaNYL (DURAGESIC) 25 mcg/hr PATCH 1 Patch by TransDERmal route every seventy-two (72) hours. Max Daily Amount: 1 Patch. Qty: 5 Patch, Refills: 0    Associated Diagnoses: Chronic midline low back pain without sciatica      bisacodyl (DULCOLAX) 5 mg EC tablet Take 1 Tab by mouth daily as needed for Constipation. Qty: 30 Tab, Refills: 0      budesonide (PULMICORT) 0.5 mg/2 mL nbsp 2 mL by Nebulization route two (2) times a day. Qty: 60 Each, Refills: 0      zinc oxide-cod liver oil (DESITIN) 40 % paste Apply  to affected area two (2) times a day. Qty: 1 Tube, Refills: 1             Activity: PT/OT per Home Health  Diet: Resume previous diet    Follow-up:     Follow-up Appointments   Procedures    FOLLOW UP VISIT Appointment in: One Week PCP     PCP     Standing Status:   Standing     Number of Occurrences:   1     Order Specific Question:   Appointment in     Answer:    One Week           Time spent to discharge patient 35 minutes  Signed:  Jennifer Tay MD  5/1/2019  12:05 PM

## 2019-05-01 NOTE — DISCHARGE INSTRUCTIONS
DISCHARGE SUMMARY from Nurse    PATIENT INSTRUCTIONS:    After general anesthesia or intravenous sedation, for 24 hours or while taking prescription Narcotics:  · Limit your activities  · Do not drive and operate hazardous machinery  · Do not make important personal or business decisions  · Do  not drink alcoholic beverages  · If you have not urinated within 8 hours after discharge, please contact your surgeon on call. Report the following to your surgeon:  · Excessive pain, swelling, redness or odor of or around the surgical area  · Temperature over 100.5  · Nausea and vomiting lasting longer than 4 hours or if unable to take medications  · Any signs of decreased circulation or nerve impairment to extremity: change in color, persistent  numbness, tingling, coldness or increase pain  · Any questions    What to do at Home:  Recommended activity: Activity as tolerated, ***    If you experience any of the following symptoms ***, please follow up with ***. *  Please give a list of your current medications to your Primary Care Provider. *  Please update this list whenever your medications are discontinued, doses are      changed, or new medications (including over-the-counter products) are added. *  Please carry medication information at all times in case of emergency situations. These are general instructions for a healthy lifestyle:    No smoking/ No tobacco products/ Avoid exposure to second hand smoke  Surgeon General's Warning:  Quitting smoking now greatly reduces serious risk to your health.     Obesity, smoking, and sedentary lifestyle greatly increases your risk for illness    A healthy diet, regular physical exercise & weight monitoring are important for maintaining a healthy lifestyle    You may be retaining fluid if you have a history of heart failure or if you experience any of the following symptoms:  Weight gain of 3 pounds or more overnight or 5 pounds in a week, increased swelling in our hands or feet or shortness of breath while lying flat in bed. Please call your doctor as soon as you notice any of these symptoms; do not wait until your next office visit. Recognize signs and symptoms of STROKE:    F-face looks uneven    A-arms unable to move or move unevenly    S-speech slurred or non-existent    T-time-call 911 as soon as signs and symptoms begin-DO NOT go       Back to bed or wait to see if you get better-TIME IS BRAIN. Warning Signs of HEART ATTACK     Call 911 if you have these symptoms:   Chest discomfort. Most heart attacks involve discomfort in the center of the chest that lasts more than a few minutes, or that goes away and comes back. It can feel like uncomfortable pressure, squeezing, fullness, or pain.  Discomfort in other areas of the upper body. Symptoms can include pain or discomfort in one or both arms, the back, neck, jaw, or stomach.  Shortness of breath with or without chest discomfort.  Other signs may include breaking out in a cold sweat, nausea, or lightheadedness. Don't wait more than five minutes to call 911 - MINUTES MATTER! Fast action can save your life. Calling 911 is almost always the fastest way to get lifesaving treatment. Emergency Medical Services staff can begin treatment when they arrive -- up to an hour sooner than if someone gets to the hospital by car. The discharge information has been reviewed with the {PATIENT PARENT GUARDIAN:[id number]}. The {PATIENT PARENT GUARDIAN:[id number]} verbalized understanding. Discharge medications reviewed with the {[facility] meds reviewed [facility]:[id number]} and appropriate educational materials and side effects teaching were provided.   ___________________________________________________________________________________________________________________________________    Patient Education        Urinary Tract Infection in Women: Care Instructions  Your Care Instructions    A urinary tract infection, or UTI, is a general term for an infection anywhere between the kidneys and the urethra (where urine comes out). Most UTIs are bladder infections. They often cause pain or burning when you urinate. UTIs are caused by bacteria and can be cured with antibiotics. Be sure to complete your treatment so that the infection goes away. Follow-up care is a key part of your treatment and safety. Be sure to make and go to all appointments, and call your doctor if you are having problems. It's also a good idea to know your test results and keep a list of the medicines you take. How can you care for yourself at home? · Take your antibiotics as directed. Do not stop taking them just because you feel better. You need to take the full course of antibiotics. · Drink extra water and other fluids for the next day or two. This may help wash out the bacteria that are causing the infection. (If you have kidney, heart, or liver disease and have to limit fluids, talk with your doctor before you increase your fluid intake.)  · Avoid drinks that are carbonated or have caffeine. They can irritate the bladder. · Urinate often. Try to empty your bladder each time. · To relieve pain, take a hot bath or lay a heating pad set on low over your lower belly or genital area. Never go to sleep with a heating pad in place. To prevent UTIs  · Drink plenty of water each day. This helps you urinate often, which clears bacteria from your system. (If you have kidney, heart, or liver disease and have to limit fluids, talk with your doctor before you increase your fluid intake.)  · Urinate when you need to. · Urinate right after you have sex. · Change sanitary pads often. · Avoid douches, bubble baths, feminine hygiene sprays, and other feminine hygiene products that have deodorants. · After going to the bathroom, wipe from front to back. When should you call for help?   Call your doctor now or seek immediate medical care if:    · Symptoms such as fever, chills, nausea, or vomiting get worse or appear for the first time.     · You have new pain in your back just below your rib cage. This is called flank pain.     · There is new blood or pus in your urine.     · You have any problems with your antibiotic medicine.    Watch closely for changes in your health, and be sure to contact your doctor if:    · You are not getting better after taking an antibiotic for 2 days.     · Your symptoms go away but then come back. Where can you learn more? Go to http://sri-hollie.info/. Enter S545 in the search box to learn more about \"Urinary Tract Infection in Women: Care Instructions. \"  Current as of: March 20, 2018  Content Version: 11.9  © 1282-1496 [employer], [employer]. Care instructions adapted under license by [employer] (which disclaims liability or warranty for this information). If you have questions about a medical condition or this instruction, always ask your healthcare professional. [address] any warranty or liability for your use of this information.

## 2019-05-01 NOTE — PROGRESS NOTES
Hourly rounding completed. Patient medicated per [facility] for severe pain in bilateral feet. Patient had one BM this shift. Patient is resting in bed, bed is locked and in lowest position, call light is within reach.  Will continue to monitor and give report to day shift RN

## 2019-05-02 VITALS
SYSTOLIC BLOOD PRESSURE: 151 MMHG | TEMPERATURE: 99 F | WEIGHT: 176.81 LBS | HEIGHT: 65 IN | RESPIRATION RATE: 18 BRPM | HEART RATE: 69 BPM | OXYGEN SATURATION: 94 % | BODY MASS INDEX: 29.46 KG/M2 | DIASTOLIC BLOOD PRESSURE: 81 MMHG

## 2019-05-02 LAB
GLUCOSE BLD STRIP.AUTO-MCNC: 156 MG/DL (ref 65–100)
GLUCOSE BLD STRIP.AUTO-MCNC: 168 MG/DL (ref 65–100)

## 2019-05-02 PROCEDURE — 82962 GLUCOSE BLOOD TEST: CPT

## 2019-05-02 PROCEDURE — 74011636637 HC RX REV CODE- 636/637: Performed by: FAMILY MEDICINE

## 2019-05-02 PROCEDURE — 74011250637 HC RX REV CODE- 250/637: Performed by: HOSPITALIST

## 2019-05-02 PROCEDURE — 74011250637 HC RX REV CODE- 250/637: Performed by: INTERNAL MEDICINE

## 2019-05-02 PROCEDURE — 74011636637 HC RX REV CODE- 636/637: Performed by: HOSPITALIST

## 2019-05-02 PROCEDURE — 74011250637 HC RX REV CODE- 250/637: Performed by: FAMILY MEDICINE

## 2019-05-02 RX ADMIN — OXYCODONE HYDROCHLORIDE 10 MG: 5 TABLET ORAL at 11:52

## 2019-05-02 RX ADMIN — NYSTATIN: 100000 POWDER TOPICAL at 08:38

## 2019-05-02 RX ADMIN — INSULIN LISPRO 15 UNITS: 100 INJECTION, SOLUTION INTRAVENOUS; SUBCUTANEOUS at 08:34

## 2019-05-02 RX ADMIN — APIXABAN 5 MG: 5 TABLET, FILM COATED ORAL at 08:36

## 2019-05-02 RX ADMIN — METOPROLOL SUCCINATE 50 MG: 50 TABLET, EXTENDED RELEASE ORAL at 08:36

## 2019-05-02 RX ADMIN — PANTOPRAZOLE SODIUM 40 MG: 40 TABLET, DELAYED RELEASE ORAL at 05:29

## 2019-05-02 RX ADMIN — OXYCODONE HYDROCHLORIDE 10 MG: 5 TABLET ORAL at 05:30

## 2019-05-02 RX ADMIN — INSULIN LISPRO 2 UNITS: 100 INJECTION, SOLUTION INTRAVENOUS; SUBCUTANEOUS at 08:35

## 2019-05-02 RX ADMIN — Medication 400 MG: at 08:36

## 2019-05-02 RX ADMIN — INSULIN LISPRO 15 UNITS: 100 INJECTION, SOLUTION INTRAVENOUS; SUBCUTANEOUS at 11:52

## 2019-05-02 RX ADMIN — INSULIN GLARGINE 45 UNITS: 100 INJECTION, SOLUTION SUBCUTANEOUS at 08:36

## 2019-05-02 RX ADMIN — FLECAINIDE ACETATE 50 MG: 100 TABLET ORAL at 08:36

## 2019-05-02 RX ADMIN — INSULIN LISPRO 2 UNITS: 100 INJECTION, SOLUTION INTRAVENOUS; SUBCUTANEOUS at 11:52

## 2019-05-02 RX ADMIN — Medication: at 08:38

## 2019-05-02 RX ADMIN — ALPRAZOLAM 0.5 MG: 0.5 TABLET ORAL at 08:34

## 2019-05-02 NOTE — PROGRESS NOTES
Pt received discharge instructions and prescriptions yesterday, no changes were made, pt to discharge today at 1:30 with [facility].

## 2019-05-02 NOTE — DISCHARGE SUMMARY
Hospitalist Discharge Summary     Admit Date:  2019  9:02 PM   Name:  Gypsy Cordoba   Age:  62 y.o.  :  1955   MRN:  [id number]   PCP:  Mile De Los Santos MD  Treatment Team: Primary Nurse: Davian Thomason; Care Manager: Darinel Mayes RN    Problem List for this Hospitalization:  Hospital Problems as of 2019 Date Reviewed: 2019          Codes Class Noted - Resolved POA    SIRS without acute organ dysfunction due to non-infectious process [facility]) ICD-10-CM: R65.10  ICD-9-CM: 995.93  2019 - Present Yes        * (Principal) DKA (diabetic ketoacidoses) ([facility] ICD-10-CM: E13.10  ICD-9-CM: 250.10  2019 - Present Yes        Abdominal pain ICD-10-CM: R10.9  ICD-9-CM: 789.00  2019 - Present Yes        Hypomagnesemia ICD-10-CM: E83.42  ICD-9-CM: 275.2  4/3/2019 - Present Yes        Diabetes ([facility] ICD-10-CM: E11.9  ICD-9-CM: 250.00  3/10/2019 - Present Yes        Hypertension ICD-10-CM: I10  ICD-9-CM: 401.9  3/10/2019 - Present Yes        A-fib ([facility] ICD-10-CM: I48.91  ICD-9-CM: 427.31  2019 - Present         Atrial fibrillation with RVR ([facility] ICD-10-CM: I48.91  ICD-9-CM: 427.31  2019 - Present Yes        Leukocytosis ICD-10-CM: U87.128  ICD-9-CM: 288.60  2019 - Present Yes        UTI (urinary tract infection) ICD-10-CM: N39.0  ICD-9-CM: 599.0  2019 - Present Yes        Hypokalemia ICD-10-CM: E87.6  ICD-9-CM: 276.8  12/10/2016 - Present Yes        Paraplegia ([facility] (Chronic) ICD-10-CM: G82.20  ICD-9-CM: 344.1  12/10/2016 - Present Yes        Bipolar disorder without psychotic features ([facility] (Chronic) ICD-10-CM: F31.9  ICD-9-CM: 296.80  12/10/2016 - Present Yes        Chronic hepatitis C virus infection ([facility] (Chronic) ICD-10-CM: B18.2  ICD-9-CM: 070.54  12/10/2016 - Present Yes        Narcotic addiction ([facility] ICD-10-CM: I12.28  ICD-9-CM: 304.90  12/10/2016 - Present Yes                Admission HPI from 2019:    \" Gypsy Cordoba is a 62 y.o. female with a past medical history of DM type II, HTN, atrial fibrillation, GERD, BPAD who presents to the ER with report of abdominal pain for the past 3 days. She reports that the pain feels like previous UTIs. Admits to dyspepsia as well. Admits that she has not been able to get her medicaitons refilled for the past 1 week, including her insulin and metformin. \"    Hospital Course: In summary, the patient is a 62 y. o. F with hx  DM type II, HTN, atrial fibrillation, GERD, BPAD who presented to the ER with report of abdominal pain for the past 3 days similar to prior UTI. Has chronic suprapubic cath. Urine culture found to be positive for ESBL e. Coli, which was treated with 7d meropenem while inpatient. Found to have DKA, which was resolved with fluids and insulin. Electrolytes replaced. Pt had been out of home insulin for several days. Also noted to have afib RVR. The patient was similarly out of her home cardiac meds for several days. Resolved back on home meds.      The patient has significant social challenges to health and was followed closely by CM. Also is followed by APS.    The patient was asking for refills of home pain meds, but review of [place]  shows last filled oxycodone 20 #90 on 4/18  Sent home with refills for essential diabetes and cardiac meds. Discharge held 5/1/19 for logistical reasons and appears stable for discharge today. Follow up instructions and discharge meds at bottom of this note. Plan was discussed with patient and staff. All questions answered. Patient was stable at time of discharge. 10 systems reviewed and negative except as noted in HPI. Diagnostic Imaging/Tests:   Xr Chest Port    Result Date: 4/23/2019  EXAM: TEMPORARY INDICATION: Sepsis COMPARISON: 4/1/2019 FINDINGS: A portable AP radiograph of the chest was obtained at 2142 hours. The patient is on a cardiac monitor. The lungs are clear.  The cardiac and mediastinal contours and pulmonary vascularity are normal.  The bones and soft tissues are grossly within normal limits. IMPRESSION: Normal chest.      Echocardiogram results:  Results for orders placed or performed during the hospital encounter of 19   2D ECHO COMPLETE ADULT (TTE) W OR [address] [facility]  [address], [address] [facility]  [phone]    Transthoracic Echocardiogram  2D, M-mode, Doppler, and Color Doppler    Patient: uJan Jose Brand  MR #: [id number]  : 1955  Age: 62 years  Gender: Female  Study date: 2019  Account #: [de-identified]  Height: 65 in  Weight: 199.5 lb  BSA: 1.98 mï¾²  Status:Routine  Location: 3308  BP: 120/ 56    Allergies: NO KNOWN ALLERGENS    Sonographer:  Navya Harris RD  Group:  [id number] Card[facility]  Referring Physician:  Roque Hines MD  Reading Physician:  Aj Borrero MD [facility]    INDICATIONS: Afib with RVR    PROCEDURE: This was a routine study. A transthoracic echocardiogram was  performed. The study included complete 2D imaging, M-mode, complete spectral  Doppler, and color Doppler. Intravenous contrast (Definity, 3 ml) was  administered. Echocardiographic views were limited by poor patient compliance  and poor acoustic window availability. The parasternal window was low, thus   no  M-mode measurements were recorded. This was a technically difficult study. LEFT VENTRICLE: Size was normal. Systolic function was normal. Ejection  fraction was estimated in the range of 60 % to 65 %. Elevated gradients noted  in the LV apex during systole, suggesting hyperdynamic apical walls. There   were  no regional wall motion abnormalities. Wall thickness was normal. Left  ventricular diastolic function was indeterminate. Average E/e' of 8.2. RIGHT VENTRICLE: Not well visualized. LEFT ATRIUM: The atrium was mildly dilated. RIGHT ATRIUM: Not well visualized. SYSTEMIC VEINS: IVC: The inferior vena cava was normal in size and course.     AORTIC VALVE: The valve appears to be structurally normal, tri-commissural.  Images were obtained subcostally. Image quality for PSAX at the AV level not  optimal for evaluation. There was no evidence for stenosis. There was no  insufficiency. MITRAL VALVE: Valve structure was normal. There was no evidence for stenosis. There was no regurgitation. TRICUSPID VALVE: The valve structure was normal. There was no evidence for  stenosis. There was trivial regurgitation. PULMONIC VALVE: Not well visualized. PERICARDIUM: There was no pericardial effusion. AORTA: The root exhibited normal size. SUMMARY:    -  Left ventricle: Systolic function was normal. Ejection fraction was  estimated in the range of 60 % to 65 %. Elevated gradients noted in the LV   apex  during systole, suggesting hyperdynamic apical walls. There were no regional  wall motion abnormalities. -  Left atrium: The atrium was mildly dilated. SYSTEM MEASUREMENT TABLES    2D mode  Left Atrium Systolic Volume Index; Method of Disks, Biplane; 2D mode;: 36   ml/m2  IVS/LVPW (2D): 1  IVSd (2D): 0.9 cm  LVIDd (2D): 4.1 cm  LVIDs (2D): 2.9 cm  LVPWd (2D): 0.9 cm  RVIDd (2D): 3.4 cm    Unspecified Scan Mode  Peak Grad; Mean; Antegrade Flow: 9 mm[Hg]  Vmax; Antegrade Flow: 152 cm/s    Prepared and signed by    Jyotsna Gupta.  Rachel Reyna MD [facility]  Signed 24-Apr-2019 13:09:54           All Micro Results     Procedure Component Value Units Date/Time    CULTURE, URINE [[id number]]  (Abnormal)  (Susceptibility) Collected:  04/23/19 0766    Order Status:  Completed Specimen:  Urine from [place] Specimen Updated:  04/28/19 0854     Special Requests: NO SPECIAL REQUESTS        Culture result:       >100,000 COLONIES/mL ESCHERICHIA COLI ** (EXTENDED SPECTRUM BETA LACTAMASE ) **                  ** MULTI-DRUG RESISTANT ORGANISM **            PATIENT IS A KNOWN ESBL               10,000 to 50,000 COLONIES/mL MIXED SKIN GERARD ISOLATED          CULTURE, BLOOD [[id number]] Collected:  04/23/19 2158    Order Status:  Completed Specimen:  Blood Updated:  04/28/19 0635     Special Requests: --        NO SPECIAL REQUESTS  LEFT  ARM       Culture result: NO GROWTH 5 DAYS       CULTURE, BLOOD [[id number]]  (Abnormal) Collected:  04/23/19 2158    Order Status:  Completed Specimen:  Blood Updated:  04/26/19 0656     Special Requests: --        NO SPECIAL REQUESTS  RIGHT  ARM       GRAM STAIN       GRAM POS COCCI IN CLUSTERS            AEROBIC BOTTLE POSITIVE               RESULTS VERIFIED, PHONED TO AND READ BACK BY Sisi Arellano RN ON 04/25/2019 AT 0112           Culture result:       STAPHYLOCOCCUS SPECIES, COAGULASE NEGATIVE                  SA target DNA sequence not detected within the sample. Test performed by PCR                  THIS ORGANISM MAY BE INDICATIVE OF CULTURE CONTAMINATION, HOWEVER, CLINICAL CORRELATION NEEDS TO BE EVALUATED, AS EACH CASE IS UNIQUE. Labs: Results:       BMP, Mg, Phos Recent Labs     05/01/19  0729 04/30/19  0440    141   K 3.7 4.8    107   CO2 27 24   AGAP 9 10   BUN 9 13   CREA 0.49* 0.56*   CA 8.8 8.2*   * 221*      CBC Recent Labs     05/01/19  0729   WBC 5.8   RBC 4.38   HGB 12.6   HCT 40.6         LFT No results for input(s): SGOT, ALT, TBIL, AP, TP, ALB, GLOB, AGRAT, GPT in the last 72 hours.    Cardiac Testing Lab Results   Component Value Date/Time     (H) 02/14/2019 07:41 PM    BNP 19 (H) 02/10/2019 08:45 PM    BNP 7 02/04/2016 06:30 PM    Troponin-I, Qt. <0.02 (L) 03/10/2019 06:50 PM    Troponin-I, Qt. <0.02 (L) 02/14/2019 07:41 PM    Troponin-I, Qt. <0.02 (L) 02/10/2019 08:45 PM      Coagulation Tests Lab Results   Component Value Date/Time    Prothrombin time 13.1 01/31/2019 10:56 AM    Prothrombin time >82.0 (H) 02/17/2017 09:00 PM    Prothrombin time 21.9 (H) 01/17/2017 11:20 AM    INR 1.0 01/31/2019 10:56 AM    INR 8.1 (HH) 02/17/2017 09:00 PM    INR 2.0 (H) 01/17/2017 11:20 AM    aPTT 40.4 (H) 02/01/2019 08:01 AM    aPTT 45.8 (H) 02/01/2019 12:34 AM    aPTT 28.5 02/04/2016 06:30 PM      A1c Lab Results   Component Value Date/Time    Hemoglobin A1c 11.8 (H) 04/24/2019 04:26 AM    Hemoglobin A1c 9.6 (H) 02/12/2019 03:56 AM      Lipid Panel No results found for: CHOL, CHOLPOCT, CHOLX, CHLST, CHOLV, 163050, HDL, LDL, LDLC, DLDLP, 840865, VLDLC, VLDL, TGLX, TRIGL, TRIGP, TGLPOCT, CHHD, [facility]   Thyroid Panel Lab Results   Component Value Date/Time    TSH 3.560 04/03/2019 04:43 AM    TSH 1.220 03/11/2019 05:29 AM    T4, Free 1.4 03/11/2019 05:29 AM        Most Recent UA Lab Results   Component Value Date/Time    Color YELLOW 04/23/2019 11:24 PM    Appearance CLOUDY 04/23/2019 11:24 PM    Specific gravity 1.036 (H) 04/23/2019 11:24 PM    pH (UA) 5.5 04/23/2019 11:24 PM    Protein 100 (A) 04/23/2019 11:24 PM    Glucose >1,000 04/23/2019 11:24 PM    Ketone >80 (A) 04/23/2019 11:24 PM    Bilirubin SMALL (A) 04/23/2019 11:24 PM    Blood SMALL (A) 04/23/2019 11:24 PM    Urobilinogen 1.0 04/23/2019 11:24 PM    Nitrites POSITIVE (A) 04/23/2019 11:24 PM    Leukocyte Esterase TRACE (A) 04/23/2019 11:24 PM        No Known Allergies  Immunization History   Administered Date(s) Administered    Influenza Vaccine (Quad) PF 10/03/2013, 12/11/2015, 12/23/2015, 10/20/2016    Influenza Vaccine PF 10/17/2014    Pneumococcal Conjugate (PCV-13) 03/03/2017    Pneumococcal Polysaccharide (PPSV-23) 12/11/2015, 05/08/2016    Rubella Virus Vaccine 06/30/1989    TB Skin Test (PPD) Intradermal 12/09/2016, 02/11/2019, 02/20/2019, 04/05/2019       All Labs from Last 24 Hrs:  Recent Results (from the past 24 hour(s))   GLUCOSE, POC    Collection Time: 05/01/19  4:28 PM   Result Value Ref Range    Glucose (POC) 165 (H) 65 - 100 mg/dL   GLUCOSE, POC    Collection Time: 05/02/19  7:42 AM   Result Value Ref Range    Glucose (POC) 168 (H) 65 - 100 mg/dL   GLUCOSE, POC    Collection Time: 05/02/19 11:42 AM   Result Value Ref Range Glucose (POC) 156 (H) 65 - 100 mg/dL       Discharge Exam:  Patient Vitals for the past 24 hrs:   Temp Pulse Resp BP SpO2   05/02/19 1214 99 °F (37.2 °C) 69 18 151/81 94 %   05/02/19 0817 98.4 °F (36.9 °C) 70 18 142/76 92 %   05/02/19 0536 98.5 °F (36.9 °C) 67 18 142/74 91 %   05/01/19 2343 98.2 °F (36.8 °C) 67 18 (!) 158/91 91 %   05/01/19 1639 98.5 °F (36.9 °C) 76 18 122/58 91 %     Oxygen Therapy  O2 Sat (%): 94 % (05/02/19 1214)  Pulse via Oximetry: 60 beats per minute (04/30/19 2011)  O2 Device: Room air (04/30/19 2011)    Intake/Output Summary (Last 24 hours) at 5/2/2019 1504  Last data filed at 5/1/2019 2343  Gross per 24 hour   Intake    Output 1800 ml   Net -1800 ml         Physical exam:  General:    Well nourished. Alert. No distress. Eyes:   Normal sclera. Extraocular movements intact. ENT:  Normocephalic, atraumatic. Moist mucous membranes  CV:   Regular rate and rhythm. No murmur, rub, or gallop. Lungs:  Clear to auscultation bilaterally. No wheezing, rhonchi, or rales. Abdomen: Soft, nontender, nondistended. Bowel sounds normal.   Extremities: Warm and dry. No cyanosis or edema. Neurologic: No focal deficits  Skin:     No rashes or jaundice. Psych:  Normal mood and affect. Discharge Info:   Discharge Medication List as of 5/1/2019 12:54 PM      CONTINUE these medications which have CHANGED    Details   flecainide (TAMBOCOR) 50 mg tablet Take 1 Tab by mouth every twelve (12) hours. , Print, Disp-60 Tab, R-0      insulin glargine (LANTUS,BASAGLAR) 100 unit/mL (3 mL) inpn 30 Units by SubCUTAneous route nightly for 30 days. , Print, Disp-1 Pen, R-2      insulin lispro (HUMALOG) 100 unit/mL injection Less than 150 =   0 units           150 -199 =   2 units  200 -249 =   4 units  250 -299 =   6 units  300 -349 =   8 units  Before meals and at bedtime. , Print, Disp-1 Vial, R-0      metoprolol succinate (TOPROL-XL) 50 mg XL tablet Take 1 Tab by mouth daily. , Print, Disp-30 Tab, R-0 apixaban (ELIQUIS) 5 mg tablet Take 1 Tab by mouth two (2) times a day., Print, Disp-60 Tab, R-0         CONTINUE these medications which have NOT CHANGED    Details   promethazine (PHENERGAN) 25 mg tablet Take 1 Tab by mouth every eight (8) hours as needed for Nausea., No Print, Disp-20 Tab, R-0      QUEtiapine (SEROQUEL) 100 mg tablet Take 3 Tabs by mouth nightly. , Print, Disp-15 Tab, R-0      pantoprazole (PROTONIX) 40 mg tablet Take 1 Tab by mouth Daily (before breakfast). , Print, Disp-30 Tab, R-0      acetaminophen (TYLENOL) 325 mg tablet Take 2 Tabs by mouth every six (6) hours as needed for Pain or Fever., No Print, Disp-30 Tab, R-0      naloxone (NARCAN) 4 mg/actuation nasal spray Use 1 spray intranasally, then discard. Repeat with new spray every 2 min as needed for opioid overdose symptoms, alternating nostrils. , Print, Disp-1 Each, R-0      amLODIPine (NORVASC) 5 mg tablet Take 1 Tab by mouth daily. , Print, Disp-30 Tab, R-0      polyethylene glycol (MIRALAX) 17 gram packet Take 1 Packet by mouth daily as needed for Other (to have a daily BM). , Print, Disp-15 Packet, R-0      albuterol (PROVENTIL HFA, VENTOLIN HFA, PROAIR HFA) 90 mcg/actuation inhaler Take 1 Puff by inhalation every four (4) hours as needed for Wheezing., Normal, Disp-1 Inhaler, R-0      albuterol (PROVENTIL VENTOLIN) 2.5 mg /3 mL (0.083 %) nebulizer solution Take 3 mL by inhalation every four (4) hours as needed for Wheezing., Normal, Disp-24 Each, R-0      Blood-Glucose Meter monitoring kit Check glucose at home daily, Normal, Disp-1 Kit, R-0      nystatin (MYCOSTATIN) powder Apply  to affected area two (2) times a day., Normal, Disp-1 Bottle, R-0      ALPRAZolam (XANAX) 0.5 mg tablet Take 1 Tab by mouth two (2) times daily as needed for Anxiety. Max Daily Amount: 1 mg., Print, Disp-30 Tab, R-0      fentaNYL (DURAGESIC) 25 mcg/hr PATCH 1 Patch by TransDERmal route every seventy-two (72) hours.  Max Daily Amount: 1 Patch., Print, Disp-5 Patch, R-0      bisacodyl (DULCOLAX) 5 mg EC tablet Take 1 Tab by mouth daily as needed for Constipation. , No Print, Disp-30 Tab, R-0      budesonide (PULMICORT) 0.5 mg/2 mL nbsp 2 mL by Nebulization route two (2) times a day., No Print, Disp-60 Each, R-0      zinc oxide-cod liver oil (DESITIN) 40 % paste Apply  to affected area two (2) times a day., No Print, Disp-1 Tube, R-1               Disposition: home  Home health  Activity: Activity as tolerated  Diet: DIET DIABETIC CONSISTENT CARB Regular    Follow-up Appointments   Procedures    FOLLOW UP VISIT Appointment in: One Week PCP     PCP     Standing Status:   Standing     Number of Occurrences:   1     Order Specific Question:   Appointment in     Answer:    One Week         Follow-up Information     Follow up With Specialties Details Why Contact Info    Cristofer Schultz MD Family Practice On 5/3/2019 at 1:20 keep as scheduled [address] [place] [address]  [phone]              Time spent in patient discharge planning and coordination 42 minutes

## 2019-05-02 NOTE — PROGRESS NOTES
CM received call from [facility] . They have secured transportation for pt through [employer] ([employer]).   [employer] will be on site today at 1:30pm.

## 2019-05-02 NOTE — PROGRESS NOTES
Hourly rounding completed. Patient medicated per [facility] for pain in feet. Logistics to  patient between 6 and 7 this AM. Patient is resting in bed, bed is locked and in lowest position, call light is within reach.  Will continue to monitor and give report to day shift RN

## 2019-05-03 ENCOUNTER — PATIENT OUTREACH (OUTPATIENT)
Dept: CASE MANAGEMENT | Age: 64
End: 2019-05-03

## 2019-05-03 ENCOUNTER — HOSPITAL ENCOUNTER (EMERGENCY)
Age: 64
Discharge: HOME | End: 2019-05-03
Attending: EMERGENCY MEDICINE
Payer: MEDICAID

## 2019-05-03 VITALS
BODY MASS INDEX: 33.32 KG/M2 | TEMPERATURE: 97.8 F | WEIGHT: 200 LBS | SYSTOLIC BLOOD PRESSURE: 179 MMHG | OXYGEN SATURATION: 90 % | HEART RATE: 73 BPM | HEIGHT: 65 IN | DIASTOLIC BLOOD PRESSURE: 91 MMHG | RESPIRATION RATE: 20 BRPM

## 2019-05-03 DIAGNOSIS — R11.0 NAUSEA WITHOUT VOMITING: ICD-10-CM

## 2019-05-03 DIAGNOSIS — G89.4 CHRONIC PAIN SYNDROME: Primary | ICD-10-CM

## 2019-05-03 DIAGNOSIS — R53.81 DEBILITY: ICD-10-CM

## 2019-05-03 LAB
ALBUMIN SERPL-MCNC: 2.8 G/DL (ref 3.2–4.6)
ALBUMIN/GLOB SERPL: 0.6 {RATIO} (ref 1.2–3.5)
ALP SERPL-CCNC: 102 U/L (ref 50–136)
ALT SERPL-CCNC: 35 U/L (ref 12–65)
ANION GAP SERPL CALC-SCNC: 10 MMOL/L (ref 7–16)
AST SERPL-CCNC: 51 U/L (ref 15–37)
BASOPHILS # BLD: 0.1 K/UL (ref 0–0.2)
BASOPHILS NFR BLD: 1 % (ref 0–2)
BILIRUB SERPL-MCNC: 0.4 MG/DL (ref 0.2–1.1)
BUN SERPL-MCNC: 7 MG/DL (ref 8–23)
CALCIUM SERPL-MCNC: 9.4 MG/DL (ref 8.3–10.4)
CHLORIDE SERPL-SCNC: 100 MMOL/L (ref 98–107)
CO2 SERPL-SCNC: 27 MMOL/L (ref 21–32)
CREAT SERPL-MCNC: 0.46 MG/DL (ref 0.6–1)
DIFFERENTIAL METHOD BLD: ABNORMAL
EOSINOPHIL # BLD: 0.5 K/UL (ref 0–0.8)
EOSINOPHIL NFR BLD: 6 % (ref 0.5–7.8)
ERYTHROCYTE [DISTWIDTH] IN BLOOD BY AUTOMATED COUNT: 14.7 % (ref 11.9–14.6)
GLOBULIN SER CALC-MCNC: 4.7 G/DL (ref 2.3–3.5)
GLUCOSE SERPL-MCNC: 195 MG/DL (ref 65–100)
HCT VFR BLD AUTO: 45.6 % (ref 35.8–46.3)
HGB BLD-MCNC: 14.5 G/DL (ref 11.7–15.4)
IMM GRANULOCYTES # BLD AUTO: 0.1 K/UL (ref 0–0.5)
IMM GRANULOCYTES NFR BLD AUTO: 1 % (ref 0–5)
LACTATE BLD-SCNC: 1.17 MMOL/L (ref 0.5–1.9)
LYMPHOCYTES # BLD: 1.9 K/UL (ref 0.5–4.6)
LYMPHOCYTES NFR BLD: 27 % (ref 13–44)
MAGNESIUM SERPL-MCNC: 2 MG/DL (ref 1.8–2.4)
MCH RBC QN AUTO: 28.7 PG (ref 26.1–32.9)
MCHC RBC AUTO-ENTMCNC: 31.8 G/DL (ref 31.4–35)
MCV RBC AUTO: 90.1 FL (ref 79.6–97.8)
MONOCYTES # BLD: 0.7 K/UL (ref 0.1–1.3)
MONOCYTES NFR BLD: 10 % (ref 4–12)
NEUTS SEG # BLD: 3.8 K/UL (ref 1.7–8.2)
NEUTS SEG NFR BLD: 54 % (ref 43–78)
NRBC # BLD: 0 K/UL (ref 0–0.2)
PLATELET # BLD AUTO: 340 K/UL (ref 150–450)
PMV BLD AUTO: 10.6 FL (ref 9.4–12.3)
POTASSIUM SERPL-SCNC: 4.5 MMOL/L (ref 3.5–5.1)
PROT SERPL-MCNC: 7.5 G/DL (ref 6.3–8.2)
RBC # BLD AUTO: 5.06 M/UL (ref 4.05–5.2)
SODIUM SERPL-SCNC: 137 MMOL/L (ref 136–145)
TROPONIN I BLD-MCNC: 0 NG/ML (ref 0.02–0.05)
WBC # BLD AUTO: 7 K/UL (ref 4.3–11.1)

## 2019-05-03 PROCEDURE — 83735 ASSAY OF MAGNESIUM: CPT

## 2019-05-03 PROCEDURE — 80053 COMPREHEN METABOLIC PANEL: CPT

## 2019-05-03 PROCEDURE — 96375 TX/PRO/DX INJ NEW DRUG ADDON: CPT | Performed by: EMERGENCY MEDICINE

## 2019-05-03 PROCEDURE — 84484 ASSAY OF TROPONIN QUANT: CPT

## 2019-05-03 PROCEDURE — 74011250636 HC RX REV CODE- 250/636: Performed by: EMERGENCY MEDICINE

## 2019-05-03 PROCEDURE — 96374 THER/PROPH/DIAG INJ IV PUSH: CPT | Performed by: EMERGENCY MEDICINE

## 2019-05-03 PROCEDURE — 99284 EMERGENCY DEPT VISIT MOD MDM: CPT | Performed by: EMERGENCY MEDICINE

## 2019-05-03 PROCEDURE — 85025 COMPLETE CBC W/AUTO DIFF WBC: CPT

## 2019-05-03 PROCEDURE — 83605 ASSAY OF LACTIC ACID: CPT

## 2019-05-03 RX ORDER — PROMETHAZINE HYDROCHLORIDE 25 MG/1
25 TABLET ORAL
Qty: 23 TAB | Refills: 0 | Status: SHIPPED | OUTPATIENT
Start: 2019-05-03

## 2019-05-03 RX ORDER — ONDANSETRON 2 MG/ML
4 INJECTION INTRAMUSCULAR; INTRAVENOUS
Status: COMPLETED | OUTPATIENT
Start: 2019-05-03 | End: 2019-05-03

## 2019-05-03 RX ORDER — HYDROMORPHONE HYDROCHLORIDE 1 MG/ML
1 INJECTION, SOLUTION INTRAMUSCULAR; INTRAVENOUS; SUBCUTANEOUS
Status: COMPLETED | OUTPATIENT
Start: 2019-05-03 | End: 2019-05-03

## 2019-05-03 RX ADMIN — SODIUM CHLORIDE 1000 ML: 900 INJECTION, SOLUTION INTRAVENOUS at 10:14

## 2019-05-03 RX ADMIN — ONDANSETRON 4 MG: 2 INJECTION INTRAMUSCULAR; INTRAVENOUS at 10:13

## 2019-05-03 RX ADMIN — HYDROMORPHONE HYDROCHLORIDE 1 MG: 1 INJECTION, SOLUTION INTRAMUSCULAR; INTRAVENOUS; SUBCUTANEOUS at 10:30

## 2019-05-03 NOTE — PROGRESS NOTES
Incoming call from Waldemar Zavala, [employer] [facility], c'worker. Discussed pt's case at length. Ms. Jorge De Souza stated several law enforcement wellness checks have been made in the last several weeks. However, pt has not been [facility] into [facility] b/c basic needs are being met. Ms. Jorge De Souza will be following up with pt and her  to determine if eviction proceedings are, indeed, underway. If so, this may trigger an EPC action if pt has nowhere to live. [employer] [facility] will remain involved with case, actively monitoring. RASHID HITCHCOCK CM, updated. TABATHA AMBROSE CM will attempt another outreach with [address] Meggan Aviles, on Monday, 5/6. This note will not be viewable in [address]

## 2019-05-03 NOTE — ED TRIAGE NOTES
Patient here from home via EMS for her catheter leaking and that she \"forgot to tell the doctor she had abdominal pain. \" Pain in the diaphragm area and she has had it since before she was here last time. Patient is non-ambulatory. EMS reports that patient is wearing multiple fentanyl patches and is \"somewhat\" non-compliant with her medications. EMS vitals 210/136, pulse 73, 96% room respirations 22, unremarkable 12 lead. Patient requesting medication for nausea ans \"something to make me go to sleep. \"

## 2019-05-03 NOTE — ED NOTES
I have reviewed discharge instructions with the patient. The patient verbalized understanding. Patient left ED via Discharge Method: stretcher to Home with thorn Opportunity for questions and clarification provided. Patient given 1 scripts. To continue your aftercare when you leave the hospital, you may receive an automated call from our care team to check in on how you are doing. This is a free service and part of our promise to provide the best care and service to meet your aftercare needs.  If you have questions, or wish to unsubscribe from this service please call [phone]. Thank you for Choosing our [facility] Emergency Department.

## 2019-05-03 NOTE — PROGRESS NOTES
[facility] outreach to home; spoke with son, Denice Bhatt. He reports she is OTW back to the ED due to leakage from her superpubic cath. Will f/u after return home.

## 2019-05-03 NOTE — PROGRESS NOTES
Chart reviewed: Ms. Lakisha Koch returned home from ED via [address]. Treated nausea in ED, no leakage of superpubic cath noted. Call to Harley Celeste, son. NO answer. Call to two other numbers listed in chart; no answer. Call back to Harley Alonso number; no answer. Will attempt ARNULFO call again on Monday 5/6.

## 2019-05-03 NOTE — DISCHARGE INSTRUCTIONS
Call your doctor for follow-up appointment  Use Phenergan as needed for nausea  Continue all your current medications  Return to ER for any worsening symptoms or new problems which may arise

## 2019-05-03 NOTE — ED PROVIDER NOTES
76-year-old female presents with complaints of recurrent upper abdominal pain. She describes it as a 10-like tightness. Associated with nausea. No known specific trigger, has had a history of abdominal pain in the past 
She is nauseous but has not vomited No fever Patient has had multiple recent hospitalizations for combination of UTI, A. Fib with RVR, encephalopathy. Patient's pain is currently managed with fentanyl patches as well as oxycodone 20 mg The history is provided by the patient and the EMS personnel. Abdominal Pain This is a recurrent problem. The current episode started 6 to 12 hours ago. The problem occurs constantly. The problem has not changed since onset. The pain is associated with an unknown factor. The pain is located in the epigastric region. The quality of the pain is aching and dull. The pain is moderate. Associated symptoms include nausea, arthralgias and back pain. Pertinent negatives include no fever, no diarrhea, no vomiting, no constipation, no dysuria, no frequency, no hematuria, no headaches, no myalgias and no chest pain. The pain is worsened by eating. The pain is relieved by nothing. Her past medical history is significant for GERD. The patient's surgical history includes hysterectomy. suprapubic Campbell catheter in place for several years Past Medical History:  
Diagnosis Date  Diabetes ([facility] [address]  Gastrointestinal disorder GERD  Hypertension  Ill-defined condition   
 neurogenic bladder  Ill-defined condition   
 transverse myelitis  Ill-defined condition   
 paraplegia  Liver disease Hepatitis C  
 Psychiatric disorder   
 anxiety  Psychiatric disorder   
 bipolar  Thromboembolus ([address] Past Surgical History:  
Procedure Laterality Date  HX HYSTERECTOMY History reviewed. No pertinent family history. Social History Socioeconomic History  Marital status: LEGALLY  Spouse name: Not on file  Number of children: Not on file  Years of education: Not on file  Highest education level: Not on file Occupational History  Not on file Social Needs  Financial resource strain: Not on file  Food insecurity:  
  Worry: Not on file Inability: Not on file  Transportation needs:  
  Medical: Not on file Non-medical: Not on file Tobacco Use  Smoking status: Current Every Day Smoker Packs/day: 0.50 Substance and Sexual Activity  Alcohol use: No  
 Drug use: No  
 Sexual activity: Not on file Lifestyle  Physical activity:  
  Days per week: Not on file Minutes per session: Not on file  Stress: Not on file Relationships  Social connections:  
  Talks on phone: Not on file Gets together: Not on file Attends Muslim service: Not on file Active member of club or organization: Not on file Attends meetings of clubs or organizations: Not on file Relationship status: Not on file  Intimate partner violence:  
  Fear of current or ex partner: Not on file Emotionally abused: Not on file Physically abused: Not on file Forced sexual activity: Not on file Other Topics Concern  Not on file Social History Narrative  Not on file ALLERGIES: Patient has no known allergies. Review of Systems Constitutional: Negative for chills and fever. HENT: Negative for congestion, ear pain and rhinorrhea. Eyes: Negative for photophobia and discharge. Respiratory: Negative for cough and shortness of breath. Cardiovascular: Negative for chest pain and palpitations. Gastrointestinal: Positive for abdominal pain and nausea. Negative for constipation, diarrhea and vomiting. Endocrine: Negative for cold intolerance and heat intolerance. Genitourinary: Negative for dysuria, flank pain, frequency and hematuria. Musculoskeletal: Positive for arthralgias and back pain. Negative for myalgias and neck pain. Skin: Negative for rash and wound. Allergic/Immunologic: Negative for environmental allergies and food allergies. Neurological: Negative for syncope and headaches. Hematological: Negative for adenopathy. Does not bruise/bleed easily. Psychiatric/Behavioral: Negative for dysphoric mood. The patient is not nervous/anxious. All other systems reviewed and are negative. Vitals:  
 05/03/19 0848 BP: (!) 173/99 Pulse: 67 Resp: 22 Temp: 97.8 °F (36.6 °C) SpO2: 99% Weight: 90.7 kg (200 lb) Height: 5' 5\" (1.651 m) Physical Exam  
Constitutional: She is oriented to person, place, and time. She appears well-developed and well-nourished. She appears distressed. HENT:  
Head: Normocephalic and atraumatic. Right Ear: External ear normal.  
Left Ear: External ear normal.  
Mouth/Throat: Oropharynx is clear and moist. No oropharyngeal exudate. Eyes: Pupils are equal, round, and reactive to light. Conjunctivae and EOM are normal.  
Neck: Normal range of motion. Neck supple. No JVD present. Cardiovascular: Normal rate, regular rhythm, normal heart sounds and intact distal pulses. Exam reveals no gallop and no friction rub. No murmur heard. Pulmonary/Chest: Effort normal and breath sounds normal.  
Abdominal: Soft. Normal appearance and bowel sounds are normal. She exhibits no distension and no mass. There is no hepatosplenomegaly. There is tenderness in the epigastric area. Abdomen is soft. Vague, diffuse upper abdominal tenderness with palpation No mass Suprapubic catheter site is clean, dry and intact. There is no erythema. No leakage noted while patient sitting upright in the bed. Musculoskeletal: Normal range of motion. She exhibits no edema or deformity. Neurological: She is alert and oriented to person, place, and time. She has normal strength. No cranial nerve deficit or sensory deficit. She displays a negative Romberg sign.  Gait normal.  
 Skin: Skin is warm and dry. Capillary refill takes less than 2 seconds. No rash noted. Psychiatric: She has a normal mood and affect. Her speech is normal and behavior is normal. Judgment and thought content normal. Cognition and memory are normal.  
Nursing note and vitals reviewed. MDM Number of Diagnoses or Management Options Chronic pain syndrome: established and worsening Debility: minor Nausea without vomiting: new and requires workup Diagnosis management comments: Patient's controlled substance prescription records reviewed @ the [facility] [place], operated by [facility]  Aware website. Information will be utilized for accurate and appropriate patient care. 04/18/2019   2   04/16/2019   Oxycodone Hcl 20 Mg Tablet    90  30  [facility]   [id number]   [facility] ([id number])   0  90.00 MME  [place]  
04/10/2019   2   04/10/2019   Oxycodone Hcl 10 Mg Tablet    15  5  Kaci Hilt   [id number]   [place] ([id number])   0  45.00 MME  [place]  
03/31/2019   2   03/29/2019   Alprazolam 1 Mg Tablet    45  15  Sa Pat   [id number]   [facility] ([id number])   0  6.00 LME  [place]  
03/29/2019   2   03/29/2019   Oxycodone Hcl 15 Mg Tablet    45  11  Sa Pat   [id number]   [facility] ([id number])   0  92.05 MME  [place]  
03/22/2019   2   03/22/2019   Alprazolam 0.5 Mg Tablet    45  15  Sa Pat   [id number]   [facility] ([id number])   0  3.00 LME  [place]  
 
 
9:37 AM 
Suprapubic catheter site appears clean, dry and intact. I see no leakage. There is no erythema or other signs of infection.  
03/22/2019   2   03/22/2019   Oxycodone Hcl 10 Mg Tablet    60  15  Sa Pat   [id number]   [facility] ([id number])   0  60.00 MME  [place]  
03/22/2019   2   03/22/2019   Morphine Sulf Er 30 Mg Tablet    30  15  Sa Pat   [id number]   [facility] ([id number])   0  60.00 [place]  
03/15/2019   2   02/09/2019   Fentanyl 25 Mcg/hr Patch    5  15  [facility]   [id number]   [place] ([id number])   0  60.00 [place]ME  [place]  
02/25/2019   2   01/24/2019   Morphine Sulfate Ir 15 Mg Tab    60  30  Ke Whe   [id number]   KHZ ([id number])   0  30.00 MME  [place]  
02/20/2019   2   01/24/2019   Oxycodone Hcl 20 Mg Tablet    120  30  Ke Whe   [id number]   [place] ([id number])   0  120.00 MME  [place]  
02/20/2019   2   01/24/2019   Alprazolam 1 Mg Tablet    90  30  Ke Whe   [id number]   [place] ([id number])   0  6.00 LME  [place]  
01/17/2019   1   01/17/2019   Morphine Sulf Er 15 Mg Tablet    60  30  Ke Whe   [id number]   Pha ([id number])   0  30.00 MME  Medicaid 10:19 AM 
Patient is afebrile here in emergency department Her white blood cell count is within normal limits Patient's lactic acid is negative No clinical signs or symptoms of an acute infectious process Patient does deal with multiple chronic pain issues as well as chronic debility Patient will be discharged to follow-up with her urologist to evaluate her suprapubic catheter Catheter is draining today. No evidence of any active leakage or bleeding from the suprapubic site I will not recommend any changes in her current medications. We will treat her nausea. Amount and/or Complexity of Data Reviewed Clinical lab tests: ordered and reviewed Tests in the medicine section of CPT®: ordered and reviewed Review and summarize past medical records: yes Risk of Complications, Morbidity, and/or Mortality Presenting problems: moderate Diagnostic procedures: moderate Management options: moderate General comments: Elements of this note have been dictated via voice recognition software. Text and phrases may be limited by the accuracy of the software. The chart has been reviewed, but errors may still be present. Patient Progress Patient progress: improved Procedures

## 2019-05-06 ENCOUNTER — PATIENT OUTREACH (OUTPATIENT)
Dept: CASE MANAGEMENT | Age: 64
End: 2019-05-06

## 2019-05-06 NOTE — PROGRESS NOTES
Transition of Care Discharge Follow-up Questionnaire Date/Time of Call: 
 5/6/19 What was the patient hospitalized for? DKA post UTI superpubic cath Does the patient understand his/her diagnosis and/or treatment and what happened during the hospitalization? Unable to reach patient Did the patient receive discharge instructions? Yes  
CM Assessed Risk for Readmission:  
 
 
Patient stated Risk for Readmission:  
 
 High related to medication noncompliance, housing concerns, co-morbidities DM, AFib Unable to reach patient Review any discharge instructions (see discharge instructions/AVS in [facility]). Ask patient if they understand these. Do they have any questions? Diabetic diet Were home services ordered (nursing, PT, OT, ST, etc.)? HH with NALDO Rizzo PT If so, has the first visit occurred? If not, why? (Assist with coordination of services if necessary.) Care resumed 5/2 by [facility] RN. PTvisited on 5/5. Nurse is Maria Esther,  is Myra, have not seen yet. Attempted to reach [facility]; no answer. Was any DME ordered? None If so, has it been received? If not, why?  (Assist patient in obtaining DME orders &/or equipment if necessary.) N/A Complete a review of all medications (new, continued and discontinued meds per the D/C instructions and medication tab in [facility]). Start: none Stop: none Changed: flecainide 50mg q 12 hours Basaglar 30u hs Humalog AC correction scale Toprol 50mg daily Eliquis 5mg q 12 hours Continue: all other home medications Were all new prescriptions filled? If not, why?  (Assist patient in obtaining medications if necessary  escalate for CCM &/or SW if ongoing issues are verbalized by pt or anticipated) Unable to reach patient Does the patient understand the purpose and dosing instructions for all medications?   (If patient has questions, provide explanation and education.) Unable to reach patient Does the patient have any problems in performing ADLs? (If patient is unable to perform ADLs  what is the limiting factor(s)? Do they have a support system that can assist? If no support system is present, discuss possible assistance that they may be able to obtain. Escalate for CCM/SW if ongoing issues are verbalized by pt or anticipated) Paraplegic with [address] cath, PICC. Lives with [facility] strange. Open APS case. Four different [facility] agencies, one hospice involved recently. Possible housing concern but declined placement, medication non-compliance, states lack of transportation to pick-up medications but offered [address] in Feb. and declined. Does the patient have all follow-up appointments scheduled? 7 day f/up with PCP?  
(f/up with PCP may be w/in 14 days if patient has a f/up with their specialist w/in 7 days) 7-14 day f/up with specialist?  
(or per discharge instructions) If f/up has not been made  what actions has the care coordinator made to accomplish this? Has transportation been arranged? PCP Dr. Rochelle Oglesby 5/3: missed due to patient in ED for nausea/pain. Any other questions or concerns expressed by the patient? See above note Schedule next appointment with CATHY CASTRO Coordinator or refer to RN Case Manager/ per the workflow guidelines. When is care coordinators next follow-up call scheduled? If referred for CCM  what RN care manager was the referral assigned? Chart Followed by RN CM and HALIE CM for HRRP. Unable to reach patient by phone since discharge. ARNULFO Call Completed By: Nusrat Manrique RN Phone outreach to [facility]. Answered but no one spoke, hung-up after 30 seconds. Call retried: went to voice mail Text then received from number: \"? Who is this\" Replied to text: \"Nurse from [facility] No reply. Call retried: went to voice mail.

## 2019-05-06 NOTE — PROGRESS NOTES
Phone outreach to Matthias at [facility].  for call back. ADDENDUM: 
Call from [address] [place], [place] [address]. She met with pt in late April. Pt was placed on the [id number]  waiting list. 
Pt was reportedly unwilling to provide SW with much information; protective of son who lives with her. Pt was apparently going to be evicted. HH SW questioned landlord who would not provide info but stated pt was aware of why she was being evicted. HH SW stated she is limited in what she can assist with as pt states that she and her son have things under control. RASHID HITCHCOCK CM, updated. Will monitor chart; support RASHID HITCHCOCK CM, as indicated. This note will not be viewable in [address]

## 2019-05-06 NOTE — Clinical Note
JESSICA. I guess they don't want to take to me, but they are allowing Ruby to visit. Gregor Segundo resumed care, but nurse hasn't visited yet.

## 2019-05-10 ENCOUNTER — PATIENT OUTREACH (OUTPATIENT)
Dept: CASE MANAGEMENT | Age: 64
End: 2019-05-10

## 2019-05-10 NOTE — PROGRESS NOTES
HALIE DIALLO attempted outreach with pt. Phone answered, then hung up. No answer on 2nd attempt. Attempted outreach with pt's son. No answer. NALDO DIALLO, RASHID Nickerson , updated. This note will not be viewable in [address]

## 2019-05-13 ENCOUNTER — PATIENT OUTREACH (OUTPATIENT)
Dept: CASE MANAGEMENT | Age: 64
End: 2019-05-13

## 2019-05-13 NOTE — PROGRESS NOTES
HLAIE DIALLO phone outreach with pt's son, Sreedhar Carrizales. He informed that pt is at [facility]. Admitted with UTI and has been there approx three days. Pt and son were evicted from their apartment \"three or four days ago\". Son is living with friends. No prospects for housing at present, according to son. HALIE DIALLO contacted [facility] case mgmt office and  for call back. Also contacted Courtney Crews, pt's DSS APS c'worker. LM that pt was in hospital and had been evicted recently. Torito Dawkins RN CM, updated. This note will not be viewable in [address]

## 2019-05-15 ENCOUNTER — PATIENT OUTREACH (OUTPATIENT)
Dept: CASE MANAGEMENT | Age: 64
End: 2019-05-15

## 2019-05-15 NOTE — PROGRESS NOTES
Attempted phone outreaches with S case mgmt and Taisha Saunders DSS APS c'worker. Left messages for call back. This note will not be viewable in [address]

## 2019-05-15 NOTE — Clinical Note
I've left another set of call back messages with [facility] case mgmt and Waldemar Zavala at [facility] APS. Not sure what else to do.

## 2019-05-20 ENCOUNTER — PATIENT OUTREACH (OUTPATIENT)
Dept: CASE MANAGEMENT | Age: 64
End: 2019-05-20

## 2019-05-20 NOTE — PROGRESS NOTES
Phone outreach with pt's son, Richardson Curling. Pt is still at [facility], room 5309.  CM outreach to Romina bob ( [facility]) Prateek Pacheco ([phone]) Discussed case, updated her on pt's recent stay at [facility] and past CM history. Also informed that pt had an active, open case with [facility] APS and provided name/number of APS c'worker. Ms. Marlena Mercer said she has been trying to get pt into rehab but no SNF will accept her thus far. She will contact pt's APS c'worker since pt has been evicted and , to her knowledge, has nowhere to go. NALDO DIALLO, RASHID Tony, updated. PLAN Outreach with S HALIE next week to check on status of pt's disposition. This note will not be viewable in [address]

## 2019-05-21 ENCOUNTER — PATIENT OUTREACH (OUTPATIENT)
Dept: CASE MANAGEMENT | Age: 64
End: 2019-05-21

## 2019-05-21 NOTE — PROGRESS NOTES
RN CM following patient through chart review and SW follow-ups. Will remain available to assist as needed if patient returns home.

## 2019-05-29 ENCOUNTER — PATIENT OUTREACH (OUTPATIENT)
Dept: CASE MANAGEMENT | Age: 64
End: 2019-05-29

## 2019-05-29 NOTE — PROGRESS NOTES
HALIE DIALLO outreach to [place] ( [address]) Neville Sloan ([phone])  Pt will be discharging tomorrow to [facility] in [place] for rehab. Ms Natali Albrecht will contact APS to inform of dc and disposition. RASHID HITCHCOCK CM, updated via email       PLAN  Discuss need for future CCM involvement with RASHID HITCHCOCK CM     ADDENDUM:  Case consultation with RASHID HITCHCOCK CM. Pt's PCP is with [facility]. Will attempt no further outreaches. This note will not be viewable in [address]

## 2020-08-03 NOTE — PROGRESS NOTES
Interdisciplinary Rounds completed 04/30/19. Nursing, Case Management, Physician and PT present. Plan of care reviewed and updated.  
 
Probable dc in am 
 Never smoker

## 2020-12-23 ENCOUNTER — HOSPITAL ENCOUNTER (INPATIENT)
Age: 65
LOS: 8 days | Discharge: NURSING, REHAB OR LONG-TERM CARE | DRG: 466 | End: 2021-01-01
Attending: EMERGENCY MEDICINE | Admitting: INTERNAL MEDICINE
Payer: MEDICAID

## 2020-12-23 ENCOUNTER — APPOINTMENT (OUTPATIENT)
Dept: GENERAL RADIOLOGY | Age: 65
DRG: 466 | End: 2020-12-23
Attending: EMERGENCY MEDICINE
Payer: MEDICAID

## 2020-12-23 DIAGNOSIS — I10 ESSENTIAL HYPERTENSION: ICD-10-CM

## 2020-12-23 DIAGNOSIS — N30.00 ACUTE CYSTITIS WITHOUT HEMATURIA: ICD-10-CM

## 2020-12-23 DIAGNOSIS — I48.19 PERSISTENT ATRIAL FIBRILLATION (HCC): ICD-10-CM

## 2020-12-23 DIAGNOSIS — I48.0 PAROXYSMAL ATRIAL FIBRILLATION WITH RAPID VENTRICULAR RESPONSE (HCC): ICD-10-CM

## 2020-12-23 DIAGNOSIS — A41.9 SEPSIS, DUE TO UNSPECIFIED ORGANISM, UNSPECIFIED WHETHER ACUTE ORGAN DYSFUNCTION PRESENT (HCC): Primary | ICD-10-CM

## 2020-12-23 LAB
ALBUMIN SERPL-MCNC: 2.5 G/DL (ref 3.2–4.6)
ALBUMIN/GLOB SERPL: 0.3 {RATIO} (ref 1.2–3.5)
ALP SERPL-CCNC: 129 U/L (ref 50–136)
ALT SERPL-CCNC: 20 U/L (ref 12–65)
ANION GAP SERPL CALC-SCNC: 5 MMOL/L (ref 7–16)
APPEARANCE UR: ABNORMAL
AST SERPL-CCNC: 115 U/L (ref 15–37)
ATRIAL RATE: 267 BPM
BACTERIA URNS QL MICRO: ABNORMAL /HPF
BASOPHILS # BLD: 0.1 K/UL (ref 0–0.2)
BASOPHILS NFR BLD: 0 % (ref 0–2)
BILIRUB SERPL-MCNC: 0.9 MG/DL (ref 0.2–1.1)
BILIRUB UR QL: NEGATIVE
BUN SERPL-MCNC: 25 MG/DL (ref 8–23)
CALCIUM SERPL-MCNC: 9.4 MG/DL (ref 8.3–10.4)
CALCULATED R AXIS, ECG10: 7 DEGREES
CALCULATED T AXIS, ECG11: 118 DEGREES
CASTS URNS QL MICRO: ABNORMAL /LPF
CHLORIDE SERPL-SCNC: 91 MMOL/L (ref 98–107)
CO2 SERPL-SCNC: 28 MMOL/L (ref 21–32)
COLOR UR: YELLOW
CREAT SERPL-MCNC: 1.06 MG/DL (ref 0.6–1)
DIAGNOSIS, 93000: NORMAL
DIFFERENTIAL METHOD BLD: ABNORMAL
EOSINOPHIL # BLD: 0.1 K/UL (ref 0–0.8)
EOSINOPHIL NFR BLD: 1 % (ref 0.5–7.8)
EPI CELLS #/AREA URNS HPF: ABNORMAL /HPF
ERYTHROCYTE [DISTWIDTH] IN BLOOD BY AUTOMATED COUNT: 17.2 % (ref 11.9–14.6)
GLOBULIN SER CALC-MCNC: 7.3 G/DL (ref 2.3–3.5)
GLUCOSE SERPL-MCNC: 230 MG/DL (ref 65–100)
GLUCOSE UR STRIP.AUTO-MCNC: NEGATIVE MG/DL
HCT VFR BLD AUTO: 46.4 % (ref 35.8–46.3)
HGB BLD-MCNC: 14.7 G/DL (ref 11.7–15.4)
HGB UR QL STRIP: ABNORMAL
IMM GRANULOCYTES # BLD AUTO: 0.1 K/UL (ref 0–0.5)
IMM GRANULOCYTES NFR BLD AUTO: 1 % (ref 0–5)
KETONES UR QL STRIP.AUTO: NEGATIVE MG/DL
LACTATE SERPL-SCNC: 2.5 MMOL/L (ref 0.4–2)
LEUKOCYTE ESTERASE UR QL STRIP.AUTO: ABNORMAL
LYMPHOCYTES # BLD: 2.6 K/UL (ref 0.5–4.6)
LYMPHOCYTES NFR BLD: 13 % (ref 13–44)
MAGNESIUM SERPL-MCNC: 2.2 MG/DL (ref 1.8–2.4)
MCH RBC QN AUTO: 27.2 PG (ref 26.1–32.9)
MCHC RBC AUTO-ENTMCNC: 31.7 G/DL (ref 31.4–35)
MCV RBC AUTO: 85.8 FL (ref 79.6–97.8)
MONOCYTES # BLD: 0.8 K/UL (ref 0.1–1.3)
MONOCYTES NFR BLD: 4 % (ref 4–12)
NEUTS SEG # BLD: 16.2 K/UL (ref 1.7–8.2)
NEUTS SEG NFR BLD: 81 % (ref 43–78)
NITRITE UR QL STRIP.AUTO: POSITIVE
NRBC # BLD: 0 K/UL (ref 0–0.2)
PH UR STRIP: 6 [PH] (ref 5–9)
PLATELET # BLD AUTO: 443 K/UL (ref 150–450)
PMV BLD AUTO: 12.2 FL (ref 9.4–12.3)
POTASSIUM SERPL-SCNC: 3.3 MMOL/L (ref 3.5–5.1)
POTASSIUM SERPL-SCNC: 8.4 MMOL/L (ref 3.5–5.1)
PROCALCITONIN SERPL-MCNC: 0.77 NG/ML
PROT SERPL-MCNC: 9.8 G/DL (ref 6.3–8.2)
PROT UR STRIP-MCNC: 100 MG/DL
Q-T INTERVAL, ECG07: 276 MS
QRS DURATION, ECG06: 82 MS
QTC CALCULATION (BEZET), ECG08: 450 MS
RBC # BLD AUTO: 5.41 M/UL (ref 4.05–5.2)
RBC #/AREA URNS HPF: ABNORMAL /HPF
SODIUM SERPL-SCNC: 124 MMOL/L (ref 136–145)
SP GR UR REFRACTOMETRY: 1.01 (ref 1–1.02)
UROBILINOGEN UR QL STRIP.AUTO: 1 EU/DL (ref 0.2–1)
VENTRICULAR RATE, ECG03: 160 BPM
WBC # BLD AUTO: 19.9 K/UL (ref 4.3–11.1)
WBC URNS QL MICRO: >100 /HPF

## 2020-12-23 PROCEDURE — 96375 TX/PRO/DX INJ NEW DRUG ADDON: CPT

## 2020-12-23 PROCEDURE — 87186 SC STD MICRODIL/AGAR DIL: CPT

## 2020-12-23 PROCEDURE — 96361 HYDRATE IV INFUSION ADD-ON: CPT

## 2020-12-23 PROCEDURE — 0T2BX0Z CHANGE DRAINAGE DEVICE IN BLADDER, EXTERNAL APPROACH: ICD-10-PCS | Performed by: INTERNAL MEDICINE

## 2020-12-23 PROCEDURE — 74011000250 HC RX REV CODE- 250: Performed by: EMERGENCY MEDICINE

## 2020-12-23 PROCEDURE — 93005 ELECTROCARDIOGRAM TRACING: CPT | Performed by: EMERGENCY MEDICINE

## 2020-12-23 PROCEDURE — 74011250637 HC RX REV CODE- 250/637: Performed by: EMERGENCY MEDICINE

## 2020-12-23 PROCEDURE — 74011250636 HC RX REV CODE- 250/636: Performed by: EMERGENCY MEDICINE

## 2020-12-23 PROCEDURE — 80053 COMPREHEN METABOLIC PANEL: CPT

## 2020-12-23 PROCEDURE — 74011000258 HC RX REV CODE- 258: Performed by: EMERGENCY MEDICINE

## 2020-12-23 PROCEDURE — 84145 PROCALCITONIN (PCT): CPT

## 2020-12-23 PROCEDURE — 84132 ASSAY OF SERUM POTASSIUM: CPT

## 2020-12-23 PROCEDURE — 51705 CHANGE OF BLADDER TUBE: CPT

## 2020-12-23 PROCEDURE — 87088 URINE BACTERIA CULTURE: CPT

## 2020-12-23 PROCEDURE — 96374 THER/PROPH/DIAG INJ IV PUSH: CPT

## 2020-12-23 PROCEDURE — 87086 URINE CULTURE/COLONY COUNT: CPT

## 2020-12-23 PROCEDURE — 71045 X-RAY EXAM CHEST 1 VIEW: CPT

## 2020-12-23 PROCEDURE — 85025 COMPLETE CBC W/AUTO DIFF WBC: CPT

## 2020-12-23 PROCEDURE — 81001 URINALYSIS AUTO W/SCOPE: CPT

## 2020-12-23 PROCEDURE — 83605 ASSAY OF LACTIC ACID: CPT

## 2020-12-23 PROCEDURE — 99285 EMERGENCY DEPT VISIT HI MDM: CPT

## 2020-12-23 PROCEDURE — 83735 ASSAY OF MAGNESIUM: CPT

## 2020-12-23 PROCEDURE — 87040 BLOOD CULTURE FOR BACTERIA: CPT

## 2020-12-23 RX ORDER — ACETAMINOPHEN 500 MG
1000 TABLET ORAL
Status: COMPLETED | OUTPATIENT
Start: 2020-12-23 | End: 2020-12-23

## 2020-12-23 RX ORDER — DILTIAZEM HYDROCHLORIDE 5 MG/ML
10 INJECTION INTRAVENOUS ONCE
Status: COMPLETED | OUTPATIENT
Start: 2020-12-23 | End: 2020-12-23

## 2020-12-23 RX ADMIN — DILTIAZEM HYDROCHLORIDE 10 MG: 5 INJECTION INTRAVENOUS at 20:56

## 2020-12-23 RX ADMIN — SODIUM CHLORIDE 1000 ML: 900 INJECTION, SOLUTION INTRAVENOUS at 20:45

## 2020-12-23 RX ADMIN — SODIUM CHLORIDE 1000 ML: 900 INJECTION, SOLUTION INTRAVENOUS at 23:45

## 2020-12-23 RX ADMIN — ACETAMINOPHEN 1000 MG: 500 TABLET ORAL at 19:43

## 2020-12-23 RX ADMIN — MEROPENEM 1 G: 1 INJECTION, POWDER, FOR SOLUTION INTRAVENOUS at 23:45

## 2020-12-23 NOTE — ED TRIAGE NOTES
Pt arrives via ems from [facility]. Her family called 911 from home and reported that she was not feeling well. Pt reporting nausea. Pt has suprapubic jasso. Pt w / hx of a fib 140-180. /40. T 100.0  O2 95% on RA.

## 2020-12-24 PROBLEM — E87.1 HYPONATREMIA: Status: ACTIVE | Noted: 2020-12-24

## 2020-12-24 PROBLEM — A41.9 SEPSIS (HCC): Status: ACTIVE | Noted: 2020-12-24

## 2020-12-24 LAB
ALBUMIN SERPL-MCNC: 2.3 G/DL (ref 3.2–4.6)
ALBUMIN/GLOB SERPL: 0.5 {RATIO} (ref 1.2–3.5)
ALP SERPL-CCNC: 129 U/L (ref 50–136)
ALT SERPL-CCNC: 11 U/L (ref 12–65)
ANION GAP SERPL CALC-SCNC: 7 MMOL/L (ref 7–16)
AST SERPL-CCNC: 22 U/L (ref 15–37)
BASOPHILS # BLD: 0.1 K/UL (ref 0–0.2)
BASOPHILS NFR BLD: 0 % (ref 0–2)
BILIRUB SERPL-MCNC: 0.5 MG/DL (ref 0.2–1.1)
BUN SERPL-MCNC: 17 MG/DL (ref 8–23)
CALCIUM SERPL-MCNC: 8.3 MG/DL (ref 8.3–10.4)
CHLORIDE SERPL-SCNC: 102 MMOL/L (ref 98–107)
CO2 SERPL-SCNC: 26 MMOL/L (ref 21–32)
CREAT SERPL-MCNC: 0.71 MG/DL (ref 0.6–1)
DIFFERENTIAL METHOD BLD: ABNORMAL
EOSINOPHIL # BLD: 0.2 K/UL (ref 0–0.8)
EOSINOPHIL NFR BLD: 1 % (ref 0.5–7.8)
ERYTHROCYTE [DISTWIDTH] IN BLOOD BY AUTOMATED COUNT: 16.3 % (ref 11.9–14.6)
EST. AVERAGE GLUCOSE BLD GHB EST-MCNC: 229 MG/DL
GLOBULIN SER CALC-MCNC: 4.8 G/DL (ref 2.3–3.5)
GLUCOSE BLD STRIP.AUTO-MCNC: 172 MG/DL (ref 65–100)
GLUCOSE BLD STRIP.AUTO-MCNC: 259 MG/DL (ref 65–100)
GLUCOSE BLD STRIP.AUTO-MCNC: 329 MG/DL (ref 65–100)
GLUCOSE SERPL-MCNC: 240 MG/DL (ref 65–100)
HBA1C MFR BLD: 9.6 % (ref 4.8–6)
HCT VFR BLD AUTO: 36.1 % (ref 35.8–46.3)
HGB BLD-MCNC: 11 G/DL (ref 11.7–15.4)
IMM GRANULOCYTES # BLD AUTO: 0.2 K/UL (ref 0–0.5)
IMM GRANULOCYTES NFR BLD AUTO: 1 % (ref 0–5)
LACTATE SERPL-SCNC: 1.2 MMOL/L (ref 0.4–2)
LYMPHOCYTES # BLD: 1.9 K/UL (ref 0.5–4.6)
LYMPHOCYTES NFR BLD: 11 % (ref 13–44)
MCH RBC QN AUTO: 26.6 PG (ref 26.1–32.9)
MCHC RBC AUTO-ENTMCNC: 30.5 G/DL (ref 31.4–35)
MCV RBC AUTO: 87.4 FL (ref 79.6–97.8)
MONOCYTES # BLD: 0.9 K/UL (ref 0.1–1.3)
MONOCYTES NFR BLD: 5 % (ref 4–12)
NEUTS SEG # BLD: 14.5 K/UL (ref 1.7–8.2)
NEUTS SEG NFR BLD: 82 % (ref 43–78)
NRBC # BLD: 0 K/UL (ref 0–0.2)
PLATELET # BLD AUTO: 362 K/UL (ref 150–450)
PMV BLD AUTO: 11.3 FL (ref 9.4–12.3)
POTASSIUM SERPL-SCNC: 3.5 MMOL/L (ref 3.5–5.1)
PROT SERPL-MCNC: 7.1 G/DL (ref 6.3–8.2)
RBC # BLD AUTO: 4.13 M/UL (ref 4.05–5.2)
SODIUM SERPL-SCNC: 135 MMOL/L (ref 136–145)
WBC # BLD AUTO: 17.8 K/UL (ref 4.3–11.1)

## 2020-12-24 PROCEDURE — 65270000029 HC RM PRIVATE

## 2020-12-24 PROCEDURE — 36415 COLL VENOUS BLD VENIPUNCTURE: CPT

## 2020-12-24 PROCEDURE — 94640 AIRWAY INHALATION TREATMENT: CPT

## 2020-12-24 PROCEDURE — 74011000258 HC RX REV CODE- 258: Performed by: INTERNAL MEDICINE

## 2020-12-24 PROCEDURE — 74011000250 HC RX REV CODE- 250: Performed by: INTERNAL MEDICINE

## 2020-12-24 PROCEDURE — 83036 HEMOGLOBIN GLYCOSYLATED A1C: CPT

## 2020-12-24 PROCEDURE — 85025 COMPLETE CBC W/AUTO DIFF WBC: CPT

## 2020-12-24 PROCEDURE — 82962 GLUCOSE BLOOD TEST: CPT

## 2020-12-24 PROCEDURE — 74011250636 HC RX REV CODE- 250/636: Performed by: INTERNAL MEDICINE

## 2020-12-24 PROCEDURE — 87040 BLOOD CULTURE FOR BACTERIA: CPT

## 2020-12-24 PROCEDURE — 74011636637 HC RX REV CODE- 636/637: Performed by: INTERNAL MEDICINE

## 2020-12-24 PROCEDURE — 74011250637 HC RX REV CODE- 250/637: Performed by: INTERNAL MEDICINE

## 2020-12-24 PROCEDURE — 87205 SMEAR GRAM STAIN: CPT

## 2020-12-24 PROCEDURE — 87150 DNA/RNA AMPLIFIED PROBE: CPT

## 2020-12-24 PROCEDURE — 83605 ASSAY OF LACTIC ACID: CPT

## 2020-12-24 PROCEDURE — 2709999900 HC NON-CHARGEABLE SUPPLY

## 2020-12-24 PROCEDURE — 77010033678 HC OXYGEN DAILY

## 2020-12-24 PROCEDURE — 80053 COMPREHEN METABOLIC PANEL: CPT

## 2020-12-24 PROCEDURE — 94760 N-INVAS EAR/PLS OXIMETRY 1: CPT

## 2020-12-24 PROCEDURE — 87635 SARS-COV-2 COVID-19 AMP PRB: CPT

## 2020-12-24 RX ORDER — BUDESONIDE 0.5 MG/2ML
500 INHALANT ORAL
Status: DISCONTINUED | OUTPATIENT
Start: 2020-12-24 | End: 2020-12-25 | Stop reason: ALTCHOICE

## 2020-12-24 RX ORDER — QUETIAPINE FUMARATE 100 MG/1
300 TABLET, FILM COATED ORAL
Status: DISCONTINUED | OUTPATIENT
Start: 2020-12-24 | End: 2020-12-24

## 2020-12-24 RX ORDER — FLECAINIDE ACETATE 100 MG/1
50 TABLET ORAL EVERY 12 HOURS
Status: DISCONTINUED | OUTPATIENT
Start: 2020-12-24 | End: 2020-12-24

## 2020-12-24 RX ORDER — BUDESONIDE 0.5 MG/2ML
500 INHALANT ORAL 2 TIMES DAILY
Status: DISCONTINUED | OUTPATIENT
Start: 2020-12-24 | End: 2020-12-24

## 2020-12-24 RX ORDER — FUROSEMIDE 40 MG/1
20 TABLET ORAL 2 TIMES DAILY
Status: DISCONTINUED | OUTPATIENT
Start: 2020-12-24 | End: 2021-01-01 | Stop reason: HOSPADM

## 2020-12-24 RX ORDER — ESCITALOPRAM OXALATE 10 MG/1
20 TABLET ORAL DAILY
Status: DISCONTINUED | OUTPATIENT
Start: 2020-12-24 | End: 2021-01-01 | Stop reason: HOSPADM

## 2020-12-24 RX ORDER — ALPRAZOLAM 0.5 MG/1
0.5 TABLET ORAL
Status: DISCONTINUED | OUTPATIENT
Start: 2020-12-24 | End: 2021-01-01 | Stop reason: HOSPADM

## 2020-12-24 RX ORDER — METOPROLOL SUCCINATE 25 MG/1
25 TABLET, EXTENDED RELEASE ORAL DAILY
Status: DISCONTINUED | OUTPATIENT
Start: 2020-12-24 | End: 2020-12-31

## 2020-12-24 RX ORDER — INSULIN GLARGINE 100 [IU]/ML
20 INJECTION, SOLUTION SUBCUTANEOUS 2 TIMES DAILY
Status: DISCONTINUED | OUTPATIENT
Start: 2020-12-24 | End: 2020-12-26

## 2020-12-24 RX ORDER — PROMETHAZINE HYDROCHLORIDE 25 MG/1
25 TABLET ORAL
Status: DISCONTINUED | OUTPATIENT
Start: 2020-12-24 | End: 2021-01-01 | Stop reason: HOSPADM

## 2020-12-24 RX ORDER — POLYETHYLENE GLYCOL 3350 17 G/17G
17 POWDER, FOR SOLUTION ORAL DAILY PRN
Status: DISCONTINUED | OUTPATIENT
Start: 2020-12-24 | End: 2021-01-01 | Stop reason: HOSPADM

## 2020-12-24 RX ORDER — SODIUM CHLORIDE 9 MG/ML
100 INJECTION, SOLUTION INTRAVENOUS CONTINUOUS
Status: DISCONTINUED | OUTPATIENT
Start: 2020-12-24 | End: 2020-12-29

## 2020-12-24 RX ORDER — INSULIN LISPRO 100 [IU]/ML
INJECTION, SOLUTION INTRAVENOUS; SUBCUTANEOUS
Status: DISCONTINUED | OUTPATIENT
Start: 2020-12-24 | End: 2020-12-26

## 2020-12-24 RX ORDER — ACETAMINOPHEN 650 MG/1
650 SUPPOSITORY RECTAL
Status: DISCONTINUED | OUTPATIENT
Start: 2020-12-24 | End: 2021-01-01 | Stop reason: HOSPADM

## 2020-12-24 RX ORDER — DULOXETIN HYDROCHLORIDE 30 MG/1
30 CAPSULE, DELAYED RELEASE ORAL DAILY
Status: DISCONTINUED | OUTPATIENT
Start: 2020-12-24 | End: 2021-01-01 | Stop reason: HOSPADM

## 2020-12-24 RX ORDER — SODIUM CHLORIDE 0.9 % (FLUSH) 0.9 %
5-40 SYRINGE (ML) INJECTION AS NEEDED
Status: DISCONTINUED | OUTPATIENT
Start: 2020-12-24 | End: 2021-01-01 | Stop reason: HOSPADM

## 2020-12-24 RX ORDER — FENTANYL 25 UG/1
1 PATCH TRANSDERMAL
Status: DISCONTINUED | OUTPATIENT
Start: 2020-12-24 | End: 2020-12-24 | Stop reason: ALTCHOICE

## 2020-12-24 RX ORDER — QUETIAPINE FUMARATE 100 MG/1
200 TABLET, FILM COATED ORAL
Status: DISCONTINUED | OUTPATIENT
Start: 2020-12-24 | End: 2021-01-01 | Stop reason: HOSPADM

## 2020-12-24 RX ORDER — SODIUM CHLORIDE 0.9 % (FLUSH) 0.9 %
5-40 SYRINGE (ML) INJECTION EVERY 8 HOURS
Status: DISCONTINUED | OUTPATIENT
Start: 2020-12-24 | End: 2021-01-01 | Stop reason: HOSPADM

## 2020-12-24 RX ORDER — PREGABALIN 75 MG/1
150 CAPSULE ORAL 2 TIMES DAILY
Status: DISCONTINUED | OUTPATIENT
Start: 2020-12-24 | End: 2021-01-01 | Stop reason: HOSPADM

## 2020-12-24 RX ORDER — PANTOPRAZOLE SODIUM 40 MG/1
40 TABLET, DELAYED RELEASE ORAL
Status: DISCONTINUED | OUTPATIENT
Start: 2020-12-24 | End: 2020-12-26

## 2020-12-24 RX ORDER — METOPROLOL SUCCINATE 50 MG/1
50 TABLET, EXTENDED RELEASE ORAL DAILY
Status: DISCONTINUED | OUTPATIENT
Start: 2020-12-24 | End: 2020-12-24

## 2020-12-24 RX ORDER — ACETAMINOPHEN 325 MG/1
650 TABLET ORAL
Status: DISCONTINUED | OUTPATIENT
Start: 2020-12-24 | End: 2021-01-01 | Stop reason: HOSPADM

## 2020-12-24 RX ORDER — ALBUTEROL SULFATE 90 UG/1
1 AEROSOL, METERED RESPIRATORY (INHALATION)
Status: DISCONTINUED | OUTPATIENT
Start: 2020-12-24 | End: 2021-01-01 | Stop reason: HOSPADM

## 2020-12-24 RX ORDER — OXYCODONE HCL 10 MG/1
10 TABLET, FILM COATED, EXTENDED RELEASE ORAL EVERY 12 HOURS
Status: DISCONTINUED | OUTPATIENT
Start: 2020-12-24 | End: 2021-01-01 | Stop reason: HOSPADM

## 2020-12-24 RX ADMIN — Medication 10 ML: at 02:50

## 2020-12-24 RX ADMIN — ESCITALOPRAM OXALATE 20 MG: 10 TABLET ORAL at 08:06

## 2020-12-24 RX ADMIN — OXYCODONE HYDROCHLORIDE 10 MG: 10 TABLET, FILM COATED, EXTENDED RELEASE ORAL at 08:06

## 2020-12-24 RX ADMIN — BUDESONIDE 500 MCG: 0.5 SUSPENSION RESPIRATORY (INHALATION) at 20:20

## 2020-12-24 RX ADMIN — FUROSEMIDE 20 MG: 40 TABLET ORAL at 08:06

## 2020-12-24 RX ADMIN — BUDESONIDE 500 MCG: 0.5 INHALANT RESPIRATORY (INHALATION) at 07:35

## 2020-12-24 RX ADMIN — INSULIN LISPRO 2 UNITS: 100 INJECTION, SOLUTION INTRAVENOUS; SUBCUTANEOUS at 08:06

## 2020-12-24 RX ADMIN — FUROSEMIDE 20 MG: 40 TABLET ORAL at 17:07

## 2020-12-24 RX ADMIN — SODIUM CHLORIDE 100 ML/HR: 900 INJECTION, SOLUTION INTRAVENOUS at 02:36

## 2020-12-24 RX ADMIN — INSULIN GLARGINE 20 UNITS: 100 INJECTION, SOLUTION SUBCUTANEOUS at 17:07

## 2020-12-24 RX ADMIN — MEROPENEM 500 MG: 500 INJECTION, POWDER, FOR SOLUTION INTRAVENOUS at 15:34

## 2020-12-24 RX ADMIN — INSULIN LISPRO 6 UNITS: 100 INJECTION, SOLUTION INTRAVENOUS; SUBCUTANEOUS at 12:04

## 2020-12-24 RX ADMIN — ACETAMINOPHEN 650 MG: 325 TABLET, FILM COATED ORAL at 08:06

## 2020-12-24 RX ADMIN — PANTOPRAZOLE SODIUM 40 MG: 40 TABLET, DELAYED RELEASE ORAL at 05:42

## 2020-12-24 RX ADMIN — OXYCODONE HYDROCHLORIDE 10 MG: 10 TABLET, FILM COATED, EXTENDED RELEASE ORAL at 21:00

## 2020-12-24 RX ADMIN — INSULIN LISPRO 8 UNITS: 100 INJECTION, SOLUTION INTRAVENOUS; SUBCUTANEOUS at 17:07

## 2020-12-24 RX ADMIN — PREGABALIN 150 MG: 75 CAPSULE ORAL at 17:07

## 2020-12-24 RX ADMIN — Medication 10 ML: at 13:55

## 2020-12-24 RX ADMIN — APIXABAN 5 MG: 5 TABLET, FILM COATED ORAL at 17:07

## 2020-12-24 RX ADMIN — MEROPENEM 500 MG: 500 INJECTION, POWDER, FOR SOLUTION INTRAVENOUS at 08:06

## 2020-12-24 RX ADMIN — APIXABAN 5 MG: 5 TABLET, FILM COATED ORAL at 08:05

## 2020-12-24 RX ADMIN — QUETIAPINE FUMARATE 200 MG: 100 TABLET ORAL at 21:00

## 2020-12-24 RX ADMIN — MEROPENEM 500 MG: 500 INJECTION, POWDER, FOR SOLUTION INTRAVENOUS at 22:00

## 2020-12-24 RX ADMIN — Medication 10 ML: at 05:38

## 2020-12-24 RX ADMIN — Medication 10 ML: at 22:00

## 2020-12-24 RX ADMIN — INSULIN GLARGINE 20 UNITS: 100 INJECTION, SOLUTION SUBCUTANEOUS at 08:06

## 2020-12-24 RX ADMIN — SODIUM CHLORIDE 100 ML/HR: 900 INJECTION, SOLUTION INTRAVENOUS at 08:06

## 2020-12-24 RX ADMIN — PREGABALIN 150 MG: 75 CAPSULE ORAL at 08:06

## 2020-12-24 RX ADMIN — ACETAMINOPHEN 650 MG: 325 TABLET, FILM COATED ORAL at 14:16

## 2020-12-24 RX ADMIN — METOPROLOL SUCCINATE 25 MG: 25 TABLET, EXTENDED RELEASE ORAL at 08:06

## 2020-12-24 RX ADMIN — DULOXETINE HYDROCHLORIDE 30 MG: 30 CAPSULE, DELAYED RELEASE ORAL at 08:06

## 2020-12-24 NOTE — PROGRESS NOTES
When asking pt admitting questions and about PTA med list, pt became aggravated and stated she doesn't know what meds she takes and didn't want to discuss anything. PTA med list completed with med list sent from facility and reviewed with MD over the phone.

## 2020-12-24 NOTE — ED NOTES
TRANSFER - OUT REPORT:    Verbal report given to Steven Gupta RN(name) on Bernardino Rodriguez  being transferred to [facility](unit) for routine progression of care       Report consisted of patients Situation, Background, Assessment and   Recommendations(SBAR). Information from the following report(s) SBAR and ED Summary was reviewed with the receiving nurse. Lines:   Peripheral IV 12/23/20 Right Antecubital (Active)   Site Assessment Clean, dry, & intact 12/23/20 2008   Phlebitis Assessment 0 12/23/20 2008   Infiltration Assessment 0 12/23/20 2008        Opportunity for questions and clarification was provided.       Patient transported with:   [employer]

## 2020-12-24 NOTE — ED NOTES
Dr. German Sink to stretcher side for IV attempt. Will wait for fluids to complete in current 22g in the right [facility], and look again. Pt is difficult stick, veins are deep and small when viewed via POCUS.

## 2020-12-24 NOTE — H&P
HOSPITALIST H&P/CONSULT  NAME:  Howard Hammond   Age:  60 y.o.  :   1955   MRN:   [id number]  PCP: Sascha Fink MD  Consulting MD:  Treatment Team: Attending Provider: Heriberto Brunner, DO; Primary Nurse: Fauzia Baron RN  HPI:   62D with pmhx of paraplegia, neurogenic bladder, anxiety/bipolar d/o, colostomy, chronic suprapubic catheter presents with complaint of 2-3 days progressive nausea, fever and chills. Pt lives at [facility]. She denies cough, shortness of breath, chest pain. Has colostomy and reports normal ostomy output. Pt had flight of ideas during interview and was more focused on finding her glasses. Did not appear in distress. She was initiall found in afib rvr 150's  but converted to SR during ER stay. ER workup notable for WBC 19K, hgb 14.7, Na 124, Chloride 91, BUN 25, Cr 1.06, ALT 20, , lactic acid 2.5, procal 0.77, CXR non acute, UA - nitrite pos, LE large, WBC >100, 4+ bacteria. Suprapubic jasso was exchanged in the ER per MD.     Hospitalist asked to admit for sepsis/ UTI    Complete ROS done and is as stated in HPI or otherwise negative  Past Medical History:   Diagnosis Date    Diabetes ([facility]     Gastrointestinal disorder     GERD    Hypertension     Ill-defined condition     neurogenic bladder    Ill-defined condition     transverse myelitis    Ill-defined condition     paraplegia    Liver disease     Hepatitis C    Psychiatric disorder     anxiety    Psychiatric disorder     bipolar    Thromboembolus ([facility]       Past Surgical History:   Procedure Laterality Date    HX HYSTERECTOMY        Prior to Admission Medications   Prescriptions Last Dose Informant Patient Reported? Taking? ALPRAZolam (XANAX) 0.5 mg tablet Unknown at Unknown time  No No   Sig: Take 1 Tab by mouth two (2) times daily as needed for Anxiety. Max Daily Amount: 1 mg.    Blood-Glucose Meter monitoring kit   No No   Sig: Check glucose at home daily   QUEtiapine (SEROQUEL) 100 mg tablet   No No   Sig: Take 3 Tabs by mouth nightly. acetaminophen (TYLENOL) 325 mg tablet 2020 at Unknown time  No Yes   Sig: Take 2 Tabs by mouth every six (6) hours as needed for Pain or Fever. albuterol (PROVENTIL HFA, VENTOLIN HFA, PROAIR HFA) 90 mcg/actuation inhaler Unknown at Unknown time  No No   Sig: Take 1 Puff by inhalation every four (4) hours as needed for Wheezing. albuterol (PROVENTIL VENTOLIN) 2.5 mg /3 mL (0.083 %) nebulizer solution Unknown at Unknown time  No No   Sig: Take 3 mL by inhalation every four (4) hours as needed for Wheezing. amLODIPine (NORVASC) 5 mg tablet   No No   Sig: Take 1 Tab by mouth daily. apixaban (ELIQUIS) 5 mg tablet   No No   Sig: Take 1 Tab by mouth two (2) times a day. bisacodyl (DULCOLAX) 5 mg EC tablet   No No   Sig: Take 1 Tab by mouth daily as needed for Constipation. budesonide (PULMICORT) 0.5 mg/2 mL nbsp   No No   Si mL by Nebulization route two (2) times a day. fentaNYL (DURAGESIC) 25 mcg/hr PATCH   No No   Si Patch by TransDERmal route every seventy-two (72) hours. Max Daily Amount: 1 Patch. flecainide (TAMBOCOR) 50 mg tablet   No No   Sig: Take 1 Tab by mouth every twelve (12) hours. insulin lispro (HUMALOG) 100 unit/mL injection   No No   Sig: Less than 150 =   0 units           150 -199 =   2 units  200 -249 =   4 units  250 -299 =   6 units  300 -349 =   8 units  Before meals and at bedtime. metoprolol succinate (TOPROL-XL) 50 mg XL tablet   No No   Sig: Take 1 Tab by mouth daily. naloxone (NARCAN) 4 mg/actuation nasal spray   No No   Sig: Use 1 spray intranasally, then discard. Repeat with new spray every 2 min as needed for opioid overdose symptoms, alternating nostrils. nystatin (MYCOSTATIN) powder   No No   Sig: Apply  to affected area two (2) times a day. pantoprazole (PROTONIX) 40 mg tablet   No No   Sig: Take 1 Tab by mouth Daily (before breakfast).    polyethylene glycol (MIRALAX) 17 gram packet   No No Sig: Take 1 Packet by mouth daily as needed for Other (to have a daily BM). promethazine (PHENERGAN) 25 mg tablet   No No   Sig: Take 1 Tab by mouth every eight (8) hours as needed for Nausea. promethazine (PHENERGAN) 25 mg tablet   No No   Sig: Take 1 Tab by mouth every six (6) hours as needed for Nausea. zinc oxide-cod liver oil (DESITIN) 40 % paste   No No   Sig: Apply  to affected area two (2) times a day. Facility-Administered Medications: None     No Known Allergies   Social History     Tobacco Use    Smoking status: Current Every Day Smoker     Packs/day: 0.50   Substance Use Topics    Alcohol use: No      No family history on file. Objective:     Visit Vitals  /67   Pulse (!) 103   Temp 99.9 °F (37.7 °C)   Resp 18   Ht 5' 5\" (1.651 m)   Wt 90.7 kg (200 lb)   SpO2 94%   BMI 33.28 kg/m²      Temp (24hrs), Av.9 °F (37.7 °C), Min:99.9 °F (37.7 °C), Max:99.9 °F (37.7 °C)    Oxygen Therapy  O2 Sat (%): 94 % (20)  Pulse via Oximetry: 67 beats per minute (20)  O2 Device: Room air (20)  Physical Exam:  General:    Alert, cooperative, no distress, appears stated age. obese    Head:   Normocephalic, without obvious abnormality, atraumatic. Nose:  Nares normal. No drainage or sinus tenderness. Lungs:   Clear to auscultation bilaterally. No Wheezing or Rhonchi. No rales. Heart:   Regular rate and rhythm,  no murmur, rub or gallop. Abdomen:   Soft, non-tender. Not distended. Bowel sounds normal. Ostomy site clean and intact  Extremities: No cyanosis. No edema.  No clubbing  Skin:     Scattered healing scabs  Neurologic: Alert and oriented x 3, no focal deficits   Data Review:   Recent Results (from the past 24 hour(s))   EKG, 12 LEAD, INITIAL    Collection Time: 20  7:41 PM   Result Value Ref Range    Ventricular Rate 160 BPM    Atrial Rate 267 BPM    QRS Duration 82 ms    Q-T Interval 276 ms    QTC Calculation (Bezet) 450 ms    Calculated R Axis 7 degrees    Calculated T Axis 118 degrees    Diagnosis       !! AGE AND GENDER SPECIFIC ECG ANALYSIS !! Atrial fibrillation with rapid ventricular response  Non-specific ST-t wave changes  Abnormal ECG  When compared with ECG of 24-APR-2019 00:46,  Significant changes have occurred  Confirmed by [facility] MARII OHARA MD (), LUIS ALFREDO DOMINIQUE ([id number]) on 12/23/2020 9:29:29 PM     CBC WITH AUTOMATED DIFF    Collection Time: 12/23/20  8:04 PM   Result Value Ref Range    WBC 19.9 (H) 4.3 - 11.1 K/uL    RBC 5.41 (H) 4.05 - 5.2 M/uL    HGB 14.7 11.7 - 15.4 g/dL    HCT 46.4 (H) 35.8 - 46.3 %    MCV 85.8 79.6 - 97.8 FL    MCH 27.2 26.1 - 32.9 PG    MCHC 31.7 31.4 - 35.0 g/dL    RDW 17.2 (H) 11.9 - 14.6 %    PLATELET 057 015 - 737 K/uL    MPV 12.2 9.4 - 12.3 FL    ABSOLUTE NRBC 0.00 0.0 - 0.2 K/uL    DF AUTOMATED      NEUTROPHILS 81 (H) 43 - 78 %    LYMPHOCYTES 13 13 - 44 %    MONOCYTES 4 4.0 - 12.0 %    EOSINOPHILS 1 0.5 - 7.8 %    BASOPHILS 0 0.0 - 2.0 %    IMMATURE GRANULOCYTES 1 0.0 - 5.0 %    ABS. NEUTROPHILS 16.2 (H) 1.7 - 8.2 K/UL    ABS. LYMPHOCYTES 2.6 0.5 - 4.6 K/UL    ABS. MONOCYTES 0.8 0.1 - 1.3 K/UL    ABS. EOSINOPHILS 0.1 0.0 - 0.8 K/UL    ABS. BASOPHILS 0.1 0.0 - 0.2 K/UL    ABS. IMM. GRANS. 0.1 0.0 - 0.5 K/UL   METABOLIC PANEL, COMPREHENSIVE    Collection Time: 12/23/20  8:04 PM   Result Value Ref Range    Sodium 124 (L) 136 - 145 mmol/L    Potassium 8.4 (HH) 3.5 - 5.1 mmol/L    Chloride 91 (L) 98 - 107 mmol/L    CO2 28 21 - 32 mmol/L    Anion gap 5 (L) 7 - 16 mmol/L    Glucose 230 (H) 65 - 100 mg/dL    BUN 25 (H) 8 - 23 MG/DL    Creatinine 1.06 (H) 0.6 - 1.0 MG/DL    GFR est AA >60 >60 ml/min/1.73m2    GFR est non-AA 55 (L) >60 ml/min/1.73m2    Calcium 9.4 8.3 - 10.4 MG/DL    Bilirubin, total 0.9 0.2 - 1.1 MG/DL    ALT (SGPT) 20 12 - 65 U/L    AST (SGOT) 115 (H) 15 - 37 U/L    Alk.  phosphatase 129 50 - 136 U/L    Protein, total 9.8 (H) 6.3 - 8.2 g/dL    Albumin 2.5 (L) 3.2 - 4.6 g/dL    Globulin 7.3 (H) 2.3 - 3.5 g/dL    A-G Ratio 0.3 (L) 1.2 - 3.5     PROCALCITONIN    Collection Time: 12/23/20  8:04 PM   Result Value Ref Range    Procalcitonin 0.77 ng/mL   LACTIC ACID    Collection Time: 12/23/20  8:04 PM   Result Value Ref Range    Lactic acid 2.5 (H) 0.4 - 2.0 MMOL/L   MAGNESIUM    Collection Time: 12/23/20  8:04 PM   Result Value Ref Range    Magnesium 2.2 1.8 - 2.4 mg/dL   URINALYSIS W/ RFLX MICROSCOPIC    Collection Time: 12/23/20 10:49 PM   Result Value Ref Range    Color YELLOW      Appearance TURBID      Specific gravity 1.012 1.001 - 1.023      pH (UA) 6.0 5.0 - 9.0      Protein 100 (A) NEG mg/dL    Glucose Negative mg/dL    Ketone Negative NEG mg/dL    Bilirubin Negative NEG      Blood LARGE (A) NEG      Urobilinogen 1.0 0.2 - 1.0 EU/dL    Nitrites Positive (A) NEG      Leukocyte Esterase LARGE (A) NEG      WBC >100 0 /hpf    RBC  0 /hpf    Epithelial cells 5-10 0 /hpf    Bacteria 4+ (H) 0 /hpf    Casts 3-5 0 /lpf   POTASSIUM    Collection Time: 12/23/20 11:31 PM   Result Value Ref Range    Potassium 3.3 (L) 3.5 - 5.1 mmol/L   SARS-COV-2    Collection Time: 12/24/20  1:30 AM   Result Value Ref Range    SARS-CoV-2 PENDING     Specimen source Nasopharyngeal      COVID-19 rapid test Not detected NOTD      SARS CoV-2 PENDING      Imaging /Procedures /Studies   Final [99] 12/23/2020 20:09 12/23/2020  8:14 PM [id number]  8:14 PM   Study Result    Chest portable     CLINICAL INDICATION: Acute generalized weakness, nausea, fatigue, chills,  elevated temperature, atrial fibrillation     COMPARISON: 4/23/2019, 4/1/2019     TECHNIQUE: single AP portable view chest at 8:08 PM upright      FINDINGS:  There is no evidence of consolidation, pneumothorax, pleural effusion  or pulmonary edema. The mediastinal and hilar contours are stable given  technique and positioning. Patient is slightly rotated.        IMPRESSION  IMPRESSION: No acute disease.          Assessment and Plan:      Active Hospital Problems    Diagnosis Date Noted    Sepsis ([facility] 12/24/2020    Hyponatremia 12/24/2020    Hypertension 03/10/2019    Diabetes ([facility] 03/10/2019    A-fib ([facility] 02/19/2019    UTI (urinary tract infection) 01/31/2019    Paraplegia ([facility] 12/10/2016    Bipolar disorder without psychotic features ([place] [id number] 12/10/2016     A/P  - Sepsis - (leukocytosis, tachycardia). Suspect secondary to UTI. Cont iv atbx, ivf and follow cultures    - UTI - with hx of ESBL. Continue Merrem. Follow for final culture    - Hyponatremia - likely secondary to hypovolemia. Cont IVF and repeat BMP in AM.     - Bipolar d/o - continue home meds as per [facility] MAR    - Chronic afib - cont eliquis, BB. Was afib rvr on arrival but converted    - paraplegia/ chronic pain - continue home meds (oxy, lyrica)     - DM2 - reduced dose of Lantus given report of nausea - titrate as needed, continue SSI. Check A1C.        Code Status: full code    Anticipated discharge: 3-4 days    DVT prophy: joe    Signed By: Junior Hernández DO     December 24, 2020

## 2020-12-24 NOTE — PROGRESS NOTES
CM attempted to contact patient in room, to complete assessment. CM was unsuccessful at reaching the patient via phone. CM contacted Yogesh Pittman with [facility], to confirm patient's status at [facility]. Per Yogesh Pittman, the patient is a LTC resident at [facility]. Yogesh Hutchinson confirmed the patient has a ten day bed hold, that will  . [facility] confirmed the patient does not obtain therapy at [facility]. SNF Manges the patient's medications, provides assistance with ADL's, and provides 3 meals a day. No needs identified at this time. Patient to return to SNF, when medically stable. Please consult or notify if any needs shall arise. CM continues to monitor plan of care.      Care Management Interventions  PCP Verified by CM: (NP/MD housed in SNF. )  Mode of Transport at Discharge: BLS  Transition of Care Consult (CM Consult): Discharge Planning  Discharge Durable Medical Equipment: No  Physical Therapy Consult: No  Occupational Therapy Consult: No  Speech Therapy Consult: No  Current Support Network: Nursing Facility(Patient is a LTC resident at [facility]. )  Confirm Follow Up Transport: Other (see comment)(TBD: based upon need. )  [address] Provided?: No  Discharge Location  Discharge Placement: Skilled nursing [facility] )

## 2020-12-24 NOTE — PROGRESS NOTES
TRANSFER - IN REPORT:    Verbal report received from Stefani Angela RN(name) on Maday Portillo  being received from ED(unit) for routine progression of care      Report consisted of patients Situation, Background, Assessment and   Recommendations(SBAR). Information from the following report(s) SBAR was reviewed with the receiving nurse. Opportunity for questions and clarification was provided. Assessment completed upon patients arrival to unit and care assumed.

## 2020-12-24 NOTE — PROGRESS NOTES
Patient seen and examined. Admitted after midnight. Chart reviewed including labs and vital signs. Admitted for sepsis and ? UTI. Continue with current meds. Follow consults, labs and imaging studies. No charge billed to the patient for this encounter.

## 2020-12-24 NOTE — PROGRESS NOTES
12/24/20 0200   Skin Integumentary   Skin Integumentary (WDL) X    Pressure  Injury Documentation No Pressure Injury Noted-Pressure Ulcer Prevention Initiated   Skin Color Ecchymosis (comment); Appropriate for ethnicity   Skin Condition/Temp Dry;Flaky;Fragile   Skin Integrity Abrasion;Cracked;Scars (comment)   Turgor Non-tenting   Hair Growth Sparce   Wound Prevention and Protection Methods   Orientation of Wound Prevention Posterior   Location of Wound Prevention Sacrum/Coccyx   Dressing Present  No   Wound Offloading (Prevention Methods) Bed, pressure reduction mattress;Repositioning;Pillows; Turning   Pt is alert and oriented x4. Pt has scattered scabs on BUE and BLE. Paraplegic with dry flaky feet. Suprapupic cath and colostomy to L abd. Pt has sore/scab on L side of belly button. Pt has deep scar that is a hole on sacrum.

## 2020-12-24 NOTE — ED PROVIDER NOTES
Patient presents to the ER via EMS for concerns over nausea and chills. Apparently she is at a nursing facility. Start to the express some nausea and fatigue earlier today. EMS reports they did have a temp of 100.3. Was noted to be in A. fib and RVR. Denies any cough. Does have a colostomy in place with reports good ostomy output. The history is provided by the patient. The history is limited by the condition of the patient. Nausea   This is a new problem. The current episode started 6 to 12 hours ago. The problem has been gradually worsening. Patient reports a subjective fever - was not measured. Pertinent negatives include no fever, no abdominal pain, no diarrhea, no headaches and no headaches. The patient is not pregnant. Past Medical History:   Diagnosis Date    Diabetes ([facility] [address]     Gastrointestinal disorder     GERD    Hypertension     Ill-defined condition     neurogenic bladder    Ill-defined condition     transverse myelitis    Ill-defined condition     paraplegia    Liver disease     Hepatitis C    Psychiatric disorder     anxiety    Psychiatric disorder     bipolar    Thromboembolus [facility])        Past Surgical History:   Procedure Laterality Date    HX HYSTERECTOMY           No family history on file.     Social History     Socioeconomic History    Marital status: LEGALLY      Spouse name: Not on file    Number of children: Not on file    Years of education: Not on file    Highest education level: Not on file   Occupational History    Not on file   Social Needs    Financial resource strain: Not on file    Food insecurity     Worry: Not on file     Inability: Not on file    Transportation needs     Medical: Not on file     Non-medical: Not on file   Tobacco Use    Smoking status: Current Every Day Smoker     Packs/day: 0.50   Substance and Sexual Activity    Alcohol use: No    Drug use: No    Sexual activity: Not on file   Lifestyle    Physical activity     Days per week: Not on file     Minutes per session: Not on file    Stress: Not on file   Relationships    Social connections     Talks on phone: Not on file     Gets together: Not on file     Attends Zoroastrian service: Not on file     Active member of club or organization: Not on file     Attends meetings of clubs or organizations: Not on file     Relationship status: Not on file    Intimate partner violence     Fear of current or ex partner: Not on file     Emotionally abused: Not on file     Physically abused: Not on file     Forced sexual activity: Not on file   Other Topics Concern    Not on file   Social History Narrative    Not on file         ALLERGIES: Patient has no known allergies. Review of Systems   Constitutional: Positive for fatigue. Negative for fever. Eyes: Negative for photophobia. Respiratory: Negative for chest tightness and shortness of breath. Cardiovascular: Negative for leg swelling. Gastrointestinal: Positive for nausea. Negative for abdominal pain and diarrhea. Genitourinary: Negative for urgency. Musculoskeletal: Negative for back pain. Skin: Negative for color change and pallor. Neurological: Negative for syncope, speech difficulty and headaches. Hematological: Negative for adenopathy. Psychiatric/Behavioral: Negative for behavioral problems and confusion. All other systems reviewed and are negative. Vitals:    12/23/20 1910   BP: 114/73   Pulse: (!) 150   Resp: 18   Temp: 99.9 °F (37.7 °C)   SpO2: 99%   Weight: 90.7 kg (200 lb)   Height: 5' 5\" (1.651 m)            Physical Exam  Vitals signs and nursing note reviewed. Constitutional:       Appearance: Normal appearance. She is ill-appearing. HENT:      Nose: Nose normal. No congestion or rhinorrhea. Mouth/Throat:      Mouth: Mucous membranes are moist.   Eyes:      Extraocular Movements: Extraocular movements intact.       Conjunctiva/sclera: Conjunctivae normal.      Pupils: Pupils are equal, round, and reactive to light. Neck:      Musculoskeletal: No neck rigidity or muscular tenderness. Vascular: No carotid bruit. Cardiovascular:      Rate and Rhythm: Tachycardia present. Rhythm irregular. Heart sounds: Normal heart sounds. No murmur. Pulmonary:      Effort: Pulmonary effort is normal. No respiratory distress. Breath sounds: Normal breath sounds. No stridor. Abdominal:      General: Abdomen is flat. Bowel sounds are normal. There is no distension. Palpations: There is no mass. Tenderness: There is no abdominal tenderness. Lymphadenopathy:      Cervical: No cervical adenopathy. Skin:     General: Skin is warm and dry. Coloration: Skin is not jaundiced or pale. Neurological:      General: No focal deficit present. Mental Status: She is alert. Mental status is at baseline. Cranial Nerves: No cranial nerve deficit. Psychiatric:         Mood and Affect: Mood normal.          MDM  Number of Diagnoses or Management Options  Acute cystitis without hematuria  Paroxysmal atrial fibrillation with rapid ventricular response (HCC)  Sepsis, due to unspecified organism, unspecified whether acute organ dysfunction present [facility])  Diagnosis management comments: Patient is in A. fib and RVR. Mildly febrile here. Concern for possible sepsis. 11:34 PM  White blood cell count elevated at 20,000. Lactic acid elevated 2.5  Blood cultures obtained. I did change out suprapubic catheter. Did obtain specimen consistent with infection. She has history of ESBL in the past.  Will treat with Merrem here. She is converted to sinus rhythm here. Plan to discuss case with hospitalist for admission      11:58 PM  Initial potassium was 8.4 gross hemolysis, recall it was 3.3.        Amount and/or Complexity of Data Reviewed  Clinical lab tests: ordered and reviewed  Tests in the radiology section of CPT®: reviewed and ordered  Decide to obtain previous medical records or to obtain history from someone other than the patient: yes  Review and summarize past medical records: yes  Discuss the patient with other providers: yes  Independent visualization of images, tracings, or specimens: yes (EKG interpretation: Atrial fibrillation with RVR, rate of 150, normal axis, no blocks, no acute ischemic changes)    Risk of Complications, Morbidity, and/or Mortality  Presenting problems: high  Diagnostic procedures: moderate  Management options: high  General comments: Critical Care Time 35 Minutes: Excluding all billable procedures, time spent at the bedside directing patients resucsitation, updating family, and coordinating care with consultants      Patient Progress  Patient progress: stable         Procedures          Results Include:    Recent Results (from the past 24 hour(s))   EKG, 12 LEAD, INITIAL    Collection Time: 12/23/20  7:41 PM   Result Value Ref Range    Ventricular Rate 160 BPM    Atrial Rate 267 BPM    QRS Duration 82 ms    Q-T Interval 276 ms    QTC Calculation (Bezet) 450 ms    Calculated R Axis 7 degrees    Calculated T Axis 118 degrees    Diagnosis       !! AGE AND GENDER SPECIFIC ECG ANALYSIS !!   Atrial fibrillation with rapid ventricular response  Non-specific ST-t wave changes  Abnormal ECG  When compared with ECG of 24-APR-2019 00:46,  Significant changes have occurred  Confirmed by ST MARII OHARA MD (), LUIS ALFREDO DOMINIQUE ([id number]) on 12/23/2020 9:29:29 PM     CBC WITH AUTOMATED DIFF    Collection Time: 12/23/20  8:04 PM   Result Value Ref Range    WBC 19.9 (H) 4.3 - 11.1 K/uL    RBC 5.41 (H) 4.05 - 5.2 M/uL    HGB 14.7 11.7 - 15.4 g/dL    HCT 46.4 (H) 35.8 - 46.3 %    MCV 85.8 79.6 - 97.8 FL    MCH 27.2 26.1 - 32.9 PG    MCHC 31.7 31.4 - 35.0 g/dL    RDW 17.2 (H) 11.9 - 14.6 %    PLATELET 992 841 - 398 K/uL    MPV 12.2 9.4 - 12.3 FL    ABSOLUTE NRBC 0.00 0.0 - 0.2 K/uL    DF AUTOMATED      NEUTROPHILS 81 (H) 43 - 78 %    LYMPHOCYTES 13 13 - 44 %    MONOCYTES 4 4.0 - 12.0 % EOSINOPHILS 1 0.5 - 7.8 %    BASOPHILS 0 0.0 - 2.0 %    IMMATURE GRANULOCYTES 1 0.0 - 5.0 %    ABS. NEUTROPHILS 16.2 (H) 1.7 - 8.2 K/UL    ABS. LYMPHOCYTES 2.6 0.5 - 4.6 K/UL    ABS. MONOCYTES 0.8 0.1 - 1.3 K/UL    ABS. EOSINOPHILS 0.1 0.0 - 0.8 K/UL    ABS. BASOPHILS 0.1 0.0 - 0.2 K/UL    ABS. IMM. GRANS. 0.1 0.0 - 0.5 K/UL   METABOLIC PANEL, COMPREHENSIVE    Collection Time: 12/23/20  8:04 PM   Result Value Ref Range    Sodium 124 (L) 136 - 145 mmol/L    Potassium 8.4 (HH) 3.5 - 5.1 mmol/L    Chloride 91 (L) 98 - 107 mmol/L    CO2 28 21 - 32 mmol/L    Anion gap 5 (L) 7 - 16 mmol/L    Glucose 230 (H) 65 - 100 mg/dL    BUN 25 (H) 8 - 23 MG/DL    Creatinine 1.06 (H) 0.6 - 1.0 MG/DL    GFR est AA >60 >60 ml/min/1.73m2    GFR est non-AA 55 (L) >60 ml/min/1.73m2    Calcium 9.4 8.3 - 10.4 MG/DL    Bilirubin, total 0.9 0.2 - 1.1 MG/DL    ALT (SGPT) 20 12 - 65 U/L    AST (SGOT) 115 (H) 15 - 37 U/L    Alk. phosphatase 129 50 - 136 U/L    Protein, total 9.8 (H) 6.3 - 8.2 g/dL    Albumin 2.5 (L) 3.2 - 4.6 g/dL    Globulin 7.3 (H) 2.3 - 3.5 g/dL    A-G Ratio 0.3 (L) 1.2 - 3.5     PROCALCITONIN    Collection Time: 12/23/20  8:04 PM   Result Value Ref Range    Procalcitonin 0.77 ng/mL   LACTIC ACID    Collection Time: 12/23/20  8:04 PM   Result Value Ref Range    Lactic acid 2.5 (H) 0.4 - 2.0 MMOL/L   MAGNESIUM    Collection Time: 12/23/20  8:04 PM   Result Value Ref Range    Magnesium 2.2 1.8 - 2.4 mg/dL   URINALYSIS W/ RFLX MICROSCOPIC    Collection Time: 12/23/20 10:49 PM   Result Value Ref Range    Color YELLOW      Appearance TURBID      Specific gravity 1.012 1.001 - 1.023      pH (UA) 6.0 5.0 - 9.0      Protein 100 (A) NEG mg/dL    Glucose Negative mg/dL    Ketone Negative NEG mg/dL    Bilirubin Negative NEG      Blood LARGE (A) NEG      Urobilinogen 1.0 0.2 - 1.0 EU/dL    Nitrites Positive (A) NEG      Leukocyte Esterase LARGE (A) NEG       Voice dictation software was used during the making of this note.   This software is not perfect and grammatical and other typographical errors may be present. This note has been proofread, but may still contain errors. Radha Rowell MD; 12/23/2020 @11:35 PM   ===================================================================    I wore appropriate PPE throughout this patient's ED visit.  Radha Rowell MD, 11:35 PM

## 2020-12-24 NOTE — CONSULTS
Infectious Disease Consult    Today's Date: 12/24/2020   Admit Date: 12/23/2020    Impression:   · Sepsis POA - etiology unknown  · Neurogenic bladder with SPC with abnormal urine in the setting of the above. UC is pending. SPC changed in ED  · H/o ESBL, but none recent per records both here and at [facility]. Last seems to be ? 4/2019  · Paraplegia - chronic  · DM II  · Bipolar d/o    Plan:   ·  Agree with Merrem  · Will follow loosely  · Await COVID 19 testing    Anti-infectives:   · Meropenem 12/24 -    Subjective:   Date of Consultation:  December 24, 2020  Referring Physician: Dr. Celine Keita    Patient is a 60 y.o. female admitted o/n by the Hospitalist service. She is paraplegic, has neurogenic bladder, and necessitates a SPC x 4 years now. It is changed monthly and was changed in the ED 12/23. She resides at [facility] where they test people every 2 days. She denies any h//o positive covid 19 tests. She thinks her last test was Tuesday and was negative. She doesn't know what test they are performing there, whether it is Ag or PCR. She reports decreased po but no abdominal pain, v/d, etc... No acute sob or cough. No acute rash or lesions. In the ED, she met sepsis criteria, and her UA was abnormal. Cultures are pending. CXR was benign. In d/w Nsg, no markedly concerning wounds although she does have some pressure wounds POA. There is some breakdown at the [facility] site.      Patient Active Problem List   Diagnosis Code    Hyperglycemia due to type 2 diabetes mellitus ([address] E11.65    Hypokalemia E87.6    Paraplegia (HCC) G82.20    Bipolar disorder without psychotic features ([address] F31.9    Chronic hepatitis C virus infection ([address] B18.2    Narcotic addiction ([address] F11.20    UTI (urinary tract infection) N39.0    Leukocytosis D72.829    Chronic midline low back pain without sciatica M54.5, G89.29    Atrial fibrillation with RVR ([facility]) I48.91    A-fib ([facility]) I48.91    Diabetes ([address] E11.9    Hypertension I10    Hypomagnesemia E83.42    DKA (diabetic ketoacidoses) ([facility]) E11.10    Abdominal pain R10.9    SIRS without acute organ dysfunction due to non-infectious process ([facility]) R65.10    Sepsis [facility] A41.9    Hyponatremia E87.1     Past Medical History:   Diagnosis Date    Diabetes ([facility]     Gastrointestinal disorder     GERD    Hypertension     Ill-defined condition     neurogenic bladder    Ill-defined condition     transverse myelitis    Ill-defined condition     paraplegia    Liver disease     Hepatitis C    Psychiatric disorder     anxiety    Psychiatric disorder     bipolar    Thromboembolus ([facility]       No family history on file. Social History     Tobacco Use    Smoking status: Current Every Day Smoker     Packs/day: 0.50   Substance Use Topics    Alcohol use: No     Past Surgical History:   Procedure Laterality Date    HX HYSTERECTOMY        Prior to Admission medications    Medication Sig Start Date End Date Taking? Authorizing Provider   insulin lispro (HUMALOG) 100 unit/mL injection Less than 150 =   0 units           150 -199 =   2 units  200 -249 =   4 units  250 -299 =   6 units  300 -349 =   8 units  Before meals and at bedtime. 5/1/19  Yes Susan Barakat MD   Blood-Glucose Meter monitoring kit Check glucose at home daily 2/18/19  Yes Kenyetta Oliver MD   nystatin (MYCOSTATIN) powder Apply  to affected area two (2) times a day. 2/18/19  Yes Kenyetta Oliver MD   promethazine (PHENERGAN) 25 mg tablet Take 1 Tab by mouth every six (6) hours as needed for Nausea. 5/3/19   Kristina Hernandez MD   flecainide (TAMBOCOR) 50 mg tablet Take 1 Tab by mouth every twelve (12) hours. 5/1/19   Susan Barakat MD   metoprolol succinate (TOPROL-XL) 50 mg XL tablet Take 1 Tab by mouth daily. Patient taking differently: Take 25 mg by mouth daily. 5/1/19   Susan Barakat MD   apixaban (ELIQUIS) 5 mg tablet Take 1 Tab by mouth two (2) times a day.  5/1/19   Susan Barakat MD   promethazine [facility]) 25 mg tablet Take 1 Tab by mouth every eight (8) hours as needed for Nausea. 4/9/19   Tin Hall MD   QUEtiapine (SEROQUEL) 100 mg tablet Take 3 Tabs by mouth nightly. Patient taking differently: Take 200 mg by mouth nightly. 4/9/19   Tin Hall MD   pantoprazole (PROTONIX) 40 mg tablet Take 1 Tab by mouth Daily (before breakfast). 4/9/19   Tin Hall MD   acetaminophen (TYLENOL) 325 mg tablet Take 2 Tabs by mouth every six (6) hours as needed for Pain or Fever. 4/9/19   Alicia Camara MD   naloxone [facility]) 4 mg/actuation nasal spray Use 1 spray intranasally, then discard. Repeat with new spray every 2 min as needed for opioid overdose symptoms, alternating nostrils. 4/9/19   Tin Hall MD   amLODIPine (NORVASC) 5 mg tablet Take 1 Tab by mouth daily. 4/9/19   Tin Hall MD   polyethylene glycol [facility]) 17 gram packet Take 1 Packet by mouth daily as needed for Other (to have a daily BM). 4/9/19   Tin Hall MD   albuterol (PROVENTIL HFA, VENTOLIN HFA, PROAIR HFA) 90 mcg/actuation inhaler Take 1 Puff by inhalation every four (4) hours as needed for Wheezing. 2/18/19   Cornelio Jain MD   albuterol (PROVENTIL VENTOLIN) 2.5 mg /3 mL (0.083 %) nebulizer solution Take 3 mL by inhalation every four (4) hours as needed for Wheezing. 2/18/19   Cornelio Jain MD   ALPRAZolam Saddie Jonathan) 0.5 mg tablet Take 1 Tab by mouth two (2) times daily as needed for Anxiety. Max Daily Amount: 1 mg. 2/9/19   Melecio Joseph MD   fentaNYL (DURAGESIC) 25 mcg/hr PATCH 1 Patch by TransDERmal route every seventy-two (72) hours. Max Daily Amount: 1 Patch. 2/9/19   Melecio Joseph MD   bisacodyl (DULCOLAX) 5 mg EC tablet Take 1 Tab by mouth daily as needed for Constipation. 12/21/16   Sivan Lofton MD   budesonide (PULMICORT) 0.5 mg/2 mL nbsp 2 mL by Nebulization route two (2) times a day.  12/21/16   Sivan Lofton MD   zinc oxide-cod liver oil (DESITIN) 40 % paste Apply  to affected area two (2) times a day. 16   Radha Gonzalez MD       No Known Allergies     Review of Systems:  A comprehensive review of systems was negative except for that written in the History of Present Illness.     Objective:     Visit Vitals  /75 (BP 1 Location: Right arm, BP Patient Position: At rest)   Pulse 76   Temp (!) 100.5 °F (38.1 °C)   Resp 20   Ht 5' 5\" (1.651 m)   Wt 90.7 kg (200 lb)   SpO2 93%   BMI 33.28 kg/m²     Temp (24hrs), Av.7 °F (37.6 °C), Min:98.6 °F (37 °C), Max:100.5 °F (38.1 °C)       Lines:  Peripheral IV:       Physical Exam:    General:  NAD, A&O to PPT   Eyes:  Anicteric, no drainage   Mouth/Throat:    Neck: Supple,    Lungs:   CTAB from anterior, no w/r/r   CV:  RRR, no MRG   Abdomen:  Soft, NT, NABS, ND   Extremities: Chronic changes c/w paraplegia   Skin: No acute rash noted or skin boils   Lymph nodes:    Musculoskeletal:    Lines/Devices:     Psych: As above       Data Review:     CBC:  Recent Labs     20   WBC 19.9*   GRANS 81*   MONOS 4   EOS 1   ANEU 16.2*   ABL 2.6   HGB 14.7   HCT 46.4*          BMP:  Recent Labs     20  2331 20   CREA  --  1.06*   BUN  --  25*   NA  --  124*   K 3.3* 8.4*   CL  --  91*   CO2  --  28   AGAP  --  5*   GLU  --  230*       LFTS:  Recent Labs     20   TBILI 0.9   ALT 20      TP 9.8*   ALB 2.5*       Microbiology:     All Micro Results     Procedure Component Value Units Date/Time    CULTURE, URINE [[id number]] Collected: 20    Order Status: Completed Specimen: Cath Urine Updated: 2013    CULTURE, BLOOD [[id number]] Collected: 20 0118    Order Status: Completed Specimen: Blood Updated: 20 0140    CULTURE, BLOOD [[id number]] Collected: 20    Order Status: Completed Specimen: Blood Updated: 20    CULTURE, URINE [[id number]]     Order Status: Canceled Specimen: Cath Urine Imaging:   reviewed    Signed By: Mariana Hylton MD     December 24, 2020

## 2020-12-24 NOTE — PROGRESS NOTES
Problem: Risk for Spread of Infection  Goal: Prevent transmission of infectious organism to others  Description: Prevent the transmission of infectious organisms to other patients, staff members, and visitors. Outcome: Progressing Towards Goal     Problem: Patient Education:  Go to Education Activity  Goal: Patient/Family Education  Outcome: Progressing Towards Goal     Problem: Falls - Risk of  Goal: *Absence of Falls  Description: Document Sunny Garcia Fall Risk and appropriate interventions in the flowsheet. Outcome: Progressing Towards Goal  Note: Fall Risk Interventions:  Mobility Interventions: Communicate number of staff needed for ambulation/transfer, Patient to call before getting OOB    Mentation Interventions: Adequate sleep, hydration, pain control, Door open when patient unattended, Evaluate medications/consider consulting pharmacy, Reorient patient, More frequent rounding    Medication Interventions: Evaluate medications/consider consulting pharmacy, Patient to call before getting OOB, Teach patient to arise slowly    Elimination Interventions: Call light in reach, Patient to call for help with toileting needs, Stay With Me (per policy), Toilet paper/wipes in reach              Problem: Patient Education: Go to Patient Education Activity  Goal: Patient/Family Education  Outcome: Progressing Towards Goal     Problem: Pressure Injury - Risk of  Goal: *Prevention of pressure injury  Description: Document Ras Scale and appropriate interventions in the flowsheet.   Outcome: Progressing Towards Goal  Note: Pressure Injury Interventions:  Sensory Interventions: Assess changes in LOC, Assess need for specialty bed, Check visual cues for pain, Float heels, Keep linens dry and wrinkle-free, Maintain/enhance activity level, Minimize linen layers    Moisture Interventions: Absorbent underpads, Apply protective barrier, creams and emollients, Assess need for specialty bed, Check for incontinence Q2 hours and as needed, Internal/External urinary devices, Internal/External fecal devices, Limit adult briefs, Maintain skin hydration (lotion/cream), Minimize layers, Moisture barrier    Activity Interventions: Assess need for specialty bed, Increase time out of bed, Pressure redistribution bed/mattress(bed type)    Mobility Interventions: Assess need for specialty bed, Float heels, HOB 30 degrees or less, Pressure redistribution bed/mattress (bed type)    Nutrition Interventions: Document food/fluid/supplement intake    Friction and Shear Interventions: Apply protective barrier, creams and emollients, Foam dressings/transparent film/skin sealants, HOB 30 degrees or less, Lift sheet, Lift team/patient mobility team, Minimize layers                Problem: Patient Education: Go to Patient Education Activity  Goal: Patient/Family Education  Outcome: Progressing Towards Goal     Problem: Urinary Tract Infection - Adult  Goal: *Absence of infection signs and symptoms  Outcome: Progressing Towards Goal     Problem: Patient Education: Go to Patient Education Activity  Goal: Patient/Family Education  Outcome: Progressing Towards Goal

## 2020-12-24 NOTE — PROGRESS NOTES
Pt is alert and oriented x4. Pt in bed, resting. Bed in low position, wheels locked, call light within reach, instructed to call with any needs. Hourly rounds completed this shift. NAD noted. VSS. Pt has c/o pain, treated per MAR. IV is infusing, and dressing is clean, dry, and intact. Suprapubic cath intact, patent and draining. LLQ ostomy intact, minimal output noted. Report to be given to night shift nurse.

## 2020-12-25 LAB
ACC. NO. FROM MICRO ORDER, ACCP: ABNORMAL
ALBUMIN SERPL-MCNC: 2.1 G/DL (ref 3.2–4.6)
ALBUMIN/GLOB SERPL: 0.4 {RATIO} (ref 1.2–3.5)
ALP SERPL-CCNC: 114 U/L (ref 50–136)
ALT SERPL-CCNC: 13 U/L (ref 12–65)
ANION GAP SERPL CALC-SCNC: 6 MMOL/L (ref 7–16)
AST SERPL-CCNC: 14 U/L (ref 15–37)
BASOPHILS # BLD: 0.1 K/UL (ref 0–0.2)
BASOPHILS NFR BLD: 1 % (ref 0–2)
BILIRUB SERPL-MCNC: 0.4 MG/DL (ref 0.2–1.1)
BUN SERPL-MCNC: 9 MG/DL (ref 8–23)
CALCIUM SERPL-MCNC: 8.3 MG/DL (ref 8.3–10.4)
CHLORIDE SERPL-SCNC: 109 MMOL/L (ref 98–107)
CO2 SERPL-SCNC: 26 MMOL/L (ref 21–32)
CREAT SERPL-MCNC: 0.62 MG/DL (ref 0.6–1)
DIFFERENTIAL METHOD BLD: ABNORMAL
EOSINOPHIL # BLD: 0.2 K/UL (ref 0–0.8)
EOSINOPHIL NFR BLD: 2 % (ref 0.5–7.8)
ERYTHROCYTE [DISTWIDTH] IN BLOOD BY AUTOMATED COUNT: 16.5 % (ref 11.9–14.6)
GLOBULIN SER CALC-MCNC: 5.2 G/DL (ref 2.3–3.5)
GLUCOSE BLD STRIP.AUTO-MCNC: 206 MG/DL (ref 65–100)
GLUCOSE BLD STRIP.AUTO-MCNC: 257 MG/DL (ref 65–100)
GLUCOSE BLD STRIP.AUTO-MCNC: 325 MG/DL (ref 65–100)
GLUCOSE BLD STRIP.AUTO-MCNC: 336 MG/DL (ref 65–100)
GLUCOSE SERPL-MCNC: 189 MG/DL (ref 65–100)
HCT VFR BLD AUTO: 39.8 % (ref 35.8–46.3)
HGB BLD-MCNC: 12.1 G/DL (ref 11.7–15.4)
IMM GRANULOCYTES # BLD AUTO: 0.1 K/UL (ref 0–0.5)
IMM GRANULOCYTES NFR BLD AUTO: 1 % (ref 0–5)
INTERPRETATION: ABNORMAL
LYMPHOCYTES # BLD: 2.6 K/UL (ref 0.5–4.6)
LYMPHOCYTES NFR BLD: 21 % (ref 13–44)
MAGNESIUM SERPL-MCNC: 1.9 MG/DL (ref 1.8–2.4)
MCH RBC QN AUTO: 26.9 PG (ref 26.1–32.9)
MCHC RBC AUTO-ENTMCNC: 30.4 G/DL (ref 31.4–35)
MCV RBC AUTO: 88.6 FL (ref 79.6–97.8)
MECA (METHICILLIN-RESISTANCE GENES), MRGP: DETECTED
MONOCYTES # BLD: 0.8 K/UL (ref 0.1–1.3)
MONOCYTES NFR BLD: 6 % (ref 4–12)
NEUTS SEG # BLD: 8.5 K/UL (ref 1.7–8.2)
NEUTS SEG NFR BLD: 69 % (ref 43–78)
NRBC # BLD: 0 K/UL (ref 0–0.2)
PLATELET # BLD AUTO: 319 K/UL (ref 150–450)
PMV BLD AUTO: 11.2 FL (ref 9.4–12.3)
POTASSIUM SERPL-SCNC: 3.6 MMOL/L (ref 3.5–5.1)
PROT SERPL-MCNC: 7.3 G/DL (ref 6.3–8.2)
RBC # BLD AUTO: 4.49 M/UL (ref 4.05–5.2)
SODIUM SERPL-SCNC: 141 MMOL/L (ref 136–145)
STAPHYLOCOCCUS, STAPP: DETECTED
WBC # BLD AUTO: 12.4 K/UL (ref 4.3–11.1)

## 2020-12-25 PROCEDURE — 74011000258 HC RX REV CODE- 258: Performed by: INTERNAL MEDICINE

## 2020-12-25 PROCEDURE — 74011250636 HC RX REV CODE- 250/636: Performed by: INTERNAL MEDICINE

## 2020-12-25 PROCEDURE — 65660000000 HC RM CCU STEPDOWN

## 2020-12-25 PROCEDURE — 94760 N-INVAS EAR/PLS OXIMETRY 1: CPT

## 2020-12-25 PROCEDURE — 74011250637 HC RX REV CODE- 250/637: Performed by: INTERNAL MEDICINE

## 2020-12-25 PROCEDURE — 83735 ASSAY OF MAGNESIUM: CPT

## 2020-12-25 PROCEDURE — 82962 GLUCOSE BLOOD TEST: CPT

## 2020-12-25 PROCEDURE — 85025 COMPLETE CBC W/AUTO DIFF WBC: CPT

## 2020-12-25 PROCEDURE — 74011636637 HC RX REV CODE- 636/637: Performed by: INTERNAL MEDICINE

## 2020-12-25 PROCEDURE — 36415 COLL VENOUS BLD VENIPUNCTURE: CPT

## 2020-12-25 PROCEDURE — 80053 COMPREHEN METABOLIC PANEL: CPT

## 2020-12-25 PROCEDURE — 74011000250 HC RX REV CODE- 250: Performed by: FAMILY MEDICINE

## 2020-12-25 PROCEDURE — 94640 AIRWAY INHALATION TREATMENT: CPT

## 2020-12-25 RX ORDER — VANCOMYCIN HYDROCHLORIDE
1250 EVERY 12 HOURS
Status: DISCONTINUED | OUTPATIENT
Start: 2020-12-25 | End: 2020-12-26

## 2020-12-25 RX ORDER — FLUTICASONE PROPIONATE 110 UG/1
2 AEROSOL, METERED RESPIRATORY (INHALATION)
Status: DISCONTINUED | OUTPATIENT
Start: 2020-12-25 | End: 2021-01-01 | Stop reason: HOSPADM

## 2020-12-25 RX ORDER — VANCOMYCIN 2 GRAM/500 ML IN 0.9 % SODIUM CHLORIDE INTRAVENOUS
2000 ONCE
Status: COMPLETED | OUTPATIENT
Start: 2020-12-25 | End: 2020-12-25

## 2020-12-25 RX ORDER — METOPROLOL TARTRATE 5 MG/5ML
5 INJECTION INTRAVENOUS ONCE
Status: COMPLETED | OUTPATIENT
Start: 2020-12-25 | End: 2020-12-25

## 2020-12-25 RX ADMIN — ACETAMINOPHEN 650 MG: 325 TABLET, FILM COATED ORAL at 11:39

## 2020-12-25 RX ADMIN — Medication 10 ML: at 06:00

## 2020-12-25 RX ADMIN — VANCOMYCIN HYDROCHLORIDE 2000 MG: 10 INJECTION, POWDER, LYOPHILIZED, FOR SOLUTION INTRAVENOUS at 09:24

## 2020-12-25 RX ADMIN — METOPROLOL SUCCINATE 25 MG: 25 TABLET, EXTENDED RELEASE ORAL at 09:10

## 2020-12-25 RX ADMIN — OXYCODONE HYDROCHLORIDE 10 MG: 10 TABLET, FILM COATED, EXTENDED RELEASE ORAL at 09:10

## 2020-12-25 RX ADMIN — MEROPENEM 500 MG: 500 INJECTION, POWDER, FOR SOLUTION INTRAVENOUS at 04:00

## 2020-12-25 RX ADMIN — ESCITALOPRAM OXALATE 20 MG: 10 TABLET ORAL at 09:10

## 2020-12-25 RX ADMIN — METOPROLOL TARTRATE 5 MG: 1 INJECTION, SOLUTION INTRAVENOUS at 20:46

## 2020-12-25 RX ADMIN — MEROPENEM 500 MG: 500 INJECTION, POWDER, FOR SOLUTION INTRAVENOUS at 16:57

## 2020-12-25 RX ADMIN — INSULIN GLARGINE 20 UNITS: 100 INJECTION, SOLUTION SUBCUTANEOUS at 17:49

## 2020-12-25 RX ADMIN — INSULIN LISPRO 8 UNITS: 100 INJECTION, SOLUTION INTRAVENOUS; SUBCUTANEOUS at 17:48

## 2020-12-25 RX ADMIN — VANCOMYCIN HYDROCHLORIDE 1250 MG: 10 INJECTION, POWDER, LYOPHILIZED, FOR SOLUTION INTRAVENOUS at 20:46

## 2020-12-25 RX ADMIN — MEROPENEM 500 MG: 500 INJECTION, POWDER, FOR SOLUTION INTRAVENOUS at 22:52

## 2020-12-25 RX ADMIN — QUETIAPINE FUMARATE 200 MG: 100 TABLET ORAL at 20:46

## 2020-12-25 RX ADMIN — Medication 10 ML: at 23:51

## 2020-12-25 RX ADMIN — PREGABALIN 150 MG: 75 CAPSULE ORAL at 17:00

## 2020-12-25 RX ADMIN — FUROSEMIDE 20 MG: 40 TABLET ORAL at 17:00

## 2020-12-25 RX ADMIN — INSULIN LISPRO 4 UNITS: 100 INJECTION, SOLUTION INTRAVENOUS; SUBCUTANEOUS at 09:08

## 2020-12-25 RX ADMIN — ACETAMINOPHEN 650 MG: 325 TABLET, FILM COATED ORAL at 18:38

## 2020-12-25 RX ADMIN — INSULIN LISPRO 6 UNITS: 100 INJECTION, SOLUTION INTRAVENOUS; SUBCUTANEOUS at 12:34

## 2020-12-25 RX ADMIN — APIXABAN 5 MG: 5 TABLET, FILM COATED ORAL at 20:34

## 2020-12-25 RX ADMIN — DULOXETINE HYDROCHLORIDE 30 MG: 30 CAPSULE, DELAYED RELEASE ORAL at 09:10

## 2020-12-25 RX ADMIN — INSULIN GLARGINE 20 UNITS: 100 INJECTION, SOLUTION SUBCUTANEOUS at 09:09

## 2020-12-25 RX ADMIN — OXYCODONE HYDROCHLORIDE 10 MG: 10 TABLET, FILM COATED, EXTENDED RELEASE ORAL at 20:34

## 2020-12-25 RX ADMIN — PREGABALIN 150 MG: 75 CAPSULE ORAL at 09:10

## 2020-12-25 RX ADMIN — APIXABAN 5 MG: 5 TABLET, FILM COATED ORAL at 09:10

## 2020-12-25 RX ADMIN — MEROPENEM 500 MG: 500 INJECTION, POWDER, FOR SOLUTION INTRAVENOUS at 11:28

## 2020-12-25 RX ADMIN — PANTOPRAZOLE SODIUM 40 MG: 40 TABLET, DELAYED RELEASE ORAL at 05:25

## 2020-12-25 RX ADMIN — FLUTICASONE PROPIONATE 2 PUFF: 110 AEROSOL, METERED RESPIRATORY (INHALATION) at 19:49

## 2020-12-25 RX ADMIN — SODIUM CHLORIDE 100 ML/HR: 900 INJECTION, SOLUTION INTRAVENOUS at 16:57

## 2020-12-25 RX ADMIN — FUROSEMIDE 20 MG: 40 TABLET ORAL at 09:10

## 2020-12-25 RX ADMIN — Medication 5 ML: at 13:06

## 2020-12-25 NOTE — PROGRESS NOTES
Infectious Disease Progress Note    Today's Date: 2020   Admit Date: 2020    Impression:   · Sepsis POA - w/u so far pointing to possible UTI  · Neurogenic bladder with SPC with abnormal urine in the setting of the above. UC is pending. SPC changed in ED. UC with < 10x5 CFUs of GNRs  · Coagulase negative staph in one of two BCs on admission. .. likely a contaminant  · H/o ESBL, but none recent per records both here and at [address]. Last seems to be ? 2019  · Paraplegia - chronic  · DM II  · Bipolar d/o    Plan:   · Agree with vanc for now, but if nothing new is growing at by tomorrow, could dc vanc as this could be contaminant. · Continue merrem and watch GNR in urine. Could de-escalate based on cultures  · Await final COVID 19 test result    Anti-infectives:   1. Merrem  -  2. Vanc  -    Subjective:     Pt feels much better today. She remembers only parts of the last few days. No f/c/s, n/v/d, acute sob, rash, etc... Review of Systems:  as above    Objective:     Visit Vitals  /74 (BP 1 Location: Left arm, BP Patient Position: At rest)   Pulse (!) 134   Temp 98.9 °F (37.2 °C)   Resp 20   Ht 5' 5\" (1.651 m)   Wt 90.7 kg (200 lb)   SpO2 94%   BMI 33.28 kg/m²     Temp (24hrs), Av.2 °F (36.8 °C), Min:97.7 °F (36.5 °C), Max:98.9 °F (37.2 °C)      Lines:  Peripheral IV:       Physical Exam:   General:  Alert, cooperative, well noursished, well developed, appears stated age   Eyes:  Sclera anicteric. Pupils equally round and reactive to light. Lungs:   Clear to auscultation bilaterally, good effort   CV:  Regular rate and rhythm,no murmur, click, rub or gallop   Abdomen:   Soft, non-tender. bowel sounds normal. non-distended   Extremities: Chronic changes c/w paraplegia noted. No acute issues.     Skin: Skin color, texture, turgor normal. no acute rash or lesions   Musculoskeletal: No swelling or deformity   Lines/Devices:  Intact, no erythema, drainage or tenderness   Psych: Alert and oriented, normal mood affect given the setting       Data Review:     CBC:   Recent Labs     12/25/20  0825 12/24/20  1043 12/23/20 2004   WBC 12.4* 17.8* 19.9*   RBC 4.49 4.13 5.41*   HGB 12.1 11.0* 14.7   HCT 39.8 36.1 46.4*    362 443   GRANS 69 82* 81*   LYMPH 21 11* 13   EOS 2 1 1     CMP:   Recent Labs     12/25/20  0825 12/24/20  1043 12/23/20  2331 12/23/20 2004   * 240*  --  230*    135*  --  124*   K 3.6 3.5 3.3* 8.4*   * 102  --  91*   CO2 26 26  --  28   BUN 9 17  --  25*   CREA 0.62 0.71  --  1.06*   CA 8.3 8.3  --  9.4   AGAP 6* 7  --  5*    129  --  129   TP 7.3 7.1  --  9.8*   ALB 2.1* 2.3*  --  2.5*   GLOB 5.2* 4.8*  --  7.3*   AGRAT 0.4* 0.5*  --  0.3*     Urinalysis:   Lab Results   Component Value Date/Time    Color YELLOW 12/23/2020 10:49 PM    Appearance TURBID 12/23/2020 10:49 PM    Specific gravity 1.010 02/14/2019 10:07 PM    pH (UA) 6.0 12/23/2020 10:49 PM    Protein 100 (A) 12/23/2020 10:49 PM    Glucose Negative 12/23/2020 10:49 PM    Ketone Negative 12/23/2020 10:49 PM    Bilirubin Negative 12/23/2020 10:49 PM    Blood LARGE (A) 12/23/2020 10:49 PM    Urobilinogen 1.0 12/23/2020 10:49 PM    Nitrites Positive (A) 12/23/2020 10:49 PM    Leukocyte Esterase LARGE (A) 12/23/2020 10:49 PM    WBC >100 12/23/2020 10:49 PM    RBC  12/23/2020 10:49 PM    Epithelial cells 5-10 12/23/2020 10:49 PM    Bacteria 4+ (H) 12/23/2020 10:49 PM    Casts 3-5 12/23/2020 10:49 PM    Crystals, urine MARKED 06/27/2018 12:25 PM        Microbiology:     All Micro Results     Procedure Component Value Units Date/Time    BLOOD CULTURE ID PANEL [[id number]]  (Abnormal) Collected: 12/24/20 0118    Order Status: Completed Specimen: Blood Updated: 12/25/20 0828     Acc. no. from Micro Order [id number]     Staphylococcus Detected        Comment: RESULTS VERIFIED, PHONED TO AND READ BACK BY  YNES GONZALEZ RN @ 5313 ON 12/25/20 AK.           mecA (Methicillin-Resistance Genes) Detected        Comment: Presence of mecA is highly indicative of methicillin resistance. The test does not replace traditional culture and susceptibilities        INTERPRETATION       Gram positive cocci in clusters. Identified by realtime PCR as  Coagulase negative Staphylococci           Comment: A single positive culture of coagulase negative Staph is likely to be a contaminant in adult patients. Consider discontinuation of antibiotics for gram positive bloodstream infections if patient asymptomatic. THIS TEST DOES NOT REPLACE SENSITIVITY TESTING. CULTURE, BLOOD [[id number]] Collected: 12/24/20 0118    Order Status: Completed Specimen: Blood Updated: 12/25/20 0826     Special Requests: --        LEFT  HAND       GRAM STAIN       GRAM POS COCCI IN CLUSTERS            PEDIATRIC BOTTLE               RESULTS VERIFIED, PHONED TO AND READ BACK BY Rosalinda Chaudhary RN @ 903 ON 12/25/2020 AK.            Culture result:       CULTURE IN PROGRESS,FURTHER UPDATES TO FOLLOW            REFER TO [employer] PANEL ACC [id number]    CULTURE, URINE [[id number]]  (Abnormal) Collected: 12/23/20 2330    Order Status: Completed Specimen: Cath Urine Updated: 12/25/20 0807     Special Requests: NO SPECIAL REQUESTS        Culture result:       >100,000 COLONIES/mL GRAM NEGATIVE RODS SUBCULTURE IN PROGRESS          CULTURE, BLOOD [[id number]] Collected: 12/23/20 2004    Order Status: Completed Specimen: Blood Updated: 12/25/20 0702     Special Requests: --        NO SPECIAL REQUESTS  RIGHT  Antecubital       Culture result: NO GROWTH 1 DAY       CULTURE, URINE [[id number]]     Order Status: Canceled Specimen: Cath Urine           Imaging:     None new    Signed By: Ann Marie Hylton MD     December 25, 2020

## 2020-12-25 NOTE — PROGRESS NOTES
Hospitalist Progress Note    2020  Admit Date: 2020  6:55 PM   NAME: Gely Waller   :  1955   MRN:  [id number]   Attending: Rosana Haley MD  PCP:  Fabiana Major MD    HPI/SUBJECTIVE:   41U with pmhx of paraplegia, neurogenic bladder, anxiety/bipolar d/o, colostomy, chronic suprapubic catheter presented with complaint of 2-3 days progressive nausea, fever and chills. Pt lives at [facility]. No cough, shortness of breath, chest pain. Has colostomy and suprapubic catheter in place which was changed in the ER. Admitted for sepsis with UTI.     She was initiall found in afib rvr 150's  but converted to SR during ER stay. : Patient seen and examined, states feels much better. Denies any pain in her abdomen. Nursing notes and chart reviewed. Review of Systems negative with exception of pertinent positives noted above. PHYSICAL EXAM     Visit Vitals  /78 (BP 1 Location: Left arm, BP Patient Position: At rest;Supine)   Pulse 84   Temp 98.4 °F (36.9 °C)   Resp 17   Ht 5' 5\" (1.651 m)   Wt 90.7 kg (200 lb)   SpO2 95%   BMI 33.28 kg/m²      Temp (24hrs), Av.2 °F (36.8 °C), Min:97.7 °F (36.5 °C), Max:98.9 °F (37.2 °C)    Oxygen Therapy  O2 Sat (%): 95 % (20 0501)  Pulse via Oximetry: 82 beats per minute (20)  O2 Device: Room air (20)  O2 Flow Rate (L/min): 4 l/min (20 0808)    Intake/Output Summary (Last 24 hours) at 2020 0822  Last data filed at 2020 0505  Gross per 24 hour   Intake 150 ml   Output 1200 ml   Net -1050 ml         General: No acute distress. Alert.    Head:  AT/NC  Lungs:  CTABL. Heart:  RRR, no murmur, rub, or gallop  Abdomen: Soft, non-distended, non-tender, +bs, colostomy bag attached and site clean. Suprapubic   catheter in place. Extremities: No cyanosis or clubbing. Neurologic:  No focal deficits. Moves all extremities. Skin:  No Obvious Rash  Psych:  Normal affect.      LABS AND STUDIES:  Personally reviewed all labs, meds, and studies for past 24hrs. ASSESSMENT      Active Hospital Problems    Diagnosis Date Noted    Sepsis ([facility] 12/24/2020    Hyponatremia 12/24/2020    Hypertension 03/10/2019    Diabetes ([place] [address] 03/10/2019    A-fib ([place] [address] 02/19/2019    UTI (urinary tract infection) 01/31/2019    Paraplegia ([facility] 12/10/2016    Bipolar disorder without psychotic features ([place] [address] 12/10/2016           PLAN:    · Sepsis/UTI: Continue with antibiotics, ID following. Will add vancomycin for GPC in blood culture. Follow final urine culture and blood cultures. Suspect  bcx contamination. · Chronic A Fib: Stable on Eliquis, BB and Lasix. Tachycardia noted, place patient on telemetry. · Hyponatremia: Resolved with IV fluid. · Paraplegia/Chronic Pain: Continue home meds  · DM2: On home dose of Lantus and SSI, trend sugars. · Bipolar: Continue on home meds. · S/p Colostomy/Suprapubic Catheter:     Full Code  Cardiac diet  Dispo: Pending improvement    DVT ppx: Eliquis  Discussed plan with pt who is in agreement. All questions answered.     Signed By: Lexi Jin MD     December 25, 2020

## 2020-12-25 NOTE — MANAGEMENT PLAN
Pharmacokinetic Consult to Pharmacist    Luis Dilip is a 60 y.o. female being treated for bloodstream infection with vancomycin. Height: 5' 5\" (165.1 cm)  Weight: 90.7 kg (200 lb)  Lab Results   Component Value Date/Time    BUN 9 12/25/2020 08:25 AM    Creatinine 0.62 12/25/2020 08:25 AM    WBC 12.4 (H) 12/25/2020 08:25 AM    Procalcitonin 0.77 12/23/2020 08:04 PM    Lactic acid 1.2 12/24/2020 06:39 AM    Lactic Acid (POC) 1.17 05/03/2019 10:06 AM      Estimated Creatinine Clearance: 90.4 mL/min (by C-G formula based on SCr of 0.62 mg/dL). CULTURES:  12/24 - blood - coag neg staph 1/4    Lab Results   Component Value Date/Time    Vancomycin,trough 33.7 (HH) 02/02/2019 06:15 AM       Day 1 of vancomycin. Goal trough is 15-20. Will load with 2000 mg and then initiate 1250 mg every 12 hours for now. Pharmacy to order levels and adjust the dose as clinically indicated. Will continue to follow patient.       Thank you,  Kin Galloway, PharmD, [address]  Clinical Pharmacy Specialist  [phone]

## 2020-12-25 NOTE — PROGRESS NOTES
Late entry  BSR received  Patient resting quietly  Respirations even on airvo 60L/80%  There are no signs of distress at this time

## 2020-12-26 LAB
ALBUMIN SERPL-MCNC: 2 G/DL (ref 3.2–4.6)
ALBUMIN/GLOB SERPL: 0.5 {RATIO} (ref 1.2–3.5)
ALP SERPL-CCNC: 115 U/L (ref 50–136)
ALT SERPL-CCNC: 14 U/L (ref 12–65)
ANION GAP SERPL CALC-SCNC: 6 MMOL/L (ref 7–16)
AST SERPL-CCNC: 15 U/L (ref 15–37)
BASOPHILS # BLD: 0.1 K/UL (ref 0–0.2)
BASOPHILS NFR BLD: 1 % (ref 0–2)
BILIRUB SERPL-MCNC: 0.3 MG/DL (ref 0.2–1.1)
BUN SERPL-MCNC: 8 MG/DL (ref 8–23)
CALCIUM SERPL-MCNC: 7.7 MG/DL (ref 8.3–10.4)
CHLORIDE SERPL-SCNC: 108 MMOL/L (ref 98–107)
CO2 SERPL-SCNC: 26 MMOL/L (ref 21–32)
CREAT SERPL-MCNC: 0.5 MG/DL (ref 0.6–1)
DIFFERENTIAL METHOD BLD: ABNORMAL
EOSINOPHIL # BLD: 0.3 K/UL (ref 0–0.8)
EOSINOPHIL NFR BLD: 3 % (ref 0.5–7.8)
ERYTHROCYTE [DISTWIDTH] IN BLOOD BY AUTOMATED COUNT: 16.5 % (ref 11.9–14.6)
GLOBULIN SER CALC-MCNC: 4 G/DL (ref 2.3–3.5)
GLUCOSE BLD STRIP.AUTO-MCNC: 217 MG/DL (ref 65–100)
GLUCOSE BLD STRIP.AUTO-MCNC: 223 MG/DL (ref 65–100)
GLUCOSE BLD STRIP.AUTO-MCNC: 336 MG/DL (ref 65–100)
GLUCOSE BLD STRIP.AUTO-MCNC: 368 MG/DL (ref 65–100)
GLUCOSE SERPL-MCNC: 193 MG/DL (ref 65–100)
HCT VFR BLD AUTO: 37.4 % (ref 35.8–46.3)
HGB BLD-MCNC: 11.4 G/DL (ref 11.7–15.4)
IMM GRANULOCYTES # BLD AUTO: 0.2 K/UL (ref 0–0.5)
IMM GRANULOCYTES NFR BLD AUTO: 2 % (ref 0–5)
LYMPHOCYTES # BLD: 2.1 K/UL (ref 0.5–4.6)
LYMPHOCYTES NFR BLD: 20 % (ref 13–44)
MCH RBC QN AUTO: 26.7 PG (ref 26.1–32.9)
MCHC RBC AUTO-ENTMCNC: 30.5 G/DL (ref 31.4–35)
MCV RBC AUTO: 87.6 FL (ref 79.6–97.8)
MONOCYTES # BLD: 0.6 K/UL (ref 0.1–1.3)
MONOCYTES NFR BLD: 6 % (ref 4–12)
NEUTS SEG # BLD: 7.2 K/UL (ref 1.7–8.2)
NEUTS SEG NFR BLD: 69 % (ref 43–78)
NRBC # BLD: 0 K/UL (ref 0–0.2)
PLATELET # BLD AUTO: 312 K/UL (ref 150–450)
PMV BLD AUTO: 10.8 FL (ref 9.4–12.3)
POTASSIUM SERPL-SCNC: 3.4 MMOL/L (ref 3.5–5.1)
PROT SERPL-MCNC: 6 G/DL (ref 6.3–8.2)
RBC # BLD AUTO: 4.27 M/UL (ref 4.05–5.2)
SODIUM SERPL-SCNC: 140 MMOL/L (ref 136–145)
WBC # BLD AUTO: 10.4 K/UL (ref 4.3–11.1)

## 2020-12-26 PROCEDURE — 80053 COMPREHEN METABOLIC PANEL: CPT

## 2020-12-26 PROCEDURE — 74011000250 HC RX REV CODE- 250: Performed by: FAMILY MEDICINE

## 2020-12-26 PROCEDURE — 2709999900 HC NON-CHARGEABLE SUPPLY

## 2020-12-26 PROCEDURE — 94640 AIRWAY INHALATION TREATMENT: CPT

## 2020-12-26 PROCEDURE — 74011250637 HC RX REV CODE- 250/637: Performed by: INTERNAL MEDICINE

## 2020-12-26 PROCEDURE — 65660000000 HC RM CCU STEPDOWN

## 2020-12-26 PROCEDURE — 74011250636 HC RX REV CODE- 250/636: Performed by: FAMILY MEDICINE

## 2020-12-26 PROCEDURE — 36415 COLL VENOUS BLD VENIPUNCTURE: CPT

## 2020-12-26 PROCEDURE — 74011250636 HC RX REV CODE- 250/636: Performed by: INTERNAL MEDICINE

## 2020-12-26 PROCEDURE — 82962 GLUCOSE BLOOD TEST: CPT

## 2020-12-26 PROCEDURE — 85025 COMPLETE CBC W/AUTO DIFF WBC: CPT

## 2020-12-26 PROCEDURE — 74011636637 HC RX REV CODE- 636/637: Performed by: INTERNAL MEDICINE

## 2020-12-26 PROCEDURE — 74011000258 HC RX REV CODE- 258: Performed by: INTERNAL MEDICINE

## 2020-12-26 PROCEDURE — 94760 N-INVAS EAR/PLS OXIMETRY 1: CPT

## 2020-12-26 PROCEDURE — 74011000258 HC RX REV CODE- 258: Performed by: FAMILY MEDICINE

## 2020-12-26 RX ORDER — OXYCODONE AND ACETAMINOPHEN 7.5; 325 MG/1; MG/1
1 TABLET ORAL
Status: DISCONTINUED | OUTPATIENT
Start: 2020-12-26 | End: 2021-01-01 | Stop reason: HOSPADM

## 2020-12-26 RX ORDER — DILTIAZEM HYDROCHLORIDE 30 MG/1
60 TABLET, FILM COATED ORAL
Status: DISCONTINUED | OUTPATIENT
Start: 2020-12-26 | End: 2020-12-26

## 2020-12-26 RX ORDER — DILTIAZEM HYDROCHLORIDE 240 MG/1
240 CAPSULE, COATED, EXTENDED RELEASE ORAL DAILY
Status: DISCONTINUED | OUTPATIENT
Start: 2020-12-26 | End: 2021-01-01 | Stop reason: HOSPADM

## 2020-12-26 RX ORDER — FAMOTIDINE 20 MG/1
20 TABLET, FILM COATED ORAL 2 TIMES DAILY
Status: DISCONTINUED | OUTPATIENT
Start: 2020-12-26 | End: 2021-01-01 | Stop reason: HOSPADM

## 2020-12-26 RX ORDER — METOPROLOL TARTRATE 5 MG/5ML
5 INJECTION INTRAVENOUS ONCE
Status: COMPLETED | OUTPATIENT
Start: 2020-12-26 | End: 2020-12-26

## 2020-12-26 RX ORDER — INSULIN GLARGINE 100 [IU]/ML
30 INJECTION, SOLUTION SUBCUTANEOUS 2 TIMES DAILY
Status: DISCONTINUED | OUTPATIENT
Start: 2020-12-26 | End: 2020-12-27

## 2020-12-26 RX ORDER — MAG HYDROX/ALUMINUM HYD/SIMETH 200-200-20
30 SUSPENSION, ORAL (FINAL DOSE FORM) ORAL
Status: DISCONTINUED | OUTPATIENT
Start: 2020-12-26 | End: 2021-01-01 | Stop reason: HOSPADM

## 2020-12-26 RX ORDER — POTASSIUM CHLORIDE 20 MEQ/1
20 TABLET, EXTENDED RELEASE ORAL 2 TIMES DAILY
Status: DISCONTINUED | OUTPATIENT
Start: 2020-12-26 | End: 2021-01-01 | Stop reason: HOSPADM

## 2020-12-26 RX ORDER — INSULIN LISPRO 100 [IU]/ML
0-15 INJECTION, SOLUTION INTRAVENOUS; SUBCUTANEOUS
Status: DISCONTINUED | OUTPATIENT
Start: 2020-12-26 | End: 2020-12-26

## 2020-12-26 RX ORDER — DILTIAZEM HYDROCHLORIDE 5 MG/ML
10 INJECTION INTRAVENOUS ONCE
Status: COMPLETED | OUTPATIENT
Start: 2020-12-26 | End: 2020-12-26

## 2020-12-26 RX ORDER — MAGNESIUM SULFATE HEPTAHYDRATE 40 MG/ML
2 INJECTION, SOLUTION INTRAVENOUS ONCE
Status: COMPLETED | OUTPATIENT
Start: 2020-12-26 | End: 2020-12-26

## 2020-12-26 RX ORDER — INSULIN GLARGINE 100 [IU]/ML
25 INJECTION, SOLUTION SUBCUTANEOUS 2 TIMES DAILY
Status: DISCONTINUED | OUTPATIENT
Start: 2020-12-26 | End: 2020-12-26

## 2020-12-26 RX ORDER — INSULIN LISPRO 100 [IU]/ML
0-15 INJECTION, SOLUTION INTRAVENOUS; SUBCUTANEOUS
Status: DISCONTINUED | OUTPATIENT
Start: 2020-12-26 | End: 2020-12-28

## 2020-12-26 RX ORDER — INSULIN LISPRO 100 [IU]/ML
0-10 INJECTION, SOLUTION INTRAVENOUS; SUBCUTANEOUS
Status: DISCONTINUED | OUTPATIENT
Start: 2020-12-26 | End: 2020-12-26

## 2020-12-26 RX ADMIN — MEROPENEM 500 MG: 500 INJECTION, POWDER, FOR SOLUTION INTRAVENOUS at 21:58

## 2020-12-26 RX ADMIN — INSULIN LISPRO 6 UNITS: 100 INJECTION, SOLUTION INTRAVENOUS; SUBCUTANEOUS at 16:47

## 2020-12-26 RX ADMIN — MEROPENEM 500 MG: 500 INJECTION, POWDER, FOR SOLUTION INTRAVENOUS at 10:24

## 2020-12-26 RX ADMIN — OXYCODONE HYDROCHLORIDE 10 MG: 10 TABLET, FILM COATED, EXTENDED RELEASE ORAL at 09:16

## 2020-12-26 RX ADMIN — SODIUM CHLORIDE 5 MG/HR: 900 INJECTION, SOLUTION INTRAVENOUS at 10:12

## 2020-12-26 RX ADMIN — MAGNESIUM SULFATE HEPTAHYDRATE 2 G: 40 INJECTION, SOLUTION INTRAVENOUS at 09:19

## 2020-12-26 RX ADMIN — POTASSIUM CHLORIDE 20 MEQ: 20 TABLET, EXTENDED RELEASE ORAL at 09:17

## 2020-12-26 RX ADMIN — INSULIN LISPRO 4 UNITS: 100 INJECTION, SOLUTION INTRAVENOUS; SUBCUTANEOUS at 09:18

## 2020-12-26 RX ADMIN — METOPROLOL TARTRATE 5 MG: 5 INJECTION INTRAVENOUS at 03:46

## 2020-12-26 RX ADMIN — PANTOPRAZOLE SODIUM 40 MG: 40 TABLET, DELAYED RELEASE ORAL at 06:33

## 2020-12-26 RX ADMIN — ACETAMINOPHEN 650 MG: 325 TABLET, FILM COATED ORAL at 10:32

## 2020-12-26 RX ADMIN — Medication 10 ML: at 06:28

## 2020-12-26 RX ADMIN — SODIUM CHLORIDE 100 ML/HR: 900 INJECTION, SOLUTION INTRAVENOUS at 11:31

## 2020-12-26 RX ADMIN — PREGABALIN 150 MG: 75 CAPSULE ORAL at 17:49

## 2020-12-26 RX ADMIN — FUROSEMIDE 20 MG: 40 TABLET ORAL at 17:49

## 2020-12-26 RX ADMIN — SODIUM CHLORIDE 7.5 MG/HR: 900 INJECTION, SOLUTION INTRAVENOUS at 11:30

## 2020-12-26 RX ADMIN — SODIUM CHLORIDE 100 ML/HR: 900 INJECTION, SOLUTION INTRAVENOUS at 07:35

## 2020-12-26 RX ADMIN — OXYCODONE HYDROCHLORIDE AND ACETAMINOPHEN 1 TABLET: 7.5; 325 TABLET ORAL at 16:40

## 2020-12-26 RX ADMIN — APIXABAN 5 MG: 5 TABLET, FILM COATED ORAL at 09:17

## 2020-12-26 RX ADMIN — INSULIN GLARGINE 25 UNITS: 100 INJECTION, SOLUTION SUBCUTANEOUS at 09:17

## 2020-12-26 RX ADMIN — QUETIAPINE FUMARATE 200 MG: 100 TABLET ORAL at 21:57

## 2020-12-26 RX ADMIN — Medication 5 ML: at 22:04

## 2020-12-26 RX ADMIN — OXYCODONE HYDROCHLORIDE 10 MG: 10 TABLET, FILM COATED, EXTENDED RELEASE ORAL at 21:57

## 2020-12-26 RX ADMIN — DULOXETINE HYDROCHLORIDE 30 MG: 30 CAPSULE, DELAYED RELEASE ORAL at 09:16

## 2020-12-26 RX ADMIN — FLUTICASONE PROPIONATE 2 PUFF: 110 AEROSOL, METERED RESPIRATORY (INHALATION) at 20:11

## 2020-12-26 RX ADMIN — INSULIN LISPRO 12 UNITS: 100 INJECTION, SOLUTION INTRAVENOUS; SUBCUTANEOUS at 23:27

## 2020-12-26 RX ADMIN — ESCITALOPRAM OXALATE 20 MG: 10 TABLET ORAL at 09:17

## 2020-12-26 RX ADMIN — INSULIN GLARGINE 30 UNITS: 100 INJECTION, SOLUTION SUBCUTANEOUS at 17:51

## 2020-12-26 RX ADMIN — POTASSIUM CHLORIDE 20 MEQ: 20 TABLET, EXTENDED RELEASE ORAL at 17:49

## 2020-12-26 RX ADMIN — DILTIAZEM HYDROCHLORIDE 10 MG: 5 INJECTION INTRAVENOUS at 10:13

## 2020-12-26 RX ADMIN — APIXABAN 5 MG: 5 TABLET, FILM COATED ORAL at 21:56

## 2020-12-26 RX ADMIN — MEROPENEM 500 MG: 500 INJECTION, POWDER, FOR SOLUTION INTRAVENOUS at 03:28

## 2020-12-26 RX ADMIN — FUROSEMIDE 20 MG: 40 TABLET ORAL at 09:16

## 2020-12-26 RX ADMIN — FAMOTIDINE 20 MG: 20 TABLET, FILM COATED ORAL at 17:49

## 2020-12-26 RX ADMIN — MEROPENEM 500 MG: 500 INJECTION, POWDER, FOR SOLUTION INTRAVENOUS at 16:40

## 2020-12-26 RX ADMIN — PREGABALIN 150 MG: 75 CAPSULE ORAL at 09:16

## 2020-12-26 RX ADMIN — METOPROLOL SUCCINATE 25 MG: 25 TABLET, EXTENDED RELEASE ORAL at 09:17

## 2020-12-26 RX ADMIN — INSULIN LISPRO 15 UNITS: 100 INJECTION, SOLUTION INTRAVENOUS; SUBCUTANEOUS at 12:02

## 2020-12-26 RX ADMIN — Medication 10 ML: at 16:49

## 2020-12-26 RX ADMIN — FLUTICASONE PROPIONATE 2 PUFF: 110 AEROSOL, METERED RESPIRATORY (INHALATION) at 09:28

## 2020-12-26 RX ADMIN — DILTIAZEM HYDROCHLORIDE 240 MG: 240 CAPSULE, COATED, EXTENDED RELEASE ORAL at 16:40

## 2020-12-26 NOTE — PROGRESS NOTES
Telemetry room called reporting pt's heart rate is staying between 140-160. Pt. AAOx4, stating she feels fine. MD made aware, orders received.

## 2020-12-26 NOTE — PROGRESS NOTES
Hospitalist Progress Note    2020  Admit Date: 2020  6:55 PM   NAME: Shannan Carrasco   :  1955   MRN:  [id number]   Attending: Antonio Archer MD  PCP:  Bri Pat MD    HPI/SUBJECTIVE:   88X with pmhx of paraplegia, neurogenic bladder, anxiety/bipolar d/o, colostomy, chronic suprapubic catheter presented with complaint of 2-3 days progressive nausea, fever and chills. Pt lives at [facility]. No cough, shortness of breath, chest pain. Has colostomy and suprapubic catheter in place which was changed in the ER. Admitted for sepsis with UTI.     She was initiall found in afib rvr 150's  but converted to SR during ER stay. : Patient seen and examined, found to be A. fib RVR and not responding to multiple doses of IV metoprolol push. She was given IV diltiazem push and transferred to third floor telemetry for diltiazem drip. Denies any chest pain or shortness of breath. Nursing notes and chart reviewed. Review of Systems negative with exception of pertinent positives noted above. PHYSICAL EXAM     Visit Vitals  /62 (BP 1 Location: Left arm, BP Patient Position: At rest)   Pulse (!) 130   Temp 99.1 °F (37.3 °C)   Resp 19   Ht 5' 5\" (1.651 m)   Wt 90.7 kg (200 lb)   SpO2 91%   BMI 33.28 kg/m²      Temp (24hrs), Av.4 °F (36.9 °C), Min:97.4 °F (36.3 °C), Max:99.1 °F (37.3 °C)    Oxygen Therapy  O2 Sat (%): 91 % (20 0505)  Pulse via Oximetry: 120 beats per minute (20)  O2 Device: Room air (20)  O2 Flow Rate (L/min): 4 l/min (20 0808)    Intake/Output Summary (Last 24 hours) at 2020 0831  Last data filed at 2020 0516  Gross per 24 hour   Intake    Output 3025 ml   Net -3025 ml         General: No acute distress. Alert.  Obese  Head:  AT/NC  Lungs:  CTABL. Heart:  IRIR, no murmur, rub, or gallop  Abdomen: Soft, non-distended, non-tender, +bs, colostomy bag attached and site clean.   Suprapubic   catheter in place.  Extremities: No cyanosis or clubbing. Neurologic:  No focal deficits. Moves all extremities. Skin:  No Obvious Rash  Psych:  Normal affect. LABS AND STUDIES:  Personally reviewed all labs, meds, and studies for past 24hrs. ASSESSMENT      Active Hospital Problems    Diagnosis Date Noted    Sepsis ([place] [address] 12/24/2020    Hyponatremia 12/24/2020    Hypertension 03/10/2019    Diabetes ([place] [address] 03/10/2019    A-fib ([place] [address] 02/19/2019    UTI (urinary tract infection) 01/31/2019    Paraplegia ([place] [address] 12/10/2016    Bipolar disorder without psychotic features ([place] [address] 12/10/2016           PLAN:    · Sepsis/UTI: Continue with antibiotics, ID following. DC Vanco given CONS. · A Fib RVR: on Eliquis, BB and Lasix. Now rate controlled on diltiazem drip. Will order diltiazem tablet and wean off the drip. Monitor on telemetry. · Hyponatremia: Resolved with IV fluid. · Paraplegia/Chronic Pain: Continue home meds OxyContin twice daily. And once as needed. · DM2: On home dose of Lantus and SSI, sugars high and doses adjusted. Trend sugars. · Bipolar: Continue on home meds. · S/p Colostomy/Suprapubic Catheter: Nursing care. · Monitor on third floor telemetry. Full Code  Cardiac diet  Dispo: Pending improvement  High risk patient  DVT ppx: Eliquis  Discussed plan with pt who is in agreement. All questions answered.     Signed By: Kirkwood Merlin, MD     December 26, 2020

## 2020-12-26 NOTE — PROGRESS NOTES
TRANSFER - IN REPORT:    Verbal report received from [facility] on [place] being received from 6th floor for routine progression of care. Report consisted of patients Situation, Background, Assessment and Recommendations(SBAR). Information from the following report(s) SBAR, ED Summary, Procedure Summary, Intake/Output, MAR, Recent Results and Med Rec Status was reviewed. Opportunity for questions and clarification was provided.

## 2020-12-26 NOTE — PROGRESS NOTES
Infectious Disease Progress Note    Today's Date: 2020   Admit Date: 2020    Impression:   · Sepsis POA - w/u so far pointing to possible UTI  · Neurogenic bladder with SPC with abnormal urine in the setting of the above. UC is pending. SPC changed in ED. UC with < 10x5 CFUs of GNRs  · Coagulase negative staph in one of two BCs on admission. .. represents a contaminant  · H/o ESBL, but none recent per records both here and at [address]. Last seems to be ? 2019  · Paraplegia - chronic  · DM II  · Bipolar d/o    Plan:   · Stop Vancomycin today   · Continue merrem and watch GNR in urine  · Await final COVID 19 test result    Anti-infectives:   1. Merrem  -  2. Vanc  -    Subjective:     Chart review only. Patient not examined. Review of Systems:  as above    Objective:     Visit Vitals  /64 (BP 1 Location: Left arm, BP Patient Position: At rest)   Pulse 77   Temp 97.7 °F (36.5 °C)   Resp 20   Ht 5' 5\" (1.651 m)   Wt 90.7 kg (200 lb)   SpO2 92%   BMI 33.28 kg/m²     Temp (24hrs), Av.4 °F (36.9 °C), Min:97.4 °F (36.3 °C), Max:99.1 °F (37.3 °C)      Lines:  Peripheral IV:       Physical Exam:   General:  Alert, cooperative, well noursished, well developed, appears stated age   Eyes:  Sclera anicteric. Pupils equally round and reactive to light. Lungs:   Clear to auscultation bilaterally, good effort   CV:  Regular rate and rhythm,no murmur, click, rub or gallop   Abdomen:   Soft, non-tender. bowel sounds normal. non-distended   Extremities: Chronic changes c/w paraplegia noted. No acute issues.     Skin: Skin color, texture, turgor normal. no acute rash or lesions   Musculoskeletal: No swelling or deformity   Lines/Devices:  Intact, no erythema, drainage or tenderness   Psych: Alert and oriented, normal mood affect given the setting       Data Review:     CBC:   Recent Labs     20  0710 20  0825 20  1043   WBC 10.4 12.4* 17.8*   RBC 4.27 4.49 4.13   HGB 11.4* 12.1 11.0*   HCT 37.4 39.8 36.1    319 362   GRANS 69 69 82*   LYMPH 20 21 11*   EOS 3 2 1     CMP:   Recent Labs     12/26/20  0710 12/25/20  0825 12/24/20  1043   * 189* 240*    141 135*   K 3.4* 3.6 3.5   * 109* 102   CO2 26 26 26   BUN 8 9 17   CREA 0.50* 0.62 0.71   CA 7.7* 8.3 8.3   AGAP 6* 6* 7    114 129   TP 6.0* 7.3 7.1   ALB 2.0* 2.1* 2.3*   GLOB 4.0* 5.2* 4.8*   AGRAT 0.5* 0.4* 0.5*     Urinalysis:   Lab Results   Component Value Date/Time    Color YELLOW 12/23/2020 10:49 PM    Appearance TURBID 12/23/2020 10:49 PM    Specific gravity 1.010 02/14/2019 10:07 PM    pH (UA) 6.0 12/23/2020 10:49 PM    Protein 100 (A) 12/23/2020 10:49 PM    Glucose Negative 12/23/2020 10:49 PM    Ketone Negative 12/23/2020 10:49 PM    Bilirubin Negative 12/23/2020 10:49 PM    Blood LARGE (A) 12/23/2020 10:49 PM    Urobilinogen 1.0 12/23/2020 10:49 PM    Nitrites Positive (A) 12/23/2020 10:49 PM    Leukocyte Esterase LARGE (A) 12/23/2020 10:49 PM    WBC >100 12/23/2020 10:49 PM    RBC  12/23/2020 10:49 PM    Epithelial cells 5-10 12/23/2020 10:49 PM    Bacteria 4+ (H) 12/23/2020 10:49 PM    Casts 3-5 12/23/2020 10:49 PM    Crystals, urine MARKED 06/27/2018 12:25 PM        Microbiology:     All Micro Results     Procedure Component Value Units Date/Time    CULTURE, BLOOD [[id number]] Collected: 12/23/20 2004    Order Status: Completed Specimen: Blood Updated: 12/26/20 0805     Special Requests: --        NO SPECIAL REQUESTS  RIGHT  Antecubital       Culture result: NO GROWTH 2 DAYS       CULTURE, URINE [[id number]]  (Abnormal) Collected: 12/23/20 2330    Order Status: Completed Specimen: Cath Urine Updated: 12/26/20 0754     Special Requests: NO SPECIAL REQUESTS        Culture result:       >100,000 COLONIES/mL GRAM NEGATIVE RODS                  IDENTIFICATION AND SUSCEPTIBILITY TO FOLLOW          CULTURE, BLOOD [[id number]]  (Abnormal) Collected: 12/24/20 0118    Order Status: Completed Specimen: Blood Updated: 12/26/20 0636     Special Requests: --        LEFT  HAND       GRAM STAIN       GRAM POS COCCI IN CLUSTERS            PEDIATRIC BOTTLE               RESULTS VERIFIED, PHONED TO AND READ BACK BY Sepideh Cunningham RN @ 5996 ON 12/25/2020 AK. Culture result:       STAPHYLOCOCCUS SPECIES, COAGULASE NEGATIVE THIS ORGANISM MAY BE INDICATIVE OF CULTURE CONTAMINATION, HOWEVER, CLINICAL CORRELATION NEEDS TO BE EVALUATED, AS EACH CASE IS UNIQUE. REFER TO [employer] PANEL ACC [id number]    BLOOD CULTURE ID PANEL [[id number]]  (Abnormal) Collected: 12/24/20 0118    Order Status: Completed Specimen: Blood Updated: 12/25/20 0828     Acc. no. from Micro Order [id number]     Staphylococcus Detected        Comment: RESULTS VERIFIED, PHONED TO AND READ BACK BY  Sepideh Cunningham RN @ 4963 ON 12/25/20 AK. mecA (Methicillin-Resistance Genes) Detected        Comment: Presence of mecA is highly indicative of methicillin resistance. The test does not replace traditional culture and susceptibilities        INTERPRETATION       Gram positive cocci in clusters. Identified by realtime PCR as  Coagulase negative Staphylococci           Comment: A single positive culture of coagulase negative Staph is likely to be a contaminant in adult patients. Consider discontinuation of antibiotics for gram positive bloodstream infections if patient asymptomatic. THIS TEST DOES NOT REPLACE SENSITIVITY TESTING.        CULTURE, URINE [[id number]]     Order Status: Canceled Specimen: Cath Urine           Imaging:     None new    Signed By: Fifi Grider NP     December 26, 2020

## 2020-12-26 NOTE — PROGRESS NOTES
TRANSFER - OUT REPORT:    Verbal report given to [facility] (name) on Quan Scales  being transferred to 3rd floor(unit) for change in patient condition. Report consisted of patients Situation, Background, Assessment and   Recommendations(SBAR). Information from the following report(s) SBAR was reviewed with the receiving nurse. Lines:   Peripheral IV 12/23/20 Right Antecubital (Active)   Site Assessment Clean, dry, & intact 12/26/20 0408   Phlebitis Assessment 0 12/26/20 0408   Infiltration Assessment 0 12/26/20 0408   Dressing Status Clean, dry, & intact 12/26/20 0408   Dressing Type Tape;Transparent 12/26/20 0408   Hub Color/Line Status Infusing 12/26/20 0408   Alcohol Cap Used No 12/26/20 0408        Opportunity for questions and clarification was provided.

## 2020-12-26 NOTE — PROGRESS NOTES
All hourly rounds/assessments completed per this shift. Pt. Turned and repositioned frequently this shift. Pt heart rhythm running afib and staying around 140, MD made aware, IV metoprolol given twice this shift, with no improvement. New orders placed for Cardizem drip and pt. To be transferred to 3rd floor. All needs met at this time. Bed locked/low. Personal belongings within reach. Call light within reach. Bedside shift report will be given to the oncoming nurse.

## 2020-12-27 LAB
ALBUMIN SERPL-MCNC: 1.9 G/DL (ref 3.2–4.6)
ALBUMIN/GLOB SERPL: 0.4 {RATIO} (ref 1.2–3.5)
ALP SERPL-CCNC: 142 U/L (ref 50–136)
ALT SERPL-CCNC: 17 U/L (ref 12–65)
ANION GAP SERPL CALC-SCNC: 5 MMOL/L (ref 7–16)
AST SERPL-CCNC: 17 U/L (ref 15–37)
BACTERIA SPEC CULT: ABNORMAL
BACTERIA SPEC CULT: ABNORMAL
BASOPHILS # BLD: 0.1 K/UL (ref 0–0.2)
BASOPHILS NFR BLD: 1 % (ref 0–2)
BILIRUB SERPL-MCNC: 0.2 MG/DL (ref 0.2–1.1)
BUN SERPL-MCNC: 9 MG/DL (ref 8–23)
CALCIUM SERPL-MCNC: 8 MG/DL (ref 8.3–10.4)
CHLORIDE SERPL-SCNC: 108 MMOL/L (ref 98–107)
CO2 SERPL-SCNC: 26 MMOL/L (ref 21–32)
CREAT SERPL-MCNC: 0.49 MG/DL (ref 0.6–1)
DIFFERENTIAL METHOD BLD: ABNORMAL
EOSINOPHIL # BLD: 0.4 K/UL (ref 0–0.8)
EOSINOPHIL NFR BLD: 3 % (ref 0.5–7.8)
ERYTHROCYTE [DISTWIDTH] IN BLOOD BY AUTOMATED COUNT: 16.6 % (ref 11.9–14.6)
GLOBULIN SER CALC-MCNC: 4.4 G/DL (ref 2.3–3.5)
GLUCOSE BLD STRIP.AUTO-MCNC: 264 MG/DL (ref 65–100)
GLUCOSE BLD STRIP.AUTO-MCNC: 284 MG/DL (ref 65–100)
GLUCOSE BLD STRIP.AUTO-MCNC: 300 MG/DL (ref 65–100)
GLUCOSE BLD STRIP.AUTO-MCNC: 315 MG/DL (ref 65–100)
GLUCOSE SERPL-MCNC: 247 MG/DL (ref 65–100)
GRAM STN SPEC: ABNORMAL
HCT VFR BLD AUTO: 33 % (ref 35.8–46.3)
HGB BLD-MCNC: 9.6 G/DL (ref 11.7–15.4)
IMM GRANULOCYTES # BLD AUTO: 0.2 K/UL (ref 0–0.5)
IMM GRANULOCYTES NFR BLD AUTO: 2 % (ref 0–5)
LYMPHOCYTES # BLD: 2.8 K/UL (ref 0.5–4.6)
LYMPHOCYTES NFR BLD: 22 % (ref 13–44)
MAGNESIUM SERPL-MCNC: 2 MG/DL (ref 1.8–2.4)
MCH RBC QN AUTO: 26.4 PG (ref 26.1–32.9)
MCHC RBC AUTO-ENTMCNC: 29.1 G/DL (ref 31.4–35)
MCV RBC AUTO: 90.9 FL (ref 79.6–97.8)
MONOCYTES # BLD: 0.8 K/UL (ref 0.1–1.3)
MONOCYTES NFR BLD: 6 % (ref 4–12)
NEUTS SEG # BLD: 8.7 K/UL (ref 1.7–8.2)
NEUTS SEG NFR BLD: 67 % (ref 43–78)
NRBC # BLD: 0 K/UL (ref 0–0.2)
PLATELET # BLD AUTO: 388 K/UL (ref 150–450)
PMV BLD AUTO: 10.3 FL (ref 9.4–12.3)
POTASSIUM SERPL-SCNC: 3.8 MMOL/L (ref 3.5–5.1)
PROT SERPL-MCNC: 6.3 G/DL (ref 6.3–8.2)
RBC # BLD AUTO: 3.63 M/UL (ref 4.05–5.2)
SERVICE CMNT-IMP: ABNORMAL
SODIUM SERPL-SCNC: 139 MMOL/L (ref 136–145)
WBC # BLD AUTO: 13 K/UL (ref 4.3–11.1)

## 2020-12-27 PROCEDURE — 94760 N-INVAS EAR/PLS OXIMETRY 1: CPT

## 2020-12-27 PROCEDURE — 82962 GLUCOSE BLOOD TEST: CPT

## 2020-12-27 PROCEDURE — 83735 ASSAY OF MAGNESIUM: CPT

## 2020-12-27 PROCEDURE — 74011250637 HC RX REV CODE- 250/637: Performed by: INTERNAL MEDICINE

## 2020-12-27 PROCEDURE — 74011636637 HC RX REV CODE- 636/637: Performed by: INTERNAL MEDICINE

## 2020-12-27 PROCEDURE — 94640 AIRWAY INHALATION TREATMENT: CPT

## 2020-12-27 PROCEDURE — 36415 COLL VENOUS BLD VENIPUNCTURE: CPT

## 2020-12-27 PROCEDURE — 74011000258 HC RX REV CODE- 258: Performed by: INTERNAL MEDICINE

## 2020-12-27 PROCEDURE — 80053 COMPREHEN METABOLIC PANEL: CPT

## 2020-12-27 PROCEDURE — 85025 COMPLETE CBC W/AUTO DIFF WBC: CPT

## 2020-12-27 PROCEDURE — 74011250636 HC RX REV CODE- 250/636: Performed by: INTERNAL MEDICINE

## 2020-12-27 PROCEDURE — 65660000000 HC RM CCU STEPDOWN

## 2020-12-27 PROCEDURE — 2709999900 HC NON-CHARGEABLE SUPPLY

## 2020-12-27 RX ORDER — DOCUSATE SODIUM 100 MG/1
100 CAPSULE, LIQUID FILLED ORAL DAILY
Status: DISCONTINUED | OUTPATIENT
Start: 2020-12-28 | End: 2021-01-01 | Stop reason: HOSPADM

## 2020-12-27 RX ORDER — INSULIN GLARGINE 100 [IU]/ML
40 INJECTION, SOLUTION SUBCUTANEOUS 2 TIMES DAILY
Status: DISCONTINUED | OUTPATIENT
Start: 2020-12-27 | End: 2020-12-30

## 2020-12-27 RX ADMIN — METOPROLOL SUCCINATE 25 MG: 25 TABLET, EXTENDED RELEASE ORAL at 08:10

## 2020-12-27 RX ADMIN — INSULIN LISPRO 12 UNITS: 100 INJECTION, SOLUTION INTRAVENOUS; SUBCUTANEOUS at 11:44

## 2020-12-27 RX ADMIN — INSULIN LISPRO 9 UNITS: 100 INJECTION, SOLUTION INTRAVENOUS; SUBCUTANEOUS at 22:57

## 2020-12-27 RX ADMIN — POTASSIUM CHLORIDE 20 MEQ: 20 TABLET, EXTENDED RELEASE ORAL at 17:09

## 2020-12-27 RX ADMIN — MEROPENEM 500 MG: 500 INJECTION, POWDER, FOR SOLUTION INTRAVENOUS at 22:52

## 2020-12-27 RX ADMIN — OXYCODONE HYDROCHLORIDE AND ACETAMINOPHEN 1 TABLET: 7.5; 325 TABLET ORAL at 14:15

## 2020-12-27 RX ADMIN — INSULIN GLARGINE 40 UNITS: 100 INJECTION, SOLUTION SUBCUTANEOUS at 17:11

## 2020-12-27 RX ADMIN — FUROSEMIDE 20 MG: 40 TABLET ORAL at 17:10

## 2020-12-27 RX ADMIN — Medication 10 ML: at 16:39

## 2020-12-27 RX ADMIN — QUETIAPINE FUMARATE 200 MG: 100 TABLET ORAL at 20:35

## 2020-12-27 RX ADMIN — FLUTICASONE PROPIONATE 2 PUFF: 110 AEROSOL, METERED RESPIRATORY (INHALATION) at 19:22

## 2020-12-27 RX ADMIN — INSULIN LISPRO 12 UNITS: 100 INJECTION, SOLUTION INTRAVENOUS; SUBCUTANEOUS at 16:44

## 2020-12-27 RX ADMIN — APIXABAN 5 MG: 5 TABLET, FILM COATED ORAL at 08:11

## 2020-12-27 RX ADMIN — POTASSIUM CHLORIDE 20 MEQ: 20 TABLET, EXTENDED RELEASE ORAL at 08:12

## 2020-12-27 RX ADMIN — MEROPENEM 500 MG: 500 INJECTION, POWDER, FOR SOLUTION INTRAVENOUS at 04:09

## 2020-12-27 RX ADMIN — SODIUM CHLORIDE 100 ML/HR: 900 INJECTION, SOLUTION INTRAVENOUS at 05:29

## 2020-12-27 RX ADMIN — DULOXETINE HYDROCHLORIDE 30 MG: 30 CAPSULE, DELAYED RELEASE ORAL at 08:11

## 2020-12-27 RX ADMIN — DILTIAZEM HYDROCHLORIDE 240 MG: 240 CAPSULE, COATED, EXTENDED RELEASE ORAL at 08:11

## 2020-12-27 RX ADMIN — MEROPENEM 500 MG: 500 INJECTION, POWDER, FOR SOLUTION INTRAVENOUS at 16:36

## 2020-12-27 RX ADMIN — ESCITALOPRAM OXALATE 20 MG: 10 TABLET ORAL at 08:11

## 2020-12-27 RX ADMIN — PREGABALIN 150 MG: 75 CAPSULE ORAL at 08:11

## 2020-12-27 RX ADMIN — PREGABALIN 150 MG: 75 CAPSULE ORAL at 17:09

## 2020-12-27 RX ADMIN — FUROSEMIDE 20 MG: 40 TABLET ORAL at 08:11

## 2020-12-27 RX ADMIN — Medication 10 ML: at 04:18

## 2020-12-27 RX ADMIN — FAMOTIDINE 20 MG: 20 TABLET, FILM COATED ORAL at 08:11

## 2020-12-27 RX ADMIN — INSULIN LISPRO 9 UNITS: 100 INJECTION, SOLUTION INTRAVENOUS; SUBCUTANEOUS at 08:12

## 2020-12-27 RX ADMIN — Medication 10 ML: at 22:53

## 2020-12-27 RX ADMIN — MEROPENEM 500 MG: 500 INJECTION, POWDER, FOR SOLUTION INTRAVENOUS at 10:00

## 2020-12-27 RX ADMIN — INSULIN GLARGINE 40 UNITS: 100 INJECTION, SOLUTION SUBCUTANEOUS at 08:44

## 2020-12-27 RX ADMIN — APIXABAN 5 MG: 5 TABLET, FILM COATED ORAL at 20:35

## 2020-12-27 RX ADMIN — FAMOTIDINE 20 MG: 20 TABLET, FILM COATED ORAL at 17:09

## 2020-12-27 RX ADMIN — OXYCODONE HYDROCHLORIDE 10 MG: 10 TABLET, FILM COATED, EXTENDED RELEASE ORAL at 08:11

## 2020-12-27 RX ADMIN — FLUTICASONE PROPIONATE 2 PUFF: 110 AEROSOL, METERED RESPIRATORY (INHALATION) at 07:37

## 2020-12-27 RX ADMIN — OXYCODONE HYDROCHLORIDE 10 MG: 10 TABLET, FILM COATED, EXTENDED RELEASE ORAL at 20:35

## 2020-12-27 NOTE — ROUTINE PROCESS
Bedside and Verbal shift change report given to self (oncoming nurse) by  Ganesh Peters RN (offgoing nurse). Report included the following information SBAR, Kardex, Intake/Output, MAR, and Recent Results.

## 2020-12-27 NOTE — PROGRESS NOTES
Primary RN and multiple other RN's have tried to place an IV on Pt. With no success.  PICC team called for assistance in placing IV

## 2020-12-27 NOTE — ROUTINE PROCESS
Bedside and Verbal [facility] Report included the following information SBAR, Kardex, Intake/Output, MAR and Recent Results.

## 2020-12-27 NOTE — ROUTINE PROCESS
Bedside and Verbal shift change report given to Tadeo Perdomo RN (oncoming nurse) by self Elvin sloan). Report included the following information SBAR, Kardex, Intake/Output, MAR, and Recent Results.

## 2020-12-27 NOTE — PROGRESS NOTES
Hospitalist Progress Note    2020  Admit Date: 2020  6:55 PM   NAME: Annetta Figueredo   :  1955   MRN:  [id number]   Attending: Maged Neumann MD  PCP:  Ana M Simpson MD    HPI/SUBJECTIVE:   25C with pmhx of paraplegia, neurogenic bladder, anxiety/bipolar d/o, colostomy, chronic suprapubic catheter presented with complaint of 2-3 days progressive nausea, fever and chills. Pt lives at [facility]. No cough, shortness of breath, chest pain. Has colostomy and suprapubic catheter in place which was changed in the ER. Admitted for sepsis with UTI.     She was found to be in afib rvr 150's, but converted to SR during ER stay. : Patient seen and examined, remains in A. Fib, rate controlled, off dilt drip, No IV access. Denies any chest pain or shortness of breath. Reports multiple bumps on her skin. Nursing notes and chart reviewed. Review of Systems negative with exception of pertinent positives noted above. PHYSICAL EXAM     Visit Vitals  /64   Pulse 75   Temp 98.7 °F (37.1 °C)   Resp 16   Ht 5' 5\" (1.651 m)   Wt 117.4 kg (258 lb 14.4 oz)   SpO2 90%   BMI 43.08 kg/m²      Temp (24hrs), Av.1 °F (36.7 °C), Min:97.1 °F (36.2 °C), Max:98.8 °F (37.1 °C)    Oxygen Therapy  O2 Sat (%): 90 % (20 0739)  Pulse via Oximetry: 80 beats per minute (20 0739)  O2 Device: Room air (20 0739)  O2 Flow Rate (L/min): 4 l/min (20 5354)    Intake/Output Summary (Last 24 hours) at 2020 0806  Last data filed at 2020 0418  Gross per 24 hour   Intake 460 ml   Output 1450 ml   Net -990 ml         General: No acute distress. Alert.  Obese  Head:  AT/NC  Lungs:  CTABL. Heart:  IRIR, no murmur, rub, or gallop  Abdomen: Soft, non-distended, non-tender, +bs, colostomy bag attached and site clean. Suprapubic   catheter in place. Extremities: No cyanosis or clubbing. Neurologic:  No focal deficits. Moves all extremities.   Skin:  No Obvious Rash, multiple small round scabs on skin all over. Psych:  Normal affect. LABS AND STUDIES:  Personally reviewed all labs, meds, and studies for past 24hrs. ASSESSMENT      Active Hospital Problems    Diagnosis Date Noted    Sepsis ([place] [address] 12/24/2020    Hyponatremia 12/24/2020    Hypertension 03/10/2019    Diabetes ([place] [address] 03/10/2019    A-fib ([place] [address] 02/19/2019    UTI (urinary tract infection) 01/31/2019    Paraplegia ([place] [address] 12/10/2016    Bipolar disorder without psychotic features ([place] [address] 12/10/2016           PLAN:    · COVID 19 Pending. · Sepsis/UTI: Polymicrobial with ESBL E. coli and Proteus mirabilis, continue with Merrem, ID following. · A Fib RVR: on Eliquis, BB and Lasix. Off diltiazem drip, Started on diltiazem ER 240mg Cap and Monitor on telemetry. · Hyponatremia: Resolved with IV fluid. · Paraplegia/Chronic Pain: Continue home meds OxyContin twice daily. And once as needed. · DM2: On home dose of Lantus and SSI, sugars high and doses adjusted. Trend sugars. · Bipolar: Continue on home meds. · S/p Colostomy/Suprapubic Catheter: Nursing care. Full Code  Cardiac diet  Dispo: Pending improvement  High risk patient  DVT ppx: Eliquis  Discussed plan with pt who is in agreement. All questions answered.     Signed By: Alvarado Peterson MD     December 27, 2020

## 2020-12-27 NOTE — PROGRESS NOTES
Problem: Risk for Spread of Infection  Goal: Prevent transmission of infectious organism to others  Description: Prevent the transmission of infectious organisms to other patients, staff members, and visitors. Outcome: Progressing Towards Goal     Problem: Falls - Risk of  Goal: *Absence of Falls  Description: Document Mamie Marie Fall Risk and appropriate interventions in the flowsheet. Outcome: Progressing Towards Goal  Note: Fall Risk Interventions:  Mobility Interventions: Bed/chair exit alarm, Communicate number of staff needed for ambulation/transfer    Mentation Interventions: Bed/chair exit alarm, Adequate sleep, hydration, pain control, Door open when patient unattended, Reorient patient, More frequent rounding    Medication Interventions: Patient to call before getting OOB, Teach patient to arise slowly    Elimination Interventions: Bed/chair exit alarm, Call light in reach, Toileting schedule/hourly rounds              Problem: Pressure Injury - Risk of  Goal: *Prevention of pressure injury  Description: Document Ras Scale and appropriate interventions in the flowsheet.   Outcome: Progressing Towards Goal  Note: Pressure Injury Interventions:  Sensory Interventions: Assess changes in LOC, Minimize linen layers, Maintain/enhance activity level, Keep linens dry and wrinkle-free, Assess need for specialty bed, Pressure redistribution bed/mattress (bed type)    Moisture Interventions: Absorbent underpads, Assess need for specialty bed, Internal/External urinary devices, Internal/External fecal devices, Minimize layers    Activity Interventions: Pressure redistribution bed/mattress(bed type), Increase time out of bed    Mobility Interventions: Pressure redistribution bed/mattress (bed type), HOB 30 degrees or less    Nutrition Interventions: Document food/fluid/supplement intake    Friction and Shear Interventions: Foam dressings/transparent film/skin sealants, HOB 30 degrees or less                Problem: Urinary Tract Infection - Adult  Goal: *Absence of infection signs and symptoms  Outcome: Progressing Towards Goal     Problem: Diabetes Self-Management  Goal: *Disease process and treatment process  Description: Define diabetes and identify own type of diabetes; list 3 options for treating diabetes. Outcome: Progressing Towards Goal  Goal: *Incorporating nutritional management into lifestyle  Description: Describe effect of type, amount and timing of food on blood glucose; list 3 methods for planning meals. Outcome: Progressing Towards Goal  Goal: *Incorporating physical activity into lifestyle  Description: State effect of exercise on blood glucose levels. Outcome: Progressing Towards Goal  Goal: *Developing strategies to promote health/change behavior  Description: Define the ABC's of diabetes; identify appropriate screenings, schedule and personal plan for screenings. Outcome: Progressing Towards Goal  Goal: *Using medications safely  Description: State effect of diabetes medications on diabetes; name diabetes medication taking, action and side effects. Outcome: Progressing Towards Goal  Goal: *Monitoring blood glucose, interpreting and using results  Description: Identify recommended blood glucose targets  and personal targets. Outcome: Progressing Towards Goal  Goal: *Prevention, detection, treatment of acute complications  Description: List symptoms of hyper- and hypoglycemia; describe how to treat low blood sugar and actions for lowering  high blood glucose level. Outcome: Progressing Towards Goal  Goal: *Prevention, detection and treatment of chronic complications  Description: Define the natural course of diabetes and describe the relationship of blood glucose levels to long term complications of diabetes.   Outcome: Progressing Towards Goal  Goal: *Developing strategies to address psychosocial issues  Description: Describe feelings about living with diabetes; identify support needed and support network  Outcome: Progressing Towards Goal  Goal: *Insulin pump training  Outcome: Progressing Towards Goal  Goal: *Sick day guidelines  Outcome: Progressing Towards Goal

## 2020-12-28 LAB
ALBUMIN SERPL-MCNC: 2.2 G/DL (ref 3.2–4.6)
ALBUMIN/GLOB SERPL: 0.4 {RATIO} (ref 1.2–3.5)
ALP SERPL-CCNC: 141 U/L (ref 50–136)
ALT SERPL-CCNC: 17 U/L (ref 12–65)
ANION GAP SERPL CALC-SCNC: 6 MMOL/L (ref 7–16)
AST SERPL-CCNC: 17 U/L (ref 15–37)
BACTERIA SPEC CULT: ABNORMAL
BASOPHILS # BLD: 0.1 K/UL (ref 0–0.2)
BASOPHILS NFR BLD: 1 % (ref 0–2)
BILIRUB SERPL-MCNC: 0.3 MG/DL (ref 0.2–1.1)
BUN SERPL-MCNC: 10 MG/DL (ref 8–23)
CALCIUM SERPL-MCNC: 8.5 MG/DL (ref 8.3–10.4)
CHLORIDE SERPL-SCNC: 102 MMOL/L (ref 98–107)
CO2 SERPL-SCNC: 30 MMOL/L (ref 21–32)
CREAT SERPL-MCNC: 0.61 MG/DL (ref 0.6–1)
DIFFERENTIAL METHOD BLD: ABNORMAL
EOSINOPHIL # BLD: 0.5 K/UL (ref 0–0.8)
EOSINOPHIL NFR BLD: 4 % (ref 0.5–7.8)
ERYTHROCYTE [DISTWIDTH] IN BLOOD BY AUTOMATED COUNT: 16.3 % (ref 11.9–14.6)
GLOBULIN SER CALC-MCNC: 4.9 G/DL (ref 2.3–3.5)
GLUCOSE BLD STRIP.AUTO-MCNC: 135 MG/DL (ref 65–100)
GLUCOSE BLD STRIP.AUTO-MCNC: 258 MG/DL (ref 65–100)
GLUCOSE BLD STRIP.AUTO-MCNC: 270 MG/DL (ref 65–100)
GLUCOSE BLD STRIP.AUTO-MCNC: 301 MG/DL (ref 65–100)
GLUCOSE SERPL-MCNC: 279 MG/DL (ref 65–100)
HCT VFR BLD AUTO: 33.9 % (ref 35.8–46.3)
HGB BLD-MCNC: 10.5 G/DL (ref 11.7–15.4)
IMM GRANULOCYTES # BLD AUTO: 0.5 K/UL (ref 0–0.5)
IMM GRANULOCYTES NFR BLD AUTO: 4 % (ref 0–5)
LYMPHOCYTES # BLD: 3.1 K/UL (ref 0.5–4.6)
LYMPHOCYTES NFR BLD: 24 % (ref 13–44)
MCH RBC QN AUTO: 26.8 PG (ref 26.1–32.9)
MCHC RBC AUTO-ENTMCNC: 31 G/DL (ref 31.4–35)
MCV RBC AUTO: 86.5 FL (ref 79.6–97.8)
MONOCYTES # BLD: 0.8 K/UL (ref 0.1–1.3)
MONOCYTES NFR BLD: 6 % (ref 4–12)
NEUTS SEG # BLD: 8.1 K/UL (ref 1.7–8.2)
NEUTS SEG NFR BLD: 63 % (ref 43–78)
NRBC # BLD: 0 K/UL (ref 0–0.2)
PLATELET # BLD AUTO: 468 K/UL (ref 150–450)
PMV BLD AUTO: 10.3 FL (ref 9.4–12.3)
POTASSIUM SERPL-SCNC: 4 MMOL/L (ref 3.5–5.1)
PROT SERPL-MCNC: 7.1 G/DL (ref 6.3–8.2)
RBC # BLD AUTO: 3.92 M/UL (ref 4.05–5.2)
SERVICE CMNT-IMP: ABNORMAL
SODIUM SERPL-SCNC: 138 MMOL/L (ref 136–145)
WBC # BLD AUTO: 12.9 K/UL (ref 4.3–11.1)

## 2020-12-28 PROCEDURE — 74011250636 HC RX REV CODE- 250/636: Performed by: FAMILY MEDICINE

## 2020-12-28 PROCEDURE — 36415 COLL VENOUS BLD VENIPUNCTURE: CPT

## 2020-12-28 PROCEDURE — 99222 1ST HOSP IP/OBS MODERATE 55: CPT | Performed by: INTERNAL MEDICINE

## 2020-12-28 PROCEDURE — 2709999900 HC NON-CHARGEABLE SUPPLY

## 2020-12-28 PROCEDURE — 85025 COMPLETE CBC W/AUTO DIFF WBC: CPT

## 2020-12-28 PROCEDURE — 82962 GLUCOSE BLOOD TEST: CPT

## 2020-12-28 PROCEDURE — 74011250637 HC RX REV CODE- 250/637: Performed by: INTERNAL MEDICINE

## 2020-12-28 PROCEDURE — 65660000000 HC RM CCU STEPDOWN

## 2020-12-28 PROCEDURE — 74011000258 HC RX REV CODE- 258: Performed by: FAMILY MEDICINE

## 2020-12-28 PROCEDURE — 74011636637 HC RX REV CODE- 636/637: Performed by: INTERNAL MEDICINE

## 2020-12-28 PROCEDURE — 80053 COMPREHEN METABOLIC PANEL: CPT

## 2020-12-28 PROCEDURE — 94640 AIRWAY INHALATION TREATMENT: CPT

## 2020-12-28 PROCEDURE — 74011250636 HC RX REV CODE- 250/636: Performed by: INTERNAL MEDICINE

## 2020-12-28 PROCEDURE — 74011000258 HC RX REV CODE- 258: Performed by: INTERNAL MEDICINE

## 2020-12-28 RX ORDER — INSULIN LISPRO 100 [IU]/ML
INJECTION, SOLUTION INTRAVENOUS; SUBCUTANEOUS
Status: DISCONTINUED | OUTPATIENT
Start: 2020-12-28 | End: 2021-01-01 | Stop reason: HOSPADM

## 2020-12-28 RX ORDER — INSULIN LISPRO 100 [IU]/ML
5 INJECTION, SOLUTION INTRAVENOUS; SUBCUTANEOUS
Status: DISCONTINUED | OUTPATIENT
Start: 2020-12-28 | End: 2020-12-30

## 2020-12-28 RX ADMIN — FAMOTIDINE 20 MG: 20 TABLET, FILM COATED ORAL at 17:46

## 2020-12-28 RX ADMIN — FLUTICASONE PROPIONATE 2 PUFF: 110 AEROSOL, METERED RESPIRATORY (INHALATION) at 07:18

## 2020-12-28 RX ADMIN — FAMOTIDINE 20 MG: 20 TABLET, FILM COATED ORAL at 08:25

## 2020-12-28 RX ADMIN — ACETAMINOPHEN 650 MG: 325 TABLET, FILM COATED ORAL at 13:48

## 2020-12-28 RX ADMIN — MEROPENEM 500 MG: 500 INJECTION, POWDER, FOR SOLUTION INTRAVENOUS at 16:37

## 2020-12-28 RX ADMIN — INSULIN LISPRO 8 UNITS: 100 INJECTION, SOLUTION INTRAVENOUS; SUBCUTANEOUS at 16:39

## 2020-12-28 RX ADMIN — Medication 5 ML: at 04:42

## 2020-12-28 RX ADMIN — INSULIN LISPRO 5 UNITS: 100 INJECTION, SOLUTION INTRAVENOUS; SUBCUTANEOUS at 16:37

## 2020-12-28 RX ADMIN — DULOXETINE HYDROCHLORIDE 30 MG: 30 CAPSULE, DELAYED RELEASE ORAL at 08:25

## 2020-12-28 RX ADMIN — INSULIN LISPRO 5 UNITS: 100 INJECTION, SOLUTION INTRAVENOUS; SUBCUTANEOUS at 12:00

## 2020-12-28 RX ADMIN — SODIUM CHLORIDE 15 MG/HR: 900 INJECTION, SOLUTION INTRAVENOUS at 12:20

## 2020-12-28 RX ADMIN — SODIUM CHLORIDE 5 MG/HR: 900 INJECTION, SOLUTION INTRAVENOUS at 04:40

## 2020-12-28 RX ADMIN — OXYCODONE HYDROCHLORIDE 10 MG: 10 TABLET, FILM COATED, EXTENDED RELEASE ORAL at 08:25

## 2020-12-28 RX ADMIN — POTASSIUM CHLORIDE 20 MEQ: 20 TABLET, EXTENDED RELEASE ORAL at 17:46

## 2020-12-28 RX ADMIN — FUROSEMIDE 20 MG: 40 TABLET ORAL at 08:26

## 2020-12-28 RX ADMIN — INSULIN LISPRO 9 UNITS: 100 INJECTION, SOLUTION INTRAVENOUS; SUBCUTANEOUS at 07:53

## 2020-12-28 RX ADMIN — POTASSIUM CHLORIDE 20 MEQ: 20 TABLET, EXTENDED RELEASE ORAL at 08:25

## 2020-12-28 RX ADMIN — APIXABAN 5 MG: 5 TABLET, FILM COATED ORAL at 22:00

## 2020-12-28 RX ADMIN — Medication 10 ML: at 22:37

## 2020-12-28 RX ADMIN — METOPROLOL SUCCINATE 25 MG: 25 TABLET, EXTENDED RELEASE ORAL at 08:25

## 2020-12-28 RX ADMIN — INSULIN LISPRO 5 UNITS: 100 INJECTION, SOLUTION INTRAVENOUS; SUBCUTANEOUS at 08:24

## 2020-12-28 RX ADMIN — INSULIN LISPRO 6 UNITS: 100 INJECTION, SOLUTION INTRAVENOUS; SUBCUTANEOUS at 22:36

## 2020-12-28 RX ADMIN — MEROPENEM 500 MG: 500 INJECTION, POWDER, FOR SOLUTION INTRAVENOUS at 04:00

## 2020-12-28 RX ADMIN — OXYCODONE HYDROCHLORIDE AND ACETAMINOPHEN 1 TABLET: 7.5; 325 TABLET ORAL at 16:37

## 2020-12-28 RX ADMIN — PREGABALIN 150 MG: 75 CAPSULE ORAL at 08:25

## 2020-12-28 RX ADMIN — PREGABALIN 150 MG: 75 CAPSULE ORAL at 17:46

## 2020-12-28 RX ADMIN — QUETIAPINE FUMARATE 200 MG: 100 TABLET ORAL at 22:00

## 2020-12-28 RX ADMIN — INSULIN GLARGINE 40 UNITS: 100 INJECTION, SOLUTION SUBCUTANEOUS at 08:24

## 2020-12-28 RX ADMIN — APIXABAN 5 MG: 5 TABLET, FILM COATED ORAL at 08:25

## 2020-12-28 RX ADMIN — ESCITALOPRAM OXALATE 20 MG: 10 TABLET ORAL at 08:25

## 2020-12-28 RX ADMIN — MEROPENEM 500 MG: 500 INJECTION, POWDER, FOR SOLUTION INTRAVENOUS at 22:37

## 2020-12-28 RX ADMIN — OXYCODONE HYDROCHLORIDE 10 MG: 10 TABLET, FILM COATED, EXTENDED RELEASE ORAL at 22:00

## 2020-12-28 RX ADMIN — MEROPENEM 500 MG: 500 INJECTION, POWDER, FOR SOLUTION INTRAVENOUS at 11:10

## 2020-12-28 RX ADMIN — INSULIN GLARGINE 40 UNITS: 100 INJECTION, SOLUTION SUBCUTANEOUS at 17:46

## 2020-12-28 RX ADMIN — FUROSEMIDE 20 MG: 40 TABLET ORAL at 17:46

## 2020-12-28 RX ADMIN — DOCUSATE SODIUM 100 MG: 100 CAPSULE ORAL at 08:25

## 2020-12-28 RX ADMIN — DILTIAZEM HYDROCHLORIDE 240 MG: 240 CAPSULE, COATED, EXTENDED RELEASE ORAL at 08:25

## 2020-12-28 NOTE — ROUTINE PROCESS
Bedside and Verbal shift change report to be given to Scar Dowd RN (oncoming nurse) by self (offgoing nurse). Report included the following information SBAR, Kardex, Intake/Output, MAR and Recent Results. Cardizem gtt verified with second RN.

## 2020-12-28 NOTE — CONSULTS
[facility] Cardiology Consult                Date of  Admission: 12/23/2020  6:55 PM     Primary Care Physician: Ismael Gupta MD  Primary Cardiologist: VLAD  Referring Physician: Hospitalist   Consulting Physician: Dr. Sheryl Cuenca    CC/Reason for consult :Tanda Parents RVR      Noé Fisher is a 60 y.o. female with prior h/o DM II, PAF, prior DVT, HTN, paraplegia with chronic supra pubic cath due to neurogenic bladder, colostomy, bipolar disorder, anxiety, chronic pain on opiate and xanax and chronic hepatitis C. Patient presented to Ed at [facility] with c/o progressive nausea, fever, and chills. Patient lives at [facility]. In ED, she was found to be in A. Fib RVR rates 150s. In ED, labs showed WBC 19K, hgb 14.7, Na 124, Chloride 91, BUN 25, Cr 1.06, ALT 20, , lactic acid 2.5, procal 0.77, CXR non acute, UA - nitrite pos, LE large, WBC >100, 4+ bacteria. Suprapubic jasso was exchanged in the ER per MD. Hospitalist admitted with sepsis/UTI. She was started on IV abx. Her covid testing is pending and her Blood cultures are positive. . Her labs today showed , WBC improved to 12.9. Currently appears in atrial fibrillation with rates ranging from about . Difficult historian. Appears some history of noncompliance with prior antiarrhythmic therapy but patient states her atrial fibrillation medications were stopped by her provider at [facility]. Appears has been maintained on Eliquis as an outpatient. Not very active at baseline.     Patient Active Problem List   Diagnosis Code    Hyperglycemia due to type 2 diabetes mellitus ([address] E11.65    Hypokalemia E87.6    Paraplegia (HCC) G82.20    Bipolar disorder without psychotic features ([address] F31.9    Chronic hepatitis C virus infection ([facility] B18.2    Narcotic addiction ([facility] F11.20    UTI (urinary tract infection) N39.0    Leukocytosis D72.829    Chronic midline low back pain without sciatica M54.5, G89.29    Atrial fibrillation with RVR ([facility]) I48.91   • A-fib (HCC) I48.91   • Diabetes (HCC) E11.9   • Hypertension I10   • Hypomagnesemia E83.42   • DKA (diabetic ketoacidoses) (HCC) E11.10   • Abdominal pain R10.9   • SIRS without acute organ dysfunction due to non-infectious process (HCC) R65.10   • Sepsis (HCC) A41.9   • Hyponatremia E87.1       Past Medical History:   Diagnosis Date   • Diabetes (HCC)    • Gastrointestinal disorder     GERD   • Hypertension    • Ill-defined condition     neurogenic bladder   • Ill-defined condition     transverse myelitis   • Ill-defined condition     paraplegia   • Liver disease     Hepatitis C   • Psychiatric disorder     anxiety   • Psychiatric disorder     bipolar   • Thromboembolus (HCC)       Past Surgical History:   Procedure Laterality Date   • HX HYSTERECTOMY       No Known Allergies   No family history on file.     Current Facility-Administered Medications   Medication Dose Route Frequency   • insulin lispro (HUMALOG) injection 5 Units  5 Units SubCUTAneous TIDAC   • insulin glargine (LANTUS) injection 40 Units  40 Units SubCUTAneous BID   • docusate sodium (COLACE) capsule 100 mg  100 mg Oral DAILY   • dilTIAZem (CARDIZEM) 100 mg in 0.9% sodium chloride (MBP/ADV) 100 mL infusion  0-15 mg/hr IntraVENous TITRATE   • potassium chloride (K-DUR, KLOR-CON) SR tablet 20 mEq  20 mEq Oral BID   • alum-mag hydroxide-simeth (MYLANTA) oral suspension 30 mL  30 mL Oral Q4H PRN   • insulin lispro (HUMALOG) injection 0-15 Units  0-15 Units SubCUTAneous AC&HS   • famotidine (PEPCID) tablet 20 mg  20 mg Oral BID   • oxyCODONE-acetaminophen (PERCOCET 7.5) 7.5-325 mg per tablet 1 Tab  1 Tab Oral DAILY PRN   • dilTIAZem ER (CARDIZEM CD) capsule 240 mg  240 mg Oral DAILY   • fluticasone (FLOVENT HFA) 110 mcg inhaler  2 Puff Inhalation BID RT   • albuterol (PROVENTIL HFA, VENTOLIN HFA, PROAIR HFA) inhaler 1 Puff  1 Puff Inhalation Q4H PRN   • ALPRAZolam (XANAX) tablet 0.5 mg  0.5 mg Oral BID PRN   • apixaban (ELIQUIS) tablet 5 mg  5 mg  Oral BID    sodium chloride (NS) flush 5-40 mL  5-40 mL IntraVENous Q8H    sodium chloride (NS) flush 5-40 mL  5-40 mL IntraVENous PRN    acetaminophen (TYLENOL) tablet 650 mg  650 mg Oral Q6H PRN    Or    acetaminophen (TYLENOL) suppository 650 mg  650 mg Rectal Q6H PRN    polyethylene glycol (MIRALAX) packet 17 g  17 g Oral DAILY PRN    promethazine (PHENERGAN) tablet 25 mg  25 mg Oral Q6H PRN    0.9% sodium chloride infusion  100 mL/hr IntraVENous CONTINUOUS    metoprolol succinate (TOPROL-XL) XL tablet 25 mg  25 mg Oral DAILY    QUEtiapine (SEROquel) tablet 200 mg  200 mg Oral QHS    pregabalin (LYRICA) capsule 150 mg  150 mg Oral BID    oxyCODONE ER (OxyCONTIN) tablet 10 mg  10 mg Oral Q12H    escitalopram oxalate (LEXAPRO) tablet 20 mg  20 mg Oral DAILY    furosemide (LASIX) tablet 20 mg  20 mg Oral BID    DULoxetine (CYMBALTA) capsule 30 mg  30 mg Oral DAILY    meropenem (MERREM) 500 mg in 0.9% sodium chloride (MBP/ADV) 50 mL MBP  0.5 g IntraVENous Q6H       Review of Systems   Constitution: Positive for chills and fever. Negative for diaphoresis and malaise/fatigue. HENT: Negative for congestion. Cardiovascular: Negative for chest pain, claudication, cyanosis, dyspnea on exertion, irregular heartbeat, leg swelling, near-syncope, orthopnea, palpitations, paroxysmal nocturnal dyspnea and syncope. Respiratory: Negative for cough, shortness of breath and wheezing. Endocrine: Negative for cold intolerance and heat intolerance. Hematologic/Lymphatic: Does not bruise/bleed easily. Skin: Negative for nail changes. Gastrointestinal: Positive for nausea. Neurological: Negative for dizziness, headaches and weakness.         Physical Exam  Vitals:    12/27/20 2140 12/28/20 0110 12/28/20 0504 12/28/20 0506   BP: 120/62 105/70 (!) 111/91    Pulse: 79 88 (!) 140    Resp: 16 16 20    Temp: 98.8 °F (37.1 °C) 97.8 °F (36.6 °C) 96.8 °F (36 °C)    SpO2: 92% 95% 95%    Weight:    116.6 kg (257 lb 1.6 oz)   Height:           Physical Exam:  General: Well Developed, Well Nourished, No Acute Distress  HEENT: pupils equal and round, no abnormalities noted  Neck: supple, no JVD, no carotid bruits  Heart: S1S2 irregular   Lungs: Clear throughout auscultation bilaterally without adventitious sounds  Abd: soft, nontender, nondistended, with good bowel sounds  Ext: warm, tr edema, calves supple/nontender, pulses 2+ bilaterally  Skin: warm and dry  Psychiatric: Normal mood and affect  Neurologic: Alert and oriented X 3    Cardiographics    Telemetry: A. Fib rates 90-110s  ECG: Atrial fibrillation with rapid ventricular response   Non-specific ST-t wave changes   Echocardiogram: April 2019 showed -  Left ventricle: Systolic function was normal. Ejection fraction was  estimated in the range of 60 % to 65 %. Elevated gradients noted in the LV apex during systole, suggesting hyperdynamic apical walls. There were no regional wall motion abnormalities. -  Left atrium: The atrium was mildly dilated. Labs:   Recent Labs     12/28/20  0349 12/27/20  0519    139   K 4.0 3.8   MG  --  2.0   BUN 10 9   CREA 0.61 0.49*   * 247*   WBC 12.9* 13.0*   HGB 10.5* 9.6*   HCT 33.9* 33.0*   * 388        Assessment/Plan:     Assessment:      Principal Problem:    Sepsis ([address] (12/24/2020)- per primary team and ID; on IV abx. Covid pending. Active Problems:    Paraplegia ([address] (12/10/2016)      Bipolar disorder without psychotic features ([address] (12/10/2016)      UTI (urinary tract infection) (1/31/2019)- see above. A-fib ([address] (2/19/2019)-PAF- likely triggered by acute illness. Continue home med eliquis. Diabetes ([address] (3/10/2019)      Hypertension (3/10/2019)      Hyponatremia (12/24/2020)- resolved     Thank you very much for this referral. We appreciate the opportunity to participate in this patient's care. We will follow along with above stated plan.     Jyoti Granado NP  Consulting MD:  Antonio Friend    I have personally seen and examined patient and agree with above assessment. I agree and confirm with findings with additional details/exceptions as listed below:    Prior history of paroxysmal atrial fibrillation. Seen in the past and appears paroxysmal is triggered by recurrent issues with UTI. Attempted Multaq in the past but cost issues and appears was started on flecainide. However uncertain regarding compliance and patient states her antiarrhythmics were stopped by her provider at [facility]. Also with prior history of DVT, paraplegia with chronic suprapubic catheter due to neurogenic bladder, chronic hepatitis C. Patient presented to the ED with progressive nausea/fever/chills. Patient was admitted with sepsis/UTI with management per hospital medicine. 1 out of 2 blood cultures positive suggesting likely contaminant. Currently with mostly controlled rates. Uncertain duration of atrial fibrillation. Appears flecainide was stopped per providers at [facility]. Defer restarting at this time. Likely triggered by acute underlying issues. Would expect rate/rhythm to improve as clinically improved. Continue Eliquis. Currently on Cardizem CD/low-dose Toprol-XL and can increase if needed. Reassess consideration for rhythm control if needed as an outpatient. No significant symptoms at this time. Last noted echo with preserved EF and mild left atrial enlargement from 4/2019. Defer antibiotics to primary team/ID. Further recommendations pending clinical course.     Bisi Enrique MD  12/28/2020

## 2020-12-28 NOTE — PROGRESS NOTES
Pt converted back to A-Fib. Rate 120-140. Keira Sutton NP notified. Orders received for cardizem gtt. Will continue to monitor patient.

## 2020-12-28 NOTE — PROGRESS NOTES
Patient is currently from [facility]. Patient is currently on a ten day bed hold and need to return back by 1/1/21. CM will continue to monitor and remain available for any needs or concerns that may occur.

## 2020-12-28 NOTE — PROGRESS NOTES
Hospitalist Progress Note    2020  Admit Date: 2020  6:55 PM   NAME: Radha Espinal   :  1955   MRN:  [id number]   Attending: Romana Donning, MD  PCP:  Sami Varela MD    HPI/SUBJECTIVE:   22Y with pmhx of paraplegia, neurogenic bladder, anxiety/bipolar d/o, colostomy, chronic suprapubic catheter presented with complaint of 2-3 days progressive nausea, fever and chills. Pt lives at [facility]. No cough, shortness of breath, chest pain. Has colostomy and suprapubic catheter in place which was changed in the ER. Admitted for sepsis with UTI.     She was found to be in afib rvr 150's, but converted to SR during ER stay. : Patient back in A. Fib RVR, restarted dilt drip, Denies any chest pain or shortness of breath. Nursing notes and chart reviewed. Review of Systems negative with exception of pertinent positives noted above. PHYSICAL EXAM     Visit Vitals  BP (!) 111/91   Pulse (!) 140 Comment: nurse was notified   Temp 96.8 °F (36 °C)   Resp 20   Ht 5' 5\" (1.651 m)   Wt 116.6 kg (257 lb 1.6 oz)   SpO2 95%   BMI 42.78 kg/m²      Temp (24hrs), Av.4 °F (36.9 °C), Min:96.8 °F (36 °C), Max:99.6 °F (37.6 °C)    Oxygen Therapy  O2 Sat (%): 95 % (20 0504)  Pulse via Oximetry: 79 beats per minute (20 1922)  O2 Device: Room air (20 0504)  O2 Flow Rate (L/min): 4 l/min (20 0808)    Intake/Output Summary (Last 24 hours) at 2020 0751  Last data filed at 2020 0600  Gross per 24 hour   Intake 230 ml   Output 5200 ml   Net -4970 ml         General: No acute distress. Alert.  Obese  Head:  AT/NC  Lungs:  CTABL. Heart:  IRIR, no murmur, rub, or gallop  Abdomen: Soft, non-distended, non-tender, +bs, colostomy bag attached and site clean. Suprapubic   catheter in place. Extremities: No cyanosis or clubbing. Neurologic:  No focal deficits. Moves all extremities. Skin:  No Obvious Rash, multiple small round scabs on skin all over.    Psych:  Normal affect. LABS AND STUDIES:  Personally reviewed all labs, meds, and studies for past 24hrs. ASSESSMENT      Active Hospital Problems    Diagnosis Date Noted    Sepsis ([facility] 12/24/2020    Hyponatremia 12/24/2020    Hypertension 03/10/2019    Diabetes ([facility] 03/10/2019    A-fib ([facility] 02/19/2019    UTI (urinary tract infection) 01/31/2019    Paraplegia ([facility] 12/10/2016    Bipolar disorder without psychotic features ([facility] 12/10/2016           PLAN:    · COVID 19 still Pending. · Sepsis/UTI: Polymicrobial with ESBL E. coli and Proteus mirabilis, continue with Merrem, ID following. · A Fib RVR: on Eliquis, BB and Lasix. restarted diltiazem drip, and Started 12/27 on diltiazem ER 240mg Cap. Monitor on telemetry. Consult cardiology given pt going back and forth in A fib rvr. Electrolytes at goal.   · Hyponatremia: Resolved with IV fluid. · Paraplegia/Chronic Pain: Continue home meds OxyContin twice daily. And once as needed. · DM2: On home dose of Lantus and SSI, sugars high and doses adjusted. Added premeal 5u on 12/28. Trend sugars. · Bipolar: Continue on home meds. · S/p Colostomy/Suprapubic Catheter: Nursing care. · Skin lesions: Derm eval as outpt. Full Code  Cardiac diet  Dispo: Pending improvement  High risk patient  DVT ppx: Eliquis  Discussed plan with pt who is in agreement. All questions answered.     Signed By: Oli Booker MD     December 28, 2020

## 2020-12-28 NOTE — PROGRESS NOTES
Infectious Disease Progress Note    Today's Date: 2020   Admit Date: 2020    Impression:   · Sepsis POA - w/u so far pointing to possible UTI  · Neurogenic bladder with suprapubic catheter with abnormal urine in the setting of the above. UC  with > 100K ESBL E. Coli and >100K pan S Proteus mirabilis. SPC changed in ED. UC with < 10x5 CFUs of GNRs  · Coagulase negative staph in one of two BCs on admission. .. represents a contaminant  · H/o ESBL, but none recent per records both here and at [facility]. Last seems to be ? 2019  · Paraplegia - chronic  · DM II  · Bipolar d/o    Plan:   · Recommend 10 day course with Carbapenem, no bacteremia but catheter related  infection; day 6 today. Can continue with meropenem thru 21 or  Use Invanz 1 gm IV daily if becomes outpatient. Would likely need PICC either way for access but needs to be removed once course complete. · Covid neative on . · ID will sign off; please call if need further assistance. Anti-infectives:   1. Merrem  -  2. Vanc  -    Subjective:   Afebrile, WBC 12.9; no left shift. Review of Systems:  as above    Objective:     Visit Vitals  BP (!) 159/70 (BP 1 Location: Left arm, BP Patient Position: At rest)   Pulse (!) 109   Temp 97.5 °F (36.4 °C)   Resp 18   Ht 5' 5\" (1.651 m)   Wt 116.6 kg (257 lb 1.6 oz)   SpO2 93%   BMI 42.78 kg/m²     Temp (24hrs), Av.3 °F (36.8 °C), Min:96.8 °F (36 °C), Max:99.6 °F (37.6 °C)      Lines:  Peripheral IV:       Physical Exam:   General:  Alert, cooperative, well noursished, well developed, appears stated age   Eyes:  Sclera anicteric. Pupils equally round and reactive to light. Lungs:   Clear to auscultation bilaterally, good effort   CV:  Regular rate and rhythm,no murmur, click, rub or gallop   Abdomen:   Soft, non-tender. bowel sounds normal. non-distended   Extremities: Chronic changes c/w paraplegia noted. No acute issues.     Skin: Skin color, texture, turgor normal. no acute rash or lesions   Musculoskeletal: No swelling or deformity   Lines/Devices:  Intact, no erythema, drainage or tenderness   Psych: Alert and oriented, normal mood affect given the setting       Data Review:     CBC:   Recent Labs     12/28/20 0349 12/27/20  0519 12/26/20  0710   WBC 12.9* 13.0* 10.4   RBC 3.92* 3.63* 4.27   HGB 10.5* 9.6* 11.4*   HCT 33.9* 33.0* 37.4   * 388 312   GRANS 63 67 69   LYMPH 24 22 20   EOS 4 3 3     CMP:   Recent Labs     12/28/20 0349 12/27/20  0519 12/26/20  0710   * 247* 193*    139 140   K 4.0 3.8 3.4*    108* 108*   CO2 30 26 26   BUN 10 9 8   CREA 0.61 0.49* 0.50*   CA 8.5 8.0* 7.7*   AGAP 6* 5* 6*   * 142* 115   TP 7.1 6.3 6.0*   ALB 2.2* 1.9* 2.0*   GLOB 4.9* 4.4* 4.0*   AGRAT 0.4* 0.4* 0.5*     Urinalysis:   Lab Results   Component Value Date/Time    Color YELLOW 12/23/2020 10:49 PM    Appearance TURBID 12/23/2020 10:49 PM    Specific gravity 1.010 02/14/2019 10:07 PM    pH (UA) 6.0 12/23/2020 10:49 PM    Protein 100 (A) 12/23/2020 10:49 PM    Glucose Negative 12/23/2020 10:49 PM    Ketone Negative 12/23/2020 10:49 PM    Bilirubin Negative 12/23/2020 10:49 PM    Blood LARGE (A) 12/23/2020 10:49 PM    Urobilinogen 1.0 12/23/2020 10:49 PM    Nitrites Positive (A) 12/23/2020 10:49 PM    Leukocyte Esterase LARGE (A) 12/23/2020 10:49 PM    WBC >100 12/23/2020 10:49 PM    RBC  12/23/2020 10:49 PM    Epithelial cells 5-10 12/23/2020 10:49 PM    Bacteria 4+ (H) 12/23/2020 10:49 PM    Casts 3-5 12/23/2020 10:49 PM    Crystals, urine MARKED 06/27/2018 12:25 PM        Microbiology:     All Micro Results     Procedure Component Value Units Date/Time    CULTURE, URINE [[id number]]  (Abnormal)  (Susceptibility) Collected: 12/23/20 2330    Order Status: Completed Specimen: Cath Urine Updated: 12/28/20 0800     Special Requests: NO SPECIAL REQUESTS        Culture result:       >100,000 COLONIES/mL ESCHERICHIA COLI ** (EXTENDED SPECTRUM BETA LACTAMASE ) **                  >100,000 COLONIES/mL PROTEUS MIRABILIS                  ** MULTI-DRUG RESISTANT ORGANISM **                  RESULTS VERIFIED, PHONED TO AND READ BACK BY  CONSTANCE ANDERSON RN ON 12/27/20 @0705, ADS      CULTURE, BLOOD [[id number]] Collected: 12/23/20 2004    Order Status: Completed Specimen: Blood Updated: 12/27/20 0700     Special Requests: --        NO SPECIAL REQUESTS  RIGHT  Antecubital       Culture result: NO GROWTH 3 DAYS       CULTURE, BLOOD [[id number]]  (Abnormal) Collected: 12/24/20 0118    Order Status: Completed Specimen: Blood Updated: 12/27/20 0623     Special Requests: --        LEFT  HAND       GRAM STAIN       GRAM POS COCCI IN CLUSTERS            PEDIATRIC BOTTLE               RESULTS VERIFIED, PHONED TO AND READ BACK BY YNES GONZALEZ RN @ 0809 ON 12/25/2020 AK.           Culture result:       STAPHYLOCOCCUS SPECIES, COAGULASE NEGATIVE THIS ORGANISM MAY BE INDICATIVE OF CULTURE CONTAMINATION, HOWEVER, CLINICAL CORRELATION NEEDS TO BE EVALUATED, AS EACH CASE IS UNIQUE.            REFER TO [employer] PANEL ACC NO.[id number]    BLOOD CULTURE ID PANEL [[id number]]  (Abnormal) Collected: 12/24/20 0118    Order Status: Completed Specimen: Blood Updated: 12/25/20 0828     Acc. no. from Micro Order [id number]     Staphylococcus Detected        Comment: RESULTS VERIFIED, PHONED TO AND READ BACK BY  YNSE GONZALEZ RN @ 0809 ON 12/25/20 AK.          mecA (Methicillin-Resistance Genes) Detected        Comment: Presence of mecA is highly indicative of methicillin resistance.   The test does not replace traditional culture and susceptibilities        INTERPRETATION       Gram positive cocci in clusters. Identified by realtime PCR as  Coagulase negative Staphylococci           Comment: A single positive culture of coagulase negative Staph is likely to be a contaminant in adult patients. Consider discontinuation of antibiotics for gram positive bloodstream  infections if patient asymptomatic. THIS TEST DOES NOT REPLACE SENSITIVITY TESTING.        CULTURE, URINE [[id number]]     Order Status: Canceled Specimen: Cath Urine           Imaging:     None new    Signed By: Mackenzie Castrejon MD     December 28, 2020

## 2020-12-29 LAB
ALBUMIN SERPL-MCNC: 1.9 G/DL (ref 3.2–4.6)
ALBUMIN/GLOB SERPL: 0.4 {RATIO} (ref 1.2–3.5)
ALP SERPL-CCNC: 138 U/L (ref 50–136)
ALT SERPL-CCNC: 19 U/L (ref 12–65)
ANION GAP SERPL CALC-SCNC: 6 MMOL/L (ref 7–16)
AST SERPL-CCNC: 20 U/L (ref 15–37)
BACTERIA SPEC CULT: NORMAL
BASOPHILS # BLD: 0.1 K/UL (ref 0–0.2)
BASOPHILS NFR BLD: 1 % (ref 0–2)
BILIRUB SERPL-MCNC: 0.3 MG/DL (ref 0.2–1.1)
BUN SERPL-MCNC: 13 MG/DL (ref 8–23)
CALCIUM SERPL-MCNC: 8.5 MG/DL (ref 8.3–10.4)
CHLORIDE SERPL-SCNC: 99 MMOL/L (ref 98–107)
CO2 SERPL-SCNC: 31 MMOL/L (ref 21–32)
COVID-19 RAPID TEST, COVR: NOT DETECTED
CREAT SERPL-MCNC: 0.74 MG/DL (ref 0.6–1)
DIFFERENTIAL METHOD BLD: ABNORMAL
EOSINOPHIL # BLD: 0.4 K/UL (ref 0–0.8)
EOSINOPHIL NFR BLD: 3 % (ref 0.5–7.8)
ERYTHROCYTE [DISTWIDTH] IN BLOOD BY AUTOMATED COUNT: 16.5 % (ref 11.9–14.6)
GLOBULIN SER CALC-MCNC: 4.9 G/DL (ref 2.3–3.5)
GLUCOSE BLD STRIP.AUTO-MCNC: 246 MG/DL (ref 65–100)
GLUCOSE BLD STRIP.AUTO-MCNC: 323 MG/DL (ref 65–100)
GLUCOSE BLD STRIP.AUTO-MCNC: 329 MG/DL (ref 65–100)
GLUCOSE BLD STRIP.AUTO-MCNC: 330 MG/DL (ref 65–100)
GLUCOSE SERPL-MCNC: 367 MG/DL (ref 65–100)
HCT VFR BLD AUTO: 34.5 % (ref 35.8–46.3)
HGB BLD-MCNC: 10.5 G/DL (ref 11.7–15.4)
IMM GRANULOCYTES # BLD AUTO: 0.6 K/UL (ref 0–0.5)
IMM GRANULOCYTES NFR BLD AUTO: 4 % (ref 0–5)
LYMPHOCYTES # BLD: 3 K/UL (ref 0.5–4.6)
LYMPHOCYTES NFR BLD: 22 % (ref 13–44)
MCH RBC QN AUTO: 26.8 PG (ref 26.1–32.9)
MCHC RBC AUTO-ENTMCNC: 30.4 G/DL (ref 31.4–35)
MCV RBC AUTO: 88 FL (ref 79.6–97.8)
MONOCYTES # BLD: 0.9 K/UL (ref 0.1–1.3)
MONOCYTES NFR BLD: 7 % (ref 4–12)
NEUTS SEG # BLD: 8.4 K/UL (ref 1.7–8.2)
NEUTS SEG NFR BLD: 63 % (ref 43–78)
NRBC # BLD: 0 K/UL (ref 0–0.2)
PLATELET # BLD AUTO: 532 K/UL (ref 150–450)
PMV BLD AUTO: 10.4 FL (ref 9.4–12.3)
POTASSIUM SERPL-SCNC: 3.6 MMOL/L (ref 3.5–5.1)
PROT SERPL-MCNC: 6.8 G/DL (ref 6.3–8.2)
RBC # BLD AUTO: 3.92 M/UL (ref 4.05–5.2)
SERVICE CMNT-IMP: NORMAL
SODIUM SERPL-SCNC: 136 MMOL/L (ref 136–145)
SOURCE, COVRS: NORMAL
WBC # BLD AUTO: 13.4 K/UL (ref 4.3–11.1)

## 2020-12-29 PROCEDURE — 74011250637 HC RX REV CODE- 250/637: Performed by: INTERNAL MEDICINE

## 2020-12-29 PROCEDURE — 94640 AIRWAY INHALATION TREATMENT: CPT

## 2020-12-29 PROCEDURE — 74011250636 HC RX REV CODE- 250/636: Performed by: INTERNAL MEDICINE

## 2020-12-29 PROCEDURE — 74011250636 HC RX REV CODE- 250/636: Performed by: FAMILY MEDICINE

## 2020-12-29 PROCEDURE — 74011000258 HC RX REV CODE- 258: Performed by: FAMILY MEDICINE

## 2020-12-29 PROCEDURE — 74011636637 HC RX REV CODE- 636/637: Performed by: INTERNAL MEDICINE

## 2020-12-29 PROCEDURE — 82962 GLUCOSE BLOOD TEST: CPT

## 2020-12-29 PROCEDURE — 94760 N-INVAS EAR/PLS OXIMETRY 1: CPT

## 2020-12-29 PROCEDURE — 74011000258 HC RX REV CODE- 258: Performed by: INTERNAL MEDICINE

## 2020-12-29 PROCEDURE — 65660000000 HC RM CCU STEPDOWN

## 2020-12-29 PROCEDURE — 85025 COMPLETE CBC W/AUTO DIFF WBC: CPT

## 2020-12-29 PROCEDURE — 36415 COLL VENOUS BLD VENIPUNCTURE: CPT

## 2020-12-29 PROCEDURE — 80053 COMPREHEN METABOLIC PANEL: CPT

## 2020-12-29 RX ADMIN — DILTIAZEM HYDROCHLORIDE 240 MG: 240 CAPSULE, COATED, EXTENDED RELEASE ORAL at 08:43

## 2020-12-29 RX ADMIN — INSULIN LISPRO 8 UNITS: 100 INJECTION, SOLUTION INTRAVENOUS; SUBCUTANEOUS at 08:44

## 2020-12-29 RX ADMIN — PREGABALIN 150 MG: 75 CAPSULE ORAL at 16:39

## 2020-12-29 RX ADMIN — ACETAMINOPHEN 650 MG: 325 TABLET, FILM COATED ORAL at 00:18

## 2020-12-29 RX ADMIN — Medication 10 ML: at 04:28

## 2020-12-29 RX ADMIN — MEROPENEM 500 MG: 500 INJECTION, POWDER, FOR SOLUTION INTRAVENOUS at 22:02

## 2020-12-29 RX ADMIN — MEROPENEM 500 MG: 500 INJECTION, POWDER, FOR SOLUTION INTRAVENOUS at 16:39

## 2020-12-29 RX ADMIN — QUETIAPINE FUMARATE 200 MG: 100 TABLET ORAL at 22:00

## 2020-12-29 RX ADMIN — APIXABAN 5 MG: 5 TABLET, FILM COATED ORAL at 08:43

## 2020-12-29 RX ADMIN — FAMOTIDINE 20 MG: 20 TABLET, FILM COATED ORAL at 08:43

## 2020-12-29 RX ADMIN — ESCITALOPRAM OXALATE 20 MG: 10 TABLET ORAL at 08:43

## 2020-12-29 RX ADMIN — POTASSIUM CHLORIDE 20 MEQ: 20 TABLET, EXTENDED RELEASE ORAL at 16:40

## 2020-12-29 RX ADMIN — FUROSEMIDE 20 MG: 40 TABLET ORAL at 16:40

## 2020-12-29 RX ADMIN — INSULIN LISPRO 8 UNITS: 100 INJECTION, SOLUTION INTRAVENOUS; SUBCUTANEOUS at 16:41

## 2020-12-29 RX ADMIN — PREGABALIN 150 MG: 75 CAPSULE ORAL at 08:43

## 2020-12-29 RX ADMIN — INSULIN GLARGINE 40 UNITS: 100 INJECTION, SOLUTION SUBCUTANEOUS at 16:41

## 2020-12-29 RX ADMIN — INSULIN LISPRO 5 UNITS: 100 INJECTION, SOLUTION INTRAVENOUS; SUBCUTANEOUS at 16:41

## 2020-12-29 RX ADMIN — MEROPENEM 500 MG: 500 INJECTION, POWDER, FOR SOLUTION INTRAVENOUS at 08:55

## 2020-12-29 RX ADMIN — OXYCODONE HYDROCHLORIDE 10 MG: 10 TABLET, FILM COATED, EXTENDED RELEASE ORAL at 22:00

## 2020-12-29 RX ADMIN — DULOXETINE HYDROCHLORIDE 30 MG: 30 CAPSULE, DELAYED RELEASE ORAL at 08:43

## 2020-12-29 RX ADMIN — INSULIN LISPRO 5 UNITS: 100 INJECTION, SOLUTION INTRAVENOUS; SUBCUTANEOUS at 12:02

## 2020-12-29 RX ADMIN — MEROPENEM 500 MG: 500 INJECTION, POWDER, FOR SOLUTION INTRAVENOUS at 04:23

## 2020-12-29 RX ADMIN — FAMOTIDINE 20 MG: 20 TABLET, FILM COATED ORAL at 16:40

## 2020-12-29 RX ADMIN — INSULIN LISPRO 4 UNITS: 100 INJECTION, SOLUTION INTRAVENOUS; SUBCUTANEOUS at 12:02

## 2020-12-29 RX ADMIN — INSULIN GLARGINE 40 UNITS: 100 INJECTION, SOLUTION SUBCUTANEOUS at 08:43

## 2020-12-29 RX ADMIN — SODIUM CHLORIDE 5 MG/HR: 900 INJECTION, SOLUTION INTRAVENOUS at 05:09

## 2020-12-29 RX ADMIN — FUROSEMIDE 20 MG: 40 TABLET ORAL at 08:43

## 2020-12-29 RX ADMIN — INSULIN LISPRO 5 UNITS: 100 INJECTION, SOLUTION INTRAVENOUS; SUBCUTANEOUS at 08:44

## 2020-12-29 RX ADMIN — DOCUSATE SODIUM 100 MG: 100 CAPSULE ORAL at 08:43

## 2020-12-29 RX ADMIN — OXYCODONE HYDROCHLORIDE 10 MG: 10 TABLET, FILM COATED, EXTENDED RELEASE ORAL at 08:43

## 2020-12-29 RX ADMIN — Medication 10 ML: at 22:02

## 2020-12-29 RX ADMIN — POTASSIUM CHLORIDE 20 MEQ: 20 TABLET, EXTENDED RELEASE ORAL at 08:43

## 2020-12-29 RX ADMIN — FLUTICASONE PROPIONATE 2 PUFF: 110 AEROSOL, METERED RESPIRATORY (INHALATION) at 20:20

## 2020-12-29 RX ADMIN — INSULIN LISPRO 8 UNITS: 100 INJECTION, SOLUTION INTRAVENOUS; SUBCUTANEOUS at 22:02

## 2020-12-29 RX ADMIN — METOPROLOL SUCCINATE 25 MG: 25 TABLET, EXTENDED RELEASE ORAL at 08:43

## 2020-12-29 RX ADMIN — APIXABAN 5 MG: 5 TABLET, FILM COATED ORAL at 22:00

## 2020-12-29 RX ADMIN — OXYCODONE HYDROCHLORIDE AND ACETAMINOPHEN 1 TABLET: 7.5; 325 TABLET ORAL at 11:58

## 2020-12-29 NOTE — ROUTINE PROCESS
Bedside and Verbal shift change report received from Felipe Bobby, [facility] (offgoing nurse) to self (oncoming nurse). Report included the following information SBAR, Kardex, Intake/Output, MAR, Recent Results, and Cardiac Rhythm NSR.

## 2020-12-29 NOTE — PROGRESS NOTES
CARDIOLOGY    - patient converted to sinus rhythm from atrial fibrillation   - sepsis treatment ongoing per medicine   - on droplet precautions  - hemodynamically and electrically stable   - continue Eliquis and AV harriet blockers , titrate as tolerated by HR/BP   - call with questions.

## 2020-12-29 NOTE — ROUTINE PROCESS
Bedside and Verbal shift change report given to Ruy Thakur RN (oncoming nurse) by self (offgoing nurse). Report included the following information SBAR, Kardex, Intake/Output, MAR and Recent Results.

## 2020-12-29 NOTE — PROGRESS NOTES
Patient just converted from A-fib to NSR. Rate currently staying in the 70's. Cardizem decreased from 15 down to 5. Will continue to monitor patient.

## 2020-12-29 NOTE — ROUTINE PROCESS
Bedside and Verbal shift change report to be received from [address]. Report included the following information SBAR, Kardex, Intake/Output, MAR and Recent Results.

## 2020-12-29 NOTE — PROGRESS NOTES
Hospitalist Note     Admit Date:  2020  6:55 PM   Name:  Jhoan Posada   Age:  60 y.o.  :  1955   MRN:  [id number]   PCP:  Joby Kelly MD  Treatment Team: Attending Provider: Juwan Christensen MD; Consulting Provider: Sarah Franks MD; Care Manager: Terese Mclean; Primary Nurse: Steve Mistry; Care Manager: Sherry Alcocer    HPI/Subjective:   Ms. Carolynn Devlin is a nice 59 y/o WF with a h/o paraplegia, neurogenic bladder, anxiety/bipolar, chronic suprapubic catheter who was admitted on  with sepsis and AFib RVR. RVR resolved. Urine culture with ESBL E. Coli, on meropenem. ID consulted. Developed AFib again and Cardiology consulted, converted. : Doesn't feel well, mostly just weak and probably hr chronic illnesses. Converted back to NSR. Dilt drip off. ROS neg otherwise. No other complaints  Objective:     Patient Vitals for the past 24 hrs:   Temp Pulse Resp BP SpO2   20 1141 98.5 °F (36.9 °C) 78 18 132/69 92 %   20 0809 97.6 °F (36.4 °C) 78 18 123/79 96 %   20 0436 97 °F (36.1 °C) 75 20 (!) 110/58 93 %   20 0029 97.9 °F (36.6 °C) 86 19 114/61 96 %   20 2103 97.6 °F (36.4 °C) 94 16 117/62 95 %     Oxygen Therapy  O2 Sat (%): 92 % (20 1141)  Pulse via Oximetry: 79 beats per minute (20 1922)  O2 Device: Room air (20 0840)  O2 Flow Rate (L/min): 4 l/min (20 0808)    Estimated body mass index is 43.17 kg/m² as calculated from the following:    Height as of this encounter: 5' 5\" (1.651 m). Weight as of this encounter: 117.7 kg (259 lb 6.4 oz). Intake/Output Summary (Last 24 hours) at 2020 1231  Last data filed at 2020 1501  Gross per 24 hour   Intake 440 ml   Output 2750 ml   Net -2310 ml       *Note that automatically entered I/Os may not be accurate; dependent on patient compliance with collection and accurate  by techs. General:    Well nourished. No overt distress. Obese.  Chronically ill appearing. CV:   RRR. No m/r/g. No edema. No JVD. Lungs:   CTAB. No wheezing, rhonchi, or rales. Unlabored  Abdomen:   Soft, nontender, nondistended. Extremities: Warm and dry. No cyanosis   Skin:     No rashes. No jaundice. Normal coloration  Neuro:  No gross focal deficits. Alert    Data Reviewed:  I have reviewed all labs, meds, and studies from the last 24 hours:  Recent Results (from the past 24 hour(s))   GLUCOSE, POC    Collection Time: 12/28/20  4:13 PM   Result Value Ref Range    Glucose (POC) 301 (H) 65 - 100 mg/dL   GLUCOSE, POC    Collection Time: 12/28/20  9:25 PM   Result Value Ref Range    Glucose (POC) 270 (H) 65 - 100 mg/dL   CBC WITH AUTOMATED DIFF    Collection Time: 12/29/20  4:36 AM   Result Value Ref Range    WBC 13.4 (H) 4.3 - 11.1 K/uL    RBC 3.92 (L) 4.05 - 5.2 M/uL    HGB 10.5 (L) 11.7 - 15.4 g/dL    HCT 34.5 (L) 35.8 - 46.3 %    MCV 88.0 79.6 - 97.8 FL    MCH 26.8 26.1 - 32.9 PG    MCHC 30.4 (L) 31.4 - 35.0 g/dL    RDW 16.5 (H) 11.9 - 14.6 %    PLATELET 214 (H) 590 - 450 K/uL    MPV 10.4 9.4 - 12.3 FL    ABSOLUTE NRBC 0.00 0.0 - 0.2 K/uL    DF AUTOMATED      NEUTROPHILS 63 43 - 78 %    LYMPHOCYTES 22 13 - 44 %    MONOCYTES 7 4.0 - 12.0 %    EOSINOPHILS 3 0.5 - 7.8 %    BASOPHILS 1 0.0 - 2.0 %    IMMATURE GRANULOCYTES 4 0.0 - 5.0 %    ABS. NEUTROPHILS 8.4 (H) 1.7 - 8.2 K/UL    ABS. LYMPHOCYTES 3.0 0.5 - 4.6 K/UL    ABS. MONOCYTES 0.9 0.1 - 1.3 K/UL    ABS. EOSINOPHILS 0.4 0.0 - 0.8 K/UL    ABS. BASOPHILS 0.1 0.0 - 0.2 K/UL    ABS. IMM.  GRANS. 0.6 (H) 0.0 - 0.5 K/UL   METABOLIC PANEL, COMPREHENSIVE    Collection Time: 12/29/20  4:36 AM   Result Value Ref Range    Sodium 136 136 - 145 mmol/L    Potassium 3.6 3.5 - 5.1 mmol/L    Chloride 99 98 - 107 mmol/L    CO2 31 21 - 32 mmol/L    Anion gap 6 (L) 7 - 16 mmol/L    Glucose 367 (H) 65 - 100 mg/dL    BUN 13 8 - 23 MG/DL    Creatinine 0.74 0.6 - 1.0 MG/DL    GFR est AA >60 >60 ml/min/1.73m2    GFR est non-AA >60 >60 ml/min/1.73m2 Calcium 8.5 8.3 - 10.4 MG/DL    Bilirubin, total 0.3 0.2 - 1.1 MG/DL    ALT (SGPT) 19 12 - 65 U/L    AST (SGOT) 20 15 - 37 U/L    Alk.  phosphatase 138 (H) 50 - 136 U/L    Protein, total 6.8 6.3 - 8.2 g/dL    Albumin 1.9 (L) 3.2 - 4.6 g/dL    Globulin 4.9 (H) 2.3 - 3.5 g/dL    A-G Ratio 0.4 (L) 1.2 - 3.5     GLUCOSE, POC    Collection Time: 12/29/20  6:03 AM   Result Value Ref Range    Glucose (POC) 329 (H) 65 - 100 mg/dL   GLUCOSE, POC    Collection Time: 12/29/20 10:52 AM   Result Value Ref Range    Glucose (POC) 246 (H) 65 - 100 mg/dL       Current Meds:  Current Facility-Administered Medications   Medication Dose Route Frequency    insulin lispro (HUMALOG) injection 5 Units  5 Units SubCUTAneous TIDAC    insulin lispro (HUMALOG) injection   SubCUTAneous AC&HS    insulin glargine (LANTUS) injection 40 Units  40 Units SubCUTAneous BID    docusate sodium (COLACE) capsule 100 mg  100 mg Oral DAILY    dilTIAZem (CARDIZEM) 100 mg in 0.9% sodium chloride (MBP/ADV) 100 mL infusion  0-15 mg/hr IntraVENous TITRATE    potassium chloride (K-DUR, KLOR-CON) SR tablet 20 mEq  20 mEq Oral BID    alum-mag hydroxide-simeth (MYLANTA) oral suspension 30 mL  30 mL Oral Q4H PRN    famotidine (PEPCID) tablet 20 mg  20 mg Oral BID    oxyCODONE-acetaminophen (PERCOCET 7.5) 7.5-325 mg per tablet 1 Tab  1 Tab Oral DAILY PRN    dilTIAZem ER (CARDIZEM CD) capsule 240 mg  240 mg Oral DAILY    fluticasone (FLOVENT HFA) 110 mcg inhaler  2 Puff Inhalation BID RT    albuterol (PROVENTIL HFA, VENTOLIN HFA, PROAIR HFA) inhaler 1 Puff  1 Puff Inhalation Q4H PRN    ALPRAZolam (XANAX) tablet 0.5 mg  0.5 mg Oral BID PRN    apixaban (ELIQUIS) tablet 5 mg  5 mg Oral BID    sodium chloride (NS) flush 5-40 mL  5-40 mL IntraVENous Q8H    sodium chloride (NS) flush 5-40 mL  5-40 mL IntraVENous PRN    acetaminophen (TYLENOL) tablet 650 mg  650 mg Oral Q6H PRN    Or    acetaminophen (TYLENOL) suppository 650 mg  650 mg Rectal Q6H PRN    polyethylene glycol (MIRALAX) packet 17 g  17 g Oral DAILY PRN    promethazine (PHENERGAN) tablet 25 mg  25 mg Oral Q6H PRN    0.9% sodium chloride infusion  100 mL/hr IntraVENous CONTINUOUS    metoprolol succinate (TOPROL-XL) XL tablet 25 mg  25 mg Oral DAILY    QUEtiapine (SEROquel) tablet 200 mg  200 mg Oral QHS    pregabalin (LYRICA) capsule 150 mg  150 mg Oral BID    oxyCODONE ER (OxyCONTIN) tablet 10 mg  10 mg Oral Q12H    escitalopram oxalate (LEXAPRO) tablet 20 mg  20 mg Oral DAILY    furosemide (LASIX) tablet 20 mg  20 mg Oral BID    DULoxetine (CYMBALTA) capsule 30 mg  30 mg Oral DAILY    meropenem (MERREM) 500 mg in 0.9% sodium chloride (MBP/ADV) 50 mL MBP  0.5 g IntraVENous Q6H       Other Studies:  No results found for this visit on 12/23/20. No results found.     All Micro Results     Procedure Component Value Units Date/Time    CULTURE, BLOOD [[id number]] Collected: 12/23/20 2004    Order Status: Completed Specimen: Blood Updated: 12/29/20 1203     Special Requests: --        NO SPECIAL REQUESTS  RIGHT  Antecubital       Culture result: NO GROWTH 5 DAYS       CULTURE, URINE [[id number]]  (Abnormal)  (Susceptibility) Collected: 12/23/20 2330    Order Status: Completed Specimen: Cath Urine Updated: 12/28/20 0800     Special Requests: NO SPECIAL REQUESTS        Culture result:       >100,000 COLONIES/mL ESCHERICHIA COLI ** (EXTENDED SPECTRUM BETA LACTAMASE ) **                  >100,000 COLONIES/mL PROTEUS MIRABILIS                  ** MULTI-DRUG RESISTANT ORGANISM **                  RESULTS VERIFIED, PHONED TO AND READ BACK BY  Guerline La RN ON 12/27/20 @0705, ADS      CULTURE, BLOOD [[id number]]  (Abnormal) Collected: 12/24/20 0118    Order Status: Completed Specimen: Blood Updated: 12/27/20 0623     Special Requests: --        LEFT  HAND       GRAM STAIN       GRAM POS COCCI IN CLUSTERS            PEDIATRIC BOTTLE               RESULTS VERIFIED, PHONED TO AND READ BACK BY Concepcion Bah RN @ 1622 ON 12/25/2020 AK. Culture result:       STAPHYLOCOCCUS SPECIES, COAGULASE NEGATIVE THIS ORGANISM MAY BE INDICATIVE OF CULTURE CONTAMINATION, HOWEVER, CLINICAL CORRELATION NEEDS TO BE EVALUATED, AS EACH CASE IS UNIQUE. REFER TO [employer]IO[employer]RE PANEL ACC TR.[id number]    BLOOD CULTURE ID PANEL [[id number]]  (Abnormal) Collected: 12/24/20 0118    Order Status: Completed Specimen: Blood Updated: 12/25/20 0828     Acc. no. from Micro Order [id number]     Staphylococcus Detected        Comment: RESULTS VERIFIED, PHONED TO AND READ BACK BY  Concepcion Bah RN @ 5715 ON 12/25/20 AK. mecA (Methicillin-Resistance Genes) Detected        Comment: Presence of mecA is highly indicative of methicillin resistance. The test does not replace traditional culture and susceptibilities        INTERPRETATION       Gram positive cocci in clusters. Identified by realtime PCR as  Coagulase negative Staphylococci           Comment: A single positive culture of coagulase negative Staph is likely to be a contaminant in adult patients. Consider discontinuation of antibiotics for gram positive bloodstream infections if patient asymptomatic. THIS TEST DOES NOT REPLACE SENSITIVITY TESTING.        CULTURE, URINE [[id number]]     Order Status: Canceled Specimen: Cath Urine           SARS-CoV-2 Lab Results  \"Novel Coronavirus\" Test: No results found for: COV2NT   \"Emergent Disease\" Test: No results found for: EDPR  \"SARS-COV-2\" Test: No results found for: XGCOVT  Rapid Test:   Lab Results   Component Value Date/Time    COVR Not detected 12/24/2020 01:30 AM            Assessment and Plan:     Hospital Problems as of 12/29/2020 Date Reviewed: 1/31/2019          Codes Class Noted - Resolved POA    * (Principal) Sepsis ([facility] ICD-10-CM: A41.9  ICD-9-CM: 038.9, 995.91  12/24/2020 - Present Yes        Hyponatremia ICD-10-CM: E87.1  ICD-9-CM: 276.1  12/24/2020 - Present Unknown        Diabetes ([facility] ICD-10-CM: E11.9  ICD-9-CM: 250.00  3/10/2019 - Present Yes        Hypertension ICD-10-CM: I10  ICD-9-CM: 401.9  3/10/2019 - Present Yes        A-fib ([facility] ICD-10-CM: I48.91  ICD-9-CM: 427.31  2/19/2019 - Present Yes        UTI (urinary tract infection) ICD-10-CM: N39.0  ICD-9-CM: 599.0  1/31/2019 - Present Yes        Paraplegia (HCC) (Chronic) ICD-10-CM: G82.20  ICD-9-CM: 344.1  12/10/2016 - Present Yes        Bipolar disorder without psychotic features ([facility] (Chronic) ICD-10-CM: F31.9  ICD-9-CM: 296.80  12/10/2016 - Present Yes              Plan:  # Sepsis 2/2 ESBL E. Coli UTI   - Resolved. ID appreciated. Meropenem EOT 1/1/2020. Can use Invanz daily if able to be discharged and if facility will do it. Either way her 10 day bed hold is up 1/1. COVID negative. # AFib RVR   - Converted to NSR now. Con't meds. # DM2   - Home meds    # Bipolar   - Home meds    # Hyponatremia   - Resolved. DC planning/Dispo: Back to facility pending antibiotic plan. Diet:  DIET DIABETIC CONSISTENT CARB  DVT ppx:  Eliquis    Other listed chronic conditions stable, cont current management.     Signed:  Marika Tariq MD

## 2020-12-29 NOTE — ROUTINE PROCESS
Bedside and Verbal shift change report received from [facility]. Report included the following information SBAR, Kardex, Intake/Output, MAR and Recent Results.

## 2020-12-30 LAB
BASOPHILS # BLD: 0.1 K/UL (ref 0–0.2)
BASOPHILS NFR BLD: 1 % (ref 0–2)
DIFFERENTIAL METHOD BLD: ABNORMAL
EOSINOPHIL # BLD: 0.5 K/UL (ref 0–0.8)
EOSINOPHIL NFR BLD: 4 % (ref 0.5–7.8)
ERYTHROCYTE [DISTWIDTH] IN BLOOD BY AUTOMATED COUNT: 16.3 % (ref 11.9–14.6)
GLUCOSE BLD STRIP.AUTO-MCNC: 273 MG/DL (ref 65–100)
GLUCOSE BLD STRIP.AUTO-MCNC: 296 MG/DL (ref 65–100)
GLUCOSE BLD STRIP.AUTO-MCNC: 312 MG/DL (ref 65–100)
GLUCOSE BLD STRIP.AUTO-MCNC: 373 MG/DL (ref 65–100)
HCT VFR BLD AUTO: 36.3 % (ref 35.8–46.3)
HGB BLD-MCNC: 10.9 G/DL (ref 11.7–15.4)
IMM GRANULOCYTES # BLD AUTO: 0.5 K/UL (ref 0–0.5)
IMM GRANULOCYTES NFR BLD AUTO: 4 % (ref 0–5)
LYMPHOCYTES # BLD: 2.8 K/UL (ref 0.5–4.6)
LYMPHOCYTES NFR BLD: 21 % (ref 13–44)
MCH RBC QN AUTO: 26.5 PG (ref 26.1–32.9)
MCHC RBC AUTO-ENTMCNC: 30 G/DL (ref 31.4–35)
MCV RBC AUTO: 88.1 FL (ref 79.6–97.8)
MONOCYTES # BLD: 0.8 K/UL (ref 0.1–1.3)
MONOCYTES NFR BLD: 6 % (ref 4–12)
NEUTS SEG # BLD: 8.3 K/UL (ref 1.7–8.2)
NEUTS SEG NFR BLD: 64 % (ref 43–78)
NRBC # BLD: 0 K/UL (ref 0–0.2)
PLATELET # BLD AUTO: 537 K/UL (ref 150–450)
PMV BLD AUTO: 10.5 FL (ref 9.4–12.3)
RBC # BLD AUTO: 4.12 M/UL (ref 4.05–5.2)
WBC # BLD AUTO: 13 K/UL (ref 4.3–11.1)

## 2020-12-30 PROCEDURE — 74011000258 HC RX REV CODE- 258: Performed by: INTERNAL MEDICINE

## 2020-12-30 PROCEDURE — 65270000029 HC RM PRIVATE

## 2020-12-30 PROCEDURE — 74011250636 HC RX REV CODE- 250/636: Performed by: INTERNAL MEDICINE

## 2020-12-30 PROCEDURE — 74011250637 HC RX REV CODE- 250/637: Performed by: INTERNAL MEDICINE

## 2020-12-30 PROCEDURE — 94640 AIRWAY INHALATION TREATMENT: CPT

## 2020-12-30 PROCEDURE — 94760 N-INVAS EAR/PLS OXIMETRY 1: CPT

## 2020-12-30 PROCEDURE — 74011636637 HC RX REV CODE- 636/637: Performed by: INTERNAL MEDICINE

## 2020-12-30 PROCEDURE — 82962 GLUCOSE BLOOD TEST: CPT

## 2020-12-30 PROCEDURE — 85025 COMPLETE CBC W/AUTO DIFF WBC: CPT

## 2020-12-30 PROCEDURE — 36415 COLL VENOUS BLD VENIPUNCTURE: CPT

## 2020-12-30 RX ORDER — INSULIN LISPRO 100 [IU]/ML
8 INJECTION, SOLUTION INTRAVENOUS; SUBCUTANEOUS
Status: DISCONTINUED | OUTPATIENT
Start: 2020-12-30 | End: 2020-12-31

## 2020-12-30 RX ORDER — INSULIN GLARGINE 100 [IU]/ML
50 INJECTION, SOLUTION SUBCUTANEOUS 2 TIMES DAILY
Status: DISCONTINUED | OUTPATIENT
Start: 2020-12-30 | End: 2021-01-01 | Stop reason: HOSPADM

## 2020-12-30 RX ADMIN — INSULIN GLARGINE 40 UNITS: 100 INJECTION, SOLUTION SUBCUTANEOUS at 08:58

## 2020-12-30 RX ADMIN — MEROPENEM 500 MG: 500 INJECTION, POWDER, FOR SOLUTION INTRAVENOUS at 16:41

## 2020-12-30 RX ADMIN — APIXABAN 5 MG: 5 TABLET, FILM COATED ORAL at 08:58

## 2020-12-30 RX ADMIN — APIXABAN 5 MG: 5 TABLET, FILM COATED ORAL at 20:29

## 2020-12-30 RX ADMIN — QUETIAPINE FUMARATE 200 MG: 100 TABLET ORAL at 20:28

## 2020-12-30 RX ADMIN — INSULIN LISPRO 8 UNITS: 100 INJECTION, SOLUTION INTRAVENOUS; SUBCUTANEOUS at 11:30

## 2020-12-30 RX ADMIN — Medication 5 ML: at 20:33

## 2020-12-30 RX ADMIN — FAMOTIDINE 20 MG: 20 TABLET, FILM COATED ORAL at 16:39

## 2020-12-30 RX ADMIN — METOPROLOL SUCCINATE 25 MG: 25 TABLET, EXTENDED RELEASE ORAL at 08:58

## 2020-12-30 RX ADMIN — INSULIN LISPRO 5 UNITS: 100 INJECTION, SOLUTION INTRAVENOUS; SUBCUTANEOUS at 11:29

## 2020-12-30 RX ADMIN — DOCUSATE SODIUM 100 MG: 100 CAPSULE ORAL at 08:58

## 2020-12-30 RX ADMIN — FUROSEMIDE 20 MG: 40 TABLET ORAL at 08:58

## 2020-12-30 RX ADMIN — MEROPENEM 500 MG: 500 INJECTION, POWDER, FOR SOLUTION INTRAVENOUS at 21:20

## 2020-12-30 RX ADMIN — PREGABALIN 150 MG: 75 CAPSULE ORAL at 08:57

## 2020-12-30 RX ADMIN — FLUTICASONE PROPIONATE 2 PUFF: 110 AEROSOL, METERED RESPIRATORY (INHALATION) at 19:12

## 2020-12-30 RX ADMIN — POTASSIUM CHLORIDE 20 MEQ: 20 TABLET, EXTENDED RELEASE ORAL at 16:39

## 2020-12-30 RX ADMIN — FAMOTIDINE 20 MG: 20 TABLET, FILM COATED ORAL at 08:57

## 2020-12-30 RX ADMIN — PREGABALIN 150 MG: 75 CAPSULE ORAL at 16:38

## 2020-12-30 RX ADMIN — DULOXETINE HYDROCHLORIDE 30 MG: 30 CAPSULE, DELAYED RELEASE ORAL at 08:57

## 2020-12-30 RX ADMIN — INSULIN GLARGINE 50 UNITS: 100 INJECTION, SOLUTION SUBCUTANEOUS at 16:40

## 2020-12-30 RX ADMIN — POTASSIUM CHLORIDE 20 MEQ: 20 TABLET, EXTENDED RELEASE ORAL at 08:57

## 2020-12-30 RX ADMIN — DILTIAZEM HYDROCHLORIDE 240 MG: 240 CAPSULE, COATED, EXTENDED RELEASE ORAL at 08:58

## 2020-12-30 RX ADMIN — Medication 10 ML: at 05:06

## 2020-12-30 RX ADMIN — INSULIN LISPRO 6 UNITS: 100 INJECTION, SOLUTION INTRAVENOUS; SUBCUTANEOUS at 08:59

## 2020-12-30 RX ADMIN — INSULIN LISPRO 5 UNITS: 100 INJECTION, SOLUTION INTRAVENOUS; SUBCUTANEOUS at 08:59

## 2020-12-30 RX ADMIN — MEROPENEM 500 MG: 500 INJECTION, POWDER, FOR SOLUTION INTRAVENOUS at 04:20

## 2020-12-30 RX ADMIN — OXYCODONE HYDROCHLORIDE 10 MG: 10 TABLET, FILM COATED, EXTENDED RELEASE ORAL at 08:58

## 2020-12-30 RX ADMIN — FUROSEMIDE 20 MG: 40 TABLET ORAL at 16:39

## 2020-12-30 RX ADMIN — ESCITALOPRAM OXALATE 20 MG: 10 TABLET ORAL at 08:58

## 2020-12-30 RX ADMIN — OXYCODONE HYDROCHLORIDE 10 MG: 10 TABLET, FILM COATED, EXTENDED RELEASE ORAL at 20:28

## 2020-12-30 RX ADMIN — INSULIN LISPRO 10 UNITS: 100 INJECTION, SOLUTION INTRAVENOUS; SUBCUTANEOUS at 16:41

## 2020-12-30 RX ADMIN — INSULIN LISPRO 8 UNITS: 100 INJECTION, SOLUTION INTRAVENOUS; SUBCUTANEOUS at 16:40

## 2020-12-30 RX ADMIN — INSULIN LISPRO 6 UNITS: 100 INJECTION, SOLUTION INTRAVENOUS; SUBCUTANEOUS at 21:23

## 2020-12-30 RX ADMIN — MEROPENEM 500 MG: 500 INJECTION, POWDER, FOR SOLUTION INTRAVENOUS at 09:01

## 2020-12-30 RX ADMIN — OXYCODONE HYDROCHLORIDE AND ACETAMINOPHEN 1 TABLET: 7.5; 325 TABLET ORAL at 16:39

## 2020-12-30 NOTE — ROUTINE PROCESS
Bedside and Verbal shift change report given to Daniel Del Toro RN (oncoming nurse) by self (offgoing nurse). Report included the following information SBAR, Kardex, Intake/Output, MAR and Recent Results.

## 2020-12-30 NOTE — PROGRESS NOTES
Noticed IV site in left arm had redness, tenderness and warmth. Agustin Jose, RN notified and called to attempt another IV. New 20G successfully put in left upper arm and old IV removed. Pt tolerated well.

## 2020-12-30 NOTE — ROUTINE PROCESS
Bedside and Verbal shift change report [facility] ([facility]) to self (oncoming nurse). Report included the following information SBAR, Kardex, Intake/Output, MAR, Recent Results, and Cardiac Rhythm NSR.

## 2020-12-30 NOTE — PROGRESS NOTES
Comprehensive Nutrition Assessment    Type and Reason for Visit: Initial, RD nutrition re-screen/LOS    Nutrition Recommendations/Plan:    Continue current diet     Malnutrition Assessment:  Malnutrition Status: No malnutrition    Nutrition Assessment:   Nutrition Background: Patient with PMH significant for paraplegia, neuroggenic bladder  with suprapubic cath, colostomy, hept C, DM2, GERD, HTN. She lives at [facility]. She presented with 2-3 day history of nausea, fever, chills and was admitted with sepsis from UTI. She was also founc to be in RVR but converted back to NSR in ER. Here until completion of Abx EOT 1/1. Daily Update:  Patient seen today with lunch tray at bedside with 100% consumed. She states that she ate well for breakfast also. She states that she eats well she gets foods that she likes. She states she has been working with PDA for preferences. She denies any needs or questions. Discussed with RN who states patient seemed to be eating better yesterday. She states that she received a tray she did not like for lunch and they ordered a second tray. Current Nutrition Therapies:  DIET DIABETIC CONSISTENT CARB Regular; 2 GM NA (House Low NA); No Conc.  Sweets    Current Intake:   Average Meal Intake: (limited data) Average Supplement Intake: None ordered      Anthropometric Measures:  Height: 5' 5\" (165.1 cm)  Current Body Wt: 117.7 kg (259 lb 7.7 oz)(12/29), Weight source: Bed scale  BMI: 43.2, Obese class 3 (BMI 40.0 or greater)     Ideal Body Wt: 125 lbs (57 kg), 207.6 %          Estimated Daily Nutrient Needs:  Energy (kcal/day): 1029-4220 (Kcal/kg(11-15), Weight Used: Current(117.7 kg (12/29)))  Protein (g/day): 65-88 (20% kcal) Weight Used: (Current)  Fluid (ml/day):   (1 ml/kcal)    Nutrition Diagnosis:   No nutrition diagnosis at this time     Nutrition Interventions:   Food and/or Nutrient Delivery: Continue current diet(preferences relayed to PDA)     Coordination of Nutrition Care: Continue to monitor while inpatient  Plan of Care discussed with NALDO Tejada    Goals:     Active Goal: Continue to meet at least 75% of needs     Nutrition Monitoring and Evaluation:      Food/Nutrient Intake Outcomes: Food and nutrient intake       Discharge Planning:    No discharge needs at this time    [address] [place], LD on 12/30/2020 at 3:32 PM  Contact: [phone]        Disaster Mode active

## 2020-12-30 NOTE — ROUTINE PROCESS
Bedside and verbal shift change report given to Rita Grove RN (oncoming nurse) by self (offgoing nurse). Report included the following information SBAR, Kardex, Intake/Output, MAR, Recent Results, and Cardiac Rhythm NSR.

## 2020-12-30 NOTE — PROGRESS NOTES
Hospitalist Note     Admit Date:  2020  6:55 PM   Name:  Maday Portillo   Age:  60 y.o.  :  1955   MRN:  [id number]   PCP:  Emily Combs MD  Treatment Team: Attending Provider: Alex Poe MD; Consulting Provider: Shaina Reid MD; Care Manager: Aleksandr Porras; Care Manager: Kehinde Abreu; Care Manager: NANCY Kimball    HPI/Subjective:   Ms. Karen Sanchez is a nice 61 y/o WF with a h/o paraplegia, neurogenic bladder, anxiety/bipolar, chronic suprapubic catheter who was admitted on  with sepsis and AFib RVR. RVR resolved. Urine culture with ESBL E. Coli, on meropenem. ID consulted. Developed AFib again and Cardiology consulted, converted spontaneously to NSR.     : Says she hasn't felt well today. Remains in NSR. No other complaints  Objective:     Patient Vitals for the past 24 hrs:   Temp Pulse Resp BP SpO2   20 1306 98.4 °F (36.9 °C) 75 17 103/65 92 %   20 0855 98.3 °F (36.8 °C) 84 16 117/64 92 %   20 0743     90 %   20 0559 98.8 °F (37.1 °C) 82 20 119/80 92 %   20 0220 99.5 °F (37.5 °C) 88  108/80 92 %   20 2105 99.5 °F (37.5 °C) 76 18 120/69 97 %   20     93 %   20 1600 98.8 °F (37.1 °C) 70 18 (!) 140/66 90 %     Oxygen Therapy  O2 Sat (%): 92 % (20 1306)  Pulse via Oximetry: 79 beats per minute (20 0743)  O2 Device: Room air (20 0830)  O2 Flow Rate (L/min): 4 l/min (20 0808)    Estimated body mass index is 43.17 kg/m² as calculated from the following:    Height as of this encounter: 5' 5\" (1.651 m). Weight as of this encounter: 117.7 kg (259 lb 6.4 oz). Intake/Output Summary (Last 24 hours) at 2020 1404  Last data filed at 2020 4933  Gross per 24 hour   Intake 250 ml   Output 3275 ml   Net -3025 ml       *Note that automatically entered I/Os may not be accurate; dependent on patient compliance with collection and accurate  by techs.     General:    Well nourished. No overt distress. Obese. Chronically ill appearing. CV:   RRR. No m/r/g. No edema. No JVD  Lungs:   CTAB. No wheezing, rhonchi, or rales. Unlabored  Abdomen:   Soft, nontender, nondistended. Extremities: Warm and dry. No cyanosis   Skin:     No rashes. No jaundice. Normal coloration  Neuro:  No gross focal deficits.   Alert    Data Reviewed:  I have reviewed all labs, meds, and studies from the last 24 hours:  Recent Results (from the past 24 hour(s))   GLUCOSE, POC    Collection Time: 12/29/20  3:58 PM   Result Value Ref Range    Glucose (POC) 323 (H) 65 - 100 mg/dL   GLUCOSE, POC    Collection Time: 12/29/20  9:04 PM   Result Value Ref Range    Glucose (POC) 330 (H) 65 - 100 mg/dL   GLUCOSE, POC    Collection Time: 12/30/20  5:26 AM   Result Value Ref Range    Glucose (POC) 296 (H) 65 - 100 mg/dL   GLUCOSE, POC    Collection Time: 12/30/20 10:31 AM   Result Value Ref Range    Glucose (POC) 312 (H) 65 - 100 mg/dL       Current Meds:  Current Facility-Administered Medications   Medication Dose Route Frequency    insulin glargine (LANTUS) injection 50 Units  50 Units SubCUTAneous BID    insulin lispro (HUMALOG) injection 8 Units  8 Units SubCUTAneous TIDAC    insulin lispro (HUMALOG) injection   SubCUTAneous AC&HS    docusate sodium (COLACE) capsule 100 mg  100 mg Oral DAILY    potassium chloride (K-DUR, KLOR-CON) SR tablet 20 mEq  20 mEq Oral BID    alum-mag hydroxide-simeth (MYLANTA) oral suspension 30 mL  30 mL Oral Q4H PRN    famotidine (PEPCID) tablet 20 mg  20 mg Oral BID    oxyCODONE-acetaminophen (PERCOCET 7.5) 7.5-325 mg per tablet 1 Tab  1 Tab Oral DAILY PRN    dilTIAZem ER (CARDIZEM CD) capsule 240 mg  240 mg Oral DAILY    fluticasone (FLOVENT HFA) 110 mcg inhaler  2 Puff Inhalation BID RT    albuterol (PROVENTIL HFA, VENTOLIN HFA, PROAIR HFA) inhaler 1 Puff  1 Puff Inhalation Q4H PRN    ALPRAZolam (XANAX) tablet 0.5 mg  0.5 mg Oral BID PRN    apixaban (ELIQUIS) tablet 5 mg  5 mg Oral BID    sodium chloride (NS) flush 5-40 mL  5-40 mL IntraVENous Q8H    sodium chloride (NS) flush 5-40 mL  5-40 mL IntraVENous PRN    acetaminophen (TYLENOL) tablet 650 mg  650 mg Oral Q6H PRN    Or    acetaminophen (TYLENOL) suppository 650 mg  650 mg Rectal Q6H PRN    polyethylene glycol (MIRALAX) packet 17 g  17 g Oral DAILY PRN    promethazine (PHENERGAN) tablet 25 mg  25 mg Oral Q6H PRN    metoprolol succinate (TOPROL-XL) XL tablet 25 mg  25 mg Oral DAILY    QUEtiapine (SEROquel) tablet 200 mg  200 mg Oral QHS    pregabalin (LYRICA) capsule 150 mg  150 mg Oral BID    oxyCODONE ER (OxyCONTIN) tablet 10 mg  10 mg Oral Q12H    escitalopram oxalate (LEXAPRO) tablet 20 mg  20 mg Oral DAILY    furosemide (LASIX) tablet 20 mg  20 mg Oral BID    DULoxetine (CYMBALTA) capsule 30 mg  30 mg Oral DAILY    meropenem (MERREM) 500 mg in 0.9% sodium chloride (MBP/ADV) 50 mL MBP  0.5 g IntraVENous Q6H       Other Studies:  No results found for this visit on 12/23/20. No results found.     All Micro Results     Procedure Component Value Units Date/Time    CULTURE, BLOOD [[id number]] Collected: 12/23/20 2004    Order Status: Completed Specimen: Blood Updated: 12/29/20 1203     Special Requests: --        NO SPECIAL REQUESTS  RIGHT  Antecubital       Culture result: NO GROWTH 5 DAYS       CULTURE, URINE [[id number]]  (Abnormal)  (Susceptibility) Collected: 12/23/20 2330    Order Status: Completed Specimen: Cath Urine Updated: 12/28/20 0800     Special Requests: NO SPECIAL REQUESTS        Culture result:       >100,000 COLONIES/mL ESCHERICHIA COLI ** (EXTENDED SPECTRUM BETA LACTAMASE ) **                  >100,000 COLONIES/mL PROTEUS MIRABILIS                  ** MULTI-DRUG RESISTANT ORGANISM **                  RESULTS VERIFIED, PHONED TO AND READ BACK BY  Soumya Chen RN ON 12/27/20 @0705, ADS      CULTURE, BLOOD [[id number]]  (Abnormal) Collected: 12/24/20 0118    Order Status: Completed Specimen: Blood Updated: 12/27/20 9433     Special Requests: --        LEFT  HAND       GRAM STAIN       GRAM POS COCCI IN CLUSTERS            PEDIATRIC BOTTLE               RESULTS VERIFIED, PHONED TO AND READ BACK BY Landon Mcclellan RN @ 7394 ON 12/25/2020 AK. Culture result:       STAPHYLOCOCCUS SPECIES, COAGULASE NEGATIVE THIS ORGANISM MAY BE INDICATIVE OF CULTURE CONTAMINATION, HOWEVER, CLINICAL CORRELATION NEEDS TO BE EVALUATED, AS EACH CASE IS UNIQUE. REFER TO [employer] PANEL ACC [id number]    BLOOD CULTURE ID PANEL [[id number]]  (Abnormal) Collected: 12/24/20 0118    Order Status: Completed Specimen: Blood Updated: 12/25/20 0828     Acc. no. from Micro Order [id number]     Staphylococcus Detected        Comment: RESULTS VERIFIED, PHONED TO AND READ BACK BY  Landon Mcclellan RN @ 7966 ON 12/25/20 AK. mecA (Methicillin-Resistance Genes) Detected        Comment: Presence of mecA is highly indicative of methicillin resistance. The test does not replace traditional culture and susceptibilities        INTERPRETATION       Gram positive cocci in clusters. Identified by realtime PCR as  Coagulase negative Staphylococci           Comment: A single positive culture of coagulase negative Staph is likely to be a contaminant in adult patients. Consider discontinuation of antibiotics for gram positive bloodstream infections if patient asymptomatic. THIS TEST DOES NOT REPLACE SENSITIVITY TESTING.        CULTURE, URINE [[id number]]     Order Status: Canceled Specimen: Cath Urine           SARS-CoV-2 Lab Results  \"Novel Coronavirus\" Test: No results found for: COV2NT   \"Emergent Disease\" Test: No results found for: EDPR  \"SARS-COV-2\" Test: No results found for: XGCOVT  Rapid Test:   Lab Results   Component Value Date/Time    COVR Not detected 12/24/2020 01:30 AM            Assessment and Plan:     Hospital Problems as of 12/30/2020 Date Reviewed: 1/31/2019          Codes Class Noted - Resolved POA    * (Principal) Sepsis ([facility] ICD-10-CM: A41.9  ICD-9-CM: 038.9, 995.91  12/24/2020 - Present Yes        Hyponatremia ICD-10-CM: E87.1  ICD-9-CM: 276.1  12/24/2020 - Present Unknown        Diabetes ([facility] ICD-10-CM: E11.9  ICD-9-CM: 250.00  3/10/2019 - Present Yes        Hypertension ICD-10-CM: I10  ICD-9-CM: 401.9  3/10/2019 - Present Yes        A-fib ([facility] ICD-10-CM: I48.91  ICD-9-CM: 427.31  2/19/2019 - Present Yes        UTI (urinary tract infection) ICD-10-CM: N39.0  ICD-9-CM: 599.0  1/31/2019 - Present Yes        Paraplegia (HCC) (Chronic) ICD-10-CM: G82.20  ICD-9-CM: 344.1  12/10/2016 - Present Yes        Bipolar disorder without psychotic features ([facility] (Chronic) ICD-10-CM: F31.9  ICD-9-CM: 296.80  12/10/2016 - Present Yes              Plan:  # Sepsis 2/2 ESBL E. Coli UTI              - Resolved. ID appreciated. Meropenem EOT 1/1/2020 then back to facility.     # AFib RVR              - Converted to NSR now. Con't meds.     # DM2              - Home meds     # Bipolar              - Home meds     # Hyponatremia              - Resolved. DC planning/Dispo: Finish abx Friday then back to facility. Diet:  DIET DIABETIC CONSISTENT CARB  DVT ppx: Eliquis     Other listed chronic conditions stable, cont current management.     Signed:  Luli St MD

## 2020-12-30 NOTE — ROUTINE PROCESS
Bedside and verbal shift change report given to Rachelle Tsai RN (oncoming nurse) by self Valentina Garcia nurse). Report included the following information SBAR, Kardex, Intake/Output, MAR, Recent Results.

## 2020-12-31 LAB
ATRIAL RATE: 234 BPM
BASOPHILS # BLD: 0.1 K/UL (ref 0–0.2)
BASOPHILS NFR BLD: 1 % (ref 0–2)
CALCULATED R AXIS, ECG10: -4 DEGREES
CALCULATED T AXIS, ECG11: 85 DEGREES
DIAGNOSIS, 93000: NORMAL
DIFFERENTIAL METHOD BLD: ABNORMAL
EOSINOPHIL # BLD: 0.5 K/UL (ref 0–0.8)
EOSINOPHIL NFR BLD: 4 % (ref 0.5–7.8)
ERYTHROCYTE [DISTWIDTH] IN BLOOD BY AUTOMATED COUNT: 16 % (ref 11.9–14.6)
GLUCOSE BLD STRIP.AUTO-MCNC: 113 MG/DL (ref 65–100)
GLUCOSE BLD STRIP.AUTO-MCNC: 212 MG/DL (ref 65–100)
GLUCOSE BLD STRIP.AUTO-MCNC: 287 MG/DL (ref 65–100)
GLUCOSE BLD STRIP.AUTO-MCNC: 334 MG/DL (ref 65–100)
GLUCOSE BLD STRIP.AUTO-MCNC: 380 MG/DL (ref 65–100)
HCT VFR BLD AUTO: 35.4 % (ref 35.8–46.3)
HGB BLD-MCNC: 10.8 G/DL (ref 11.7–15.4)
IMM GRANULOCYTES # BLD AUTO: 0.7 K/UL (ref 0–0.5)
IMM GRANULOCYTES NFR BLD AUTO: 4 % (ref 0–5)
LYMPHOCYTES # BLD: 3.9 K/UL (ref 0.5–4.6)
LYMPHOCYTES NFR BLD: 26 % (ref 13–44)
MCH RBC QN AUTO: 26.3 PG (ref 26.1–32.9)
MCHC RBC AUTO-ENTMCNC: 30.5 G/DL (ref 31.4–35)
MCV RBC AUTO: 86.1 FL (ref 79.6–97.8)
MONOCYTES # BLD: 1 K/UL (ref 0.1–1.3)
MONOCYTES NFR BLD: 7 % (ref 4–12)
NEUTS SEG # BLD: 8.5 K/UL (ref 1.7–8.2)
NEUTS SEG NFR BLD: 58 % (ref 43–78)
NRBC # BLD: 0 K/UL (ref 0–0.2)
PLATELET # BLD AUTO: 622 K/UL (ref 150–450)
PMV BLD AUTO: 9.8 FL (ref 9.4–12.3)
Q-T INTERVAL, ECG07: 364 MS
QRS DURATION, ECG06: 78 MS
QTC CALCULATION (BEZET), ECG08: 510 MS
RBC # BLD AUTO: 4.11 M/UL (ref 4.05–5.2)
VENTRICULAR RATE, ECG03: 118 BPM
WBC # BLD AUTO: 14.7 K/UL (ref 4.3–11.1)

## 2020-12-31 PROCEDURE — 74011636637 HC RX REV CODE- 636/637: Performed by: INTERNAL MEDICINE

## 2020-12-31 PROCEDURE — 74011250636 HC RX REV CODE- 250/636: Performed by: INTERNAL MEDICINE

## 2020-12-31 PROCEDURE — 74011250637 HC RX REV CODE- 250/637: Performed by: INTERNAL MEDICINE

## 2020-12-31 PROCEDURE — 93005 ELECTROCARDIOGRAM TRACING: CPT | Performed by: INTERNAL MEDICINE

## 2020-12-31 PROCEDURE — 85025 COMPLETE CBC W/AUTO DIFF WBC: CPT

## 2020-12-31 PROCEDURE — 74011000250 HC RX REV CODE- 250: Performed by: INTERNAL MEDICINE

## 2020-12-31 PROCEDURE — 82962 GLUCOSE BLOOD TEST: CPT

## 2020-12-31 PROCEDURE — 36415 COLL VENOUS BLD VENIPUNCTURE: CPT

## 2020-12-31 PROCEDURE — 74011000258 HC RX REV CODE- 258: Performed by: INTERNAL MEDICINE

## 2020-12-31 PROCEDURE — 94640 AIRWAY INHALATION TREATMENT: CPT

## 2020-12-31 PROCEDURE — 65270000029 HC RM PRIVATE

## 2020-12-31 PROCEDURE — 94760 N-INVAS EAR/PLS OXIMETRY 1: CPT

## 2020-12-31 RX ORDER — METOPROLOL TARTRATE 5 MG/5ML
5 INJECTION INTRAVENOUS
Status: DISCONTINUED | OUTPATIENT
Start: 2020-12-31 | End: 2021-01-01 | Stop reason: HOSPADM

## 2020-12-31 RX ORDER — METOPROLOL SUCCINATE 50 MG/1
50 TABLET, EXTENDED RELEASE ORAL DAILY
Status: DISCONTINUED | OUTPATIENT
Start: 2021-01-01 | End: 2021-01-01 | Stop reason: HOSPADM

## 2020-12-31 RX ORDER — INSULIN LISPRO 100 [IU]/ML
10 INJECTION, SOLUTION INTRAVENOUS; SUBCUTANEOUS
Status: DISCONTINUED | OUTPATIENT
Start: 2021-01-01 | End: 2021-01-01 | Stop reason: HOSPADM

## 2020-12-31 RX ADMIN — POTASSIUM CHLORIDE 20 MEQ: 20 TABLET, EXTENDED RELEASE ORAL at 17:35

## 2020-12-31 RX ADMIN — MEROPENEM 500 MG: 500 INJECTION, POWDER, FOR SOLUTION INTRAVENOUS at 09:14

## 2020-12-31 RX ADMIN — ACETAMINOPHEN 650 MG: 325 TABLET, FILM COATED ORAL at 09:11

## 2020-12-31 RX ADMIN — MEROPENEM 500 MG: 500 INJECTION, POWDER, FOR SOLUTION INTRAVENOUS at 17:35

## 2020-12-31 RX ADMIN — QUETIAPINE FUMARATE 200 MG: 100 TABLET ORAL at 21:29

## 2020-12-31 RX ADMIN — INSULIN GLARGINE 50 UNITS: 100 INJECTION, SOLUTION SUBCUTANEOUS at 09:13

## 2020-12-31 RX ADMIN — Medication 5 ML: at 21:29

## 2020-12-31 RX ADMIN — METOPROLOL SUCCINATE 25 MG: 25 TABLET, EXTENDED RELEASE ORAL at 09:12

## 2020-12-31 RX ADMIN — FUROSEMIDE 20 MG: 40 TABLET ORAL at 09:12

## 2020-12-31 RX ADMIN — OXYCODONE HYDROCHLORIDE 10 MG: 10 TABLET, FILM COATED, EXTENDED RELEASE ORAL at 09:11

## 2020-12-31 RX ADMIN — APIXABAN 5 MG: 5 TABLET, FILM COATED ORAL at 21:29

## 2020-12-31 RX ADMIN — INSULIN LISPRO 6 UNITS: 100 INJECTION, SOLUTION INTRAVENOUS; SUBCUTANEOUS at 17:36

## 2020-12-31 RX ADMIN — DULOXETINE HYDROCHLORIDE 30 MG: 30 CAPSULE, DELAYED RELEASE ORAL at 09:12

## 2020-12-31 RX ADMIN — PREGABALIN 150 MG: 75 CAPSULE ORAL at 17:35

## 2020-12-31 RX ADMIN — MEROPENEM 500 MG: 500 INJECTION, POWDER, FOR SOLUTION INTRAVENOUS at 03:41

## 2020-12-31 RX ADMIN — DILTIAZEM HYDROCHLORIDE 240 MG: 240 CAPSULE, COATED, EXTENDED RELEASE ORAL at 09:12

## 2020-12-31 RX ADMIN — INSULIN LISPRO 4 UNITS: 100 INJECTION, SOLUTION INTRAVENOUS; SUBCUTANEOUS at 13:18

## 2020-12-31 RX ADMIN — OXYCODONE HYDROCHLORIDE 10 MG: 10 TABLET, FILM COATED, EXTENDED RELEASE ORAL at 21:29

## 2020-12-31 RX ADMIN — INSULIN LISPRO 8 UNITS: 100 INJECTION, SOLUTION INTRAVENOUS; SUBCUTANEOUS at 13:18

## 2020-12-31 RX ADMIN — FUROSEMIDE 20 MG: 40 TABLET ORAL at 17:35

## 2020-12-31 RX ADMIN — INSULIN GLARGINE 50 UNITS: 100 INJECTION, SOLUTION SUBCUTANEOUS at 17:35

## 2020-12-31 RX ADMIN — FAMOTIDINE 20 MG: 20 TABLET, FILM COATED ORAL at 09:12

## 2020-12-31 RX ADMIN — Medication 5 ML: at 06:21

## 2020-12-31 RX ADMIN — FAMOTIDINE 20 MG: 20 TABLET, FILM COATED ORAL at 17:35

## 2020-12-31 RX ADMIN — ESCITALOPRAM OXALATE 20 MG: 10 TABLET ORAL at 09:12

## 2020-12-31 RX ADMIN — ACETAMINOPHEN 650 MG: 325 TABLET, FILM COATED ORAL at 17:35

## 2020-12-31 RX ADMIN — INSULIN LISPRO 8 UNITS: 100 INJECTION, SOLUTION INTRAVENOUS; SUBCUTANEOUS at 09:13

## 2020-12-31 RX ADMIN — INSULIN LISPRO 8 UNITS: 100 INJECTION, SOLUTION INTRAVENOUS; SUBCUTANEOUS at 17:35

## 2020-12-31 RX ADMIN — INSULIN LISPRO 8 UNITS: 100 INJECTION, SOLUTION INTRAVENOUS; SUBCUTANEOUS at 22:31

## 2020-12-31 RX ADMIN — PREGABALIN 150 MG: 75 CAPSULE ORAL at 09:11

## 2020-12-31 RX ADMIN — POTASSIUM CHLORIDE 20 MEQ: 20 TABLET, EXTENDED RELEASE ORAL at 09:11

## 2020-12-31 RX ADMIN — OXYCODONE HYDROCHLORIDE AND ACETAMINOPHEN 1 TABLET: 7.5; 325 TABLET ORAL at 13:17

## 2020-12-31 RX ADMIN — APIXABAN 5 MG: 5 TABLET, FILM COATED ORAL at 09:12

## 2020-12-31 RX ADMIN — DOCUSATE SODIUM 100 MG: 100 CAPSULE ORAL at 09:12

## 2020-12-31 RX ADMIN — FLUTICASONE PROPIONATE 2 PUFF: 110 AEROSOL, METERED RESPIRATORY (INHALATION) at 07:30

## 2020-12-31 NOTE — ROUTINE PROCESS
Bedside and Verbal shift change report received from [employer] (offgoing nurse) to self (oncoming nurse). Report included the following information SBAR, Kardex, Intake/Output, MAR, Recent Results.

## 2020-12-31 NOTE — PROGRESS NOTES
Pt MAY transfer back to [facility] later this evening. Pts bed hold expires tomorrow, 1/1/20; after this date a new LOC will be needed. Call for report is: [phone], Rm 24B. Pt has made several attempts of calling and texting Chanel Bertrand at [facility] (main [phone] / cell [phone]) about pts transfer and the possibility of her being accepted tomorrow on the day of her bed hold expiring. CM has left messages for Chanel Anderss but has not responded. Transfer has NOT been arranged at this time since it is unknown when the pt is transferring. CM will continue to monitor. 1508-CM spoke with Chanel Bertrand. She stated that if the pt has to transfer tomorrow on 1/1 she will be accepted to the facility. CM made the MD aware and will let this writer know when transferring. CM will set up transport prior to leaving for the day. 1548-Pt transferring tomorrow due to NSR. CM notified the facility. Transport was arranged for 1pm.     Care Management Interventions  PCP Verified by CM:  Yes  Mode of Transport at Discharge: BLS  Transition of Care Consult (CM Consult): Discharge Planning, SNF(Resident of [facility])  Discharge Durable Medical Equipment: No  Physical Therapy Consult: No  Occupational Therapy Consult: No  Speech Therapy Consult: No  Current Support Network: [address], Family Lives Nearby  Confirm Follow Up Transport: Other (see comment)(TBD: based upon need. )  [address] Provided?: No  Discharge Location  Discharge Placement: Skilled nursing facility

## 2020-12-31 NOTE — ROUTINE PROCESS
Bedside and verbal shift change report given to Andreas Nguyễn RN (oncoming nurse) by self Cassandra sloan). Report included the following information SBAR, Kardex, Intake/Output, MAR, Recent Results, and Cardiac Rhythm NSR.

## 2020-12-31 NOTE — ROUTINE PROCESS
Verbal bedside report received from Terrell, [address] [facility]. Assumed care of patient.

## 2020-12-31 NOTE — PROGRESS NOTES
Hospitalist Note     Admit Date:  2020  6:55 PM   Name:  Rodri Lam   Age:  60 y.o.  :  1955   MRN:  [id number]   PCP:  Ambar Clarke MD  Treatment Team: Attending Provider: Hipolito Rowe MD; Consulting Provider: Luci Guerrero MD; Care Manager: Kelby Torres; Care Manager: Cherie Baltazar; Care Manager: NANCY Ha; Primary Nurse: Frandy Ruelas; Utilization Review: Suzie Lewis RN    HPI/Subjective:   Ms. Hever Collazo is a nice 61 y/o WF with a h/o paraplegia, neurogenic bladder, anxiety/bipolar, chronic suprapubic catheter who was admitted on  with sepsis and AFib RVR. RVR resolved. Urine culture with ESBL E. Coli, on meropenem. ID consulted. Developed AFib again and Cardiology consulted, converted spontaneously to NSR.     : Back in AF RVR this afternoon, confirmed on EKG, though she spontaneously converted again before any IV meds were needed. Finishes abx today. No other complaints  Objective:     Patient Vitals for the past 24 hrs:   Temp Pulse Resp BP SpO2   20 1653 98 °F (36.7 °C) 75 18 109/62 94 %   20 1352  81      20 1347  (!) 113  112/66    20 1252 98.1 °F (36.7 °C) 79 18 122/62 94 %   20 0859 98.2 °F (36.8 °C) 80 18 110/72 91 %   20 0730     91 %   20 0514     92 %   20 0449 99.3 °F (37.4 °C) 79 18 115/66 (!) 89 %   20 0049 99.4 °F (37.4 °C) 83 16 125/61 91 %   20 2054 98.4 °F (36.9 °C) 71 16 126/72 93 %   20 1912     91 %     Oxygen Therapy  O2 Sat (%): 94 % (20 1653)  Pulse via Oximetry: 107 beats per minute (20 0730)  O2 Device: Room air (20)  O2 Flow Rate (L/min): 4 l/min (20)  FIO2 (%): 21 % (20)    Estimated body mass index is 41.5 kg/m² as calculated from the following:    Height as of this encounter: 5' 5\" (1.651 m). Weight as of this encounter: 113.1 kg (249 lb 6.4 oz).     Intake/Output Summary (Last 24 hours) at 12/31/2020 1751  Last data filed at 12/31/2020 1501  Gross per 24 hour   Intake 300 ml   Output 3775 ml   Net -3475 ml       *Note that automatically entered I/Os may not be accurate; dependent on patient compliance with collection and accurate  by techs. General:    Well nourished. Obese. Chronically ill appearing but NAD. CV:   RRR. No m/r/g. No edema. No JVD  Lungs:   CTAB. No wheezing, rhonchi, or rales. Unlabored  Abdomen:   Soft, nontender, nondistended. Extremities: Warm and dry. No cyanosis   Skin:     No rashes. No jaundice. Normal coloration  Neuro:  No gross focal deficits. Alert    Data Reviewed:  I have reviewed all labs, meds, and studies from the last 24 hours:  Recent Results (from the past 24 hour(s))   GLUCOSE, POC    Collection Time: 12/30/20  9:10 PM   Result Value Ref Range    Glucose (POC) 273 (H) 65 - 100 mg/dL   CBC WITH AUTOMATED DIFF    Collection Time: 12/31/20  4:29 AM   Result Value Ref Range    WBC 14.7 (H) 4.3 - 11.1 K/uL    RBC 4.11 4.05 - 5.2 M/uL    HGB 10.8 (L) 11.7 - 15.4 g/dL    HCT 35.4 (L) 35.8 - 46.3 %    MCV 86.1 79.6 - 97.8 FL    MCH 26.3 26.1 - 32.9 PG    MCHC 30.5 (L) 31.4 - 35.0 g/dL    RDW 16.0 (H) 11.9 - 14.6 %    PLATELET 946 (H) 826 - 450 K/uL    MPV 9.8 9.4 - 12.3 FL    ABSOLUTE NRBC 0.00 0.0 - 0.2 K/uL    DF AUTOMATED      NEUTROPHILS 58 43 - 78 %    LYMPHOCYTES 26 13 - 44 %    MONOCYTES 7 4.0 - 12.0 %    EOSINOPHILS 4 0.5 - 7.8 %    BASOPHILS 1 0.0 - 2.0 %    IMMATURE GRANULOCYTES 4 0.0 - 5.0 %    ABS. NEUTROPHILS 8.5 (H) 1.7 - 8.2 K/UL    ABS. LYMPHOCYTES 3.9 0.5 - 4.6 K/UL    ABS. MONOCYTES 1.0 0.1 - 1.3 K/UL    ABS. EOSINOPHILS 0.5 0.0 - 0.8 K/UL    ABS. BASOPHILS 0.1 0.0 - 0.2 K/UL    ABS. IMM.  GRANS. 0.7 (H) 0.0 - 0.5 K/UL   GLUCOSE, POC    Collection Time: 12/31/20  6:06 AM   Result Value Ref Range    Glucose (POC) 113 (H) 65 - 100 mg/dL   GLUCOSE, POC    Collection Time: 12/31/20 10:53 AM   Result Value Ref Range    Glucose (POC) 212 (H) 65 - 100 mg/dL   EKG, 12 LEAD, SUBSEQUENT    Collection Time: 12/31/20 12:20 PM   Result Value Ref Range    Ventricular Rate 118 BPM    Atrial Rate 234 BPM    QRS Duration 78 ms    Q-T Interval 364 ms    QTC Calculation (Bezet) 510 ms    Calculated R Axis -4 degrees    Calculated T Axis 85 degrees    Diagnosis       Atrial fibrillation with rapid ventricular response  Abnormal ECG  When compared with ECG of 23-DEC-2020 19:41,  ST no longer depressed in Anterior leads  Nonspecific T wave abnormality no longer evident in Inferior leads  T wave inversion no longer evident in Anterior leads  Confirmed by LASHON MCGOWAN (), Kelli Jasso ([id number]) on 12/31/2020 1:33:41 PM     GLUCOSE, POC    Collection Time: 12/31/20  4:43 PM   Result Value Ref Range    Glucose (POC) 287 (H) 65 - 100 mg/dL       Current Meds:  Current Facility-Administered Medications   Medication Dose Route Frequency    metoprolol (LOPRESSOR) injection 5 mg  5 mg IntraVENous Q5MIN PRN    [START ON 1/1/2021] metoprolol succinate (TOPROL-XL) XL tablet 50 mg  50 mg Oral DAILY    insulin glargine (LANTUS) injection 50 Units  50 Units SubCUTAneous BID    insulin lispro (HUMALOG) injection 8 Units  8 Units SubCUTAneous TIDAC    insulin lispro (HUMALOG) injection   SubCUTAneous AC&HS    docusate sodium (COLACE) capsule 100 mg  100 mg Oral DAILY    potassium chloride (K-DUR, KLOR-CON) SR tablet 20 mEq  20 mEq Oral BID    alum-mag hydroxide-simeth (MYLANTA) oral suspension 30 mL  30 mL Oral Q4H PRN    famotidine (PEPCID) tablet 20 mg  20 mg Oral BID    oxyCODONE-acetaminophen (PERCOCET 7.5) 7.5-325 mg per tablet 1 Tab  1 Tab Oral DAILY PRN    dilTIAZem ER (CARDIZEM CD) capsule 240 mg  240 mg Oral DAILY    fluticasone (FLOVENT HFA) 110 mcg inhaler  2 Puff Inhalation BID RT    albuterol (PROVENTIL HFA, VENTOLIN HFA, PROAIR HFA) inhaler 1 Puff  1 Puff Inhalation Q4H PRN    ALPRAZolam (XANAX) tablet 0.5 mg  0.5 mg Oral BID PRN    apixaban (ELIQUIS) tablet 5 mg  5 mg Oral BID    sodium chloride (NS) flush 5-40 mL  5-40 mL IntraVENous Q8H    sodium chloride (NS) flush 5-40 mL  5-40 mL IntraVENous PRN    acetaminophen (TYLENOL) tablet 650 mg  650 mg Oral Q6H PRN    Or    acetaminophen (TYLENOL) suppository 650 mg  650 mg Rectal Q6H PRN    polyethylene glycol (MIRALAX) packet 17 g  17 g Oral DAILY PRN    promethazine (PHENERGAN) tablet 25 mg  25 mg Oral Q6H PRN    QUEtiapine (SEROquel) tablet 200 mg  200 mg Oral QHS    pregabalin (LYRICA) capsule 150 mg  150 mg Oral BID    oxyCODONE ER (OxyCONTIN) tablet 10 mg  10 mg Oral Q12H    escitalopram oxalate (LEXAPRO) tablet 20 mg  20 mg Oral DAILY    furosemide (LASIX) tablet 20 mg  20 mg Oral BID    DULoxetine (CYMBALTA) capsule 30 mg  30 mg Oral DAILY    meropenem (MERREM) 500 mg in 0.9% sodium chloride (MBP/ADV) 50 mL MBP  0.5 g IntraVENous Q6H       Other Studies:  No results found for this visit on 12/23/20. No results found.     All Micro Results     Procedure Component Value Units Date/Time    CULTURE, BLOOD [[id number]] Collected: 12/23/20 2004    Order Status: Completed Specimen: Blood Updated: 12/29/20 1203     Special Requests: --        NO SPECIAL REQUESTS  RIGHT  Antecubital       Culture result: NO GROWTH 5 DAYS       CULTURE, URINE [[id number]]  (Abnormal)  (Susceptibility) Collected: 12/23/20 2330    Order Status: Completed Specimen: Cath Urine Updated: 12/28/20 0800     Special Requests: NO SPECIAL REQUESTS        Culture result:       >100,000 COLONIES/mL ESCHERICHIA COLI ** (EXTENDED SPECTRUM BETA LACTAMASE ) **                  >100,000 COLONIES/mL PROTEUS MIRABILIS                  ** MULTI-DRUG RESISTANT ORGANISM **                  RESULTS VERIFIED, PHONED TO AND READ BACK BY  Christin Palencia RN ON 12/27/20 @0705, ADS      CULTURE, BLOOD [[id number]]  (Abnormal) Collected: 12/24/20 0118    Order Status: Completed Specimen: Blood Updated: 12/27/20 4242     Special Requests: -- LEFT  HAND       GRAM STAIN       GRAM POS COCCI IN CLUSTERS            PEDIATRIC BOTTLE               RESULTS VERIFIED, PHONED TO AND READ BACK BY Landon Mcclellan RN @ 9575 ON 12/25/2020 AK. Culture result:       STAPHYLOCOCCUS SPECIES, COAGULASE NEGATIVE THIS ORGANISM MAY BE INDICATIVE OF CULTURE CONTAMINATION, HOWEVER, CLINICAL CORRELATION NEEDS TO BE EVALUATED, AS EACH CASE IS UNIQUE. REFER TO [email or web address] PANEL ACC [id number]    BLOOD CULTURE ID PANEL [[id number]]  (Abnormal) Collected: 12/24/20 0118    Order Status: Completed Specimen: Blood Updated: 12/25/20 0828     Acc. no. from Micro Order [id number]     Staphylococcus Detected        Comment: RESULTS VERIFIED, PHONED TO AND READ BACK BY  Landon Mcclellan RN @ 7432 ON 12/25/20 AK. mecA (Methicillin-Resistance Genes) Detected        Comment: Presence of mecA is highly indicative of methicillin resistance. The test does not replace traditional culture and susceptibilities        INTERPRETATION       Gram positive cocci in clusters. Identified by realtime PCR as  Coagulase negative Staphylococci           Comment: A single positive culture of coagulase negative Staph is likely to be a contaminant in adult patients. Consider discontinuation of antibiotics for gram positive bloodstream infections if patient asymptomatic. THIS TEST DOES NOT REPLACE SENSITIVITY TESTING.        CULTURE, URINE [[id number]]     Order Status: Canceled Specimen: Cath Urine           SARS-CoV-2 Lab Results  \"Novel Coronavirus\" Test: No results found for: COV2NT   \"Emergent Disease\" Test: No results found for: EDPR  \"SARS-COV-2\" Test: No results found for: XGCOVT  Rapid Test:   Lab Results   Component Value Date/Time    COVR Not detected 12/24/2020 01:30 AM            Assessment and Plan:     Hospital Problems as of 12/31/2020 Date Reviewed: 1/31/2019          Codes Class Noted - Resolved POA    * (Principal) Sepsis ([facility] ICD-10-CM: A41.9  ICD-9-CM: 038.9, 995.91  12/24/2020 - Present Yes        Hyponatremia ICD-10-CM: E87.1  ICD-9-CM: 276.1  12/24/2020 - Present Unknown        Diabetes ([facility] ICD-10-CM: E11.9  ICD-9-CM: 250.00  3/10/2019 - Present Yes        Hypertension ICD-10-CM: I10  ICD-9-CM: 401.9  3/10/2019 - Present Yes        A-fib ([facility] ICD-10-CM: I48.91  ICD-9-CM: 427.31  2/19/2019 - Present Yes        UTI (urinary tract infection) ICD-10-CM: N39.0  ICD-9-CM: 599.0  1/31/2019 - Present Yes        Paraplegia (HCC) (Chronic) ICD-10-CM: G82.20  ICD-9-CM: 344.1  12/10/2016 - Present Yes        Bipolar disorder without psychotic features ([facility] (Chronic) ICD-10-CM: F31.9  ICD-9-CM: 296.80  12/10/2016 - Present Yes              Plan:  # AFib RVR              - Back in it this afternoon but, again, spontaneously converted to SR before any IV Lopressor was given. Increase PO BB. # Sepsis 2/2 ESBL E. Coli UTI              - Resolved. ID appreciated. Finishes meropenem.     # DM2              - Home meds. Increase prandial. Con't basal with SSI.     # Bipolar              - Home meds     # Hyponatremia              - Resolved. DC planning/Dispo: Back to facility tomorrow. Diet:  DIET DIABETIC CONSISTENT CARB  DVT ppx: Eliquis    Other listed chronic conditions stable, cont current management.     Signed:  Robbin Black MD

## 2020-12-31 NOTE — ROUTINE PROCESS
Verbal bedside report given to [facility], oncoming RN. Patient's situation, background, assessment and recommendations provided. Opportunity for questions provided. Oncoming RN assumed care of patient.

## 2021-01-01 VITALS
RESPIRATION RATE: 17 BRPM | HEART RATE: 76 BPM | SYSTOLIC BLOOD PRESSURE: 112 MMHG | HEIGHT: 65 IN | WEIGHT: 249.8 LBS | OXYGEN SATURATION: 91 % | TEMPERATURE: 97.8 F | BODY MASS INDEX: 41.62 KG/M2 | DIASTOLIC BLOOD PRESSURE: 73 MMHG

## 2021-01-01 PROBLEM — N39.0 UTI (URINARY TRACT INFECTION): Status: RESOLVED | Noted: 2019-01-31 | Resolved: 2021-01-01

## 2021-01-01 PROBLEM — A41.9 SEPSIS (HCC): Status: RESOLVED | Noted: 2020-12-24 | Resolved: 2021-01-01

## 2021-01-01 PROBLEM — E87.1 HYPONATREMIA: Status: RESOLVED | Noted: 2020-12-24 | Resolved: 2021-01-01

## 2021-01-01 LAB
GLUCOSE BLD STRIP.AUTO-MCNC: 157 MG/DL (ref 65–100)
GLUCOSE BLD STRIP.AUTO-MCNC: 246 MG/DL (ref 65–100)

## 2021-01-01 PROCEDURE — 94640 AIRWAY INHALATION TREATMENT: CPT

## 2021-01-01 PROCEDURE — 74011636637 HC RX REV CODE- 636/637: Performed by: INTERNAL MEDICINE

## 2021-01-01 PROCEDURE — 74011250637 HC RX REV CODE- 250/637: Performed by: INTERNAL MEDICINE

## 2021-01-01 PROCEDURE — 94760 N-INVAS EAR/PLS OXIMETRY 1: CPT

## 2021-01-01 PROCEDURE — 82962 GLUCOSE BLOOD TEST: CPT

## 2021-01-01 RX ORDER — INSULIN GLARGINE 100 [IU]/ML
50 INJECTION, SOLUTION SUBCUTANEOUS 2 TIMES DAILY
Qty: 1 VIAL | Refills: 0 | Status: SHIPPED
Start: 2021-01-01

## 2021-01-01 RX ORDER — FUROSEMIDE 20 MG/1
20 TABLET ORAL 2 TIMES DAILY
Qty: 1 TAB | Refills: 0 | Status: SHIPPED
Start: 2021-01-01

## 2021-01-01 RX ORDER — DILTIAZEM HYDROCHLORIDE 240 MG/1
240 CAPSULE, COATED, EXTENDED RELEASE ORAL DAILY
Qty: 90 CAP | Refills: 0 | Status: SHIPPED | OUTPATIENT
Start: 2021-01-01

## 2021-01-01 RX ADMIN — POTASSIUM CHLORIDE 20 MEQ: 20 TABLET, EXTENDED RELEASE ORAL at 08:00

## 2021-01-01 RX ADMIN — ESCITALOPRAM OXALATE 20 MG: 10 TABLET ORAL at 08:00

## 2021-01-01 RX ADMIN — INSULIN LISPRO 10 UNITS: 100 INJECTION, SOLUTION INTRAVENOUS; SUBCUTANEOUS at 12:15

## 2021-01-01 RX ADMIN — OXYCODONE HYDROCHLORIDE 10 MG: 10 TABLET, FILM COATED, EXTENDED RELEASE ORAL at 08:00

## 2021-01-01 RX ADMIN — DOCUSATE SODIUM 100 MG: 100 CAPSULE ORAL at 08:01

## 2021-01-01 RX ADMIN — FUROSEMIDE 20 MG: 40 TABLET ORAL at 08:00

## 2021-01-01 RX ADMIN — APIXABAN 5 MG: 5 TABLET, FILM COATED ORAL at 08:00

## 2021-01-01 RX ADMIN — INSULIN GLARGINE 50 UNITS: 100 INJECTION, SOLUTION SUBCUTANEOUS at 08:03

## 2021-01-01 RX ADMIN — DILTIAZEM HYDROCHLORIDE 240 MG: 240 CAPSULE, COATED, EXTENDED RELEASE ORAL at 08:00

## 2021-01-01 RX ADMIN — INSULIN LISPRO 2 UNITS: 100 INJECTION, SOLUTION INTRAVENOUS; SUBCUTANEOUS at 08:04

## 2021-01-01 RX ADMIN — INSULIN LISPRO 10 UNITS: 100 INJECTION, SOLUTION INTRAVENOUS; SUBCUTANEOUS at 08:03

## 2021-01-01 RX ADMIN — INSULIN LISPRO 4 UNITS: 100 INJECTION, SOLUTION INTRAVENOUS; SUBCUTANEOUS at 12:15

## 2021-01-01 RX ADMIN — Medication 10 ML: at 06:23

## 2021-01-01 RX ADMIN — METOPROLOL SUCCINATE 50 MG: 50 TABLET, EXTENDED RELEASE ORAL at 09:47

## 2021-01-01 RX ADMIN — DULOXETINE HYDROCHLORIDE 30 MG: 30 CAPSULE, DELAYED RELEASE ORAL at 08:01

## 2021-01-01 RX ADMIN — FLUTICASONE PROPIONATE 2 PUFF: 110 AEROSOL, METERED RESPIRATORY (INHALATION) at 08:50

## 2021-01-01 RX ADMIN — FAMOTIDINE 20 MG: 20 TABLET, FILM COATED ORAL at 08:00

## 2021-01-01 RX ADMIN — PREGABALIN 150 MG: 75 CAPSULE ORAL at 08:01

## 2021-01-01 NOTE — ROUTINE PROCESS
[facility] Plan of care reviewed with patient at bedside. Opportunity to ask questions provided. Denies needs at this time. Bed low and locked with call light within reach. Patient instructed to call for assistance. Patient verbalized understanding.

## 2021-01-01 NOTE — DISCHARGE SUMMARY
Hospitalist Discharge Summary     Admit Date:  2020  6:55 PM   DC note date: 2021  Name:  La Mike   Age:  60 y.o.  :  1955   MRN:  [id number]   PCP:  Mac Fermin MD  Treatment Team: Attending Provider: Marshal Cronin MD; Consulting Provider: Yovany Dawkins MD; Care Manager: Herbie Canavan; Care Manager: Mattie Hernandez; Care Manager: NANCY Brush; Primary Nurse: Tyson Irving; Utilization Review: Irma Benavides RN    Problem List for this Hospitalization:  Hospital Problems as of 2021 Date Reviewed: 2019          Codes Class Noted - Resolved POA    Diabetes ([facility] ICD-10-CM: E11.9  ICD-9-CM: 250.00  3/10/2019 - Present Yes        Hypertension ICD-10-CM: I10  ICD-9-CM: 401.9  3/10/2019 - Present Yes        A-fib ([facility] ICD-10-CM: I48.91  ICD-9-CM: 427.31  2019 - Present Yes        Paraplegia ([facility] (Chronic) ICD-10-CM: G82.20  ICD-9-CM: 344.1  12/10/2016 - Present Yes        Bipolar disorder without psychotic features ([facility] (Chronic) ICD-10-CM: F31.9  ICD-9-CM: 296.80  12/10/2016 - Present Yes        * (Principal) RESOLVED: Sepsis ([facility] ICD-10-CM: A41.9  ICD-9-CM: 038.9, 995.91  2020 - 2021 Yes        RESOLVED: Hyponatremia ICD-10-CM: E87.1  ICD-9-CM: 276.1  2020 - 2021 Unknown        RESOLVED: UTI (urinary tract infection) ICD-10-CM: N39.0  ICD-9-CM: 599.0  2019 - 2021 Yes            Hospital Course:  Ms. Lazaro Alaniz is a nice 59 y/o WF with a h/o paraplegia, neurogenic bladder, anxiety/bipolar, chronic suprapubic catheter who was admitted on  with sepsis and AFib RVR. Her RVR resolved. UA suggested UTI. Her catheter was changed. She was started on meropenem given a history of ESBL, which she ultimately grew again. ID was consulted. Patient has completed a course of meropenem. She developed AFib again and Cardiology was consulted and medications were adjusted. She converted spontaneously back to NSR.  She was planned for discharge on 12/31 but developed AFib RVR again, however she again spontaneously converted back to NSR before any PRN medications were needed. She was monitored overnight and this did not recur. HRs remain normal. Her hospital course was otherwise unremarkable and she is medically stable for discharge back to her facility today. Disposition: Rehab Facility  Activity: Activity as tolerated  Diet: DIET DIABETIC CONSISTENT CARB Regular; 2 GM NA (House Low NA); No Conc. Sweets  Code Status: Full Code    Follow Up Orders:  No orders of the defined types were placed in this encounter. Follow-up Information     Follow up With Specialties Details Why Enio [address] [place] [address] [id number]  [address] [facility] [address], [address]   [address] [place] [address]  [phone]            Discharge meds at bottom of this note. Plan was discussed with patient, nursing, CM. All questions answered. Patient was stable at time of discharge. Given instructions to call a physician or return if any concerns. Discharge summary and encounter summary was sent to PCP electronically via \"Comm Mgt\" link in [facility], if possible. Diagnostic Imaging/Tests:   Xr Chest Port    Result Date: 12/23/2020  Chest portable CLINICAL INDICATION: Acute generalized weakness, nausea, fatigue, chills, elevated temperature, atrial fibrillation COMPARISON: 4/23/2019, 4/1/2019 TECHNIQUE: single AP portable view chest at 8:08 PM upright FINDINGS:  There is no evidence of consolidation, pneumothorax, pleural effusion or pulmonary edema. The mediastinal and hilar contours are stable given technique and positioning. Patient is slightly rotated. IMPRESSION: No acute disease. Echocardiogram/EKG results:  No results found for this visit on 12/23/20.     Results for orders placed or performed during the hospital encounter of 12/23/20   EKG, 12 LEAD, INITIAL   Result Value Ref Range    Ventricular Rate 160 BPM    Atrial Rate 267 BPM    QRS Duration 82 ms    Q-T Interval 276 ms    QTC Calculation (Bezet) 450 ms    Calculated R Axis 7 degrees    Calculated T Axis 118 degrees    Diagnosis       !! AGE AND GENDER SPECIFIC ECG ANALYSIS !! Atrial fibrillation with rapid ventricular response  Non-specific ST-t wave changes  Abnormal ECG  When compared with ECG of 24-APR-2019 00:46,  Significant changes have occurred  Confirmed by ST MARII OHARA MD (), LUIS ALFREDO DOMINIQUE ([id number]) on 12/23/2020 9:29:29 PM         Procedures done this admission:  * No surgery found *    All Micro Results     Procedure Component Value Units Date/Time    CULTURE, BLOOD [[id number]] Collected: 12/23/20 2004    Order Status: Completed Specimen: Blood Updated: 12/29/20 1203     Special Requests: --        NO SPECIAL REQUESTS  RIGHT  Antecubital       Culture result: NO GROWTH 5 DAYS       CULTURE, URINE [[id number]]  (Abnormal)  (Susceptibility) Collected: 12/23/20 2330    Order Status: Completed Specimen: Cath Urine Updated: 12/28/20 0800     Special Requests: NO SPECIAL REQUESTS        Culture result:       >100,000 COLONIES/mL ESCHERICHIA COLI ** (EXTENDED SPECTRUM BETA LACTAMASE ) **                  >100,000 COLONIES/mL PROTEUS MIRABILIS                  ** MULTI-DRUG RESISTANT ORGANISM **                  RESULTS VERIFIED, PHONED TO AND READ BACK BY  Marcos Augustine RN ON 12/27/20 @0705, ADS      CULTURE, BLOOD [[id number]]  (Abnormal) Collected: 12/24/20 0118    Order Status: Completed Specimen: Blood Updated: 12/27/20 5961     Special Requests: --        LEFT  HAND       GRAM STAIN       GRAM POS COCCI IN CLUSTERS            PEDIATRIC BOTTLE               RESULTS VERIFIED, PHONED TO AND READ BACK BY Sepideh Cunningham RN @ 7625 ON 12/25/2020 AK.            Culture result:       STAPHYLOCOCCUS SPECIES, COAGULASE NEGATIVE THIS ORGANISM MAY BE INDICATIVE OF CULTURE CONTAMINATION, HOWEVER, CLINICAL CORRELATION NEEDS TO BE EVALUATED, AS EACH CASE IS UNIQUE. REFER TO BIOFIRE PANEL ACC [id number]    BLOOD CULTURE ID PANEL [[id number]]  (Abnormal) Collected: 12/24/20 0118    Order Status: Completed Specimen: Blood Updated: 12/25/20 0828     Acc. no. from Micro Order [id number]     Staphylococcus Detected        Comment: RESULTS VERIFIED, PHONED TO AND READ BACK BY  Yovany Johnson RN @ 0564 ON 12/25/20 AK. mecA (Methicillin-Resistance Genes) Detected        Comment: Presence of mecA is highly indicative of methicillin resistance. The test does not replace traditional culture and susceptibilities        INTERPRETATION       Gram positive cocci in clusters. Identified by realtime PCR as  Coagulase negative Staphylococci           Comment: A single positive culture of coagulase negative Staph is likely to be a contaminant in adult patients. Consider discontinuation of antibiotics for gram positive bloodstream infections if patient asymptomatic. THIS TEST DOES NOT REPLACE SENSITIVITY TESTING. CULTURE, URINE [[id number]]     Order Status: Canceled Specimen: Cath Urine           SARS-CoV-2 Lab Results  \"Novel Coronavirus\" Test: No results found for: COV2NT   \"Emergent Disease\" Test: No results found for: EDPR  \"SARS-COV-2\" Test: No results found for: XGCOVT  Rapid Test:   Lab Results   Component Value Date/Time    COVR Not detected 12/24/2020 01:30 AM            Labs: Results:       BMP, Mg, Phos No results for input(s): NA, K, CL, CO2, AGAP, BUN, CREA, CA, GLU, MG, PHOS in the last 72 hours.    CBC Recent Labs     12/31/20  0429 12/30/20  1402   WBC 14.7* 13.0*   RBC 4.11 4.12   HGB 10.8* 10.9*   HCT 35.4* 36.3   * 537*   GRANS 58 64   LYMPH 26 21   EOS 4 4   MONOS 7 6   BASOS 1 1   IG 4 4   ANEU 8.5* 8.3*   ABL 3.9 2.8   DANA 0.5 0.5   ABM 1.0 0.8   ABB 0.1 0.1   AIG 0.7* 0.5      LFT No results for input(s): ALT, TBIL, AP, TP, ALB, GLOB, AGRAT in the last 72 hours.     No lab exists for component: SGOT, GPT   Cardiac Testing Lab Results   Component Value Date/Time     (H) 02/14/2019 07:41 PM    BNP 19 (H) 02/10/2019 08:45 PM    BNP 7 02/04/2016 06:30 PM    Troponin-I, Qt. <0.02 (L) 03/10/2019 06:50 PM    Troponin-I, Qt. <0.02 (L) 02/14/2019 07:41 PM    Troponin-I, Qt. <0.02 (L) 02/10/2019 08:45 PM      Coagulation Tests Lab Results   Component Value Date/Time    Prothrombin time 13.1 01/31/2019 10:56 AM    Prothrombin time >82.0 (H) 02/17/2017 09:00 PM    Prothrombin time 21.9 (H) 01/17/2017 11:20 AM    INR 1.0 01/31/2019 10:56 AM    INR 8.1 (HH) 02/17/2017 09:00 PM    INR 2.0 (H) 01/17/2017 11:20 AM    aPTT 40.4 (H) 02/01/2019 08:01 AM    aPTT 45.8 (H) 02/01/2019 12:34 AM    aPTT 28.5 02/04/2016 06:30 PM      A1c Lab Results   Component Value Date/Time    Hemoglobin A1c 9.6 (H) 12/24/2020 10:43 AM    Hemoglobin A1c 11.8 (H) 04/24/2019 04:26 AM    Hemoglobin A1c 9.6 (H) 02/12/2019 03:56 AM      Lipid Panel No results found for: CHOL, CHOLPOCT, CHOLX, CHLST, CHOLV, 397328, HDL, HDLP, LDL, LDLC, DLDLP, 823272, VLDLC, VLDL, TGLX, TRIGL, TRIGP, TGLPOCT, CHHD, [facility]   Thyroid Panel Lab Results   Component Value Date/Time    TSH 3.560 04/03/2019 04:43 AM    TSH 1.220 03/11/2019 05:29 AM    T4, Free 1.4 03/11/2019 05:29 AM        Most Recent UA Lab Results   Component Value Date/Time    Color YELLOW 12/23/2020 10:49 PM    Appearance TURBID 12/23/2020 10:49 PM    Specific gravity 1.012 12/23/2020 10:49 PM    pH (UA) 6.0 12/23/2020 10:49 PM    Protein 100 (A) 12/23/2020 10:49 PM    Glucose Negative 12/23/2020 10:49 PM    Ketone Negative 12/23/2020 10:49 PM    Bilirubin Negative 12/23/2020 10:49 PM    Blood LARGE (A) 12/23/2020 10:49 PM    Urobilinogen 1.0 12/23/2020 10:49 PM    Nitrites Positive (A) 12/23/2020 10:49 PM    Leukocyte Esterase LARGE (A) 12/23/2020 10:49 PM    WBC >100 12/23/2020 10:49 PM    RBC  12/23/2020 10:49 PM    Epithelial cells 5-10 12/23/2020 10:49 PM    Bacteria 4+ (H) 12/23/2020 10:49 PM    Casts 3-5 12/23/2020 10:49 PM    Crystals, urine MARKED 06/27/2018 12:25 PM    Mucus TRACE 12/10/2016 06:55 AM        No Known Allergies  Immunization History   Administered Date(s) Administered    Influenza Vaccine [employer]) PF (>6 Mo Flulaval, Fluarix, and >3 Yrs Afluria, Fluzone 10040) 10/03/2013, 12/11/2015, 12/23/2015, 10/20/2016    Influenza Vaccine PF 10/17/2014    Pneumococcal Conjugate (PCV-13) 03/03/2017    Pneumococcal Polysaccharide (PPSV-23) 12/11/2015, 05/08/2016    Rubella Virus Vaccine 06/30/1989    TB Skin Test (PPD) Intradermal 12/09/2016, 02/11/2019, 02/20/2019, 04/05/2019       All Labs from Last 24 Hrs:  Recent Results (from the past 24 hour(s))   GLUCOSE, POC    Collection Time: 12/31/20 10:53 AM   Result Value Ref Range    Glucose (POC) 212 (H) 65 - 100 mg/dL   EKG, 12 LEAD, SUBSEQUENT    Collection Time: 12/31/20 12:20 PM   Result Value Ref Range    Ventricular Rate 118 BPM    Atrial Rate 234 BPM    QRS Duration 78 ms    Q-T Interval 364 ms    QTC Calculation (Bezet) 510 ms    Calculated R Axis -4 degrees    Calculated T Axis 85 degrees    Diagnosis       Atrial fibrillation with rapid ventricular response  Abnormal ECG  When compared with ECG of 23-DEC-2020 19:41,  ST no longer depressed in Anterior leads  Nonspecific T wave abnormality no longer evident in Inferior leads  T wave inversion no longer evident in Anterior leads  Confirmed by Michele Luna MD (), Herbert Lopez ([id number]) on 12/31/2020 1:33:41 PM     GLUCOSE, POC    Collection Time: 12/31/20  4:43 PM   Result Value Ref Range    Glucose (POC) 287 (H) 65 - 100 mg/dL   GLUCOSE, POC    Collection Time: 12/31/20  8:54 PM   Result Value Ref Range    Glucose (POC) 380 (H) 65 - 100 mg/dL   GLUCOSE, POC    Collection Time: 12/31/20 10:23 PM   Result Value Ref Range    Glucose (POC) 334 (H) 65 - 100 mg/dL   GLUCOSE, POC    Collection Time: 01/01/21  6:04 AM   Result Value Ref Range Glucose (POC) 157 (H) 65 - 100 mg/dL       Discharge Exam:  Patient Vitals for the past 24 hrs:   Temp Pulse Resp BP SpO2   01/01/21 0440 98 °F (36.7 °C) 78 22 125/70 92 %   01/01/21 0037 97.9 °F (36.6 °C) 73 18 121/70 94 %   12/31/20 2133 98.4 °F (36.9 °C) 70 16 105/65 92 %   12/31/20 1653 98 °F (36.7 °C) 75 18 109/62 94 %   12/31/20 1352  81      12/31/20 1347  (!) 113  112/66    12/31/20 1252 98.1 °F (36.7 °C) 79 18 122/62 94 %   12/31/20 0859 98.2 °F (36.8 °C) 80 18 110/72 91 %     Oxygen Therapy  O2 Sat (%): 92 % (01/01/21 0440)  Pulse via Oximetry: 107 beats per minute (12/31/20 0730)  O2 Device: Room air (01/01/21 0405)  O2 Flow Rate (L/min): 4 l/min (12/24/20 0808)  FIO2 (%): 21 % (12/31/20 0730)    Estimated body mass index is 41.57 kg/m² as calculated from the following:    Height as of this encounter: 5' 5\" (1.651 m). Weight as of this encounter: 113.3 kg (249 lb 12.8 oz). Intake/Output Summary (Last 24 hours) at 1/1/2021 0741  Last data filed at 1/1/2021 0405  Gross per 24 hour   Intake 840 ml   Output 2450 ml   Net -1610 ml       *Note that automatically entered I/Os may not be accurate; dependent on patient compliance with collection and accurate  by assistants. General:    Well nourished. No overt distress. Obese. Chronically ill appearing. Eyes:   Normal sclerae. Extraocular movements intact. ENT:  Normocephalic, atraumatic. Moist mucous membranes  CV:   Regular rate and rhythm. No m/r/g. No edema. Lungs:  CTAB. No wheezing, rhonchi, or rales. Unlabored  Abdomen: Soft, nontender, nondistended. Extremities: Warm and dry. No cyanosis or clubbing  Neurologic: CN II-XII grossly intact. No gross focal deficits. Alert. Skin:     No rashes. No jaundice. Psych:  Normal mood and affect.     Current Med List in Hospital:   Current Facility-Administered Medications   Medication Dose Route Frequency    metoprolol (LOPRESSOR) injection 5 mg  5 mg IntraVENous Q5MIN PRN  metoprolol succinate (TOPROL-XL) XL tablet 50 mg  50 mg Oral DAILY    insulin lispro (HUMALOG) injection 10 Units  10 Units SubCUTAneous TIDAC    insulin glargine (LANTUS) injection 50 Units  50 Units SubCUTAneous BID    insulin lispro (HUMALOG) injection   SubCUTAneous AC&HS    docusate sodium (COLACE) capsule 100 mg  100 mg Oral DAILY    potassium chloride (K-DUR, KLOR-CON) SR tablet 20 mEq  20 mEq Oral BID    alum-mag hydroxide-simeth (MYLANTA) oral suspension 30 mL  30 mL Oral Q4H PRN    famotidine (PEPCID) tablet 20 mg  20 mg Oral BID    oxyCODONE-acetaminophen (PERCOCET 7.5) 7.5-325 mg per tablet 1 Tab  1 Tab Oral DAILY PRN    dilTIAZem ER (CARDIZEM CD) capsule 240 mg  240 mg Oral DAILY    fluticasone (FLOVENT HFA) 110 mcg inhaler  2 Puff Inhalation BID RT    albuterol (PROVENTIL HFA, VENTOLIN HFA, PROAIR HFA) inhaler 1 Puff  1 Puff Inhalation Q4H PRN    ALPRAZolam (XANAX) tablet 0.5 mg  0.5 mg Oral BID PRN    apixaban (ELIQUIS) tablet 5 mg  5 mg Oral BID    sodium chloride (NS) flush 5-40 mL  5-40 mL IntraVENous Q8H    sodium chloride (NS) flush 5-40 mL  5-40 mL IntraVENous PRN    acetaminophen (TYLENOL) tablet 650 mg  650 mg Oral Q6H PRN    Or    acetaminophen (TYLENOL) suppository 650 mg  650 mg Rectal Q6H PRN    polyethylene glycol (MIRALAX) packet 17 g  17 g Oral DAILY PRN    promethazine (PHENERGAN) tablet 25 mg  25 mg Oral Q6H PRN    QUEtiapine (SEROquel) tablet 200 mg  200 mg Oral QHS    pregabalin (LYRICA) capsule 150 mg  150 mg Oral BID    oxyCODONE ER (OxyCONTIN) tablet 10 mg  10 mg Oral Q12H    escitalopram oxalate (LEXAPRO) tablet 20 mg  20 mg Oral DAILY    furosemide (LASIX) tablet 20 mg  20 mg Oral BID    DULoxetine (CYMBALTA) capsule 30 mg  30 mg Oral DAILY       Discharge Info:   Current Discharge Medication List      START taking these medications    Details   insulin glargine (LANTUS) 100 unit/mL injection 50 Units by SubCUTAneous route two (2) times a day.   Qty: 1 Vial, Refills: 0      furosemide (LASIX) 20 mg tablet Take 1 Tab by mouth two (2) times a day. Qty: 1 Tab, Refills: 0      dilTIAZem ER (CARDIZEM CD) 240 mg capsule Take 1 Cap by mouth daily. Qty: 90 Cap, Refills: 0         CONTINUE these medications which have NOT CHANGED    Details   insulin lispro (HUMALOG) 100 unit/mL injection Less than 150 =   0 units           150 -199 =   2 units  200 -249 =   4 units  250 -299 =   6 units  300 -349 =   8 units  Before meals and at bedtime. Qty: 1 Vial, Refills: 0      Blood-Glucose Meter monitoring kit Check glucose at home daily  Qty: 1 Kit, Refills: 0      nystatin (MYCOSTATIN) powder Apply  to affected area two (2) times a day. Qty: 1 Bottle, Refills: 0      promethazine (PHENERGAN) 25 mg tablet Take 1 Tab by mouth every six (6) hours as needed for Nausea. Qty: 23 Tab, Refills: 0      metoprolol succinate (TOPROL-XL) 50 mg XL tablet Take 1 Tab by mouth daily. Qty: 30 Tab, Refills: 0      apixaban (ELIQUIS) 5 mg tablet Take 1 Tab by mouth two (2) times a day. Qty: 60 Tab, Refills: 0      QUEtiapine (SEROQUEL) 100 mg tablet Take 3 Tabs by mouth nightly. Qty: 15 Tab, Refills: 0      pantoprazole (PROTONIX) 40 mg tablet Take 1 Tab by mouth Daily (before breakfast). Qty: 30 Tab, Refills: 0      acetaminophen (TYLENOL) 325 mg tablet Take 2 Tabs by mouth every six (6) hours as needed for Pain or Fever. Qty: 30 Tab, Refills: 0      naloxone (NARCAN) 4 mg/actuation nasal spray Use 1 spray intranasally, then discard. Repeat with new spray every 2 min as needed for opioid overdose symptoms, alternating nostrils. Qty: 1 Each, Refills: 0      polyethylene glycol (MIRALAX) 17 gram packet Take 1 Packet by mouth daily as needed for Other (to have a daily BM). Qty: 15 Packet, Refills: 0      albuterol (PROVENTIL HFA, VENTOLIN HFA, PROAIR HFA) 90 mcg/actuation inhaler Take 1 Puff by inhalation every four (4) hours as needed for Wheezing.   Qty: 1 Inhaler, Refills: 0 albuterol (PROVENTIL VENTOLIN) 2.5 mg /3 mL (0.083 %) nebulizer solution Take 3 mL by inhalation every four (4) hours as needed for Wheezing. Qty: 24 Each, Refills: 0      ALPRAZolam (XANAX) 0.5 mg tablet Take 1 Tab by mouth two (2) times daily as needed for Anxiety. Max Daily Amount: 1 mg. Qty: 30 Tab, Refills: 0    Associated Diagnoses: Bipolar disorder without psychotic features ([address]      bisacodyl (DULCOLAX) 5 mg EC tablet Take 1 Tab by mouth daily as needed for Constipation. Qty: 30 Tab, Refills: 0      budesonide (PULMICORT) 0.5 mg/2 mL nbsp 2 mL by Nebulization route two (2) times a day. Qty: 60 Each, Refills: 0      zinc oxide-cod liver oil (DESITIN) 40 % paste Apply  to affected area two (2) times a day. Qty: 1 Tube, Refills: 1         STOP taking these medications       flecainide (TAMBOCOR) 50 mg tablet Comments:   Reason for Stopping:         amLODIPine (NORVASC) 5 mg tablet Comments:   Reason for Stopping:         fentaNYL (DURAGESIC) 25 mcg/hr PATCH Comments:   Reason for Stopping:                 Time spent in patient discharge planning and coordination 35 minutes.     Signed:  Fely Salinas MD

## 2021-01-01 NOTE — DISCHARGE INSTRUCTIONS
Patient Education        Learning About Atrial Fibrillation  What is atrial fibrillation? Atrial fibrillation (say \"AY-tree-deepak mzc-gysc-ORL-shun\") is the most common type of irregular heartbeat (arrhythmia). Normally, the heart beats in a strong, steady rhythm. In atrial fibrillation, a problem with the heart's electrical system causes the two upper parts of the heart (the atria) to quiver, or fibrillate. Your heart rate also may be faster than normal.  Atrial fibrillation can be dangerous because if the heartbeat isn't strong and steady, blood can collect, or pool, in the atria. And pooled blood is more likely to form clots. Clots can travel to the brain, block blood flow, and cause a stroke. Atrial fibrillation can also lead to heart failure. Treatment for atrial fibrillation helps prevent stroke and heart failure. It also helps relieve symptoms. Atrial fibrillation is often caused by another heart problem. It may happen after heart surgery. It may also be caused by other problems, such as an overactive thyroid gland or lung disease. Many people with atrial fibrillation are able to live full and active lives. What are the symptoms? Some people feel symptoms when they have episodes of atrial fibrillation. But other people don't notice any symptoms. If you have symptoms, you may feel:  · A fluttering, racing, or pounding feeling in your chest called palpitations. · Weak or tired. · Dizzy or lightheaded. · Short of breath. · Chest pain. · Confused. You may notice signs of atrial fibrillation when you check your pulse. Your pulse may seem uneven or fast.  What can you expect when you have atrial fibrillation? At first, spells of atrial fibrillation may come on suddenly and last a short time. It may go away on its own or it goes away after treatment. This is called paroxysmal atrial fibrillation. Over time, the spells may last longer and occur more often. They often don't go away on their own.   How is it treated? Treatments can help you feel better and prevent future problems, especially stroke and heart failure. The main types of treatment slow the heart rate, control the heart rhythm, and help prevent stroke. Your treatment will depend on the cause of your atrial fibrillation, your symptoms, and your risk for stroke. · Heart rate treatment. Medicine may be used to slow your heart rate. Your heartbeat may still be irregular. But these medicines keep your heart from beating too fast. They may also help relieve your symptoms. · Heart rhythm treatment. Different treatments may be used to try to stop atrial fibrillation and keep it from returning. They can also relieve symptoms. These treatments include medicine, electrical cardioversion to shock the heart back to a normal rhythm, a procedure called catheter ablation, and heart surgery. · Stroke prevention. You and your doctor can decide how to lower your risk. You may decide to take a blood-thinning medicine called an anticoagulant. How can you live well with it? You can live well and help manage atrial fibrillation by having a heart-healthy lifestyle. This lifestyle may help reduce how often you have episodes of atrial fibrillation. If you are overweight, losing weight can help relieve symptoms. To have a heart-healthy lifestyle:  · Don't smoke. · Eat heart-healthy foods. · Be active. Talk to your doctor about what type and level of exercise is safe for you. · Stay at a healthy weight. Lose weight if you need to. · Avoid alcohol if it triggers symptoms. · Manage other health problems such as high blood pressure, high cholesterol, and diabetes. · Avoid getting sick from the flu. Get a flu shot every year. · Manage stress. Where can you learn more? Go to http://www.gray.com/  Enter L274 in the search box to learn more about \"Learning About Atrial Fibrillation. \"  Current as of: December 16, 2019               Content Version: 12.6  © 9419-7098 [employer]. Care instructions adapted under license by [employer] (which disclaims liability or warranty for this information). If you have questions about a medical condition or this instruction, always ask your healthcare professional. [address] any warranty or liability for your use of this information. Patient Education        Learning About Sepsis  What is sepsis? Sepsis is an intense reaction to an infection. It can cause deadly damage to the body and lead to dangerously low blood pressure. You may have inflammation across large areas of your body. It can damage tissue and even go deep into your organs. Sepsis can develop very quickly. It requires immediate care in a hospital.  Infections that can lead to sepsis include:  · A skin infection such as from a cut. · A lung infection like pneumonia. · A kidney infection. · A gut infection such as E. coli. Symptoms can include low blood pressure, breathing problems, fast heartbeat, and confusion. Other symptoms include fever or low body temperature, chills, cool clammy skin, skin rashes, and shaking. Sepsis can cause problems in many organs. People with sepsis might need to be treated in an intensive care unit (ICU) for several days or weeks. An ICU is a part of the hospital where very sick people get care. Septic shock is sepsis that causes extremely low blood pressure, which limits blood flow to the body. It can cause organ failure and death. How is sepsis treated? Doctors will treat the person with antibiotics. They will try to find the infection that led to sepsis. Machines will track the person's vital signs, including temperature, blood pressure, breathing rate, and pulse rate. The person will get fluids through an IV. He or she may also get strong medicine. This can help raise the blood pressure.   If a person with sepsis is very sick, equipment in the ICU can support many body systems. That includes breathing, circulation, fluids, and help for organs like the kidneys and heart. If the person needs help breathing, a ventilator may be used. What can you expect when someone has sepsis? The person may start new treatments while still in the hospital. Different doctors may help with different symptoms. If a person needs to be treated in the intensive care unit (ICU), the ICU staff will do everything they can to treat all of the problems sepsis causes, including the infection. The ICU can be scary and confusing for patients and their families, friends, and supporters. But it's designed to keep your loved one comfortable and safe and to provide the best medical care. Expect a long recovery after the person leaves the ICU. If you need it, ask for support from friends and family. Where can you learn more? Go to http://www.gray.com/  Enter [id number] in the search box to learn more about \"Learning About Sepsis. \"  Current as of: June 26, 2019               Content Version: 12.6  © 3059-7144 [employer]. Care instructions adapted under license by [employer] (which disclaims liability or warranty for this information). If you have questions about a medical condition or this instruction, always ask your healthcare professional. [place] [address] any warranty or liability for your use of this information. Patient Education      Diltiazem (Cardizem, Cardizem CD, Cardizem LA, Cardizem SR) - (By mouth)   Why this medicine is used:   Treats high blood pressure and angina (chest pain).   Contact a nurse or doctor right away if you have:  · Fast, slow, uneven, or pounding heartbeat  · Rapid weight gain; swelling in your hands, ankles, or feet  · Dark urine or pale stools  · Nausea, vomiting, loss of appetite, stomach pain,  · Yellow skin or eyes     Common side effects:  · Dizziness  · Tiredness  © 2017 [facility] [address] is for End User's use only and may not be sold, redistributed or otherwise used for commercial purposes. Patient Education   Patient Education      Insulin Glargine (Lantus, Lantus SoloStar, Toujeo) - (By injection)   Why this medicine is used:   Treats diabetes. Contact a nurse or doctor right away if you have:  · Fast, pounding, or uneven heartbeat  · Lightheadedness, confusion  · Shaking, trembling, sweating  · Dry mouth, increased thirst or hunger, muscle cramps, nausea or vomiting     Common side effects:  · Redness, itching, swelling, changes in your skin where the shot is given  © 2017 [address] is for End User's use only and may not be sold, redistributed or otherwise used for commercial purposes. Furosemide (Lasix) - (By mouth)   Why this medicine is used:   Treats fluid retention and high blood pressure. Contact a nurse or doctor right away if you have:  · Blistering, peeling, red skin rash  · Lightheadedness, dizziness, fainting  · Dry mouth, increased thirst, muscle cramps, nausea or vomiting  · Uneven heartbeat  · Hearing loss, ringing in the ears     Common side effects:  · Loss of appetite, stomach cramps  © 2017 [facility] Information is for End User's use only and may not be sold, redistributed or otherwise used for commercial purposes.

## 2021-01-01 NOTE — PROGRESS NOTES
Pt discharged back to [facility] today for continued care via [facility]. Packet prepared to go with pt to facility. Care Management Interventions  PCP Verified by CM:  Yes  Mode of Transport at Discharge: BLS  Transition of Care Consult (CM Consult): Discharge Planning, SNF(Resident of [facility])  Discharge Durable Medical Equipment: No  Physical Therapy Consult: No  Occupational Therapy Consult: No  Speech Therapy Consult: No  Current Support Network: [address] [place] [address], Family Lives Nearby  Confirm Follow Up Transport: Other (see comment)(TBD: based upon need. )  [facility]  Discharge Location  Discharge Placement: Skilled nursing facility

## 2021-01-01 NOTE — ROUTINE PROCESS
Verbal bedside report given to nga Vincent RN. Patient's situation, background, assessment and recommendations provided. Opportunity for questions provided. Oncoming RN assumed care of patient.

## 2021-01-01 NOTE — PROGRESS NOTES
Telephone report given to Leny Barrett at [facility]. Pt to be picked up at 1300. Pt to be sent with new prescription of Cardizem and discharge paperwork. Verified with RN that pt has prescriptions for Lasix and Lantus. All questions answered. Pt be discharged with suprapubic jasso and colostomy bag. Discharge instructions reviewed with pt; IV removed and telebox returned to monitor room. Room packed up including cell phone, glasses and rescue inhaler.

## 2021-01-01 NOTE — ROUTINE PROCESS
Verbal bedside report received from [facility]. Assumed care of patient. Pt watching TV in bed denies need

## 2021-08-03 PROBLEM — I48.91 A-FIB (HCC): Status: RESOLVED | Noted: 2019-02-19 | Resolved: 2021-08-03

## 2023-08-21 NOTE — PROGRESS NOTES
Verbal bedside report received from NALDO [facility]. Assumed care of patient. cardizem IV drip verified at bedside with outgoing RN. Instructions: This plan will send the code FBSE to the PM system.  DO NOT or CHANGE the price. Price (Do Not Change): 0.00 Detail Level: Simple WDL

## 2024-06-05 NOTE — PROGRESS NOTES
[facility] CARDIOLOGY PROGRESS NOTE 
      
 
2/5/2019 7:51 PM 
 
Admit Date: 2/4/2019 Subjective:  
 
Discharged 2/4 but represented to ER 2/5 and noted back in AF with RVR. Currently in SR 
 
ROS: 
Cardiovascular:  As noted above Objective:  
  
Vitals:  
 02/05/19 1630 02/05/19 1633 02/05/19 1705 02/05/19 1800 BP: 151/71  170/90 165/81 Pulse:  70 71 70 Resp:  22 22 Temp:      
SpO2: 98% 95% 91% Weight:      
Height:      
 
 
Physical Exam: 
General-No Acute Distress Neck- supple, no JVD 
CV- regular rate and rhythm no MRG Lung- clear bilaterally Abd- soft, nontender, nondistended Ext- tr edema bilaterally. Skin- warm and dry Data Review:  
Recent Labs 02/05/19 
0542 02/04/19 
2308  138  
K 3.5 3.8 MG  --  2.2 BUN 15 14 CREA 0.94 1.06* * 100 WBC 14.3* 16.2* HGB 14.6 15.9*  
HCT 46.4* 49.9*  
 361 Assessment/Plan: Active Problems: 
  Leukocytosis (2/5/2019) Dyspnea (2/5/2019) Atrial fibrillation with rapid ventricular response ([address] (2/5/2019) Hypertensive urgency, malignant (2/5/2019) Chronic midline low back pain without sciatica (2/5/2019) Recurrent paroxyms of AF; likely triggered by UTI but also prior hx of AF. Continue eliquis. Likely needs AAD and attempt trial with multaq. Monitor EKG intervals (QT) with possible med interractions. Echo with preserved EF and mild LAE. Continue lopressor.  
Abx per primary team 
 
 
Jaime Christine MD 
2/5/2019 7:51 PM 
 [FreeTextEntry1] : FENO (April 2024) 13 (LOW) 6MWT (April 2024) Pt ambulated 489 meters with lowest O2 saturation at 93% SPI (Mar 2024) FEV1/FVC 71 FEV1 1.63 (59.6) FVC 2.30 (63.5) Mild obstruction and restriction

## 2024-07-16 NOTE — PROGRESS NOTES
[facility] and [facility] states discharge plan is to return home with him. Patient's son states they do not want to resume [facility], but would like referral sent to [facility]. Referral sent. CM will continue to follow. Care Management Interventions PCP Verified by CM: Yes([facility] Palliative Care Criteria Met (RRAT>21 & CHF Dx)?: No(RRAT 24 Dx Medicaid ) Mode of Transport at Discharge: Other (see comment) Transition of Care Consult (CM Consult): Discharge Planning, Home Health [address] No 
Reason Outside [place]: Patient already serviced by other home care/hospice agency Discharge Durable Medical Equipment: No 
Physical Therapy Consult: No 
Occupational Therapy Consult: No 
Speech Therapy Consult: Yes Current Support Network: Other, Relative's Home(lives with her sons [facility] [phone] and Luis Crooks ) Confirm Follow Up Transport: Other (see comment)([employer] [facility] or [facility]) Discharge Location Discharge Placement: Home with home health No